# Patient Record
Sex: FEMALE | Race: WHITE | NOT HISPANIC OR LATINO | Employment: OTHER | URBAN - METROPOLITAN AREA
[De-identification: names, ages, dates, MRNs, and addresses within clinical notes are randomized per-mention and may not be internally consistent; named-entity substitution may affect disease eponyms.]

---

## 2017-03-01 ENCOUNTER — APPOINTMENT (OUTPATIENT)
Dept: LAB | Facility: CLINIC | Age: 77
End: 2017-03-01
Payer: MEDICARE

## 2017-03-01 DIAGNOSIS — I10 ESSENTIAL (PRIMARY) HYPERTENSION: ICD-10-CM

## 2017-03-01 LAB
ANION GAP SERPL CALCULATED.3IONS-SCNC: 5 MMOL/L (ref 4–13)
BUN SERPL-MCNC: 20 MG/DL (ref 5–25)
CALCIUM SERPL-MCNC: 9.6 MG/DL (ref 8.3–10.1)
CHLORIDE SERPL-SCNC: 103 MMOL/L (ref 100–108)
CO2 SERPL-SCNC: 32 MMOL/L (ref 21–32)
CREAT SERPL-MCNC: 0.97 MG/DL (ref 0.6–1.3)
GFR SERPL CREATININE-BSD FRML MDRD: 56 ML/MIN/1.73SQ M
GLUCOSE SERPL-MCNC: 128 MG/DL (ref 65–140)
POTASSIUM SERPL-SCNC: 4.3 MMOL/L (ref 3.5–5.3)
SODIUM SERPL-SCNC: 140 MMOL/L (ref 136–145)

## 2017-03-01 PROCEDURE — 80048 BASIC METABOLIC PNL TOTAL CA: CPT

## 2017-03-01 PROCEDURE — 36415 COLL VENOUS BLD VENIPUNCTURE: CPT

## 2017-03-21 ENCOUNTER — ALLSCRIPTS OFFICE VISIT (OUTPATIENT)
Dept: OTHER | Facility: OTHER | Age: 77
End: 2017-03-21

## 2017-05-22 ENCOUNTER — APPOINTMENT (OUTPATIENT)
Dept: LAB | Facility: CLINIC | Age: 77
End: 2017-05-22
Payer: MEDICARE

## 2017-05-22 DIAGNOSIS — E87.1 HYPO-OSMOLALITY AND HYPONATREMIA: ICD-10-CM

## 2017-05-22 LAB
ANION GAP SERPL CALCULATED.3IONS-SCNC: 10 MMOL/L (ref 4–13)
BACTERIA UR QL AUTO: ABNORMAL /HPF
BILIRUB UR QL STRIP: NEGATIVE
BUN SERPL-MCNC: 23 MG/DL (ref 5–25)
CALCIUM SERPL-MCNC: 9.6 MG/DL (ref 8.3–10.1)
CHLORIDE SERPL-SCNC: 99 MMOL/L (ref 100–108)
CLARITY UR: CLEAR
CO2 SERPL-SCNC: 28 MMOL/L (ref 21–32)
COLOR UR: YELLOW
CREAT SERPL-MCNC: 0.87 MG/DL (ref 0.6–1.3)
CREAT UR-MCNC: 62.1 MG/DL
GFR SERPL CREATININE-BSD FRML MDRD: >60 ML/MIN/1.73SQ M
GLUCOSE SERPL-MCNC: 152 MG/DL (ref 65–140)
GLUCOSE UR STRIP-MCNC: NEGATIVE MG/DL
HGB UR QL STRIP.AUTO: NEGATIVE
KETONES UR STRIP-MCNC: NEGATIVE MG/DL
LEUKOCYTE ESTERASE UR QL STRIP: ABNORMAL
NITRITE UR QL STRIP: NEGATIVE
NON-SQ EPI CELLS URNS QL MICRO: ABNORMAL /HPF
PH UR STRIP.AUTO: 6.5 [PH] (ref 4.5–8)
POTASSIUM SERPL-SCNC: 4.4 MMOL/L (ref 3.5–5.3)
PROT UR STRIP-MCNC: NEGATIVE MG/DL
PROT UR-MCNC: 10 MG/DL
PROT/CREAT UR: 0.16 MG/G{CREAT} (ref 0–0.1)
RBC #/AREA URNS AUTO: ABNORMAL /HPF
SODIUM SERPL-SCNC: 137 MMOL/L (ref 136–145)
SP GR UR STRIP.AUTO: 1.01 (ref 1–1.03)
UROBILINOGEN UR QL STRIP.AUTO: 0.2 E.U./DL
WBC #/AREA URNS AUTO: ABNORMAL /HPF

## 2017-05-22 PROCEDURE — 84156 ASSAY OF PROTEIN URINE: CPT

## 2017-05-22 PROCEDURE — 80048 BASIC METABOLIC PNL TOTAL CA: CPT

## 2017-05-22 PROCEDURE — 81001 URINALYSIS AUTO W/SCOPE: CPT

## 2017-05-22 PROCEDURE — 82570 ASSAY OF URINE CREATININE: CPT

## 2017-05-22 PROCEDURE — 36415 COLL VENOUS BLD VENIPUNCTURE: CPT

## 2017-05-24 ENCOUNTER — GENERIC CONVERSION - ENCOUNTER (OUTPATIENT)
Dept: OTHER | Facility: OTHER | Age: 77
End: 2017-05-24

## 2017-06-02 ENCOUNTER — ALLSCRIPTS OFFICE VISIT (OUTPATIENT)
Dept: OTHER | Facility: OTHER | Age: 77
End: 2017-06-02

## 2017-06-02 DIAGNOSIS — E87.1 HYPO-OSMOLALITY AND HYPONATREMIA: ICD-10-CM

## 2017-10-11 ENCOUNTER — APPOINTMENT (OUTPATIENT)
Dept: LAB | Facility: CLINIC | Age: 77
End: 2017-10-11
Payer: MEDICARE

## 2017-10-11 DIAGNOSIS — E87.1 HYPO-OSMOLALITY AND HYPONATREMIA: ICD-10-CM

## 2017-10-11 LAB
ANION GAP SERPL CALCULATED.3IONS-SCNC: 6 MMOL/L (ref 4–13)
BUN SERPL-MCNC: 15 MG/DL (ref 5–25)
CALCIUM SERPL-MCNC: 9.7 MG/DL (ref 8.3–10.1)
CHLORIDE SERPL-SCNC: 100 MMOL/L (ref 100–108)
CO2 SERPL-SCNC: 31 MMOL/L (ref 21–32)
CREAT SERPL-MCNC: 0.9 MG/DL (ref 0.6–1.3)
CREAT UR-MCNC: 57.9 MG/DL
GFR SERPL CREATININE-BSD FRML MDRD: 62 ML/MIN/1.73SQ M
GLUCOSE SERPL-MCNC: 147 MG/DL (ref 65–140)
POTASSIUM SERPL-SCNC: 4.5 MMOL/L (ref 3.5–5.3)
PROT UR-MCNC: 8 MG/DL
PROT/CREAT UR: 0.14 MG/G{CREAT} (ref 0–0.1)
SODIUM SERPL-SCNC: 137 MMOL/L (ref 136–145)

## 2017-10-11 PROCEDURE — 80048 BASIC METABOLIC PNL TOTAL CA: CPT

## 2017-10-11 PROCEDURE — 82570 ASSAY OF URINE CREATININE: CPT

## 2017-10-11 PROCEDURE — 84156 ASSAY OF PROTEIN URINE: CPT

## 2017-10-11 PROCEDURE — 36415 COLL VENOUS BLD VENIPUNCTURE: CPT

## 2017-10-22 ENCOUNTER — GENERIC CONVERSION - ENCOUNTER (OUTPATIENT)
Dept: OTHER | Facility: OTHER | Age: 77
End: 2017-10-22

## 2017-10-23 ENCOUNTER — GENERIC CONVERSION - ENCOUNTER (OUTPATIENT)
Dept: OTHER | Facility: OTHER | Age: 77
End: 2017-10-23

## 2017-11-28 ENCOUNTER — GENERIC CONVERSION - ENCOUNTER (OUTPATIENT)
Dept: NEPHROLOGY | Facility: CLINIC | Age: 77
End: 2017-11-28

## 2017-12-05 ENCOUNTER — GENERIC CONVERSION - ENCOUNTER (OUTPATIENT)
Dept: OTHER | Facility: OTHER | Age: 77
End: 2017-12-05

## 2017-12-15 ENCOUNTER — ALLSCRIPTS OFFICE VISIT (OUTPATIENT)
Dept: OTHER | Facility: OTHER | Age: 77
End: 2017-12-15

## 2017-12-20 ENCOUNTER — GENERIC CONVERSION - ENCOUNTER (OUTPATIENT)
Dept: OTHER | Facility: OTHER | Age: 77
End: 2017-12-20

## 2017-12-20 NOTE — PROGRESS NOTES
Assessment  1  Benign essential hypertension (401 1) (I10)   2  Hyponatremia (276 1) (E87 1)    Plan  Hyponatremia    · (1) BASIC METABOLIC PROFILE; Status:Active; Requested IQH:[id number]; Perform:[facility]; VLD:[id number]; Last Updated [facility]; 12/15/2017 12:05:52 PM;Ordered;For:Hyponatremia; Ordered By:Jeffrey Nava;   · (1) BASIC METABOLIC PROFILE; Status:Active; Requested QKX:43DKF4534; Perform:[facility]; [id number]; Last Updated [facility]; 12/15/2017 12:06:14 PM;Ordered;For:Hyponatremia; Ordered By:Jeffrey Nava;   · (1) BASIC METABOLIC PROFILE; Status:Active; Requested QFU:94WUP5171; Perform:[facility]; ZDC:46KMC1771; Last Updated [facility]; 12/15/2017 12:06:29 PM;Ordered;For:Hyponatremia; Ordered By:Jeffrey Nava;   · (1) BASIC METABOLIC PROFILE; Status:Complete; Requested for:Recurring Schedule:2018; 1/15/2018; 2018 ; Perform:[facility]; PIC:24YVN1555;KPVIBOE;PWB:[id number]; Ordered By:Jeffrey Nava;    #1  Continue fluid restriction between low 1 L to 1200 mL's per day that would be 1 L plus one glass of fluid  #2   Labs next week #3  Continue water pill  Please call with dosage so we can update the chart  #4  Make sure you continue to eat adequately #5  Call the office if any new medications were started   Discussion/Summary    #1  Hyponatremia: Recent hospitalization with acute on chronic drop in sodium level to 107 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, SIADH  Patient reported increase in fluid intake with decrease in intake of food due to nausea previous to admission  She was treated with 3% saline and discharged on fluid restriction, diuretic and salt tabs  She discontinued salt tabs one week, with suggestion of her PCP due to rash  Sodium level III days following cessation of salt tabs was 138 -Continue to closely monitor sodium level   Labs next week and then every two weeks in January Ã3-Continue fluid restriction-Continue diuretic-Hold salt tablets at this time#2  Hypertension: Blood pressure acceptable at this time#3  Volume status: Euvolemic  No evidence of orthostasis#4  CHF: Ejection fraction 55%, 2+ MR-Compensated#5  COPD: Remains on continuous home oxygen#6  Assessment of proteinuria excretion  Last urine protein creatinine ratio 0 16  The patient's family was counseled regarding instructions for management,-- risk factor reductions,-- patient and family education,-- impressions,-- importance of compliance with treatment  total time of encounter was 40 minutes-- and-- 20 minutes was spent counseling  Patient is able to Self-Care  Possible side effects of new medications were reviewed with the patient/guardian today  The treatment plan was reviewed with the patient/guardian  The patient/guardian understands and agrees with the treatment plan      Reason For Visit  Hospital follow-up      History of Present Illness  It was a pleasure seeing Camryn today in the nephrology clinic  She is followed by Dr Roberto Reyes for management of hyponatremia  She was hospitalized recently from November 27 to December 1 due to an acute decline in sodium level to 107  The patient states that it was preceded by increase in water intake, decreased appetite with decreased food intake and nausea  No recent change in medications  She was in the intensive care unit on 3% normal saline  She stabilized on fluid restriction, diuretic and salt tablets  Last Friday she had a generalized macular papular rash and her primary care physician took her off the salt tablets  The rash has subsequently resolved  She had labs done five days ago which show a serum sodium level of 138  She remains on a fluid restriction between 1 L to 1200 mL's a day  She is on torsemide daily  She relates that since she came home her lower extremity edema has all but resolved  She is on chronic oxygen since March 2016   She has no unusual shortness of breath although she has periods of shakiness sometimes in the morning  She has had no fevers or chills  No chest pain  No difficulty urinating  No nausea, vomiting, diarrhea  Her intake of food is adequate  She enjoys small frequent meals  She is religiously following her fluid restriction  Review of Systems   Constitutional: No complaints of fever, no chills, no anorexia, no tiredness, no recent weight gain or weight loss  Integumentary: a rash, but-- as noted in HPI  Gastrointestinal: No complains of abdominal pain, no constipation or diarrhea, no nausea or vomiting  Respiratory: no cough  Cardiovascular: no orthopnea,-- as noted in HPI,-- no chest pain,-- no palpitations-- and-- no lower extremity edema  Musculoskeletal: joint pain  Neurological: No complaints of headache, no lightheadedness or dizziness  Genitourinary: No dysuria, no hematuria, no nocturia, no urinary frequency, no incomplete emptying of bladder, no foamy urine  Eyes: No complaints of eyesight problems or dryness of eyes  ENT: no complaints of hearing loss, no nasal discharge  Psychiatric: Not suicidal, no sleep disturbance, no anxiety or depression, no change in personality, no emotional problems  Past Medical History    The active problems and past medical history were reviewed and updated today  Surgical History    The surgical history was reviewed and updated today  Family History    The family history was reviewed and updated today  Social History  The social history was reviewed and updated today  The social history was reviewed and is unchanged  Current Meds   1  Adult Aspirin EC Low Strength 81 MG Oral Tablet Delayed Release; TAKE 1 TABLET DAILY; Therapy: 33KAS2794 to Recorded   2  AmLODIPine Besylate 5 MG Oral Tablet; Take 1 tablet daily; Therapy: 85SRZ5513 to (Evaluate:07Jun2017) Recorded   3  Calcium 600-400 MG-UNIT Oral Tablet Chewable; chew 1 tablet twice daily; Therapy: 04WHE7226 to Recorded   4  Claritin 10 MG Oral Tablet; TAKE 1 TABLET DAILY; Therapy: 68AXQ9855 to Recorded   5  GlipiZIDE 10 MG Oral Tablet; Take 1 tablet twice daily; Therapy: 88PAD0469 to Recorded   6  Latanoprost 0 005 % Ophthalmic Solution; Therapy: 21Apr2017 to Recorded   7  MetFORMIN HCl - 500 MG Oral Tablet; take 2 tablet twice daily; Therapy: 97KMS7603 to Recorded   8  Metoprolol Tartrate 50 MG Oral Tablet; Take 1 tablet twice daily; Therapy: 14MWW4055 to (Evaluate:61Bjh1518) Recorded   9  Nasonex 50 MCG/ACT Nasal Suspension; USE 1 SPRAY IN EACH NOSTRIL ONCE DAILY; Therapy: 02ZMN8334 to Recorded   10  Symbicort 160-4 5 MCG/ACT Inhalation Aerosol; INHALE 2 PUFFS TWICE DAILY  RINSE  MOUTH AFTER USE; Therapy: 15NSO1955 to Recorded   11  Terazosin HCl - 10 MG Oral Capsule; TAKE 1 CAPSULE AT BEDTIME NIGHTLY; Therapy: 82NCK4017 to Recorded   12  Torsemide 20 MG Oral Tablet; TAKE 1 TABLET DAILY; Therapy: 35SUE8428 to (Evaluate:13Jun2018)  Requested for: 33WMM6217 Recorded    The medication list was reviewed and updated today  Allergies  1  HydroCHLOROthiazide TABS    Vitals  Vital Signs    ** Printed in Appendix #1 below  Physical Exam   Constitutional: General appearance: Abnormal   chronically ill-- and-- appearance reflects stated age  ENT: External ears and nose appear normal     Eyes: Anicteric sclerae  Neck: No bruit heard over either carotid  JVD:  No JVD present  Pulmonary: Respiratory effort: No increased work of breathing or signs of respiratory distress  -- Auscultation of lungs: Clear to auscultation  Cardiovascular: Auscultation of heart: Abnormal   The heart rate was normal  The rhythm was regular  Heart sounds: normal S2  A grade 2 systolic murmur was heard at the LUSB  Abdomen: Non-tender, no masses  Extremities: No cyanosis, clubbing or edema  Pulses: Dorsalis Pedis and Posterior Tibial pulses normal   Rash: No rash present    Neurologic: Non Focal     Psychiatric: Orientation to person, place, and time: Normal  -- and-- Mood and affect: Normal    Back: No CVA tenderness  Additional Exam:  No rash noted        Results/Data  Nephrology Flowsheet 07QYX7543 01:47PM Sheldon Saucedo     Test Name Result Flag Reference   Sodium 135     Potassium 4 5     Chloride 97     Carbon Dioxide 29     Calcium 9 7     GLUCOSE 194     BUN 21     Serum Creatinine 0 89     GFR, NON- 63         Signatures   Electronically signed by : Araceli Chaparro 15 2017 12:18PM EST                       (Author)    Electronically signed by : RAMAKRISHNA Saldana ; Dec 19 2017  9:29PM EST                       (Author)    Appendix #1  Vital Signs   PatientChloe Watson; : 1940; MRN: [id number]   Recorded: [id number] 11:57AM Recorded: 61TUI9180 11:45AM Recorded: 17NVM5996 11:30AM   Heart Rate  58, Apical    Pulse Quality  Regular, Apical    Respiration Quality  Normal    Systolic 320, RUE, Standing 136, RUE, Sitting    Diastolic 60, RUE, Standing 60, RUE, Sitting    Height   5 ft    Weight   190 lb 4 oz   BMI Calculated   37 16   BSA Calculated   1 83

## 2017-12-28 ENCOUNTER — GENERIC CONVERSION - ENCOUNTER (OUTPATIENT)
Dept: OTHER | Facility: OTHER | Age: 77
End: 2017-12-28

## 2018-01-01 DIAGNOSIS — I10 ESSENTIAL (PRIMARY) HYPERTENSION: ICD-10-CM

## 2018-01-01 DIAGNOSIS — E87.1 HYPO-OSMOLALITY AND HYPONATREMIA (CODE): ICD-10-CM

## 2018-01-09 NOTE — RESULT NOTES
Message   labs stable  Na stable  UPCR stable  will review with patient tomorrow at appt  Verified Results  (1) BASIC METABOLIC PROFILE 00HRI5783 09:46AM Julianawes Knutsondalia   TW Order Number: [id number]     Test Name Result Flag Reference   GLUCOSE,RANDM 147 mg/dL H    If the patient is fasting, the ADA then defines impaired fasting glucose as > 100 mg/dL and diabetes as > or equal to 123 mg/dL  Specimen collection should occur prior to Sulfasalazine administration due to the potential for falsely depressed results  Specimen collection should occur prior to Sulfapyridine administration due to the potential for falsely elevated results  SODIUM 137 mmol/L  136-145   POTASSIUM 4 5 mmol/L  3 5-5 3   CHLORIDE 100 mmol/L  100-108   CARBON DIOXIDE 31 mmol/L  21-32   ANION GAP (CALC) 6 mmol/L  4-13   BLOOD UREA NITROGEN 15 mg/dL  5-25   CREATININE 0 90 mg/dL  0 60-1 30   Standardized to IDMS reference method   CALCIUM 9 7 mg/dL  8 3-10 1   eGFR 62 ml/min/1 73sq [facility] Disease Education Program recommendations are as follows:  GFR calculation is accurate only with a steady state creatinine  Chronic Kidney disease less than 60 ml/min/1 73 sq  meters  Kidney failure less than 15 ml/min/1 73 sq  meters       (1) URINE PROTEIN CREATININE RATIO 11Oct2017 09:46AM Julianawes Scooby   TW Order Number: [id number]     Test Name Result Flag Reference   CREATININE URINE 57 9 mg/dL     URINE PROTEIN:CREATININE RATIO 0 14 H 0 00-0 10   URINE PROTEIN 8 mg/dL

## 2018-01-13 VITALS
HEIGHT: 60 IN | DIASTOLIC BLOOD PRESSURE: 85 MMHG | SYSTOLIC BLOOD PRESSURE: 145 MMHG | BODY MASS INDEX: 35.53 KG/M2 | WEIGHT: 181 LBS

## 2018-01-13 VITALS
DIASTOLIC BLOOD PRESSURE: 52 MMHG | SYSTOLIC BLOOD PRESSURE: 132 MMHG | BODY MASS INDEX: 34.55 KG/M2 | WEIGHT: 176 LBS | HEIGHT: 60 IN

## 2018-01-15 ENCOUNTER — APPOINTMENT (OUTPATIENT)
Dept: LAB | Facility: CLINIC | Age: 78
End: 2018-01-15
Payer: MEDICARE

## 2018-01-15 DIAGNOSIS — E87.1 HYPO-OSMOLALITY AND HYPONATREMIA (CODE): ICD-10-CM

## 2018-01-15 LAB
ANION GAP SERPL CALCULATED.3IONS-SCNC: 7 MMOL/L (ref 4–13)
BUN SERPL-MCNC: 15 MG/DL (ref 5–25)
CALCIUM SERPL-MCNC: 9.7 MG/DL (ref 8.3–10.1)
CHLORIDE SERPL-SCNC: 102 MMOL/L (ref 100–108)
CO2 SERPL-SCNC: 31 MMOL/L (ref 21–32)
CREAT SERPL-MCNC: 0.9 MG/DL (ref 0.6–1.3)
GFR SERPL CREATININE-BSD FRML MDRD: 62 ML/MIN/1.73SQ M
GLUCOSE SERPL-MCNC: 163 MG/DL (ref 65–140)
POTASSIUM SERPL-SCNC: 4 MMOL/L (ref 3.5–5.3)
SODIUM SERPL-SCNC: 140 MMOL/L (ref 136–145)

## 2018-01-15 PROCEDURE — 36415 COLL VENOUS BLD VENIPUNCTURE: CPT

## 2018-01-15 PROCEDURE — 80048 BASIC METABOLIC PNL TOTAL CA: CPT

## 2018-01-16 NOTE — RESULT NOTES
Verified Results  (1) BASIC METABOLIC PROFILE 24GBE1533 09:12AM Gnzo   TW Order Number: [id number]     Test Name Result Flag Reference   GLUCOSE,RANDM 152 mg/dL H    If the patient is fasting, the ADA then defines impaired fasting glucose as > 100 mg/dL and diabetes as > or equal to 123 mg/dL  SODIUM 137 mmol/L  136-145   POTASSIUM 4 4 mmol/L  3 5-5 3   CHLORIDE 99 mmol/L L 100-108   CARBON DIOXIDE 28 mmol/L  21-32   ANION GAP (CALC) 10 mmol/L  4-13   BLOOD UREA NITROGEN 23 mg/dL  5-25   CREATININE 0 87 mg/dL  0 60-1 30   Standardized to IDMS reference method   CALCIUM 9 6 mg/dL  8 3-10 1   eGFR Non-African American      >60 0 ml/min/1 73sq [facility] Disease Education Program recommendations are as follows:  GFR calculation is accurate only with a steady state creatinine  Chronic Kidney disease less than 60 ml/min/1 73 sq  meters  Kidney failure less than 15 ml/min/1 73 sq  meters       (1) URINE PROTEIN CREATININE RATIO 68BAO7111 09:12AM Gnzo   TW Order Number: [id number]     Test Name Result Flag Reference   CREATININE URINE 62 1 mg/dL     URINE PROTEIN:CREATININE RATIO 0 16 H 0 00-0 10   URINE PROTEIN 10 mg/dL       (1) URINALYSIS (will reflex a microscopy if leukocytes, occult blood, protein or nitrites are not within normal limits) 47DMT5728 09:12AM Gnzo   TW Order Number: [id number]     Test Name Result Flag Reference   COLOR Yellow     CLARITY Clear     SPECIFIC GRAVITY UA 1 010  1 003-1 030   PH UA 6 5  4 5-8 0   LEUKOCYTE ESTERASE UA Trace A Negative   NITRITE UA Negative  Negative   PROTEIN UA Negative mg/dl  Negative   GLUCOSE UA Negative mg/dl  Negative   KETONES UA Negative mg/dl  Negative   UROBILINOGEN UA 0 2 E U /dl  0 2, 1 0 E U /dl   BILIRUBIN UA Negative  Negative   BLOOD UA Negative  Negative   BACTERIA Occasional /hpf  None Seen, Occasional   EPITHELIAL CELLS Occasional /hpf  None Seen, Occasional   RBC UA 0-1 /hpf A None Seen   WBC UA 1-2 /hpf A None Seen

## 2018-01-18 ENCOUNTER — GENERIC CONVERSION - ENCOUNTER (OUTPATIENT)
Dept: OTHER | Facility: OTHER | Age: 78
End: 2018-01-18

## 2018-01-22 VITALS
HEIGHT: 60 IN | SYSTOLIC BLOOD PRESSURE: 138 MMHG | DIASTOLIC BLOOD PRESSURE: 58 MMHG | WEIGHT: 193 LBS | BODY MASS INDEX: 37.89 KG/M2

## 2018-01-23 VITALS
BODY MASS INDEX: 37.35 KG/M2 | HEIGHT: 60 IN | HEART RATE: 58 BPM | DIASTOLIC BLOOD PRESSURE: 60 MMHG | WEIGHT: 190.25 LBS | SYSTOLIC BLOOD PRESSURE: 140 MMHG

## 2018-01-23 NOTE — RESULT NOTES
Message    Can patient be advised that Na improving  can liberalize fluid intake to 1 8-2 L daily      repeat BMP in 2 weeks        thank you      above task request sent  Pt aware of the above already has paper for blood work              Verified Results  Nephrology Flowsheet 39CBQ4753 08:33AM Ardia Barn     Test Name Result Flag Reference   Sodium 140     Potassium 4 0     Chloride 103     Carbon Dioxide 24     Calcium 9 4     GLUCOSE 211     BUN 17     Serum Creatinine 0 77       Nephrology Flowsheet 54HCS9209 01:47PM Ardia Barn     Test Name Result Flag Reference   Sodium 135     Potassium 4 5     Chloride 97     Carbon Dioxide 29     Calcium 9 7     GLUCOSE 194     BUN 21     Serum Creatinine 0 89     GFR, NON- 63         Signatures   Electronically signed by : Lisandro Owusu, ; Dec 28 2017  3:54PM EST                       (Author)

## 2018-01-23 NOTE — MISCELLANEOUS
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)   4  Congestive heart failure (428 0) (I50 9)   5  COPD (chronic obstructive pulmonary disease) (496) (J44 9)   6  Diabetes mellitus, type 2 (250 00) (E11 9)   7  Glaucoma (365 9) (H40 9)   8  Hyponatremia (276 1) (E87 1)    Current Meds   1  Adult Aspirin EC Low Strength 81 MG Oral Tablet Delayed Release; TAKE 1 TABLET   DAILY; Therapy: 33FVI3548 to Recorded   2  AmLODIPine Besylate 5 MG Oral Tablet; Take 1 tablet daily; Therapy: 58TZD5429 to (Evaluate:07Jun2017) Recorded   3  Calcium 600-400 MG-UNIT Oral Tablet Chewable; chew 1 tablet twice daily; Therapy: 93FVO5725 to Recorded   4  Claritin 10 MG Oral Tablet; TAKE 1 TABLET DAILY; Therapy: 57NEX8059 to Recorded   5  GlipiZIDE 10 MG Oral Tablet; Take 1 tablet twice daily; Therapy: 64ZBV8879 to Recorded   6  Latanoprost 0 005 % Ophthalmic Solution; Therapy: 57Sky8064 to Recorded   7  MetFORMIN HCl - 500 MG Oral Tablet; take 2 tablet twice daily; Therapy: 35BKZ8427 to Recorded   8  Metoprolol Tartrate 50 MG Oral Tablet; Take 1 tablet twice daily; Therapy: 40LBC1183 to (Evaluate:09Lju7589) Recorded   9  Nasonex 50 MCG/ACT Nasal Suspension (Mometasone Furoate); USE 1 SPRAY IN   EACH NOSTRIL ONCE DAILY; Therapy: 57TDL0236 to Recorded   10  Symbicort 160-4 5 MCG/ACT Inhalation Aerosol; INHALE 2 PUFFS TWICE DAILY  RINSE MOUTH AFTER USE; Therapy: 46NNP1871 to Recorded   11   Terazosin HCl - 10 MG Oral Capsule; TAKE 1 CAPSULE AT BEDTIME NIGHTLY; Therapy: 77YEX2783 to Recorded   12  Torsemide 20 MG Oral Tablet; TAKE 1 TABLET DAILY; Therapy: 59SNB3672 to (Evaluate:47Wkj2674)  Requested for: 53KGT3870 Recorded    Allergies    1   HydroCHLOROthiazide TABS    Signatures   Electronically signed by : Britta Scott, ; Jan 18 2018  3:33PM EST                       (Author)

## 2018-01-29 ENCOUNTER — TRANSCRIBE ORDERS (OUTPATIENT)
Dept: LAB | Facility: CLINIC | Age: 78
End: 2018-01-29

## 2018-01-29 ENCOUNTER — LAB (OUTPATIENT)
Dept: LAB | Facility: CLINIC | Age: 78
End: 2018-01-29
Payer: MEDICARE

## 2018-01-29 DIAGNOSIS — E87.1 HYPO-OSMOLALITY AND HYPONATREMIA (CODE): ICD-10-CM

## 2018-01-29 LAB
ANION GAP SERPL CALCULATED.3IONS-SCNC: 8 MMOL/L (ref 4–13)
BUN SERPL-MCNC: 18 MG/DL (ref 5–25)
CALCIUM SERPL-MCNC: 10 MG/DL (ref 8.3–10.1)
CHLORIDE SERPL-SCNC: 104 MMOL/L (ref 100–108)
CO2 SERPL-SCNC: 29 MMOL/L (ref 21–32)
CREAT SERPL-MCNC: 0.98 MG/DL (ref 0.6–1.3)
GFR SERPL CREATININE-BSD FRML MDRD: 56 ML/MIN/1.73SQ M
GLUCOSE SERPL-MCNC: 152 MG/DL (ref 65–140)
POTASSIUM SERPL-SCNC: 4.5 MMOL/L (ref 3.5–5.3)
SODIUM SERPL-SCNC: 141 MMOL/L (ref 136–145)

## 2018-01-29 PROCEDURE — 80048 BASIC METABOLIC PNL TOTAL CA: CPT

## 2018-01-29 PROCEDURE — 36415 COLL VENOUS BLD VENIPUNCTURE: CPT

## 2018-02-12 ENCOUNTER — LAB (OUTPATIENT)
Dept: LAB | Facility: CLINIC | Age: 78
End: 2018-02-12
Payer: MEDICARE

## 2018-02-12 DIAGNOSIS — E87.1 HYPO-OSMOLALITY AND HYPONATREMIA (CODE): ICD-10-CM

## 2018-02-12 DIAGNOSIS — E87.1 HYPONATREMIA: Primary | ICD-10-CM

## 2018-02-12 LAB
ANION GAP SERPL CALCULATED.3IONS-SCNC: 9 MMOL/L (ref 4–13)
BUN SERPL-MCNC: 19 MG/DL (ref 5–25)
CALCIUM SERPL-MCNC: 10 MG/DL (ref 8.3–10.1)
CHLORIDE SERPL-SCNC: 103 MMOL/L (ref 100–108)
CO2 SERPL-SCNC: 29 MMOL/L (ref 21–32)
CREAT SERPL-MCNC: 1.04 MG/DL (ref 0.6–1.3)
CREAT UR-MCNC: <13 MG/DL
ERYTHROCYTE [DISTWIDTH] IN BLOOD BY AUTOMATED COUNT: 13.3 % (ref 11.6–15.1)
GFR SERPL CREATININE-BSD FRML MDRD: 52 ML/MIN/1.73SQ M
GLUCOSE SERPL-MCNC: 155 MG/DL (ref 65–140)
HCT VFR BLD AUTO: 37.8 % (ref 34.8–46.1)
HGB BLD-MCNC: 11.9 G/DL (ref 11.5–15.4)
MCH RBC QN AUTO: 27.8 PG (ref 26.8–34.3)
MCHC RBC AUTO-ENTMCNC: 31.5 G/DL (ref 31.4–37.4)
MCV RBC AUTO: 88 FL (ref 82–98)
PLATELET # BLD AUTO: 273 THOUSANDS/UL (ref 149–390)
PMV BLD AUTO: 9.9 FL (ref 8.9–12.7)
POTASSIUM SERPL-SCNC: 4.2 MMOL/L (ref 3.5–5.3)
PROT UR-MCNC: 7 MG/DL
PROT/CREAT UR: >0.54 MG/G{CREAT} (ref 0–0.1)
RBC # BLD AUTO: 4.28 MILLION/UL (ref 3.81–5.12)
SODIUM SERPL-SCNC: 141 MMOL/L (ref 136–145)
WBC # BLD AUTO: 6.53 THOUSAND/UL (ref 4.31–10.16)

## 2018-02-12 PROCEDURE — 36415 COLL VENOUS BLD VENIPUNCTURE: CPT

## 2018-02-12 PROCEDURE — 84165 PROTEIN E-PHORESIS SERUM: CPT | Performed by: PATHOLOGY

## 2018-02-12 PROCEDURE — 85027 COMPLETE CBC AUTOMATED: CPT

## 2018-02-12 PROCEDURE — 84156 ASSAY OF PROTEIN URINE: CPT

## 2018-02-12 PROCEDURE — 82570 ASSAY OF URINE CREATININE: CPT

## 2018-02-12 PROCEDURE — 80048 BASIC METABOLIC PNL TOTAL CA: CPT

## 2018-02-12 PROCEDURE — 84166 PROTEIN E-PHORESIS/URINE/CSF: CPT

## 2018-02-12 PROCEDURE — 83883 ASSAY NEPHELOMETRY NOT SPEC: CPT

## 2018-02-12 PROCEDURE — 84165 PROTEIN E-PHORESIS SERUM: CPT

## 2018-02-12 PROCEDURE — 84166 PROTEIN E-PHORESIS/URINE/CSF: CPT | Performed by: PATHOLOGY

## 2018-02-12 NOTE — PROGRESS NOTES
Hello    Can patient be advised that sodium is stable   No change to current plan    Can patient have BMP prior to next appt (i believe she is seeing us in march)    Thank you    np

## 2018-02-13 LAB
KAPPA LC FREE SER-MCNC: 48.5 MG/L (ref 3.3–19.4)
KAPPA LC FREE/LAMBDA FREE SER: 1.47 {RATIO} (ref 0.26–1.65)
LAMBDA LC FREE SERPL-MCNC: 32.9 MG/L (ref 5.7–26.3)

## 2018-02-14 LAB
ALBUMIN SERPL ELPH-MCNC: 4.41 G/DL (ref 3.5–5)
ALBUMIN SERPL ELPH-MCNC: 55.8 % (ref 52–65)
ALBUMIN UR ELPH-MCNC: 100 %
ALPHA1 GLOB MFR UR ELPH: 0 %
ALPHA1 GLOB SERPL ELPH-MCNC: 0.27 G/DL (ref 0.1–0.4)
ALPHA1 GLOB SERPL ELPH-MCNC: 3.4 % (ref 2.5–5)
ALPHA2 GLOB MFR UR ELPH: 0 %
ALPHA2 GLOB SERPL ELPH-MCNC: 0.91 G/DL (ref 0.4–1.2)
ALPHA2 GLOB SERPL ELPH-MCNC: 11.5 % (ref 7–13)
B-GLOBULIN MFR UR ELPH: 0 %
BETA GLOB ABNORMAL SERPL ELPH-MCNC: 0.47 G/DL (ref 0.4–0.8)
BETA1 GLOB SERPL ELPH-MCNC: 5.9 % (ref 5–13)
BETA2 GLOB SERPL ELPH-MCNC: 3.4 % (ref 2–8)
BETA2+GAMMA GLOB SERPL ELPH-MCNC: 0.27 G/DL (ref 0.2–0.5)
GAMMA GLOB ABNORMAL SERPL ELPH-MCNC: 1.58 G/DL (ref 0.5–1.6)
GAMMA GLOB MFR UR ELPH: 0 %
GAMMA GLOB SERPL ELPH-MCNC: 20 % (ref 12–22)
IGG/ALB SER: 1.26 {RATIO} (ref 1.1–1.8)
PROT PATTERN SERPL ELPH-IMP: NORMAL
PROT PATTERN UR ELPH-IMP: NORMAL
PROT SERPL-MCNC: 7.9 G/DL (ref 6.4–8.2)
PROT UR-MCNC: 7 MG/DL

## 2018-03-08 NOTE — PROGRESS NOTES
Hello    Can patient be notified that sodium level is normal      No changes to regimen    Thank you    berenice

## 2018-04-16 LAB — HBA1C MFR BLD HPLC: 6.1 %

## 2018-04-18 ENCOUNTER — OFFICE VISIT (OUTPATIENT)
Dept: NEPHROLOGY | Facility: CLINIC | Age: 78
End: 2018-04-18
Payer: MEDICARE

## 2018-04-18 VITALS
BODY MASS INDEX: 39.31 KG/M2 | DIASTOLIC BLOOD PRESSURE: 74 MMHG | WEIGHT: 195 LBS | SYSTOLIC BLOOD PRESSURE: 166 MMHG | HEIGHT: 59 IN | HEART RATE: 58 BPM

## 2018-04-18 DIAGNOSIS — R80.1 PERSISTENT PROTEINURIA: ICD-10-CM

## 2018-04-18 DIAGNOSIS — I10 ESSENTIAL HYPERTENSION: ICD-10-CM

## 2018-04-18 DIAGNOSIS — E87.1 HYPONATREMIA: Primary | ICD-10-CM

## 2018-04-18 LAB
EXT GLUCOSE BLD: 167
EXTERNAL BUN: 15
EXTERNAL CALCIUM: 10
EXTERNAL CHLORIDE: 99
EXTERNAL CO2: 32
EXTERNAL CREATININE: 0.78
EXTERNAL EGFR: 74
EXTERNAL POTASSIUM: 4.1
EXTERNAL SODIUM: 137
HCT VFR BLD AUTO: 37.6 % (ref 34.8–46.1)
HGB BLD-MCNC: 11.9 G/DL (ref 11.5–15.4)
PLATELET # BLD AUTO: 276 THOUSANDS/UL (ref 149–390)
WBC # BLD AUTO: 6.9 THOUSAND/UL

## 2018-04-18 PROCEDURE — 99213 OFFICE O/P EST LOW 20 MIN: CPT | Performed by: NURSE PRACTITIONER

## 2018-04-18 RX ORDER — LORATADINE 10 MG/1
1 TABLET ORAL DAILY
COMMUNITY
Start: 2016-12-09

## 2018-04-18 RX ORDER — GLIPIZIDE 10 MG/1
1 TABLET ORAL 2 TIMES DAILY
COMMUNITY
Start: 2016-12-09 | End: 2020-10-08 | Stop reason: SDUPTHER

## 2018-04-18 RX ORDER — FLUTICASONE PROPIONATE 50 MCG
1 SPRAY, SUSPENSION (ML) NASAL DAILY
COMMUNITY

## 2018-04-18 RX ORDER — TERAZOSIN 10 MG/1
1 CAPSULE ORAL
COMMUNITY
Start: 2016-12-09 | End: 2019-05-29 | Stop reason: SDUPTHER

## 2018-04-18 RX ORDER — AMLODIPINE BESYLATE 5 MG/1
1 TABLET ORAL DAILY
COMMUNITY
Start: 2016-12-09 | End: 2018-07-18 | Stop reason: SDUPTHER

## 2018-04-18 RX ORDER — TORSEMIDE 20 MG/1
20 TABLET ORAL DAILY
COMMUNITY
Start: 2018-03-15 | End: 2020-04-13 | Stop reason: SDUPTHER

## 2018-04-18 RX ORDER — VALSARTAN 320 MG/1
320 TABLET ORAL DAILY
COMMUNITY
Start: 2018-03-22 | End: 2018-07-18

## 2018-04-18 RX ORDER — SPIRONOLACTONE 25 MG/1
12.5 TABLET ORAL DAILY
Qty: 15 TABLET | Refills: 5 | Status: SHIPPED | OUTPATIENT
Start: 2018-04-18 | End: 2019-01-21 | Stop reason: SDUPTHER

## 2018-04-18 RX ORDER — ECHINACEA PURPUREA EXTRACT 125 MG
1 TABLET ORAL AS NEEDED
COMMUNITY
End: 2021-02-08 | Stop reason: ALTCHOICE

## 2018-04-18 RX ORDER — LATANOPROST 50 UG/ML
SOLUTION/ DROPS OPHTHALMIC
COMMUNITY
Start: 2017-04-21

## 2018-04-18 RX ORDER — METOPROLOL TARTRATE 50 MG/1
1 TABLET, FILM COATED ORAL 2 TIMES DAILY
COMMUNITY
Start: 2016-12-09 | End: 2019-05-29 | Stop reason: SDUPTHER

## 2018-04-18 NOTE — LETTER
April 18, 2018     [facility]  [address] [facility]  [id number] [place] [address]    Patient: Melvin George   YOB: 1940   Date of Visit: 4/18/2018       Dear Dr Andres Son: It was a pleasure seeing Melvin George In the nephrology clinic  Below are my notes for this Visit  If you have questions, please do not hesitate to call me  I look forward to following your patient along with you  Sincerely,        DEANNE Beltran        CC: MD Amee Carlson, [facility]  4/18/2018  5:08 PM  Sign at close encounter  [address] [facility] [address] [place] [address] 69 y o  female MRN: [id number]  4/18/2018    Reason for Visit: Follow-up    History of present illness and review of systems    I had the pleasure of seeing Ozzie Shanks today in the renal clinic for the continued management of Hyponatremia  She has a history of COPD, hypertension, lung carcinoid, CHF and mitral regurg  Since her last visit, there has been no ER visits or hospitilizations  She wears oxygen on a continual basis  She wears CPAP at night  She has no difficulty sleeping on one flat pillow; In fact she prefers it  No unusual or additional swelling  No weight change  She tries to keep her fluid intake at 2 L a day  No chest pain  No nausea, vomiting, diarrhea  No difficulty urinating  No blood or foam in urine  The last blood work was done on 4/13/18, which we have reviewed together  ASSESSMENT and PLAN:    1  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  Stopped salt tabs at the suggestion of her primary care physician several months ago due to a rash  Sodium level has been stable on fluid restriction and diuretic  · Sodium level stable, 137 on 4/13  · Modest fluid restriction  · On torsemide 10 mg daily  2  Hypertension: BP above goal  No orthostasis  Blood pressure increased upon standing  Left arm pressure readings lower than right arm  · Patient was not taking spironolactone therefore I started it at 12 5 mg daily  Check BMP in several weeks  · I instructed patient to check blood pressure at home and bring readings to next appointment  A form for documentation was provided  I encouraged her to bring in her home device if she had questions regarding function or validity  I reviewed the proper method of obtaining accurate home blood pressure readings  Please note that generally home devices are used in the left arm and need to be reviewed with the patient for use on the right arm if we desire those readings  Her left arm readings are lower than her right arm therefore we have to consider that when reviewing home readings  3  Electrolytes: Potassium 4 1  · Follow-up labs since we are reinitiating spironolactone  4  Volume status:  Fairly euvolemic- Trace edema  5  CHF: Compensated  Ejection fraction 55%, 2+ MR  6  COPD: On home oxygen  7  Proteinuria:  Last urine protein creatinine ratio 0 54 (Previously 0 14 and 0 16)  · Free light chain ratio within normal limits, no monoclonal gammopathy on SPEP or UPEP  · On valsartan  Spironolactone added      PATIENT INSTRUCTIONS:    Patient Instructions   1  Continue current fluid intake- 2 liters per day  2  Continue current medications  3  Blood work in one month and in 3 months before next appointment  4  Follow up with Dr Ivan Arrington in July  5  Bring in home blood pressure  Readings to next appointment  6  Start spironolactone 12 5 mg daily which is a half a tablet daily  7  Restart daily vitamin- centrum silver          Orders Placed This Encounter   Procedures    COMPREHENSIVE METABOLIC LESXV(26)     This is a patient instruction: Patient fasting for 8 hours or longer recommended       Standing Status:   Future     Standing Expiration Date:   9/18/2018    CBC     Standing Status:   Future     Standing Expiration Date:   9/18/2018    Protein / creatinine ratio, urine     Standing Status:   Future     Standing Expiration Date:   9/18/2018    Urinalysis with reflex to microscopic Standing Status:   Future     Standing Expiration Date:   9/18/2018    Basic metabolic panel     This is a patient instruction: Patient fasting for 8 hours or longer recommended  Standing Status:   Future     Standing Expiration Date:   7/18/2018       OBJECTIVE:  Current Weight: Weight - Scale: 88 5 kg (195 lb)  Vitals:    04/18/18 1456 04/18/18 1548 04/18/18 1549   BP: 146/68 156/70 166/74   BP Location: Left arm Right arm Right arm   Patient Position: Sitting Sitting Standing   Pulse: 58     Weight: 88 5 kg (195 lb)     Height: 4' 11" (1 499 m)      Body mass index is 39 39 kg/m²  REVIEW OF SYSTEMS:    Review of Systems   Constitutional: Negative for activity change, appetite change and unexpected weight change  Fatigue: No unusual fatigue  HENT:        Complains of nasal congestion especially at night  Sometimes uses some Vicks help clear her nasal passages   Eyes: Negative  Respiratory: Negative for wheezing  Shortness of breath: On continual oxygen  No unusual shortness of breath  Cardiovascular: Negative for chest pain and palpitations  Leg swelling: Mild intermittent ankle edema  Gastrointestinal: Negative  Endocrine: Negative  Genitourinary: Negative  Musculoskeletal: Gait problem: Uses an assistive device i e  cane for walking  PHYSICAL EXAM:      Physical Exam   Constitutional: She is oriented to person, place, and time  Elderly woman in no acute distress  HENT:   Head: Normocephalic and atraumatic  Mouth/Throat: Oropharynx is clear and moist  No oropharyngeal exudate  Eyes: Conjunctivae are normal  Right eye exhibits no discharge  Left eye exhibits no discharge  No scleral icterus  Neck: Neck supple  No JVD present  No bruit   Cardiovascular: Normal rate and regular rhythm  Exam reveals no gallop and no friction rub  Murmur (?  Soft systolic murmur) heard  Pulmonary/Chest: Effort normal and breath sounds normal  No respiratory distress   She has no wheezes  She has no rales  Abdominal: Soft  Bowel sounds are normal  She exhibits no distension  There is no tenderness  There is no rebound and no guarding  Musculoskeletal: She exhibits edema (Trace edema)  Neurological: She is alert and oriented to person, place, and time  Skin: Skin is warm and dry  No rash noted  No erythema         Medications:    Current Outpatient Prescriptions:     ADULT ASPIRIN LOW STRENGTH PO, Take 1 tablet by mouth daily, Disp: , Rfl:     amLODIPine (NORVASC) 5 mg tablet, Take 1 tablet by mouth daily, Disp: , Rfl:     Calcium 600-400 MG-UNIT CHEW, Chew 1 tablet 2 (two) times a day, Disp: , Rfl:     fluticasone (FLONASE) 50 mcg/act nasal spray, 1 spray into each nostril daily, Disp: , Rfl:     glipiZIDE (GLUCOTROL) 10 mg tablet, Take 1 tablet by mouth 2 (two) times a day, Disp: , Rfl:     latanoprost (XALATAN) 0 005 % ophthalmic solution, Apply to eye, Disp: , Rfl:     loratadine (CLARITIN) 10 mg tablet, Take 1 tablet by mouth daily, Disp: , Rfl:     metFORMIN (GLUCOPHAGE) 500 mg tablet, Take 2 tablets by mouth 2 (two) times a day, Disp: , Rfl:     metoprolol tartrate (LOPRESSOR) 50 mg tablet, Take 1 tablet by mouth 2 (two) times a day, Disp: , Rfl:     Multiple Vitamins-Minerals (CENTRUM SILVER 50+WOMEN PO), Take by mouth, Disp: , Rfl:     sodium chloride (OCEAN) 0 65 % nasal spray, 1 spray into each nostril as needed for congestion, Disp: , Rfl:     terazosin (HYTRIN) 10 MG capsule, Take 1 capsule by mouth, Disp: , Rfl:     torsemide (DEMADEX) 20 mg tablet, Take 20 mg by mouth daily, Disp: , Rfl:     valsartan (DIOVAN) 320 MG tablet, Take 320 mg by mouth daily, Disp: , Rfl:     spironolactone (ALDACTONE) 25 mg tablet, Take 0 5 tablets (12 5 mg total) by mouth daily, Disp: 15 tablet, Rfl: 5    Laboratory Results:    Results from last 7 days  Lab Units 04/13/18   WBC Thousand/uL 6 90   HEMOGLOBIN g/dL 11 9   HEMATOCRIT % 37 6   PLATELETS Thousands/uL 276   CHLORIDE 99       Results for orders placed or performed in visit on 04/16/18   HEMOGLOBIN A1C W/ EAG ESTIMATION   Result Value Ref Range    EXT Hemoglobin A1C 6 1

## 2018-04-18 NOTE — PROGRESS NOTES
NEPHROLOGY OFFICE VISIT   Marivel Sanchez 69 y o  female MRN: [id number]  4/18/2018    Reason for Visit: Follow-up    History of present illness and review of systems    I had the pleasure of seeing Camryn today in the renal clinic for the continued management of Hyponatremia  She has a history of COPD, hypertension, lung carcinoid, CHF and mitral regurg  Since her last visit, there has been no ER visits or hospitilizations  She wears oxygen on a continual basis  She wears CPAP at night  She has no difficulty sleeping on one flat pillow; In fact she prefers it  No unusual or additional swelling  No weight change  She tries to keep her fluid intake at 2 L a day  No chest pain  No nausea, vomiting, diarrhea  No difficulty urinating  No blood or foam in urine  The last blood work was done on 4/13/18, which we have reviewed together  ASSESSMENT and PLAN:    1  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  Stopped salt tabs at the suggestion of her primary care physician several months ago due to a rash  Sodium level has been stable on fluid restriction and diuretic  · Sodium level stable, 137 on 4/13  · Modest fluid restriction  · On torsemide 10 mg daily  2  Hypertension: BP above goal  No orthostasis  Blood pressure increased upon standing  Left arm pressure readings lower than right arm  · Patient was not taking spironolactone therefore I started it at 12 5 mg daily  Check BMP in several weeks  · I instructed patient to check blood pressure at home and bring readings to next appointment  A form for documentation was provided  I encouraged her to bring in her home device if she had questions regarding function or validity  I reviewed the proper method of obtaining accurate home blood pressure readings  Please note that generally home devices are used in the left arm and need to be reviewed with the patient for use on the right arm if we desire those readings    Her left arm readings are lower than her right arm therefore we have to consider that when reviewing home readings  3  Electrolytes: Potassium 4 1  · Follow-up labs since we are reinitiating spironolactone  4  Volume status:  Fairly euvolemic- Trace edema  5  CHF: Compensated  Ejection fraction 55%, 2+ MR  6  COPD: On home oxygen  7  Proteinuria:  Last urine protein creatinine ratio 0 54 (Previously 0 14 and 0 16)  · Free light chain ratio within normal limits, no monoclonal gammopathy on SPEP or UPEP  · On valsartan  Spironolactone added      PATIENT INSTRUCTIONS:    Patient Instructions   1  Continue current fluid intake- 2 liters per day  2  Continue current medications  3  Blood work in one month and in 3 months before next appointment  4  Follow up with Dr Jaden Brower in July 5  Bring in home blood pressure  Readings to next appointment  6  Start spironolactone 12 5 mg daily which is a half a tablet daily  7  Restart daily vitamin- centrum silver          Orders Placed This Encounter   Procedures    COMPREHENSIVE METABOLIC KFGLB(35)     This is a patient instruction: Patient fasting for 8 hours or longer recommended  Standing Status:   Future     Standing Expiration Date:   9/18/2018    CBC     Standing Status:   Future     Standing Expiration Date:   9/18/2018    Protein / creatinine ratio, urine     Standing Status:   Future     Standing Expiration Date:   9/18/2018    Urinalysis with reflex to microscopic     Standing Status:   Future     Standing Expiration Date:   9/18/2018    Basic metabolic panel     This is a patient instruction: Patient fasting for 8 hours or longer recommended       Standing Status:   Future     Standing Expiration Date:   7/18/2018       OBJECTIVE:  Current Weight: Weight - Scale: 88 5 kg (195 lb)  Vitals:    04/18/18 1456 04/18/18 1548 04/18/18 1549   BP: 146/68 156/70 166/74   BP Location: Left arm Right arm Right arm   Patient Position: Sitting Sitting Standing   Pulse: 58     Weight: 88 5 kg (195 lb)     Height: 4' 11" (1 499 m)      Body mass index is 39 39 kg/m²  REVIEW OF SYSTEMS:    Review of Systems   Constitutional: Negative for activity change, appetite change and unexpected weight change  Fatigue: No unusual fatigue  HENT:        Complains of nasal congestion especially at night  Sometimes uses some Vicks help clear her nasal passages   Eyes: Negative  Respiratory: Negative for wheezing  Shortness of breath: On continual oxygen  No unusual shortness of breath  Cardiovascular: Negative for chest pain and palpitations  Leg swelling: Mild intermittent ankle edema  Gastrointestinal: Negative  Endocrine: Negative  Genitourinary: Negative  Musculoskeletal: Gait problem: Uses an assistive device i e  cane for walking  PHYSICAL EXAM:      Physical Exam   Constitutional: She is oriented to person, place, and time  Elderly woman in no acute distress  HENT:   Head: Normocephalic and atraumatic  Mouth/Throat: Oropharynx is clear and moist  No oropharyngeal exudate  Eyes: Conjunctivae are normal  Right eye exhibits no discharge  Left eye exhibits no discharge  No scleral icterus  Neck: Neck supple  No JVD present  No bruit   Cardiovascular: Normal rate and regular rhythm  Exam reveals no gallop and no friction rub  Murmur (?  Soft systolic murmur) heard  Pulmonary/Chest: Effort normal and breath sounds normal  No respiratory distress  She has no wheezes  She has no rales  Abdominal: Soft  Bowel sounds are normal  She exhibits no distension  There is no tenderness  There is no rebound and no guarding  Musculoskeletal: She exhibits edema (Trace edema)  Neurological: She is alert and oriented to person, place, and time  Skin: Skin is warm and dry  No rash noted  No erythema         Medications:    Current Outpatient Prescriptions:     ADULT ASPIRIN LOW STRENGTH PO, Take 1 tablet by mouth daily, Disp: , Rfl:     amLODIPine (NORVASC) 5 mg tablet, Take 1 tablet by mouth daily, Disp: , Rfl:     Calcium 600-400 MG-UNIT CHEW, Chew 1 tablet 2 (two) times a day, Disp: , Rfl:     fluticasone (FLONASE) 50 mcg/act nasal spray, 1 spray into each nostril daily, Disp: , Rfl:     glipiZIDE (GLUCOTROL) 10 mg tablet, Take 1 tablet by mouth 2 (two) times a day, Disp: , Rfl:     latanoprost (XALATAN) 0 005 % ophthalmic solution, Apply to eye, Disp: , Rfl:     loratadine (CLARITIN) 10 mg tablet, Take 1 tablet by mouth daily, Disp: , Rfl:     metFORMIN (GLUCOPHAGE) 500 mg tablet, Take 2 tablets by mouth 2 (two) times a day, Disp: , Rfl:     metoprolol tartrate (LOPRESSOR) 50 mg tablet, Take 1 tablet by mouth 2 (two) times a day, Disp: , Rfl:     Multiple Vitamins-Minerals (CENTRUM SILVER 50+WOMEN PO), Take by mouth, Disp: , Rfl:     sodium chloride (OCEAN) 0 65 % nasal spray, 1 spray into each nostril as needed for congestion, Disp: , Rfl:     terazosin (HYTRIN) 10 MG capsule, Take 1 capsule by mouth, Disp: , Rfl:     torsemide (DEMADEX) 20 mg tablet, Take 20 mg by mouth daily, Disp: , Rfl:     valsartan (DIOVAN) 320 MG tablet, Take 320 mg by mouth daily, Disp: , Rfl:     spironolactone (ALDACTONE) 25 mg tablet, Take 0 5 tablets (12 5 mg total) by mouth daily, Disp: 15 tablet, Rfl: 5    Laboratory Results:    Results from last 7 days  Lab Units 04/13/18   WBC Thousand/uL 6 90   HEMOGLOBIN g/dL 11 9   HEMATOCRIT % 37 6   PLATELETS Thousands/uL 276   CHLORIDE  99       Results for orders placed or performed in visit on 04/16/18   HEMOGLOBIN A1C W/ EAG ESTIMATION   Result Value Ref Range    EXT Hemoglobin A1C 6 1

## 2018-04-18 NOTE — PATIENT INSTRUCTIONS
1  Continue current fluid intake- 2 liters per day  2  Continue current medications  3  Blood work in one month and in 3 months before next appointment  4  Follow up with Dr Ramiro Dinh in July  5  Bring in home blood pressure  Readings to next appointment  6  Start spironolactone 12 5 mg daily which is a half a tablet daily  7   Restart daily vitamin- centrum silver

## 2018-04-20 ENCOUNTER — TELEPHONE (OUTPATIENT)
Dept: NEPHROLOGY | Facility: CLINIC | Age: 78
End: 2018-04-20

## 2018-04-20 NOTE — TELEPHONE ENCOUNTER
I called Dr Benedict Juarez office to call Camryn and schedule follow up regarding her borderline A1C level per Dominic Dalal

## 2018-05-18 ENCOUNTER — LAB (OUTPATIENT)
Dept: LAB | Facility: CLINIC | Age: 78
End: 2018-05-18
Payer: MEDICARE

## 2018-05-18 DIAGNOSIS — E87.1 HYPONATREMIA: ICD-10-CM

## 2018-05-18 LAB
ANION GAP SERPL CALCULATED.3IONS-SCNC: 7 MMOL/L (ref 4–13)
BUN SERPL-MCNC: 15 MG/DL (ref 5–25)
CALCIUM SERPL-MCNC: 9.8 MG/DL (ref 8.3–10.1)
CHLORIDE SERPL-SCNC: 103 MMOL/L (ref 100–108)
CO2 SERPL-SCNC: 30 MMOL/L (ref 21–32)
CREAT SERPL-MCNC: 0.77 MG/DL (ref 0.6–1.3)
GFR SERPL CREATININE-BSD FRML MDRD: 75 ML/MIN/1.73SQ M
GLUCOSE P FAST SERPL-MCNC: 146 MG/DL (ref 65–99)
POTASSIUM SERPL-SCNC: 4 MMOL/L (ref 3.5–5.3)
SODIUM SERPL-SCNC: 140 MMOL/L (ref 136–145)

## 2018-05-18 PROCEDURE — 36415 COLL VENOUS BLD VENIPUNCTURE: CPT

## 2018-05-18 PROCEDURE — 80048 BASIC METABOLIC PNL TOTAL CA: CPT

## 2018-05-18 NOTE — PROGRESS NOTES
Please let Camryn know that her labs look great  Sodium level is perfect  No changes to medications

## 2018-07-02 ENCOUNTER — LAB (OUTPATIENT)
Dept: LAB | Facility: CLINIC | Age: 78
End: 2018-07-02
Payer: MEDICARE

## 2018-07-02 ENCOUNTER — TRANSCRIBE ORDERS (OUTPATIENT)
Dept: LAB | Facility: CLINIC | Age: 78
End: 2018-07-02

## 2018-07-02 DIAGNOSIS — R80.1 PERSISTENT PROTEINURIA: ICD-10-CM

## 2018-07-02 DIAGNOSIS — E87.1 HYPONATREMIA: ICD-10-CM

## 2018-07-02 LAB
ALBUMIN SERPL BCP-MCNC: 3.7 G/DL (ref 3.5–5)
ALP SERPL-CCNC: 68 U/L (ref 46–116)
ALT SERPL W P-5'-P-CCNC: 30 U/L (ref 12–78)
ANION GAP SERPL CALCULATED.3IONS-SCNC: 10 MMOL/L (ref 4–13)
AST SERPL W P-5'-P-CCNC: 51 U/L (ref 5–45)
BACTERIA UR QL AUTO: ABNORMAL /HPF
BILIRUB SERPL-MCNC: 0.5 MG/DL (ref 0.2–1)
BILIRUB UR QL STRIP: NEGATIVE
BUN SERPL-MCNC: 13 MG/DL (ref 5–25)
CALCIUM SERPL-MCNC: 9.8 MG/DL (ref 8.3–10.1)
CHLORIDE SERPL-SCNC: 101 MMOL/L (ref 100–108)
CLARITY UR: CLEAR
CO2 SERPL-SCNC: 27 MMOL/L (ref 21–32)
COLOR UR: YELLOW
CREAT SERPL-MCNC: 0.8 MG/DL (ref 0.6–1.3)
CREAT UR-MCNC: 50.5 MG/DL
ERYTHROCYTE [DISTWIDTH] IN BLOOD BY AUTOMATED COUNT: 13.9 % (ref 11.6–15.1)
GFR SERPL CREATININE-BSD FRML MDRD: 72 ML/MIN/1.73SQ M
GLUCOSE P FAST SERPL-MCNC: 147 MG/DL (ref 65–99)
GLUCOSE UR STRIP-MCNC: NEGATIVE MG/DL
HCT VFR BLD AUTO: 35.5 % (ref 34.8–46.1)
HGB BLD-MCNC: 11.2 G/DL (ref 11.5–15.4)
HGB UR QL STRIP.AUTO: NEGATIVE
KETONES UR STRIP-MCNC: NEGATIVE MG/DL
LEUKOCYTE ESTERASE UR QL STRIP: ABNORMAL
MCH RBC QN AUTO: 27.3 PG (ref 26.8–34.3)
MCHC RBC AUTO-ENTMCNC: 31.5 G/DL (ref 31.4–37.4)
MCV RBC AUTO: 87 FL (ref 82–98)
NITRITE UR QL STRIP: NEGATIVE
NON-SQ EPI CELLS URNS QL MICRO: ABNORMAL /HPF
OTHER STN SPEC: ABNORMAL
PH UR STRIP.AUTO: 7 [PH] (ref 4.5–8)
PLATELET # BLD AUTO: 289 THOUSANDS/UL (ref 149–390)
PMV BLD AUTO: 9.6 FL (ref 8.9–12.7)
POTASSIUM SERPL-SCNC: 4.2 MMOL/L (ref 3.5–5.3)
PROT SERPL-MCNC: 8.2 G/DL (ref 6.4–8.2)
PROT UR STRIP-MCNC: NEGATIVE MG/DL
PROT UR-MCNC: 17 MG/DL
PROT/CREAT UR: 0.34 MG/G{CREAT} (ref 0–0.1)
RBC # BLD AUTO: 4.1 MILLION/UL (ref 3.81–5.12)
RBC #/AREA URNS AUTO: ABNORMAL /HPF
SODIUM SERPL-SCNC: 138 MMOL/L (ref 136–145)
SP GR UR STRIP.AUTO: 1.01 (ref 1–1.03)
UROBILINOGEN UR QL STRIP.AUTO: 0.2 E.U./DL
WBC # BLD AUTO: 7.44 THOUSAND/UL (ref 4.31–10.16)
WBC #/AREA URNS AUTO: ABNORMAL /HPF

## 2018-07-02 PROCEDURE — 85027 COMPLETE CBC AUTOMATED: CPT

## 2018-07-02 PROCEDURE — 80053 COMPREHEN METABOLIC PANEL: CPT

## 2018-07-02 PROCEDURE — 84156 ASSAY OF PROTEIN URINE: CPT

## 2018-07-02 PROCEDURE — 81001 URINALYSIS AUTO W/SCOPE: CPT

## 2018-07-02 PROCEDURE — 36415 COLL VENOUS BLD VENIPUNCTURE: CPT

## 2018-07-02 PROCEDURE — 82570 ASSAY OF URINE CREATININE: CPT

## 2018-07-18 ENCOUNTER — OFFICE VISIT (OUTPATIENT)
Dept: NEPHROLOGY | Facility: CLINIC | Age: 78
End: 2018-07-18
Payer: MEDICARE

## 2018-07-18 VITALS
DIASTOLIC BLOOD PRESSURE: 80 MMHG | SYSTOLIC BLOOD PRESSURE: 172 MMHG | RESPIRATION RATE: 20 BRPM | HEART RATE: 58 BPM | HEIGHT: 60 IN | WEIGHT: 196.8 LBS | BODY MASS INDEX: 38.64 KG/M2

## 2018-07-18 DIAGNOSIS — N18.30 CKD (CHRONIC KIDNEY DISEASE), STAGE III (HCC): ICD-10-CM

## 2018-07-18 DIAGNOSIS — I10 BENIGN ESSENTIAL HTN: ICD-10-CM

## 2018-07-18 DIAGNOSIS — I15.0 RENOVASCULAR HYPERTENSION: ICD-10-CM

## 2018-07-18 DIAGNOSIS — R53.83 OTHER FATIGUE: Primary | ICD-10-CM

## 2018-07-18 PROCEDURE — 99213 OFFICE O/P EST LOW 20 MIN: CPT | Performed by: INTERNAL MEDICINE

## 2018-07-18 RX ORDER — LOSARTAN POTASSIUM 100 MG/1
100 TABLET ORAL DAILY
Qty: 30 TABLET | Refills: 3 | Status: SHIPPED | OUTPATIENT
Start: 2018-07-18 | End: 2018-10-15 | Stop reason: SDUPTHER

## 2018-07-18 RX ORDER — AMLODIPINE BESYLATE 10 MG/1
10 TABLET ORAL DAILY
Qty: 30 TABLET | Refills: 3 | Status: SHIPPED | OUTPATIENT
Start: 2018-07-18 | End: 2018-11-21 | Stop reason: SDUPTHER

## 2018-07-18 NOTE — PATIENT INSTRUCTIONS
1) start losartan one tablet daily  2) STOP valsartan  3) increase amlodipine to 10 mg once daily (you can use 2 of your 5 mg tablets until completed) then  the new prescription  4) labwork in 1 month  5) please call your PCP regarding the fatigue

## 2018-07-18 NOTE — LETTER
July 18, 2018     [address]  [id number] [place] [address] [address] [address]    Patient: Dee Maurice   YOB: 1940   Date of Visit: 7/18/2018       Dear Dr Charu Obrien:    Thank you for referring Dee Maurice to me for evaluation  Below are my notes for this consultation  If you have questions, please do not hesitate to call me  I look forward to following your patient along with you  Sincerely,        Audi Piña MD        CC: No Recipients  Audi Piña MD  7/18/2018 12:21 PM  Sign at close encounter  [address] [address] [address] [place] [address] 69 y o  female MRN: [id number]  7/18/2018    Reason for Visit: hyponatremia and HTN    ASSESSMENT and PLAN:    I had the pleasure of seeing Ms Mary Rodriguez today in the renal clinic for the continued management of hyponatremia, and HTN  Since our last visit, there has been no ER visits or hospitalizations  He currently has no complaints at this time and is feeling well  Patient denies any chest pain, shortness of breath and swelling  The last blood work was done on 7/2/18, which we have reviewed together  70 yo Patient has a history of CHF, lung carcinoid, COPD, hyponatremia, hypertension who is being seen as a follow-up for hyponatremia   The etiology of hyponatremia was felt to be secondary to volume overload in the setting of heart failure, and citalopram use during admission last year at [facility]     1) hyponatremia    - secondary volume overloaded systolic prime  -sodium level has normalized now  -on torsemide  - no longer on salt tab    2) HTN -uncontrolled    -was started on spironolactone last visit  -patient is valsartan as part of the FDA recall  -therefore will stop valsartan, and change to losartan 100 mg once a day  -increase amlodipine to 10 mg daily from 5 mg daily  -continue metoprolol, spironolactone, terazosin, torsemide  -check renal Doppler flows    3) renal function     - stable    4) proteinuria    - prior SPEP, UPEP unrevealing  - on ARB and spironolactone    5) fatigue    - thyroid nodule - pt states was biopsied  - check TSH  - pt will talk to PCP further as pt has COPD history, on O2, also with prior thyroid nodule    6) electrolytes    - check BMP in one month  - K this visit is stable    7) AST slightly elevated     - will defer to PCP    It was a pleasure evaluating your patient in the office today  Thank you for allowing our team to participate in the care of Ms Teena Humphrey  Please do not hesitate to contact our team if further issues/questions shall arise in the interim  No problem-specific Assessment & Plan notes found for this encounter  HPI:    Patient's main concern is fatigue  Otherwise denies complaints  No nausea, no vomiting, no diarrhea, no dysuria  PATIENT INSTRUCTIONS:    Patient Instructions   1) start losartan one tablet daily  2) STOP valsartan  3) increase amlodipine to 10 mg once daily (you can use 2 of your 5 mg tablets until completed) then  the new prescription  4) labwork in 1 month  5) please call your PCP regarding the fatigue        OBJECTIVE:  Current Weight: Weight - Scale: 89 3 kg (196 lb 12 8 oz)  Vitals:    07/18/18 1052   BP: (!) 172/80   BP Location: Right arm   Patient Position: Sitting   Cuff Size: Standard   Pulse: 58   Resp: 20   Weight: 89 3 kg (196 lb 12 8 oz)   Height: 5' (1 524 m)    Body mass index is 38 43 kg/m²  REVIEW OF SYSTEMS:    Review of Systems   Constitutional: Positive for fatigue  HENT: Negative  Eyes: Negative  Respiratory: Negative  Negative for shortness of breath  Cardiovascular: Negative  Negative for leg swelling  Gastrointestinal: Negative  Endocrine: Negative  Genitourinary: Negative  Negative for difficulty urinating  Musculoskeletal: Negative  Skin: Negative  Allergic/Immunologic: Negative  Neurological: Negative  Hematological: Negative  Psychiatric/Behavioral: Negative      All other systems reviewed and are negative  PHYSICAL EXAM:      Physical Exam   Constitutional: She is oriented to person, place, and time  She appears well-developed and well-nourished  No distress  HENT:   Head: Normocephalic and atraumatic  Mouth/Throat: No oropharyngeal exudate  Eyes: Conjunctivae are normal  Right eye exhibits no discharge  Left eye exhibits no discharge  No scleral icterus  Neck: Normal range of motion  Neck supple  Cardiovascular: Normal rate and regular rhythm  Exam reveals no gallop and no friction rub  No murmur heard  Pulmonary/Chest: Effort normal and breath sounds normal  No respiratory distress  She has no wheezes  She has no rales  Abdominal: Soft  Bowel sounds are normal  She exhibits no distension  There is no tenderness  There is no rebound  Musculoskeletal: Normal range of motion  She exhibits edema  She exhibits no tenderness or deformity  Trace LE    Neurological: She is alert and oriented to person, place, and time  Coordination normal    Skin: Skin is warm and dry  No rash noted  She is not diaphoretic  No erythema  No pallor  Psychiatric: She has a normal mood and affect  Her behavior is normal  Judgment and thought content normal    Nursing note and vitals reviewed        Medications:    Current Outpatient Prescriptions:     ADULT ASPIRIN LOW STRENGTH PO, Take 1 tablet by mouth daily, Disp: , Rfl:     amLODIPine (NORVASC) 10 mg tablet, Take 1 tablet (10 mg total) by mouth daily, Disp: 30 tablet, Rfl: 3    Calcium 600-400 MG-UNIT CHEW, Chew 1 tablet 2 (two) times a day, Disp: , Rfl:     fluticasone (FLONASE) 50 mcg/act nasal spray, 1 spray into each nostril daily, Disp: , Rfl:     glipiZIDE (GLUCOTROL) 10 mg tablet, Take 1 tablet by mouth 2 (two) times a day, Disp: , Rfl:     latanoprost (XALATAN) 0 005 % ophthalmic solution, Apply to eye, Disp: , Rfl:     loratadine (CLARITIN) 10 mg tablet, Take 1 tablet by mouth daily, Disp: , Rfl:     metFORMIN (GLUCOPHAGE) 500 mg tablet, Take 2 tablets by mouth 2 (two) times a day, Disp: , Rfl:     metoprolol tartrate (LOPRESSOR) 50 mg tablet, Take 1 tablet by mouth 2 (two) times a day, Disp: , Rfl:     Multiple Vitamins-Minerals (CENTRUM SILVER 50+WOMEN PO), Take by mouth, Disp: , Rfl:     sodium chloride (OCEAN) 0 65 % nasal spray, 1 spray into each nostril as needed for congestion, Disp: , Rfl:     spironolactone (ALDACTONE) 25 mg tablet, Take 0 5 tablets (12 5 mg total) by mouth daily, Disp: 15 tablet, Rfl: 5    terazosin (HYTRIN) 10 MG capsule, Take 1 capsule by mouth, Disp: , Rfl:     torsemide (DEMADEX) 20 mg tablet, Take 20 mg by mouth daily, Disp: , Rfl:     losartan (COZAAR) 100 MG tablet, Take 1 tablet (100 mg total) by mouth daily, Disp: 30 tablet, Rfl: 3    Laboratory Results:        Results for orders placed or performed in visit on 07/02/18   CBC   Result Value Ref Range    WBC 7 44 4 31 - 10 16 Thousand/uL    RBC 4 10 3 81 - 5 12 Million/uL    Hemoglobin 11 2 (L) 11 5 - 15 4 g/dL    Hematocrit 35 5 34 8 - 46 1 %    MCV 87 82 - 98 fL    MCH 27 3 26 8 - 34 3 pg    MCHC 31 5 31 4 - 37 4 g/dL    RDW 13 9 11 6 - 15 1 %    Platelets 476 416 - 272 Thousands/uL    MPV 9 6 8 9 - 12 7 fL   Protein / creatinine ratio, urine   Result Value Ref Range    Creatinine, Ur 50 5 mg/dL    Protein Urine Random 17 mg/dL    Prot/Creat Ratio, Ur 0 34 (H) 0 00 - 0 10   Urinalysis with reflex to microscopic   Result Value Ref Range    Color, UA Yellow     Clarity, UA Clear     Specific [place], UA 1 010 1 003 - 1 030    pH, UA 7 0 4 5 - 8 0    Leukocytes, UA Small (A) Negative    Nitrite, UA Negative Negative    Protein, UA Negative Negative mg/dl    Glucose, UA Negative Negative mg/dl    Ketones, UA Negative Negative mg/dl    Urobilinogen, UA 0 2 0 2, 1 0 E U /dl E U /dl    Bilirubin, UA Negative Negative    Blood, UA Negative Negative   Comprehensive metabolic panel   Result Value Ref Range    Sodium 138 136 - 145 mmol/L Potassium 4 2 3 5 - 5 3 mmol/L    Chloride 101 100 - 108 mmol/L    CO2 27 21 - 32 mmol/L    Anion Gap 10 4 - 13 mmol/L    BUN 13 5 - 25 mg/dL    Creatinine 0 80 0 60 - 1 30 mg/dL    Glucose, Fasting 147 (H) 65 - 99 mg/dL    Calcium 9 8 8 3 - 10 1 mg/dL    AST 51 (H) 5 - 45 U/L    ALT 30 12 - 78 U/L    Alkaline Phosphatase 68 46 - 116 U/L    Total Protein 8 2 6 4 - 8 2 g/dL    Albumin 3 7 3 5 - 5 0 g/dL    Total Bilirubin 0 50 0 20 - 1 00 mg/dL    eGFR 72 ml/min/1 73sq m   Urine Microscopic   Result Value Ref Range    RBC, UA None Seen None Seen, 0-5 /hpf    WBC, UA 0-1 (A) None Seen, 0-5, 5-55, 5-65 /hpf    Epithelial Cells Occasional None Seen, Occasional /hpf    Bacteria, UA None Seen None Seen, Occasional /hpf    OTHER OBSERVATIONS Transitional Epithelial Cells

## 2018-07-18 NOTE — PROGRESS NOTES
NEPHROLOGY OFFICE VISIT   Mavis Cho 69 y o  female MRN: [id number]  7/18/2018    Reason for Visit: hyponatremia and HTN    ASSESSMENT and PLAN:    I had the pleasure of seeing Ms Aranza Davidson today in the renal clinic for the continued management of hyponatremia, and HTN  Since our last visit, there has been no ER visits or hospitalizations  He currently has no complaints at this time and is feeling well  Patient denies any chest pain, shortness of breath and swelling  The last blood work was done on 7/2/18, which we have reviewed together  70 yo Patient has a history of CHF, lung carcinoid, COPD, hyponatremia, hypertension who is being seen as a follow-up for hyponatremia  The etiology of hyponatremia was felt to be secondary to volume overload in the setting of heart failure, and citalopram use during admission last year at [facility]     1) hyponatremia    - secondary volume overloaded systolic prime  -sodium level has normalized now  -on torsemide  - no longer on salt tab    2) HTN -uncontrolled    -was started on spironolactone last visit  -patient is valsartan as part of the FDA recall  -therefore will stop valsartan, and change to losartan 100 mg once a day  -increase amlodipine to 10 mg daily from 5 mg daily  -continue metoprolol, spironolactone, terazosin, torsemide  -check renal Doppler flows    3) renal function     - stable    4) proteinuria    - prior SPEP, UPEP unrevealing  - on ARB and spironolactone    5) fatigue    - thyroid nodule - pt states was biopsied  - check TSH  - pt will talk to PCP further as pt has COPD history, on O2, also with prior thyroid nodule    6) electrolytes    - check BMP in one month  - K this visit is stable    7) AST slightly elevated     - will defer to PCP    It was a pleasure evaluating your patient in the office today  Thank you for allowing our team to participate in the care of Ms Mavis Cho   Please do not hesitate to contact our team if further issues/questions shall arise in the interim  No problem-specific Assessment & Plan notes found for this encounter  HPI:    Patient's main concern is fatigue  Otherwise denies complaints  No nausea, no vomiting, no diarrhea, no dysuria  PATIENT INSTRUCTIONS:    Patient Instructions   1) start losartan one tablet daily  2) STOP valsartan  3) increase amlodipine to 10 mg once daily (you can use 2 of your 5 mg tablets until completed) then  the new prescription  4) labwork in 1 month  5) please call your PCP regarding the fatigue        OBJECTIVE:  Current Weight: Weight - Scale: 89 3 kg (196 lb 12 8 oz)  Vitals:    07/18/18 1052   BP: (!) 172/80   BP Location: Right arm   Patient Position: Sitting   Cuff Size: Standard   Pulse: 58   Resp: 20   Weight: 89 3 kg (196 lb 12 8 oz)   Height: 5' (1 524 m)    Body mass index is 38 43 kg/m²  REVIEW OF SYSTEMS:    Review of Systems   Constitutional: Positive for fatigue  HENT: Negative  Eyes: Negative  Respiratory: Negative  Negative for shortness of breath  Cardiovascular: Negative  Negative for leg swelling  Gastrointestinal: Negative  Endocrine: Negative  Genitourinary: Negative  Negative for difficulty urinating  Musculoskeletal: Negative  Skin: Negative  Allergic/Immunologic: Negative  Neurological: Negative  Hematological: Negative  Psychiatric/Behavioral: Negative  All other systems reviewed and are negative  PHYSICAL EXAM:      Physical Exam   Constitutional: She is oriented to person, place, and time  She appears well-developed and well-nourished  No distress  HENT:   Head: Normocephalic and atraumatic  Mouth/Throat: No oropharyngeal exudate  Eyes: Conjunctivae are normal  Right eye exhibits no discharge  Left eye exhibits no discharge  No scleral icterus  Neck: Normal range of motion  Neck supple  Cardiovascular: Normal rate and regular rhythm  Exam reveals no gallop and no friction rub      No murmur heard   Pulmonary/Chest: Effort normal and breath sounds normal  No respiratory distress  She has no wheezes  She has no rales  Abdominal: Soft  Bowel sounds are normal  She exhibits no distension  There is no tenderness  There is no rebound  Musculoskeletal: Normal range of motion  She exhibits edema  She exhibits no tenderness or deformity  Trace LE    Neurological: She is alert and oriented to person, place, and time  Coordination normal    Skin: Skin is warm and dry  No rash noted  She is not diaphoretic  No erythema  No pallor  Psychiatric: She has a normal mood and affect  Her behavior is normal  Judgment and thought content normal    Nursing note and vitals reviewed        Medications:    Current Outpatient Prescriptions:     ADULT ASPIRIN LOW STRENGTH PO, Take 1 tablet by mouth daily, Disp: , Rfl:     amLODIPine (NORVASC) 10 mg tablet, Take 1 tablet (10 mg total) by mouth daily, Disp: 30 tablet, Rfl: 3    Calcium 600-400 MG-UNIT CHEW, Chew 1 tablet 2 (two) times a day, Disp: , Rfl:     fluticasone (FLONASE) 50 mcg/act nasal spray, 1 spray into each nostril daily, Disp: , Rfl:     glipiZIDE (GLUCOTROL) 10 mg tablet, Take 1 tablet by mouth 2 (two) times a day, Disp: , Rfl:     latanoprost (XALATAN) 0 005 % ophthalmic solution, Apply to eye, Disp: , Rfl:     loratadine (CLARITIN) 10 mg tablet, Take 1 tablet by mouth daily, Disp: , Rfl:     metFORMIN (GLUCOPHAGE) 500 mg tablet, Take 2 tablets by mouth 2 (two) times a day, Disp: , Rfl:     metoprolol tartrate (LOPRESSOR) 50 mg tablet, Take 1 tablet by mouth 2 (two) times a day, Disp: , Rfl:     Multiple Vitamins-Minerals (CENTRUM SILVER 50+WOMEN PO), Take by mouth, Disp: , Rfl:     sodium chloride (OCEAN) 0 65 % nasal spray, 1 spray into each nostril as needed for congestion, Disp: , Rfl:     spironolactone (ALDACTONE) 25 mg tablet, Take 0 5 tablets (12 5 mg total) by mouth daily, Disp: 15 tablet, Rfl: 5    terazosin (HYTRIN) 10 MG capsule, Take 1 capsule by mouth, Disp: , Rfl:     torsemide (DEMADEX) 20 mg tablet, Take 20 mg by mouth daily, Disp: , Rfl:     losartan (COZAAR) 100 MG tablet, Take 1 tablet (100 mg total) by mouth daily, Disp: 30 tablet, Rfl: 3    Laboratory Results:        Results for orders placed or performed in visit on 07/02/18   CBC   Result Value Ref Range    WBC 7 44 4 31 - 10 16 Thousand/uL    RBC 4 10 3 81 - 5 12 Million/uL    Hemoglobin 11 2 (L) 11 5 - 15 4 g/dL    Hematocrit 35 5 34 8 - 46 1 %    MCV 87 82 - 98 fL    MCH 27 3 26 8 - 34 3 pg    MCHC 31 5 31 4 - 37 4 g/dL    RDW 13 9 11 6 - 15 1 %    Platelets 702 665 - 245 Thousands/uL    MPV 9 6 8 9 - 12 7 fL   Protein / creatinine ratio, urine   Result Value Ref Range    Creatinine, Ur 50 5 mg/dL    Protein Urine Random 17 mg/dL    Prot/Creat Ratio, Ur 0 34 (H) 0 00 - 0 10   Urinalysis with reflex to microscopic   Result Value Ref Range    Color, UA Yellow     Clarity, UA Clear     Specific [place], UA 1 010 1 003 - 1 030    pH, UA 7 0 4 5 - 8 0    Leukocytes, UA Small (A) Negative    Nitrite, UA Negative Negative    Protein, UA Negative Negative mg/dl    Glucose, UA Negative Negative mg/dl    Ketones, UA Negative Negative mg/dl    Urobilinogen, UA 0 2 0 2, 1 0 E U /dl E U /dl    Bilirubin, UA Negative Negative    Blood, UA Negative Negative   Comprehensive metabolic panel   Result Value Ref Range    Sodium 138 136 - 145 mmol/L    Potassium 4 2 3 5 - 5 3 mmol/L    Chloride 101 100 - 108 mmol/L    CO2 27 21 - 32 mmol/L    Anion Gap 10 4 - 13 mmol/L    BUN 13 5 - 25 mg/dL    Creatinine 0 80 0 60 - 1 30 mg/dL    Glucose, Fasting 147 (H) 65 - 99 mg/dL    Calcium 9 8 8 3 - 10 1 mg/dL    AST 51 (H) 5 - 45 U/L    ALT 30 12 - 78 U/L    Alkaline Phosphatase 68 46 - 116 U/L    Total Protein 8 2 6 4 - 8 2 g/dL    Albumin 3 7 3 5 - 5 0 g/dL    Total Bilirubin 0 50 0 20 - 1 00 mg/dL    eGFR 72 ml/min/1 73sq m   Urine Microscopic   Result Value Ref Range    RBC, UA None Seen None Seen, 0-5 /hpf    WBC, UA 0-1 (A) None Seen, 0-5, 5-55, 5-65 /hpf    Epithelial Cells Occasional None Seen, Occasional /hpf    Bacteria, UA None Seen None Seen, Occasional /hpf    OTHER OBSERVATIONS Transitional Epithelial Cells

## 2018-08-15 ENCOUNTER — OFFICE VISIT (OUTPATIENT)
Dept: NEPHROLOGY | Facility: CLINIC | Age: 78
End: 2018-08-15
Payer: MEDICARE

## 2018-08-15 VITALS
SYSTOLIC BLOOD PRESSURE: 118 MMHG | WEIGHT: 194 LBS | BODY MASS INDEX: 38.09 KG/M2 | HEART RATE: 64 BPM | DIASTOLIC BLOOD PRESSURE: 62 MMHG | HEIGHT: 60 IN

## 2018-08-15 DIAGNOSIS — I10 BENIGN ESSENTIAL HYPERTENSION: Primary | ICD-10-CM

## 2018-08-15 DIAGNOSIS — E87.1 HYPONATREMIA: ICD-10-CM

## 2018-08-15 PROCEDURE — 99213 OFFICE O/P EST LOW 20 MIN: CPT | Performed by: PHYSICIAN ASSISTANT

## 2018-08-15 NOTE — PROGRESS NOTES
Assessment and Plan:    Camryn was seen today for follow-up  Diagnoses and all orders for this visit:    Benign essential hypertension  -     Basic metabolic panel; Future    Hyponatremia      Hypertension- Antihypertensive regimen includes amlodipine 10mg daily, losartan 100mg daily, metoprolol 50mg twice a day, spironolactone 12 5mg daily, and torsemide 20mg daily  Avoid salt in your diet  Stay active  Avoid NSAIDs (advil, aleve, ibuprofen, motrin, naproxen, Excedrin, mobic)  Renal Artery Doppler negative  Blood pressure soft but no symptoms  No medication changes today  Hyponatremia- Stable  Follow up with Dr Hemalatha Seymour in 2-3 months  Please call the office with any questions or concerns  Reason for Visit: Follow-up    HPI: Artem Hale is a 69 y o  female who is here for follow up of a blood pressure check  She last saw Dr Bartolo Elkins about a month ago, and at that time was switched to losartan (due to recall of valsartan) and her amlodipine was increased  Homero Downing Her lightheadedness and dizziness has improved with her new regimen  She has no acute complaints  She does not take her blood pressure at home  ROS: A complete review of systems was performed and was negative unless otherwise noted in the history of present illness      Allergies:   Hydrochlorothiazide    Medications:     Current Outpatient Prescriptions:     ADULT ASPIRIN LOW STRENGTH PO, Take 1 tablet by mouth daily, Disp: , Rfl:     amLODIPine (NORVASC) 10 mg tablet, Take 1 tablet (10 mg total) by mouth daily, Disp: 30 tablet, Rfl: 3    Calcium 600-400 MG-UNIT CHEW, Chew 1 tablet 2 (two) times a day, Disp: , Rfl:     fluticasone (FLONASE) 50 mcg/act nasal spray, 1 spray into each nostril daily, Disp: , Rfl:     glipiZIDE (GLUCOTROL) 10 mg tablet, Take 1 tablet by mouth 2 (two) times a day, Disp: , Rfl:     latanoprost (XALATAN) 0 005 % ophthalmic solution, Apply to eye, Disp: , Rfl:     loratadine (CLARITIN) 10 mg tablet, Take 1 tablet by mouth daily, Disp: , Rfl:     losartan (COZAAR) 100 MG tablet, Take 1 tablet (100 mg total) by mouth daily, Disp: 30 tablet, Rfl: 3    metFORMIN (GLUCOPHAGE) 500 mg tablet, Take 2 tablets by mouth 2 (two) times a day, Disp: , Rfl:     metoprolol tartrate (LOPRESSOR) 50 mg tablet, Take 1 tablet by mouth 2 (two) times a day, Disp: , Rfl:     Multiple Vitamins-Minerals (CENTRUM SILVER 50+WOMEN PO), Take by mouth, Disp: , Rfl:     sodium chloride (OCEAN) 0 65 % nasal spray, 1 spray into each nostril as needed for congestion, Disp: , Rfl:     spironolactone (ALDACTONE) 25 mg tablet, Take 0 5 tablets (12 5 mg total) by mouth daily, Disp: 15 tablet, Rfl: 5    terazosin (HYTRIN) 10 MG capsule, Take 1 capsule by mouth, Disp: , Rfl:     torsemide (DEMADEX) 20 mg tablet, Take 20 mg by mouth daily, Disp: , Rfl:     Past Medical History:   Diagnosis Date    Benign hypertension     Hyponatremia      No past surgical history on file  Family History   Problem Relation Age of Onset    Family history unknown: Yes      reports that she has never smoked  She has never used smokeless tobacco     Physical Exam:   Vitals:    08/15/18 1054 08/15/18 1103   BP:  118/62   BP Location:  Right arm   Patient Position:  Sitting   Cuff Size:  Large   Pulse:  64   Weight: 88 kg (194 lb)    Height: 5' (1 524 m)      Body mass index is 37 89 kg/m²  General: NAD  Neuro: AAO  Neck: supple  Skin: no rash  Heart: RRR  Lungs: CTAB  Abdomen: soft nt nd  Extremities: trace edema    Procedure:  No results found for this or any previous visit  Labs reviewed      Lab Results   Component Value Date    GLUCOSE 155 (H) 02/12/2018    CALCIUM 9 8 07/02/2018     07/02/2018    K 4 2 07/02/2018    CO2 27 07/02/2018     07/02/2018    BUN 13 07/02/2018    CREATININE 0 80 07/02/2018

## 2018-08-15 NOTE — LETTER
August 15, 2018     [address] [place] [address]    Patient: Christin Minaya   YOB: 1940   Date of Visit: 8/15/2018       Dear Dr Fernando Lainez:    Thank you for referring Christin Minaya to me for evaluation  Below are my notes for this consultation  If you have questions, please do not hesitate to call me  I look forward to following your patient along with you  Sincerely,        Kana Rojas PA-C        CC: No Recipients  Kana Rojas [place]  8/15/2018 11:07 AM  Incomplete  Assessment and Plan:    Camryn was seen today for follow-up  Diagnoses and all orders for this visit:    Benign essential hypertension  -     Basic metabolic panel; Future    Hyponatremia      Hypertension- Antihypertensive regimen includes amlodipine 10mg daily, losartan 100mg daily, metoprolol 50mg twice a day, spironolactone 12 5mg daily, and torsemide 20mg daily  Avoid salt in your diet  Stay active  Avoid NSAIDs (advil, aleve, ibuprofen, motrin, naproxen, Excedrin, mobic)  Renal Artery Doppler pending  Blood pressure soft but no symptoms  No medication changes today  Hyponatremia- Stable  Follow up with Dr Radha Talley in 2-3 months  Please call the office with any questions or concerns  Reason for Visit: Follow-up    HPI: Christin Mniaya is a 69 y o  female who is here for follow up of a blood pressure check  She last saw Dr Giovanna Angel about a month ago, and at that time was switched to losartan (due to recall of valsartan) and her amlodipine was increased  Karey Sole Her lightheadedness and dizzines has improved with her new regimen  ROS: A complete review of systems was performed and was negative unless otherwise noted in the history of present illness      Allergies:   Hydrochlorothiazide    Medications:     Current Outpatient Prescriptions:     ADULT ASPIRIN LOW STRENGTH PO, Take 1 tablet by mouth daily, Disp: , Rfl:     amLODIPine (NORVASC) 10 mg tablet, Take 1 tablet (10 mg total) by mouth daily, Disp: 30 tablet, Rfl: 3    Calcium 600-400 MG-UNIT CHEW, Chew 1 tablet 2 (two) times a day, Disp: , Rfl:     fluticasone (FLONASE) 50 mcg/act nasal spray, 1 spray into each nostril daily, Disp: , Rfl:     glipiZIDE (GLUCOTROL) 10 mg tablet, Take 1 tablet by mouth 2 (two) times a day, Disp: , Rfl:     latanoprost (XALATAN) 0 005 % ophthalmic solution, Apply to eye, Disp: , Rfl:     loratadine (CLARITIN) 10 mg tablet, Take 1 tablet by mouth daily, Disp: , Rfl:     losartan (COZAAR) 100 MG tablet, Take 1 tablet (100 mg total) by mouth daily, Disp: 30 tablet, Rfl: 3    metFORMIN (GLUCOPHAGE) 500 mg tablet, Take 2 tablets by mouth 2 (two) times a day, Disp: , Rfl:     metoprolol tartrate (LOPRESSOR) 50 mg tablet, Take 1 tablet by mouth 2 (two) times a day, Disp: , Rfl:     Multiple Vitamins-Minerals (CENTRUM SILVER 50+WOMEN PO), Take by mouth, Disp: , Rfl:     sodium chloride (OCEAN) 0 65 % nasal spray, 1 spray into each nostril as needed for congestion, Disp: , Rfl:     spironolactone (ALDACTONE) 25 mg tablet, Take 0 5 tablets (12 5 mg total) by mouth daily, Disp: 15 tablet, Rfl: 5    terazosin (HYTRIN) 10 MG capsule, Take 1 capsule by mouth, Disp: , Rfl:     torsemide (DEMADEX) 20 mg tablet, Take 20 mg by mouth daily, Disp: , Rfl:     Past Medical History:   Diagnosis Date    Benign hypertension     Hyponatremia      No past surgical history on file  Family History   Problem Relation Age of Onset    Family history unknown: Yes      reports that she has never smoked  She has never used smokeless tobacco     Physical Exam:   Vitals:    08/15/18 1054 08/15/18 1103   BP:  118/62   BP Location:  Right arm   Patient Position:  Sitting   Cuff Size:  Large   Pulse:  64   Weight: 88 kg (194 lb)    Height: 5' (1 524 m)      Body mass index is 37 89 kg/m²      General: NAD  Neuro: AAO  Neck: supple  Skin: no rash  Heart: RRR  Lungs: CTAB  Abdomen: soft nt nd  Extremities: trace edema    Procedure:  No results found for this or any previous visit  Labs reviewed      Lab Results   Component Value Date    GLUCOSE 155 (H) 02/12/2018    CALCIUM 9 8 07/02/2018     07/02/2018    K 4 2 07/02/2018    CO2 27 07/02/2018     07/02/2018    BUN 13 07/02/2018    CREATININE 0 80 07/02/2018

## 2018-08-15 NOTE — PATIENT INSTRUCTIONS
Hypertension- Antihypertensive regimen includes amlodipine 10mg daily, losartan 100mg daily, metoprolol 50mg twice a day, spironolactone 12 5mg daily, and torsemide 20mg daily  Avoid salt in your diet  Stay active  Avoid NSAIDs (advil, aleve, ibuprofen, motrin, naproxen, Excedrin, mobic)  Renal Artery Doppler pending  Blood pressure soft but no symptoms  No medication changes today  Hyponatremia- Stable  Follow up with Dr Sidney Frances in 2-3 months  Please call the office with any questions or concerns

## 2018-08-18 ENCOUNTER — LAB (OUTPATIENT)
Dept: LAB | Facility: CLINIC | Age: 78
End: 2018-08-18
Payer: MEDICARE

## 2018-08-18 ENCOUNTER — TRANSCRIBE ORDERS (OUTPATIENT)
Dept: LAB | Facility: CLINIC | Age: 78
End: 2018-08-18

## 2018-08-18 DIAGNOSIS — N18.30 CKD (CHRONIC KIDNEY DISEASE), STAGE III (HCC): ICD-10-CM

## 2018-08-18 DIAGNOSIS — C7A.090 MALIGNANT CARCINOID TUMOR OF THE BRONCHUS AND LUNG (HCC): Primary | ICD-10-CM

## 2018-08-18 DIAGNOSIS — R53.83 OTHER FATIGUE: ICD-10-CM

## 2018-08-18 DIAGNOSIS — C7A.090 MALIGNANT CARCINOID TUMOR OF THE BRONCHUS AND LUNG (HCC): ICD-10-CM

## 2018-08-18 LAB
25(OH)D3 SERPL-MCNC: 40.1 NG/ML (ref 30–100)
ALBUMIN SERPL BCP-MCNC: 3.8 G/DL (ref 3.5–5)
ALP SERPL-CCNC: 62 U/L (ref 46–116)
ALT SERPL W P-5'-P-CCNC: 34 U/L (ref 12–78)
ANION GAP SERPL CALCULATED.3IONS-SCNC: 5 MMOL/L (ref 4–13)
AST SERPL W P-5'-P-CCNC: 54 U/L (ref 5–45)
BACTERIA UR QL AUTO: ABNORMAL /HPF
BASOPHILS # BLD AUTO: 0.06 THOUSANDS/ΜL (ref 0–0.1)
BASOPHILS NFR BLD AUTO: 1 % (ref 0–1)
BILIRUB SERPL-MCNC: 0.5 MG/DL (ref 0.2–1)
BILIRUB UR QL STRIP: NEGATIVE
BUN SERPL-MCNC: 14 MG/DL (ref 5–25)
CALCIUM SERPL-MCNC: 9.8 MG/DL (ref 8.3–10.1)
CHLORIDE SERPL-SCNC: 105 MMOL/L (ref 100–108)
CLARITY UR: CLEAR
CO2 SERPL-SCNC: 30 MMOL/L (ref 21–32)
COLOR UR: YELLOW
CREAT SERPL-MCNC: 0.82 MG/DL (ref 0.6–1.3)
CREAT UR-MCNC: 114 MG/DL
CREAT UR-MCNC: 114 MG/DL
EOSINOPHIL # BLD AUTO: 0.28 THOUSAND/ΜL (ref 0–0.61)
EOSINOPHIL NFR BLD AUTO: 4 % (ref 0–6)
ERYTHROCYTE [DISTWIDTH] IN BLOOD BY AUTOMATED COUNT: 14 % (ref 11.6–15.1)
GFR SERPL CREATININE-BSD FRML MDRD: 70 ML/MIN/1.73SQ M
GGT SERPL-CCNC: 33 U/L (ref 5–85)
GLUCOSE P FAST SERPL-MCNC: 139 MG/DL (ref 65–99)
GLUCOSE UR STRIP-MCNC: NEGATIVE MG/DL
HCT VFR BLD AUTO: 37.3 % (ref 34.8–46.1)
HGB BLD-MCNC: 11.6 G/DL (ref 11.5–15.4)
HGB UR QL STRIP.AUTO: NEGATIVE
IMM GRANULOCYTES # BLD AUTO: 0.02 THOUSAND/UL (ref 0–0.2)
IMM GRANULOCYTES NFR BLD AUTO: 0 % (ref 0–2)
KETONES UR STRIP-MCNC: NEGATIVE MG/DL
LEUKOCYTE ESTERASE UR QL STRIP: ABNORMAL
LYMPHOCYTES # BLD AUTO: 1.16 THOUSANDS/ΜL (ref 0.6–4.47)
LYMPHOCYTES NFR BLD AUTO: 18 % (ref 14–44)
MCH RBC QN AUTO: 27.1 PG (ref 26.8–34.3)
MCHC RBC AUTO-ENTMCNC: 31.1 G/DL (ref 31.4–37.4)
MCV RBC AUTO: 87 FL (ref 82–98)
MICROALBUMIN UR-MCNC: 33.3 MG/L (ref 0–20)
MICROALBUMIN/CREAT 24H UR: 29 MG/G CREATININE (ref 0–30)
MONOCYTES # BLD AUTO: 0.49 THOUSAND/ΜL (ref 0.17–1.22)
MONOCYTES NFR BLD AUTO: 8 % (ref 4–12)
NEUTROPHILS # BLD AUTO: 4.54 THOUSANDS/ΜL (ref 1.85–7.62)
NEUTS SEG NFR BLD AUTO: 69 % (ref 43–75)
NITRITE UR QL STRIP: NEGATIVE
NON-SQ EPI CELLS URNS QL MICRO: ABNORMAL /HPF
NRBC BLD AUTO-RTO: 0 /100 WBCS
OTHER STN SPEC: ABNORMAL
PH UR STRIP.AUTO: 7 [PH] (ref 4.5–8)
PHOSPHATE SERPL-MCNC: 3.9 MG/DL (ref 2.3–4.1)
PLATELET # BLD AUTO: 258 THOUSANDS/UL (ref 149–390)
PMV BLD AUTO: 10 FL (ref 8.9–12.7)
POTASSIUM SERPL-SCNC: 4.2 MMOL/L (ref 3.5–5.3)
PROT SERPL-MCNC: 8 G/DL (ref 6.4–8.2)
PROT UR STRIP-MCNC: NEGATIVE MG/DL
PROT UR-MCNC: 16 MG/DL
PROT/CREAT UR: 0.14 MG/G{CREAT} (ref 0–0.1)
PTH-INTACT SERPL-MCNC: 19.1 PG/ML (ref 18.4–80.1)
RBC # BLD AUTO: 4.28 MILLION/UL (ref 3.81–5.12)
RBC #/AREA URNS AUTO: ABNORMAL /HPF
SODIUM SERPL-SCNC: 140 MMOL/L (ref 136–145)
SP GR UR STRIP.AUTO: 1.01 (ref 1–1.03)
TSH SERPL DL<=0.05 MIU/L-ACNC: 2.48 UIU/ML (ref 0.36–3.74)
UROBILINOGEN UR QL STRIP.AUTO: 0.2 E.U./DL
WBC # BLD AUTO: 6.55 THOUSAND/UL (ref 4.31–10.16)
WBC #/AREA URNS AUTO: ABNORMAL /HPF

## 2018-08-18 PROCEDURE — 85025 COMPLETE CBC W/AUTO DIFF WBC: CPT

## 2018-08-18 PROCEDURE — 84156 ASSAY OF PROTEIN URINE: CPT

## 2018-08-18 PROCEDURE — 36415 COLL VENOUS BLD VENIPUNCTURE: CPT

## 2018-08-18 PROCEDURE — 82570 ASSAY OF URINE CREATININE: CPT

## 2018-08-18 PROCEDURE — 82977 ASSAY OF GGT: CPT

## 2018-08-18 PROCEDURE — 84443 ASSAY THYROID STIM HORMONE: CPT

## 2018-08-18 PROCEDURE — 82306 VITAMIN D 25 HYDROXY: CPT

## 2018-08-18 PROCEDURE — 84100 ASSAY OF PHOSPHORUS: CPT

## 2018-08-18 PROCEDURE — 82043 UR ALBUMIN QUANTITATIVE: CPT

## 2018-08-18 PROCEDURE — 83970 ASSAY OF PARATHORMONE: CPT

## 2018-08-18 PROCEDURE — 80053 COMPREHEN METABOLIC PANEL: CPT

## 2018-08-18 PROCEDURE — 81001 URINALYSIS AUTO W/SCOPE: CPT

## 2018-08-20 DIAGNOSIS — N18.30 CKD (CHRONIC KIDNEY DISEASE), STAGE III (HCC): Primary | ICD-10-CM

## 2018-08-20 NOTE — PROGRESS NOTES
Hello    Patient normally is followed up by Ms Charley Boggs  Can the patient be notified that the renal function is stable  Does the patient have any urinary symptoms-fevers, chills, dysuria, suprapubic pain? Patient had a very small amount of white cells in the urine  If there are no symptoms then for now nothing further to do  Patient should have a BMP, CBC in 3 months      Thank you    np

## 2018-10-12 DIAGNOSIS — I10 BENIGN ESSENTIAL HTN: ICD-10-CM

## 2018-10-12 DIAGNOSIS — N18.30 CKD (CHRONIC KIDNEY DISEASE), STAGE III (HCC): ICD-10-CM

## 2018-10-13 RX ORDER — LOSARTAN POTASSIUM 100 MG/1
100 TABLET ORAL DAILY
Qty: 90 TABLET | Refills: 3 | OUTPATIENT
Start: 2018-10-13

## 2018-10-15 ENCOUNTER — APPOINTMENT (OUTPATIENT)
Dept: LAB | Facility: CLINIC | Age: 78
End: 2018-10-15
Payer: MEDICARE

## 2018-10-15 ENCOUNTER — TELEPHONE (OUTPATIENT)
Dept: OTHER | Facility: HOSPITAL | Age: 78
End: 2018-10-15

## 2018-10-15 DIAGNOSIS — I10 BENIGN ESSENTIAL HYPERTENSION: ICD-10-CM

## 2018-10-15 DIAGNOSIS — I10 BENIGN ESSENTIAL HTN: ICD-10-CM

## 2018-10-15 DIAGNOSIS — N18.30 CKD (CHRONIC KIDNEY DISEASE), STAGE III (HCC): ICD-10-CM

## 2018-10-15 LAB
ANION GAP SERPL CALCULATED.3IONS-SCNC: 8 MMOL/L (ref 4–13)
BUN SERPL-MCNC: 17 MG/DL (ref 5–25)
CALCIUM SERPL-MCNC: 9.4 MG/DL (ref 8.3–10.1)
CHLORIDE SERPL-SCNC: 101 MMOL/L (ref 100–108)
CO2 SERPL-SCNC: 30 MMOL/L (ref 21–32)
CREAT SERPL-MCNC: 0.82 MG/DL (ref 0.6–1.3)
GFR SERPL CREATININE-BSD FRML MDRD: 69 ML/MIN/1.73SQ M
GLUCOSE P FAST SERPL-MCNC: 145 MG/DL (ref 65–99)
POTASSIUM SERPL-SCNC: 4.2 MMOL/L (ref 3.5–5.3)
SODIUM SERPL-SCNC: 139 MMOL/L (ref 136–145)

## 2018-10-15 PROCEDURE — 36415 COLL VENOUS BLD VENIPUNCTURE: CPT

## 2018-10-15 PROCEDURE — 80048 BASIC METABOLIC PNL TOTAL CA: CPT

## 2018-10-15 RX ORDER — LOSARTAN POTASSIUM 100 MG/1
100 TABLET ORAL DAILY
Qty: 90 TABLET | Refills: 3 | Status: SHIPPED | OUTPATIENT
Start: 2018-10-15 | End: 2019-05-29

## 2018-11-19 ENCOUNTER — LAB (OUTPATIENT)
Dept: LAB | Facility: CLINIC | Age: 78
End: 2018-11-19
Payer: MEDICARE

## 2018-11-19 DIAGNOSIS — N18.30 CKD (CHRONIC KIDNEY DISEASE), STAGE III (HCC): ICD-10-CM

## 2018-11-19 LAB
ANION GAP SERPL CALCULATED.3IONS-SCNC: 10 MMOL/L (ref 4–13)
BUN SERPL-MCNC: 16 MG/DL (ref 5–25)
CALCIUM SERPL-MCNC: 9.9 MG/DL (ref 8.3–10.1)
CHLORIDE SERPL-SCNC: 102 MMOL/L (ref 100–108)
CO2 SERPL-SCNC: 29 MMOL/L (ref 21–32)
CREAT SERPL-MCNC: 0.88 MG/DL (ref 0.6–1.3)
ERYTHROCYTE [DISTWIDTH] IN BLOOD BY AUTOMATED COUNT: 14.6 % (ref 11.6–15.1)
GFR SERPL CREATININE-BSD FRML MDRD: 64 ML/MIN/1.73SQ M
GLUCOSE P FAST SERPL-MCNC: 155 MG/DL (ref 65–99)
HCT VFR BLD AUTO: 37.7 % (ref 34.8–46.1)
HGB BLD-MCNC: 11.9 G/DL (ref 11.5–15.4)
MCH RBC QN AUTO: 28.1 PG (ref 26.8–34.3)
MCHC RBC AUTO-ENTMCNC: 31.6 G/DL (ref 31.4–37.4)
MCV RBC AUTO: 89 FL (ref 82–98)
PLATELET # BLD AUTO: 258 THOUSANDS/UL (ref 149–390)
PMV BLD AUTO: 9.7 FL (ref 8.9–12.7)
POTASSIUM SERPL-SCNC: 4.1 MMOL/L (ref 3.5–5.3)
RBC # BLD AUTO: 4.23 MILLION/UL (ref 3.81–5.12)
SODIUM SERPL-SCNC: 141 MMOL/L (ref 136–145)
WBC # BLD AUTO: 8.02 THOUSAND/UL (ref 4.31–10.16)

## 2018-11-19 PROCEDURE — 80048 BASIC METABOLIC PNL TOTAL CA: CPT

## 2018-11-19 PROCEDURE — 36415 COLL VENOUS BLD VENIPUNCTURE: CPT

## 2018-11-19 PROCEDURE — 85027 COMPLETE CBC AUTOMATED: CPT

## 2018-11-19 NOTE — PROGRESS NOTES
Hello    The you are seeing the patient a few days  Renal function stable  Sodium level is stable      Thank you    np

## 2018-11-21 DIAGNOSIS — I10 BENIGN ESSENTIAL HTN: ICD-10-CM

## 2018-11-21 DIAGNOSIS — N18.30 CKD (CHRONIC KIDNEY DISEASE), STAGE III (HCC): ICD-10-CM

## 2018-11-21 RX ORDER — AMLODIPINE BESYLATE 10 MG/1
10 TABLET ORAL DAILY
Qty: 30 TABLET | Refills: 4 | Status: SHIPPED | OUTPATIENT
Start: 2018-11-21 | End: 2019-04-16 | Stop reason: SDUPTHER

## 2018-11-23 ENCOUNTER — OFFICE VISIT (OUTPATIENT)
Dept: NEPHROLOGY | Facility: CLINIC | Age: 78
End: 2018-11-23
Payer: MEDICARE

## 2018-11-23 VITALS
WEIGHT: 195.2 LBS | DIASTOLIC BLOOD PRESSURE: 52 MMHG | SYSTOLIC BLOOD PRESSURE: 132 MMHG | HEIGHT: 60 IN | HEART RATE: 52 BPM | BODY MASS INDEX: 38.32 KG/M2

## 2018-11-23 DIAGNOSIS — I10 BENIGN ESSENTIAL HYPERTENSION: Primary | ICD-10-CM

## 2018-11-23 PROCEDURE — 99213 OFFICE O/P EST LOW 20 MIN: CPT | Performed by: PHYSICIAN ASSISTANT

## 2018-11-23 NOTE — PATIENT INSTRUCTIONS
Hypertension- Antihypertensive regimen includes amlodipine 10mg daily, losartan 100mg daily, metoprolol 50mg twice a day, spironolactone 12 5mg daily, and torsemide 20mg daily  Avoid salt in your diet  Stay active  Avoid NSAIDs (advil, aleve, ibuprofen, motrin, naproxen, Excedrin, mobic)  Her blood pressure is acceptable  Renal Artery Doppler negative  Hyponatremia- Stable for over a year  Follow up with Dr Petra Kern in 6 months  Please call the office with any questions or concerns

## 2018-11-23 NOTE — LETTER
November 23, 2018     [address] [facility]  [address] [facility]  [id number] [place] [address]    Patient: Roger Finch   YOB: 1940   Date of Visit: 11/23/2018       Dear Dr Sophie Cuevas:    Thank you for referring Roger Finch to me for evaluation  Below are my notes for this consultation  If you have questions, please do not hesitate to call me  I look forward to following your patient along with you  Sincerely,        Jose Chow PA-C        CC: No Recipients  Margoth Olguin  11/23/2018 11:46 AM  Sign at close encounter  Assessment and Plan:    Diagnoses and all orders for this visit:    Benign essential hypertension  -     Basic metabolic panel; Future      Hypertension- Antihypertensive regimen includes amlodipine 10mg daily, losartan 100mg daily, metoprolol 50mg twice a day, spironolactone 12 5mg daily, and torsemide 20mg daily  Avoid salt in your diet  Stay active  Avoid NSAIDs (advil, aleve, ibuprofen, motrin, naproxen, Excedrin, mobic)  Her blood pressure is acceptable  Renal Artery Doppler negative  Hyponatremia- Stable for over a year  Follow up with Dr Desi Deshpande in 6 months  Please call the office with any questions or concerns  Reason for Visit: No chief complaint on file  HPI: Roger Finch is a 69 y o  female who is here for follow up of a blood pressure check  She feels congested in her head and mucinex helped  She is also fatigued a lot  She denies changes in appetite and urination  She has some knee pain  ROS: A complete review of systems was performed and was negative unless otherwise noted in the history of present illness      Allergies:   Hydrochlorothiazide    Medications:     Current Outpatient Prescriptions:     ADULT ASPIRIN LOW STRENGTH PO, Take 1 tablet by mouth daily, Disp: , Rfl:     amLODIPine (NORVASC) 10 mg tablet, Take 1 tablet (10 mg total) by mouth daily, Disp: 30 tablet, Rfl: 4    Calcium 600-400 MG-UNIT CHEW, Chew 1 tablet 2 (two) times a day, Disp: , Rfl:     fluticasone (FLONASE) 50 mcg/act nasal spray, 1 spray into each nostril daily, Disp: , Rfl:     glipiZIDE (GLUCOTROL) 10 mg tablet, Take 1 tablet by mouth 2 (two) times a day, Disp: , Rfl:     latanoprost (XALATAN) 0 005 % ophthalmic solution, Apply to eye, Disp: , Rfl:     loratadine (CLARITIN) 10 mg tablet, Take 1 tablet by mouth daily, Disp: , Rfl:     losartan (COZAAR) 100 MG tablet, Take 1 tablet (100 mg total) by mouth daily, Disp: 90 tablet, Rfl: 3    metFORMIN (GLUCOPHAGE) 500 mg tablet, Take 2 tablets by mouth 2 (two) times a day, Disp: , Rfl:     metoprolol tartrate (LOPRESSOR) 50 mg tablet, Take 1 tablet by mouth 2 (two) times a day, Disp: , Rfl:     Multiple Vitamins-Minerals (CENTRUM SILVER 50+WOMEN PO), Take by mouth, Disp: , Rfl:     sodium chloride (OCEAN) 0 65 % nasal spray, 1 spray into each nostril as needed for congestion, Disp: , Rfl:     spironolactone (ALDACTONE) 25 mg tablet, Take 0 5 tablets (12 5 mg total) by mouth daily, Disp: 15 tablet, Rfl: 5    terazosin (HYTRIN) 10 MG capsule, Take 1 capsule by mouth, Disp: , Rfl:     torsemide (DEMADEX) 20 mg tablet, Take 20 mg by mouth daily, Disp: , Rfl:     Past Medical History:   Diagnosis Date    Benign hypertension     Hyponatremia      History reviewed  No pertinent surgical history  Family History   Problem Relation Age of Onset    Family history unknown: Yes      reports that she has never smoked  She has never used smokeless tobacco  She reports that she does not drink alcohol or use drugs  Physical Exam:   Vitals:    11/23/18 1124 11/23/18 1139   BP:  132/52   BP Location:  Right arm   Patient Position:  Sitting   Cuff Size:  Standard   Pulse:  (!) 52   Weight: 88 5 kg (195 lb 3 2 oz)    Height: 5' (1 524 m)      Body mass index is 38 12 kg/m²      General: NAD  Neuro: AAO  Neck: supple  Skin: no rash  Heart: RRR  Lungs: CTAB  Abdomen: soft nt nd  Extremities: trace pedal edema    Procedure:  No results found for this or any previous visit  Labs reviewed      Lab Results   Component Value Date    CALCIUM 9 9 11/19/2018    K 4 1 11/19/2018    CO2 29 11/19/2018     11/19/2018    BUN 16 11/19/2018    CREATININE 0 88 11/19/2018         Results from last 7 days  Lab Units 11/19/18  0930   WBC Thousand/uL 8 02   HEMOGLOBIN g/dL 11 9   HEMATOCRIT % 37 7   PLATELETS Thousands/uL 258   POTASSIUM mmol/L 4 1   CHLORIDE mmol/L 102   CO2 mmol/L 29   BUN mg/dL 16   CREATININE mg/dL 0 88   CALCIUM mg/dL 9 9

## 2018-11-23 NOTE — PROGRESS NOTES
Assessment and Plan:    Diagnoses and all orders for this visit:    Benign essential hypertension  -     Basic metabolic panel; Future      Hypertension- Antihypertensive regimen includes amlodipine 10mg daily, losartan 100mg daily, metoprolol 50mg twice a day, spironolactone 12 5mg daily, and torsemide 20mg daily  Avoid salt in your diet  Stay active  Avoid NSAIDs (advil, aleve, ibuprofen, motrin, naproxen, Excedrin, mobic)  Her blood pressure is acceptable  Renal Artery Doppler negative  Hyponatremia- Stable for over a year  Follow up with Dr Niharika Canchola in 6 months  Please call the office with any questions or concerns  Reason for Visit: No chief complaint on file  HPI: Quincy Hilaroi is a 69 y o  female who is here for follow up of a blood pressure check  She feels congested in her head and mucinex helped  She is also fatigued a lot  She denies changes in appetite and urination  She has some knee pain  ROS: A complete review of systems was performed and was negative unless otherwise noted in the history of present illness      Allergies:   Hydrochlorothiazide    Medications:     Current Outpatient Prescriptions:     ADULT ASPIRIN LOW STRENGTH PO, Take 1 tablet by mouth daily, Disp: , Rfl:     amLODIPine (NORVASC) 10 mg tablet, Take 1 tablet (10 mg total) by mouth daily, Disp: 30 tablet, Rfl: 4    Calcium 600-400 MG-UNIT CHEW, Chew 1 tablet 2 (two) times a day, Disp: , Rfl:     fluticasone (FLONASE) 50 mcg/act nasal spray, 1 spray into each nostril daily, Disp: , Rfl:     glipiZIDE (GLUCOTROL) 10 mg tablet, Take 1 tablet by mouth 2 (two) times a day, Disp: , Rfl:     latanoprost (XALATAN) 0 005 % ophthalmic solution, Apply to eye, Disp: , Rfl:     loratadine (CLARITIN) 10 mg tablet, Take 1 tablet by mouth daily, Disp: , Rfl:     losartan (COZAAR) 100 MG tablet, Take 1 tablet (100 mg total) by mouth daily, Disp: 90 tablet, Rfl: 3    metFORMIN (GLUCOPHAGE) 500 mg tablet, Take 2 tablets by mouth 2 (two) times a day, Disp: , Rfl:     metoprolol tartrate (LOPRESSOR) 50 mg tablet, Take 1 tablet by mouth 2 (two) times a day, Disp: , Rfl:     Multiple Vitamins-Minerals (CENTRUM SILVER 50+WOMEN PO), Take by mouth, Disp: , Rfl:     sodium chloride (OCEAN) 0 65 % nasal spray, 1 spray into each nostril as needed for congestion, Disp: , Rfl:     spironolactone (ALDACTONE) 25 mg tablet, Take 0 5 tablets (12 5 mg total) by mouth daily, Disp: 15 tablet, Rfl: 5    terazosin (HYTRIN) 10 MG capsule, Take 1 capsule by mouth, Disp: , Rfl:     torsemide (DEMADEX) 20 mg tablet, Take 20 mg by mouth daily, Disp: , Rfl:     Past Medical History:   Diagnosis Date    Benign hypertension     Hyponatremia      History reviewed  No pertinent surgical history  Family History   Problem Relation Age of Onset    Family history unknown: Yes      reports that she has never smoked  She has never used smokeless tobacco  She reports that she does not drink alcohol or use drugs  Physical Exam:   Vitals:    11/23/18 1124 11/23/18 1139   BP:  132/52   BP Location:  Right arm   Patient Position:  Sitting   Cuff Size:  Standard   Pulse:  (!) 52   Weight: 88 5 kg (195 lb 3 2 oz)    Height: 5' (1 524 m)      Body mass index is 38 12 kg/m²  General: NAD  Neuro: AAO  Neck: supple  Skin: no rash  Heart: RRR  Lungs: CTAB  Abdomen: soft nt nd  Extremities: trace pedal edema    Procedure:  No results found for this or any previous visit  Labs reviewed      Lab Results   Component Value Date    CALCIUM 9 9 11/19/2018    K 4 1 11/19/2018    CO2 29 11/19/2018     11/19/2018    BUN 16 11/19/2018    CREATININE 0 88 11/19/2018         Results from last 7 days  Lab Units 11/19/18  0930   WBC Thousand/uL 8 02   HEMOGLOBIN g/dL 11 9   HEMATOCRIT % 37 7   PLATELETS Thousands/uL 258   POTASSIUM mmol/L 4 1   CHLORIDE mmol/L 102   CO2 mmol/L 29   BUN mg/dL 16   CREATININE mg/dL 0 88   CALCIUM mg/dL 9 9

## 2019-01-21 DIAGNOSIS — I10 ESSENTIAL HYPERTENSION: ICD-10-CM

## 2019-01-21 DIAGNOSIS — R80.1 PERSISTENT PROTEINURIA: ICD-10-CM

## 2019-01-21 RX ORDER — SPIRONOLACTONE 25 MG/1
12.5 TABLET ORAL DAILY
Qty: 15 TABLET | Refills: 5 | Status: SHIPPED | OUTPATIENT
Start: 2019-01-21 | End: 2020-04-13 | Stop reason: SDUPTHER

## 2019-04-16 DIAGNOSIS — N18.30 CKD (CHRONIC KIDNEY DISEASE), STAGE III (HCC): ICD-10-CM

## 2019-04-16 DIAGNOSIS — I10 BENIGN ESSENTIAL HTN: ICD-10-CM

## 2019-04-16 RX ORDER — AMLODIPINE BESYLATE 10 MG/1
10 TABLET ORAL DAILY
Qty: 30 TABLET | Refills: 4 | Status: SHIPPED | OUTPATIENT
Start: 2019-04-16 | End: 2019-09-23 | Stop reason: SDUPTHER

## 2019-05-24 ENCOUNTER — APPOINTMENT (OUTPATIENT)
Dept: LAB | Facility: CLINIC | Age: 79
End: 2019-05-24
Payer: MEDICARE

## 2019-05-24 DIAGNOSIS — I10 BENIGN ESSENTIAL HYPERTENSION: ICD-10-CM

## 2019-05-24 LAB
ANION GAP SERPL CALCULATED.3IONS-SCNC: 9 MMOL/L (ref 4–13)
BUN SERPL-MCNC: 15 MG/DL (ref 5–25)
CALCIUM SERPL-MCNC: 9.9 MG/DL (ref 8.3–10.1)
CHLORIDE SERPL-SCNC: 100 MMOL/L (ref 100–108)
CO2 SERPL-SCNC: 29 MMOL/L (ref 21–32)
CREAT SERPL-MCNC: 0.92 MG/DL (ref 0.6–1.3)
GFR SERPL CREATININE-BSD FRML MDRD: 60 ML/MIN/1.73SQ M
GLUCOSE P FAST SERPL-MCNC: 120 MG/DL (ref 65–99)
POTASSIUM SERPL-SCNC: 4.2 MMOL/L (ref 3.5–5.3)
SODIUM SERPL-SCNC: 138 MMOL/L (ref 136–145)

## 2019-05-24 PROCEDURE — 36415 COLL VENOUS BLD VENIPUNCTURE: CPT

## 2019-05-24 PROCEDURE — 80048 BASIC METABOLIC PNL TOTAL CA: CPT

## 2019-05-29 ENCOUNTER — OFFICE VISIT (OUTPATIENT)
Dept: NEPHROLOGY | Facility: CLINIC | Age: 79
End: 2019-05-29
Payer: MEDICARE

## 2019-05-29 VITALS
HEIGHT: 60 IN | HEART RATE: 54 BPM | SYSTOLIC BLOOD PRESSURE: 130 MMHG | WEIGHT: 191.4 LBS | DIASTOLIC BLOOD PRESSURE: 42 MMHG | BODY MASS INDEX: 37.58 KG/M2

## 2019-05-29 DIAGNOSIS — E87.1 HYPONATREMIA: Primary | ICD-10-CM

## 2019-05-29 DIAGNOSIS — I10 BENIGN ESSENTIAL HTN: ICD-10-CM

## 2019-05-29 PROCEDURE — 99214 OFFICE O/P EST MOD 30 MIN: CPT | Performed by: INTERNAL MEDICINE

## 2019-05-29 RX ORDER — VALSARTAN 320 MG/1
320 TABLET ORAL DAILY
COMMUNITY
End: 2021-01-28 | Stop reason: SDUPTHER

## 2019-05-29 RX ORDER — TERAZOSIN 5 MG/1
5 CAPSULE ORAL
Qty: 90 CAPSULE | Refills: 3 | Status: SHIPPED | OUTPATIENT
Start: 2019-05-29 | End: 2020-06-01 | Stop reason: SDUPTHER

## 2019-05-29 RX ORDER — METOPROLOL TARTRATE 37.5 MG/1
37.5 TABLET, FILM COATED ORAL 2 TIMES DAILY
Qty: 180 TABLET | Refills: 3 | Status: SHIPPED | OUTPATIENT
Start: 2019-05-29 | End: 2019-07-01

## 2019-05-31 ENCOUNTER — DOCUMENTATION (OUTPATIENT)
Dept: NEPHROLOGY | Facility: CLINIC | Age: 79
End: 2019-05-31

## 2019-07-01 ENCOUNTER — OFFICE VISIT (OUTPATIENT)
Dept: NEPHROLOGY | Facility: CLINIC | Age: 79
End: 2019-07-01
Payer: MEDICARE

## 2019-07-01 VITALS
DIASTOLIC BLOOD PRESSURE: 56 MMHG | WEIGHT: 189.4 LBS | HEART RATE: 56 BPM | SYSTOLIC BLOOD PRESSURE: 126 MMHG | BODY MASS INDEX: 37.18 KG/M2 | HEIGHT: 60 IN

## 2019-07-01 DIAGNOSIS — I10 BENIGN ESSENTIAL HYPERTENSION: Primary | ICD-10-CM

## 2019-07-01 PROCEDURE — 99213 OFFICE O/P EST LOW 20 MIN: CPT | Performed by: PHYSICIAN ASSISTANT

## 2019-07-01 RX ORDER — METOPROLOL SUCCINATE 25 MG/1
25 TABLET, EXTENDED RELEASE ORAL
Qty: 30 TABLET | Refills: 3 | Status: SHIPPED | OUTPATIENT
Start: 2019-07-01 | End: 2019-10-25 | Stop reason: SDUPTHER

## 2019-07-01 NOTE — PATIENT INSTRUCTIONS
Hypertension- Antihypertensive regimen includes amlodipine 10mg daily, valsartan 320mg daily, metoprolol 37 5mg twice a day (however patient has only been taking it once a day for the past month), spironolactone 12 5mg daily, torsemide 20mg daily, and terazosin 5mg daily at night  Avoid salt in your diet  Stay active  Avoid NSAIDs (advil, aleve, ibuprofen, motrin, naproxen, Excedrin, mobic)  Her blood pressure is acceptable  Renal Artery Doppler negative  Please change metoprolol to the long acting form with 25mg once daily at night  Follow up with Dr Lorene Ashby in 3 months  Please call the office with any questions or concerns

## 2019-07-01 NOTE — PROGRESS NOTES
Assessment and Plan:    Camryn was seen today for follow-up, hypertension and hyponatremia  Diagnoses and all orders for this visit:    Benign essential hypertension  -     metoprolol succinate (TOPROL-XL) 25 mg 24 hr tablet; Take 1 tablet (25 mg total) by mouth daily at bedtime      Hypertension- Antihypertensive regimen includes amlodipine 10mg daily, valsartan 320mg daily, metoprolol 37 5mg twice a day (however patient has only been taking it once a day for the past month), spironolactone 12 5mg daily, torsemide 20mg daily, and terazosin 5mg daily at night  Avoid salt in your diet  Stay active  Avoid NSAIDs (advil, aleve, ibuprofen, motrin, naproxen, Excedrin, mobic)  Her blood pressure is acceptable  Renal Artery Doppler negative  Please change metoprolol to the long acting form with 25mg once daily at night  Follow up with Dr Bartolo Elkins in 3 months  Please call the office with any questions or concerns  Reason for Visit: Follow-up (bp check); Hypertension; and Hyponatremia    HPI: Artem Hale is a 69 y o  female who is here for follow up of hypertension  She saw Dr Bartolo Elkins 1 month ago  At that time, her antihypertensives were lowered due to low diastolic readings and feeling of orthostasis  At home for the week of 6/4, she had readings ranging from 135 to 149/60-80  Her pulse ranges from 60-66  She states she still feels off balance sometimes  She fell on Friday due to her hand rail breaking and she hit her knees  Otherwise, she has no acute complaints today  She is having cataract surgery in late August and early September  ROS: A complete review of systems was performed and was negative unless otherwise noted in the history of present illness      Allergies:   Hydrochlorothiazide; Penicillins; and Shellfish-derived products    Medications:     Current Outpatient Medications:     ADULT ASPIRIN LOW STRENGTH PO, Take 1 tablet by mouth every 14 (fourteen) days , Disp: , Rfl:     amLODIPine (NORVASC) 10 mg tablet, Take 1 tablet (10 mg total) by mouth daily, Disp: 30 tablet, Rfl: 4    Calcium 600-400 MG-UNIT CHEW, Chew 1 tablet 2 (two) times a day, Disp: , Rfl:     fluticasone (FLONASE) 50 mcg/act nasal spray, 1 spray into each nostril daily, Disp: , Rfl:     glipiZIDE (GLUCOTROL) 10 mg tablet, Take 1 tablet by mouth 2 (two) times a day, Disp: , Rfl:     ipratropium (ATROVENT) 0 03 % nasal spray, 2 sprays into each nostril 3 (three) times a day, Disp: 30 mL, Rfl: 6    latanoprost (XALATAN) 0 005 % ophthalmic solution, Apply to eye, Disp: , Rfl:     loratadine (CLARITIN) 10 mg tablet, Take 1 tablet by mouth daily, Disp: , Rfl:     metFORMIN (GLUCOPHAGE) 500 mg tablet, Take 2 tablets by mouth 2 (two) times a day, Disp: , Rfl:     Multiple Vitamins-Minerals (CENTRUM SILVER 50+WOMEN PO), Take by mouth, Disp: , Rfl:     sodium chloride (OCEAN) 0 65 % nasal spray, 1 spray into each nostril as needed for congestion, Disp: , Rfl:     spironolactone (ALDACTONE) 25 mg tablet, Take 0 5 tablets (12 5 mg total) by mouth daily, Disp: 15 tablet, Rfl: 5    terazosin (HYTRIN) 5 mg capsule, Take 1 capsule (5 mg total) by mouth daily at bedtime, Disp: 90 capsule, Rfl: 3    torsemide (DEMADEX) 20 mg tablet, Take 20 mg by mouth daily, Disp: , Rfl:     valsartan (DIOVAN) 320 MG tablet, Take 320 mg by mouth daily, Disp: , Rfl:     metoprolol succinate (TOPROL-XL) 25 mg 24 hr tablet, Take 1 tablet (25 mg total) by mouth daily at bedtime, Disp: 30 tablet, Rfl: 3    Past Medical History:   Diagnosis Date    Benign hypertension     Diabetes ([address] )     Hyponatremia      Past Surgical History:   Procedure Laterality Date    ABSCESS DRAINAGE      APPENDECTOMY      CECOSTOMY       SECTION      GALLBLADDER SURGERY      HERNIA REPAIR      HYSTERECTOMY       Family History   Family history unknown: Yes      reports that she has never smoked   She has never used smokeless tobacco  She reports that she does not drink alcohol or use drugs  Physical Exam:   Vitals:    07/01/19 0952 07/01/19 1003   BP:  126/56   BP Location:  Left arm   Patient Position:  Sitting   Cuff Size:  Standard   Pulse:  56   Weight: 85 9 kg (189 lb 6 4 oz)    Height: 5' (1 524 m)      Body mass index is 36 99 kg/m²  General: NAD  Neuro: AAO  Neck: supple  Skin: no rash  Heart: RRR  Lungs: CTAB  Abdomen: soft nt nd  Extremities: no edema    Procedure:  No results found for this or any previous visit  Labs reviewed      Lab Results   Component Value Date    CALCIUM 9 9 05/24/2019    K 4 2 05/24/2019    CO2 29 05/24/2019     05/24/2019    BUN 15 05/24/2019    CREATININE 0 92 05/24/2019

## 2019-08-20 ENCOUNTER — TELEPHONE (OUTPATIENT)
Dept: NEPHROLOGY | Facility: CLINIC | Age: 79
End: 2019-08-20

## 2019-08-20 NOTE — TELEPHONE ENCOUNTER
I called and left message for patient to call back to schedule October follow up appt with Dr Hoda James

## 2019-08-29 ENCOUNTER — LAB (OUTPATIENT)
Dept: LAB | Facility: CLINIC | Age: 79
End: 2019-08-29
Payer: MEDICARE

## 2019-08-29 ENCOUNTER — TELEPHONE (OUTPATIENT)
Dept: NEPHROLOGY | Facility: CLINIC | Age: 79
End: 2019-08-29

## 2019-08-29 DIAGNOSIS — E87.1 HYPONATREMIA: ICD-10-CM

## 2019-08-29 DIAGNOSIS — I10 BENIGN ESSENTIAL HTN: ICD-10-CM

## 2019-08-29 LAB
ANION GAP SERPL CALCULATED.3IONS-SCNC: 8 MMOL/L (ref 4–13)
BACTERIA UR QL AUTO: ABNORMAL /HPF
BILIRUB UR QL STRIP: NEGATIVE
BUN SERPL-MCNC: 15 MG/DL (ref 5–25)
CALCIUM SERPL-MCNC: 9 MG/DL (ref 8.3–10.1)
CHLORIDE SERPL-SCNC: 106 MMOL/L (ref 100–108)
CLARITY UR: CLEAR
CO2 SERPL-SCNC: 28 MMOL/L (ref 21–32)
COLOR UR: YELLOW
CREAT SERPL-MCNC: 0.81 MG/DL (ref 0.6–1.3)
CREAT UR-MCNC: 47.1 MG/DL
ERYTHROCYTE [DISTWIDTH] IN BLOOD BY AUTOMATED COUNT: 13.7 % (ref 11.6–15.1)
GFR SERPL CREATININE-BSD FRML MDRD: 70 ML/MIN/1.73SQ M
GLUCOSE P FAST SERPL-MCNC: 136 MG/DL (ref 65–99)
GLUCOSE UR STRIP-MCNC: NEGATIVE MG/DL
HCT VFR BLD AUTO: 37.7 % (ref 34.8–46.1)
HGB BLD-MCNC: 11.7 G/DL (ref 11.5–15.4)
HGB UR QL STRIP.AUTO: NEGATIVE
KETONES UR STRIP-MCNC: NEGATIVE MG/DL
LEUKOCYTE ESTERASE UR QL STRIP: ABNORMAL
MCH RBC QN AUTO: 28.2 PG (ref 26.8–34.3)
MCHC RBC AUTO-ENTMCNC: 31 G/DL (ref 31.4–37.4)
MCV RBC AUTO: 91 FL (ref 82–98)
NITRITE UR QL STRIP: NEGATIVE
NON-SQ EPI CELLS URNS QL MICRO: ABNORMAL /HPF
PH UR STRIP.AUTO: 5.5 [PH]
PHOSPHATE SERPL-MCNC: 3.6 MG/DL (ref 2.3–4.1)
PLATELET # BLD AUTO: 255 THOUSANDS/UL (ref 149–390)
PMV BLD AUTO: 9.8 FL (ref 8.9–12.7)
POTASSIUM SERPL-SCNC: 4.2 MMOL/L (ref 3.5–5.3)
PROT UR STRIP-MCNC: NEGATIVE MG/DL
PROT UR-MCNC: 14 MG/DL
PROT/CREAT UR: 0.3 MG/G{CREAT} (ref 0–0.1)
PTH-INTACT SERPL-MCNC: 24.8 PG/ML (ref 18.4–80.1)
RBC # BLD AUTO: 4.15 MILLION/UL (ref 3.81–5.12)
RBC #/AREA URNS AUTO: ABNORMAL /HPF
SODIUM SERPL-SCNC: 142 MMOL/L (ref 136–145)
SP GR UR STRIP.AUTO: 1.01 (ref 1–1.03)
UROBILINOGEN UR QL STRIP.AUTO: 0.2 E.U./DL
WBC # BLD AUTO: 6.67 THOUSAND/UL (ref 4.31–10.16)
WBC #/AREA URNS AUTO: ABNORMAL /HPF

## 2019-08-29 PROCEDURE — 85027 COMPLETE CBC AUTOMATED: CPT

## 2019-08-29 PROCEDURE — 83970 ASSAY OF PARATHORMONE: CPT

## 2019-08-29 PROCEDURE — 36415 COLL VENOUS BLD VENIPUNCTURE: CPT

## 2019-08-29 PROCEDURE — 80048 BASIC METABOLIC PNL TOTAL CA: CPT

## 2019-08-29 PROCEDURE — 81001 URINALYSIS AUTO W/SCOPE: CPT

## 2019-08-29 PROCEDURE — 84100 ASSAY OF PHOSPHORUS: CPT

## 2019-08-29 PROCEDURE — 82570 ASSAY OF URINE CREATININE: CPT

## 2019-08-29 PROCEDURE — 84156 ASSAY OF PROTEIN URINE: CPT

## 2019-08-29 NOTE — RESULT ENCOUNTER NOTE
Hello    Patient normally is followed up by Ms Brittnee Noble  Can pt be notified that renal function is stable  Sodium stable  No changes for now       Thank you    np

## 2019-08-29 NOTE — TELEPHONE ENCOUNTER
----- Message from Rajat Sanchez MD sent at 8/29/2019 10:38 AM EDT -----  Hello    Patient normally is followed up by Ms Wilner Carballo  Can pt be notified that renal function is stable  Sodium stable  No changes for now       Thank you    np

## 2019-09-23 DIAGNOSIS — N18.30 CKD (CHRONIC KIDNEY DISEASE), STAGE III (HCC): ICD-10-CM

## 2019-09-23 DIAGNOSIS — I10 BENIGN ESSENTIAL HTN: ICD-10-CM

## 2019-09-23 RX ORDER — AMLODIPINE BESYLATE 10 MG/1
10 TABLET ORAL DAILY
Qty: 30 TABLET | Refills: 4 | Status: SHIPPED | OUTPATIENT
Start: 2019-09-23 | End: 2020-02-25 | Stop reason: SDUPTHER

## 2019-10-25 ENCOUNTER — OFFICE VISIT (OUTPATIENT)
Dept: NEPHROLOGY | Facility: CLINIC | Age: 79
End: 2019-10-25
Payer: MEDICARE

## 2019-10-25 VITALS
HEART RATE: 72 BPM | BODY MASS INDEX: 36.71 KG/M2 | DIASTOLIC BLOOD PRESSURE: 40 MMHG | HEIGHT: 60 IN | WEIGHT: 187 LBS | SYSTOLIC BLOOD PRESSURE: 130 MMHG

## 2019-10-25 DIAGNOSIS — I10 BENIGN ESSENTIAL HYPERTENSION: Primary | ICD-10-CM

## 2019-10-25 DIAGNOSIS — R80.1 PERSISTENT PROTEINURIA: ICD-10-CM

## 2019-10-25 PROCEDURE — 99214 OFFICE O/P EST MOD 30 MIN: CPT | Performed by: PHYSICIAN ASSISTANT

## 2019-10-25 RX ORDER — METOPROLOL SUCCINATE 25 MG/1
25 TABLET, EXTENDED RELEASE ORAL
Qty: 90 TABLET | Refills: 3 | Status: SHIPPED | OUTPATIENT
Start: 2019-10-25 | End: 2020-08-03

## 2019-10-25 NOTE — PATIENT INSTRUCTIONS
Hypertension- Antihypertensive regimen includes amlodipine 10mg daily, valsartan 320mg daily, metoprolol 25mg daily at night, spironolactone 12 5mg daily, torsemide 20mg daily, and terazosin 5mg daily at night   Avoid salt in your diet   Stay active   Avoid NSAIDs (advil, aleve, ibuprofen, motrin, naproxen, Excedrin, mobic)  [facility] blood pressure is acceptable  Renal Artery Doppler negative  Proteinuria- Minimal   Continue ARB  SPEP/UPEP unrevealing in the past   May be secondary to long standing hypertension  Volume Status- Continue torsemide 20mg daily and spironolactone 12 5mg daily  Renal function stable and normal     Follow up with Dr Italo Recinos in 6 months  Please call the office with any questions or concerns

## 2019-10-25 NOTE — PROGRESS NOTES
Assessment and Plan:    Camryn was seen today for follow-up, hypertension and hyponatremia  Diagnoses and all orders for this visit:    Benign essential hypertension  -     metoprolol succinate (TOPROL-XL) 25 mg 24 hr tablet; Take 1 tablet (25 mg total) by mouth daily at bedtime  -     Basic metabolic panel; Future  -     Magnesium; Future    Persistent proteinuria  -     Protein / creatinine ratio, urine; Future      Hypertension- Antihypertensive regimen includes amlodipine 10mg daily, valsartan 320mg daily, metoprolol 25mg daily at night, spironolactone 12 5mg daily, torsemide 20mg daily, and terazosin 5mg daily at night   Avoid salt in your diet   Stay active   Avoid NSAIDs (advil, aleve, ibuprofen, motrin, naproxen, Excedrin, mobic)  Arleth Montes blood pressure is acceptable  Renal Artery Doppler negative  Proteinuria- Minimal   Continue ARB  SPEP/UPEP unrevealing in the past   May be secondary to long standing hypertension  Volume Status- Continue torsemide 20mg daily and spironolactone 12 5mg daily  Renal function stable and normal     Follow up with Dr Ria German in 6 months  Please call the office with any questions or concerns  Reason for Visit: Follow-up; Hypertension; and Hyponatremia    HPI: Lazarus Conrad is a 67 y o  female who is here for follow up of hypertension  I saw her last in July  She states during the summer she was very tired and lethargic but since fall started, she has been feeling better and able to do more around the house  She has knee pain which she sees Dr Chi Delvalle for but this makes her feel unstable on her legs which is not new  She has a left knee brace she uses sometimes  She denies dizziness  She denies acute shortness of breath  She has chronic shortness of breath and made modifications to her home with chairs near the steps so she can easily rest when needed      ROS: A complete review of systems was performed and was negative unless otherwise noted in the history of present illness  Allergies:   Hydrochlorothiazide;  Other; Penicillins; and Shellfish-derived products    Medications:     Current Outpatient Medications:     ACCU-CHEK FASTCLIX LANCETS MISC, USE LANCETS ONCE DAILY, Disp: , Rfl: 3    ACCU-CHEK SMARTVIEW test strip, USE 1 STRIP TO CHECK GLUCOSE ONCE DAILY, Disp: , Rfl: 3    ADULT ASPIRIN LOW STRENGTH PO, Take 1 tablet by mouth every 14 (fourteen) days , Disp: , Rfl:     amLODIPine (NORVASC) 10 mg tablet, Take 1 tablet (10 mg total) by mouth daily, Disp: 30 tablet, Rfl: 4    Calcium 600-400 MG-UNIT CHEW, Chew 1 tablet 2 (two) times a day, Disp: , Rfl:     fluticasone (FLONASE) 50 mcg/act nasal spray, 1 spray into each nostril daily, Disp: , Rfl:     glipiZIDE (GLUCOTROL) 10 mg tablet, Take 1 tablet by mouth 2 (two) times a day, Disp: , Rfl:     ipratropium (ATROVENT) 0 03 % nasal spray, 2 sprays into each nostril 3 (three) times a day, Disp: 30 mL, Rfl: 6    latanoprost (XALATAN) 0 005 % ophthalmic solution, Apply to eye, Disp: , Rfl:     loratadine (CLARITIN) 10 mg tablet, Take 1 tablet by mouth daily, Disp: , Rfl:     metFORMIN (GLUCOPHAGE) 500 mg tablet, Take 2 tablets by mouth 2 (two) times a day, Disp: , Rfl:     metoprolol succinate (TOPROL-XL) 25 mg 24 hr tablet, Take 1 tablet (25 mg total) by mouth daily at bedtime, Disp: 90 tablet, Rfl: 3    Multiple Vitamins-Minerals (CENTRUM SILVER 50+WOMEN PO), Take by mouth, Disp: , Rfl:     sodium chloride (OCEAN) 0 65 % nasal spray, 1 spray into each nostril as needed for congestion, Disp: , Rfl:     spironolactone (ALDACTONE) 25 mg tablet, Take 0 5 tablets (12 5 mg total) by mouth daily, Disp: 15 tablet, Rfl: 5    terazosin (HYTRIN) 5 mg capsule, Take 1 capsule (5 mg total) by mouth daily at bedtime, Disp: 90 capsule, Rfl: 3    torsemide (DEMADEX) 20 mg tablet, Take 20 mg by mouth daily, Disp: , Rfl:     valsartan (DIOVAN) 320 MG tablet, Take 320 mg by mouth daily, Disp: , Rfl:     Past Medical History:   Diagnosis Date    Allergic rhinitis     Asthma     As a child    Benign hypertension     COPD (chronic obstructive pulmonary disease) (HCC)     Diabetes (HCC)     Hyponatremia     Sleep apnea     On CPAP treatment    Sleep difficulties      Past Surgical History:   Procedure Laterality Date    ABSCESS DRAINAGE      APPENDECTOMY      CECOSTOMY       SECTION      GALLBLADDER SURGERY      HERNIA REPAIR      HYSTERECTOMY       Family History   Problem Relation Age of Onset    No Known Problems Mother     No Known Problems Father       reports that she is a non-smoker but has been exposed to tobacco smoke  She has never used smokeless tobacco  She reports that she does not drink alcohol or use drugs  Physical Exam:   Vitals:    10/25/19 1102 10/25/19 1114   BP:  (!) 130/40   BP Location:  Right arm   Patient Position:  Sitting   Cuff Size:  Large   Pulse:  72   Weight: 84 8 kg (187 lb)    Height: 5' (1 524 m)      Body mass index is 36 52 kg/m²  General: NAD  Neuro: AAO  Neck: no JVD  Skin: no rash  Cardiac: RRR  Lungs: CTAB  Abdomen: soft nt nd  Extremities: trace LE edema    Procedure:  No results found for this or any previous visit  Labs reviewed      Lab Results   Component Value Date    CALCIUM 9 0 2019    K 4 2 2019    CO2 28 2019     2019    BUN 15 2019    CREATININE 0 81 2019

## 2020-02-25 DIAGNOSIS — I10 BENIGN ESSENTIAL HTN: ICD-10-CM

## 2020-02-25 DIAGNOSIS — N18.30 CKD (CHRONIC KIDNEY DISEASE), STAGE III (HCC): ICD-10-CM

## 2020-02-25 RX ORDER — AMLODIPINE BESYLATE 10 MG/1
10 TABLET ORAL DAILY
Qty: 90 TABLET | Refills: 3 | Status: SHIPPED | OUTPATIENT
Start: 2020-02-25 | End: 2021-02-21 | Stop reason: SDUPTHER

## 2020-03-12 ENCOUNTER — TELEPHONE (OUTPATIENT)
Dept: NEPHROLOGY | Facility: CLINIC | Age: 80
End: 2020-03-12

## 2020-04-13 ENCOUNTER — TELEMEDICINE (OUTPATIENT)
Dept: NEPHROLOGY | Facility: CLINIC | Age: 80
End: 2020-04-13
Payer: MEDICARE

## 2020-04-13 DIAGNOSIS — I10 BENIGN ESSENTIAL HYPERTENSION: Primary | ICD-10-CM

## 2020-04-13 DIAGNOSIS — I10 ESSENTIAL HYPERTENSION: ICD-10-CM

## 2020-04-13 DIAGNOSIS — R80.1 PERSISTENT PROTEINURIA: ICD-10-CM

## 2020-04-13 PROCEDURE — 99442 PR PHYS/QHP TELEPHONE EVALUATION 11-20 MIN: CPT | Performed by: PHYSICIAN ASSISTANT

## 2020-04-13 RX ORDER — SPIRONOLACTONE 25 MG/1
12.5 TABLET ORAL DAILY
Qty: 45 TABLET | Refills: 3 | Status: SHIPPED | OUTPATIENT
Start: 2020-04-13 | End: 2020-10-09 | Stop reason: SDUPTHER

## 2020-04-13 RX ORDER — TORSEMIDE 20 MG/1
20 TABLET ORAL DAILY
Qty: 90 TABLET | Refills: 3 | Status: SHIPPED | OUTPATIENT
Start: 2020-04-13 | End: 2022-01-04 | Stop reason: SDUPTHER

## 2020-06-01 DIAGNOSIS — I10 BENIGN ESSENTIAL HTN: ICD-10-CM

## 2020-06-01 RX ORDER — TERAZOSIN 5 MG/1
5 CAPSULE ORAL
Qty: 90 CAPSULE | Refills: 3 | Status: SHIPPED | OUTPATIENT
Start: 2020-06-01 | End: 2021-06-07 | Stop reason: SDUPTHER

## 2020-08-01 DIAGNOSIS — I10 BENIGN ESSENTIAL HYPERTENSION: ICD-10-CM

## 2020-08-03 RX ORDER — METOPROLOL SUCCINATE 25 MG/1
TABLET, EXTENDED RELEASE ORAL
Qty: 90 TABLET | Refills: 0 | Status: SHIPPED | OUTPATIENT
Start: 2020-08-03 | End: 2020-10-19 | Stop reason: SDUPTHER

## 2020-08-25 ENCOUNTER — TELEPHONE (OUTPATIENT)
Dept: INTERNAL MEDICINE CLINIC | Facility: CLINIC | Age: 80
End: 2020-08-25

## 2020-08-26 ENCOUNTER — OFFICE VISIT (OUTPATIENT)
Dept: PULMONOLOGY | Facility: CLINIC | Age: 80
End: 2020-08-26
Payer: MEDICARE

## 2020-08-26 VITALS
BODY MASS INDEX: 36.71 KG/M2 | TEMPERATURE: 98.2 F | OXYGEN SATURATION: 96 % | HEIGHT: 60 IN | DIASTOLIC BLOOD PRESSURE: 68 MMHG | WEIGHT: 187 LBS | HEART RATE: 73 BPM | SYSTOLIC BLOOD PRESSURE: 132 MMHG

## 2020-08-26 DIAGNOSIS — D3A.090 BENIGN CARCINOID TUMOR OF LUNG: ICD-10-CM

## 2020-08-26 DIAGNOSIS — I50.32 CHRONIC DIASTOLIC CHF (CONGESTIVE HEART FAILURE) (HCC): ICD-10-CM

## 2020-08-26 DIAGNOSIS — E66.9 OBESITY (BMI 35.0-39.9 WITHOUT COMORBIDITY): ICD-10-CM

## 2020-08-26 DIAGNOSIS — I10 BENIGN ESSENTIAL HYPERTENSION: ICD-10-CM

## 2020-08-26 DIAGNOSIS — J44.9 CHRONIC OBSTRUCTIVE PULMONARY DISEASE, UNSPECIFIED COPD TYPE (HCC): Primary | ICD-10-CM

## 2020-08-26 DIAGNOSIS — G47.33 OSA (OBSTRUCTIVE SLEEP APNEA): ICD-10-CM

## 2020-08-26 PROCEDURE — 99213 OFFICE O/P EST LOW 20 MIN: CPT | Performed by: INTERNAL MEDICINE

## 2020-08-26 NOTE — PROGRESS NOTES
Assessment/Plan:    COPD (chronic obstructive pulmonary disease) ([facility] [address] )  She has history of COPD for a long time and was on Advair Bid before  She also has history of asthma as a child  She has occasional cough and no significant phlegm  No fever or chills  Though she is a never smoker, she has history of secondhand smoke exposure  Her chest was clear to auscultation  Her PFT which showed moderate airflow obstruction with diffusion defect  There was no hyperinflation  Her 6 min walk test showed an oxygen desaturation to 82% with exertion  Her baseline oxygen saturation at rest was 90%  She also was found to have severe exercise limitation from SOB  She is not using Symbicort  now  I have advised her to use the nebulizer when necessary for her shortness of breath  I have advised her to continue with oxygen supplementation  Carcinoid tumor of lung  She has a 2 0 x 2 0 lobulated lung mass in the left lower lobe and multiple lung nodules on the left side  She had also mediastinal lymphadenopathy  She was also noted to have a nodule in the thyroid gland and a nodular hepatic lesion  She had a CT guided biopsy of LLL lung lesion which was consistent with neuroendocrine tumor, carcinoid  She follows with Dr Kofi Carter from Oncology  She has multiple lung nodules on her CT scan from Feb 2019  She had a recent CT scan which reportedly showed that the lung shadow is almost gone  The films are not available at this time to review  Chronic diastolic CHF (congestive heart failure) (HCC)  Wt Readings from Last 3 Encounters:   08/26/20 84 8 kg (187 lb)   06/25/20 83 5 kg (184 lb)   01/23/20 84 8 kg (187 lb)     She has chronic heart failure with good ejection fraction  She is  much better now  She follows with Dr Elihue Harada from cardiology  She is on torsemide and spironolactone  Her previous echocardiogram from Feb 2016 showed good EF at 65%  She has CKD also       TOM (obstructive sleep apnea)  She had a long history of snoring and daytime sleepiness  She was found to have mild TOM with oxygen desaturation on her overnight sleep study  Her CPAP titration study was suboptimal  She was started on CPAP therapy at 9 cm of water and has been using it  Her CPAP compliance has been good  Her residual AHI has been low  She is getting benefit from CPAP therapy I have advised her to use the CPAP as before  Her latest CPAP compliance records are not currently available    I had a long discussion with her and have answered all their questions  Obesity (BMI 35 0-39 9 without comorbidity)  She is obese and understands the need for weight reduction  Unfortunately she has ambulatory dysfunction and is not able to lose weight    Benign essential hypertension  She has history of hypertension and has been on treatment with amlodipine and valsartan       Diagnoses and all orders for this visit:    Chronic obstructive pulmonary disease, unspecified COPD type (HCC)    TOM (obstructive sleep apnea)    Benign carcinoid tumor of lung    Chronic diastolic CHF (congestive heart failure) ([facility] [address] )    Benign essential hypertension    Obesity (BMI 35 0-39 9 without comorbidity)          Subjective:      Patient ID: Tyrell Santamaria is a 67 y o  female  Mrs Tyrell Santamaria has obstructive sleep apnea and has been on CPAP therapy  She states that she is using the CPAP regularly every night  But she has some problems keeping it longer according to her  She has some daytime sleepiness  She is comfortable with the mask and pressure  Her sleep is not disturbed  She has no morning headache  Her CPAP compliance records are not currently available  She feels that she is getting benefit from CPAP therapy  She has history of COPD and has been on oxygen supplementation  She has ambulatory dysfunction and her exercise tolerance is less than 100 ft  She has occasional cough from postnasal drip  No significant wheezing or chest pain no palpitations    No fever or chills  She uses albuterol nebulizer on an as-needed basis  She has history of carcinoid tumor or and has been on follow-up per Dr Sanchez Bautista  She denies any hemoptysis and according to her the recent CT scan showed that the tumor is almost disappeared  She is very obese and understands the need for weight reduction  Unfortunately she cannot exercise because of her obesity and ambulatory dysfunction  She has congestive heart failure which is chronic and diastolic  She is on diuresis with torsemide  The following portions of the patient's history were reviewed and updated as appropriate: allergies, current medications, past family history, past medical history, past social history, past surgical history and problem list     Review of Systems   Constitutional: Negative for chills and fever  HENT: Positive for postnasal drip and rhinorrhea  Negative for congestion, hearing loss, sore throat, trouble swallowing and voice change  Eyes: Negative for visual disturbance  Respiratory: Positive for cough and shortness of breath  Negative for chest tightness and wheezing  Cardiovascular: Positive for leg swelling  Negative for chest pain and palpitations  Gastrointestinal: Negative for abdominal pain, constipation and diarrhea  Endocrine: Negative for polyuria  Genitourinary: Positive for frequency and urgency  Negative for dysuria  Musculoskeletal: Positive for arthralgias and gait problem  Negative for myalgias  Severe ambulatory dysfunction  Uses cane   Skin: Negative for pallor and rash  Allergic/Immunologic: Positive for environmental allergies  Neurological: Negative for dizziness, speech difficulty and light-headedness  Psychiatric/Behavioral: Negative for agitation and sleep disturbance  The patient is not nervous/anxious            Objective:      /68 (BP Location: Right arm, Patient Position: Sitting, Cuff Size: Large)   Pulse 73   Temp 98 2 °F (36 8 °C) (Tympanic)   Ht 5' (1 524 m)   Wt 84 8 kg (187 lb)   SpO2 96% Comment: 1 liter  BMI 36 52 kg/m²          Physical Exam  Vitals signs reviewed  Constitutional:       General: She is not in acute distress  Appearance: She is obese  She is not ill-appearing or toxic-appearing  Eyes:      General: No scleral icterus  Conjunctiva/sclera: Conjunctivae normal    Neck:      Musculoskeletal: No neck rigidity  Cardiovascular:      Rate and Rhythm: Normal rate  Heart sounds: Normal heart sounds  No murmur  Pulmonary:      Effort: No respiratory distress  Breath sounds: No wheezing, rhonchi or rales  Chest:      Chest wall: No tenderness  Abdominal:      General: Bowel sounds are normal  There is distension  Tenderness: There is no abdominal tenderness  Musculoskeletal:         General: No deformity  Right lower leg: Edema present  Left lower leg: Edema present  Lymphadenopathy:      Cervical: No cervical adenopathy  Skin:     General: Skin is warm and dry  Coloration: Skin is not jaundiced or pale  Neurological:      Mental Status: She is alert and oriented to person, place, and time  Psychiatric:         Mood and Affect: Mood normal          Behavior: Behavior normal          Thought Content:  Thought content normal          Judgment: Judgment normal

## 2020-08-26 NOTE — LETTER
August 26, 2020     [address] [address]    Patient: Sierra Veliz   YOB: 1940   Date of Visit: 8/26/2020       Dear Dr Bryant Chaves:    Thank you for referring Sierra Veliz to me for evaluation  Below are my notes for this consultation  If you have questions, please do not hesitate to call me  I look forward to following your patient along with you  Sincerely,        Mary Ellen Wallace MD        CC: No Recipients  Mary Ellen Wallace MD  8/26/2020  4:30 PM  Sign when Signing Visit  Assessment/Plan:    COPD (chronic obstructive pulmonary disease) ([facility] [address] )  She has history of COPD for a long time and was on Advair Bid before  She also has history of asthma as a child  She has occasional cough and no significant phlegm  No fever or chills  Though she is a never smoker, she has history of secondhand smoke exposure  Her chest was clear to auscultation  Her PFT which showed moderate airflow obstruction with diffusion defect  There was no hyperinflation  Her 6 min walk test showed an oxygen desaturation to 82% with exertion  Her baseline oxygen saturation at rest was 90%  She also was found to have severe exercise limitation from SOB  She is not using Symbicort  now  I have advised her to use the nebulizer when necessary for her shortness of breath  I have advised her to continue with oxygen supplementation  Carcinoid tumor of lung  She has a 2 0 x 2 0 lobulated lung mass in the left lower lobe and multiple lung nodules on the left side  She had also mediastinal lymphadenopathy  She was also noted to have a nodule in the thyroid gland and a nodular hepatic lesion  She had a CT guided biopsy of LLL lung lesion which was consistent with neuroendocrine tumor, carcinoid  She follows with Dr Alejandro Raza from Oncology  She has multiple lung nodules on her CT scan from Feb 2019  She had a recent CT scan which reportedly showed that the lung shadow is almost gone  The films are not available at this time to review  Chronic diastolic CHF (congestive heart failure) (HCC)  Wt Readings from Last 3 Encounters:   08/26/20 84 8 kg (187 lb)   06/25/20 83 5 kg (184 lb)   01/23/20 84 8 kg (187 lb)     She has chronic heart failure with good ejection fraction  She is  much better now  She follows with Dr Melinda Long from cardiology  She is on torsemide and spironolactone  Her previous echocardiogram from Feb 2016 showed good EF at 65%  She has CKD also  TOM (obstructive sleep apnea)  She had a long history of snoring and daytime sleepiness  She was found to have mild TOM with oxygen desaturation on her overnight sleep study  Her CPAP titration study was suboptimal  She was started on CPAP therapy at 9 cm of water and has been using it  Her CPAP compliance has been good  Her residual AHI has been low  She is getting benefit from CPAP therapy I have advised her to use the CPAP as before  Her latest CPAP compliance records are not currently available    I had a long discussion with her and have answered all their questions  Obesity (BMI 35 0-39 9 without comorbidity)  She is obese and understands the need for weight reduction  Unfortunately she has ambulatory dysfunction and is not able to lose weight    Benign essential hypertension  She has history of hypertension and has been on treatment with amlodipine and valsartan       Diagnoses and all orders for this visit:    Chronic obstructive pulmonary disease, unspecified COPD type (HCC)    TOM (obstructive sleep apnea)    Benign carcinoid tumor of lung    Chronic diastolic CHF (congestive heart failure) ([facility] [address] )    Benign essential hypertension    Obesity (BMI 35 0-39 9 without comorbidity)          Subjective:      Patient ID: Christy Funez is a 67 y o  female  Mrs Hutchinson has obstructive sleep apnea and has been on CPAP therapy  She states that she is using the CPAP regularly every night    But she has some problems keeping it longer according to her  She has some daytime sleepiness  She is comfortable with the mask and pressure  Her sleep is not disturbed  She has no morning headache  Her CPAP compliance records are not currently available  She feels that she is getting benefit from CPAP therapy  She has history of COPD and has been on oxygen supplementation  She has ambulatory dysfunction and her exercise tolerance is less than 100 ft  She has occasional cough from postnasal drip  No significant wheezing or chest pain no palpitations  No fever or chills  She uses albuterol nebulizer on an as-needed basis  She has history of carcinoid tumor or and has been on follow-up per Dr Sapphire Gonzales  She denies any hemoptysis and according to her the recent CT scan showed that the tumor is almost disappeared  She is very obese and understands the need for weight reduction  Unfortunately she cannot exercise because of her obesity and ambulatory dysfunction  She has congestive heart failure which is chronic and diastolic  She is on diuresis with torsemide  The following portions of the patient's history were reviewed and updated as appropriate: allergies, current medications, past family history, past medical history, past social history, past surgical history and problem list     Review of Systems   Constitutional: Negative for chills and fever  HENT: Positive for postnasal drip and rhinorrhea  Negative for congestion, hearing loss, sore throat, trouble swallowing and voice change  Eyes: Negative for visual disturbance  Respiratory: Positive for cough and shortness of breath  Negative for chest tightness and wheezing  Cardiovascular: Positive for leg swelling  Negative for chest pain and palpitations  Gastrointestinal: Negative for abdominal pain, constipation and diarrhea  Endocrine: Negative for polyuria  Genitourinary: Positive for frequency and urgency  Negative for dysuria     Musculoskeletal: Positive for arthralgias and gait problem  Negative for myalgias  Severe ambulatory dysfunction  Uses cane   Skin: Negative for pallor and rash  Allergic/Immunologic: Positive for environmental allergies  Neurological: Negative for dizziness, speech difficulty and light-headedness  Psychiatric/Behavioral: Negative for agitation and sleep disturbance  The patient is not nervous/anxious  Objective:      /68 (BP Location: Right arm, Patient Position: Sitting, Cuff Size: Large)   Pulse 73   Temp 98 2 °F (36 8 °C) (Tympanic)   Ht 5' (1 524 m)   Wt 84 8 kg (187 lb)   SpO2 96% Comment: 1 liter  BMI 36 52 kg/m²          Physical Exam  Vitals signs reviewed  Constitutional:       General: She is not in acute distress  Appearance: She is obese  She is not ill-appearing or toxic-appearing  Eyes:      General: No scleral icterus  Conjunctiva/sclera: Conjunctivae normal    Neck:      Musculoskeletal: No neck rigidity  Cardiovascular:      Rate and Rhythm: Normal rate  Heart sounds: Normal heart sounds  No murmur  Pulmonary:      Effort: No respiratory distress  Breath sounds: No wheezing, rhonchi or rales  Chest:      Chest wall: No tenderness  Abdominal:      General: Bowel sounds are normal  There is distension  Tenderness: There is no abdominal tenderness  Musculoskeletal:         General: No deformity  Right lower leg: Edema present  Left lower leg: Edema present  Lymphadenopathy:      Cervical: No cervical adenopathy  Skin:     General: Skin is warm and dry  Coloration: Skin is not jaundiced or pale  Neurological:      Mental Status: She is alert and oriented to person, place, and time  Psychiatric:         Mood and Affect: Mood normal          Behavior: Behavior normal          Thought Content:  Thought content normal          Judgment: Judgment normal

## 2020-08-26 NOTE — ASSESSMENT & PLAN NOTE
She had a long history of snoring and daytime sleepiness  She was found to have mild TOM with oxygen desaturation on her overnight sleep study  Her CPAP titration study was suboptimal  She was started on CPAP therapy at 9 cm of water and has been using it  Her CPAP compliance has been good  Her residual AHI has been low  She is getting benefit from CPAP therapy I have advised her to use the CPAP as before  Her latest CPAP compliance records are not currently available    I had a long discussion with her and have answered all their questions

## 2020-08-26 NOTE — ASSESSMENT & PLAN NOTE
She is obese and understands the need for weight reduction    Unfortunately she has ambulatory dysfunction and is not able to lose weight

## 2020-08-26 NOTE — ASSESSMENT & PLAN NOTE
She has history of COPD for a long time and was on Advair Bid before  She also has history of asthma as a child  She has occasional cough and no significant phlegm  No fever or chills  Though she is a never smoker, she has history of secondhand smoke exposure  Her chest was clear to auscultation  Her PFT which showed moderate airflow obstruction with diffusion defect  There was no hyperinflation  Her 6 min walk test showed an oxygen desaturation to 82% with exertion  Her baseline oxygen saturation at rest was 90%  She also was found to have severe exercise limitation from SOB  She is not using Symbicort  now  I have advised her to use the nebulizer when necessary for her shortness of breath  I have advised her to continue with oxygen supplementation

## 2020-08-26 NOTE — ASSESSMENT & PLAN NOTE
Wt Readings from Last 3 Encounters:   08/26/20 84 8 kg (187 lb)   06/25/20 83 5 kg (184 lb)   01/23/20 84 8 kg (187 lb)     She has chronic heart failure with good ejection fraction  She is  much better now  She follows with Dr Chopra Adjutant from cardiology  She is on torsemide and spironolactone  Her previous echocardiogram from Feb 2016 showed good EF at 65%  She has CKD also

## 2020-09-25 ENCOUNTER — APPOINTMENT (OUTPATIENT)
Dept: LAB | Facility: CLINIC | Age: 80
End: 2020-09-25
Payer: MEDICARE

## 2020-09-25 ENCOUNTER — TRANSCRIBE ORDERS (OUTPATIENT)
Dept: LAB | Facility: CLINIC | Age: 80
End: 2020-09-25

## 2020-09-25 DIAGNOSIS — I10 BENIGN ESSENTIAL HYPERTENSION: ICD-10-CM

## 2020-09-25 DIAGNOSIS — R80.1 PERSISTENT PROTEINURIA: ICD-10-CM

## 2020-09-25 LAB
ANION GAP SERPL CALCULATED.3IONS-SCNC: 10 MMOL/L (ref 4–13)
BUN SERPL-MCNC: 17 MG/DL (ref 5–25)
CALCIUM SERPL-MCNC: 9.8 MG/DL (ref 8.3–10.1)
CHLORIDE SERPL-SCNC: 103 MMOL/L (ref 100–108)
CO2 SERPL-SCNC: 28 MMOL/L (ref 21–32)
CREAT SERPL-MCNC: 0.87 MG/DL (ref 0.6–1.3)
CREAT UR-MCNC: 28 MG/DL
ERYTHROCYTE [DISTWIDTH] IN BLOOD BY AUTOMATED COUNT: 13.7 % (ref 11.6–15.1)
GFR SERPL CREATININE-BSD FRML MDRD: 64 ML/MIN/1.73SQ M
GLUCOSE SERPL-MCNC: 143 MG/DL (ref 65–140)
HCT VFR BLD AUTO: 37.8 % (ref 34.8–46.1)
HGB BLD-MCNC: 11.8 G/DL (ref 11.5–15.4)
MAGNESIUM SERPL-MCNC: 2.1 MG/DL (ref 1.6–2.6)
MCH RBC QN AUTO: 28 PG (ref 26.8–34.3)
MCHC RBC AUTO-ENTMCNC: 31.2 G/DL (ref 31.4–37.4)
MCV RBC AUTO: 90 FL (ref 82–98)
PLATELET # BLD AUTO: 262 THOUSANDS/UL (ref 149–390)
PMV BLD AUTO: 10.1 FL (ref 8.9–12.7)
POTASSIUM SERPL-SCNC: 4.4 MMOL/L (ref 3.5–5.3)
PROT UR-MCNC: 17 MG/DL
PROT/CREAT UR: 0.61 MG/G{CREAT} (ref 0–0.1)
RBC # BLD AUTO: 4.21 MILLION/UL (ref 3.81–5.12)
SODIUM SERPL-SCNC: 141 MMOL/L (ref 136–145)
WBC # BLD AUTO: 7.8 THOUSAND/UL (ref 4.31–10.16)

## 2020-09-25 PROCEDURE — 83735 ASSAY OF MAGNESIUM: CPT

## 2020-09-25 PROCEDURE — 36415 COLL VENOUS BLD VENIPUNCTURE: CPT

## 2020-09-25 PROCEDURE — 84156 ASSAY OF PROTEIN URINE: CPT

## 2020-09-25 PROCEDURE — 80048 BASIC METABOLIC PNL TOTAL CA: CPT

## 2020-09-25 PROCEDURE — 85027 COMPLETE CBC AUTOMATED: CPT

## 2020-09-25 PROCEDURE — 82570 ASSAY OF URINE CREATININE: CPT

## 2020-10-05 PROBLEM — K63.5 COLON POLYPS: Status: ACTIVE | Noted: 2020-10-05

## 2020-10-05 PROBLEM — R91.8 MULTIPLE PULMONARY NODULES: Status: ACTIVE | Noted: 2020-10-05

## 2020-10-08 ENCOUNTER — OFFICE VISIT (OUTPATIENT)
Dept: FAMILY MEDICINE CLINIC | Facility: CLINIC | Age: 80
End: 2020-10-08
Payer: MEDICARE

## 2020-10-08 VITALS
TEMPERATURE: 97.2 F | SYSTOLIC BLOOD PRESSURE: 130 MMHG | OXYGEN SATURATION: 96 % | HEIGHT: 60 IN | WEIGHT: 193 LBS | BODY MASS INDEX: 37.89 KG/M2 | DIASTOLIC BLOOD PRESSURE: 60 MMHG | HEART RATE: 76 BPM

## 2020-10-08 DIAGNOSIS — Z23 ENCOUNTER FOR IMMUNIZATION: ICD-10-CM

## 2020-10-08 DIAGNOSIS — E11.9 DIABETES MELLITUS WITHOUT COMPLICATION (HCC): Primary | ICD-10-CM

## 2020-10-08 DIAGNOSIS — I50.32 CHRONIC DIASTOLIC CHF (CONGESTIVE HEART FAILURE) (HCC): ICD-10-CM

## 2020-10-08 DIAGNOSIS — G47.33 OSA (OBSTRUCTIVE SLEEP APNEA): ICD-10-CM

## 2020-10-08 DIAGNOSIS — I10 ESSENTIAL HYPERTENSION: ICD-10-CM

## 2020-10-08 DIAGNOSIS — D3A.090 BENIGN CARCINOID TUMOR OF LUNG: ICD-10-CM

## 2020-10-08 DIAGNOSIS — J44.9 CHRONIC OBSTRUCTIVE PULMONARY DISEASE, UNSPECIFIED COPD TYPE (HCC): ICD-10-CM

## 2020-10-08 PROCEDURE — G0008 ADMIN INFLUENZA VIRUS VAC: HCPCS | Performed by: FAMILY MEDICINE

## 2020-10-08 PROCEDURE — 90662 IIV NO PRSV INCREASED AG IM: CPT | Performed by: FAMILY MEDICINE

## 2020-10-08 PROCEDURE — 99214 OFFICE O/P EST MOD 30 MIN: CPT | Performed by: FAMILY MEDICINE

## 2020-10-08 RX ORDER — GLIPIZIDE 10 MG/1
10 TABLET ORAL 2 TIMES DAILY
Qty: 180 TABLET | Refills: 2 | Status: SHIPPED | OUTPATIENT
Start: 2020-10-08 | End: 2020-12-14 | Stop reason: SDUPTHER

## 2020-10-09 ENCOUNTER — OFFICE VISIT (OUTPATIENT)
Dept: CARDIOLOGY CLINIC | Facility: CLINIC | Age: 80
End: 2020-10-09
Payer: MEDICARE

## 2020-10-09 VITALS
WEIGHT: 192.4 LBS | DIASTOLIC BLOOD PRESSURE: 78 MMHG | SYSTOLIC BLOOD PRESSURE: 166 MMHG | HEIGHT: 60 IN | HEART RATE: 76 BPM | BODY MASS INDEX: 37.77 KG/M2 | TEMPERATURE: 98 F

## 2020-10-09 DIAGNOSIS — I10 ESSENTIAL HYPERTENSION: ICD-10-CM

## 2020-10-09 DIAGNOSIS — G47.33 OSA (OBSTRUCTIVE SLEEP APNEA): ICD-10-CM

## 2020-10-09 DIAGNOSIS — J44.9 CHRONIC OBSTRUCTIVE PULMONARY DISEASE, UNSPECIFIED COPD TYPE (HCC): ICD-10-CM

## 2020-10-09 DIAGNOSIS — I50.32 CHRONIC DIASTOLIC CHF (CONGESTIVE HEART FAILURE) (HCC): Primary | ICD-10-CM

## 2020-10-09 DIAGNOSIS — R80.1 PERSISTENT PROTEINURIA: ICD-10-CM

## 2020-10-09 PROCEDURE — 99204 OFFICE O/P NEW MOD 45 MIN: CPT | Performed by: INTERNAL MEDICINE

## 2020-10-09 RX ORDER — SPIRONOLACTONE 25 MG/1
25 TABLET ORAL DAILY
Qty: 90 TABLET | Refills: 3 | Status: SHIPPED | OUTPATIENT
Start: 2020-10-09 | End: 2022-01-14 | Stop reason: SDUPTHER

## 2020-10-15 ENCOUNTER — HOSPITAL ENCOUNTER (OUTPATIENT)
Dept: NON INVASIVE DIAGNOSTICS | Facility: CLINIC | Age: 80
Discharge: HOME | End: 2020-10-15
Payer: MEDICARE

## 2020-10-15 DIAGNOSIS — I50.32 CHRONIC DIASTOLIC CHF (CONGESTIVE HEART FAILURE) (HCC): ICD-10-CM

## 2020-10-15 PROCEDURE — 93306 TTE W/DOPPLER COMPLETE: CPT | Performed by: INTERNAL MEDICINE

## 2020-10-15 PROCEDURE — 93306 TTE W/DOPPLER COMPLETE: CPT

## 2020-10-19 DIAGNOSIS — I10 BENIGN ESSENTIAL HYPERTENSION: ICD-10-CM

## 2020-10-19 RX ORDER — METOPROLOL SUCCINATE 25 MG/1
25 TABLET, EXTENDED RELEASE ORAL
Qty: 90 TABLET | Refills: 3 | Status: SHIPPED | OUTPATIENT
Start: 2020-10-19 | End: 2021-10-12 | Stop reason: SDUPTHER

## 2020-11-04 ENCOUNTER — OFFICE VISIT (OUTPATIENT)
Dept: NEPHROLOGY | Facility: CLINIC | Age: 80
End: 2020-11-04
Payer: MEDICARE

## 2020-11-04 VITALS
HEIGHT: 60 IN | WEIGHT: 194 LBS | TEMPERATURE: 97.9 F | SYSTOLIC BLOOD PRESSURE: 144 MMHG | DIASTOLIC BLOOD PRESSURE: 64 MMHG | BODY MASS INDEX: 38.09 KG/M2

## 2020-11-04 DIAGNOSIS — R80.9 PROTEINURIA, UNSPECIFIED TYPE: Primary | ICD-10-CM

## 2020-11-04 DIAGNOSIS — I10 BENIGN ESSENTIAL HTN: ICD-10-CM

## 2020-11-04 DIAGNOSIS — R73.9 HYPERGLYCEMIA: ICD-10-CM

## 2020-11-04 PROCEDURE — 99214 OFFICE O/P EST MOD 30 MIN: CPT | Performed by: INTERNAL MEDICINE

## 2020-11-06 DIAGNOSIS — E11.9 DIABETES MELLITUS WITHOUT COMPLICATION (HCC): Primary | ICD-10-CM

## 2020-11-06 RX ORDER — BLOOD SUGAR DIAGNOSTIC
1 STRIP MISCELLANEOUS DAILY
Qty: 100 EACH | Refills: 3 | Status: SHIPPED | OUTPATIENT
Start: 2020-11-06 | End: 2021-11-22 | Stop reason: SDUPTHER

## 2020-11-09 ENCOUNTER — LAB (OUTPATIENT)
Dept: LAB | Facility: HOSPITAL | Age: 80
End: 2020-11-09
Payer: MEDICARE

## 2020-11-09 DIAGNOSIS — I50.32 CHRONIC DIASTOLIC CHF (CONGESTIVE HEART FAILURE) (HCC): ICD-10-CM

## 2020-11-09 LAB
ANION GAP SERPL CALCULATED.3IONS-SCNC: 8 MMOL/L (ref 4–13)
BUN SERPL-MCNC: 17 MG/DL (ref 6–20)
CALCIUM SERPL-MCNC: 10.1 MG/DL (ref 8.4–10.2)
CHLORIDE SERPL-SCNC: 101 MMOL/L (ref 96–108)
CO2 SERPL-SCNC: 29 MMOL/L (ref 22–33)
CREAT SERPL-MCNC: 0.88 MG/DL (ref 0.4–1.1)
GFR SERPL CREATININE-BSD FRML MDRD: 63 ML/MIN/1.73SQ M
GLUCOSE P FAST SERPL-MCNC: 145 MG/DL (ref 70–105)
POTASSIUM SERPL-SCNC: 4.4 MMOL/L (ref 3.5–5)
SODIUM SERPL-SCNC: 138 MMOL/L (ref 133–145)

## 2020-11-09 PROCEDURE — 80048 BASIC METABOLIC PNL TOTAL CA: CPT

## 2020-11-09 PROCEDURE — 36415 COLL VENOUS BLD VENIPUNCTURE: CPT

## 2020-11-24 ENCOUNTER — LAB (OUTPATIENT)
Dept: LAB | Facility: HOSPITAL | Age: 80
End: 2020-11-24
Attending: FAMILY MEDICINE
Payer: MEDICARE

## 2020-11-24 ENCOUNTER — TELEPHONE (OUTPATIENT)
Dept: NEPHROLOGY | Facility: CLINIC | Age: 80
End: 2020-11-24

## 2020-11-24 DIAGNOSIS — I10 BENIGN ESSENTIAL HTN: ICD-10-CM

## 2020-11-24 DIAGNOSIS — I10 ESSENTIAL HYPERTENSION: ICD-10-CM

## 2020-11-24 DIAGNOSIS — R73.9 HYPERGLYCEMIA: ICD-10-CM

## 2020-11-24 DIAGNOSIS — N18.30 STAGE 3 CHRONIC KIDNEY DISEASE, UNSPECIFIED WHETHER STAGE 3A OR 3B CKD (HCC): Primary | ICD-10-CM

## 2020-11-24 DIAGNOSIS — E11.9 DIABETES MELLITUS WITHOUT COMPLICATION (HCC): ICD-10-CM

## 2020-11-24 DIAGNOSIS — R80.9 PROTEINURIA, UNSPECIFIED TYPE: ICD-10-CM

## 2020-11-24 LAB
ANION GAP SERPL CALCULATED.3IONS-SCNC: 7 MMOL/L (ref 4–13)
BACTERIA UR QL AUTO: ABNORMAL /HPF
BILIRUB UR QL STRIP: NEGATIVE
BUN SERPL-MCNC: 22 MG/DL (ref 6–20)
CALCIUM SERPL-MCNC: 10 MG/DL (ref 8.4–10.2)
CHLORIDE SERPL-SCNC: 98 MMOL/L (ref 96–108)
CLARITY UR: CLEAR
CO2 SERPL-SCNC: 30 MMOL/L (ref 22–33)
COLOR UR: YELLOW
CREAT SERPL-MCNC: 0.89 MG/DL (ref 0.4–1.1)
CREAT UR-MCNC: 40.8 MG/DL
ERYTHROCYTE [DISTWIDTH] IN BLOOD BY AUTOMATED COUNT: 13.9 % (ref 11.6–15.1)
EST. AVERAGE GLUCOSE BLD GHB EST-MCNC: 140 MG/DL
GFR SERPL CREATININE-BSD FRML MDRD: 62 ML/MIN/1.73SQ M
GLUCOSE P FAST SERPL-MCNC: 144 MG/DL (ref 70–105)
GLUCOSE UR STRIP-MCNC: NEGATIVE MG/DL
HBA1C MFR BLD: 6.5 %
HCT VFR BLD AUTO: 37.7 % (ref 34.8–46.1)
HGB BLD-MCNC: 12.1 G/DL (ref 11.5–15.4)
HGB UR QL STRIP.AUTO: NEGATIVE
KETONES UR STRIP-MCNC: NEGATIVE MG/DL
LEUKOCYTE ESTERASE UR QL STRIP: ABNORMAL
MAGNESIUM SERPL-MCNC: 2.1 MG/DL (ref 1.6–2.6)
MCH RBC QN AUTO: 27.6 PG (ref 26.8–34.3)
MCHC RBC AUTO-ENTMCNC: 32.1 G/DL (ref 31.4–37.4)
MCV RBC AUTO: 86 FL (ref 82–98)
NITRITE UR QL STRIP: NEGATIVE
NON-SQ EPI CELLS URNS QL MICRO: ABNORMAL /HPF
PH UR STRIP.AUTO: 7.5 [PH]
PHOSPHATE SERPL-MCNC: 3.8 MG/DL (ref 2.5–5)
PLATELET # BLD AUTO: 272 THOUSANDS/UL (ref 149–390)
PMV BLD AUTO: 9.7 FL (ref 8.9–12.7)
POTASSIUM SERPL-SCNC: 4.9 MMOL/L (ref 3.5–5)
PROT UR STRIP-MCNC: NEGATIVE MG/DL
PROT UR-MCNC: 11 MG/DL
PROT/CREAT UR: 0.27 MG/G{CREAT} (ref 0–0.1)
RBC # BLD AUTO: 4.38 MILLION/UL (ref 3.81–5.12)
RBC #/AREA URNS AUTO: ABNORMAL /HPF
SODIUM SERPL-SCNC: 135 MMOL/L (ref 133–145)
SP GR UR STRIP.AUTO: 1.01 (ref 1–1.03)
UROBILINOGEN UR QL STRIP.AUTO: 0.2 E.U./DL
WBC # BLD AUTO: 7.3 THOUSAND/UL (ref 4.31–10.16)
WBC #/AREA URNS AUTO: ABNORMAL /HPF

## 2020-11-24 PROCEDURE — 80048 BASIC METABOLIC PNL TOTAL CA: CPT

## 2020-11-24 PROCEDURE — 84156 ASSAY OF PROTEIN URINE: CPT

## 2020-11-24 PROCEDURE — 36415 COLL VENOUS BLD VENIPUNCTURE: CPT

## 2020-11-24 PROCEDURE — 85027 COMPLETE CBC AUTOMATED: CPT

## 2020-11-24 PROCEDURE — 83036 HEMOGLOBIN GLYCOSYLATED A1C: CPT

## 2020-11-24 PROCEDURE — 81001 URINALYSIS AUTO W/SCOPE: CPT

## 2020-11-24 PROCEDURE — 82570 ASSAY OF URINE CREATININE: CPT

## 2020-11-24 PROCEDURE — 84100 ASSAY OF PHOSPHORUS: CPT

## 2020-11-24 PROCEDURE — 83735 ASSAY OF MAGNESIUM: CPT

## 2020-12-08 ENCOUNTER — TELEPHONE (OUTPATIENT)
Dept: FAMILY MEDICINE CLINIC | Facility: CLINIC | Age: 80
End: 2020-12-08

## 2020-12-08 DIAGNOSIS — I50.32 CHRONIC DIASTOLIC CHF (CONGESTIVE HEART FAILURE) (HCC): ICD-10-CM

## 2020-12-08 DIAGNOSIS — J44.9 CHRONIC OBSTRUCTIVE PULMONARY DISEASE, UNSPECIFIED COPD TYPE (HCC): Primary | ICD-10-CM

## 2020-12-14 DIAGNOSIS — E11.9 DIABETES MELLITUS WITHOUT COMPLICATION (HCC): ICD-10-CM

## 2020-12-14 RX ORDER — GLIPIZIDE 10 MG/1
10 TABLET ORAL 2 TIMES DAILY
Qty: 180 TABLET | Refills: 2 | Status: SHIPPED | OUTPATIENT
Start: 2020-12-14 | End: 2022-01-11 | Stop reason: SDUPTHER

## 2020-12-18 ENCOUNTER — TELEPHONE (OUTPATIENT)
Dept: SURGERY | Facility: CLINIC | Age: 80
End: 2020-12-18

## 2020-12-19 ENCOUNTER — TRANSCRIBE ORDERS (OUTPATIENT)
Dept: LAB | Facility: CLINIC | Age: 80
End: 2020-12-19

## 2020-12-19 ENCOUNTER — LAB (OUTPATIENT)
Dept: LAB | Facility: CLINIC | Age: 80
End: 2020-12-19
Payer: MEDICARE

## 2020-12-19 DIAGNOSIS — N18.30 STAGE 3 CHRONIC KIDNEY DISEASE, UNSPECIFIED WHETHER STAGE 3A OR 3B CKD (HCC): ICD-10-CM

## 2020-12-19 LAB
ANION GAP SERPL CALCULATED.3IONS-SCNC: 6 MMOL/L (ref 4–13)
BUN SERPL-MCNC: 17 MG/DL (ref 5–25)
CALCIUM SERPL-MCNC: 10 MG/DL (ref 8.3–10.1)
CHLORIDE SERPL-SCNC: 100 MMOL/L (ref 100–108)
CO2 SERPL-SCNC: 30 MMOL/L (ref 21–32)
CREAT SERPL-MCNC: 0.84 MG/DL (ref 0.6–1.3)
GFR SERPL CREATININE-BSD FRML MDRD: 66 ML/MIN/1.73SQ M
GLUCOSE SERPL-MCNC: 159 MG/DL (ref 65–140)
POTASSIUM SERPL-SCNC: 4.1 MMOL/L (ref 3.5–5.3)
SODIUM SERPL-SCNC: 136 MMOL/L (ref 136–145)

## 2020-12-19 PROCEDURE — 36415 COLL VENOUS BLD VENIPUNCTURE: CPT

## 2020-12-19 PROCEDURE — 80048 BASIC METABOLIC PNL TOTAL CA: CPT

## 2020-12-22 ENCOUNTER — TELEPHONE (OUTPATIENT)
Dept: NEPHROLOGY | Facility: CLINIC | Age: 80
End: 2020-12-22

## 2021-01-18 ENCOUNTER — TELEPHONE (OUTPATIENT)
Dept: FAMILY MEDICINE CLINIC | Facility: CLINIC | Age: 81
End: 2021-01-18

## 2021-01-28 DIAGNOSIS — I10 ESSENTIAL HYPERTENSION: Primary | ICD-10-CM

## 2021-01-28 RX ORDER — VALSARTAN 320 MG/1
320 TABLET ORAL DAILY
Qty: 90 TABLET | Refills: 1 | Status: SHIPPED | OUTPATIENT
Start: 2021-01-28 | End: 2021-07-26 | Stop reason: SDUPTHER

## 2021-02-07 ENCOUNTER — IMMUNIZATIONS (OUTPATIENT)
Dept: FAMILY MEDICINE CLINIC | Facility: HOSPITAL | Age: 81
End: 2021-02-07

## 2021-02-07 DIAGNOSIS — Z23 ENCOUNTER FOR IMMUNIZATION: Primary | ICD-10-CM

## 2021-02-07 PROCEDURE — 0001A SARS-COV-2 / COVID-19 MRNA VACCINE (PFIZER-BIONTECH) 30 MCG: CPT

## 2021-02-07 PROCEDURE — 91300 SARS-COV-2 / COVID-19 MRNA VACCINE (PFIZER-BIONTECH) 30 MCG: CPT

## 2021-02-08 ENCOUNTER — OFFICE VISIT (OUTPATIENT)
Dept: FAMILY MEDICINE CLINIC | Facility: CLINIC | Age: 81
End: 2021-02-08
Payer: MEDICARE

## 2021-02-08 VITALS
HEIGHT: 60 IN | TEMPERATURE: 96.6 F | BODY MASS INDEX: 37.89 KG/M2 | HEART RATE: 72 BPM | WEIGHT: 193 LBS | SYSTOLIC BLOOD PRESSURE: 126 MMHG | OXYGEN SATURATION: 98 % | DIASTOLIC BLOOD PRESSURE: 66 MMHG

## 2021-02-08 DIAGNOSIS — I50.32 CHRONIC DIASTOLIC CHF (CONGESTIVE HEART FAILURE) (HCC): ICD-10-CM

## 2021-02-08 DIAGNOSIS — E11.9 DIABETES MELLITUS WITHOUT COMPLICATION (HCC): ICD-10-CM

## 2021-02-08 DIAGNOSIS — G47.33 OSA (OBSTRUCTIVE SLEEP APNEA): ICD-10-CM

## 2021-02-08 DIAGNOSIS — D3A.090 BENIGN CARCINOID TUMOR OF LUNG: ICD-10-CM

## 2021-02-08 DIAGNOSIS — I10 ESSENTIAL HYPERTENSION: ICD-10-CM

## 2021-02-08 DIAGNOSIS — Z00.00 MEDICARE ANNUAL WELLNESS VISIT, SUBSEQUENT: Primary | ICD-10-CM

## 2021-02-08 DIAGNOSIS — J44.9 CHRONIC OBSTRUCTIVE PULMONARY DISEASE, UNSPECIFIED COPD TYPE (HCC): ICD-10-CM

## 2021-02-08 PROCEDURE — G0439 PPPS, SUBSEQ VISIT: HCPCS | Performed by: FAMILY MEDICINE

## 2021-02-08 PROCEDURE — 1123F ACP DISCUSS/DSCN MKR DOCD: CPT | Performed by: FAMILY MEDICINE

## 2021-02-08 PROCEDURE — 99214 OFFICE O/P EST MOD 30 MIN: CPT | Performed by: FAMILY MEDICINE

## 2021-02-08 NOTE — ASSESSMENT & PLAN NOTE
Wellness exam done  Had flu shot in Oct 2020  Had prevnar 13 and pneumovacc 23  Had COVID vaccine #1 in Feb 2021  Recommend Tdap and Shingrix  FBS and lipids UTD  Has living will
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
## 2021-02-08 NOTE — PATIENT INSTRUCTIONS
Medicare Preventive Visit Patient Instructions  Thank you for completing your Welcome to Medicare Visit or Medicare Annual Wellness Visit today  Your next wellness visit will be due in one year (2/8/2022)  The screening/preventive services that you may require over the next 5-10 years are detailed below  Some tests may not apply to you based off risk factors and/or age  Screening tests ordered at today's visit but not completed yet may show as past due  Also, please note that scanned in results may not display below  Preventive Screenings:  Service Recommendations Previous Testing/Comments   Colorectal Cancer Screening  * Colonoscopy    * Fecal Occult Blood Test (FOBT)/Fecal Immunochemical Test (FIT)  * Fecal DNA/Cologuard Test  * Flexible Sigmoidoscopy Age: 54-65 years old   Colonoscopy: every 10 years (may be performed more frequently if at higher risk)  OR  FOBT/FIT: every 1 year  OR  Cologuard: every 3 years  OR  Sigmoidoscopy: every 5 years  Screening may be recommended earlier than age 48 if at higher risk for colorectal cancer  Also, an individualized decision between you and your healthcare provider will decide whether screening between the ages of 74-80 would be appropriate  Colonoscopy: Not on file  FOBT/FIT: Not on file  Cologuard: Not on file  Sigmoidoscopy: Not on file    Screening Current     Breast Cancer Screening Age: 36 years old  Frequency: every 1-2 years  Not required if history of left and right mastectomy Mammogram: Not on file       Cervical Cancer Screening Between the ages of 21-29, pap smear recommended once every 3 years  Between the ages of 33-67, can perform pap smear with HPV co-testing every 5 years     Recommendations may differ for women with a history of total hysterectomy, cervical cancer, or abnormal pap smears in past  Pap Smear: Not on file    Screening Not Indicated   Hepatitis C Screening Once for adults born between 1945 and 1965  More frequently in patients at high risk for Hepatitis C Hep C Antibody: Not on file       Diabetes Screening 1-2 times per year if you're at risk for diabetes or have pre-diabetes Fasting glucose: 144 mg/dL   A1C: 6 5 %    Screening Not Indicated  History Diabetes   Cholesterol Screening Once every 5 years if you don't have a lipid disorder  May order more often based on risk factors  Lipid panel: Not on file    Screening Current     Other Preventive Screenings Covered by Medicare:  1  Abdominal Aortic Aneurysm (AAA) Screening: covered once if your at risk  You're considered to be at risk if you have a family history of AAA  2  Lung Cancer Screening: covers low dose CT scan once per year if you meet all of the following conditions: (1) Age 50-69; (2) No signs or symptoms of lung cancer; (3) Current smoker or have quit smoking within the last 15 years; (4) You have a tobacco smoking history of at least 30 pack years (packs per day multiplied by number of years you smoked); (5) You get a written order from a healthcare provider  3  Glaucoma Screening: covered annually if you're considered high risk: (1) You have diabetes OR (2) Family history of glaucoma OR (3)  aged 48 and older OR (3)  American aged 72 and older  3  Osteoporosis Screening: covered every 2 years if you meet one of the following conditions: (1) You're estrogen deficient and at risk for osteoporosis based off medical history and other findings; (2) Have a vertebral abnormality; (3) On glucocorticoid therapy for more than 3 months; (4) Have primary hyperparathyroidism; (5) On osteoporosis medications and need to assess response to drug therapy  · Last bone density test (DXA Scan): Not on file  5  HIV Screening: covered annually if you're between the age of 12-76  Also covered annually if you are younger than 13 and older than 72 with risk factors for HIV infection   For pregnant patients, it is covered up to 3 times per pregnancy  Immunizations:  Immunization Recommendations   Influenza Vaccine Annual influenza vaccination during flu season is recommended for all persons aged >= 6 months who do not have contraindications   Pneumococcal Vaccine (Prevnar and Pneumovax)  * Prevnar = PCV13  * Pneumovax = PPSV23   Adults 25-60 years old: 1-3 doses may be recommended based on certain risk factors  Adults 72 years old: Prevnar (PCV13) vaccine recommended followed by Pneumovax (PPSV23) vaccine  If already received PPSV23 since turning 65, then PCV13 recommended at least one year after PPSV23 dose  Hepatitis B Vaccine 3 dose series if at intermediate or high risk (ex: diabetes, end stage renal disease, liver disease)   Tetanus (Td) Vaccine - COST NOT COVERED BY MEDICARE PART B Following completion of primary series, a booster dose should be given every 10 years to maintain immunity against tetanus  Td may also be given as tetanus wound prophylaxis  Tdap Vaccine - COST NOT COVERED BY MEDICARE PART B Recommended at least once for all adults  For pregnant patients, recommended with each pregnancy  Shingles Vaccine (Shingrix) - COST NOT COVERED BY MEDICARE PART B  2 shot series recommended in those aged 48 and above     Health Maintenance Due:  There are no preventive care reminders to display for this patient  Immunizations Due:      Topic Date Due    DTaP,Tdap,and Td Vaccines (1 - Tdap) 10/02/1961     Advance Directives   What are advance directives? Advance directives are legal documents that state your wishes and plans for medical care  These plans are made ahead of time in case you lose your ability to make decisions for yourself  Advance directives can apply to any medical decision, such as the treatments you want, and if you want to donate organs  What are the types of advance directives? There are many types of advance directives, and each state has rules about how to use them   You may choose a combination of any of the following:  · Living will: This is a written record of the treatment you want  You can also choose which treatments you do not want, which to limit, and which to stop at a certain time  This includes surgery, medicine, IV fluid, and tube feedings  · Durable power of  for healthcare [facility]): This is a written record that states who you want to make healthcare choices for you when you are unable to make them for yourself  This person, called a proxy, is usually a family member or a friend  You may choose more than 1 proxy  · Do not resuscitate (DNR) order:  A DNR order is used in case your heart stops beating or you stop breathing  It is a request not to have certain forms of treatment, such as CPR  A DNR order may be included in other types of advance directives  · Medical directive: This covers the care that you want if you are in a coma, near death, or unable to make decisions for yourself  You can list the treatments you want for each condition  Treatment may include pain medicine, surgery, blood transfusions, dialysis, IV or tube feedings, and a ventilator (breathing machine)  · Values history: This document has questions about your views, beliefs, and how you feel and think about life  This information can help others choose the care that you would choose  Why are advance directives important? An advance directive helps you control your care  Although spoken wishes may be used, it is better to have your wishes written down  Spoken wishes can be misunderstood, or not followed  Treatments may be given even if you do not want them  An advance directive may make it easier for your family to make difficult choices about your care  Urinary Incontinence   Urinary incontinence (UI)  is when you lose control of your bladder  UI develops because your bladder cannot store or empty urine properly  The 3 most common types of UI are stress incontinence, urge incontinence, or both    Medicines:   · May be given to help strengthen your bladder control  Report any side effects of medication to your healthcare provider  Do pelvic muscle exercises often:  Your pelvic muscles help you stop urinating  Squeeze these muscles tight for 5 seconds, then relax for 5 seconds  Gradually work up to squeezing for 10 seconds  Do 3 sets of 15 repetitions a day, or as directed  This will help strengthen your pelvic muscles and improve bladder control  Train your bladder:  Go to the bathroom at set times, such as every 2 hours, even if you do not feel the urge to go  You can also try to hold your urine when you feel the urge to go  For example, hold your urine for 5 minutes when you feel the urge to go  As that becomes easier, hold your urine for 10 minutes  Self-care:   · Keep a UI record  Write down how often you leak urine and how much you leak  Make a note of what you were doing when you leaked urine  · Drink liquids as directed  You may need to limit the amount of liquid you drink to help control your urine leakage  Do not drink any liquid right before you go to bed  Limit or do not have drinks that contain caffeine or alcohol  · Prevent constipation  Eat a variety of high-fiber foods  Good examples are high-fiber cereals, beans, vegetables, and whole-grain breads  Walking is the best way to trigger your intestines to have a bowel movement  · Exercise regularly and maintain a healthy weight  Weight loss and exercise will decrease pressure on your bladder and help you control your leakage  · Use a catheter as directed  to help empty your bladder  A catheter is a tiny, plastic tube that is put into your bladder to drain your urine  · Go to behavior therapy as directed  Behavior therapy may be used to help you learn to control your urge to urinate      Weight Management   Why it is important to manage your weight:  Being overweight increases your risk of health conditions such as heart disease, high blood pressure, type 2 diabetes, and certain types of cancer  It can also increase your risk for osteoarthritis, sleep apnea, and other respiratory problems  Aim for a slow, steady weight loss  Even a small amount of weight loss can lower your risk of health problems  How to lose weight safely:  A safe and healthy way to lose weight is to eat fewer calories and get regular exercise  You can lose up about 1 pound a week by decreasing the number of calories you eat by 500 calories each day  Healthy meal plan for weight management:  A healthy meal plan includes a variety of foods, contains fewer calories, and helps you stay healthy  A healthy meal plan includes the following:  · Eat whole-grain foods more often  A healthy meal plan should contain fiber  Fiber is the part of grains, fruits, and vegetables that is not broken down by your body  Whole-grain foods are healthy and provide extra fiber in your diet  Some examples of whole-grain foods are whole-wheat breads and pastas, oatmeal, brown rice, and bulgur  · Eat a variety of vegetables every day  Include dark, leafy greens such as spinach, kale, sammy greens, and mustard greens  Eat yellow and orange vegetables such as carrots, sweet potatoes, and winter squash  · Eat a variety of fruits every day  Choose fresh or canned fruit (canned in its own juice or light syrup) instead of juice  Fruit juice has very little or no fiber  · Eat low-fat dairy foods  Drink fat-free (skim) milk or 1% milk  Eat fat-free yogurt and low-fat cottage cheese  Try low-fat cheeses such as mozzarella and other reduced-fat cheeses  · Choose meat and other protein foods that are low in fat  Choose beans or other legumes such as split peas or lentils  Choose fish, skinless poultry (chicken or turkey), or lean cuts of red meat (beef or pork)  Before you cook meat or poultry, cut off any visible fat  · Use less fat and oil  Try baking foods instead of frying them   Add less fat, such as margarine, sour cream, regular salad dressing and mayonnaise to foods  Eat fewer high-fat foods  Some examples of high-fat foods include french fries, doughnuts, ice cream, and cakes  · Eat fewer sweets  Limit foods and drinks that are high in sugar  This includes candy, cookies, regular soda, and sweetened drinks  Exercise:  Exercise at least 30 minutes per day on most days of the week  Some examples of exercise include walking, biking, dancing, and swimming  You can also fit in more physical activity by taking the stairs instead of the elevator or parking farther away from stores  Ask your healthcare provider about the best exercise plan for you  © Copyright [employer] 2018 Information is for End User's use only and may not be sold, redistributed or otherwise used for commercial purposes   All illustrations and images included in CareNotes® are the copyrighted property of A D A M , Inc  or [address] [place] [address]

## 2021-02-08 NOTE — ASSESSMENT & PLAN NOTE
A1C was 6 5 in Nov 2020  Cont current meds and low carb diet  Pt had eye exam in July 2020  Foot exam done  Had flu shot  In Oct 2020      Lab Results   Component Value Date    HGBA1C 6 5 (H) 11/24/2020

## 2021-02-08 NOTE — ASSESSMENT & PLAN NOTE
Wt Readings from Last 3 Encounters:   11/25/20 88 kg (194 lb)   11/04/20 88 kg (194 lb)   10/09/20 87 3 kg (192 lb 6 4 oz)     Pt saw Dr Quinn Cheung and had echo in Oct 2020  EF was 70%  Cont meds per Cards

## 2021-02-08 NOTE — ASSESSMENT & PLAN NOTE
Cont mgmt per Dr Nuria Walker and her next appt with him is on Feb 16, 2021  Uses O2 by NC 4L at home and 2L when out

## 2021-02-16 ENCOUNTER — TELEMEDICINE (OUTPATIENT)
Dept: PULMONOLOGY | Facility: CLINIC | Age: 81
End: 2021-02-16

## 2021-02-16 DIAGNOSIS — J44.9 CHRONIC OBSTRUCTIVE PULMONARY DISEASE, UNSPECIFIED COPD TYPE (HCC): Primary | ICD-10-CM

## 2021-02-16 DIAGNOSIS — E66.9 OBESITY (BMI 35.0-39.9 WITHOUT COMORBIDITY): ICD-10-CM

## 2021-02-16 DIAGNOSIS — D3A.090 BENIGN CARCINOID TUMOR OF LUNG: ICD-10-CM

## 2021-02-16 DIAGNOSIS — I10 ESSENTIAL HYPERTENSION: ICD-10-CM

## 2021-02-16 DIAGNOSIS — I50.32 CHRONIC DIASTOLIC CHF (CONGESTIVE HEART FAILURE) (HCC): ICD-10-CM

## 2021-02-16 DIAGNOSIS — G47.33 OSA (OBSTRUCTIVE SLEEP APNEA): ICD-10-CM

## 2021-02-16 PROCEDURE — 99212 OFFICE O/P EST SF 10 MIN: CPT | Performed by: INTERNAL MEDICINE
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
## 2021-02-17 ENCOUNTER — OFFICE VISIT (OUTPATIENT)
Dept: NEPHROLOGY | Facility: CLINIC | Age: 81
End: 2021-02-17
Payer: MEDICARE

## 2021-02-17 VITALS
SYSTOLIC BLOOD PRESSURE: 150 MMHG | WEIGHT: 194 LBS | HEART RATE: 81 BPM | BODY MASS INDEX: 38.09 KG/M2 | DIASTOLIC BLOOD PRESSURE: 64 MMHG | HEIGHT: 60 IN

## 2021-02-17 DIAGNOSIS — E83.52 HYPERCALCEMIA: Primary | ICD-10-CM

## 2021-02-17 DIAGNOSIS — E87.1 HYPONATREMIA: ICD-10-CM

## 2021-02-17 DIAGNOSIS — I15.8 OTHER SECONDARY HYPERTENSION: ICD-10-CM

## 2021-02-17 PROCEDURE — 99213 OFFICE O/P EST LOW 20 MIN: CPT | Performed by: INTERNAL MEDICINE

## 2021-02-17 RX ORDER — ACETAMINOPHEN 500 MG
500 TABLET ORAL EVERY 6 HOURS PRN
COMMUNITY

## 2021-02-17 NOTE — LETTER
February 17, 2021     Patsy Mclean [address] [facility]  [address] [facility]  [address] [address]    Patient: Lazarus Conrad   YOB: 1940   Date of Visit: 2/17/2021       Dear Dr Domenic Solorzano:    Thank you for referring Lazarus Conrad to me for evaluation  Below are my notes for this consultation  If you have questions, please do not hesitate to call me  I look forward to following your patient along with you  Sincerely,        Jose Guerra MD        CC: No Recipients  Jose Guerra MD  2/17/2021  3:08 PM  Sign when Signing Visit  NEPHROLOGY OFFICE VISIT   Lazarus Conrad 79 y o  female MRN: [id number]  2/17/2021    Reason for Visit: hyponatremia    ASSESSMENT and PLAN:    I had the pleasure of seeing Ms Tamia Hannon today in the renal clinic for the continued management of hyponatreima  79 yo Patient has a history of CHF, lung carcinoid, COPD on home O4, TOM, hyponatremia, hypertension who is being seen as a follow-up for hyponatremia  The etiology of hyponatremia was felt to be secondary to volume overload in the setting of heart failure, and citalopram use during admission 2018 at [facility]  Pt is now presenting for f/u for HTN, hyponatremia, proteinuria     1) hyponatremia     - secondary to volume overload prior  -sodium level has normalized now  -on torsemide  - no longer on salt tab     2020 - Pt states that was taking torsemide daily but not taking it on days with appointment and if had to go somewhere  Noticed edema when not taking torsemide  Was taking 1/2 of spironolactone  Saw Cardiology and was advised to take 1/2 tab torsemide (so ten mg) and spironolactone 25 mg daily  Pt states with this change, has edema improved   Is feeling satisfied with the improvement       Plan:  - check labs in 2 months  - lower calcium supplement to once daily  - check repeat UPCR  - appt in 3 months     2) HTN -     -was started on spironolactone in April 2018    -during visit in [facility] [address] eighteen, patient's amlodipine was increased and valsartan was changed to losartan due to recall   -continue metoprolol, spironolactone, terazosin, torsemide  -renal artery Doppler were unrevealing   In the proximal arteries of the renal artery  -May 2019-Lower terazosin to 5 mg, and lower metoprolol to 37 5 twice a day  - 7/2019 - metoprolol changed to 25 mg at night as pt was taking 37 5 BID dosing only once daily  - BP above goal today but was normal range 2/8  So will monitor for now     3) renal function      - stable creatinine 0 8 mg/dL  - f/u UPCR next labs     4) proteinuria     - prior SPEP, UPEP unrevealing  - on ARB and spironolactone  - last UA on 8/2019  None recent  - repeat first      5) thyroid nodule - biopsied prior     6) electrolytes     - stable Na, K, mag  - calcium rising  - lower calcium supplement  - check PTH     7) COPD and pulm carcinoid-     - follows with Pulmonary team and Hematology team     It was a pleasure evaluating your patient in the office today  Thank you for allowing our team to participate in the care of Ms Camryn Roblero  Please do not hesitate to contact our team if further issues/questions shall arise in the interim         No problem-specific Assessment & Plan notes found for this encounter  HPI:    Pt denies complaints  No fevers, chills, nausea, vomiting  PATIENT INSTRUCTIONS:    Patient Instructions   1) Avoid NSAIDS - (Example - motrin, advil, ibuprofen, aleve, exederin, etc)  2) Always follow a low salt diet  3) please lower calcium supplement to once daily  4) continue rest of your medications as you are  5) labwork in April  6) appointment in may         OBJECTIVE:  Current Weight: Weight - Scale: 88 kg (194 lb)  Vitals:    02/17/21 1414   BP: 150/64   BP Location: Left arm   Patient Position: Sitting   Cuff Size: Adult   Pulse: 81   Weight: 88 kg (194 lb)   Height: 5' (1 524 m)    Body mass index is 37 89 kg/m²        REVIEW OF SYSTEMS:    Review of Systems Constitutional: Negative  Negative for fatigue  HENT: Negative  Eyes: Negative  Respiratory: Negative  Negative for shortness of breath  Cardiovascular: Negative  Negative for leg swelling  Gastrointestinal: Negative  Endocrine: Negative  Genitourinary: Negative  Negative for difficulty urinating  Musculoskeletal: Negative  Skin: Negative  Allergic/Immunologic: Negative  Neurological: Negative  Hematological: Negative  Psychiatric/Behavioral: Negative  All other systems reviewed and are negative  PHYSICAL EXAM:      Physical Exam  Vitals signs and nursing note reviewed  Constitutional:       General: She is not in acute distress  Appearance: She is well-developed  She is not diaphoretic  HENT:      Head: Normocephalic and atraumatic  Eyes:      General: No scleral icterus  Right eye: No discharge  Left eye: No discharge  Conjunctiva/sclera: Conjunctivae normal    Neck:      Musculoskeletal: Normal range of motion and neck supple  Vascular: No JVD  Cardiovascular:      Rate and Rhythm: Normal rate and regular rhythm  Heart sounds: No murmur  No friction rub  No gallop  Pulmonary:      Effort: Pulmonary effort is normal  No respiratory distress  Breath sounds: Normal breath sounds  No wheezing or rales  Abdominal:      General: Bowel sounds are normal  There is no distension  Palpations: Abdomen is soft  Tenderness: There is no abdominal tenderness  There is no rebound  Musculoskeletal: Normal range of motion  General: No tenderness or deformity  Comments: Trace to minimal LE edema  Skin:     General: Skin is warm and dry  Coloration: Skin is not pale  Findings: No erythema or rash  Neurological:      Mental Status: She is alert and oriented to person, place, and time        Coordination: Coordination normal    Psychiatric:         Behavior: Behavior normal          Thought Content:  Thought content normal          Judgment: Judgment normal          Medications:    Current Outpatient Medications:     ACCU-CHEK FASTCLIX LANCETS [facility], USE LANCETS ONCE DAILY, Disp: , Rfl: 3    Accu-Chek SmartView test strip, 1 each by Other route daily Use as instructed, Disp: 100 each, Rfl: 3    acetaminophen (TYLENOL) 500 mg tablet, Take 500 mg by mouth every 6 (six) hours as needed for mild pain For pain, Disp: , Rfl:     ADULT ASPIRIN LOW STRENGTH PO, Take 1 tablet by mouth daily , Disp: , Rfl:     amLODIPine (NORVASC) 10 mg tablet, Take 1 tablet (10 mg total) by mouth daily, Disp: 90 tablet, Rfl: 3    Calcium 600-400 MG-UNIT CHEW, Chew 1 tablet daily, Disp: 30 tablet, Rfl: 3    fluticasone (FLONASE) 50 mcg/act nasal spray, 1 spray into each nostril daily, Disp: , Rfl:     glipiZIDE (GLUCOTROL) 10 mg tablet, Take 1 tablet (10 mg total) by mouth 2 (two) times a day, Disp: 180 tablet, Rfl: 2    latanoprost (XALATAN) 0 005 % ophthalmic solution, Apply to eye, Disp: , Rfl:     loratadine (CLARITIN) 10 mg tablet, Take 1 tablet by mouth daily, Disp: , Rfl:     metFORMIN (GLUCOPHAGE) 500 mg tablet, Take 2 tablets by mouth 2 (two) times a day, Disp: , Rfl:     metoprolol succinate (TOPROL-XL) 25 mg 24 hr tablet, Take 1 tablet (25 mg total) by mouth daily at bedtime, Disp: 90 tablet, Rfl: 3    spironolactone (ALDACTONE) 25 mg tablet, Take 1 tablet (25 mg total) by mouth daily, Disp: 90 tablet, Rfl: 3    terazosin (HYTRIN) 5 mg capsule, Take 1 capsule (5 mg total) by mouth daily at bedtime, Disp: 90 capsule, Rfl: 3    torsemide (DEMADEX) 20 mg tablet, Take 1 tablet (20 mg total) by mouth daily (Patient taking differently: Take 10 mg by mouth daily ), Disp: 90 tablet, Rfl: 3    valsartan (DIOVAN) 320 MG tablet, Take 1 tablet (320 mg total) by mouth daily, Disp: 90 tablet, Rfl: 1    Laboratory Results:        Invalid input(s): ALBUMIN    Results for orders placed or performed in visit on 11/24/20 Basic metabolic panel   Result Value Ref Range    Sodium 136 136 - 145 mmol/L    Potassium 4 1 3 5 - 5 3 mmol/L    Chloride 100 100 - 108 mmol/L    CO2 30 21 - 32 mmol/L    ANION GAP 6 4 - 13 mmol/L    BUN 17 5 - 25 mg/dL    Creatinine 0 84 0 60 - 1 30 mg/dL    Glucose 159 (H) 65 - 140 mg/dL    Calcium 10 0 8 3 - 10 1 mg/dL    eGFR 66 ml/min/1 73sq m   Basic metabolic panel   Result Value Ref Range    Sodium 135 133 - 145 mmol/L    Potassium 4 9 3 5 - 5 0 mmol/L    Chloride 98 96 - 108 mmol/L    CO2 30 22 - 33 mmol/L    ANION GAP 7 4 - 13 mmol/L    BUN 22 (H) 6 - 20 mg/dL    Creatinine 0 89 0 40 - 1 10 mg/dL    Glucose, Fasting 144 (H) 70 - 105 mg/dL    Calcium 10 0 8 4 - 10 2 mg/dL    eGFR 62 ml/min/1 73sq m   CBC   Result Value Ref Range    WBC 7 30 4 31 - 10 16 Thousand/uL    RBC 4 38 3 81 - 5 12 Million/uL    Hemoglobin 12 1 11 5 - 15 4 g/dL    Hematocrit 37 7 34 8 - 46 1 %    MCV 86 82 - 98 fL    MCH 27 6 26 8 - 34 3 pg    MCHC 32 1 31 4 - 37 4 g/dL    RDW 13 9 11 6 - 15 1 %    Platelets 319 550 - 713 Thousands/uL    MPV 9 7 8 9 - 12 7 fL   Magnesium   Result Value Ref Range    Magnesium 2 1 1 6 - 2 6 mg/dL   Phosphorus   Result Value Ref Range    Phosphorus 3 8 2 5 - 5 0 mg/dL   Protein / creatinine ratio, urine   Result Value Ref Range    Creatinine, Ur 40 8 mg/dL    Protein Urine Random 11 mg/dL    Prot/Creat Ratio, Ur 0 27 (H) 0 00 - 0 10   Urinalysis with microscopic   Result Value Ref Range    Clarity, UA Clear Clear    Color, UA Yellow Yellow    Specific [place], UA 1 010 1 001 - 1 030    pH, UA 7 5 5 0, 5 5, 6 0, 6 5, 7 0, 7 5, 8 0    Glucose, UA Negative Negative mg/dl    Ketones, UA Negative Negative mg/dl    Blood, UA Negative Negative    Protein, UA Negative Negative, Interference- unable to analyze mg/dl    Nitrite, UA Negative Negative    Bilirubin, UA Negative Negative    Urobilinogen, UA 0 2 0 2, 1 0 E U /dl E U /dl    Leukocytes, UA Trace (A) Negative    WBC, UA 2-4 None Seen, 0-1, 1-2, 0-5, 2-4 /hpf    RBC, UA None Seen None Seen, 0-1, 1-2, 2-4, 0-5 /hpf    Bacteria, UA Occasional None Seen, Occasional /hpf    Epithelial Cells Occasional None Seen, Occasional /hpf   Hemoglobin A1C   Result Value Ref Range    Hemoglobin A1C 6 5 (H) Normal 3 8-5 6%; PreDiabetic 5 7-6 4%;  Diabetic >=6 5%; Glycemic control for adults with diabetes <7 0% %     mg/dl

## 2021-02-17 NOTE — PATIENT INSTRUCTIONS
1) Avoid NSAIDS - (Example - motrin, advil, ibuprofen, aleve, exederin, etc)  2) Always follow a low salt diet  3) please lower calcium supplement to once daily  4) continue rest of your medications as you are  5) labwork in April  6) appointment in may

## 2021-02-17 NOTE — PROGRESS NOTES
NEPHROLOGY OFFICE VISIT   René Jean 79 y o  female MRN: [id number]  2/17/2021    Reason for Visit: hyponatremia    ASSESSMENT and PLAN:    I had the pleasure of seeing Ms Jeanette Ritter today in the renal clinic for the continued management of hyponatreima  81 yo Patient has a history of CHF, lung carcinoid, COPD on home O4, TOM, hyponatremia, hypertension who is being seen as a follow-up for hyponatremia  The etiology of hyponatremia was felt to be secondary to volume overload in the setting of heart failure, and citalopram use during admission 2018 at [facility]  Pt is now presenting for f/u for HTN, hyponatremia, proteinuria     1) hyponatremia     - secondary to volume overload prior  -sodium level has normalized now  -on torsemide  - no longer on salt tab     2020 - Pt states that was taking torsemide daily but not taking it on days with appointment and if had to go somewhere  Noticed edema when not taking torsemide  Was taking 1/2 of spironolactone  Saw Cardiology and was advised to take 1/2 tab torsemide (so ten mg) and spironolactone 25 mg daily  Pt states with this change, has edema improved  Is feeling satisfied with the improvement       Plan:  - check labs in 2 months  - lower calcium supplement to once daily  - check repeat UPCR  - appt in 3 months     2) HTN -     -was started on spironolactone in April 2018    -during visit in [address] [place] [address], patient's amlodipine was increased and valsartan was changed to losartan due to recall   -continue metoprolol, spironolactone, terazosin, torsemide  -renal artery Doppler were unrevealing   In the proximal arteries of the renal artery  -May 2019-Lower terazosin to 5 mg, and lower metoprolol to 37 5 twice a day  - 7/2019 - metoprolol changed to 25 mg at night as pt was taking 37 5 BID dosing only once daily  - BP above goal today but was normal range 2/8  So will monitor for now       3) renal function      - stable creatinine 0 8 mg/dL  - f/u UPCR next labs     4) proteinuria     - prior SPEP, UPEP unrevealing  - on ARB and spironolactone  - last UA on 8/2019  None recent  - repeat first      5) thyroid nodule - biopsied prior     6) electrolytes     - stable Na, K, mag  - calcium rising  - lower calcium supplement  - check PTH     7) COPD and pulm carcinoid-     - follows with Pulmonary team and Hematology team    8) AST slight elevation - chronic  Per PCP     It was a pleasure evaluating your patient in the office today  Thank you for allowing our team to participate in the care of Ms Camryn Roblero  Please do not hesitate to contact our team if further issues/questions shall arise in the interim         No problem-specific Assessment & Plan notes found for this encounter  HPI:    Pt denies complaints  No fevers, chills, nausea, vomiting  PATIENT INSTRUCTIONS:    Patient Instructions   1) Avoid NSAIDS - (Example - motrin, advil, ibuprofen, aleve, exederin, etc)  2) Always follow a low salt diet  3) please lower calcium supplement to once daily  4) continue rest of your medications as you are  5) labwork in April  6) appointment in may         OBJECTIVE:  Current Weight: Weight - Scale: 88 kg (194 lb)  Vitals:    02/17/21 1414   BP: 150/64   BP Location: Left arm   Patient Position: Sitting   Cuff Size: Adult   Pulse: 81   Weight: 88 kg (194 lb)   Height: 5' (1 524 m)    Body mass index is 37 89 kg/m²  REVIEW OF SYSTEMS:    Review of Systems   Constitutional: Negative  Negative for fatigue  HENT: Negative  Eyes: Negative  Respiratory: Negative  Negative for shortness of breath  Cardiovascular: Negative  Negative for leg swelling  Gastrointestinal: Negative  Endocrine: Negative  Genitourinary: Negative  Negative for difficulty urinating  Musculoskeletal: Negative  Skin: Negative  Allergic/Immunologic: Negative  Neurological: Negative  Hematological: Negative  Psychiatric/Behavioral: Negative      All other systems reviewed and are negative  PHYSICAL EXAM:      Physical Exam  Vitals signs and nursing note reviewed  Constitutional:       General: She is not in acute distress  Appearance: She is well-developed  She is not diaphoretic  HENT:      Head: Normocephalic and atraumatic  Eyes:      General: No scleral icterus  Right eye: No discharge  Left eye: No discharge  Conjunctiva/sclera: Conjunctivae normal    Neck:      Musculoskeletal: Normal range of motion and neck supple  Vascular: No JVD  Cardiovascular:      Rate and Rhythm: Normal rate and regular rhythm  Heart sounds: No murmur  No friction rub  No gallop  Pulmonary:      Effort: Pulmonary effort is normal  No respiratory distress  Breath sounds: Normal breath sounds  No wheezing or rales  Abdominal:      General: Bowel sounds are normal  There is no distension  Palpations: Abdomen is soft  Tenderness: There is no abdominal tenderness  There is no rebound  Musculoskeletal: Normal range of motion  General: No tenderness or deformity  Comments: Trace to minimal LE edema  Skin:     General: Skin is warm and dry  Coloration: Skin is not pale  Findings: No erythema or rash  Neurological:      Mental Status: She is alert and oriented to person, place, and time  Coordination: Coordination normal    Psychiatric:         Behavior: Behavior normal          Thought Content:  Thought content normal          Judgment: Judgment normal          Medications:    Current Outpatient Medications:     ACCU-CHEK FASTCLIX LANCETS [place], USE LANCETS ONCE DAILY, Disp: , Rfl: 3    Accu-Chek SmartView test strip, 1 each by Other route daily Use as instructed, Disp: 100 each, Rfl: 3    acetaminophen (TYLENOL) 500 mg tablet, Take 500 mg by mouth every 6 (six) hours as needed for mild pain For pain, Disp: , Rfl:     ADULT ASPIRIN LOW STRENGTH PO, Take 1 tablet by mouth daily , Disp: , Rfl:    amLODIPine (NORVASC) 10 mg tablet, Take 1 tablet (10 mg total) by mouth daily, Disp: 90 tablet, Rfl: 3    Calcium 600-400 MG-UNIT CHEW, Chew 1 tablet daily, Disp: 30 tablet, Rfl: 3    fluticasone (FLONASE) 50 mcg/act nasal spray, 1 spray into each nostril daily, Disp: , Rfl:     glipiZIDE (GLUCOTROL) 10 mg tablet, Take 1 tablet (10 mg total) by mouth 2 (two) times a day, Disp: 180 tablet, Rfl: 2    latanoprost (XALATAN) 0 005 % ophthalmic solution, Apply to eye, Disp: , Rfl:     loratadine (CLARITIN) 10 mg tablet, Take 1 tablet by mouth daily, Disp: , Rfl:     metFORMIN (GLUCOPHAGE) 500 mg tablet, Take 2 tablets by mouth 2 (two) times a day, Disp: , Rfl:     metoprolol succinate (TOPROL-XL) 25 mg 24 hr tablet, Take 1 tablet (25 mg total) by mouth daily at bedtime, Disp: 90 tablet, Rfl: 3    spironolactone (ALDACTONE) 25 mg tablet, Take 1 tablet (25 mg total) by mouth daily, Disp: 90 tablet, Rfl: 3    terazosin (HYTRIN) 5 mg capsule, Take 1 capsule (5 mg total) by mouth daily at bedtime, Disp: 90 capsule, Rfl: 3    torsemide (DEMADEX) 20 mg tablet, Take 1 tablet (20 mg total) by mouth daily (Patient taking differently: Take 10 mg by mouth daily ), Disp: 90 tablet, Rfl: 3    valsartan (DIOVAN) 320 MG tablet, Take 1 tablet (320 mg total) by mouth daily, Disp: 90 tablet, Rfl: 1    Laboratory Results:        Invalid input(s): ALBUMIN    Results for orders placed or performed in visit on 85/17/17   Basic metabolic panel   Result Value Ref Range    Sodium 136 136 - 145 mmol/L    Potassium 4 1 3 5 - 5 3 mmol/L    Chloride 100 100 - 108 mmol/L    CO2 30 21 - 32 mmol/L    ANION GAP 6 4 - 13 mmol/L    BUN 17 5 - 25 mg/dL    Creatinine 0 84 0 60 - 1 30 mg/dL    Glucose 159 (H) 65 - 140 mg/dL    Calcium 10 0 8 3 - 10 1 mg/dL    eGFR 66 ml/min/1 73sq m   Basic metabolic panel   Result Value Ref Range    Sodium 135 133 - 145 mmol/L    Potassium 4 9 3 5 - 5 0 mmol/L    Chloride 98 96 - 108 mmol/L    CO2 30 22 - 33 mmol/L ANION GAP 7 4 - 13 mmol/L    BUN 22 (H) 6 - 20 mg/dL    Creatinine 0 89 0 40 - 1 10 mg/dL    Glucose, Fasting 144 (H) 70 - 105 mg/dL    Calcium 10 0 8 4 - 10 2 mg/dL    eGFR 62 ml/min/1 73sq m   CBC   Result Value Ref Range    WBC 7 30 4 31 - 10 16 Thousand/uL    RBC 4 38 3 81 - 5 12 Million/uL    Hemoglobin 12 1 11 5 - 15 4 g/dL    Hematocrit 37 7 34 8 - 46 1 %    MCV 86 82 - 98 fL    MCH 27 6 26 8 - 34 3 pg    MCHC 32 1 31 4 - 37 4 g/dL    RDW 13 9 11 6 - 15 1 %    Platelets 840 349 - 376 Thousands/uL    MPV 9 7 8 9 - 12 7 fL   Magnesium   Result Value Ref Range    Magnesium 2 1 1 6 - 2 6 mg/dL   Phosphorus   Result Value Ref Range    Phosphorus 3 8 2 5 - 5 0 mg/dL   Protein / creatinine ratio, urine   Result Value Ref Range    Creatinine, Ur 40 8 mg/dL    Protein Urine Random 11 mg/dL    Prot/Creat Ratio, Ur 0 27 (H) 0 00 - 0 10   Urinalysis with microscopic   Result Value Ref Range    Clarity, UA Clear Clear    Color, UA Yellow Yellow    Specific [place], UA 1 010 1 001 - 1 030    pH, UA 7 5 5 0, 5 5, 6 0, 6 5, 7 0, 7 5, 8 0    Glucose, UA Negative Negative mg/dl    Ketones, UA Negative Negative mg/dl    Blood, UA Negative Negative    Protein, UA Negative Negative, Interference- unable to analyze mg/dl    Nitrite, UA Negative Negative    Bilirubin, UA Negative Negative    Urobilinogen, UA 0 2 0 2, 1 0 E U /dl E U /dl    Leukocytes, UA Trace (A) Negative    WBC, UA 2-4 None Seen, 0-1, 1-2, 0-5, 2-4 /hpf    RBC, UA None Seen None Seen, 0-1, 1-2, 2-4, 0-5 /hpf    Bacteria, UA Occasional None Seen, Occasional /hpf    Epithelial Cells Occasional None Seen, Occasional /hpf   Hemoglobin A1C   Result Value Ref Range    Hemoglobin A1C 6 5 (H) Normal 3 8-5 6%; PreDiabetic 5 7-6 4%;  Diabetic >=6 5%; Glycemic control for adults with diabetes <7 0% %     mg/dl

## 2021-02-19 NOTE — ASSESSMENT & PLAN NOTE
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

## 2021-02-19 NOTE — ASSESSMENT & PLAN NOTE
She had a long history of snoring and daytime sleepiness  She was found to have mild TOM with oxygen desaturation on her overnight sleep study  Her CPAP titration study was suboptimal  She was started on CPAP therapy at 9 cm of water and has been using it  Her CPAP compliance has been good  Her residual AHI has been low  She is getting benefit from CPAP therapy I have advised her to use the CPAP as before  Her latest CPAP compliance records are not currently available     I had a long discussion with her and have answered all their questions     today

## 2021-02-19 NOTE — ASSESSMENT & PLAN NOTE
She has history of COPD for a long time and was on Advair Bid before  She also has history of asthma as a child  She has occasional cough and no significant phlegm  No fever or chills  Though she is a never smoker, she has history of secondhand smoke exposure  Her PFT which showed moderate airflow obstruction with diffusion defect  There was no hyperinflation  Her 6 min walk test showed an oxygen desaturation to 82% with exertion  Her baseline oxygen saturation at rest was 90%  She also was found to have severe exercise limitation from SOB  She is not using Symbicort  now  I have advised her to use the nebulizer when necessary for her shortness of breath   I have advised her to continue with oxygen supplementation

## 2021-02-19 NOTE — ASSESSMENT & PLAN NOTE
Wt Readings from Last 3 Encounters:   02/17/21 88 kg (194 lb)   02/08/21 87 5 kg (193 lb)   11/25/20 88 kg (194 lb)     She has chronic heart failure with good ejection fraction  She is  much better now  She follows with Dr Guillermo Argueta from cardiology  She is on torsemide and spironolactone  Her previous echocardiogram from Feb 2016 showed good EF at 65%  She has CKD also

## 2021-02-21 DIAGNOSIS — I10 BENIGN ESSENTIAL HTN: ICD-10-CM

## 2021-02-21 DIAGNOSIS — N18.30 CKD (CHRONIC KIDNEY DISEASE), STAGE III (HCC): ICD-10-CM

## 2021-02-22 RX ORDER — AMLODIPINE BESYLATE 10 MG/1
10 TABLET ORAL DAILY
Qty: 90 TABLET | Refills: 0 | Status: SHIPPED | OUTPATIENT
Start: 2021-02-22 | End: 2021-05-13

## 2021-02-28 ENCOUNTER — IMMUNIZATIONS (OUTPATIENT)
Dept: FAMILY MEDICINE CLINIC | Facility: HOSPITAL | Age: 81
End: 2021-02-28

## 2021-02-28 DIAGNOSIS — Z23 ENCOUNTER FOR IMMUNIZATION: Primary | ICD-10-CM

## 2021-02-28 PROCEDURE — 91300 SARS-COV-2 / COVID-19 MRNA VACCINE (PFIZER-BIONTECH) 30 MCG: CPT

## 2021-02-28 PROCEDURE — 0002A SARS-COV-2 / COVID-19 MRNA VACCINE (PFIZER-BIONTECH) 30 MCG: CPT

## 2021-03-10 ENCOUNTER — TRANSCRIBE ORDERS (OUTPATIENT)
Dept: ADMINISTRATIVE | Facility: HOSPITAL | Age: 81
End: 2021-03-10

## 2021-03-10 DIAGNOSIS — C7A.090 MALIGNANT CARCINOID TUMOR OF THE BRONCHUS AND LUNG (HCC): Primary | ICD-10-CM

## 2021-04-15 DIAGNOSIS — E11.9 DIABETES MELLITUS WITHOUT COMPLICATION (HCC): Primary | ICD-10-CM

## 2021-04-15 RX ORDER — LANCETS
EACH MISCELLANEOUS
Qty: 100 EACH | Refills: 3 | Status: SHIPPED | OUTPATIENT
Start: 2021-04-15 | End: 2022-03-31

## 2021-04-22 ENCOUNTER — LAB (OUTPATIENT)
Dept: LAB | Facility: HOSPITAL | Age: 81
End: 2021-04-22
Attending: INTERNAL MEDICINE
Payer: MEDICARE

## 2021-04-22 DIAGNOSIS — E83.52 HYPERCALCEMIA: ICD-10-CM

## 2021-04-22 DIAGNOSIS — E87.1 HYPONATREMIA: ICD-10-CM

## 2021-04-22 DIAGNOSIS — I15.8 OTHER SECONDARY HYPERTENSION: ICD-10-CM

## 2021-04-22 LAB
ANION GAP SERPL CALCULATED.3IONS-SCNC: 7 MMOL/L (ref 4–13)
BACTERIA UR QL AUTO: ABNORMAL /HPF
BILIRUB UR QL STRIP: NEGATIVE
BUN SERPL-MCNC: 16 MG/DL (ref 6–20)
CALCIUM SERPL-MCNC: 10.1 MG/DL (ref 8.4–10.2)
CHLORIDE SERPL-SCNC: 97 MMOL/L (ref 96–108)
CLARITY UR: CLEAR
CO2 SERPL-SCNC: 28 MMOL/L (ref 22–33)
COLOR UR: ABNORMAL
CREAT SERPL-MCNC: 0.88 MG/DL (ref 0.4–1.1)
CREAT UR-MCNC: 32 MG/DL
ERYTHROCYTE [DISTWIDTH] IN BLOOD BY AUTOMATED COUNT: 13.4 % (ref 11.6–15.1)
GFR SERPL CREATININE-BSD FRML MDRD: 63 ML/MIN/1.73SQ M
GLUCOSE P FAST SERPL-MCNC: 130 MG/DL (ref 70–105)
GLUCOSE UR STRIP-MCNC: NEGATIVE MG/DL
HCT VFR BLD AUTO: 35.7 % (ref 34.8–46.1)
HGB BLD-MCNC: 11.7 G/DL (ref 11.5–15.4)
HGB UR QL STRIP.AUTO: NEGATIVE
KETONES UR STRIP-MCNC: NEGATIVE MG/DL
LEUKOCYTE ESTERASE UR QL STRIP: NEGATIVE
MAGNESIUM SERPL-MCNC: 1.9 MG/DL (ref 1.6–2.6)
MCH RBC QN AUTO: 28.1 PG (ref 26.8–34.3)
MCHC RBC AUTO-ENTMCNC: 32.8 G/DL (ref 31.4–37.4)
MCV RBC AUTO: 86 FL (ref 82–98)
NITRITE UR QL STRIP: NEGATIVE
NON-SQ EPI CELLS URNS QL MICRO: ABNORMAL /HPF
PH UR STRIP.AUTO: 6 [PH]
PHOSPHATE SERPL-MCNC: 3.8 MG/DL (ref 2.5–5)
PLATELET # BLD AUTO: 302 THOUSANDS/UL (ref 149–390)
PMV BLD AUTO: 9.8 FL (ref 8.9–12.7)
POTASSIUM SERPL-SCNC: 4.5 MMOL/L (ref 3.5–5)
PROT UR STRIP-MCNC: NEGATIVE MG/DL
PROT UR-MCNC: <6 MG/DL
PROT/CREAT UR: <0.19 MG/G{CREAT} (ref 0–0.1)
PTH-INTACT SERPL-MCNC: 25.3 PG/ML (ref 18.4–80.1)
RBC # BLD AUTO: 4.16 MILLION/UL (ref 3.81–5.12)
RBC #/AREA URNS AUTO: ABNORMAL /HPF
SODIUM SERPL-SCNC: 132 MMOL/L (ref 133–145)
SP GR UR STRIP.AUTO: 1.01 (ref 1–1.03)
UROBILINOGEN UR QL STRIP.AUTO: 0.2 E.U./DL
WBC # BLD AUTO: 5.36 THOUSAND/UL (ref 4.31–10.16)
WBC #/AREA URNS AUTO: ABNORMAL /HPF

## 2021-04-22 PROCEDURE — 84100 ASSAY OF PHOSPHORUS: CPT

## 2021-04-22 PROCEDURE — 82570 ASSAY OF URINE CREATININE: CPT

## 2021-04-22 PROCEDURE — 85027 COMPLETE CBC AUTOMATED: CPT

## 2021-04-22 PROCEDURE — 83970 ASSAY OF PARATHORMONE: CPT

## 2021-04-22 PROCEDURE — 81001 URINALYSIS AUTO W/SCOPE: CPT

## 2021-04-22 PROCEDURE — 36415 COLL VENOUS BLD VENIPUNCTURE: CPT

## 2021-04-22 PROCEDURE — 80048 BASIC METABOLIC PNL TOTAL CA: CPT

## 2021-04-22 PROCEDURE — 84156 ASSAY OF PROTEIN URINE: CPT

## 2021-04-22 PROCEDURE — 83735 ASSAY OF MAGNESIUM: CPT

## 2021-04-23 NOTE — RESULT ENCOUNTER NOTE
Hello    Patient normally is followed up by Ms Carmen Johnson  Can you please let pt know that creat stable  Na slightly below goal 132     - please have pt repeat BMP in 2-4 weeks    Thank you    np

## 2021-04-26 ENCOUNTER — TELEPHONE (OUTPATIENT)
Dept: NEPHROLOGY | Facility: CLINIC | Age: 81
End: 2021-04-26

## 2021-04-26 DIAGNOSIS — N18.30 STAGE 3 CHRONIC KIDNEY DISEASE, UNSPECIFIED WHETHER STAGE 3A OR 3B CKD (HCC): Primary | ICD-10-CM

## 2021-04-26 NOTE — TELEPHONE ENCOUNTER
----- Message from Shahla Bailey MD sent at 4/23/2021  6:19 PM EDT -----  Hello    Patient normally is followed up by Ms Airam Remy  Can you please let pt know that creat stable  Na slightly below goal 132     - please have pt repeat BMP in 2-4 weeks    Thank you    np

## 2021-05-10 ENCOUNTER — TELEPHONE (OUTPATIENT)
Dept: FAMILY MEDICINE CLINIC | Facility: CLINIC | Age: 81
End: 2021-05-10

## 2021-05-10 DIAGNOSIS — E11.9 DIABETES MELLITUS WITHOUT COMPLICATION (HCC): Primary | ICD-10-CM

## 2021-05-10 NOTE — TELEPHONE ENCOUNTER
metFORMIN (GLUCOPHAGE) 500 mg tablet Take 2 tablets by mouth 2 (two) times a day send to [facility]   ND

## 2021-05-11 ENCOUNTER — TELEPHONE (OUTPATIENT)
Dept: PULMONOLOGY | Facility: CLINIC | Age: 81
End: 2021-05-11

## 2021-05-11 ENCOUNTER — OFFICE VISIT (OUTPATIENT)
Dept: PULMONOLOGY | Facility: CLINIC | Age: 81
End: 2021-05-11
Payer: MEDICARE

## 2021-05-11 VITALS
WEIGHT: 181.4 LBS | SYSTOLIC BLOOD PRESSURE: 140 MMHG | TEMPERATURE: 97.2 F | HEART RATE: 65 BPM | DIASTOLIC BLOOD PRESSURE: 70 MMHG | HEIGHT: 59 IN | BODY MASS INDEX: 36.57 KG/M2 | OXYGEN SATURATION: 97 %

## 2021-05-11 DIAGNOSIS — I50.32 CHRONIC DIASTOLIC CHF (CONGESTIVE HEART FAILURE) (HCC): ICD-10-CM

## 2021-05-11 DIAGNOSIS — J30.2 SEASONAL ALLERGIC RHINITIS, UNSPECIFIED TRIGGER: ICD-10-CM

## 2021-05-11 DIAGNOSIS — G47.33 OSA (OBSTRUCTIVE SLEEP APNEA): ICD-10-CM

## 2021-05-11 DIAGNOSIS — J44.9 CHRONIC OBSTRUCTIVE PULMONARY DISEASE, UNSPECIFIED COPD TYPE (HCC): ICD-10-CM

## 2021-05-11 DIAGNOSIS — E66.9 OBESITY (BMI 35.0-39.9 WITHOUT COMORBIDITY): ICD-10-CM

## 2021-05-11 DIAGNOSIS — D3A.090 BENIGN CARCINOID TUMOR OF LUNG: Primary | ICD-10-CM

## 2021-05-11 PROCEDURE — 99214 OFFICE O/P EST MOD 30 MIN: CPT | Performed by: INTERNAL MEDICINE

## 2021-05-11 RX ORDER — MONTELUKAST SODIUM 10 MG/1
10 TABLET ORAL
Qty: 30 TABLET | Refills: 0 | Status: SHIPPED | OUTPATIENT
Start: 2021-05-11 | End: 2021-06-07 | Stop reason: SDUPTHER

## 2021-05-11 NOTE — ASSESSMENT & PLAN NOTE
She has a long history of COPD and was on Advair b i d  before  She also has a history of asthma as a child  She complains of occasional cough at baseline with no significant production of phlegm  She denied fever/chills  She is a nonsmoker, but she has a history of secondhand smoke exposure  Her PFT showed moderate airflow obstruction with diffusion defect  With no hyperinflation  Her 6 minutes walk test showed oxygen desaturation to 82% with exertion  Currently she uses 4 L of oxygen at rest and exertion 24/7  Whenever she is outside home she uses 2 L of oxygen  Today with 2 L of oxygen, her oxygen saturation was around 97%  After removal of oxygen and waiting for 5 minutes, when we recheck the oxygen saturation she was saturating around 97% on room air  She will be sent for a repeat PFT and a 6 minutes walk test, to re-evaluate her oxygen requirements  Currently she is not on any inhaler and not using nebulization

## 2021-05-11 NOTE — ASSESSMENT & PLAN NOTE
She had a 2 0 x 2 0 lobulated lung mass and left lower lobe and multiple lung nodules on the left side  She had also mediastinal lymphadenopathy  She was also noted to have a nodule in the thyroid gland and a nodular hepatic lesion  She underwent CT-guided biopsy of left lower lobe lung lesion which was consistent with neuroendocrine tumor, carcinoid  She follows with Dr Rio Diaz  Recent visit was in March/2021  She is due for her repeat CT scan this may/2021  Currently she has no respiratory complaints

## 2021-05-11 NOTE — ASSESSMENT & PLAN NOTE
She was found to be mild TOM with oxygen desaturation on her overnight sleep study  She was started on CPAP therapy at 9 cm of water pressure and has been using it since then  She stated that she has been compliance with it  She denies morning headache, fatigue however endorses daytime somnolence  Currently, I do not have her recent compliance report

## 2021-05-11 NOTE — PROGRESS NOTES
Assessment/Plan:    Carcinoid tumor of lung  She had a 2 0 x 2 0 lobulated lung mass and left lower lobe and multiple lung nodules on the left side  She had also mediastinal lymphadenopathy  She was also noted to have a nodule in the thyroid gland and a nodular hepatic lesion  She underwent CT-guided biopsy of left lower lobe lung lesion which was consistent with neuroendocrine tumor, carcinoid  She follows with Dr Murali Duffy  Recent visit was in March/2021  She is due for her repeat CT scan this may/2021  Currently she has no respiratory complaints  COPD (chronic obstructive pulmonary disease) ([facility] [address] )  She has a long history of COPD and was on Advair b i d  before  She also has a history of asthma as a child  She complains of occasional cough at baseline with no significant production of phlegm  She denied fever/chills  She is a nonsmoker, but she has a history of secondhand smoke exposure  Her PFT showed moderate airflow obstruction with diffusion defect  With no hyperinflation  Her 6 minutes walk test showed oxygen desaturation to 82% with exertion  Currently she uses 4 L of oxygen at rest and exertion 24/7  Whenever she is outside home she uses 2 L of oxygen  Today with 2 L of oxygen, her oxygen saturation was around 97%  After removal of oxygen and waiting for 5 minutes, when we recheck the oxygen saturation she was saturating around 97% on room air  She will be sent for a repeat PFT and a 6 minutes walk test, to re-evaluate her oxygen requirements  Currently she is not on any inhaler and not using nebulization  TOM (obstructive sleep apnea)  She was found to be mild TOM with oxygen desaturation on her overnight sleep study  She was started on CPAP therapy at 9 cm of water pressure and has been using it since then  She stated that she has been compliance with it  She denies morning headache, fatigue however endorses daytime somnolence    Currently, I do not have her recent compliance report  Obesity (BMI 35 0-39 9 without comorbidity)  Unfortunately, she has ambulatory dysfunction, and on baseline uses walker for ambulation  This makes it difficult for her to exercise and walk around  However she understands the importance of weight reduction  She was counseled thoroughly about adaptation  of healthy dietary habits  Problem List Items Addressed This Visit        Respiratory    Carcinoid tumor of lung - Primary     She had a 2 0 x 2 0 lobulated lung mass and left lower lobe and multiple lung nodules on the left side  She had also mediastinal lymphadenopathy  She was also noted to have a nodule in the thyroid gland and a nodular hepatic lesion  She underwent CT-guided biopsy of left lower lobe lung lesion which was consistent with neuroendocrine tumor, carcinoid  She follows with Dr Will Martinez  Recent visit was in March/2021  She is due for her repeat CT scan this may/2021  Currently she has no respiratory complaints  Relevant Medications    montelukast (SINGULAIR) 10 mg tablet    COPD (chronic obstructive pulmonary disease) ([facility] [address] )     She has a long history of COPD and was on Advair b i d  before  She also has a history of asthma as a child  She complains of occasional cough at baseline with no significant production of phlegm  She denied fever/chills  She is a nonsmoker, but she has a history of secondhand smoke exposure  Her PFT showed moderate airflow obstruction with diffusion defect  With no hyperinflation  Her 6 minutes walk test showed oxygen desaturation to 82% with exertion  Currently she uses 4 L of oxygen at rest and exertion 24/7  Whenever she is outside home she uses 2 L of oxygen  Today with 2 L of oxygen, her oxygen saturation was around 97%  After removal of oxygen and waiting for 5 minutes, when we recheck the oxygen saturation she was saturating around 97% on room air      She will be sent for a repeat PFT and a 6 minutes walk test, to re-evaluate her oxygen requirements  Currently she is not on any inhaler and not using nebulization  Relevant Medications    montelukast (SINGULAIR) 10 mg tablet    Other Relevant Orders    Complete PFT with post Bronchodilator and Six Minute walk    TOM (obstructive sleep apnea)     She was found to be mild TOM with oxygen desaturation on her overnight sleep study  She was started on CPAP therapy at 9 cm of water pressure and has been using it since then  She stated that she has been compliance with it  She denies morning headache, fatigue however endorses daytime somnolence  Currently, I do not have her recent compliance report  Cardiovascular and Mediastinum    Chronic diastolic CHF (congestive heart failure) (HCC)       Other    Obesity (BMI 35 0-39 9 without comorbidity)     Unfortunately, she has ambulatory dysfunction, and on baseline uses walker for ambulation  This makes it difficult for her to exercise and walk around  However she understands the importance of weight reduction  She was counseled thoroughly about adaptation  of healthy dietary habits  Other Visit Diagnoses     Seasonal allergic rhinitis, unspecified trigger        Relevant Medications    montelukast (SINGULAIR) 10 mg tablet            Subjective:  Patient was seen and examined in the room  She was comfortably sitting on a chair  She was not in acute distress  Patient ID: Ruth Klein is a 79 y o  female  With past medical history significant for pulmonary carcinoid tumor, COPD with chronic hypoxic respiratory failure, hypertension, diabetes, sleep apnea on CPAP and seasonal allergies presented to the clinic today for regular follow-up  She stated that she has been doing well generally  Lately, her seasonal allergies have been bothering her in the form of sneezing, rhino Debbie and runny eyes  She has been using Claritin with moderate relief    For her COPD, she is not on any inhaler, does not use any nebulizer  She denied cough, shortness of breath, chest pain  She stated that she uses 4 L of oxygen at rest and on off exertion 24/7  And whenever she is out of home, she uses 2 L of oxygen  Today in the clinic when I removed her oxygen and checked her saturation she was saturating around 97% on room air  I have ordered PFT with 6 minutes walk test, to re-evaluate her oxygen requirements  For her sleep apnea, she stated that she has been compliant with CPAP  She denied any morning headaches and daytime fatigue  However she endorses daytime somnolence  She denied fever, chills, sick contacts  She already got her both doses of COVID-19 vaccine  I a m  adding singular 10 mg tablet daily at bedtime for 30 days For her seasonal allergies    The following portions of the patient's history were reviewed and updated as appropriate: allergies, current medications, past family history, past medical history, past social history, past surgical history and problem list     Review of Systems   Constitutional: Negative for activity change, chills, fatigue and fever  HENT: Positive for congestion, rhinorrhea and sneezing  Negative for sore throat  Eyes: Negative for visual disturbance  Respiratory: Negative for cough, chest tightness and shortness of breath  Cardiovascular: Negative for chest pain, palpitations and leg swelling  Gastrointestinal: Negative for constipation, diarrhea, nausea and vomiting  Genitourinary: Negative for dysuria and urgency  Musculoskeletal: Positive for arthralgias  Neurological: Negative for dizziness, weakness, numbness and headaches  Hematological: Negative for adenopathy  Psychiatric/Behavioral: Negative for agitation, confusion and sleep disturbance           Objective:      /70 (BP Location: Left arm, Patient Position: Sitting)   Pulse 65   Temp (!) 97 2 °F (36 2 °C) (Tympanic)   Ht 4' 11" (1 499 m)   Wt 82 3 kg (181 lb 6 4 oz)   SpO2 97% BMI 36 64 kg/m²          Physical Exam  Vitals signs reviewed  Constitutional:       Appearance: She is obese  HENT:      Head: Normocephalic and atraumatic  Eyes:      General: No scleral icterus  Conjunctiva/sclera: Conjunctivae normal    Neck:      Musculoskeletal: Normal range of motion  Cardiovascular:      Rate and Rhythm: Normal rate and regular rhythm  Pulses: Normal pulses  Heart sounds: Normal heart sounds  Pulmonary:      Effort: Pulmonary effort is normal       Breath sounds: Normal breath sounds  Abdominal:      General: Bowel sounds are normal  There is distension  Palpations: Abdomen is soft  There is no mass  Tenderness: There is no abdominal tenderness  Musculoskeletal:         General: No swelling  Right lower leg: No edema  Left lower leg: No edema  Skin:     General: Skin is warm and dry  Capillary Refill: Capillary refill takes less than 2 seconds  Neurological:      Mental Status: She is alert and oriented to person, place, and time  Mental status is at baseline     Psychiatric:         Mood and Affect: Mood normal

## 2021-05-11 NOTE — TELEPHONE ENCOUNTER
5/11 [facility] from [facility] said they are not monitoring the pt right now because the pt normally would bring in the chip so they could download, but they can't until the pt does that  Any questions/concerns call [facility] directly at [phone]

## 2021-05-11 NOTE — ASSESSMENT & PLAN NOTE
Unfortunately, she has ambulatory dysfunction, and on baseline uses walker for ambulation  This makes it difficult for her to exercise and walk around  However she understands the importance of weight reduction  She was counseled thoroughly about adaptation  of healthy dietary habits

## 2021-05-11 NOTE — LETTER
May 11, 2021     [facility] [address] [address]    Patient: Angelique Nelson   YOB: 1940   Date of Visit: 5/11/2021       Dear Dr Barraza Sport:    Thank you for referring Angelique Nelson to me for evaluation  Below are my notes for this consultation  If you have questions, please do not hesitate to call me  I look forward to following your patient along with you  Sincerely,        Duncan Ochoa MD        CC: No Recipients  Alisa Celeste MD  5/11/2021 10:28 AM  Cosign Needed  Assessment/Plan:    Carcinoid tumor of lung  She had a 2 0 x 2 0 lobulated lung mass and left lower lobe and multiple lung nodules on the left side  She had also mediastinal lymphadenopathy  She was also noted to have a nodule in the thyroid gland and a nodular hepatic lesion  She underwent CT-guided biopsy of left lower lobe lung lesion which was consistent with neuroendocrine tumor, carcinoid  She follows with Dr Jesus Howell  Recent visit was in March/2021  She is due for her repeat CT scan this may/2021  Currently she has no respiratory complaints  COPD (chronic obstructive pulmonary disease) ([facility] [address] )  She has a long history of COPD and was on Advair b i d  before  She also has a history of asthma as a child  She complains of occasional cough at baseline with no significant production of phlegm  She denied fever/chills  She is a nonsmoker, but she has a history of secondhand smoke exposure  Her PFT showed moderate airflow obstruction with diffusion defect  With no hyperinflation  Her 6 minutes walk test showed oxygen desaturation to 82% with exertion  Currently she uses 4 L of oxygen at rest and exertion 24/7  Whenever she is outside home she uses 2 L of oxygen  Today with 2 L of oxygen, her oxygen saturation was around 97%  After removal of oxygen and waiting for 5 minutes, when we recheck the oxygen saturation she was saturating around 97% on room air      She will be sent for a repeat PFT and a 6 minutes walk test, to re-evaluate her oxygen requirements  Currently she is not on any inhaler and not using nebulization  TOM (obstructive sleep apnea)  She was found to be mild TOM with oxygen desaturation on her overnight sleep study  She was started on CPAP therapy at 9 cm of water pressure and has been using it since then  She stated that she has been compliance with it  She denies morning headache, fatigue however endorses daytime somnolence  Currently, I do not have her recent compliance report  Obesity (BMI 35 0-39 9 without comorbidity)  Unfortunately, she has ambulatory dysfunction, and on baseline uses walker for ambulation  This makes it difficult for her to exercise and walk around  However she understands the importance of weight reduction  She was counseled thoroughly about adaptation  of healthy dietary habits  Problem List Items Addressed This Visit        Respiratory    Carcinoid tumor of lung - Primary     She had a 2 0 x 2 0 lobulated lung mass and left lower lobe and multiple lung nodules on the left side  She had also mediastinal lymphadenopathy  She was also noted to have a nodule in the thyroid gland and a nodular hepatic lesion  She underwent CT-guided biopsy of left lower lobe lung lesion which was consistent with neuroendocrine tumor, carcinoid  She follows with Dr Randa Pierce  Recent visit was in March/2021  She is due for her repeat CT scan this may/2021  Currently she has no respiratory complaints  Relevant Medications    montelukast (SINGULAIR) 10 mg tablet    COPD (chronic obstructive pulmonary disease) ([facility] [address] )     She has a long history of COPD and was on Advair b i d  before  She also has a history of asthma as a child  She complains of occasional cough at baseline with no significant production of phlegm  She denied fever/chills  She is a nonsmoker, but she has a history of secondhand smoke exposure    Her PFT showed moderate airflow obstruction with diffusion defect  With no hyperinflation  Her 6 minutes walk test showed oxygen desaturation to 82% with exertion  Currently she uses 4 L of oxygen at rest and exertion 24/7  Whenever she is outside home she uses 2 L of oxygen  Today with 2 L of oxygen, her oxygen saturation was around 97%  After removal of oxygen and waiting for 5 minutes, when we recheck the oxygen saturation she was saturating around 97% on room air  She will be sent for a repeat PFT and a 6 minutes walk test, to re-evaluate her oxygen requirements  Currently she is not on any inhaler and not using nebulization  Relevant Medications    montelukast (SINGULAIR) 10 mg tablet    Other Relevant Orders    Complete PFT with post Bronchodilator and Six Minute walk    TOM (obstructive sleep apnea)     She was found to be mild TOM with oxygen desaturation on her overnight sleep study  She was started on CPAP therapy at 9 cm of water pressure and has been using it since then  She stated that she has been compliance with it  She denies morning headache, fatigue however endorses daytime somnolence  Currently, I do not have her recent compliance report  Cardiovascular and Mediastinum    Chronic diastolic CHF (congestive heart failure) (HCC)       Other    Obesity (BMI 35 0-39 9 without comorbidity)     Unfortunately, she has ambulatory dysfunction, and on baseline uses walker for ambulation  This makes it difficult for her to exercise and walk around  However she understands the importance of weight reduction  She was counseled thoroughly about adaptation  of healthy dietary habits  Other Visit Diagnoses     Seasonal allergic rhinitis, unspecified trigger        Relevant Medications    montelukast (SINGULAIR) 10 mg tablet            Subjective:  Patient was seen and examined in the room  She was comfortably sitting on a chair  She was not in acute distress       Patient ID: Angelique Nelson is a 79 y o  female  With past medical history significant for pulmonary carcinoid tumor, COPD with chronic hypoxic respiratory failure, hypertension, diabetes, sleep apnea on CPAP and seasonal allergies presented to the clinic today for regular follow-up  She stated that she has been doing well generally  Lately, her seasonal allergies have been bothering her in the form of sneezing, [place] and runny eyes  She has been using Claritin with moderate relief  For her COPD, she is not on any inhaler, does not use any nebulizer  She denied cough, shortness of breath, chest pain  She stated that she uses 4 L of oxygen at rest and on off exertion 24/7  And whenever she is out of home, she uses 2 L of oxygen  Today in the clinic when I removed her oxygen and checked her saturation she was saturating around 97% on room air  I have ordered PFT with 6 minutes walk test, to re-evaluate her oxygen requirements  For her sleep apnea, she stated that she has been compliant with CPAP  She denied any morning headaches and daytime fatigue  However she endorses daytime somnolence  She denied fever, chills, sick contacts  She already got her both doses of COVID-19 vaccine  I a m  adding singular 10 mg tablet daily at bedtime for 30 days For her seasonal allergies    The following portions of the patient's history were reviewed and updated as appropriate: allergies, current medications, past family history, past medical history, past social history, past surgical history and problem list     Review of Systems   Constitutional: Negative for activity change, chills, fatigue and fever  HENT: Positive for congestion, rhinorrhea and sneezing  Negative for sore throat  Eyes: Negative for visual disturbance  Respiratory: Negative for cough, chest tightness and shortness of breath  Cardiovascular: Negative for chest pain, palpitations and leg swelling     Gastrointestinal: Negative for constipation, diarrhea, nausea and vomiting  Genitourinary: Negative for dysuria and urgency  Musculoskeletal: Positive for arthralgias  Neurological: Negative for dizziness, weakness, numbness and headaches  Hematological: Negative for adenopathy  Psychiatric/Behavioral: Negative for agitation, confusion and sleep disturbance  Objective:      /70 (BP Location: Left arm, Patient Position: Sitting)   Pulse 65   Temp (!) 97 2 °F (36 2 °C) (Tympanic)   Ht 4' 11" (1 499 m)   Wt 82 3 kg (181 lb 6 4 oz)   SpO2 97%   BMI 36 64 kg/m²          Physical Exam  Vitals signs reviewed  Constitutional:       Appearance: She is obese  HENT:      Head: Normocephalic and atraumatic  Eyes:      General: No scleral icterus  Conjunctiva/sclera: Conjunctivae normal    Neck:      Musculoskeletal: Normal range of motion  Cardiovascular:      Rate and Rhythm: Normal rate and regular rhythm  Pulses: Normal pulses  Heart sounds: Normal heart sounds  Pulmonary:      Effort: Pulmonary effort is normal       Breath sounds: Normal breath sounds  Abdominal:      General: Bowel sounds are normal  There is distension  Palpations: Abdomen is soft  There is no mass  Tenderness: There is no abdominal tenderness  Musculoskeletal:         General: No swelling  Right lower leg: No edema  Left lower leg: No edema  Skin:     General: Skin is warm and dry  Capillary Refill: Capillary refill takes less than 2 seconds  Neurological:      Mental Status: She is alert and oriented to person, place, and time  Mental status is at baseline     Psychiatric:         Mood and Affect: Mood normal

## 2021-05-12 ENCOUNTER — TELEPHONE (OUTPATIENT)
Dept: FAMILY MEDICINE CLINIC | Facility: CLINIC | Age: 81
End: 2021-05-12

## 2021-05-12 NOTE — TELEPHONE ENCOUNTER
[place] [id number] [place] Cardi[place]  Spoke to patient and she will do it if you think it is necessary but otherwise she is not interested    Form on your desk

## 2021-05-13 ENCOUNTER — HOSPITAL ENCOUNTER (OUTPATIENT)
Dept: CT IMAGING | Facility: HOSPITAL | Age: 81
Discharge: HOME | End: 2021-05-13
Payer: MEDICARE

## 2021-05-13 ENCOUNTER — OFFICE VISIT (OUTPATIENT)
Dept: NEPHROLOGY | Facility: CLINIC | Age: 81
End: 2021-05-13
Payer: MEDICARE

## 2021-05-13 VITALS
HEART RATE: 66 BPM | BODY MASS INDEX: 36.49 KG/M2 | HEIGHT: 59 IN | DIASTOLIC BLOOD PRESSURE: 50 MMHG | SYSTOLIC BLOOD PRESSURE: 108 MMHG | WEIGHT: 181 LBS

## 2021-05-13 DIAGNOSIS — Z79.4 TYPE 2 DIABETES MELLITUS WITH HYPERGLYCEMIA, WITH LONG-TERM CURRENT USE OF INSULIN (HCC): ICD-10-CM

## 2021-05-13 DIAGNOSIS — E66.01 OBESITY, MORBID (HCC): ICD-10-CM

## 2021-05-13 DIAGNOSIS — E11.65 TYPE 2 DIABETES MELLITUS WITH HYPERGLYCEMIA, WITH LONG-TERM CURRENT USE OF INSULIN (HCC): ICD-10-CM

## 2021-05-13 DIAGNOSIS — C7A.090 MALIGNANT CARCINOID TUMOR OF THE BRONCHUS AND LUNG (HCC): ICD-10-CM

## 2021-05-13 DIAGNOSIS — E87.1 HYPONATREMIA: Primary | ICD-10-CM

## 2021-05-13 PROCEDURE — G1004 CDSM NDSC: HCPCS

## 2021-05-13 PROCEDURE — 99214 OFFICE O/P EST MOD 30 MIN: CPT | Performed by: INTERNAL MEDICINE

## 2021-05-13 PROCEDURE — 71250 CT THORAX DX C-: CPT

## 2021-05-13 NOTE — TELEPHONE ENCOUNTER
I do not think it is necessary  Pt has echo done in Oct 2020 and has Cardiologist  They will order cardiac tests when they think she needs them

## 2021-05-13 NOTE — PROGRESS NOTES
NEPHROLOGY OFFICE VISIT   Nadege Finley 79 y o  female MRN: [id number]  5/13/2021    Reason for Visit: hyponatremia    ASSESSMENT and PLAN:    I had the pleasure of seeing Ms Yury Helm today in the renal clinic for the continued management of hyponatremia  81 yo Patient has a history of CHF, lung carcinoid, COPD on home O4, TOM, hyponatremia, hypertension, prior ischemic bowel during pregnancy, who is being seen as a follow-up for hyponatremia  The etiology of hyponatremia was felt to be secondary to volume overload in the setting of heart failure, and citalopram use during admission 2018 at [facility] is now presenting for f/u for HTN, hyponatremia, proteinuria       1) hyponatremia     - secondary to volume overload prior  -sodium level has normalized now  Until 4/22 in the sodium level was slightly lower at 132  -on torsemide  - no longer on salt tab     2020 - Pt states that was taking torsemide daily but not taking it on days with appointment and if had to go somewhere  Noticed edema when not taking torsemide  Was taking 1/2 of spironolactone  Saw Cardiology and was advised to take 1/2 tab torsemide (so ten mg) and spironolactone 25 mg daily  Pt states with this change, has edema improved  Is feeling satisfied with the improvement  May 2021- patient's blood pressures are below goal   Patient is asymptomatic  Patient states that she has now been compliant with her medications this week and prior was not taking some of her medications up to 1 to 2 times a week  We reviewed that this makes it difficult to appropriately titrate medications as we are not sure what she is taking when she is seen in the office  Patient agrees  Therefore, now that the patient is taking all of her medications daily with the exception of diuretics on days when she has heard physician appointments, I advised the patient to hold amlodipine    And check blood pressures once a day for 2 weeks and call with results      Plan:  - Recheck sodium level in June   -Hold amlodipine   -Check blood pressures once a day for 2 weeks with call parameters outlined below   - remain compliant with the rest of the antihypertensive regimen which will allow for better titration if needed  - appointment in 2-3 months     2) HTN - currently is on valsartan, torsemide, spironolactone, metoprolol, amlodipine, terazosin     -was started on spironolactone in April 2018    -during visit in July 2018, patient's amlodipine was increased and valsartan was changed to losartan due to recall   -renal artery Doppler were unrevealing   In the proximal arteries of the renal artery  -May 2019-Lower terazosin to 5 mg, and lower metoprolol to 37 5 twice a day  - 7/2019 - metoprolol changed to 25 mg at night as pt was taking 37 5 BID dosing only once daily  - 5/13 /2021-hold amlodipine  - pt states is forgetting valsartan once or twice a week, pt also does not take torsemide and spironolactone daily and skips 1-2 times per week  But this week has been more compliant to chew the explain why the blood pressures are marginal today in the office  Changes as outlined above      3) renal function      - stable creatinine 0 8 mg/dL  -   U PCR unrevealing on 04/22     4) proteinuria     - prior SPEP, UPEP unrevealing  - on ARB and spironolactone  - last UA on 8/2019  None recent  -  Repeat urinalysis unrevealing      5) thyroid nodule - biopsied prior     6) electrolytes     -   Hyponatremia is above -recheck BMP for now  - calcium  stable  - lower calcium supplement last visit  Calcium level is stable on 04/22  -   Unrevealing PTH     7) COPD and pulm carcinoid and TOM     - follows with Pulmonary team and Hematology team  - on home O2  - f/u CT scan pending from 5/13/2021     8) AST slight elevation - chronic  Per PCP    9)   Parathyroid hormone level 25  3      It was a pleasure evaluating your patient in the office today   Thank you for allowing our team to participate in the care of Ms Camryn Roblero  Please do not hesitate to contact our team if further issues/questions shall arise in the interim      No problem-specific Assessment & Plan notes found for this encounter  HPI:    Patient feeling tired as  was sick and pt doing more at home  No fevers, chills  Stable resp status but does become fatigued with climbing stairs  PATIENT INSTRUCTIONS:    There are no Patient Instructions on file for this visit  OBJECTIVE:  Current Weight: Weight - Scale: 82 1 kg (181 lb)  Vitals:    05/13/21 1412   BP: 110/50   BP Location: Left arm   Patient Position: Sitting   Cuff Size: Large   Pulse: 66   Weight: 82 1 kg (181 lb)   Height: 4' 11" (1 499 m)    Body mass index is 36 56 kg/m²  REVIEW OF SYSTEMS:    Review of Systems   Constitutional: Negative  Negative for fatigue  HENT: Negative  Eyes: Negative  Respiratory: Negative  Negative for shortness of breath  Cardiovascular: Negative  Negative for leg swelling  Gastrointestinal: Negative  Endocrine: Negative  Genitourinary: Negative  Negative for difficulty urinating  Musculoskeletal: Negative  Skin: Negative  Allergic/Immunologic: Negative  Neurological: Negative  Hematological: Negative  Psychiatric/Behavioral: Negative  All other systems reviewed and are negative  PHYSICAL EXAM:      Physical Exam  Vitals signs and nursing note reviewed  Constitutional:       General: She is not in acute distress  Appearance: She is well-developed  She is not diaphoretic  HENT:      Head: Normocephalic and atraumatic  Eyes:      General: No scleral icterus  Right eye: No discharge  Left eye: No discharge  Conjunctiva/sclera: Conjunctivae normal    Neck:      Musculoskeletal: Normal range of motion and neck supple  Vascular: No JVD  Cardiovascular:      Rate and Rhythm: Normal rate and regular rhythm  Heart sounds: No murmur  No friction rub  No gallop  Pulmonary:      Effort: Pulmonary effort is normal  No respiratory distress  Breath sounds: Normal breath sounds  No wheezing or rales  Comments:  No significant findings but distant lung sounds  Abdominal:      General: Bowel sounds are normal  There is no distension  Palpations: Abdomen is soft  Tenderness: There is no abdominal tenderness  There is no rebound  Musculoskeletal: Normal range of motion  General: No tenderness or deformity  Comments:  No significant edema   Skin:     General: Skin is warm and dry  Coloration: Skin is not pale  Findings: No erythema or rash  Neurological:      Mental Status: She is alert and oriented to person, place, and time  Coordination: Coordination normal    Psychiatric:         Behavior: Behavior normal          Thought Content:  Thought content normal          Judgment: Judgment normal          Medications:    Current Outpatient Medications:     Accu-Chek FastClix Lancets MISC, Use 1 lancet per day, Disp: 100 each, Rfl: 3    Accu-Chek SmartView test strip, 1 each by Other route daily Use as instructed, Disp: 100 each, Rfl: 3    acetaminophen (TYLENOL) 500 mg tablet, Take 500 mg by mouth every 6 (six) hours as needed for mild pain For pain, Disp: , Rfl:     ADULT ASPIRIN LOW STRENGTH PO, Take 1 tablet by mouth daily , Disp: , Rfl:     amLODIPine (NORVASC) 10 mg tablet, Take 1 tablet (10 mg total) by mouth daily, Disp: 90 tablet, Rfl: 0    Calcium 600-400 MG-UNIT CHEW, Chew 1 tablet daily, Disp: 30 tablet, Rfl: 3    fluticasone (FLONASE) 50 mcg/act nasal spray, 1 spray into each nostril daily, Disp: , Rfl:     glipiZIDE (GLUCOTROL) 10 mg tablet, Take 1 tablet (10 mg total) by mouth 2 (two) times a day, Disp: 180 tablet, Rfl: 2    latanoprost (XALATAN) 0 005 % ophthalmic solution, Apply to eye, Disp: , Rfl:     loratadine (CLARITIN) 10 mg tablet, Take 1 tablet by mouth daily, Disp: , Rfl:     metFORMIN (GLUCOPHAGE) 500 mg tablet, Take 2 tablets (1,000 mg total) by mouth 2 (two) times a day, Disp: 360 tablet, Rfl: 1    metoprolol succinate (TOPROL-XL) 25 mg 24 hr tablet, Take 1 tablet (25 mg total) by mouth daily at bedtime, Disp: 90 tablet, Rfl: 3    spironolactone (ALDACTONE) 25 mg tablet, Take 1 tablet (25 mg total) by mouth daily, Disp: 90 tablet, Rfl: 3    terazosin (HYTRIN) 5 mg capsule, Take 1 capsule (5 mg total) by mouth daily at bedtime, Disp: 90 capsule, Rfl: 3    torsemide (DEMADEX) 20 mg tablet, Take 1 tablet (20 mg total) by mouth daily (Patient taking differently: Take 10 mg by mouth daily ), Disp: 90 tablet, Rfl: 3    valsartan (DIOVAN) 320 MG tablet, Take 1 tablet (320 mg total) by mouth daily, Disp: 90 tablet, Rfl: 1    montelukast (SINGULAIR) 10 mg tablet, Take 1 tablet (10 mg total) by mouth daily at bedtime (Patient not taking: Reported on 5/13/2021), Disp: 30 tablet, Rfl: 0    Laboratory Results:        Invalid input(s): ALBUMIN    Results for orders placed or performed in visit on 04/22/21   CBC   Result Value Ref Range    WBC 5 36 4 31 - 10 16 Thousand/uL    RBC 4 16 3 81 - 5 12 Million/uL    Hemoglobin 11 7 11 5 - 15 4 g/dL    Hematocrit 35 7 34 8 - 46 1 %    MCV 86 82 - 98 fL    MCH 28 1 26 8 - 34 3 pg    MCHC 32 8 31 4 - 37 4 g/dL    RDW 13 4 11 6 - 15 1 %    Platelets 354 274 - 610 Thousands/uL    MPV 9 8 8 9 - 12 7 fL   Basic metabolic panel   Result Value Ref Range    Sodium 132 (L) 133 - 145 mmol/L    Potassium 4 5 3 5 - 5 0 mmol/L    Chloride 97 96 - 108 mmol/L    CO2 28 22 - 33 mmol/L    ANION GAP 7 4 - 13 mmol/L    BUN 16 6 - 20 mg/dL    Creatinine 0 88 0 40 - 1 10 mg/dL    Glucose, Fasting 130 (H) 70 - 105 mg/dL    Calcium 10 1 8 4 - 10 2 mg/dL    eGFR 63 ml/min/1 73sq m   Magnesium   Result Value Ref Range    Magnesium 1 9 1 6 - 2 6 mg/dL   Phosphorus   Result Value Ref Range    Phosphorus 3 8 2 5 - 5 0 mg/dL   Protein / creatinine ratio, urine   Result Value Ref Range    Creatinine, Ur 32 0 mg/dL    Protein Urine Random <6 mg/dL    Prot/Creat Ratio, Ur <0 19 (H) 0 00 - 0 10   PTH, intact   Result Value Ref Range    PTH 25 3 18 4 - 80 1 pg/mL   Urinalysis with microscopic   Result Value Ref Range    Clarity, UA Clear Clear    Color, UA Straw (A) Yellow    Specific [place], UA 1 010 1 001 - 1 030    pH, UA 6 0 5 0, 5 5, 6 0, 6 5, 7 0, 7 5, 8 0    Glucose, UA Negative Negative mg/dl    Ketones, UA Negative Negative mg/dl    Blood, UA Negative Negative    Protein, UA Negative Negative, Interference- unable to analyze mg/dl    Nitrite, UA Negative Negative    Bilirubin, UA Negative Negative    Urobilinogen, UA 0 2 0 2, 1 0 E U /dl E U /dl    Leukocytes, UA Negative Negative    WBC, UA 1-2 None Seen, 0-1, 1-2, 0-5, 2-4 /hpf    RBC, UA None Seen None Seen, 0-1, 1-2, 2-4, 0-5 /hpf    Bacteria, UA Occasional None Seen, Occasional /hpf    Epithelial Cells Occasional None Seen, Occasional /hpf

## 2021-05-13 NOTE — PATIENT INSTRUCTIONS
1) Avoid NSAIDS - (Example - motrin, advil, ibuprofen, aleve, exederin, etc)  2) Always follow a low salt diet  3) STOP YOUR AMLODIPINE  4) check blood pressures once a day at the same time every day - if it is higher than 145 or lower than 105 (top number) please call; if bottom number is higher than 90 or less than 50 please call  5) labwork in June  6) appointment in 2-3 months

## 2021-05-13 NOTE — TELEPHONE ENCOUNTER
Pt advised she does not need to have this testing done   Not sure if you have a number to call them or just want to close this because she Is aware

## 2021-05-13 NOTE — LETTER
May 13, 2021     [place] [address]    Patient: Rajesh Montgomery   YOB: 1940   Date of Visit: 5/13/2021       Dear Dr Edgar Collins:    Thank you for referring Tavo Gonsales to me for evaluation  Below are my notes for this consultation  If you have questions, please do not hesitate to call me  I look forward to following your patient along with you  Sincerely,        Darlene Simms MD        CC: No Recipients  Darlene Simms MD  5/13/2021  2:58 PM  Sign when Signing Visit  Joe Found 79 y o  female MRN: [id number]  5/13/2021    Reason for Visit: hyponatremia    ASSESSMENT and PLAN:    I had the pleasure of seeing Ms Nikko Desai today in the renal clinic for the continued management of hyponatremia  81 yo Patient has a history of CHF, lung carcinoid, COPD on home O4, TOM, hyponatremia, hypertension, prior ischemic bowel during pregnancy, who is being seen as a follow-up for hyponatremia  The etiology of hyponatremia was felt to be secondary to volume overload in the setting of heart failure, and citalopram use during admission 2018 at [facility] is now presenting for f/u for HTN, hyponatremia, proteinuria       1) hyponatremia     - secondary to volume overload prior  -sodium level has normalized now  Until 4/22 in the sodium level was slightly lower at 132  -on torsemide  - no longer on salt tab     2020 - Pt states that was taking torsemide daily but not taking it on days with appointment and if had to go somewhere  Noticed edema when not taking torsemide  Was taking 1/2 of spironolactone  Saw Cardiology and was advised to take 1/2 tab torsemide (so ten mg) and spironolactone 25 mg daily  Pt states with this change, has edema improved  Is feeling satisfied with the improvement  May 2021- patient's blood pressures are below goal   Patient is asymptomatic    Patient states that she has now been compliant with her medications this week and prior was not taking some of her medications up to 1 to 2 times a week  We reviewed that this makes it difficult to appropriately titrate medications as we are not sure what she is taking when she is seen in the office  Patient agrees  Therefore, now that the patient is taking all of her medications daily with the exception of diuretics on days when she has heard physician appointments, I advised the patient to hold amlodipine  And check blood pressures once a day for 2 weeks and call with results      Plan:  -  Recheck sodium level in June   -Hold amlodipine   -Check blood pressures once a day for 2 weeks with call parameters outlined below   - remain compliant with the rest of the antihypertensive regimen which will allow for better titration if needed  - appointment in 2-3 months     2) HTN - currently is on valsartan, torsemide, spironolactone, metoprolol, amlodipine, terazosin     -was started on spironolactone in April 2018    -during visit in July 2018, patient's amlodipine was increased and valsartan was changed to losartan due to recall   -renal artery Doppler were unrevealing   In the proximal arteries of the renal artery  -May 2019-Lower terazosin to 5 mg, and lower metoprolol to 37 5 twice a day  - 7/2019 - metoprolol changed to 25 mg at night as pt was taking 37 5 BID dosing only once daily  - 5/13 /2021-hold amlodipine  - pt states is forgetting valsartan once or twice a week, pt also does not take torsemide and spironolactone daily and skips 1-2 times per week  But this week has been more compliant to chew the explain why the blood pressures are marginal today in the office  Changes as outlined above      3) renal function      - stable creatinine 0 8 mg/dL  -   U PCR unrevealing on 04/22     4) proteinuria     - prior SPEP, UPEP unrevealing  - on ARB and spironolactone  - last UA on 8/2019  None recent     -  Repeat urinalysis unrevealing      5) thyroid nodule - biopsied prior     6) electrolytes     -   Hyponatremia is above -recheck BMP for now  - calcium  stable  - lower calcium supplement last visit  Calcium level is stable on 04/22  -   Unrevealing PTH     7) COPD and pulm carcinoid and TOM     - follows with Pulmonary team and Hematology team  - on home O2  - f/u CT scan pending from 5/13/2021     8) AST slight elevation - chronic  Per PCP    9)   Parathyroid hormone level 25  3      It was a pleasure evaluating your patient in the office today  Thank you for allowing our team to participate in the care of Ms Camryn Roblero  Please do not hesitate to contact our team if further issues/questions shall arise in the interim      No problem-specific Assessment & Plan notes found for this encounter  HPI:    Patient feeling tired as  was sick and pt doing more at home  No fevers, chills  Stable resp status but does become fatigued with climbing stairs  PATIENT INSTRUCTIONS:    There are no Patient Instructions on file for this visit  OBJECTIVE:  Current Weight: Weight - Scale: 82 1 kg (181 lb)  Vitals:    05/13/21 1412   BP: 110/50   BP Location: Left arm   Patient Position: Sitting   Cuff Size: Large   Pulse: 66   Weight: 82 1 kg (181 lb)   Height: 4' 11" (1 499 m)    Body mass index is 36 56 kg/m²  REVIEW OF SYSTEMS:    Review of Systems   Constitutional: Negative  Negative for fatigue  HENT: Negative  Eyes: Negative  Respiratory: Negative  Negative for shortness of breath  Cardiovascular: Negative  Negative for leg swelling  Gastrointestinal: Negative  Endocrine: Negative  Genitourinary: Negative  Negative for difficulty urinating  Musculoskeletal: Negative  Skin: Negative  Allergic/Immunologic: Negative  Neurological: Negative  Hematological: Negative  Psychiatric/Behavioral: Negative  All other systems reviewed and are negative        PHYSICAL EXAM:      Physical Exam  Vitals signs and nursing note reviewed  Constitutional:       General: She is not in acute distress  Appearance: She is well-developed  She is not diaphoretic  HENT:      Head: Normocephalic and atraumatic  Eyes:      General: No scleral icterus  Right eye: No discharge  Left eye: No discharge  Conjunctiva/sclera: Conjunctivae normal    Neck:      Musculoskeletal: Normal range of motion and neck supple  Vascular: No JVD  Cardiovascular:      Rate and Rhythm: Normal rate and regular rhythm  Heart sounds: No murmur  No friction rub  No gallop  Pulmonary:      Effort: Pulmonary effort is normal  No respiratory distress  Breath sounds: Normal breath sounds  No wheezing or rales  Comments:  No significant findings but distant lung sounds  Abdominal:      General: Bowel sounds are normal  There is no distension  Palpations: Abdomen is soft  Tenderness: There is no abdominal tenderness  There is no rebound  Musculoskeletal: Normal range of motion  General: No tenderness or deformity  Comments:  No significant edema   Skin:     General: Skin is warm and dry  Coloration: Skin is not pale  Findings: No erythema or rash  Neurological:      Mental Status: She is alert and oriented to person, place, and time  Coordination: Coordination normal    Psychiatric:         Behavior: Behavior normal          Thought Content:  Thought content normal          Judgment: Judgment normal          Medications:    Current Outpatient Medications:     Accu-Chek FastClix Lancets MISC, Use 1 lancet per day, Disp: 100 each, Rfl: 3    Accu-Chek SmartView test strip, 1 each by Other route daily Use as instructed, Disp: 100 each, Rfl: 3    acetaminophen (TYLENOL) 500 mg tablet, Take 500 mg by mouth every 6 (six) hours as needed for mild pain For pain, Disp: , Rfl:     ADULT ASPIRIN LOW STRENGTH PO, Take 1 tablet by mouth daily , Disp: , Rfl:     amLODIPine (NORVASC) 10 mg tablet, Take 1 tablet (10 mg total) by mouth daily, Disp: 90 tablet, Rfl: 0    Calcium 600-400 MG-UNIT CHEW, Chew 1 tablet daily, Disp: 30 tablet, Rfl: 3    fluticasone (FLONASE) 50 mcg/act nasal spray, 1 spray into each nostril daily, Disp: , Rfl:     glipiZIDE (GLUCOTROL) 10 mg tablet, Take 1 tablet (10 mg total) by mouth 2 (two) times a day, Disp: 180 tablet, Rfl: 2    latanoprost (XALATAN) 0 005 % ophthalmic solution, Apply to eye, Disp: , Rfl:     loratadine (CLARITIN) 10 mg tablet, Take 1 tablet by mouth daily, Disp: , Rfl:     metFORMIN (GLUCOPHAGE) 500 mg tablet, Take 2 tablets (1,000 mg total) by mouth 2 (two) times a day, Disp: 360 tablet, Rfl: 1    metoprolol succinate (TOPROL-XL) 25 mg 24 hr tablet, Take 1 tablet (25 mg total) by mouth daily at bedtime, Disp: 90 tablet, Rfl: 3    spironolactone (ALDACTONE) 25 mg tablet, Take 1 tablet (25 mg total) by mouth daily, Disp: 90 tablet, Rfl: 3    terazosin (HYTRIN) 5 mg capsule, Take 1 capsule (5 mg total) by mouth daily at bedtime, Disp: 90 capsule, Rfl: 3    torsemide (DEMADEX) 20 mg tablet, Take 1 tablet (20 mg total) by mouth daily (Patient taking differently: Take 10 mg by mouth daily ), Disp: 90 tablet, Rfl: 3    valsartan (DIOVAN) 320 MG tablet, Take 1 tablet (320 mg total) by mouth daily, Disp: 90 tablet, Rfl: 1    montelukast (SINGULAIR) 10 mg tablet, Take 1 tablet (10 mg total) by mouth daily at bedtime (Patient not taking: Reported on 5/13/2021), Disp: 30 tablet, Rfl: 0    Laboratory Results:        Invalid input(s): ALBUMIN    Results for orders placed or performed in visit on 04/22/21   CBC   Result Value Ref Range    WBC 5 36 4 31 - 10 16 Thousand/uL    RBC 4 16 3 81 - 5 12 Million/uL    Hemoglobin 11 7 11 5 - 15 4 g/dL    Hematocrit 35 7 34 8 - 46 1 %    MCV 86 82 - 98 fL    MCH 28 1 26 8 - 34 3 pg    MCHC 32 8 31 4 - 37 4 g/dL    RDW 13 4 11 6 - 15 1 %    Platelets 252 414 - 202 Thousands/uL    MPV 9 8 8 9 - 12 7 fL   Basic metabolic panel Result Value Ref Range    Sodium 132 (L) 133 - 145 mmol/L    Potassium 4 5 3 5 - 5 0 mmol/L    Chloride 97 96 - 108 mmol/L    CO2 28 22 - 33 mmol/L    ANION GAP 7 4 - 13 mmol/L    BUN 16 6 - 20 mg/dL    Creatinine 0 88 0 40 - 1 10 mg/dL    Glucose, Fasting 130 (H) 70 - 105 mg/dL    Calcium 10 1 8 4 - 10 2 mg/dL    eGFR 63 ml/min/1 73sq m   Magnesium   Result Value Ref Range    Magnesium 1 9 1 6 - 2 6 mg/dL   Phosphorus   Result Value Ref Range    Phosphorus 3 8 2 5 - 5 0 mg/dL   Protein / creatinine ratio, urine   Result Value Ref Range    Creatinine, Ur 32 0 mg/dL    Protein Urine Random <6 mg/dL    Prot/Creat Ratio, Ur <0 19 (H) 0 00 - 0 10   PTH, intact   Result Value Ref Range    PTH 25 3 18 4 - 80 1 pg/mL   Urinalysis with microscopic   Result Value Ref Range    Clarity, UA Clear Clear    Color, UA Straw (A) Yellow    Specific [place], UA 1 010 1 001 - 1 030    pH, UA 6 0 5 0, 5 5, 6 0, 6 5, 7 0, 7 5, 8 0    Glucose, UA Negative Negative mg/dl    Ketones, UA Negative Negative mg/dl    Blood, UA Negative Negative    Protein, UA Negative Negative, Interference- unable to analyze mg/dl    Nitrite, UA Negative Negative    Bilirubin, UA Negative Negative    Urobilinogen, UA 0 2 0 2, 1 0 E U /dl E U /dl    Leukocytes, UA Negative Negative    WBC, UA 1-2 None Seen, 0-1, 1-2, 0-5, 2-4 /hpf    RBC, UA None Seen None Seen, 0-1, 1-2, 2-4, 0-5 /hpf    Bacteria, UA Occasional None Seen, Occasional /hpf    Epithelial Cells Occasional None Seen, Occasional /hpf

## 2021-05-14 NOTE — TELEPHONE ENCOUNTER
5/14 Pt said she will take the chip to [facility] sometime next week and will call the office to let us know after she does

## 2021-05-27 ENCOUNTER — TELEPHONE (OUTPATIENT)
Dept: PULMONOLOGY | Facility: CLINIC | Age: 81
End: 2021-05-27

## 2021-05-27 NOTE — TELEPHONE ENCOUNTER
Patient called 5/26/21 and LM that she mailed her chip to [address] so they can pull compliance  Called [facility] 5/27, they confirmed they have received the chip and will do a download today and fax over the compliance letter today  Patient notified

## 2021-05-29 ENCOUNTER — LAB (OUTPATIENT)
Dept: LAB | Facility: HOSPITAL | Age: 81
End: 2021-05-29
Attending: FAMILY MEDICINE
Payer: MEDICARE

## 2021-05-29 DIAGNOSIS — I10 ESSENTIAL HYPERTENSION: ICD-10-CM

## 2021-05-29 DIAGNOSIS — E87.1 HYPONATREMIA: ICD-10-CM

## 2021-05-29 DIAGNOSIS — E11.9 DIABETES MELLITUS WITHOUT COMPLICATION (HCC): ICD-10-CM

## 2021-05-29 LAB
ALBUMIN SERPL BCP-MCNC: 4.2 G/DL (ref 3.4–4.8)
ALP SERPL-CCNC: 55.8 U/L (ref 35–140)
ALT SERPL W P-5'-P-CCNC: 20 U/L (ref 5–54)
ANION GAP SERPL CALCULATED.3IONS-SCNC: 8 MMOL/L (ref 4–13)
AST SERPL W P-5'-P-CCNC: 50 U/L (ref 15–41)
BASOPHILS # BLD AUTO: 0.04 THOUSANDS/ΜL (ref 0–0.1)
BASOPHILS NFR BLD AUTO: 1 % (ref 0–1)
BILIRUB SERPL-MCNC: 0.56 MG/DL (ref 0.3–1.2)
BUN SERPL-MCNC: 18 MG/DL (ref 6–20)
CALCIUM SERPL-MCNC: 10 MG/DL (ref 8.4–10.2)
CHLORIDE SERPL-SCNC: 93 MMOL/L (ref 96–108)
CHOLEST SERPL-MCNC: 139 MG/DL
CO2 SERPL-SCNC: 27 MMOL/L (ref 22–33)
CREAT SERPL-MCNC: 0.81 MG/DL (ref 0.4–1.1)
EOSINOPHIL # BLD AUTO: 0.19 THOUSAND/ΜL (ref 0–0.61)
EOSINOPHIL NFR BLD AUTO: 3 % (ref 0–6)
ERYTHROCYTE [DISTWIDTH] IN BLOOD BY AUTOMATED COUNT: 13.4 % (ref 11.6–15.1)
EST. AVERAGE GLUCOSE BLD GHB EST-MCNC: 128 MG/DL
GFR SERPL CREATININE-BSD FRML MDRD: 69 ML/MIN/1.73SQ M
GLUCOSE P FAST SERPL-MCNC: 106 MG/DL (ref 70–105)
HBA1C MFR BLD: 6.1 %
HCT VFR BLD AUTO: 36.8 % (ref 34.8–46.1)
HDLC SERPL-MCNC: 34 MG/DL
HGB BLD-MCNC: 12 G/DL (ref 11.5–15.4)
IMM GRANULOCYTES # BLD AUTO: 0.01 THOUSAND/UL (ref 0–0.2)
IMM GRANULOCYTES NFR BLD AUTO: 0 % (ref 0–2)
LDLC SERPL CALC-MCNC: 89 MG/DL (ref 0–100)
LYMPHOCYTES # BLD AUTO: 1.27 THOUSANDS/ΜL (ref 0.6–4.47)
LYMPHOCYTES NFR BLD AUTO: 19 % (ref 14–44)
MCH RBC QN AUTO: 27.7 PG (ref 26.8–34.3)
MCHC RBC AUTO-ENTMCNC: 32.6 G/DL (ref 31.4–37.4)
MCV RBC AUTO: 85 FL (ref 82–98)
MONOCYTES # BLD AUTO: 0.61 THOUSAND/ΜL (ref 0.17–1.22)
MONOCYTES NFR BLD AUTO: 9 % (ref 4–12)
NEUTROPHILS # BLD AUTO: 4.43 THOUSANDS/ΜL (ref 1.85–7.62)
NEUTS SEG NFR BLD AUTO: 68 % (ref 43–75)
NONHDLC SERPL-MCNC: 105 MG/DL
PLATELET # BLD AUTO: 305 THOUSANDS/UL (ref 149–390)
PMV BLD AUTO: 9.8 FL (ref 8.9–12.7)
POTASSIUM SERPL-SCNC: 4.4 MMOL/L (ref 3.5–5)
PROT SERPL-MCNC: 7.6 G/DL (ref 6.4–8.3)
RBC # BLD AUTO: 4.33 MILLION/UL (ref 3.81–5.12)
SODIUM SERPL-SCNC: 128 MMOL/L (ref 133–145)
TRIGL SERPL-MCNC: 81.7 MG/DL
WBC # BLD AUTO: 6.55 THOUSAND/UL (ref 4.31–10.16)

## 2021-05-29 PROCEDURE — 85025 COMPLETE CBC W/AUTO DIFF WBC: CPT

## 2021-05-29 PROCEDURE — 83036 HEMOGLOBIN GLYCOSYLATED A1C: CPT

## 2021-05-29 PROCEDURE — 80053 COMPREHEN METABOLIC PANEL: CPT

## 2021-05-29 PROCEDURE — 36415 COLL VENOUS BLD VENIPUNCTURE: CPT

## 2021-05-29 PROCEDURE — 80061 LIPID PANEL: CPT

## 2021-06-01 ENCOUNTER — TELEPHONE (OUTPATIENT)
Dept: OTHER | Facility: HOSPITAL | Age: 81
End: 2021-06-01

## 2021-06-01 NOTE — RESULT ENCOUNTER NOTE
Hello    Patient normally is followed up by Ms Lulú Rubio  Can you please let the patient know that the sodium level is lower again to 128  Is the patient is staying compliant with her diuretics  Can you please confirm and let me know what diuretic she is taking and what doses  - can you please have the patient repeat a BMP this week on  Thursday or Friday  Along with urine sodium, urine osmolarity, serum osmolarity, TSH  -Fluid restriction 1-1 5 L a day  - I tried to call the patient just now but no answer  - if the patient is feeling fatigued, not well, worsening weight gain, she may need to be referred to the ER  Please let me know what you find out      Thank you    np

## 2021-06-02 ENCOUNTER — TELEPHONE (OUTPATIENT)
Dept: NEPHROLOGY | Facility: CLINIC | Age: 81
End: 2021-06-02

## 2021-06-02 DIAGNOSIS — E87.1 HYPONATREMIA: Primary | ICD-10-CM

## 2021-06-02 DIAGNOSIS — N18.30 STAGE 3 CHRONIC KIDNEY DISEASE, UNSPECIFIED WHETHER STAGE 3A OR 3B CKD (HCC): ICD-10-CM

## 2021-06-02 NOTE — TELEPHONE ENCOUNTER
----- Message from Tammie Campuzano MD sent at 6/1/2021  5:28 PM EDT -----  Hello    Patient normally is followed up by Ms Benitez Osorio  Can you please let the patient know that the sodium level is lower again to 128  Is the patient is staying compliant with her diuretics  Can you please confirm and let me know what diuretic she is taking and what doses  - can you please have the patient repeat a BMP this week on  Thursday or Friday  Along with urine sodium, urine osmolarity, serum osmolarity, TSH  -Fluid restriction 1-1 5 L a day  - I tried to call the patient just now but no answer  - if the patient is feeling fatigued, not well, worsening weight gain, she may need to be referred to the ER  Please let me know what you find out      Thank you    np

## 2021-06-02 NOTE — TELEPHONE ENCOUNTER
Pt aware of the above, she feels ok  She did admit to drinking more water and also her  is on low salt diet and she has been following that with him  Meds reviewed and she is been taking everything as prescribed  Agreeable to have the repeat labs and watch her fluid intake

## 2021-06-04 ENCOUNTER — TRANSCRIBE ORDERS (OUTPATIENT)
Dept: LAB | Facility: CLINIC | Age: 81
End: 2021-06-04

## 2021-06-04 ENCOUNTER — TELEPHONE (OUTPATIENT)
Dept: OTHER | Facility: HOSPITAL | Age: 81
End: 2021-06-04

## 2021-06-04 ENCOUNTER — LAB (OUTPATIENT)
Dept: LAB | Facility: CLINIC | Age: 81
End: 2021-06-04
Payer: MEDICARE

## 2021-06-04 DIAGNOSIS — E87.1 HYPONATREMIA: ICD-10-CM

## 2021-06-04 DIAGNOSIS — N18.30 STAGE 3 CHRONIC KIDNEY DISEASE, UNSPECIFIED WHETHER STAGE 3A OR 3B CKD (HCC): ICD-10-CM

## 2021-06-04 LAB
ANION GAP SERPL CALCULATED.3IONS-SCNC: 11 MMOL/L (ref 4–13)
BUN SERPL-MCNC: 15 MG/DL (ref 5–25)
CALCIUM SERPL-MCNC: 9.5 MG/DL (ref 8.3–10.1)
CHLORIDE SERPL-SCNC: 94 MMOL/L (ref 100–108)
CO2 SERPL-SCNC: 24 MMOL/L (ref 21–32)
CREAT SERPL-MCNC: 0.81 MG/DL (ref 0.6–1.3)
GFR SERPL CREATININE-BSD FRML MDRD: 69 ML/MIN/1.73SQ M
GLUCOSE SERPL-MCNC: 91 MG/DL (ref 65–140)
OSMOLALITY UR/SERPL-RTO: 270 MMOL/KG (ref 282–298)
OSMOLALITY UR: 242 MMOL/KG
POTASSIUM SERPL-SCNC: 4.5 MMOL/L (ref 3.5–5.3)
SODIUM 24H UR-SCNC: 22 MOL/L
SODIUM SERPL-SCNC: 129 MMOL/L (ref 136–145)
TSH SERPL DL<=0.05 MIU/L-ACNC: 2 UIU/ML (ref 0.36–3.74)

## 2021-06-04 PROCEDURE — 36415 COLL VENOUS BLD VENIPUNCTURE: CPT

## 2021-06-04 PROCEDURE — 84300 ASSAY OF URINE SODIUM: CPT

## 2021-06-04 PROCEDURE — 84443 ASSAY THYROID STIM HORMONE: CPT

## 2021-06-04 PROCEDURE — 80048 BASIC METABOLIC PNL TOTAL CA: CPT

## 2021-06-04 PROCEDURE — 83935 ASSAY OF URINE OSMOLALITY: CPT

## 2021-06-04 PROCEDURE — 83930 ASSAY OF BLOOD OSMOLALITY: CPT

## 2021-06-04 NOTE — RESULT ENCOUNTER NOTE
Hello    Patient normally is followed up by Ms Karen Quinonez         In addition to the prior task about these labs, please ask the patient to make sure she is eating healthy and consider drinking 1 ensure once a day    Thank you    np

## 2021-06-04 NOTE — TELEPHONE ENCOUNTER
Attempted to call the patient to review sodium level  Slightly improving  Still remains low at 129  No answer  Task request sent to reach the patient

## 2021-06-04 NOTE — RESULT ENCOUNTER NOTE
Hello    Patient normally is followed up by Ms Marquis Police  I attempted to call the patient  No answer  Can you please let the patient know that the sodium level is slightly improved to 129     -Continue fluid restriction   - please ask the patient to continue diuretics  Can you please make sure she is compliant with her diuretics?   Sometimes she does not take them all the time from her own report   -repeat BMP On Wednesday June 9th  - has she been  Compliant with fluid restriction  - please let me know what patient states as we may need to start salt tablets    Thank you    np

## 2021-06-07 DIAGNOSIS — I10 BENIGN ESSENTIAL HTN: ICD-10-CM

## 2021-06-07 DIAGNOSIS — E11.9 DIABETES MELLITUS WITHOUT COMPLICATION (HCC): Primary | ICD-10-CM

## 2021-06-07 DIAGNOSIS — J30.2 SEASONAL ALLERGIC RHINITIS, UNSPECIFIED TRIGGER: ICD-10-CM

## 2021-06-07 DIAGNOSIS — N18.30 STAGE 3 CHRONIC KIDNEY DISEASE, UNSPECIFIED WHETHER STAGE 3A OR 3B CKD (HCC): ICD-10-CM

## 2021-06-07 RX ORDER — MONTELUKAST SODIUM 10 MG/1
10 TABLET ORAL
Qty: 30 TABLET | Refills: 3 | Status: SHIPPED | OUTPATIENT
Start: 2021-06-07 | End: 2021-10-11 | Stop reason: SDUPTHER

## 2021-06-07 RX ORDER — TERAZOSIN 5 MG/1
5 CAPSULE ORAL
Qty: 90 CAPSULE | Refills: 3 | Status: SHIPPED | OUTPATIENT
Start: 2021-06-07 | End: 2022-01-11 | Stop reason: SDUPTHER

## 2021-06-07 NOTE — TELEPHONE ENCOUNTER
I spoke to the patient she is aware of message and will try her best to follow fluid restriction and stay on her diuretics  Per patient she does forget to take them sometimes, I tried to explain to her that it's very important she follows instructions and is following her prescribed orders  Will have her daughter  some ensure to try out  Will go for labs this Wednesday

## 2021-06-07 NOTE — TELEPHONE ENCOUNTER
Patient stated she was started on montelukast last month by PCP and would like a renewal    Patient states he has been working for her  Please review script and approve

## 2021-06-07 NOTE — TELEPHONE ENCOUNTER
----- Message from Tammie Campuzano MD sent at 6/4/2021  6:17 PM EDT -----  Hello    Patient normally is followed up by Ms Benitez Osorio        I attempted to call the patient   No answer  Marcelina Jina you please let the patient know that the sodium level is slightly improved to 129      -Continue fluid restriction   - please ask the patient to continue diuretics   Can you please make sure she is compliant with her diuretics?  Sometimes she does not take them all the time from her own report   -repeat BMP On Wednesday June 9th   - has she been  Compliant with fluid restriction   - please let me know what patient states as we may need to start salt tablets      In addition to the prior task about these labs, please ask the patient to make sure she is eating healthy and consider drinking 1 ensure once a day    Thank you    np

## 2021-06-08 ENCOUNTER — OFFICE VISIT (OUTPATIENT)
Dept: FAMILY MEDICINE CLINIC | Facility: CLINIC | Age: 81
End: 2021-06-08
Payer: MEDICARE

## 2021-06-08 VITALS
HEIGHT: 59 IN | RESPIRATION RATE: 20 BRPM | OXYGEN SATURATION: 96 % | DIASTOLIC BLOOD PRESSURE: 60 MMHG | TEMPERATURE: 98.6 F | BODY MASS INDEX: 36.49 KG/M2 | SYSTOLIC BLOOD PRESSURE: 132 MMHG | WEIGHT: 181 LBS | HEART RATE: 72 BPM

## 2021-06-08 DIAGNOSIS — D3A.090 BENIGN CARCINOID TUMOR OF LUNG: ICD-10-CM

## 2021-06-08 DIAGNOSIS — I50.32 CHRONIC DIASTOLIC CHF (CONGESTIVE HEART FAILURE) (HCC): ICD-10-CM

## 2021-06-08 DIAGNOSIS — E87.1 HYPONATREMIA: ICD-10-CM

## 2021-06-08 DIAGNOSIS — J44.9 CHRONIC OBSTRUCTIVE PULMONARY DISEASE, UNSPECIFIED COPD TYPE (HCC): ICD-10-CM

## 2021-06-08 DIAGNOSIS — I10 ESSENTIAL HYPERTENSION: Primary | ICD-10-CM

## 2021-06-08 DIAGNOSIS — M79.672 LEFT FOOT PAIN: ICD-10-CM

## 2021-06-08 DIAGNOSIS — E11.9 DIABETES MELLITUS WITHOUT COMPLICATION (HCC): ICD-10-CM

## 2021-06-08 DIAGNOSIS — G47.33 OSA (OBSTRUCTIVE SLEEP APNEA): ICD-10-CM

## 2021-06-08 PROCEDURE — 99214 OFFICE O/P EST MOD 30 MIN: CPT | Performed by: FAMILY MEDICINE

## 2021-06-08 NOTE — ASSESSMENT & PLAN NOTE
Wt Readings from Last 3 Encounters:   05/13/21 82 1 kg (181 lb)   05/11/21 82 3 kg (181 lb 6 4 oz)   04/29/21 88 kg (194 lb)     Weight has been stable and pt has had less leg swelling  Continue current management per Cardiology Dr Heidi Prado
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
## 2021-06-08 NOTE — ASSESSMENT & PLAN NOTE
Na 129 in June 2021  Managed by Nephrology Dr Farhad Tran  Pt to continue diuretics and fluid restriction  Pt is to recheck BMP tomorrow and if low, may start salt pill

## 2021-06-08 NOTE — ASSESSMENT & PLAN NOTE
Pt saw Dr Chinyere Long in May 2021 and has been stable  Pt for breathing eval next week to see if they can lower O2 requirements

## 2021-06-09 ENCOUNTER — LAB (OUTPATIENT)
Dept: LAB | Facility: CLINIC | Age: 81
End: 2021-06-09
Payer: MEDICARE

## 2021-06-09 DIAGNOSIS — E11.9 DIABETES MELLITUS WITHOUT COMPLICATION (HCC): ICD-10-CM

## 2021-06-09 DIAGNOSIS — N18.30 STAGE 3 CHRONIC KIDNEY DISEASE, UNSPECIFIED WHETHER STAGE 3A OR 3B CKD (HCC): ICD-10-CM

## 2021-06-09 DIAGNOSIS — I10 BENIGN ESSENTIAL HTN: ICD-10-CM

## 2021-06-09 LAB
ANION GAP SERPL CALCULATED.3IONS-SCNC: 8 MMOL/L (ref 4–13)
BUN SERPL-MCNC: 15 MG/DL (ref 5–25)
CALCIUM SERPL-MCNC: 9.5 MG/DL (ref 8.3–10.1)
CHLORIDE SERPL-SCNC: 96 MMOL/L (ref 100–108)
CO2 SERPL-SCNC: 27 MMOL/L (ref 21–32)
CREAT SERPL-MCNC: 0.94 MG/DL (ref 0.6–1.3)
GFR SERPL CREATININE-BSD FRML MDRD: 58 ML/MIN/1.73SQ M
GLUCOSE SERPL-MCNC: 147 MG/DL (ref 65–140)
POTASSIUM SERPL-SCNC: 4.9 MMOL/L (ref 3.5–5.3)
SODIUM SERPL-SCNC: 131 MMOL/L (ref 136–145)

## 2021-06-09 PROCEDURE — 80048 BASIC METABOLIC PNL TOTAL CA: CPT

## 2021-06-09 PROCEDURE — 36415 COLL VENOUS BLD VENIPUNCTURE: CPT

## 2021-06-09 NOTE — RESULT ENCOUNTER NOTE
Hello    Patient normally is followed up by Ms Jagdeep Mark  Please let pt know that sodium slowly improving 131  Creat stable   K slowly rising  - should attempt to follow a low potassium diet  - continue ensure drink  - BMP in 3 weeks and then if Na stable, will expand sodium checks further out    Thank you    np

## 2021-06-10 ENCOUNTER — TELEPHONE (OUTPATIENT)
Dept: NEPHROLOGY | Facility: CLINIC | Age: 81
End: 2021-06-10

## 2021-06-10 DIAGNOSIS — N18.30 STAGE 3 CHRONIC KIDNEY DISEASE, UNSPECIFIED WHETHER STAGE 3A OR 3B CKD (HCC): Primary | ICD-10-CM

## 2021-06-10 NOTE — TELEPHONE ENCOUNTER
----- Message from Myles Goldman MD sent at 6/9/2021  6:15 PM EDT -----  Hello    Patient normally is followed up by Ms Bre Barbosa  Please let pt know that sodium slowly improving 131  Creat stable  K slowly rising  - should attempt to follow a low potassium diet  - continue ensure drink  - BMP in 3 weeks and then if Na stable, will expand sodium checks further out    Thank you    np    Tried calling pt few times, no answer and voice mail full, unable to leave message  Will mail out instructions along with lab order

## 2021-06-15 ENCOUNTER — HOSPITAL ENCOUNTER (OUTPATIENT)
Dept: PULMONOLOGY | Facility: HOSPITAL | Age: 81
Discharge: HOME | End: 2021-06-15
Payer: MEDICARE

## 2021-06-15 DIAGNOSIS — J44.9 CHRONIC OBSTRUCTIVE PULMONARY DISEASE, UNSPECIFIED COPD TYPE (HCC): ICD-10-CM

## 2021-06-15 PROCEDURE — 94726 PLETHYSMOGRAPHY LUNG VOLUMES: CPT

## 2021-06-15 PROCEDURE — 94726 PLETHYSMOGRAPHY LUNG VOLUMES: CPT | Performed by: INTERNAL MEDICINE

## 2021-06-15 PROCEDURE — 94729 DIFFUSING CAPACITY: CPT

## 2021-06-15 PROCEDURE — 94729 DIFFUSING CAPACITY: CPT | Performed by: INTERNAL MEDICINE

## 2021-06-15 PROCEDURE — 94761 N-INVAS EAR/PLS OXIMETRY MLT: CPT

## 2021-06-15 PROCEDURE — 94060 EVALUATION OF WHEEZING: CPT | Performed by: INTERNAL MEDICINE

## 2021-06-15 PROCEDURE — 94060 EVALUATION OF WHEEZING: CPT

## 2021-06-15 PROCEDURE — 94618 PULMONARY STRESS TESTING: CPT | Performed by: INTERNAL MEDICINE

## 2021-06-15 RX ORDER — ALBUTEROL SULFATE 2.5 MG/3ML
2.5 SOLUTION RESPIRATORY (INHALATION) ONCE
Status: COMPLETED | OUTPATIENT
Start: 2021-06-15 | End: 2021-06-15

## 2021-06-15 RX ADMIN — ALBUTEROL SULFATE 2.5 MG: 2.5 SOLUTION RESPIRATORY (INHALATION) at 10:11

## 2021-07-06 ENCOUNTER — LAB (OUTPATIENT)
Dept: LAB | Facility: HOSPITAL | Age: 81
End: 2021-07-06
Attending: INTERNAL MEDICINE
Payer: MEDICARE

## 2021-07-06 DIAGNOSIS — N18.30 STAGE 3 CHRONIC KIDNEY DISEASE, UNSPECIFIED WHETHER STAGE 3A OR 3B CKD (HCC): ICD-10-CM

## 2021-07-06 LAB
ANION GAP SERPL CALCULATED.3IONS-SCNC: 6 MMOL/L (ref 4–13)
BUN SERPL-MCNC: 17 MG/DL (ref 6–20)
CALCIUM SERPL-MCNC: 9.8 MG/DL (ref 8.4–10.2)
CHLORIDE SERPL-SCNC: 97 MMOL/L (ref 96–108)
CO2 SERPL-SCNC: 31 MMOL/L (ref 22–33)
CREAT SERPL-MCNC: 0.87 MG/DL (ref 0.4–1.1)
GFR SERPL CREATININE-BSD FRML MDRD: 64 ML/MIN/1.73SQ M
GLUCOSE P FAST SERPL-MCNC: 133 MG/DL (ref 70–105)
POTASSIUM SERPL-SCNC: 4.3 MMOL/L (ref 3.5–5)
SODIUM SERPL-SCNC: 134 MMOL/L (ref 133–145)

## 2021-07-06 PROCEDURE — 80048 BASIC METABOLIC PNL TOTAL CA: CPT

## 2021-07-06 PROCEDURE — 36415 COLL VENOUS BLD VENIPUNCTURE: CPT

## 2021-07-07 ENCOUNTER — TELEPHONE (OUTPATIENT)
Dept: NEPHROLOGY | Facility: CLINIC | Age: 81
End: 2021-07-07

## 2021-07-07 NOTE — TELEPHONE ENCOUNTER
----- Message from Jen Perez MD sent at 7/7/2021 12:42 AM EDT -----  Hello    Patient normally is followed up by Ms Mushtaq Otero  Please let the patient know that the sodium level is improving to normal range 134  No changes for now      Thank you    np

## 2021-07-07 NOTE — TELEPHONE ENCOUNTER
I spoke with patient and relayed the following:  Sodium level is improving to normal range 134  No changes for now  Patient verbalized understanding with no questions or concerns at this time

## 2021-07-07 NOTE — RESULT ENCOUNTER NOTE
Hello    Patient normally is followed up by Ms Wilner Carballo  Please let the patient know that the sodium level is improving to normal range 134  No changes for now      Thank you    np

## 2021-07-26 DIAGNOSIS — I10 ESSENTIAL HYPERTENSION: ICD-10-CM

## 2021-07-26 RX ORDER — VALSARTAN 320 MG/1
320 TABLET ORAL DAILY
Qty: 90 TABLET | Refills: 0 | Status: SHIPPED | OUTPATIENT
Start: 2021-07-26 | End: 2021-11-01 | Stop reason: SDUPTHER

## 2021-08-02 ENCOUNTER — OFFICE VISIT (OUTPATIENT)
Dept: NEPHROLOGY | Facility: CLINIC | Age: 81
End: 2021-08-02
Payer: MEDICARE

## 2021-08-02 VITALS
HEIGHT: 59 IN | SYSTOLIC BLOOD PRESSURE: 150 MMHG | DIASTOLIC BLOOD PRESSURE: 80 MMHG | BODY MASS INDEX: 36.08 KG/M2 | WEIGHT: 179 LBS | HEART RATE: 72 BPM

## 2021-08-02 DIAGNOSIS — E87.1 HYPONATREMIA: Primary | ICD-10-CM

## 2021-08-02 PROCEDURE — 99214 OFFICE O/P EST MOD 30 MIN: CPT | Performed by: INTERNAL MEDICINE

## 2021-08-02 NOTE — PROGRESS NOTES
NEPHROLOGY OFFICE VISIT   Sofy Garrido 79 y o  female MRN: [id number]  8/2/2021    Reason for Visit: hyponatremia    ASSESSMENT and PLAN:    I had the pleasure of seeing Ms Randy Lane today in the renal clinic for the continued management of hyponatremia  79 yo Patient has a history of CHF, lung carcinoid, COPD on home O4, TOM, hyponatremia, hypertension, prior ischemic bowel during pregnancy, who is being seen as a follow-up for hyponatremia  The etiology of hyponatremia was felt to be secondary to volume overload in the setting of heart failure, and citalopram use during admission 2018 at [facility] is now presenting for f/u for HTN, hyponatremia, proteinuria       1) hyponatremia     - secondary to volume overload prior  -serum osmolarity 270 on June 2021  -urine osmolarity 242, urine sodium 22 in June 2021  -sodium level is stable and then decreased in June 2021-128 after which patient was advised fluid restriction  Patient was also advised to be compliant with diuretics  Sodium level is slowly improving back to 134 on July 7th  -on torsemide  - no longer on salt tab     2020 - Pt states that was taking torsemide daily but not taking it on days with appointment and if had to go somewhere  Noticed edema when not taking torsemide  Was taking 1/2 of spironolactone  Saw Cardiology and was advised to take 1/2 tab torsemide (so ten mg) and spironolactone 25 mg daily  Pt states with this change, has edema improved  Is feeling satisfied with the improvement        May 2021- patient's blood pressures are below goal   Patient is asymptomatic  Patient states that she has now been compliant with her medications this week and prior was not taking some of her medications up to 1 to 2 times a week  We reviewed that this makes it difficult to appropriately titrate medications as we are not sure what she is taking when she is seen in the office  Patient agrees    Therefore, now that the patient is taking all of her medications daily with the exception of diuretics on days when she has heard physician appointments, I advised the patient to hold amlodipine  And check blood pressures once a day for 2 weeks and call with results      Plan:  - labwork in 3 months  - BP elevated in office  Repeat  syst  But no changes as pt states that at times she does feel dizzy and lightheaded at home  Currently uses a walker  Would be a fall risk  Did not take torsemide and spironolactone today as of yet  Therefore no changes for now to antihypertensive regimen  - continue fluid restriction    2) HTN - currently is on valsartan, torsemide, spironolactone, metoprolol, amlodipine, terazosin     -was started on spironolactone in April 2018    -during visit in July 2018, patient's amlodipine was increased and valsartan was changed to losartan due to recall   -renal artery Doppler were unrevealing   In the proximal arteries of the renal artery  -May 2019-Lower terazosin to 5 mg, and lower metoprolol to 37 5 twice a day  - 7/2019 - metoprolol changed to 25 mg at night as pt was taking 37 5 BID dosing only once daily  - 5/13 /2021-hold amlodipine  - 08/2021-see above  Blood pressure is slightly elevated  Monitoring for now     3) renal function      - stable creatinine 0 8 mg/dL July 2021  -   U PCR unrevealing on 04/22     4) proteinuria     - prior SPEP, UPEP unrevealing  - on ARB and spironolactone  - last UA on 8/2019  None recent  -  Repeat urinalysis unrevealing  Id U PCR unrevealing in April 2021     5) thyroid nodule - biopsied prior     6) electrolytes     -   Hyponatremia is above -recheck BMP for now  - calcium  stable  - lower calcium supplement last visit  Calcium level is stable on 04/22  -   Unrevealing PTH     7) COPD and pulm carcinoid and TOM     - follows with Pulmonary team and Hematology team  - on home O2  - f/u CT scan pending from 5/13/2021     8) AST slight elevation - chronic   Per PCP     9)   recheck PTH next visit      It was a pleasure evaluating your patient in the office today  Thank you for allowing our team to participate in the care of Ms Camryn Roblero  Please do not hesitate to contact our team if further issues/questions shall arise in the interim      No problem-specific Assessment & Plan notes found for this encounter  HPI:    Patient denies complaints  No fevers, chills, nausea, vomiting  No shortness of breath  Is still on oxygen  PATIENT INSTRUCTIONS:    Patient Instructions   1) Avoid NSAIDS - (Example - motrin, advil, ibuprofen, aleve, exederin, etc)  2) Always follow a low salt diet  3) no changes to your medications today  4) labwork in 3 months   5) appointment in December         OBJECTIVE:  Current Weight: Weight - Scale: 81 2 kg (179 lb)  Vitals:    08/02/21 1425 08/02/21 1440   BP: 158/60 150/80   BP Location: Right arm    Patient Position: Sitting    Cuff Size: Large    Pulse: 72    Weight: 81 2 kg (179 lb)    Height: 4' 11" (1 499 m)     Body mass index is 36 15 kg/m²  REVIEW OF SYSTEMS:    Review of Systems   Constitutional: Negative  Negative for fatigue  HENT: Negative  Eyes: Negative  Respiratory: Negative  Negative for shortness of breath  Cardiovascular: Negative  Negative for leg swelling  Gastrointestinal: Negative  Endocrine: Negative  Genitourinary: Negative  Negative for difficulty urinating  Musculoskeletal: Negative  Skin: Negative  Allergic/Immunologic: Negative  Neurological: Negative  Hematological: Negative  Psychiatric/Behavioral: Negative  All other systems reviewed and are negative  PHYSICAL EXAM:      Physical Exam  Vitals and nursing note reviewed  Constitutional:       General: She is not in acute distress  Appearance: She is well-developed  She is not diaphoretic  HENT:      Head: Normocephalic and atraumatic  Eyes:      General: No scleral icterus  Right eye: No discharge           Left eye: No discharge  Conjunctiva/sclera: Conjunctivae normal    Neck:      Vascular: No JVD  Cardiovascular:      Rate and Rhythm: Normal rate and regular rhythm  Heart sounds: Murmur heard  No friction rub  No gallop  Pulmonary:      Effort: Pulmonary effort is normal  No respiratory distress  Breath sounds: Normal breath sounds  No wheezing or rales  Abdominal:      General: Bowel sounds are normal  There is no distension  Palpations: Abdomen is soft  Tenderness: There is no abdominal tenderness  There is no rebound  Musculoskeletal:         General: No tenderness or deformity  Normal range of motion  Cervical back: Normal range of motion and neck supple  Right lower leg: No edema  Left lower leg: No edema  Skin:     General: Skin is warm and dry  Coloration: Skin is not pale  Findings: No erythema or rash  Neurological:      Mental Status: She is alert and oriented to person, place, and time  Coordination: Coordination normal    Psychiatric:         Behavior: Behavior normal          Thought Content:  Thought content normal          Judgment: Judgment normal          Medications:    Current Outpatient Medications:     Accu-Chek FastClix Lancets MISC, Use 1 lancet per day, Disp: 100 each, Rfl: 3    Accu-Chek SmartView test strip, 1 each by Other route daily Use as instructed, Disp: 100 each, Rfl: 3    acetaminophen (TYLENOL) 500 mg tablet, Take 500 mg by mouth every 6 (six) hours as needed for mild pain For pain, Disp: , Rfl:     ADULT ASPIRIN LOW STRENGTH PO, Take 1 tablet by mouth daily , Disp: , Rfl:     Calcium 600-400 MG-UNIT CHEW, Chew 1 tablet daily, Disp: 30 tablet, Rfl: 3    fluticasone (FLONASE) 50 mcg/act nasal spray, 1 spray into each nostril daily, Disp: , Rfl:     glipiZIDE (GLUCOTROL) 10 mg tablet, Take 1 tablet (10 mg total) by mouth 2 (two) times a day, Disp: 180 tablet, Rfl: 2    latanoprost (XALATAN) 0 005 % ophthalmic solution, Apply to eye, Disp: , Rfl:     loratadine (CLARITIN) 10 mg tablet, Take 1 tablet by mouth daily, Disp: , Rfl:     metFORMIN (GLUCOPHAGE) 500 mg tablet, Take 2 tablets (1,000 mg total) by mouth 2 (two) times a day, Disp: 360 tablet, Rfl: 1    metoprolol succinate (TOPROL-XL) 25 mg 24 hr tablet, Take 1 tablet (25 mg total) by mouth daily at bedtime, Disp: 90 tablet, Rfl: 3    montelukast (SINGULAIR) 10 mg tablet, Take 1 tablet (10 mg total) by mouth daily at bedtime, Disp: 30 tablet, Rfl: 3    spironolactone (ALDACTONE) 25 mg tablet, Take 1 tablet (25 mg total) by mouth daily, Disp: 90 tablet, Rfl: 3    terazosin (HYTRIN) 5 mg capsule, Take 1 capsule (5 mg total) by mouth daily at bedtime, Disp: 90 capsule, Rfl: 3    torsemide (DEMADEX) 20 mg tablet, Take 1 tablet (20 mg total) by mouth daily (Patient taking differently: Take 10 mg by mouth daily ), Disp: 90 tablet, Rfl: 3    valsartan (DIOVAN) 320 MG tablet, Take 1 tablet (320 mg total) by mouth daily, Disp: 90 tablet, Rfl: 0    Laboratory Results:        Invalid input(s): ALBUMIN    Results for orders placed or performed in visit on 58/95/41   Basic metabolic panel   Result Value Ref Range    Sodium 134 133 - 145 mmol/L    Potassium 4 3 3 5 - 5 0 mmol/L    Chloride 97 96 - 108 mmol/L    CO2 31 22 - 33 mmol/L    ANION GAP 6 4 - 13 mmol/L    BUN 17 6 - 20 mg/dL    Creatinine 0 87 0 40 - 1 10 mg/dL    Glucose, Fasting 133 (H) 70 - 105 mg/dL    Calcium 9 8 8 4 - 10 2 mg/dL    eGFR 64 ml/min/1 73sq m

## 2021-08-02 NOTE — LETTER
August 2, 2021     [facility] [address] [address]    Patient: Ulisses Osorio   YOB: 1940   Date of Visit: 8/2/2021       Dear Dr Jaziel Booker:    Thank you for referring Soheila Valentino to me for evaluation  Below are my notes for this consultation  If you have questions, please do not hesitate to call me  I look forward to following your patient along with you  Sincerely,        Jeffry Wagner MD        CC: No Recipients  Jeffry Wagner MD  8/2/2021  2:57 PM  Sign when Signing Visit  Rosalynn Lanes 79 y o  female MRN: [id number]  8/2/2021    Reason for Visit: hyponatremia    ASSESSMENT and PLAN:    I had the pleasure of seeing Ms Jorge Jensen today in the renal clinic for the continued management of hyponatremia  81 yo Patient has a history of CHF, lung carcinoid, COPD on home O4, TOM, hyponatremia, hypertension, prior ischemic bowel during pregnancy, who is being seen as a follow-up for hyponatremia  The etiology of hyponatremia was felt to be secondary to volume overload in the setting of heart failure, and citalopram use during admission 2018 at [facility] is now presenting for f/u for HTN, hyponatremia, proteinuria       1) hyponatremia     - secondary to volume overload prior  -serum osmolarity 270 on June 2021  -urine osmolarity 242, urine sodium 22 in June 2021  -sodium level is stable and then decreased in June 2021-128 after which patient was advised fluid restriction  Patient was also advised to be compliant with diuretics  Sodium level is slowly improving back to 134 on July 7th  -on torsemide  - no longer on salt tab     2020 - Pt states that was taking torsemide daily but not taking it on days with appointment and if had to go somewhere  Noticed edema when not taking torsemide  Was taking 1/2 of spironolactone  Saw Cardiology and was advised to take 1/2 tab torsemide (so ten mg) and spironolactone 25 mg daily   Pt states with this change, has edema improved  Is feeling satisfied with the improvement        May 2021- patient's blood pressures are below goal   Patient is asymptomatic  Patient states that she has now been compliant with her medications this week and prior was not taking some of her medications up to 1 to 2 times a week  We reviewed that this makes it difficult to appropriately titrate medications as we are not sure what she is taking when she is seen in the office  Patient agrees  Therefore, now that the patient is taking all of her medications daily with the exception of diuretics on days when she has heard physician appointments, I advised the patient to hold amlodipine  And check blood pressures once a day for 2 weeks and call with results      Plan:  - labwork in 3 months  - BP elevated in office  Repeat  syst  But no changes as pt states that at times she does feel dizzy and lightheaded at home  Currently uses a walker  Would be a fall risk  Did not take torsemide and spironolactone today as of yet  Therefore no changes for now to antihypertensive regimen  - continue fluid restriction    2) HTN - currently is on valsartan, torsemide, spironolactone, metoprolol, amlodipine, terazosin     -was started on spironolactone in April 2018    -during visit in July 2018, patient's amlodipine was increased and valsartan was changed to losartan due to recall   -renal artery Doppler were unrevealing   In the proximal arteries of the renal artery  -May 2019-Lower terazosin to 5 mg, and lower metoprolol to 37 5 twice a day  - 7/2019 - metoprolol changed to 25 mg at night as pt was taking 37 5 BID dosing only once daily  - 5/13 /2021-hold amlodipine  - 08/2021-see above  Blood pressure is slightly elevated  Monitoring for now       3) renal function      - stable creatinine 0 8 mg/dL July 2021  -   U PCR unrevealing on 04/22     4) proteinuria     - prior SPEP, UPEP unrevealing  - on ARB and spironolactone  - last UA on 8/2019  None recent  -  Repeat urinalysis unrevealing  Id U PCR unrevealing in April 2021     5) thyroid nodule - biopsied prior     6) electrolytes     -   Hyponatremia is above -recheck BMP for now  - calcium  stable  - lower calcium supplement last visit  Calcium level is stable on 04/22  -   Unrevealing PTH     7) COPD and pulm carcinoid and TOM     - follows with Pulmonary team and Hematology team  - on home O2  - f/u CT scan pending from 5/13/2021     8) AST slight elevation - chronic  Per PCP     9)   recheck PTH next visit      It was a pleasure evaluating your patient in the office today  Thank you for allowing our team to participate in the care of Ms Camryn Roblero  Please do not hesitate to contact our team if further issues/questions shall arise in the interim      No problem-specific Assessment & Plan notes found for this encounter  HPI:    Patient denies complaints  No fevers, chills, nausea, vomiting  No shortness of breath  Is still on oxygen  PATIENT INSTRUCTIONS:    Patient Instructions   1) Avoid NSAIDS - (Example - motrin, advil, ibuprofen, aleve, exederin, etc)  2) Always follow a low salt diet  3) no changes to your medications today  4) labwork in 3 months   5) appointment in December         OBJECTIVE:  Current Weight: Weight - Scale: 81 2 kg (179 lb)  Vitals:    08/02/21 1425 08/02/21 1440   BP: 158/60 150/80   BP Location: Right arm    Patient Position: Sitting    Cuff Size: Large    Pulse: 72    Weight: 81 2 kg (179 lb)    Height: 4' 11" (1 499 m)     Body mass index is 36 15 kg/m²  REVIEW OF SYSTEMS:    Review of Systems   Constitutional: Negative  Negative for fatigue  HENT: Negative  Eyes: Negative  Respiratory: Negative  Negative for shortness of breath  Cardiovascular: Negative  Negative for leg swelling  Gastrointestinal: Negative  Endocrine: Negative  Genitourinary: Negative  Negative for difficulty urinating  Musculoskeletal: Negative  Skin: Negative  Allergic/Immunologic: Negative  Neurological: Negative  Hematological: Negative  Psychiatric/Behavioral: Negative  All other systems reviewed and are negative  PHYSICAL EXAM:      Physical Exam  Vitals and nursing note reviewed  Constitutional:       General: She is not in acute distress  Appearance: She is well-developed  She is not diaphoretic  HENT:      Head: Normocephalic and atraumatic  Eyes:      General: No scleral icterus  Right eye: No discharge  Left eye: No discharge  Conjunctiva/sclera: Conjunctivae normal    Neck:      Vascular: No JVD  Cardiovascular:      Rate and Rhythm: Normal rate and regular rhythm  Heart sounds: Murmur heard  No friction rub  No gallop  Pulmonary:      Effort: Pulmonary effort is normal  No respiratory distress  Breath sounds: Normal breath sounds  No wheezing or rales  Abdominal:      General: Bowel sounds are normal  There is no distension  Palpations: Abdomen is soft  Tenderness: There is no abdominal tenderness  There is no rebound  Musculoskeletal:         General: No tenderness or deformity  Normal range of motion  Cervical back: Normal range of motion and neck supple  Right lower leg: No edema  Left lower leg: No edema  Skin:     General: Skin is warm and dry  Coloration: Skin is not pale  Findings: No erythema or rash  Neurological:      Mental Status: She is alert and oriented to person, place, and time  Coordination: Coordination normal    Psychiatric:         Behavior: Behavior normal          Thought Content:  Thought content normal          Judgment: Judgment normal          Medications:    Current Outpatient Medications:     Accu-Chek FastClix Lancets MISC, Use 1 lancet per day, Disp: 100 each, Rfl: 3    Accu-Chek SmartView test strip, 1 each by Other route daily Use as instructed, Disp: 100 each, Rfl: 3    acetaminophen (TYLENOL) 500 mg tablet, Take 500 mg by mouth every 6 (six) hours as needed for mild pain For pain, Disp: , Rfl:     ADULT ASPIRIN LOW STRENGTH PO, Take 1 tablet by mouth daily , Disp: , Rfl:     Calcium 600-400 MG-UNIT CHEW, Chew 1 tablet daily, Disp: 30 tablet, Rfl: 3    fluticasone (FLONASE) 50 mcg/act nasal spray, 1 spray into each nostril daily, Disp: , Rfl:     glipiZIDE (GLUCOTROL) 10 mg tablet, Take 1 tablet (10 mg total) by mouth 2 (two) times a day, Disp: 180 tablet, Rfl: 2    latanoprost (XALATAN) 0 005 % ophthalmic solution, Apply to eye, Disp: , Rfl:     loratadine (CLARITIN) 10 mg tablet, Take 1 tablet by mouth daily, Disp: , Rfl:     metFORMIN (GLUCOPHAGE) 500 mg tablet, Take 2 tablets (1,000 mg total) by mouth 2 (two) times a day, Disp: 360 tablet, Rfl: 1    metoprolol succinate (TOPROL-XL) 25 mg 24 hr tablet, Take 1 tablet (25 mg total) by mouth daily at bedtime, Disp: 90 tablet, Rfl: 3    montelukast (SINGULAIR) 10 mg tablet, Take 1 tablet (10 mg total) by mouth daily at bedtime, Disp: 30 tablet, Rfl: 3    spironolactone (ALDACTONE) 25 mg tablet, Take 1 tablet (25 mg total) by mouth daily, Disp: 90 tablet, Rfl: 3    terazosin (HYTRIN) 5 mg capsule, Take 1 capsule (5 mg total) by mouth daily at bedtime, Disp: 90 capsule, Rfl: 3    torsemide (DEMADEX) 20 mg tablet, Take 1 tablet (20 mg total) by mouth daily (Patient taking differently: Take 10 mg by mouth daily ), Disp: 90 tablet, Rfl: 3    valsartan (DIOVAN) 320 MG tablet, Take 1 tablet (320 mg total) by mouth daily, Disp: 90 tablet, Rfl: 0    Laboratory Results:        Invalid input(s): ALBUMIN    Results for orders placed or performed in visit on 16/25/53   Basic metabolic panel   Result Value Ref Range    Sodium 134 133 - 145 mmol/L    Potassium 4 3 3 5 - 5 0 mmol/L    Chloride 97 96 - 108 mmol/L    CO2 31 22 - 33 mmol/L    ANION GAP 6 4 - 13 mmol/L    BUN 17 6 - 20 mg/dL    Creatinine 0 87 0 40 - 1 10 mg/dL Glucose, Fasting 133 (H) 70 - 105 mg/dL    Calcium 9 8 8 4 - 10 2 mg/dL    eGFR 64 ml/min/1 73sq m

## 2021-08-02 NOTE — PATIENT INSTRUCTIONS
1) Avoid NSAIDS - (Example - motrin, advil, ibuprofen, aleve, exederin, etc)  2) Always follow a low salt diet  3) no changes to your medications today  4) labwork in 3 months   5) appointment in December

## 2021-08-11 ENCOUNTER — OFFICE VISIT (OUTPATIENT)
Dept: PULMONOLOGY | Facility: CLINIC | Age: 81
End: 2021-08-11
Payer: MEDICARE

## 2021-08-11 VITALS
WEIGHT: 180 LBS | BODY MASS INDEX: 36.29 KG/M2 | HEIGHT: 59 IN | OXYGEN SATURATION: 94 % | TEMPERATURE: 98.4 F | DIASTOLIC BLOOD PRESSURE: 80 MMHG | HEART RATE: 79 BPM | SYSTOLIC BLOOD PRESSURE: 148 MMHG

## 2021-08-11 DIAGNOSIS — R91.8 MULTIPLE PULMONARY NODULES: ICD-10-CM

## 2021-08-11 DIAGNOSIS — I50.32 CHRONIC DIASTOLIC CHF (CONGESTIVE HEART FAILURE) (HCC): ICD-10-CM

## 2021-08-11 DIAGNOSIS — E66.01 CLASS 2 SEVERE OBESITY DUE TO EXCESS CALORIES WITH SERIOUS COMORBIDITY AND BODY MASS INDEX (BMI) OF 36.0 TO 36.9 IN ADULT (HCC): ICD-10-CM

## 2021-08-11 DIAGNOSIS — D3A.090 BENIGN CARCINOID TUMOR OF LUNG: ICD-10-CM

## 2021-08-11 DIAGNOSIS — G47.33 OSA (OBSTRUCTIVE SLEEP APNEA): ICD-10-CM

## 2021-08-11 DIAGNOSIS — J44.9 CHRONIC OBSTRUCTIVE PULMONARY DISEASE, UNSPECIFIED COPD TYPE (HCC): Primary | ICD-10-CM

## 2021-08-11 PROBLEM — E66.812 CLASS 2 SEVERE OBESITY DUE TO EXCESS CALORIES WITH SERIOUS COMORBIDITY AND BODY MASS INDEX (BMI) OF 36.0 TO 36.9 IN ADULT (HCC): Status: ACTIVE | Noted: 2021-05-13

## 2021-08-11 PROCEDURE — 99214 OFFICE O/P EST MOD 30 MIN: CPT | Performed by: INTERNAL MEDICINE

## 2021-08-11 RX ORDER — B-COMPLEX WITH VITAMIN C
1 TABLET ORAL
COMMUNITY

## 2021-08-11 RX ORDER — ALBUTEROL SULFATE 90 UG/1
2 AEROSOL, METERED RESPIRATORY (INHALATION) EVERY 6 HOURS PRN
Qty: 18 G | Refills: 1 | Status: SHIPPED | OUTPATIENT
Start: 2021-08-11

## 2021-08-11 NOTE — PROGRESS NOTES
Assessment/Plan:    Carcinoid tumor of lung  She had a 2 0 x 2 0 lobulated lung mass in the left lower lobe and multiple lung nodules on the left side  She had also mediastinal lymphadenopathy  She was also noted to have a nodule in the thyroid gland and a nodular hepatic lesion  She had a CT guided biopsy of LLL lung lesion which was consistent with neuroendocrine tumor, carcinoid  She follows with Dr Jeremy Vazquez from Oncology  She has multiple lung nodules on her CT scan from Feb 2019  She had a recent CT scan which  showed that the lung nodules  COPD (chronic obstructive pulmonary disease) ([address] )  She has history of COPD for a long time and was on Advair Bid before  She also has history of asthma as a child  She has occasional cough and no significant phlegm  No fever or chills  Though she is a never smoker, she has history of secondhand smoke exposure  Her PFT which showed moderate airflow obstruction with diffusion defect  There was no hyperinflation  Her 6 min walk test showed an oxygen desaturation to 82% with exertion  Her baseline oxygen saturation at rest was 90%  She also was found to have severe exercise limitation from SOB  She is not using Symbicort  now  I have advised her to use the nebulizer when necessary for her shortness of breath  I have advised her to continue with oxygen supplementation as needed  TOM (obstructive sleep apnea)  She had a long history of snoring and daytime sleepiness  She was found to have mild TOM with oxygen desaturation on her overnight sleep study  Her CPAP titration study was suboptimal  She was started on CPAP therapy at 9 cm of water and has been using it  Her CPAP compliance has been good  Her residual AHI has been low   She is getting benefit from CPAP therapy I have advised her to use the CPAP as before   Her latest CPAP compliance records are not currently available    I had a long discussion with her and have answered all their questions        Chronic diastolic CHF (congestive heart failure) (HCC)  Wt Readings from Last 3 Encounters:   08/11/21 81 6 kg (180 lb)   08/02/21 81 2 kg (179 lb)   06/08/21 82 1 kg (181 lb)     She has chronic heart failure with good ejection fraction    She is  much better now  She follows with cardiology  She is on torsemide and spironolactone  Her previous echocardiogram from Feb 2016 showed good EF at 65%  She has CKD also  Class 2 severe obesity due to excess calories with serious comorbidity and body mass index (BMI) of 36 0 to 36 9 in [facility])  She is severely obese and understands the need for weight reduction  Pressure she has ambulatory dysfunction and has not been able to exercise much  Diagnoses and all orders for this visit:    Chronic obstructive pulmonary disease, unspecified COPD type ([facility] [address] )  -     albuterol (ProAir HFA) 90 mcg/act inhaler; Inhale 2 puffs every 6 (six) hours as needed for wheezing or shortness of breath    TOM (obstructive sleep apnea)    Chronic diastolic CHF (congestive heart failure) (HCC)    Benign carcinoid tumor of lung    Multiple pulmonary nodules    Class 2 severe obesity due to excess calories with serious comorbidity and body mass index (BMI) of 36 0 to 36 9 in [facility])    Other orders  -     calcium carbonate-vitamin D (OSCAL-D) 500 mg-200 units per tablet; Take 1 tablet by mouth          Subjective:      Patient ID: Dorine Spurling is a 79 y o  female  Mrs Ceballos First has come for follow-up for her respiratory failure COPD and carcinoid tumor of the lung  She also has multiple lung nodules which have been stable on her last CT scan from May 2021  She has severe ambulatory dysfunction  She states she can walk about a block with the walker  She is on oxygen supplementation at rest and during exertion  She had a recent PFT which showed mild emphysema  She did the 6 minute walk test fairly satisfactorily and did not desaturate    She denies any significant cough or phlegm or wheeze or chest pain  Currently she is not using any inhaler  She has obstructive sleep apnea and has been using CPAP regularly  She is comfortable with the mask and pressure  She has no significant daytime sleepiness or morning headache  She is very motivated to continue on CPAP therapy  Her CPAP compliance is not available currently  The following portions of the patient's history were reviewed and updated as appropriate: allergies, current medications, past family history, past medical history, past social history, past surgical history and problem list     Review of Systems   Constitutional: Negative for activity change, chills and fever  HENT: Positive for sneezing  Negative for hearing loss, rhinorrhea, sore throat, trouble swallowing and voice change  Eyes: Negative for visual disturbance  Respiratory: Positive for shortness of breath  Negative for cough, chest tightness and wheezing  Cardiovascular: Negative for chest pain, palpitations and leg swelling  Gastrointestinal: Negative for abdominal pain, constipation, diarrhea, nausea and vomiting  Endocrine: Negative for polyuria  Genitourinary: Positive for urgency  Negative for dysuria and frequency  Musculoskeletal: Positive for arthralgias and gait problem  Skin: Negative for rash  Allergic/Immunologic: Positive for environmental allergies  Neurological: Negative for dizziness, seizures, syncope, light-headedness and headaches  Psychiatric/Behavioral: Negative for agitation, confusion and sleep disturbance  The patient is not nervous/anxious  Objective:      /80 (BP Location: Right arm, Patient Position: Sitting, Cuff Size: Adult)   Pulse 79   Temp 98 4 °F (36 9 °C) (Tympanic)   Ht 4' 11" (1 499 m)   Wt 81 6 kg (180 lb)   SpO2 94%   BMI 36 36 kg/m²          Physical Exam  Vitals reviewed  Constitutional:       General: She is not in acute distress  Appearance: She is obese   She is not ill-appearing or toxic-appearing  HENT:      Head: Normocephalic  Eyes:      General: No scleral icterus  Conjunctiva/sclera: Conjunctivae normal    Cardiovascular:      Rate and Rhythm: Normal rate  Heart sounds: Normal heart sounds  No murmur heard  Pulmonary:      Effort: Pulmonary effort is normal  No respiratory distress  Breath sounds: Normal breath sounds  No stridor  No wheezing, rhonchi or rales  Chest:      Chest wall: No tenderness  Abdominal:      General: Bowel sounds are normal       Palpations: Abdomen is soft  Tenderness: There is no abdominal tenderness  There is no guarding  Musculoskeletal:      Cervical back: No rigidity  Right lower leg: No edema  Left lower leg: No edema  Lymphadenopathy:      Cervical: No cervical adenopathy  Skin:     General: Skin is warm  Coloration: Skin is not jaundiced or pale  Neurological:      Mental Status: She is alert and oriented to person, place, and time  Gait: Gait abnormal    Psychiatric:         Mood and Affect: Mood normal          Behavior: Behavior normal          Thought Content:  Thought content normal          Judgment: Judgment normal

## 2021-08-16 NOTE — ASSESSMENT & PLAN NOTE
She had a 2 0 x 2 0 lobulated lung mass in the left lower lobe and multiple lung nodules on the left side  She had also mediastinal lymphadenopathy  She was also noted to have a nodule in the thyroid gland and a nodular hepatic lesion  She had a CT guided biopsy of LLL lung lesion which was consistent with neuroendocrine tumor, carcinoid  She follows with Dr Laura Paredes from Oncology  She has multiple lung nodules on her CT scan from Feb 2019  She had a recent CT scan which  showed that the lung nodules

## 2021-08-16 NOTE — ASSESSMENT & PLAN NOTE
She has history of COPD for a long time and was on Advair Bid before  She also has history of asthma as a child  She has occasional cough and no significant phlegm  No fever or chills  Though she is a never smoker, she has history of secondhand smoke exposure  Her PFT which showed moderate airflow obstruction with diffusion defect  There was no hyperinflation  Her 6 min walk test showed an oxygen desaturation to 82% with exertion  Her baseline oxygen saturation at rest was 90%  She also was found to have severe exercise limitation from SOB  She is not using Symbicort  now  I have advised her to use the nebulizer when necessary for her shortness of breath  I have advised her to continue with oxygen supplementation as needed

## 2021-08-16 NOTE — ASSESSMENT & PLAN NOTE
She is severely obese and understands the need for weight reduction  Pressure she has ambulatory dysfunction and has not been able to exercise much

## 2021-08-16 NOTE — ASSESSMENT & PLAN NOTE
Wt Readings from Last 3 Encounters:   08/11/21 81 6 kg (180 lb)   08/02/21 81 2 kg (179 lb)   06/08/21 82 1 kg (181 lb)     She has chronic heart failure with good ejection fraction    She is  much better now  She follows with cardiology  She is on torsemide and spironolactone  Her previous echocardiogram from Feb 2016 showed good EF at 65%  She has CKD also

## 2021-08-20 LAB
LEFT EYE DIABETIC RETINOPATHY: NORMAL
RIGHT EYE DIABETIC RETINOPATHY: NORMAL

## 2021-09-13 ENCOUNTER — APPOINTMENT (OUTPATIENT)
Dept: LAB | Facility: HOSPITAL | Age: 81
End: 2021-09-13
Payer: MEDICARE

## 2021-09-13 DIAGNOSIS — C7A.090 CARCINOID BRONCHIAL ADENOMA OF LEFT LUNG (HCC): ICD-10-CM

## 2021-09-13 LAB
ALBUMIN SERPL BCP-MCNC: 4.1 G/DL (ref 3.4–4.8)
ALP SERPL-CCNC: 61 U/L (ref 35–140)
ALT SERPL W P-5'-P-CCNC: 19 U/L (ref 5–54)
ANION GAP SERPL CALCULATED.3IONS-SCNC: 9 MMOL/L (ref 4–13)
AST SERPL W P-5'-P-CCNC: 47 U/L (ref 15–41)
BASOPHILS # BLD AUTO: 0.05 THOUSANDS/ΜL (ref 0–0.1)
BASOPHILS NFR BLD AUTO: 1 % (ref 0–1)
BILIRUB SERPL-MCNC: 0.45 MG/DL (ref 0.3–1.2)
BUN SERPL-MCNC: 18 MG/DL (ref 6–20)
CALCIUM SERPL-MCNC: 9.6 MG/DL (ref 8.4–10.2)
CHLORIDE SERPL-SCNC: 99 MMOL/L (ref 96–108)
CO2 SERPL-SCNC: 25 MMOL/L (ref 22–33)
CREAT SERPL-MCNC: 0.88 MG/DL (ref 0.4–1.1)
EOSINOPHIL # BLD AUTO: 0.25 THOUSAND/ΜL (ref 0–0.61)
EOSINOPHIL NFR BLD AUTO: 4 % (ref 0–6)
ERYTHROCYTE [DISTWIDTH] IN BLOOD BY AUTOMATED COUNT: 13.7 % (ref 11.6–15.1)
GFR SERPL CREATININE-BSD FRML MDRD: 63 ML/MIN/1.73SQ M
GLUCOSE P FAST SERPL-MCNC: 134 MG/DL (ref 70–105)
HCT VFR BLD AUTO: 37.4 % (ref 34.8–46.1)
HGB BLD-MCNC: 11.9 G/DL (ref 11.5–15.4)
IMM GRANULOCYTES # BLD AUTO: 0.01 THOUSAND/UL (ref 0–0.2)
IMM GRANULOCYTES NFR BLD AUTO: 0 % (ref 0–2)
LYMPHOCYTES # BLD AUTO: 1.63 THOUSANDS/ΜL (ref 0.6–4.47)
LYMPHOCYTES NFR BLD AUTO: 24 % (ref 14–44)
MCH RBC QN AUTO: 27.6 PG (ref 26.8–34.3)
MCHC RBC AUTO-ENTMCNC: 31.8 G/DL (ref 31.4–37.4)
MCV RBC AUTO: 87 FL (ref 82–98)
MONOCYTES # BLD AUTO: 0.62 THOUSAND/ΜL (ref 0.17–1.22)
MONOCYTES NFR BLD AUTO: 9 % (ref 4–12)
NEUTROPHILS # BLD AUTO: 4.32 THOUSANDS/ΜL (ref 1.85–7.62)
NEUTS SEG NFR BLD AUTO: 62 % (ref 43–75)
PLATELET # BLD AUTO: 280 THOUSANDS/UL (ref 149–390)
PMV BLD AUTO: 10.4 FL (ref 8.9–12.7)
POTASSIUM SERPL-SCNC: 4.3 MMOL/L (ref 3.5–5)
PROT SERPL-MCNC: 7.8 G/DL (ref 6.4–8.3)
RBC # BLD AUTO: 4.31 MILLION/UL (ref 3.81–5.12)
SODIUM SERPL-SCNC: 133 MMOL/L (ref 133–145)
WBC # BLD AUTO: 6.88 THOUSAND/UL (ref 4.31–10.16)

## 2021-09-13 PROCEDURE — 80053 COMPREHEN METABOLIC PANEL: CPT

## 2021-09-13 PROCEDURE — 85025 COMPLETE CBC W/AUTO DIFF WBC: CPT

## 2021-09-13 PROCEDURE — 36415 COLL VENOUS BLD VENIPUNCTURE: CPT

## 2021-09-14 ENCOUNTER — OFFICE VISIT (OUTPATIENT)
Dept: FAMILY MEDICINE CLINIC | Facility: CLINIC | Age: 81
End: 2021-09-14
Payer: MEDICARE

## 2021-09-14 VITALS
BODY MASS INDEX: 35.88 KG/M2 | DIASTOLIC BLOOD PRESSURE: 60 MMHG | WEIGHT: 178 LBS | HEART RATE: 74 BPM | OXYGEN SATURATION: 97 % | TEMPERATURE: 97.9 F | HEIGHT: 59 IN | SYSTOLIC BLOOD PRESSURE: 150 MMHG

## 2021-09-14 DIAGNOSIS — E11.9 DIABETES MELLITUS WITHOUT COMPLICATION (HCC): ICD-10-CM

## 2021-09-14 DIAGNOSIS — I10 ESSENTIAL HYPERTENSION: Primary | ICD-10-CM

## 2021-09-14 DIAGNOSIS — I50.32 CHRONIC DIASTOLIC CHF (CONGESTIVE HEART FAILURE) (HCC): ICD-10-CM

## 2021-09-14 DIAGNOSIS — G47.33 OSA (OBSTRUCTIVE SLEEP APNEA): ICD-10-CM

## 2021-09-14 DIAGNOSIS — D3A.090 BENIGN CARCINOID TUMOR OF LUNG: ICD-10-CM

## 2021-09-14 DIAGNOSIS — E87.1 HYPONATREMIA: ICD-10-CM

## 2021-09-14 DIAGNOSIS — J44.9 CHRONIC OBSTRUCTIVE PULMONARY DISEASE, UNSPECIFIED COPD TYPE (HCC): ICD-10-CM

## 2021-09-14 DIAGNOSIS — E66.01 CLASS 2 SEVERE OBESITY DUE TO EXCESS CALORIES WITH SERIOUS COMORBIDITY AND BODY MASS INDEX (BMI) OF 36.0 TO 36.9 IN ADULT (HCC): ICD-10-CM

## 2021-09-14 DIAGNOSIS — Z23 ENCOUNTER FOR IMMUNIZATION: ICD-10-CM

## 2021-09-14 PROBLEM — E66.9 OBESITY (BMI 35.0-39.9 WITHOUT COMORBIDITY): Status: RESOLVED | Noted: 2020-08-26 | Resolved: 2021-09-14

## 2021-09-14 PROBLEM — M79.672 LEFT FOOT PAIN: Status: RESOLVED | Noted: 2021-06-08 | Resolved: 2021-09-14

## 2021-09-14 LAB — SL AMB POCT HEMOGLOBIN AIC: 5.9 (ref ?–6.5)

## 2021-09-14 PROCEDURE — 99214 OFFICE O/P EST MOD 30 MIN: CPT | Performed by: FAMILY MEDICINE

## 2021-09-14 PROCEDURE — 90662 IIV NO PRSV INCREASED AG IM: CPT | Performed by: FAMILY MEDICINE

## 2021-09-14 PROCEDURE — G0008 ADMIN INFLUENZA VIRUS VAC: HCPCS | Performed by: FAMILY MEDICINE

## 2021-09-14 PROCEDURE — 83036 HEMOGLOBIN GLYCOSYLATED A1C: CPT | Performed by: FAMILY MEDICINE

## 2021-09-14 NOTE — ASSESSMENT & PLAN NOTE
Wt Readings from Last 3 Encounters:   08/11/21 81 6 kg (180 lb)   08/02/21 81 2 kg (179 lb)   06/08/21 82 1 kg (181 lb)     Weight stable and no recent leg swelling  Continue current meds and management per Cardiology Dr Chelsey Barahona  Her next appointment is on 9/17/21

## 2021-09-14 NOTE — ASSESSMENT & PLAN NOTE
Pt saw Dr Kane De La Torre in Aug 2021 and has been stable  Pt continues to use O2 supplementation as needed  Her 6 min walk test showed desaturation to 82% with exertion
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
## 2021-09-14 NOTE — ASSESSMENT & PLAN NOTE
BP high today  Continue current meds for now  Pt to monitor BP at home for next week and if SBP >140, will add back amlodipine

## 2021-09-14 NOTE — ASSESSMENT & PLAN NOTE
A1C done today and was 5 9  Continue metformin 1000 mg bid and will lower glipizide to 5 mg qd  Continue low carb diet  Last eye exam was in July 2020 and pt advised to schedule visit  Foot exam done in Feb 2021  Had COVID vaccines      Lab Results   Component Value Date    HGBA1C 6 1 (H) 05/29/2021

## 2021-09-14 NOTE — ASSESSMENT & PLAN NOTE
Had CT of chest in May 2021 and was stable  No new nodules were seen and no enlarged lymph nodes  Continue follow-up with Oncology Dr Ibeth Middleton  Her next appointment is on 9/21/21

## 2021-09-15 ENCOUNTER — TELEPHONE (OUTPATIENT)
Dept: ADMINISTRATIVE | Facility: OTHER | Age: 81
End: 2021-09-15

## 2021-09-15 NOTE — TELEPHONE ENCOUNTER
----- Message from Jalen Garvin sent at 9/14/2021  1:49 PM EDT -----  Regarding: care gap request  09/14/21 1:49 PM    Hello, our patient attached above has had Diabetic Eye Exam completed/performed  Please assist in updating the patient chart by making an External outreach to Dr Reyes [facility] facility located in [place], [place]  The date of service is July or August 2021      Thank you,  Jalen Garvin  [facility]

## 2021-09-15 NOTE — TELEPHONE ENCOUNTER
Upon review of the In Basket request and the patient's chart, initial outreach has been made via fax, please see Contacts section for details       Thank you  Namrata Varela

## 2021-09-15 NOTE — LETTER
Diabetic Eye Exam Form    Date Requested: 09/15/21  Patient: Pao Cline  Patient : 1940   Referring Provider: Sirena Farfan MD    Dilated Retinal Exam, Optomap-Iris Exam, or Fundus Photography Done         Yes ([place] one above)         No     Date of Diabetic Eye Exam ______________________________  Left Eye      Exam did show retinopathy    Exam did not show retinopathy         Mild       Moderate       None       Proliferative       Severe     Right Eye     Exam did show retinopathy    Exam did not show retinopathy         Mild       Moderate       None       Proliferative       Severe     Comments __________________________________________________________    Practice Providing Exam ______________________________________________    Exam Performed By (print name) _______________________________________      Provider Signature ___________________________________________________      These reports are needed for  compliance  Please fax this completed form and a copy of the Diabetic Eye Exam form to our office located at [place] [address] as soon as possible to [phone] david Aviles [employer]: Phone [phone]    We thank you for your assistance in treating our mutual patient

## 2021-09-16 NOTE — TELEPHONE ENCOUNTER
Upon review of the In Basket request we were able to locate, review, and update the patient chart as requested for Diabetic Eye Exam     Any additional questions or concerns should be emailed to the Practice Liaisons via [email or web address] email, please do not reply via In Basket      Thank you  Skylar Do

## 2021-09-17 ENCOUNTER — OFFICE VISIT (OUTPATIENT)
Dept: CARDIOLOGY CLINIC | Facility: CLINIC | Age: 81
End: 2021-09-17
Payer: MEDICARE

## 2021-09-17 VITALS
WEIGHT: 178.4 LBS | OXYGEN SATURATION: 98 % | BODY MASS INDEX: 35.96 KG/M2 | HEIGHT: 59 IN | SYSTOLIC BLOOD PRESSURE: 150 MMHG | DIASTOLIC BLOOD PRESSURE: 72 MMHG | HEART RATE: 73 BPM

## 2021-09-17 DIAGNOSIS — I10 ESSENTIAL HYPERTENSION: ICD-10-CM

## 2021-09-17 DIAGNOSIS — I50.32 CHRONIC DIASTOLIC CHF (CONGESTIVE HEART FAILURE) (HCC): Primary | ICD-10-CM

## 2021-09-17 DIAGNOSIS — G47.33 OSA (OBSTRUCTIVE SLEEP APNEA): ICD-10-CM

## 2021-09-17 PROCEDURE — 99214 OFFICE O/P EST MOD 30 MIN: CPT | Performed by: INTERNAL MEDICINE

## 2021-09-17 PROCEDURE — 93000 ELECTROCARDIOGRAM COMPLETE: CPT | Performed by: INTERNAL MEDICINE

## 2021-09-17 RX ORDER — AMLODIPINE BESYLATE 5 MG/1
5 TABLET ORAL DAILY
Qty: 90 TABLET | Refills: 3 | Status: SHIPPED | OUTPATIENT
Start: 2021-09-17 | End: 2022-01-20 | Stop reason: SDUPTHER

## 2021-09-17 NOTE — PATIENT INSTRUCTIONS
Low-Sodium Diet   AMBULATORY CARE:   A low-sodium diet  limits foods that are high in sodium (salt)  You will need to follow a low-sodium diet if you have high blood pressure, kidney disease, or heart failure  You may also need to follow this diet if you have a condition that is causing your body to retain (hold) extra fluid  You may need to limit the amount of sodium you eat in a day to 1,500 to 2,000 mg  Ask your healthcare provider how much sodium you can have each day  How to use food labels to choose foods that are low in sodium:  Read food labels to find the amount of sodium they contain  The amount of sodium is listed in milligrams (mg)  The % Daily Value (DV) column tells you how much of your daily needs are met by 1 serving of the food for each nutrient listed  Choose foods that have less than 5% of the DV of sodium  These foods are considered low in sodium  Foods that have 20% or more of the DV of sodium are considered high in sodium  Some food labels may also list any of the following terms that tell you about the sodium content in the food:  · Sodium-free:  Less than 5 mg in each serving    · Very low sodium:  35 mg of sodium or less in each serving    · Low sodium:  140 mg of sodium or less in each serving    · Reduced sodium: At least 25% less sodium in each serving than the regular type    · Light in sodium:  50% less sodium in each serving    · Unsalted or no added salt:  No extra salt is added during processing (the food may still contain sodium)     Foods to avoid:  Salty foods are high in sodium  You should avoid the following:  · Processed foods:      ? Mixes for cornbread, biscuits, cake, and pudding     ? Instant foods, such as potatoes, cereals, noodles, and rice     ? Packaged foods, such as bread stuffing, rice and pasta mixes, snack dip mixes, and macaroni and cheese     ? Canned foods, such as canned vegetables, soups, broths, sauces, and vegetable or tomato juice    ?  Snack foods, such as salted chips, popcorn, pretzels, pork rinds, salted crackers, and salted nuts    ? Frozen foods, such as dinners, entrees, vegetables with sauces, and breaded meats    ? Sauerkraut, pickled vegetables, and other foods prepared in brine    · Meats and cheeses:      ? Smoked or cured meat, such as corned beef, sandoval, ham, hot dogs, and sausage    ? Canned meats or spreads, such as potted meats, sardines, anchovies, and imitation seafood    ? Deli or lunch meats, such as bologna, ham, turkey, and roast beef    ? Processed cheese, such as American cheese and cheese spreads    · Condiments, sauces, and seasonings:      ? Salt (¼ teaspoon of salt contains 575 mg of sodium)    ? Seasonings made with salt, such as garlic salt, celery salt, onion salt, and seasoned salt    ? Regular soy sauce, barbecue sauce, teriyaki sauce, steak sauce, Worcestershire sauce, and most flavored vinegars    ? Canned gravy and mixes     ? Regular condiments, such as mustard, ketchup, and salad dressings    ? Pickles and olives    ? Meat tenderizers and monosodium glutamate (MSG)    Foods to include:  Read the food label to find the exact amount of sodium in each serving  · Bread and cereal:  Try to choose breads with less than 80 mg of sodium per serving  ? Bread, roll, beth, tortilla, or unsalted crackers  ? Ready-to-eat cereals with less than 5% DV of sodium (examples include shredded wheat and puffed rice)    ? Pasta    · Vegetables and fruits:      ? Unsalted fresh, frozen, or canned vegetables    ? Fresh, frozen, or canned fruits    ? Fruit juice    · Dairy:  One serving has about 150 mg of sodium  ? Milk, all types    ? Yogurt    ? Hard cheese, such as cheddar, Swiss, [employer], or mozzarella    · Meat and other protein foods:  Some raw meats may have added sodium  ? Plain meats, fish, and poultry     ? Eggs    · Other foods:      ? Homemade pudding    ? Unsalted nuts, popcorn, or pretzels    ?  Unsalted butter or margarine    Ways to decrease sodium:   · Add spices and herbs to foods instead of salt during cooking  Use salt-free seasonings to add flavor to foods  Examples include onion powder, garlic powder, basil, mccormack powder, paprika, and parsley  Try lemon or lime juice or vinegar to give foods a tart flavor  Use hot peppers, pepper, or cayenne pepper to add a spicy flavor  · Do not keep a salt shaker at your kitchen table  This may help keep you from adding salt to food at the table  A teaspoon of salt has 2,300 mg of sodium  It may take time to get used to enjoying the natural flavor of food instead of adding salt  Talk to your healthcare provider before you use salt substitutes  Some salt substitutes have a high amount of potassium and need to be avoided if you have kidney disease  · Choose low-sodium foods at restaurants  Meals from restaurants are often high in sodium  Some restaurants have nutrition information on the menu that tells you the amount of sodium in their foods  If possible, ask for your food to be prepared with less, or no salt  · Shop for unsalted or low-sodium foods and snacks at the grocery store  Examples include unsalted or low-sodium broths, soups, and canned vegetables  Choose fresh or frozen vegetables instead  Choose unsalted nuts or seeds or fresh fruits or vegetables as snacks  Read food labels and choose salt-free, very low-sodium, or low-sodium foods  © Copyright [employer] 2021 Information is for End User's use only and may not be sold, redistributed or otherwise used for commercial purposes  All illustrations and images included in CareNotes® are the copyrighted property of A D A M [employer] Inc  or Kody Ramos   The above information is an  only  It is not intended as medical advice for individual conditions or treatments  Talk to your doctor, nurse or pharmacist before following any medical regimen to see if it is safe and effective for you
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
## 2021-09-21 ENCOUNTER — TELEPHONE (OUTPATIENT)
Dept: FAMILY MEDICINE CLINIC | Facility: CLINIC | Age: 81
End: 2021-09-21

## 2021-09-24 ENCOUNTER — NEW PATIENT (OUTPATIENT)
Dept: URBAN - METROPOLITAN AREA CLINIC 6 | Facility: CLINIC | Age: 81
End: 2021-09-24

## 2021-09-24 DIAGNOSIS — H40.1131: ICD-10-CM

## 2021-09-24 DIAGNOSIS — H25.813: ICD-10-CM

## 2021-09-24 PROCEDURE — 1036F TOBACCO NON-USER: CPT

## 2021-09-24 PROCEDURE — 92004 COMPRE OPH EXAM NEW PT 1/>: CPT

## 2021-09-24 PROCEDURE — G8427 DOCREV CUR MEDS BY ELIG CLIN: HCPCS

## 2021-09-24 ASSESSMENT — VISUAL ACUITY
OU_CC: J1-1
OD_PH: 20/40
OD_CC: 20/40
OS_CC: 20/70
OS_GLARE: CF 2FT
OD_GLARE: CF 2FT
OS_PH: 20/70

## 2021-09-24 ASSESSMENT — TONOMETRY
OD_IOP_MMHG: 11
OS_IOP_MMHG: 7

## 2021-10-11 DIAGNOSIS — J30.2 SEASONAL ALLERGIC RHINITIS, UNSPECIFIED TRIGGER: ICD-10-CM

## 2021-10-12 DIAGNOSIS — I10 BENIGN ESSENTIAL HYPERTENSION: ICD-10-CM

## 2021-10-12 RX ORDER — METOPROLOL SUCCINATE 25 MG/1
25 TABLET, EXTENDED RELEASE ORAL
Qty: 90 TABLET | Refills: 0 | Status: SHIPPED | OUTPATIENT
Start: 2021-10-12 | End: 2022-01-11 | Stop reason: SDUPTHER

## 2021-10-13 RX ORDER — MONTELUKAST SODIUM 10 MG/1
10 TABLET ORAL
Qty: 30 TABLET | Refills: 2 | Status: SHIPPED | OUTPATIENT
Start: 2021-10-13 | End: 2022-04-10

## 2021-10-27 ENCOUNTER — PRE-OP CATARACT MEASUREMENTS (OUTPATIENT)
Dept: URBAN - METROPOLITAN AREA CLINIC 6 | Facility: CLINIC | Age: 81
End: 2021-10-27

## 2021-10-27 DIAGNOSIS — H52.4: ICD-10-CM

## 2021-10-27 DIAGNOSIS — H40.1131: ICD-10-CM

## 2021-10-27 DIAGNOSIS — H25.813: ICD-10-CM

## 2021-10-27 PROCEDURE — 92136 OPHTHALMIC BIOMETRY: CPT

## 2021-10-27 PROCEDURE — G8428 CUR MEDS NOT DOCUMENT: HCPCS

## 2021-10-27 PROCEDURE — 1036F TOBACCO NON-USER: CPT

## 2021-10-27 PROCEDURE — 92012 INTRM OPH EXAM EST PATIENT: CPT

## 2021-10-27 ASSESSMENT — VISUAL ACUITY
OS_PH: 20/100-1
OD_GLARE: 20/400-1
OD_PH: 20/50
OS_GLARE: 20/400-1
OD_CC: 20/50
OS_CC: 20/200

## 2021-10-28 ENCOUNTER — TELEPHONE (OUTPATIENT)
Dept: FAMILY MEDICINE CLINIC | Facility: CLINIC | Age: 81
End: 2021-10-28

## 2021-11-01 ENCOUNTER — TELEPHONE (OUTPATIENT)
Dept: FAMILY MEDICINE CLINIC | Facility: CLINIC | Age: 81
End: 2021-11-01

## 2021-11-01 DIAGNOSIS — I10 ESSENTIAL HYPERTENSION: ICD-10-CM

## 2021-11-01 RX ORDER — VALSARTAN 320 MG/1
320 TABLET ORAL DAILY
Qty: 90 TABLET | Refills: 0 | Status: SHIPPED | OUTPATIENT
Start: 2021-11-01 | End: 2022-01-11 | Stop reason: SDUPTHER

## 2021-11-02 ENCOUNTER — OFFICE VISIT (OUTPATIENT)
Dept: FAMILY MEDICINE CLINIC | Facility: CLINIC | Age: 81
End: 2021-11-02
Payer: MEDICARE

## 2021-11-02 ENCOUNTER — LAB (OUTPATIENT)
Dept: LAB | Facility: HOSPITAL | Age: 81
End: 2021-11-02
Attending: INTERNAL MEDICINE
Payer: MEDICARE

## 2021-11-02 VITALS
DIASTOLIC BLOOD PRESSURE: 60 MMHG | HEIGHT: 59 IN | HEART RATE: 75 BPM | TEMPERATURE: 97 F | WEIGHT: 178 LBS | BODY MASS INDEX: 35.88 KG/M2 | OXYGEN SATURATION: 97 % | SYSTOLIC BLOOD PRESSURE: 138 MMHG

## 2021-11-02 DIAGNOSIS — E11.9 DIABETES MELLITUS WITHOUT COMPLICATION (HCC): ICD-10-CM

## 2021-11-02 DIAGNOSIS — H26.9 CATARACT OF BOTH EYES, UNSPECIFIED CATARACT TYPE: Primary | ICD-10-CM

## 2021-11-02 DIAGNOSIS — I50.32 CHRONIC DIASTOLIC CHF (CONGESTIVE HEART FAILURE) (HCC): ICD-10-CM

## 2021-11-02 DIAGNOSIS — E87.1 HYPONATREMIA: ICD-10-CM

## 2021-11-02 DIAGNOSIS — I10 ESSENTIAL HYPERTENSION: ICD-10-CM

## 2021-11-02 LAB
ANION GAP SERPL CALCULATED.3IONS-SCNC: 7 MMOL/L (ref 4–13)
BACTERIA UR QL AUTO: ABNORMAL /HPF
BILIRUB UR QL STRIP: NEGATIVE
BUN SERPL-MCNC: 19 MG/DL (ref 6–20)
CALCIUM SERPL-MCNC: 10 MG/DL (ref 8.4–10.2)
CHLORIDE SERPL-SCNC: 98 MMOL/L (ref 96–108)
CLARITY UR: CLEAR
CO2 SERPL-SCNC: 28 MMOL/L (ref 22–33)
COLOR UR: ABNORMAL
CREAT SERPL-MCNC: 0.83 MG/DL (ref 0.4–1.1)
CREAT UR-MCNC: 18.8 MG/DL
ERYTHROCYTE [DISTWIDTH] IN BLOOD BY AUTOMATED COUNT: 14 % (ref 11.6–15.1)
GFR SERPL CREATININE-BSD FRML MDRD: 67 ML/MIN/1.73SQ M
GLUCOSE P FAST SERPL-MCNC: 124 MG/DL (ref 70–105)
GLUCOSE UR STRIP-MCNC: NEGATIVE MG/DL
HCT VFR BLD AUTO: 38.6 % (ref 34.8–46.1)
HGB BLD-MCNC: 12.6 G/DL (ref 11.5–15.4)
HGB UR QL STRIP.AUTO: NEGATIVE
KETONES UR STRIP-MCNC: NEGATIVE MG/DL
LEUKOCYTE ESTERASE UR QL STRIP: ABNORMAL
MAGNESIUM SERPL-MCNC: 1.8 MG/DL (ref 1.6–2.6)
MCH RBC QN AUTO: 27.5 PG (ref 26.8–34.3)
MCHC RBC AUTO-ENTMCNC: 32.6 G/DL (ref 31.4–37.4)
MCV RBC AUTO: 84 FL (ref 82–98)
NITRITE UR QL STRIP: NEGATIVE
NON-SQ EPI CELLS URNS QL MICRO: ABNORMAL /HPF
PH UR STRIP.AUTO: 7 [PH]
PHOSPHATE SERPL-MCNC: 4.1 MG/DL (ref 2.5–5)
PLATELET # BLD AUTO: 265 THOUSANDS/UL (ref 149–390)
PMV BLD AUTO: 10.1 FL (ref 8.9–12.7)
POTASSIUM SERPL-SCNC: 4 MMOL/L (ref 3.5–5)
PROT UR STRIP-MCNC: NEGATIVE MG/DL
PROT UR-MCNC: 9 MG/DL
PROT/CREAT UR: 0.48 MG/G{CREAT} (ref 0–0.1)
RBC # BLD AUTO: 4.58 MILLION/UL (ref 3.81–5.12)
RBC #/AREA URNS AUTO: ABNORMAL /HPF
SODIUM SERPL-SCNC: 133 MMOL/L (ref 133–145)
SP GR UR STRIP.AUTO: 1.01 (ref 1–1.03)
UROBILINOGEN UR QL STRIP.AUTO: 0.2 E.U./DL
WBC # BLD AUTO: 6.72 THOUSAND/UL (ref 4.31–10.16)
WBC #/AREA URNS AUTO: ABNORMAL /HPF

## 2021-11-02 PROCEDURE — 80048 BASIC METABOLIC PNL TOTAL CA: CPT

## 2021-11-02 PROCEDURE — 83735 ASSAY OF MAGNESIUM: CPT

## 2021-11-02 PROCEDURE — 82570 ASSAY OF URINE CREATININE: CPT

## 2021-11-02 PROCEDURE — 85027 COMPLETE CBC AUTOMATED: CPT

## 2021-11-02 PROCEDURE — 99214 OFFICE O/P EST MOD 30 MIN: CPT | Performed by: FAMILY MEDICINE

## 2021-11-02 PROCEDURE — 81001 URINALYSIS AUTO W/SCOPE: CPT

## 2021-11-02 PROCEDURE — 84156 ASSAY OF PROTEIN URINE: CPT

## 2021-11-02 PROCEDURE — 36415 COLL VENOUS BLD VENIPUNCTURE: CPT

## 2021-11-02 PROCEDURE — 84100 ASSAY OF PHOSPHORUS: CPT

## 2021-11-02 RX ORDER — PREDNISOLONE ACETATE 10 MG/ML
SUSPENSION/ DROPS OPHTHALMIC
COMMUNITY
Start: 2021-11-01

## 2021-11-03 ENCOUNTER — TELEPHONE (OUTPATIENT)
Dept: NEPHROLOGY | Facility: CLINIC | Age: 81
End: 2021-11-03

## 2021-11-03 DIAGNOSIS — N18.30 STAGE 3 CHRONIC KIDNEY DISEASE, UNSPECIFIED WHETHER STAGE 3A OR 3B CKD (HCC): Primary | ICD-10-CM

## 2021-11-10 DIAGNOSIS — E11.9 DIABETES MELLITUS WITHOUT COMPLICATION (HCC): ICD-10-CM

## 2021-11-11 ENCOUNTER — SURGERY/PROCEDURE (OUTPATIENT)
Dept: URBAN - METROPOLITAN AREA SURGICAL CENTER 6 | Facility: SURGICAL CENTER | Age: 81
End: 2021-11-11

## 2021-11-11 DIAGNOSIS — H25.811: ICD-10-CM

## 2021-11-11 DIAGNOSIS — H40.1111: ICD-10-CM

## 2021-11-11 PROCEDURE — 66984 XCAPSL CTRC RMVL W/O ECP: CPT | Mod: 54,RT

## 2021-11-11 PROCEDURE — 0191T INSERTION OF ANTERIOR SEGMENT AQUEOUS DRAINAGE DEVICE, WITHOUT EXTRAOCULAR RESERVOIR; INTERNAL APPROACH, INTO THE TRABECULAR MESHWORK, INITIAL INSERTION: CPT | Mod: 59,RT

## 2021-11-11 PROCEDURE — 0356T INSERTION OF DRUG-ELUTING IMPLANT (INCLUDING PUNCTAL DILATION AND IMPLANT REMOVAL WHEN PERFORMED) INTO LACRIMAL CANALICULUS, EACH: CPT | Mod: 59,RT

## 2021-11-11 PROCEDURE — 0376T INSERTION OF ANTERIOR SEGMENT AQUEOUS DRAINAGE DEVICE, WITHOUT EXTRAOCULAR RESERVOIR, INTERNAL APPROACH, INTO THE TRABECULAR MESHWORK; EACH ADDITIONAL DEVICE INSERTION (LIST SEPARATELY IN ADDITION TO CODE FOR PRIMARY PROCEDURE): CPT | Mod: 59,RT

## 2021-11-12 ENCOUNTER — 1 DAY PO - CE/IOL (OUTPATIENT)
Dept: URBAN - METROPOLITAN AREA CLINIC 6 | Facility: CLINIC | Age: 81
End: 2021-11-12

## 2021-11-12 DIAGNOSIS — H40.1131: ICD-10-CM

## 2021-11-12 DIAGNOSIS — Z96.1: ICD-10-CM

## 2021-11-12 PROCEDURE — 1036F TOBACCO NON-USER: CPT

## 2021-11-12 PROCEDURE — 99024 POSTOP FOLLOW-UP VISIT: CPT

## 2021-11-12 PROCEDURE — G8428 CUR MEDS NOT DOCUMENT: HCPCS

## 2021-11-12 ASSESSMENT — TONOMETRY: OD_IOP_MMHG: 15

## 2021-11-12 ASSESSMENT — VISUAL ACUITY
OD_PH: 20/20-2
OD_SC: 20/40-2

## 2021-11-18 ENCOUNTER — OFFICE VISIT (OUTPATIENT)
Dept: PULMONOLOGY | Facility: CLINIC | Age: 81
End: 2021-11-18
Payer: MEDICARE

## 2021-11-18 VITALS
OXYGEN SATURATION: 98 % | TEMPERATURE: 97.2 F | HEIGHT: 59 IN | DIASTOLIC BLOOD PRESSURE: 78 MMHG | BODY MASS INDEX: 35.96 KG/M2 | HEART RATE: 88 BPM | SYSTOLIC BLOOD PRESSURE: 146 MMHG | WEIGHT: 178.38 LBS

## 2021-11-18 DIAGNOSIS — I10 ESSENTIAL HYPERTENSION: ICD-10-CM

## 2021-11-18 DIAGNOSIS — G47.33 OSA (OBSTRUCTIVE SLEEP APNEA): ICD-10-CM

## 2021-11-18 DIAGNOSIS — I50.32 CHRONIC DIASTOLIC CHF (CONGESTIVE HEART FAILURE) (HCC): ICD-10-CM

## 2021-11-18 DIAGNOSIS — J44.9 CHRONIC OBSTRUCTIVE PULMONARY DISEASE, UNSPECIFIED COPD TYPE (HCC): Primary | ICD-10-CM

## 2021-11-18 DIAGNOSIS — R91.8 MULTIPLE PULMONARY NODULES: ICD-10-CM

## 2021-11-18 DIAGNOSIS — E66.01 CLASS 2 SEVERE OBESITY DUE TO EXCESS CALORIES WITH SERIOUS COMORBIDITY AND BODY MASS INDEX (BMI) OF 36.0 TO 36.9 IN ADULT (HCC): ICD-10-CM

## 2021-11-18 PROCEDURE — 99214 OFFICE O/P EST MOD 30 MIN: CPT | Performed by: INTERNAL MEDICINE

## 2021-11-22 DIAGNOSIS — E11.9 DIABETES MELLITUS WITHOUT COMPLICATION (HCC): ICD-10-CM

## 2021-11-22 RX ORDER — BLOOD SUGAR DIAGNOSTIC
1 STRIP MISCELLANEOUS DAILY
Qty: 100 EACH | Refills: 3 | Status: SHIPPED | OUTPATIENT
Start: 2021-11-22

## 2021-11-24 ENCOUNTER — PRE-OP CATARACT MEASUREMENTS (OUTPATIENT)
Dept: URBAN - METROPOLITAN AREA CLINIC 6 | Facility: CLINIC | Age: 81
End: 2021-11-24

## 2021-11-24 DIAGNOSIS — H25.812: ICD-10-CM

## 2021-11-24 DIAGNOSIS — H40.1131: ICD-10-CM

## 2021-11-24 PROCEDURE — 1036F TOBACCO NON-USER: CPT

## 2021-11-24 PROCEDURE — 92012 INTRM OPH EXAM EST PATIENT: CPT | Mod: 24

## 2021-11-24 PROCEDURE — G8427 DOCREV CUR MEDS BY ELIG CLIN: HCPCS

## 2021-11-24 PROCEDURE — 92136 OPHTHALMIC BIOMETRY: CPT

## 2021-11-24 ASSESSMENT — TONOMETRY
OS_IOP_MMHG: 13
OD_IOP_MMHG: 14

## 2021-11-24 ASSESSMENT — VISUAL ACUITY
OD_SC: 20/25-2
OS_CC: 20/50-2
OS_PH: 20/50-2
OS_GLARE: 20/400-1

## 2021-11-24 ASSESSMENT — KERATOMETRY
OS_K2POWER_DIOPTERS: 43.75
OS_AXISANGLE_DEGREES: 128
OS_K1POWER_DIOPTERS: 43.50
OS_AXISANGLE2_DEGREES: 38

## 2021-11-30 ENCOUNTER — PREPPED CHART (OUTPATIENT)
Dept: URBAN - METROPOLITAN AREA CLINIC 6 | Facility: CLINIC | Age: 81
End: 2021-11-30

## 2021-11-30 ASSESSMENT — KERATOMETRY
OS_K2POWER_DIOPTERS: 43.75
OS_K1POWER_DIOPTERS: 43.50
OS_AXISANGLE_DEGREES: 128
OS_AXISANGLE2_DEGREES: 38

## 2021-12-02 ENCOUNTER — SURGERY/PROCEDURE (OUTPATIENT)
Dept: URBAN - METROPOLITAN AREA SURGICAL CENTER 6 | Facility: SURGICAL CENTER | Age: 81
End: 2021-12-02

## 2021-12-02 DIAGNOSIS — H40.1122: ICD-10-CM

## 2021-12-02 DIAGNOSIS — H25.812: ICD-10-CM

## 2021-12-02 PROCEDURE — 0191T INSERTION OF ANTERIOR SEGMENT AQUEOUS DRAINAGE DEVICE, WITHOUT EXTRAOCULAR RESERVOIR; INTERNAL APPROACH, INTO THE TRABECULAR MESHWORK, INITIAL INSERTION: CPT | Mod: 59,LT

## 2021-12-02 PROCEDURE — 0376T INSERTION OF ANTERIOR SEGMENT AQUEOUS DRAINAGE DEVICE, WITHOUT EXTRAOCULAR RESERVOIR, INTERNAL APPROACH, INTO THE TRABECULAR MESHWORK; EACH ADDITIONAL DEVICE INSERTION (LIST SEPARATELY IN ADDITION TO CODE FOR PRIMARY PROCEDURE): CPT | Mod: 59,LT

## 2021-12-02 PROCEDURE — 66984 XCAPSL CTRC RMVL W/O ECP: CPT | Mod: 54,LT,79,LT

## 2021-12-02 PROCEDURE — 0356T INSERTION OF DRUG-ELUTING IMPLANT (INCLUDING PUNCTAL DILATION AND IMPLANT REMOVAL WHEN PERFORMED) INTO LACRIMAL CANALICULUS, EACH: CPT

## 2021-12-03 ENCOUNTER — 1 DAY POST-OP (OUTPATIENT)
Dept: URBAN - METROPOLITAN AREA CLINIC 6 | Facility: CLINIC | Age: 81
End: 2021-12-03

## 2021-12-03 DIAGNOSIS — Z96.1: ICD-10-CM

## 2021-12-03 PROCEDURE — 99024 POSTOP FOLLOW-UP VISIT: CPT

## 2021-12-03 PROCEDURE — G8427 DOCREV CUR MEDS BY ELIG CLIN: HCPCS

## 2021-12-03 PROCEDURE — 1036F TOBACCO NON-USER: CPT

## 2021-12-03 ASSESSMENT — TONOMETRY
OS_IOP_MMHG: 25
OD_IOP_MMHG: 13

## 2021-12-03 ASSESSMENT — KERATOMETRY
OS_AXISANGLE_DEGREES: 128
OS_K1POWER_DIOPTERS: 43.50
OS_K2POWER_DIOPTERS: 43.75
OS_AXISANGLE2_DEGREES: 38

## 2021-12-03 ASSESSMENT — VISUAL ACUITY
OS_SC: 20/40
OD_SC: 20/30

## 2021-12-06 ASSESSMENT — KERATOMETRY
OS_AXISANGLE2_DEGREES: 38
OS_K2POWER_DIOPTERS: 43.75
OS_K1POWER_DIOPTERS: 43.50
OS_AXISANGLE_DEGREES: 128

## 2021-12-08 ENCOUNTER — RA CDI HCC (OUTPATIENT)
Dept: OTHER | Facility: HOSPITAL | Age: 81
End: 2021-12-08

## 2021-12-14 ENCOUNTER — OFFICE VISIT (OUTPATIENT)
Dept: FAMILY MEDICINE CLINIC | Facility: CLINIC | Age: 81
End: 2021-12-14
Payer: MEDICARE

## 2021-12-14 VITALS
HEIGHT: 59 IN | OXYGEN SATURATION: 96 % | BODY MASS INDEX: 35.08 KG/M2 | RESPIRATION RATE: 16 BRPM | SYSTOLIC BLOOD PRESSURE: 136 MMHG | TEMPERATURE: 96.4 F | DIASTOLIC BLOOD PRESSURE: 74 MMHG | HEART RATE: 75 BPM | WEIGHT: 174 LBS

## 2021-12-14 DIAGNOSIS — M25.561 CHRONIC PAIN OF BOTH KNEES: ICD-10-CM

## 2021-12-14 DIAGNOSIS — D3A.090 BENIGN CARCINOID TUMOR OF LUNG: ICD-10-CM

## 2021-12-14 DIAGNOSIS — M25.562 CHRONIC PAIN OF BOTH KNEES: ICD-10-CM

## 2021-12-14 DIAGNOSIS — E11.9 DIABETES MELLITUS WITHOUT COMPLICATION (HCC): ICD-10-CM

## 2021-12-14 DIAGNOSIS — E87.1 HYPONATREMIA: ICD-10-CM

## 2021-12-14 DIAGNOSIS — G89.29 CHRONIC PAIN OF BOTH KNEES: ICD-10-CM

## 2021-12-14 DIAGNOSIS — I50.32 CHRONIC DIASTOLIC CHF (CONGESTIVE HEART FAILURE) (HCC): ICD-10-CM

## 2021-12-14 DIAGNOSIS — J44.9 CHRONIC OBSTRUCTIVE PULMONARY DISEASE, UNSPECIFIED COPD TYPE (HCC): ICD-10-CM

## 2021-12-14 DIAGNOSIS — G47.33 OSA (OBSTRUCTIVE SLEEP APNEA): ICD-10-CM

## 2021-12-14 DIAGNOSIS — I10 ESSENTIAL HYPERTENSION: Primary | ICD-10-CM

## 2021-12-14 LAB — SL AMB POCT HEMOGLOBIN AIC: 6.4 (ref ?–6.5)

## 2021-12-14 PROCEDURE — 83036 HEMOGLOBIN GLYCOSYLATED A1C: CPT | Performed by: FAMILY MEDICINE

## 2021-12-14 PROCEDURE — 99214 OFFICE O/P EST MOD 30 MIN: CPT | Performed by: FAMILY MEDICINE

## 2021-12-18 ENCOUNTER — OFFICE VISIT (OUTPATIENT)
Dept: OBGYN CLINIC | Facility: CLINIC | Age: 81
End: 2021-12-18
Payer: MEDICARE

## 2021-12-18 ENCOUNTER — HOSPITAL ENCOUNTER (OUTPATIENT)
Dept: RADIOLOGY | Facility: HOSPITAL | Age: 81
Discharge: HOME | End: 2021-12-18
Payer: MEDICARE

## 2021-12-18 VITALS — BODY MASS INDEX: 36 KG/M2 | HEIGHT: 59 IN | WEIGHT: 178.6 LBS

## 2021-12-18 DIAGNOSIS — M25.561 CHRONIC PAIN OF BOTH KNEES: ICD-10-CM

## 2021-12-18 DIAGNOSIS — M25.561 CHRONIC PAIN OF BOTH KNEES: Primary | ICD-10-CM

## 2021-12-18 DIAGNOSIS — G89.29 CHRONIC PAIN OF BOTH KNEES: Primary | ICD-10-CM

## 2021-12-18 DIAGNOSIS — M25.562 CHRONIC PAIN OF BOTH KNEES: ICD-10-CM

## 2021-12-18 DIAGNOSIS — M25.562 CHRONIC PAIN OF BOTH KNEES: Primary | ICD-10-CM

## 2021-12-18 DIAGNOSIS — M17.12 PRIMARY OSTEOARTHRITIS OF LEFT KNEE: ICD-10-CM

## 2021-12-18 DIAGNOSIS — M17.11 PRIMARY OSTEOARTHRITIS OF RIGHT KNEE: ICD-10-CM

## 2021-12-18 DIAGNOSIS — G89.29 CHRONIC PAIN OF BOTH KNEES: ICD-10-CM

## 2021-12-18 PROCEDURE — 20610 DRAIN/INJ JOINT/BURSA W/O US: CPT | Performed by: PHYSICIAN ASSISTANT

## 2021-12-18 PROCEDURE — 73562 X-RAY EXAM OF KNEE 3: CPT

## 2021-12-18 PROCEDURE — 99203 OFFICE O/P NEW LOW 30 MIN: CPT | Performed by: PHYSICIAN ASSISTANT

## 2021-12-18 RX ORDER — TRIAMCINOLONE ACETONIDE 40 MG/ML
80 INJECTION, SUSPENSION INTRA-ARTICULAR; INTRAMUSCULAR
Status: COMPLETED | OUTPATIENT
Start: 2021-12-18 | End: 2021-12-18

## 2021-12-18 RX ORDER — BUPIVACAINE HYDROCHLORIDE 5 MG/ML
2 INJECTION, SOLUTION EPIDURAL; INTRACAUDAL
Status: COMPLETED | OUTPATIENT
Start: 2021-12-18 | End: 2021-12-18

## 2021-12-18 RX ORDER — LIDOCAINE HYDROCHLORIDE 10 MG/ML
2 INJECTION, SOLUTION INFILTRATION; PERINEURAL
Status: COMPLETED | OUTPATIENT
Start: 2021-12-18 | End: 2021-12-18

## 2021-12-18 RX ADMIN — TRIAMCINOLONE ACETONIDE 80 MG: 40 INJECTION, SUSPENSION INTRA-ARTICULAR; INTRAMUSCULAR at 11:28

## 2021-12-18 RX ADMIN — LIDOCAINE HYDROCHLORIDE 2 ML: 10 INJECTION, SOLUTION INFILTRATION; PERINEURAL at 11:28

## 2021-12-18 RX ADMIN — BUPIVACAINE HYDROCHLORIDE 2 ML: 5 INJECTION, SOLUTION EPIDURAL; INTRACAUDAL at 11:28

## 2022-01-04 DIAGNOSIS — I10 BENIGN ESSENTIAL HYPERTENSION: ICD-10-CM

## 2022-01-05 RX ORDER — TORSEMIDE 20 MG/1
20 TABLET ORAL DAILY
Qty: 90 TABLET | Refills: 3 | Status: SHIPPED | OUTPATIENT
Start: 2022-01-05 | End: 2022-01-17 | Stop reason: SDUPTHER

## 2022-01-09 ENCOUNTER — IMMUNIZATIONS (OUTPATIENT)
Dept: FAMILY MEDICINE CLINIC | Facility: HOSPITAL | Age: 82
End: 2022-01-09

## 2022-01-09 DIAGNOSIS — Z23 ENCOUNTER FOR IMMUNIZATION: Primary | ICD-10-CM

## 2022-01-09 PROCEDURE — 0001A COVID-19 PFIZER VACC 0.3 ML: CPT

## 2022-01-09 PROCEDURE — 91300 COVID-19 PFIZER VACC 0.3 ML: CPT

## 2022-01-11 ENCOUNTER — TELEPHONE (OUTPATIENT)
Dept: FAMILY MEDICINE CLINIC | Facility: CLINIC | Age: 82
End: 2022-01-11

## 2022-01-11 DIAGNOSIS — I10 BENIGN ESSENTIAL HYPERTENSION: ICD-10-CM

## 2022-01-11 DIAGNOSIS — I10 BENIGN ESSENTIAL HTN: ICD-10-CM

## 2022-01-11 DIAGNOSIS — I10 ESSENTIAL HYPERTENSION: ICD-10-CM

## 2022-01-11 DIAGNOSIS — E11.9 DIABETES MELLITUS WITHOUT COMPLICATION (HCC): ICD-10-CM

## 2022-01-11 RX ORDER — METOPROLOL SUCCINATE 25 MG/1
25 TABLET, EXTENDED RELEASE ORAL
Qty: 90 TABLET | Refills: 3 | Status: SHIPPED | OUTPATIENT
Start: 2022-01-11

## 2022-01-11 RX ORDER — TERAZOSIN 5 MG/1
5 CAPSULE ORAL
Qty: 90 CAPSULE | Refills: 3 | Status: SHIPPED | OUTPATIENT
Start: 2022-01-11

## 2022-01-11 RX ORDER — VALSARTAN 320 MG/1
320 TABLET ORAL DAILY
Qty: 90 TABLET | Refills: 2 | Status: SHIPPED | OUTPATIENT
Start: 2022-01-11 | End: 2022-06-21

## 2022-01-11 RX ORDER — GLIPIZIDE 10 MG/1
5 TABLET ORAL DAILY
Qty: 45 TABLET | Refills: 2 | Status: SHIPPED | OUTPATIENT
Start: 2022-01-11

## 2022-01-14 DIAGNOSIS — R80.1 PERSISTENT PROTEINURIA: ICD-10-CM

## 2022-01-14 DIAGNOSIS — I10 ESSENTIAL HYPERTENSION: ICD-10-CM

## 2022-01-17 DIAGNOSIS — I10 BENIGN ESSENTIAL HYPERTENSION: ICD-10-CM

## 2022-01-17 RX ORDER — TORSEMIDE 20 MG/1
20 TABLET ORAL DAILY
Qty: 90 TABLET | Refills: 3 | Status: SHIPPED | OUTPATIENT
Start: 2022-01-17 | End: 2023-01-17

## 2022-01-18 RX ORDER — SPIRONOLACTONE 25 MG/1
25 TABLET ORAL DAILY
Qty: 90 TABLET | Refills: 3 | Status: SHIPPED | OUTPATIENT
Start: 2022-01-18 | End: 2022-06-21 | Stop reason: SDUPTHER

## 2022-01-20 DIAGNOSIS — I10 ESSENTIAL HYPERTENSION: ICD-10-CM

## 2022-01-20 RX ORDER — AMLODIPINE BESYLATE 5 MG/1
5 TABLET ORAL DAILY
Qty: 90 TABLET | Refills: 3 | Status: SHIPPED | OUTPATIENT
Start: 2022-01-20

## 2022-02-01 ENCOUNTER — TELEPHONE (OUTPATIENT)
Dept: NEPHROLOGY | Facility: CLINIC | Age: 82
End: 2022-02-01

## 2022-02-01 ENCOUNTER — LAB (OUTPATIENT)
Dept: LAB | Facility: HOSPITAL | Age: 82
End: 2022-02-01
Attending: INTERNAL MEDICINE
Payer: MEDICARE

## 2022-02-01 ENCOUNTER — DOCUMENTATION (OUTPATIENT)
Dept: NEPHROLOGY | Facility: CLINIC | Age: 82
End: 2022-02-01

## 2022-02-01 DIAGNOSIS — N18.30 STAGE 3 CHRONIC KIDNEY DISEASE, UNSPECIFIED WHETHER STAGE 3A OR 3B CKD (HCC): ICD-10-CM

## 2022-02-01 LAB
ANION GAP SERPL CALCULATED.3IONS-SCNC: 6 MMOL/L (ref 4–13)
BACTERIA UR QL AUTO: ABNORMAL /HPF
BASOPHILS # BLD AUTO: 0.07 THOUSANDS/ΜL (ref 0–0.1)
BASOPHILS NFR BLD AUTO: 1 % (ref 0–1)
BILIRUB UR QL STRIP: NEGATIVE
BUN SERPL-MCNC: 19 MG/DL (ref 6–20)
CALCIUM SERPL-MCNC: 10.4 MG/DL (ref 8.4–10.2)
CHLORIDE SERPL-SCNC: 97 MMOL/L (ref 96–108)
CLARITY UR: CLEAR
CO2 SERPL-SCNC: 31 MMOL/L (ref 22–33)
COLOR UR: YELLOW
CREAT SERPL-MCNC: 0.86 MG/DL (ref 0.4–1.1)
CREAT UR-MCNC: 19.8 MG/DL
EOSINOPHIL # BLD AUTO: 0.29 THOUSAND/ΜL (ref 0–0.61)
EOSINOPHIL NFR BLD AUTO: 4 % (ref 0–6)
ERYTHROCYTE [DISTWIDTH] IN BLOOD BY AUTOMATED COUNT: 14.4 % (ref 11.6–15.1)
GFR SERPL CREATININE-BSD FRML MDRD: 64 ML/MIN/1.73SQ M
GLUCOSE P FAST SERPL-MCNC: 129 MG/DL (ref 70–105)
GLUCOSE UR STRIP-MCNC: NEGATIVE MG/DL
HCT VFR BLD AUTO: 37.5 % (ref 34.8–46.1)
HGB BLD-MCNC: 12.1 G/DL (ref 11.5–15.4)
HGB UR QL STRIP.AUTO: NEGATIVE
IMM GRANULOCYTES # BLD AUTO: 0.02 THOUSAND/UL (ref 0–0.2)
IMM GRANULOCYTES NFR BLD AUTO: 0 % (ref 0–2)
KETONES UR STRIP-MCNC: NEGATIVE MG/DL
LEUKOCYTE ESTERASE UR QL STRIP: ABNORMAL
LYMPHOCYTES # BLD AUTO: 1.39 THOUSANDS/ΜL (ref 0.6–4.47)
LYMPHOCYTES NFR BLD AUTO: 19 % (ref 14–44)
MAGNESIUM SERPL-MCNC: 1.9 MG/DL (ref 1.6–2.6)
MCH RBC QN AUTO: 27.6 PG (ref 26.8–34.3)
MCHC RBC AUTO-ENTMCNC: 32.3 G/DL (ref 31.4–37.4)
MCV RBC AUTO: 85 FL (ref 82–98)
MONOCYTES # BLD AUTO: 0.71 THOUSAND/ΜL (ref 0.17–1.22)
MONOCYTES NFR BLD AUTO: 10 % (ref 4–12)
MUCOUS THREADS UR QL AUTO: ABNORMAL
NEUTROPHILS # BLD AUTO: 4.86 THOUSANDS/ΜL (ref 1.85–7.62)
NEUTS SEG NFR BLD AUTO: 66 % (ref 43–75)
NITRITE UR QL STRIP: NEGATIVE
NON-SQ EPI CELLS URNS QL MICRO: ABNORMAL /HPF
NRBC BLD AUTO-RTO: 0 /100 WBCS
PH UR STRIP.AUTO: 7 [PH]
PHOSPHATE SERPL-MCNC: 3.9 MG/DL (ref 2.5–5)
PLATELET # BLD AUTO: 293 THOUSANDS/UL (ref 149–390)
PMV BLD AUTO: 9.7 FL (ref 8.9–12.7)
POTASSIUM SERPL-SCNC: 4.4 MMOL/L (ref 3.5–5)
PROT UR STRIP-MCNC: NEGATIVE MG/DL
PROT UR-MCNC: 7 MG/DL
PROT/CREAT UR: 0.35 MG/G{CREAT} (ref 0–0.1)
RBC # BLD AUTO: 4.39 MILLION/UL (ref 3.81–5.12)
RBC #/AREA URNS AUTO: ABNORMAL /HPF
SODIUM SERPL-SCNC: 134 MMOL/L (ref 133–145)
SP GR UR STRIP.AUTO: 1.01 (ref 1–1.03)
UROBILINOGEN UR QL STRIP.AUTO: 0.2 E.U./DL
WBC # BLD AUTO: 7.34 THOUSAND/UL (ref 4.31–10.16)
WBC #/AREA URNS AUTO: ABNORMAL /HPF

## 2022-02-01 PROCEDURE — 80048 BASIC METABOLIC PNL TOTAL CA: CPT

## 2022-02-01 PROCEDURE — 36415 COLL VENOUS BLD VENIPUNCTURE: CPT

## 2022-02-01 PROCEDURE — 83735 ASSAY OF MAGNESIUM: CPT

## 2022-02-01 PROCEDURE — 84156 ASSAY OF PROTEIN URINE: CPT

## 2022-02-01 PROCEDURE — 84100 ASSAY OF PHOSPHORUS: CPT

## 2022-02-01 PROCEDURE — 81001 URINALYSIS AUTO W/SCOPE: CPT

## 2022-02-01 PROCEDURE — 85025 COMPLETE CBC W/AUTO DIFF WBC: CPT

## 2022-02-01 PROCEDURE — 82570 ASSAY OF URINE CREATININE: CPT

## 2022-02-01 NOTE — RESULT ENCOUNTER NOTE
Hello    Patient normally is followed up by Ms Faulkner Sic  Can you please let the patient know that the calcium is borderline elevated  Hold the calcium and vitamin-D supplement  I am seeing her next week at which point I will give her new lab slips as we will need to also check parathyroid hormone levels at that time    But for now can just hold the medications mentioned above and we will discuss more next week    Thank you    np

## 2022-02-01 NOTE — TELEPHONE ENCOUNTER
----- Message from Jen Sun MD sent at 2/1/2022 11:54 AM EST -----  Hello    Patient normally is followed up by Ms Betina Souza  Can you please let the patient know that the calcium is borderline elevated  Hold the calcium and vitamin-D supplement  I am seeing her next week at which point I will give her new lab slips as we will need to also check parathyroid hormone levels at that time    But for now can just hold the medications mentioned above and we will discuss more next week    Thank you    np

## 2022-02-09 ENCOUNTER — OFFICE VISIT (OUTPATIENT)
Dept: NEPHROLOGY | Facility: CLINIC | Age: 82
End: 2022-02-09
Payer: MEDICARE

## 2022-02-09 VITALS
HEART RATE: 81 BPM | DIASTOLIC BLOOD PRESSURE: 54 MMHG | HEIGHT: 59 IN | BODY MASS INDEX: 35.48 KG/M2 | SYSTOLIC BLOOD PRESSURE: 130 MMHG | WEIGHT: 176 LBS

## 2022-02-09 DIAGNOSIS — E83.52 HYPERCALCEMIA: Primary | ICD-10-CM

## 2022-02-09 DIAGNOSIS — E87.1 HYPONATREMIA: ICD-10-CM

## 2022-02-09 PROCEDURE — 99213 OFFICE O/P EST LOW 20 MIN: CPT | Performed by: INTERNAL MEDICINE

## 2022-02-09 NOTE — PATIENT INSTRUCTIONS
1) Avoid NSAIDS - (Example - motrin, advil, ibuprofen, aleve, exederin, etc)  2) Always follow a low salt diet  3) labwork in one month - BMP, PTH, Vitamin D level, phosphorous levels  4) full labwork in 5 months  5) appointment in 5-6 months  6) keep holding calcium supplement for now

## 2022-02-09 NOTE — LETTER
February 9, 2022     [facility]chel[facility]In[facility] [address]  [id number] [place] [address]    Patient: Yadi Angel   YOB: 1940   Date of Visit: 2/9/2022       Dear Dr Low Cage:    Thank you for referring Tyrell Santamaria to me for evaluation  Below are my notes for this consultation  If you have questions, please do not hesitate to call me  I look forward to following your patient along with you  Sincerely,        Fernie Mccartney MD        CC: No Recipients  Fernie Mccartney MD  2/9/2022  4:40 PM  Sign when Signing Visit  Alessandro Franklin [de-identified] y o  female MRN: [id number]  2/9/2022    Reason for Visit: hyponatremia    ASSESSMENT and PLAN:    I had the pleasure of seeing ms Roblero today in the renal clinic for the continued management of hyponatremia  82 yo Patient has a history of CHF, lung carcinoid, COPD on home O4, TOM, hyponatremia, hypertension, prior ischemic bowel during pregnancy, who is being seen as a follow-up for hyponatremia  The etiology of hyponatremia was felt to be secondary to volume overload in the setting of heart failure, and citalopram use during admission 2018 at [facility] is now presenting for f/u for HTN, hyponatremia, proteinuria       1) hyponatremia     - secondary to volume overload prior  -serum osmolarity 270 on June 2021  -urine osmolarity 242, urine sodium 22 in June 2021  -sodium level is stable and then decreased in June 2021-128 after which patient was advised fluid restriction  Patient was also advised to be compliant with diuretics  -on torsemide  - no longer on salt tab     2020 - Pt states that was taking torsemide daily but not taking it on days with appointment and if had to go somewhere  Noticed edema when not taking torsemide  Was taking 1/2 of spironolactone  Saw Cardiology and was advised to take 1/2 tab torsemide (so ten mg) and spironolactone 25 mg daily  Pt states with this change, has edema improved   Is feeling satisfied with the improvement        May 2021- patient's blood pressures are below goal   Patient is asymptomatic   Patient states that she has now been compliant with her medications this week and prior was not taking some of her medications up to 1 to 2 times a week   We reviewed that this makes it difficult to appropriately titrate medications as we are not sure what she is taking when she is seen in the office  Dangelo Kirby agrees  Amol Loveless, now that the patient is taking all of her medications daily with the exception of diuretics on days when she has heard physician appointments, I advised the patient to hold amlodipine   And check blood pressures once a day for 2 weeks and call with results  February 2022-sodium level remains stable 134  Calcium borderline elevated and was advised to hold calcium supplement  Protein level in the urine remains stable 0 3      Plan:  - labwork in 1 month to follow up calcium, PTH, vitamin-D level  -full lab work in 5-6 months an appointment in 5-6 months  -continue mild fluid restriction     2) HTN - currently is on valsartan, torsemide, spironolactone, metoprolol, amlodipine, terazosin     -was started on spironolactone in April 2018    -during visit in [address] [place] [address], patient's amlodipine was increased and valsartan was changed to losartan due to recall   -renal artery Doppler were unrevealing   In the proximal arteries of the renal artery  -May 2019-Lower terazosin to 5 mg, and lower metoprolol to 37 5 twice a day  - 7/2019 - metoprolol changed to 25 mg at night as pt was taking 37 5 BID dosing only once daily     3) renal function      - stable creatinine 0 8 mg/dL  02/2022     4) proteinuria     - prior SPEP, UPEP unrevealing  - on ARB and spironolactone  - last UA on 8/2019  None recent  -  Repeat urinalysis unrevealing    U PCR stable 02/2022     5) thyroid nodule - biopsied prior     6) electrolytes     -   Hyponatremia is above -recheck BMP for now  - slightly elevated calcium  - calcium supplement held  February 2022  Repeating tests as noted above in 1 month     7) COPD and pulm carcinoid and TOM     - follows with Pulmonary team and Hematology team  - on home O2  - f/u CT scan pending from 5/13/2021     8) AST slight elevation - chronic  Per PCP     9)   recheck PTH 1 month     It was a pleasure evaluating your patient in the office today  Thank you for allowing our team to participate in the care of Ms Camryn Roblero  Please do not hesitate to contact our team if further issues/questions shall arise in the interim      No problem-specific Assessment & Plan notes found for this encounter  HPI:    Patient denies overall complaints  No fevers, chills, nausea, vomiting  No constipation  PATIENT INSTRUCTIONS:    Patient Instructions   1) Avoid NSAIDS - (Example - motrin, advil, ibuprofen, aleve, exederin, etc)  2) Always follow a low salt diet  3) labwork in one month - BMP, PTH, Vitamin D level, phosphorous levels  4) full labwork in 5 months  5) appointment in 5-6 months  6) keep holding calcium supplement for now          OBJECTIVE:  Current Weight: Weight - Scale: 79 8 kg (176 lb)  Vitals:    02/09/22 1600 02/09/22 1630   BP: 158/78 130/54   BP Location: Left arm    Patient Position: Sitting    Cuff Size: Standard    Pulse: 81    Weight: 79 8 kg (176 lb)    Height: 4' 11" (1 499 m)     Body mass index is 35 55 kg/m²  REVIEW OF SYSTEMS:    Review of Systems   Constitutional: Negative  Negative for fatigue  HENT: Negative  Eyes: Negative  Respiratory: Negative  Negative for shortness of breath  Cardiovascular: Negative  Negative for leg swelling  Gastrointestinal: Negative  Endocrine: Negative  Genitourinary: Negative  Negative for difficulty urinating  Musculoskeletal: Negative  Skin: Negative  Allergic/Immunologic: Negative  Neurological: Negative  Hematological: Negative  Psychiatric/Behavioral: Negative      All other systems reviewed and are negative  PHYSICAL EXAM:      Physical Exam  Vitals and nursing note reviewed  Constitutional:       General: She is not in acute distress  Appearance: She is well-developed  She is not diaphoretic  HENT:      Head: Normocephalic and atraumatic  Eyes:      General: No scleral icterus  Right eye: No discharge  Left eye: No discharge  Conjunctiva/sclera: Conjunctivae normal    Neck:      Vascular: No JVD  Cardiovascular:      Rate and Rhythm: Normal rate and regular rhythm  Heart sounds: No murmur heard  No friction rub  No gallop  Pulmonary:      Effort: Pulmonary effort is normal  No respiratory distress  Breath sounds: Normal breath sounds  No wheezing or rales  Abdominal:      General: Bowel sounds are normal  There is no distension  Palpations: Abdomen is soft  Tenderness: There is no abdominal tenderness  There is no rebound  Musculoskeletal:         General: No tenderness or deformity  Normal range of motion  Cervical back: Normal range of motion and neck supple  Right lower leg: Edema present  Left lower leg: Edema present  Comments: Trace to 1+   Skin:     General: Skin is warm and dry  Coloration: Skin is not pale  Findings: No erythema or rash  Neurological:      Mental Status: She is alert and oriented to person, place, and time  Coordination: Coordination normal    Psychiatric:         Behavior: Behavior normal          Thought Content:  Thought content normal          Judgment: Judgment normal          Medications:    Current Outpatient Medications:     Accu-Chek FastClix Lancets MISC, Use 1 lancet per day, Disp: 100 each, Rfl: 3    Accu-Chek SmartView test strip, Use 1 each daily Use as instructed, Disp: 100 each, Rfl: 3    acetaminophen (TYLENOL) 500 mg tablet, Take 500 mg by mouth every 6 (six) hours as needed for mild pain For pain, Disp: , Rfl:     ADULT ASPIRIN LOW STRENGTH PO, Take 1 tablet by mouth daily , Disp: , Rfl:     albuterol (ProAir HFA) 90 mcg/act inhaler, Inhale 2 puffs every 6 (six) hours as needed for wheezing or shortness of breath (Patient taking differently: Inhale 2 puffs as needed for wheezing or shortness of breath  ), Disp: 18 g, Rfl: 1    amLODIPine (NORVASC) 5 mg tablet, Take 1 tablet (5 mg total) by mouth daily, Disp: 90 tablet, Rfl: 3    fluticasone (FLONASE) 50 mcg/act nasal spray, 1 spray into each nostril daily , Disp: , Rfl:     glipiZIDE (GLUCOTROL) 10 mg tablet, Take 0 5 tablets (5 mg total) by mouth in the morning, Disp: 45 tablet, Rfl: 2    latanoprost (XALATAN) 0 005 % ophthalmic solution, Apply to eye, Disp: , Rfl:     loratadine (CLARITIN) 10 mg tablet, Take 1 tablet by mouth daily, Disp: , Rfl:     metFORMIN (GLUCOPHAGE) 500 mg tablet, Take 2 tablets (1,000 mg total) by mouth 2 (two) times a day, Disp: 360 tablet, Rfl: 2    metoprolol succinate (TOPROL-XL) 25 mg 24 hr tablet, Take 1 tablet (25 mg total) by mouth daily at bedtime, Disp: 90 tablet, Rfl: 3    montelukast (SINGULAIR) 10 mg tablet, Take 1 tablet (10 mg total) by mouth daily at bedtime, Disp: 30 tablet, Rfl: 2    spironolactone (ALDACTONE) 25 mg tablet, Take 1 tablet (25 mg total) by mouth daily, Disp: 90 tablet, Rfl: 3    terazosin (HYTRIN) 5 mg capsule, Take 1 capsule (5 mg total) by mouth daily at bedtime, Disp: 90 capsule, Rfl: 3    torsemide (DEMADEX) 20 mg tablet, Take 1 tablet (20 mg total) by mouth daily (Patient taking differently: Take 10 mg by mouth daily  ), Disp: 90 tablet, Rfl: 3    valsartan (DIOVAN) 320 MG tablet, Take 1 tablet (320 mg total) by mouth daily, Disp: 90 tablet, Rfl: 2    Calcium 600-400 MG-UNIT CHEW, Chew 1 tablet daily (Patient not taking: Reported on 12/14/2021 ), Disp: 30 tablet, Rfl: 3    calcium carbonate-vitamin D (OSCAL-D) 500 mg-200 units per tablet, Take 1 tablet by mouth (Patient not taking: Reported on 2/1/2022 ), Disp: , Rfl:     prednisoLONE acetate (PRED FORTE) 1 % ophthalmic suspension, , Disp: , Rfl:     Laboratory Results:        Invalid input(s): ALBUMIN    Results for orders placed or performed in visit on 84/43/45   Basic metabolic panel   Result Value Ref Range    Sodium 134 133 - 145 mmol/L    Potassium 4 4 3 5 - 5 0 mmol/L    Chloride 97 96 - 108 mmol/L    CO2 31 22 - 33 mmol/L    ANION GAP 6 4 - 13 mmol/L    BUN 19 6 - 20 mg/dL    Creatinine 0 86 0 40 - 1 10 mg/dL    Glucose, Fasting 129 (H) 70 - 105 mg/dL    Calcium 10 4 (H) 8 4 - 10 2 mg/dL    eGFR 64 ml/min/1 73sq m   Magnesium   Result Value Ref Range    Magnesium 1 9 1 6 - 2 6 mg/dL   Phosphorus   Result Value Ref Range    Phosphorus 3 9 2 5 - 5 0 mg/dL   CBC and differential   Result Value Ref Range    WBC 7 34 4 31 - 10 16 Thousand/uL    RBC 4 39 3 81 - 5 12 Million/uL    Hemoglobin 12 1 11 5 - 15 4 g/dL    Hematocrit 37 5 34 8 - 46 1 %    MCV 85 82 - 98 fL    MCH 27 6 26 8 - 34 3 pg    MCHC 32 3 31 4 - 37 4 g/dL    RDW 14 4 11 6 - 15 1 %    MPV 9 7 8 9 - 12 7 fL    Platelets 408 912 - 298 Thousands/uL    nRBC 0 /100 WBCs    Neutrophils Relative 66 43 - 75 %    Immat GRANS % 0 0 - 2 %    Lymphocytes Relative 19 14 - 44 %    Monocytes Relative 10 4 - 12 %    Eosinophils Relative 4 0 - 6 %    Basophils Relative 1 0 - 1 %    Neutrophils Absolute 4 86 1 85 - 7 62 Thousands/µL    Immature Grans Absolute 0 02 0 00 - 0 20 Thousand/uL    Lymphocytes Absolute 1 39 0 60 - 4 47 Thousands/µL    Monocytes Absolute 0 71 0 17 - 1 22 Thousand/µL    Eosinophils Absolute 0 29 0 00 - 0 61 Thousand/µL    Basophils Absolute 0 07 0 00 - 0 10 Thousands/µL

## 2022-02-10 DIAGNOSIS — E11.9 DIABETES MELLITUS WITHOUT COMPLICATION (HCC): ICD-10-CM

## 2022-03-09 ENCOUNTER — LAB (OUTPATIENT)
Dept: LAB | Facility: HOSPITAL | Age: 82
End: 2022-03-09
Attending: INTERNAL MEDICINE
Payer: MEDICARE

## 2022-03-09 DIAGNOSIS — E83.52 HYPERCALCEMIA: ICD-10-CM

## 2022-03-09 LAB
25(OH)D3 SERPL-MCNC: 38.2 NG/ML (ref 30–100)
ANION GAP SERPL CALCULATED.3IONS-SCNC: 8 MMOL/L (ref 4–13)
BUN SERPL-MCNC: 20 MG/DL (ref 5–25)
CALCIUM SERPL-MCNC: 10.1 MG/DL (ref 8.4–10.2)
CHLORIDE SERPL-SCNC: 96 MMOL/L (ref 96–108)
CO2 SERPL-SCNC: 29 MMOL/L (ref 21–32)
CREAT SERPL-MCNC: 0.83 MG/DL (ref 0.6–1.3)
GFR SERPL CREATININE-BSD FRML MDRD: 66 ML/MIN/1.73SQ M
GLUCOSE P FAST SERPL-MCNC: 121 MG/DL (ref 65–99)
PHOSPHATE SERPL-MCNC: 3.7 MG/DL (ref 2.3–4.1)
POTASSIUM SERPL-SCNC: 4.2 MMOL/L (ref 3.5–5.3)
PTH-INTACT SERPL-MCNC: 28.6 PG/ML (ref 18.4–80.1)
SODIUM SERPL-SCNC: 133 MMOL/L (ref 135–147)

## 2022-03-09 PROCEDURE — 84100 ASSAY OF PHOSPHORUS: CPT

## 2022-03-09 PROCEDURE — 36415 COLL VENOUS BLD VENIPUNCTURE: CPT

## 2022-03-09 PROCEDURE — 82306 VITAMIN D 25 HYDROXY: CPT

## 2022-03-09 PROCEDURE — 80048 BASIC METABOLIC PNL TOTAL CA: CPT

## 2022-03-09 PROCEDURE — 83970 ASSAY OF PARATHORMONE: CPT

## 2022-03-09 NOTE — RESULT ENCOUNTER NOTE
Hello    Patient normally is followed up by Ms Afsaneh Pack  Please let the patient know that the calcium is improved  PTH and vitamin-D are appropriate  Sodium level is slightly below goal at 1:33 a m  No changes for now    Repeat a BMP in 1-2 months   -patient should continue mild fluid restriction 1 8 L per day    Thank you    np

## 2022-03-10 ENCOUNTER — TELEPHONE (OUTPATIENT)
Dept: NEPHROLOGY | Facility: CLINIC | Age: 82
End: 2022-03-10

## 2022-03-10 DIAGNOSIS — N18.30 STAGE 3 CHRONIC KIDNEY DISEASE, UNSPECIFIED WHETHER STAGE 3A OR 3B CKD (HCC): Primary | ICD-10-CM

## 2022-03-10 NOTE — TELEPHONE ENCOUNTER
----- Message from Juan Fernández MD sent at 3/9/2022  5:18 PM EST -----  Hello    Patient normally is followed up by Ms Jemma Chan  Please let the patient know that the calcium is improved  PTH and vitamin-D are appropriate  Sodium level is slightly below goal at 1:33 a m  No changes for now    Repeat a BMP in 1-2 months   -patient should continue mild fluid restriction 1 8 L per day    Thank you    np

## 2022-03-22 ENCOUNTER — OFFICE VISIT (OUTPATIENT)
Dept: OBGYN CLINIC | Facility: CLINIC | Age: 82
End: 2022-03-22
Payer: MEDICARE

## 2022-03-22 VITALS
HEIGHT: 59 IN | BODY MASS INDEX: 36 KG/M2 | DIASTOLIC BLOOD PRESSURE: 58 MMHG | WEIGHT: 178.6 LBS | SYSTOLIC BLOOD PRESSURE: 138 MMHG

## 2022-03-22 DIAGNOSIS — M17.11 PRIMARY OSTEOARTHRITIS OF RIGHT KNEE: ICD-10-CM

## 2022-03-22 DIAGNOSIS — M17.12 PRIMARY OSTEOARTHRITIS OF LEFT KNEE: Primary | ICD-10-CM

## 2022-03-22 PROCEDURE — 20610 DRAIN/INJ JOINT/BURSA W/O US: CPT | Performed by: ORTHOPAEDIC SURGERY

## 2022-03-22 PROCEDURE — 99214 OFFICE O/P EST MOD 30 MIN: CPT | Performed by: ORTHOPAEDIC SURGERY

## 2022-03-22 RX ORDER — KETOROLAC TROMETHAMINE 30 MG/ML
30 INJECTION, SOLUTION INTRAMUSCULAR; INTRAVENOUS
Status: COMPLETED | OUTPATIENT
Start: 2022-03-22 | End: 2022-03-22

## 2022-03-22 RX ORDER — BUPIVACAINE HYDROCHLORIDE 2.5 MG/ML
2 INJECTION, SOLUTION INFILTRATION; PERINEURAL
Status: COMPLETED | OUTPATIENT
Start: 2022-03-22 | End: 2022-03-22

## 2022-03-22 RX ORDER — METHYLPREDNISOLONE ACETATE 80 MG/ML
1 INJECTION, SUSPENSION INTRA-ARTICULAR; INTRALESIONAL; INTRAMUSCULAR; SOFT TISSUE
Status: COMPLETED | OUTPATIENT
Start: 2022-03-22 | End: 2022-03-22

## 2022-03-22 RX ADMIN — KETOROLAC TROMETHAMINE 30 MG: 30 INJECTION, SOLUTION INTRAMUSCULAR; INTRAVENOUS at 14:09

## 2022-03-22 RX ADMIN — METHYLPREDNISOLONE ACETATE 1 ML: 80 INJECTION, SUSPENSION INTRA-ARTICULAR; INTRALESIONAL; INTRAMUSCULAR; SOFT TISSUE at 14:09

## 2022-03-22 RX ADMIN — BUPIVACAINE HYDROCHLORIDE 2 ML: 2.5 INJECTION, SOLUTION INFILTRATION; PERINEURAL at 14:09
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
## 2022-04-08 ENCOUNTER — RA CDI HCC (OUTPATIENT)
Dept: OTHER | Facility: HOSPITAL | Age: 82
End: 2022-04-08

## 2022-04-08 NOTE — PROGRESS NOTES
I13 0, E11 22  [facility]  coding opportunities          Chart Reviewed number of suggestions sent to Provider: 2     Patients Insurance     Medicare Insurance: Medicare

## 2022-04-10 DIAGNOSIS — J30.2 SEASONAL ALLERGIC RHINITIS, UNSPECIFIED TRIGGER: ICD-10-CM

## 2022-04-10 RX ORDER — MONTELUKAST SODIUM 10 MG/1
TABLET ORAL
Qty: 90 TABLET | Refills: 0 | Status: SHIPPED | OUTPATIENT
Start: 2022-04-10

## 2022-04-18 ENCOUNTER — LAB (OUTPATIENT)
Dept: LAB | Facility: HOSPITAL | Age: 82
End: 2022-04-18
Attending: INTERNAL MEDICINE
Payer: MEDICARE

## 2022-04-18 DIAGNOSIS — N18.30 STAGE 3 CHRONIC KIDNEY DISEASE, UNSPECIFIED WHETHER STAGE 3A OR 3B CKD (HCC): ICD-10-CM

## 2022-04-18 LAB
ANION GAP SERPL CALCULATED.3IONS-SCNC: 6 MMOL/L (ref 4–13)
BUN SERPL-MCNC: 18 MG/DL (ref 5–25)
CALCIUM SERPL-MCNC: 9.9 MG/DL (ref 8.4–10.2)
CHLORIDE SERPL-SCNC: 104 MMOL/L (ref 96–108)
CO2 SERPL-SCNC: 27 MMOL/L (ref 21–32)
CREAT SERPL-MCNC: 0.67 MG/DL (ref 0.6–1.3)
GFR SERPL CREATININE-BSD FRML MDRD: 83 ML/MIN/1.73SQ M
GLUCOSE P FAST SERPL-MCNC: 92 MG/DL (ref 65–99)
POTASSIUM SERPL-SCNC: 4.1 MMOL/L (ref 3.5–5.3)
SODIUM SERPL-SCNC: 137 MMOL/L (ref 135–147)

## 2022-04-18 PROCEDURE — 36415 COLL VENOUS BLD VENIPUNCTURE: CPT

## 2022-04-18 PROCEDURE — 80048 BASIC METABOLIC PNL TOTAL CA: CPT

## 2022-04-18 NOTE — RESULT ENCOUNTER NOTE
Hello    Patient normally is followed up by Ms Roque Laird  Please let the patient know that the sodium level is normalized 137  Creatinine stable  Calcium normal   No changes for now      Thank you    np

## 2022-04-19 ENCOUNTER — TELEPHONE (OUTPATIENT)
Dept: NEPHROLOGY | Facility: CLINIC | Age: 82
End: 2022-04-19

## 2022-04-19 NOTE — TELEPHONE ENCOUNTER
----- Message from Brittany Owen MD sent at 4/18/2022  3:39 PM EDT -----  Hello    Patient normally is followed up by Ms Nani Torres  Please let the patient know that the sodium level is normalized 137  Creatinine stable  Calcium normal   No changes for now      Thank you    np

## 2022-04-23 NOTE — ASSESSMENT & PLAN NOTE
Had CT in May 2021 and was stable  Continue follow-up with Oncology Dr Rukhsana Hurley in Aug 2021  Security at bedside, has returned pt belongings to room.

## 2022-04-26 ENCOUNTER — OFFICE VISIT (OUTPATIENT)
Dept: FAMILY MEDICINE CLINIC | Facility: CLINIC | Age: 82
End: 2022-04-26
Payer: MEDICARE

## 2022-04-26 VITALS
TEMPERATURE: 97 F | HEART RATE: 77 BPM | OXYGEN SATURATION: 96 % | BODY MASS INDEX: 35.08 KG/M2 | HEIGHT: 59 IN | SYSTOLIC BLOOD PRESSURE: 130 MMHG | WEIGHT: 174 LBS | DIASTOLIC BLOOD PRESSURE: 60 MMHG | RESPIRATION RATE: 16 BRPM

## 2022-04-26 DIAGNOSIS — E11.9 DIABETES MELLITUS WITHOUT COMPLICATION (HCC): ICD-10-CM

## 2022-04-26 DIAGNOSIS — Z13.820 ENCOUNTER FOR OSTEOPOROSIS SCREENING IN ASYMPTOMATIC POSTMENOPAUSAL PATIENT: ICD-10-CM

## 2022-04-26 DIAGNOSIS — Z12.31 ENCOUNTER FOR SCREENING MAMMOGRAM FOR BREAST CANCER: ICD-10-CM

## 2022-04-26 DIAGNOSIS — D3A.090 BENIGN CARCINOID TUMOR OF LUNG: ICD-10-CM

## 2022-04-26 DIAGNOSIS — G47.33 OSA (OBSTRUCTIVE SLEEP APNEA): ICD-10-CM

## 2022-04-26 DIAGNOSIS — I50.32 CHRONIC DIASTOLIC CHF (CONGESTIVE HEART FAILURE) (HCC): ICD-10-CM

## 2022-04-26 DIAGNOSIS — J44.9 CHRONIC OBSTRUCTIVE PULMONARY DISEASE, UNSPECIFIED COPD TYPE (HCC): ICD-10-CM

## 2022-04-26 DIAGNOSIS — Z78.0 ENCOUNTER FOR OSTEOPOROSIS SCREENING IN ASYMPTOMATIC POSTMENOPAUSAL PATIENT: ICD-10-CM

## 2022-04-26 DIAGNOSIS — Z23 ENCOUNTER FOR IMMUNIZATION: ICD-10-CM

## 2022-04-26 DIAGNOSIS — Z00.00 MEDICARE ANNUAL WELLNESS VISIT, SUBSEQUENT: Primary | ICD-10-CM

## 2022-04-26 DIAGNOSIS — I10 ESSENTIAL HYPERTENSION: ICD-10-CM

## 2022-04-26 DIAGNOSIS — E87.1 HYPONATREMIA: ICD-10-CM

## 2022-04-26 DIAGNOSIS — E66.01 CLASS 2 SEVERE OBESITY DUE TO EXCESS CALORIES WITH SERIOUS COMORBIDITY AND BODY MASS INDEX (BMI) OF 36.0 TO 36.9 IN ADULT (HCC): ICD-10-CM

## 2022-04-26 LAB — SL AMB POCT HEMOGLOBIN AIC: 6.3 (ref ?–6.5)

## 2022-04-26 PROCEDURE — 1123F ACP DISCUSS/DSCN MKR DOCD: CPT | Performed by: FAMILY MEDICINE

## 2022-04-26 PROCEDURE — G0009 ADMIN PNEUMOCOCCAL VACCINE: HCPCS | Performed by: FAMILY MEDICINE

## 2022-04-26 PROCEDURE — 90677 PCV20 VACCINE IM: CPT | Performed by: FAMILY MEDICINE

## 2022-04-26 PROCEDURE — 83036 HEMOGLOBIN GLYCOSYLATED A1C: CPT | Performed by: FAMILY MEDICINE

## 2022-04-26 PROCEDURE — 99214 OFFICE O/P EST MOD 30 MIN: CPT | Performed by: FAMILY MEDICINE

## 2022-04-26 PROCEDURE — G0438 PPPS, INITIAL VISIT: HCPCS | Performed by: FAMILY MEDICINE

## 2022-04-26 NOTE — ASSESSMENT & PLAN NOTE
Wt Readings from Last 3 Encounters:   03/22/22 81 kg (178 lb 9 6 oz)   03/02/22 79 8 kg (176 lb)   02/09/22 79 8 kg (176 lb)     No recent weight gain or leg swelling  Continue current meds

## 2022-04-26 NOTE — ASSESSMENT & PLAN NOTE
Wellness exam done  Had flu shot in Sept 2021 and COVID booster in Jan 2022  Had prevnar 13 and pneumovacc 23  Recommend Tdap and Shingrix  FBS and lipids UTD  I advised pt to schedule mammogram and dexascan  No recent falls  Mood ok  Has living will

## 2022-04-26 NOTE — PROGRESS NOTES
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  Right Foot/Ankle   Right Foot Inspection  Skin Exam: skin normal and skin intact  No dry skin, no warmth, no callus, no erythema, no maceration, no abnormal color, no pre-ulcer, no ulcer and no callus  Sensory   Proprioception: diminished      Vascular  Capillary refills: < 3 seconds          Left Foot/Ankle  Left Foot Inspection  Skin Exam: skin normal and skin intact  No dry skin, no warmth, no erythema, no maceration, normal color, no pre-ulcer, no ulcer and no callus       Sensory   Proprioception: diminished      Vascular  Capillary refills: < 3 seconds        Assign Risk Category  No deformity present  Loss of protective sensation  No weak pulses  Risk: 1        Dalia Beal MD

## 2022-04-26 NOTE — ASSESSMENT & PLAN NOTE
A1C done today and was 6 4  Continue metformin 1000 mg bid and glipizide 5 mg qd  Continue low carb diet  Foot exam done today  Eye exam done in Aug 2021  Had COVID booster and flu shot      Lab Results   Component Value Date    HGBA1C 6 4 12/14/2021

## 2022-04-26 NOTE — ASSESSMENT & PLAN NOTE
Pt saw Dr Tico Alberto in Nov 2021 and has been stable  Pt continues to use O2 supplementation as needed  Her 6 min walk test showed desaturation to 82% with exertion  Had flu shot in Sept 2021 and had COVID booster in Jan 2022

## 2022-04-26 NOTE — PATIENT INSTRUCTIONS
Medicare Preventive Visit Patient Instructions  Thank you for completing your Welcome to Medicare Visit or Medicare Annual Wellness Visit today  Your next wellness visit will be due in one year (4/27/2023)  The screening/preventive services that you may require over the next 5-10 years are detailed below  Some tests may not apply to you based off risk factors and/or age  Screening tests ordered at today's visit but not completed yet may show as past due  Also, please note that scanned in results may not display below  Preventive Screenings:  Service Recommendations Previous Testing/Comments   Colorectal Cancer Screening  * Colonoscopy    * Fecal Occult Blood Test (FOBT)/Fecal Immunochemical Test (FIT)  * Fecal DNA/Cologuard Test  * Flexible Sigmoidoscopy Age: 54-65 years old   Colonoscopy: every 10 years (may be performed more frequently if at higher risk)  OR  FOBT/FIT: every 1 year  OR  Cologuard: every 3 years  OR  Sigmoidoscopy: every 5 years  Screening may be recommended earlier than age 48 if at higher risk for colorectal cancer  Also, an individualized decision between you and your healthcare provider will decide whether screening between the ages of 74-80 would be appropriate  Colonoscopy: Not on file  FOBT/FIT: Not on file  Cologuard: Not on file  Sigmoidoscopy: Not on file    Patient Declines     Breast Cancer Screening Age: 36 years old  Frequency: every 1-2 years  Not required if history of left and right mastectomy Mammogram: 04/10/2019    Patient Declines   Cervical Cancer Screening Between the ages of 21-29, pap smear recommended once every 3 years  Between the ages of 33-67, can perform pap smear with HPV co-testing every 5 years     Recommendations may differ for women with a history of total hysterectomy, cervical cancer, or abnormal pap smears in past  Pap Smear: Not on file    Screening Not Indicated   Hepatitis C Screening Once for adults born between 1945 and 1965  More frequently in patients at high risk for Hepatitis C Hep C Antibody: Not on file        Diabetes Screening 1-2 times per year if you're at risk for diabetes or have pre-diabetes Fasting glucose: 92 mg/dL   A1C: 6 4    Screening Not Indicated  History Diabetes   Cholesterol Screening Once every 5 years if you don't have a lipid disorder  May order more often based on risk factors  Lipid panel: 05/29/2021    Screening Current     Other Preventive Screenings Covered by Medicare:  1  Abdominal Aortic Aneurysm (AAA) Screening: covered once if your at risk  You're considered to be at risk if you have a family history of AAA  2  Lung Cancer Screening: covers low dose CT scan once per year if you meet all of the following conditions: (1) Age 50-69; (2) No signs or symptoms of lung cancer; (3) Current smoker or have quit smoking within the last 15 years; (4) You have a tobacco smoking history of at least 30 pack years (packs per day multiplied by number of years you smoked); (5) You get a written order from a healthcare provider  3  Glaucoma Screening: covered annually if you're considered high risk: (1) You have diabetes OR (2) Family history of glaucoma OR (3)  aged 48 and older OR (3)  American aged 72 and older  3  Osteoporosis Screening: covered every 2 years if you meet one of the following conditions: (1) You're estrogen deficient and at risk for osteoporosis based off medical history and other findings; (2) Have a vertebral abnormality; (3) On glucocorticoid therapy for more than 3 months; (4) Have primary hyperparathyroidism; (5) On osteoporosis medications and need to assess response to drug therapy  · Last bone density test (DXA Scan): Not on file  5  HIV Screening: covered annually if you're between the age of 12-76  Also covered annually if you are younger than 13 and older than 72 with risk factors for HIV infection   For pregnant patients, it is covered up to 3 times per pregnancy  Immunizations:  Immunization Recommendations   Influenza Vaccine Annual influenza vaccination during flu season is recommended for all persons aged >= 6 months who do not have contraindications   Pneumococcal Vaccine (Prevnar and Pneumovax)  * Prevnar = PCV13  * Pneumovax = PPSV23   Adults 25-60 years old: 1-3 doses may be recommended based on certain risk factors  Adults 72 years old: Prevnar (PCV13) vaccine recommended followed by Pneumovax (PPSV23) vaccine  If already received PPSV23 since turning 65, then PCV13 recommended at least one year after PPSV23 dose  Hepatitis B Vaccine 3 dose series if at intermediate or high risk (ex: diabetes, end stage renal disease, liver disease)   Tetanus (Td) Vaccine - COST NOT COVERED BY MEDICARE PART B Following completion of primary series, a booster dose should be given every 10 years to maintain immunity against tetanus  Td may also be given as tetanus wound prophylaxis  Tdap Vaccine - COST NOT COVERED BY MEDICARE PART B Recommended at least once for all adults  For pregnant patients, recommended with each pregnancy  Shingles Vaccine (Shingrix) - COST NOT COVERED BY MEDICARE PART B  2 shot series recommended in those aged 48 and above     Health Maintenance Due:  There are no preventive care reminders to display for this patient  Immunizations Due:  There are no preventive care reminders to display for this patient  Advance Directives   What are advance directives? Advance directives are legal documents that state your wishes and plans for medical care  These plans are made ahead of time in case you lose your ability to make decisions for yourself  Advance directives can apply to any medical decision, such as the treatments you want, and if you want to donate organs  What are the types of advance directives? There are many types of advance directives, and each state has rules about how to use them   You may choose a combination of any of the following:  · Living will: This is a written record of the treatment you want  You can also choose which treatments you do not want, which to limit, and which to stop at a certain time  This includes surgery, medicine, IV fluid, and tube feedings  · Durable power of  for healthcare [facility]): This is a written record that states who you want to make healthcare choices for you when you are unable to make them for yourself  This person, called a proxy, is usually a family member or a friend  You may choose more than 1 proxy  · Do not resuscitate (DNR) order:  A DNR order is used in case your heart stops beating or you stop breathing  It is a request not to have certain forms of treatment, such as CPR  A DNR order may be included in other types of advance directives  · Medical directive: This covers the care that you want if you are in a coma, near death, or unable to make decisions for yourself  You can list the treatments you want for each condition  Treatment may include pain medicine, surgery, blood transfusions, dialysis, IV or tube feedings, and a ventilator (breathing machine)  · Values history: This document has questions about your views, beliefs, and how you feel and think about life  This information can help others choose the care that you would choose  Why are advance directives important? An advance directive helps you control your care  Although spoken wishes may be used, it is better to have your wishes written down  Spoken wishes can be misunderstood, or not followed  Treatments may be given even if you do not want them  An advance directive may make it easier for your family to make difficult choices about your care  Urinary Incontinence   Urinary incontinence (UI)  is when you lose control of your bladder  UI develops because your bladder cannot store or empty urine properly  The 3 most common types of UI are stress incontinence, urge incontinence, or both    Medicines:   · May be given to help strengthen your bladder control  Report any side effects of medication to your healthcare provider  Do pelvic muscle exercises often:  Your pelvic muscles help you stop urinating  Squeeze these muscles tight for 5 seconds, then relax for 5 seconds  Gradually work up to squeezing for 10 seconds  Do 3 sets of 15 repetitions a day, or as directed  This will help strengthen your pelvic muscles and improve bladder control  Train your bladder:  Go to the bathroom at set times, such as every 2 hours, even if you do not feel the urge to go  You can also try to hold your urine when you feel the urge to go  For example, hold your urine for 5 minutes when you feel the urge to go  As that becomes easier, hold your urine for 10 minutes  Self-care:   · Keep a UI record  Write down how often you leak urine and how much you leak  Make a note of what you were doing when you leaked urine  · Drink liquids as directed  You may need to limit the amount of liquid you drink to help control your urine leakage  Do not drink any liquid right before you go to bed  Limit or do not have drinks that contain caffeine or alcohol  · Prevent constipation  Eat a variety of high-fiber foods  Good examples are high-fiber cereals, beans, vegetables, and whole-grain breads  Walking is the best way to trigger your intestines to have a bowel movement  · Exercise regularly and maintain a healthy weight  Weight loss and exercise will decrease pressure on your bladder and help you control your leakage  · Use a catheter as directed  to help empty your bladder  A catheter is a tiny, plastic tube that is put into your bladder to drain your urine  · Go to behavior therapy as directed  Behavior therapy may be used to help you learn to control your urge to urinate      Weight Management   Why it is important to manage your weight:  Being overweight increases your risk of health conditions such as heart disease, high blood pressure, type 2 diabetes, and certain types of cancer  It can also increase your risk for osteoarthritis, sleep apnea, and other respiratory problems  Aim for a slow, steady weight loss  Even a small amount of weight loss can lower your risk of health problems  How to lose weight safely:  A safe and healthy way to lose weight is to eat fewer calories and get regular exercise  You can lose up about 1 pound a week by decreasing the number of calories you eat by 500 calories each day  Healthy meal plan for weight management:  A healthy meal plan includes a variety of foods, contains fewer calories, and helps you stay healthy  A healthy meal plan includes the following:  · Eat whole-grain foods more often  A healthy meal plan should contain fiber  Fiber is the part of grains, fruits, and vegetables that is not broken down by your body  Whole-grain foods are healthy and provide extra fiber in your diet  Some examples of whole-grain foods are whole-wheat breads and pastas, oatmeal, brown rice, and bulgur  · Eat a variety of vegetables every day  Include dark, leafy greens such as spinach, kale, sammy greens, and mustard greens  Eat yellow and orange vegetables such as carrots, sweet potatoes, and winter squash  · Eat a variety of fruits every day  Choose fresh or canned fruit (canned in its own juice or light syrup) instead of juice  Fruit juice has very little or no fiber  · Eat low-fat dairy foods  Drink fat-free (skim) milk or 1% milk  Eat fat-free yogurt and low-fat cottage cheese  Try low-fat cheeses such as mozzarella and other reduced-fat cheeses  · Choose meat and other protein foods that are low in fat  Choose beans or other legumes such as split peas or lentils  Choose fish, skinless poultry (chicken or turkey), or lean cuts of red meat (beef or pork)  Before you cook meat or poultry, cut off any visible fat  · Use less fat and oil  Try baking foods instead of frying them   Add less fat, such as margarine, sour cream, regular salad dressing and mayonnaise to foods  Eat fewer high-fat foods  Some examples of high-fat foods include french fries, doughnuts, ice cream, and cakes  · Eat fewer sweets  Limit foods and drinks that are high in sugar  This includes candy, cookies, regular soda, and sweetened drinks  Exercise:  Exercise at least 30 minutes per day on most days of the week  Some examples of exercise include walking, biking, dancing, and swimming  You can also fit in more physical activity by taking the stairs instead of the elevator or parking farther away from stores  Ask your healthcare provider about the best exercise plan for you  © Copyright [employer] 2018 Information is for End User's use only and may not be sold, redistributed or otherwise used for commercial purposes   All illustrations and images included in CareNotes® are the copyrighted property of [employer]  or [address] [place] [address]

## 2022-05-16 ENCOUNTER — HOSPITAL ENCOUNTER (OUTPATIENT)
Dept: CT IMAGING | Facility: HOSPITAL | Age: 82
Discharge: HOME | End: 2022-05-16
Payer: MEDICARE

## 2022-05-16 DIAGNOSIS — C7A.090 MALIGNANT CARCINOID TUMOR OF THE BRONCHUS AND LUNG (HCC): ICD-10-CM

## 2022-05-16 PROCEDURE — 71250 CT THORAX DX C-: CPT

## 2022-05-16 PROCEDURE — G1004 CDSM NDSC: HCPCS

## 2022-05-23 ENCOUNTER — TELEPHONE (OUTPATIENT)
Dept: FAMILY MEDICINE CLINIC | Facility: CLINIC | Age: 82
End: 2022-05-23

## 2022-05-23 DIAGNOSIS — E11.9 DIABETES MELLITUS WITHOUT COMPLICATION (HCC): ICD-10-CM

## 2022-05-23 RX ORDER — LANCETS
EACH MISCELLANEOUS
Qty: 102 EACH | Refills: 5 | Status: SHIPPED | OUTPATIENT
Start: 2022-05-23

## 2022-05-24 ENCOUNTER — OFFICE VISIT (OUTPATIENT)
Dept: PULMONOLOGY | Facility: CLINIC | Age: 82
End: 2022-05-24
Payer: MEDICARE

## 2022-05-24 VITALS
OXYGEN SATURATION: 98 % | BODY MASS INDEX: 36.08 KG/M2 | HEIGHT: 59 IN | WEIGHT: 179 LBS | HEART RATE: 59 BPM | DIASTOLIC BLOOD PRESSURE: 69 MMHG | SYSTOLIC BLOOD PRESSURE: 166 MMHG

## 2022-05-24 DIAGNOSIS — G47.33 OSA (OBSTRUCTIVE SLEEP APNEA): ICD-10-CM

## 2022-05-24 DIAGNOSIS — J44.9 CHRONIC OBSTRUCTIVE PULMONARY DISEASE, UNSPECIFIED COPD TYPE (HCC): Primary | ICD-10-CM

## 2022-05-24 DIAGNOSIS — E66.01 CLASS 2 SEVERE OBESITY DUE TO EXCESS CALORIES WITH SERIOUS COMORBIDITY AND BODY MASS INDEX (BMI) OF 36.0 TO 36.9 IN ADULT (HCC): ICD-10-CM

## 2022-05-24 DIAGNOSIS — I50.32 CHRONIC DIASTOLIC CHF (CONGESTIVE HEART FAILURE) (HCC): ICD-10-CM

## 2022-05-24 DIAGNOSIS — D3A.090 BENIGN CARCINOID TUMOR OF LUNG: ICD-10-CM

## 2022-05-24 PROCEDURE — 99214 OFFICE O/P EST MOD 30 MIN: CPT | Performed by: INTERNAL MEDICINE

## 2022-05-24 NOTE — PROGRESS NOTES
Assessment/Plan:    Carcinoid tumor of lung  She had a 2 0 x 2 0 lobulated lung mass in the left lower lobe and multiple lung nodules on the left side  She had also mediastinal lymphadenopathy  She was also noted to have a nodule in the thyroid gland and a nodular hepatic lesion  She had a CT guided biopsy of LLL lung lesion which was consistent with neuroendocrine tumor, carcinoid  She follows with Dr Daylin Cabrales from Oncology  She has multiple lung nodules on her CT scan from Feb 2019  She had a CT scan in May 2021 which showed stability  COPD (chronic obstructive pulmonary disease) ([address] )  She has history of COPD and was on Advair Bid before  She also has history of asthma as a child  She has occasional cough and no significant phlegm  No fever or chills  She is a never smoker, however she has history of secondhand smoke exposure  Her PFT which showed moderate airflow obstruction with diffusion defect  There was no hyperinflation  Her 6 min walk test showed an oxygen desaturation to 82% with exertion  Her baseline oxygen saturation at rest was 90%  She also was found to have severe exercise limitation from SOB  She is not using Symbicort  now  I have advised her to use the nebulizer when necessary for her shortness of breath  I have advised her to continue with oxygen supplementation as needed  TOM (obstructive sleep apnea)  She had a long history of snoring and daytime sleepiness  She was found to have mild TOM with oxygen desaturation on her overnight sleep study  Her CPAP titration study was suboptimal  She was started on CPAP therapy at 9 cm of water and has been using it  Her CPAP compliance has been good  Her residual AHI has been low   She is getting benefit from CPAP therapy I have advised her to use the CPAP as before   Her latest CPAP compliance records are not currently available    I had a long discussion with her and have answered all their questions    Chronic diastolic CHF (congestive heart failure) ([place] [id number] )  Wt Readings from Last 3 Encounters:   05/24/22 81 2 kg (179 lb)   04/26/22 78 9 kg (174 lb)   03/22/22 81 kg (178 lb 9 6 oz)     She has chronic heart failure with good ejection fraction    She is  much better now  She follows with cardiology  She is on torsemide and spironolactone  Her previous echocardiogram from Feb 2016 showed good EF at 65%  She has CKD also  Class 2 severe obesity due to excess calories with serious comorbidity and body mass index (BMI) of 36 0 to 36 9 in adult [facility])  She is severely obese and understands the need for weight reduction   Pressure she has ambulatory dysfunction and has not been able to exercise much    Multiple pulmonary nodules  She has multiple lung nodules which were stable on the last CT scan from May 2022  Diagnoses and all orders for this visit:    Chronic obstructive pulmonary disease, unspecified COPD type ([place] [id number] )    Benign carcinoid tumor of lung    Class 2 severe obesity due to excess calories with serious comorbidity and body mass index (BMI) of 36 0 to 36 9 in adult ([facility])    TOM (obstructive sleep apnea)    Chronic diastolic CHF (congestive heart failure) ([facility])          Subjective:      Patient ID: Sandy Lund is a [de-identified] y o  female  Peña Laureano came for follow-up for her COPD and obstructive sleep apnea  Her exercise tolerance is limited due to her ambulatory problems  She uses a walker  Currently she uses only albuterol inhaler on an as-needed basis  She has chronic diastolic heart failure and has been on diuretic therapy  She has occasional cough and occasional wheeze  She has also allergies and she has been using Claritin  She also has montelukast   She denied any swelling of feet  No chest pain or palpitations  She has been the using the CPAP every night  She is comfortable with the mask and pressure  She has no significant daytime sleepiness or morning headache  She is very motivated to continue on CPAP therapy    Her compliance records are not available  The following portions of the patient's history were reviewed and updated as appropriate: allergies, current medications, past family history, past medical history, past social history, past surgical history and problem list     Review of Systems   Constitutional: Negative for activity change, appetite change, chills, fatigue and fever  HENT: Positive for rhinorrhea and sneezing  Negative for hearing loss, sore throat, trouble swallowing and voice change  Eyes: Negative for visual disturbance  Respiratory: Positive for cough and shortness of breath  Negative for wheezing  Cardiovascular: Negative for chest pain, palpitations and leg swelling  Gastrointestinal: Negative for abdominal pain, constipation, diarrhea, nausea and vomiting  Genitourinary: Negative for dysuria, frequency and urgency  Musculoskeletal: Positive for gait problem (uses walker)  Skin: Negative for rash  Allergic/Immunologic: Positive for environmental allergies  Neurological: Positive for dizziness  Negative for syncope, light-headedness and headaches  Psychiatric/Behavioral: Negative for agitation, confusion and sleep disturbance  The patient is not nervous/anxious  Objective:      /69 (BP Location: Left arm, Patient Position: Sitting, Cuff Size: Adult)   Pulse 59   Ht 4' 11" (1 499 m)   Wt 81 2 kg (179 lb)   SpO2 98%   BMI 36 15 kg/m²          Physical Exam  Vitals reviewed  Constitutional:       General: She is not in acute distress  Appearance: She is obese  She is not ill-appearing, toxic-appearing or diaphoretic  HENT:      Head: Normocephalic  Mouth/Throat:      Mouth: Mucous membranes are moist    Eyes:      General: No scleral icterus  Conjunctiva/sclera: Conjunctivae normal    Cardiovascular:      Rate and Rhythm: Normal rate and regular rhythm  Heart sounds: Normal heart sounds  No murmur heard    Pulmonary:      Effort: Pulmonary effort is normal  No respiratory distress  Breath sounds: Normal breath sounds  No wheezing or rales  Abdominal:      General: Bowel sounds are normal       Palpations: Abdomen is soft  Tenderness: There is no abdominal tenderness  There is no guarding  Musculoskeletal:      Cervical back: No rigidity  Right lower leg: No edema  Left lower leg: No edema  Lymphadenopathy:      Cervical: No cervical adenopathy  Skin:     Coloration: Skin is not jaundiced or pale  Findings: No rash  Neurological:      Mental Status: She is alert and oriented to person, place, and time  Gait: Gait abnormal    Psychiatric:         Mood and Affect: Mood normal          Judgment: Judgment normal        I spent 30 minutes taking care of this patient with multiple pulmonary problems  The majority of this time was spent directly with the patient counseling as well as coordinating care

## 2022-05-26 ENCOUNTER — TELEPHONE (OUTPATIENT)
Dept: PULMONOLOGY | Facility: CLINIC | Age: 82
End: 2022-05-26

## 2022-05-26 NOTE — TELEPHONE ENCOUNTER
5/26/22 - Pt called and said during her appt this wk, Dr German Mercado said she could return her oxygen tanks b/c she hasn't used them in a year  Pt said she called [facility] but they need a script from Dr German Mercado saying it is ok for them to  the tanks  Please review and then advise the pt re the status of her request/'s script

## 2022-05-26 NOTE — ASSESSMENT & PLAN NOTE
She had a long history of snoring and daytime sleepiness  She was found to have mild TMO with oxygen desaturation on her overnight sleep study  Her CPAP titration study was suboptimal  She was started on CPAP therapy at 9 cm of water and has been using it  Her CPAP compliance has been good  Her residual AHI has been low   She is getting benefit from CPAP therapy I have advised her to use the CPAP as before   Her latest CPAP compliance records are not currently available    I had a long discussion with her and have answered all their questions

## 2022-05-26 NOTE — ASSESSMENT & PLAN NOTE
Wt Readings from Last 3 Encounters:   05/24/22 81 2 kg (179 lb)   04/26/22 78 9 kg (174 lb)   03/22/22 81 kg (178 lb 9 6 oz)     She has chronic heart failure with good ejection fraction    She is  much better now  She follows with cardiology  She is on torsemide and spironolactone  Her previous echocardiogram from Feb 2016 showed good EF at 65%  She has CKD also

## 2022-05-26 NOTE — ASSESSMENT & PLAN NOTE
She has history of COPD and was on Advair Bid before  She also has history of asthma as a child  She has occasional cough and no significant phlegm  No fever or chills  She is a never smoker, however she has history of secondhand smoke exposure  Her PFT which showed moderate airflow obstruction with diffusion defect  There was no hyperinflation  Her 6 min walk test showed an oxygen desaturation to 82% with exertion  Her baseline oxygen saturation at rest was 90%  She also was found to have severe exercise limitation from SOB  She is not using Symbicort  now  I have advised her to use the nebulizer when necessary for her shortness of breath  I have advised her to continue with oxygen supplementation as needed

## 2022-05-26 NOTE — ASSESSMENT & PLAN NOTE
She had a 2 0 x 2 0 lobulated lung mass in the left lower lobe and multiple lung nodules on the left side  She had also mediastinal lymphadenopathy  She was also noted to have a nodule in the thyroid gland and a nodular hepatic lesion  She had a CT guided biopsy of LLL lung lesion which was consistent with neuroendocrine tumor, carcinoid  She follows with Dr Oleg Angel from Oncology  She has multiple lung nodules on her CT scan from Feb 2019  She had a CT scan in May 2021 which showed stability

## 2022-05-27 NOTE — TELEPHONE ENCOUNTER
Called and spoke to flaco at [facility], faxing over office note from 5/24 for them to review and they will contact patient to  the tanks however Dr Brandie Gonzales still wants patient on O2 so they will not take everything

## 2022-06-09 ENCOUNTER — LAB (OUTPATIENT)
Dept: LAB | Facility: HOSPITAL | Age: 82
End: 2022-06-09
Attending: INTERNAL MEDICINE
Payer: MEDICARE

## 2022-06-09 DIAGNOSIS — E83.52 HYPERCALCEMIA: ICD-10-CM

## 2022-06-09 DIAGNOSIS — E87.1 HYPONATREMIA: ICD-10-CM

## 2022-06-09 LAB
ANION GAP SERPL CALCULATED.3IONS-SCNC: 7 MMOL/L (ref 4–13)
BACTERIA UR QL AUTO: ABNORMAL /HPF
BILIRUB UR QL STRIP: NEGATIVE
BUN SERPL-MCNC: 17 MG/DL (ref 5–25)
CALCIUM SERPL-MCNC: 9.5 MG/DL (ref 8.4–10.2)
CHLORIDE SERPL-SCNC: 101 MMOL/L (ref 96–108)
CLARITY UR: CLEAR
CO2 SERPL-SCNC: 29 MMOL/L (ref 21–32)
COLOR UR: YELLOW
CREAT SERPL-MCNC: 0.78 MG/DL (ref 0.6–1.3)
CREAT UR-MCNC: 47 MG/DL
ERYTHROCYTE [DISTWIDTH] IN BLOOD BY AUTOMATED COUNT: 14.3 % (ref 11.6–15.1)
GFR SERPL CREATININE-BSD FRML MDRD: 72 ML/MIN/1.73SQ M
GLUCOSE P FAST SERPL-MCNC: 85 MG/DL (ref 65–99)
GLUCOSE UR STRIP-MCNC: NEGATIVE MG/DL
HCT VFR BLD AUTO: 37.8 % (ref 34.8–46.1)
HGB BLD-MCNC: 12.1 G/DL (ref 11.5–15.4)
HGB UR QL STRIP.AUTO: NEGATIVE
KETONES UR STRIP-MCNC: NEGATIVE MG/DL
LEUKOCYTE ESTERASE UR QL STRIP: ABNORMAL
MAGNESIUM SERPL-MCNC: 1.8 MG/DL (ref 1.9–2.7)
MCH RBC QN AUTO: 28.2 PG (ref 26.8–34.3)
MCHC RBC AUTO-ENTMCNC: 32 G/DL (ref 31.4–37.4)
MCV RBC AUTO: 88 FL (ref 82–98)
NITRITE UR QL STRIP: NEGATIVE
NON-SQ EPI CELLS URNS QL MICRO: ABNORMAL /HPF
PH UR STRIP.AUTO: 6 [PH]
PHOSPHATE SERPL-MCNC: 3.5 MG/DL (ref 2.3–4.1)
PLATELET # BLD AUTO: 259 THOUSANDS/UL (ref 149–390)
PMV BLD AUTO: 10.3 FL (ref 8.9–12.7)
POTASSIUM SERPL-SCNC: 4.5 MMOL/L (ref 3.5–5.3)
PROT UR STRIP-MCNC: NEGATIVE MG/DL
PROT UR-MCNC: 14 MG/DL
PROT/CREAT UR: 0.3 MG/G{CREAT} (ref 0–0.1)
RBC # BLD AUTO: 4.29 MILLION/UL (ref 3.81–5.12)
RBC #/AREA URNS AUTO: ABNORMAL /HPF
SODIUM SERPL-SCNC: 137 MMOL/L (ref 135–147)
SP GR UR STRIP.AUTO: 1.01 (ref 1–1.03)
UROBILINOGEN UR QL STRIP.AUTO: 0.2 E.U./DL
WBC # BLD AUTO: 7.52 THOUSAND/UL (ref 4.31–10.16)
WBC #/AREA URNS AUTO: ABNORMAL /HPF

## 2022-06-09 PROCEDURE — 82570 ASSAY OF URINE CREATININE: CPT

## 2022-06-09 PROCEDURE — 36415 COLL VENOUS BLD VENIPUNCTURE: CPT

## 2022-06-09 PROCEDURE — 85027 COMPLETE CBC AUTOMATED: CPT

## 2022-06-09 PROCEDURE — 84100 ASSAY OF PHOSPHORUS: CPT

## 2022-06-09 PROCEDURE — 84156 ASSAY OF PROTEIN URINE: CPT

## 2022-06-09 PROCEDURE — 80048 BASIC METABOLIC PNL TOTAL CA: CPT

## 2022-06-09 PROCEDURE — 83735 ASSAY OF MAGNESIUM: CPT

## 2022-06-09 PROCEDURE — 81001 URINALYSIS AUTO W/SCOPE: CPT

## 2022-06-10 ENCOUNTER — TELEPHONE (OUTPATIENT)
Dept: NEPHROLOGY | Facility: CLINIC | Age: 82
End: 2022-06-10

## 2022-06-10 NOTE — RESULT ENCOUNTER NOTE
Hello    Patient normally is followed up by Ms Jake Taylor  Please let the patient know that the renal function is stable  Sodium level is stable  Please have the patient scheduled to see us in August   She does not need to do blood work before this appointment  I will give the patient a new lab work to do for after the appointment      Thank you    np

## 2022-06-10 NOTE — TELEPHONE ENCOUNTER
----- Message from Juan Garnett MD sent at 6/10/2022 10:46 AM EDT -----  Hello    Patient normally is followed up by Ms Jose Crum  Please let the patient know that the renal function is stable  Sodium level is stable  Please have the patient scheduled to see us in August   She does not need to do blood work before this appointment  I will give the patient a new lab work to do for after the appointment      Thank you    np

## 2022-06-21 ENCOUNTER — OFFICE VISIT (OUTPATIENT)
Dept: CARDIOLOGY CLINIC | Facility: CLINIC | Age: 82
End: 2022-06-21
Payer: MEDICARE

## 2022-06-21 VITALS
HEART RATE: 82 BPM | SYSTOLIC BLOOD PRESSURE: 152 MMHG | HEIGHT: 59 IN | WEIGHT: 180.8 LBS | DIASTOLIC BLOOD PRESSURE: 68 MMHG | OXYGEN SATURATION: 96 % | BODY MASS INDEX: 36.45 KG/M2

## 2022-06-21 DIAGNOSIS — I50.32 CHRONIC DIASTOLIC CHF (CONGESTIVE HEART FAILURE) (HCC): Primary | ICD-10-CM

## 2022-06-21 DIAGNOSIS — I10 ESSENTIAL HYPERTENSION: ICD-10-CM

## 2022-06-21 DIAGNOSIS — G47.33 OSA (OBSTRUCTIVE SLEEP APNEA): ICD-10-CM

## 2022-06-21 PROCEDURE — 99214 OFFICE O/P EST MOD 30 MIN: CPT | Performed by: INTERNAL MEDICINE

## 2022-06-21 RX ORDER — SPIRONOLACTONE 25 MG/1
25 TABLET ORAL DAILY
Qty: 90 TABLET | Refills: 3
Start: 2022-06-21
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
## 2022-06-21 NOTE — PATIENT INSTRUCTIONS

## 2022-06-28 ENCOUNTER — OFFICE VISIT (OUTPATIENT)
Dept: OBGYN CLINIC | Facility: CLINIC | Age: 82
End: 2022-06-28
Payer: MEDICARE

## 2022-06-28 VITALS
BODY MASS INDEX: 35.68 KG/M2 | WEIGHT: 177 LBS | HEART RATE: 88 BPM | HEIGHT: 59 IN | SYSTOLIC BLOOD PRESSURE: 160 MMHG | DIASTOLIC BLOOD PRESSURE: 78 MMHG

## 2022-06-28 DIAGNOSIS — M17.11 PRIMARY OSTEOARTHRITIS OF RIGHT KNEE: ICD-10-CM

## 2022-06-28 DIAGNOSIS — M17.12 PRIMARY OSTEOARTHRITIS OF LEFT KNEE: Primary | ICD-10-CM

## 2022-06-28 PROCEDURE — 99214 OFFICE O/P EST MOD 30 MIN: CPT | Performed by: ORTHOPAEDIC SURGERY

## 2022-06-28 RX ORDER — MELOXICAM 15 MG/1
15 TABLET ORAL DAILY
Qty: 30 TABLET | Refills: 1 | Status: SHIPPED | OUTPATIENT
Start: 2022-06-28
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
## 2022-07-18 ENCOUNTER — RA CDI HCC (OUTPATIENT)
Dept: OTHER | Facility: HOSPITAL | Age: 82
End: 2022-07-18

## 2022-07-18 NOTE — PROGRESS NOTES
[address]  coding opportunities       Chart reviewed, no opportunity found: CHART REVIEWED, NO OPPORTUNITY FOUND        Patients Insurance     Medicare Insurance: Medicare

## 2022-08-16 ENCOUNTER — OFFICE VISIT (OUTPATIENT)
Dept: NEPHROLOGY | Facility: CLINIC | Age: 82
End: 2022-08-16
Payer: MEDICARE

## 2022-08-16 VITALS
HEART RATE: 68 BPM | DIASTOLIC BLOOD PRESSURE: 60 MMHG | HEIGHT: 59 IN | SYSTOLIC BLOOD PRESSURE: 138 MMHG | WEIGHT: 179 LBS | BODY MASS INDEX: 36.08 KG/M2

## 2022-08-16 DIAGNOSIS — E11.65 TYPE 2 DIABETES MELLITUS WITH HYPERGLYCEMIA, WITH LONG-TERM CURRENT USE OF INSULIN (HCC): ICD-10-CM

## 2022-08-16 DIAGNOSIS — M17.11 PRIMARY OSTEOARTHRITIS OF RIGHT KNEE: ICD-10-CM

## 2022-08-16 DIAGNOSIS — E87.1 HYPONATREMIA: Primary | ICD-10-CM

## 2022-08-16 DIAGNOSIS — I10 BENIGN ESSENTIAL HTN: ICD-10-CM

## 2022-08-16 DIAGNOSIS — Z79.4 TYPE 2 DIABETES MELLITUS WITH HYPERGLYCEMIA, WITH LONG-TERM CURRENT USE OF INSULIN (HCC): ICD-10-CM

## 2022-08-16 PROCEDURE — 99213 OFFICE O/P EST LOW 20 MIN: CPT | Performed by: INTERNAL MEDICINE

## 2022-08-16 RX ORDER — MELOXICAM 15 MG/1
TABLET ORAL
Qty: 60 TABLET | Refills: 0 | Status: SHIPPED | OUTPATIENT
Start: 2022-08-16 | End: 2022-09-19

## 2022-08-16 NOTE — LETTER
August 16, 2022     [facility] [address] [address]    Patient: Kateryna Houser   YOB: 1940   Date of Visit: 8/16/2022       Dear Dr Ruth Meraz:    Thank you for referring Priyanka Finch to me for evaluation  Below are my notes for this consultation  If you have questions, please do not hesitate to call me  I look forward to following your patient along with you  Sincerely,        Néstor Finn MD        CC: No Recipients  Néstor Finn MD  8/16/2022 11:52 AM  Sign when Signing Visit  Janice Soto [de-identified] y o  female MRN: [id number]  8/16/2022    Reason for Visit:  Hyponatremia follow-up    ASSESSMENT and PLAN:    I had the pleasure of seeing Ms Benny Harrington today in the renal clinic for the continued management of Hyponatremia  80 yo Patient has a history of CHF, lung carcinoid, COPD on home O4, TOM, hyponatremia, hypertension, prior ischemic bowel during pregnancy, who is being seen as a follow-up for hyponatremia  The etiology of hyponatremia was felt to be secondary to volume overload in the setting of heart failure, and citalopram use during admission 2018 at [facility] is now presenting for f/u for HTN, hyponatremia, proteinuria       1) hyponatremia     - secondary to volume overload prior  -serum osmolarity 270 on June 2021  -urine osmolarity 242, urine sodium 22 in June 2021  -sodium level is stable and then decreased in June 2021-128 after which patient was advised fluid restriction   Patient was also advised to be compliant with diuretics     -on torsemide  - no longer on salt tab     2020 - Pt states that was taking torsemide daily but not taking it on days with appointment and if had to go somewhere  Noticed edema when not taking torsemide  Was taking 1/2 of spironolactone  Saw Cardiology and was advised to take 1/2 tab torsemide (so ten mg) and spironolactone 25 mg daily   Pt states with this change, has edema improved  Is feeling satisfied with the improvement        May 2021- patient's blood pressures are below goal   Patient is asymptomatic   Patient states that she has now been compliant with her medications this week and prior was not taking some of her medications up to 1 to 2 times a week   We reviewed that this makes it difficult to appropriately titrate medications as we are not sure what she is taking when she is seen in the office  Dorcas Alexs agrees  Erica Randolph, now that the patient is taking all of her medications daily with the exception of diuretics on days when she has heard physician appointments, I advised the patient to hold amlodipine   And check blood pressures once a day for 2 weeks and call with results      February 2022-sodium level remains stable 134  Calcium borderline elevated and was advised to hold calcium supplement  Protein level in the urine remains stable 0 3  March 2022 calcium level improved      Plan:  - appointment in 5-6 months   -no changes to medication regimen today  - repeat labwork this or next month especially as pt is on meloxicam to follow renal function and sodium levels closely     2) HTN - currently is on valsartan, spironolactone, metoprolol, amlodipine, terazosin with controlled blood pressures August 2022     -was started on spironolactone in April 2018    -during visit in [address] [place] [address], patient's amlodipine was increased and valsartan was changed to losartan due to recall   -renal artery Doppler were unrevealing   In the proximal arteries of the renal artery    -May 2019-Lower terazosin to 5 mg, and lower metoprolol to 37 5 twice a day  - 7/2019 - metoprolol changed to 25 mg at night as pt was taking 37 5 BID dosing only once daily  -June 2022-patient saw cardiologist   At this time was taking torsemide 10 mg 3 times per week and spironolactone was 3 times per week and was advised to take spironolactone once a day  - august 2022 - pt states is not taking torsemide regularly and last dose was in july     3) renal function      - stable creatinine 0 8 mg/dL  6/2022  - to note, started on meloxicam in 6/28/2022     4) proteinuria     - prior SPEP, UPEP unrevealing  - on ARB and spironolactone  - last UA on 8/2019  None recent  -  Repeat urinalysis unrevealing  U PCR stable August 2022     5) thyroid nodule - biopsied prior     6) electrolytes     -   Hyponatremia -sodium level has normalized  - calcium supplement held in February 2022 due to borderline hypercalcemia  February 2022  repeat testing with improved calcium level  Holding calcium supplements     7) COPD and pulm carcinoid and TOM     - follows with Pulmonary team and Hematology team  - on home O2  - f/u CT scan pending from 5/13/2021     8) AST slight elevation - chronic  Per PCP     9)   recheck PTH 1 month    10) weight at home is 172-175 lbs  - not taking torsemide since July  - is taking spironolactone once deisi  -advised to take torsemide 10 mg if weight rises above 175    11) DM - being managed by PCP     It was a pleasure evaluating your patient in the office today  Thank you for allowing our team to participate in the care of Ms Camryn Roblero  Please do not hesitate to contact our team if further issues/questions shall arise in the interim         No problem-specific Assessment & Plan notes found for this encounter  HPI:    Patient denies complaints  No fevers, chills, nausea, vomiting    Weight is controlled without    PATIENT INSTRUCTIONS:    Patient Instructions   1) Avoid NSAIDS - (Example - motrin, advil, ibuprofen, aleve, exederin, etc)  2) Always follow a low salt diet  3) please take 1/2 tablet of torsemide if your weight rises 176 pounds or above  4) labwork end of this month or early next month  5) no changes to your medications today        OBJECTIVE:  Current Weight: Weight - Scale: 81 2 kg (179 lb)  Vitals:    08/16/22 1115 08/16/22 1146   BP: 164/78 138/60   BP Location: Left arm Patient Position: Sitting    Cuff Size: Standard    Pulse: 68    Weight: 81 2 kg (179 lb)    Height: 4' 11" (1 499 m)     Body mass index is 36 15 kg/m²  REVIEW OF SYSTEMS:    Review of Systems   Constitutional: Negative  Negative for fatigue  HENT: Negative  Eyes: Negative  Respiratory: Negative  Negative for shortness of breath  Cardiovascular: Negative  Negative for leg swelling  Gastrointestinal: Negative  Endocrine: Negative  Genitourinary: Negative  Negative for difficulty urinating  Musculoskeletal: Negative  Skin: Negative  Allergic/Immunologic: Negative  Neurological: Negative  Hematological: Negative  Psychiatric/Behavioral: Negative  All other systems reviewed and are negative  PHYSICAL EXAM:      Physical Exam  Vitals and nursing note reviewed  Constitutional:       General: She is not in acute distress  Appearance: She is well-developed  She is not diaphoretic  HENT:      Head: Normocephalic and atraumatic  Eyes:      General: No scleral icterus  Right eye: No discharge  Left eye: No discharge  Conjunctiva/sclera: Conjunctivae normal    Neck:      Vascular: No JVD  Cardiovascular:      Rate and Rhythm: Normal rate and regular rhythm  Heart sounds: No murmur heard  No friction rub  No gallop  Pulmonary:      Effort: Pulmonary effort is normal  No respiratory distress  Breath sounds: Normal breath sounds  No wheezing or rales  Abdominal:      General: Bowel sounds are normal  There is no distension  Palpations: Abdomen is soft  Tenderness: There is no abdominal tenderness  There is no rebound  Musculoskeletal:         General: No tenderness or deformity  Normal range of motion  Cervical back: Normal range of motion and neck supple  Skin:     General: Skin is warm and dry  Coloration: Skin is not pale  Findings: No erythema or rash     Neurological:      Mental Status: She is alert and oriented to person, place, and time  Coordination: Coordination normal    Psychiatric:         Behavior: Behavior normal          Thought Content:  Thought content normal          Judgment: Judgment normal          Medications:    Current Outpatient Medications:     ADULT ASPIRIN LOW STRENGTH PO, Take 1 tablet by mouth daily , Disp: , Rfl:     amLODIPine (NORVASC) 5 mg tablet, Take 1 tablet (5 mg total) by mouth daily, Disp: 90 tablet, Rfl: 3    glipiZIDE (GLUCOTROL) 10 mg tablet, Take 0 5 tablets (5 mg total) by mouth in the morning, Disp: 45 tablet, Rfl: 2    loratadine (CLARITIN) 10 mg tablet, Take 1 tablet by mouth daily, Disp: , Rfl:     meloxicam (Mobic) 15 mg tablet, Take 1 tablet (15 mg total) by mouth daily, Disp: 30 tablet, Rfl: 1    metFORMIN (GLUCOPHAGE) 500 mg tablet, Take 2 tablets (1,000 mg total) by mouth 2 (two) times a day, Disp: 360 tablet, Rfl: 2    metoprolol succinate (TOPROL-XL) 25 mg 24 hr tablet, Take 1 tablet (25 mg total) by mouth daily at bedtime, Disp: 90 tablet, Rfl: 3    montelukast (SINGULAIR) 10 mg tablet, TAKE 1 TABLET EVERY DAY, Disp: 90 tablet, Rfl: 0    spironolactone (ALDACTONE) 25 mg tablet, Take 1 tablet (25 mg total) by mouth daily, Disp: 90 tablet, Rfl: 3    terazosin (HYTRIN) 5 mg capsule, Take 1 capsule (5 mg total) by mouth daily at bedtime, Disp: 90 capsule, Rfl: 3    valsartan (DIOVAN) 320 MG tablet, Take 1 tablet (320 mg total) by mouth daily, Disp: 90 tablet, Rfl: 2    Accu-Chek FastClix Lancets MISC, USE 1 LANCET   TO CHECK GLUCOSE ONCE DAILY, Disp: 102 each, Rfl: 5    Accu-Chek SmartView test strip, Use 1 each daily Use as instructed, Disp: 100 each, Rfl: 3    acetaminophen (TYLENOL) 500 mg tablet, Take 500 mg by mouth every 6 (six) hours as needed for mild pain For pain, Disp: , Rfl:     albuterol (ProAir HFA) 90 mcg/act inhaler, Inhale 2 puffs every 6 (six) hours as needed for wheezing or shortness of breath (Patient taking differently: Inhale 2 puffs as needed for wheezing or shortness of breath), Disp: 18 g, Rfl: 1    Calcium 600-400 MG-UNIT CHEW, Chew 1 tablet daily (Patient not taking: Reported on 8/16/2022), Disp: 30 tablet, Rfl: 3    calcium carbonate-vitamin D (OSCAL-D) 500 mg-200 units per tablet, Take 1 tablet by mouth (Patient not taking: Reported on 8/16/2022), Disp: , Rfl:     Diclofenac Sodium (VOLTAREN) 1 %, Apply 2 g topically 4 (four) times a day, Disp: 120 g, Rfl: 2    fluticasone (FLONASE) 50 mcg/act nasal spray, 1 spray into each nostril daily , Disp: , Rfl:     latanoprost (XALATAN) 0 005 % ophthalmic solution, Apply to eye (Patient not taking: No sig reported), Disp: , Rfl:     prednisoLONE acetate (PRED FORTE) 1 % ophthalmic suspension, , Disp: , Rfl:     torsemide (DEMADEX) 20 mg tablet, Take 1 tablet (20 mg total) by mouth daily (Patient not taking: No sig reported), Disp: 90 tablet, Rfl: 3    Laboratory Results:        Invalid input(s): ALBUMIN    Results for orders placed or performed in visit on 68/87/30   Basic metabolic panel   Result Value Ref Range    Sodium 137 135 - 147 mmol/L    Potassium 4 5 3 5 - 5 3 mmol/L    Chloride 101 96 - 108 mmol/L    CO2 29 21 - 32 mmol/L    ANION GAP 7 4 - 13 mmol/L    BUN 17 5 - 25 mg/dL    Creatinine 0 78 0 60 - 1 30 mg/dL    Glucose, Fasting 85 65 - 99 mg/dL    Calcium 9 5 8 4 - 10 2 mg/dL    eGFR 72 ml/min/1 73sq m   CBC   Result Value Ref Range    WBC 7 52 4 31 - 10 16 Thousand/uL    RBC 4 29 3 81 - 5 12 Million/uL    Hemoglobin 12 1 11 5 - 15 4 g/dL    Hematocrit 37 8 34 8 - 46 1 %    MCV 88 82 - 98 fL    MCH 28 2 26 8 - 34 3 pg    MCHC 32 0 31 4 - 37 4 g/dL    RDW 14 3 11 6 - 15 1 %    Platelets 062 072 - 668 Thousands/uL    MPV 10 3 8 9 - 12 7 fL   Magnesium   Result Value Ref Range    Magnesium 1 8 (L) 1 9 - 2 7 mg/dL   Phosphorus   Result Value Ref Range    Phosphorus 3 5 2 3 - 4 1 mg/dL   Protein / creatinine ratio, urine   Result Value Ref Range Creatinine, Ur 47 0 mg/dL    Protein Urine Random 14 mg/dL    Prot/Creat Ratio, Ur 0 30 (H) 0 00 - 0 10   Urinalysis with microscopic   Result Value Ref Range    Clarity, UA Clear Clear    Color, UA Yellow Yellow    Specific [place], UA 1 010 1 001 - 1 030    pH, UA 6 0 5 0, 5 5, 6 0, 6 5, 7 0, 7 5, 8 0    Glucose, UA Negative Negative mg/dl    Ketones, UA Negative Negative mg/dl    Occult Blood, UA Negative Negative    Protein, UA Negative Negative, Interference- unable to analyze mg/dl    Nitrite, UA Negative Negative    Bilirubin, UA Negative Negative    Urobilinogen, UA 0 2 0 2, 1 0 E U /dl E U /dl    Leukocytes, UA 2+ (A) Negative    WBC, UA 0-5 None Seen, 0-1, 1-2, 0-5, 2-4 /hpf    RBC, UA None Seen None Seen, 0-1, 1-2, 2-4, 0-5 /hpf    Bacteria, UA Occasional None Seen, Occasional /hpf    Epithelial Cells Occasional None Seen, Occasional /hpf

## 2022-08-16 NOTE — PROGRESS NOTES
NEPHROLOGY OFFICE VISIT   Palak Mas [de-identified] y o  female MRN: [id number]  8/16/2022    Reason for Visit:  Hyponatremia follow-up    ASSESSMENT and PLAN:    I had the pleasure of seeing Ms Bia Soto today in the renal clinic for the continued management of Hyponatremia  82 yo Patient has a history of CHF, lung carcinoid, COPD on home O4, TOM, hyponatremia, hypertension, prior ischemic bowel during pregnancy, who is being seen as a follow-up for hyponatremia  The etiology of hyponatremia was felt to be secondary to volume overload in the setting of heart failure, and citalopram use during admission 2018 at [facility] is now presenting for f/u for HTN, hyponatremia, proteinuria       1) hyponatremia     - secondary to volume overload prior  -serum osmolarity 270 on June 2021  -urine osmolarity 242, urine sodium 22 in June 2021  -sodium level is stable and then decreased in June 2021-128 after which patient was advised fluid restriction   Patient was also advised to be compliant with diuretics     -on torsemide  - no longer on salt tab     2020 - Pt states that was taking torsemide daily but not taking it on days with appointment and if had to go somewhere  Noticed edema when not taking torsemide  Was taking 1/2 of spironolactone  Saw Cardiology and was advised to take 1/2 tab torsemide (so ten mg) and spironolactone 25 mg daily  Pt states with this change, has edema improved   Is feeling satisfied with the improvement        May 2021- patient's blood pressures are below goal   Patient is asymptomatic   Patient states that she has now been compliant with her medications this week and prior was not taking some of her medications up to 1 to 2 times a week   We reviewed that this makes it difficult to appropriately titrate medications as we are not sure what she is taking when she is seen in the office  Estela George agrees  Sri Diana, now that the patient is taking all of her medications daily with the exception of diuretics on days when she has heard physician appointments, I advised the patient to hold amlodipine   And check blood pressures once a day for 2 weeks and call with results      February 2022-sodium level remains stable 134  Calcium borderline elevated and was advised to hold calcium supplement  Protein level in the urine remains stable 0 3  March 2022 calcium level improved      Plan:  - appointment in 5-6 months   -no changes to medication regimen today  - repeat labwork this or next month especially as pt is on meloxicam to follow renal function and sodium levels closely     2) HTN - currently is on valsartan, spironolactone, metoprolol, amlodipine, terazosin with controlled blood pressures August 2022     -was started on spironolactone in April 2018    -during visit in [facility], patient's amlodipine was increased and valsartan was changed to losartan due to recall   -renal artery Doppler were unrevealing   In the proximal arteries of the renal artery  -May 2019-Lower terazosin to 5 mg, and lower metoprolol to 37 5 twice a day  - 7/2019 - metoprolol changed to 25 mg at night as pt was taking 37 5 BID dosing only once daily  -June 2022-patient saw cardiologist   At this time was taking torsemide 10 mg 3 times per week and spironolactone was 3 times per week and was advised to take spironolactone once a day  - august 2022 - pt states is not taking torsemide regularly and last dose was in july     3) renal function      - stable creatinine 0 8 mg/dL  6/2022  - to note, started on meloxicam in 6/28/2022     4) proteinuria     - prior SPEP, UPEP unrevealing  - on ARB and spironolactone  - last UA on 8/2019  None recent  -  Repeat urinalysis unrevealing  U PCR stable August 2022     5) thyroid nodule - biopsied prior     6) electrolytes     -   Hyponatremia -sodium level has normalized  - calcium supplement held in February 2022 due to borderline hypercalcemia  February 2022     repeat testing with improved calcium level   Holding calcium supplements     7) COPD and pulm carcinoid and TOM     - follows with Pulmonary team and Hematology team  - on home O2  - f/u CT scan pending from 5/13/2021     8) AST slight elevation - chronic  Per PCP     9)   recheck PTH 1 month    10) weight at home is 172-175 lbs  - not taking torsemide since July  - is taking spironolactone once deisi  -advised to take torsemide 10 mg if weight rises above 175    11) DM - being managed by PCP     It was a pleasure evaluating your patient in the office today  Thank you for allowing our team to participate in the care of Ms Camryn Roblero  Please do not hesitate to contact our team if further issues/questions shall arise in the interim         No problem-specific Assessment & Plan notes found for this encounter  HPI:    Patient denies complaints  No fevers, chills, nausea, vomiting  Weight is controlled without    PATIENT INSTRUCTIONS:    Patient Instructions   1) Avoid NSAIDS - (Example - motrin, advil, ibuprofen, aleve, exederin, etc)  2) Always follow a low salt diet  3) please take 1/2 tablet of torsemide if your weight rises 176 pounds or above  4) labwork end of this month or early next month  5) no changes to your medications today        OBJECTIVE:  Current Weight: Weight - Scale: 81 2 kg (179 lb)  Vitals:    08/16/22 1115 08/16/22 1146   BP: 164/78 138/60   BP Location: Left arm    Patient Position: Sitting    Cuff Size: Standard    Pulse: 68    Weight: 81 2 kg (179 lb)    Height: 4' 11" (1 499 m)     Body mass index is 36 15 kg/m²  REVIEW OF SYSTEMS:    Review of Systems   Constitutional: Negative  Negative for fatigue  HENT: Negative  Eyes: Negative  Respiratory: Negative  Negative for shortness of breath  Cardiovascular: Negative  Negative for leg swelling  Gastrointestinal: Negative  Endocrine: Negative  Genitourinary: Negative  Negative for difficulty urinating  Musculoskeletal: Negative  Skin: Negative  Allergic/Immunologic: Negative  Neurological: Negative  Hematological: Negative  Psychiatric/Behavioral: Negative  All other systems reviewed and are negative  PHYSICAL EXAM:      Physical Exam  Vitals and nursing note reviewed  Constitutional:       General: She is not in acute distress  Appearance: She is well-developed  She is not diaphoretic  HENT:      Head: Normocephalic and atraumatic  Eyes:      General: No scleral icterus  Right eye: No discharge  Left eye: No discharge  Conjunctiva/sclera: Conjunctivae normal    Neck:      Vascular: No JVD  Cardiovascular:      Rate and Rhythm: Normal rate and regular rhythm  Heart sounds: No murmur heard  No friction rub  No gallop  Pulmonary:      Effort: Pulmonary effort is normal  No respiratory distress  Breath sounds: Normal breath sounds  No wheezing or rales  Abdominal:      General: Bowel sounds are normal  There is no distension  Palpations: Abdomen is soft  Tenderness: There is no abdominal tenderness  There is no rebound  Musculoskeletal:         General: No tenderness or deformity  Normal range of motion  Cervical back: Normal range of motion and neck supple  Skin:     General: Skin is warm and dry  Coloration: Skin is not pale  Findings: No erythema or rash  Neurological:      Mental Status: She is alert and oriented to person, place, and time  Coordination: Coordination normal    Psychiatric:         Behavior: Behavior normal          Thought Content:  Thought content normal          Judgment: Judgment normal          Medications:    Current Outpatient Medications:     ADULT ASPIRIN LOW STRENGTH PO, Take 1 tablet by mouth daily , Disp: , Rfl:     amLODIPine (NORVASC) 5 mg tablet, Take 1 tablet (5 mg total) by mouth daily, Disp: 90 tablet, Rfl: 3    glipiZIDE (GLUCOTROL) 10 mg tablet, Take 0 5 tablets (5 mg total) by mouth in the morning, Disp: 45 tablet, Rfl: 2    loratadine (CLARITIN) 10 mg tablet, Take 1 tablet by mouth daily, Disp: , Rfl:     meloxicam (Mobic) 15 mg tablet, Take 1 tablet (15 mg total) by mouth daily, Disp: 30 tablet, Rfl: 1    metFORMIN (GLUCOPHAGE) 500 mg tablet, Take 2 tablets (1,000 mg total) by mouth 2 (two) times a day, Disp: 360 tablet, Rfl: 2    metoprolol succinate (TOPROL-XL) 25 mg 24 hr tablet, Take 1 tablet (25 mg total) by mouth daily at bedtime, Disp: 90 tablet, Rfl: 3    montelukast (SINGULAIR) 10 mg tablet, TAKE 1 TABLET EVERY DAY, Disp: 90 tablet, Rfl: 0    spironolactone (ALDACTONE) 25 mg tablet, Take 1 tablet (25 mg total) by mouth daily, Disp: 90 tablet, Rfl: 3    terazosin (HYTRIN) 5 mg capsule, Take 1 capsule (5 mg total) by mouth daily at bedtime, Disp: 90 capsule, Rfl: 3    valsartan (DIOVAN) 320 MG tablet, Take 1 tablet (320 mg total) by mouth daily, Disp: 90 tablet, Rfl: 2    Accu-Chek FastClix Lancets MISC, USE 1 LANCET   TO CHECK GLUCOSE ONCE DAILY, Disp: 102 each, Rfl: 5    Accu-Chek SmartView test strip, Use 1 each daily Use as instructed, Disp: 100 each, Rfl: 3    acetaminophen (TYLENOL) 500 mg tablet, Take 500 mg by mouth every 6 (six) hours as needed for mild pain For pain, Disp: , Rfl:     albuterol (ProAir HFA) 90 mcg/act inhaler, Inhale 2 puffs every 6 (six) hours as needed for wheezing or shortness of breath (Patient taking differently: Inhale 2 puffs as needed for wheezing or shortness of breath), Disp: 18 g, Rfl: 1    Calcium 600-400 MG-UNIT CHEW, Chew 1 tablet daily (Patient not taking: Reported on 8/16/2022), Disp: 30 tablet, Rfl: 3    calcium carbonate-vitamin D (OSCAL-D) 500 mg-200 units per tablet, Take 1 tablet by mouth (Patient not taking: Reported on 8/16/2022), Disp: , Rfl:     Diclofenac Sodium (VOLTAREN) 1 %, Apply 2 g topically 4 (four) times a day, Disp: 120 g, Rfl: 2    fluticasone (FLONASE) 50 mcg/act nasal spray, 1 spray into each nostril daily , Disp: , Rfl:    latanoprost (XALATAN) 0 005 % ophthalmic solution, Apply to eye (Patient not taking: No sig reported), Disp: , Rfl:     prednisoLONE acetate (PRED FORTE) 1 % ophthalmic suspension, , Disp: , Rfl:     torsemide (DEMADEX) 20 mg tablet, Take 1 tablet (20 mg total) by mouth daily (Patient not taking: No sig reported), Disp: 90 tablet, Rfl: 3    Laboratory Results:        Invalid input(s): ALBUMIN    Results for orders placed or performed in visit on 82/54/65   Basic metabolic panel   Result Value Ref Range    Sodium 137 135 - 147 mmol/L    Potassium 4 5 3 5 - 5 3 mmol/L    Chloride 101 96 - 108 mmol/L    CO2 29 21 - 32 mmol/L    ANION GAP 7 4 - 13 mmol/L    BUN 17 5 - 25 mg/dL    Creatinine 0 78 0 60 - 1 30 mg/dL    Glucose, Fasting 85 65 - 99 mg/dL    Calcium 9 5 8 4 - 10 2 mg/dL    eGFR 72 ml/min/1 73sq m   CBC   Result Value Ref Range    WBC 7 52 4 31 - 10 16 Thousand/uL    RBC 4 29 3 81 - 5 12 Million/uL    Hemoglobin 12 1 11 5 - 15 4 g/dL    Hematocrit 37 8 34 8 - 46 1 %    MCV 88 82 - 98 fL    MCH 28 2 26 8 - 34 3 pg    MCHC 32 0 31 4 - 37 4 g/dL    RDW 14 3 11 6 - 15 1 %    Platelets 574 943 - 172 Thousands/uL    MPV 10 3 8 9 - 12 7 fL   Magnesium   Result Value Ref Range    Magnesium 1 8 (L) 1 9 - 2 7 mg/dL   Phosphorus   Result Value Ref Range    Phosphorus 3 5 2 3 - 4 1 mg/dL   Protein / creatinine ratio, urine   Result Value Ref Range    Creatinine, Ur 47 0 mg/dL    Protein Urine Random 14 mg/dL    Prot/Creat Ratio, Ur 0 30 (H) 0 00 - 0 10   Urinalysis with microscopic   Result Value Ref Range    Clarity, UA Clear Clear    Color, UA Yellow Yellow    Specific [place], UA 1 010 1 001 - 1 030    pH, UA 6 0 5 0, 5 5, 6 0, 6 5, 7 0, 7 5, 8 0    Glucose, UA Negative Negative mg/dl    Ketones, UA Negative Negative mg/dl    Occult Blood, UA Negative Negative    Protein, UA Negative Negative, Interference- unable to analyze mg/dl    Nitrite, UA Negative Negative    Bilirubin, UA Negative Negative    Urobilinogen, UA 0 2 0 2, 1 0 E U /dl E U /dl    Leukocytes, UA 2+ (A) Negative    WBC, UA 0-5 None Seen, 0-1, 1-2, 0-5, 2-4 /hpf    RBC, UA None Seen None Seen, 0-1, 1-2, 2-4, 0-5 /hpf    Bacteria, UA Occasional None Seen, Occasional /hpf    Epithelial Cells Occasional None Seen, Occasional /hpf

## 2022-08-16 NOTE — PATIENT INSTRUCTIONS
1) Avoid NSAIDS - (Example - motrin, advil, ibuprofen, aleve, exederin, etc)  2) Always follow a low salt diet  3) please take 1/2 tablet of torsemide if your weight rises 176 pounds or above  4) labwork end of this month or early next month  5) no changes to your medications today

## 2022-08-23 ENCOUNTER — HOSPITAL ENCOUNTER (OUTPATIENT)
Dept: RADIOLOGY | Facility: HOSPITAL | Age: 82
Discharge: HOME | End: 2022-08-23
Payer: MEDICARE

## 2022-08-23 VITALS — WEIGHT: 178 LBS | HEIGHT: 59 IN | BODY MASS INDEX: 35.88 KG/M2

## 2022-08-23 DIAGNOSIS — Z12.31 ENCOUNTER FOR SCREENING MAMMOGRAM FOR BREAST CANCER: ICD-10-CM

## 2022-08-23 DIAGNOSIS — Z13.820 ENCOUNTER FOR OSTEOPOROSIS SCREENING IN ASYMPTOMATIC POSTMENOPAUSAL PATIENT: ICD-10-CM

## 2022-08-23 DIAGNOSIS — Z78.0 ENCOUNTER FOR OSTEOPOROSIS SCREENING IN ASYMPTOMATIC POSTMENOPAUSAL PATIENT: ICD-10-CM

## 2022-08-23 PROBLEM — M85.80 OSTEOPENIA: Status: ACTIVE | Noted: 2022-08-23

## 2022-08-23 PROCEDURE — 77063 BREAST TOMOSYNTHESIS BI: CPT

## 2022-08-23 PROCEDURE — 77080 DXA BONE DENSITY AXIAL: CPT

## 2022-08-23 PROCEDURE — 77067 SCR MAMMO BI INCL CAD: CPT

## 2022-08-29 ENCOUNTER — OFFICE VISIT (OUTPATIENT)
Dept: FAMILY MEDICINE CLINIC | Facility: CLINIC | Age: 82
End: 2022-08-29
Payer: MEDICARE

## 2022-08-29 VITALS
DIASTOLIC BLOOD PRESSURE: 60 MMHG | RESPIRATION RATE: 16 BRPM | HEIGHT: 59 IN | OXYGEN SATURATION: 96 % | TEMPERATURE: 97 F | HEART RATE: 68 BPM | BODY MASS INDEX: 36.49 KG/M2 | WEIGHT: 181 LBS | SYSTOLIC BLOOD PRESSURE: 130 MMHG

## 2022-08-29 DIAGNOSIS — M25.561 CHRONIC PAIN OF BOTH KNEES: ICD-10-CM

## 2022-08-29 DIAGNOSIS — M85.80 OSTEOPENIA, UNSPECIFIED LOCATION: ICD-10-CM

## 2022-08-29 DIAGNOSIS — I10 ESSENTIAL HYPERTENSION: Primary | ICD-10-CM

## 2022-08-29 DIAGNOSIS — M25.562 CHRONIC PAIN OF BOTH KNEES: ICD-10-CM

## 2022-08-29 DIAGNOSIS — D3A.090 BENIGN CARCINOID TUMOR OF LUNG: ICD-10-CM

## 2022-08-29 DIAGNOSIS — E11.9 DIABETES MELLITUS WITHOUT COMPLICATION (HCC): ICD-10-CM

## 2022-08-29 DIAGNOSIS — G47.33 OSA (OBSTRUCTIVE SLEEP APNEA): ICD-10-CM

## 2022-08-29 DIAGNOSIS — G89.29 CHRONIC PAIN OF BOTH KNEES: ICD-10-CM

## 2022-08-29 DIAGNOSIS — I50.32 CHRONIC DIASTOLIC CHF (CONGESTIVE HEART FAILURE) (HCC): ICD-10-CM

## 2022-08-29 DIAGNOSIS — J44.9 CHRONIC OBSTRUCTIVE PULMONARY DISEASE, UNSPECIFIED COPD TYPE (HCC): ICD-10-CM

## 2022-08-29 LAB — SL AMB POCT HEMOGLOBIN AIC: 6.1 (ref ?–6.5)

## 2022-08-29 PROCEDURE — 99214 OFFICE O/P EST MOD 30 MIN: CPT | Performed by: FAMILY MEDICINE

## 2022-08-29 PROCEDURE — 83036 HEMOGLOBIN GLYCOSYLATED A1C: CPT | Performed by: FAMILY MEDICINE
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
## 2022-08-29 NOTE — ASSESSMENT & PLAN NOTE
A1C done today and was 6 1  Continue metformin 1000 mg bid and glipizide 5 mg qd  Continue low carb diet  Foot exam done in April 2022  Eye exam done in Aug 2021      Lab Results   Component Value Date    HGBA1C 6 3 04/26/2022

## 2022-08-29 NOTE — ASSESSMENT & PLAN NOTE
Wt Readings from Last 3 Encounters:   08/23/22 80 7 kg (178 lb)   08/16/22 81 2 kg (179 lb)   07/06/22 80 3 kg (177 lb 0 5 oz)     Has been stable  Continue current meds per Cardiology

## 2022-08-29 NOTE — ASSESSMENT & PLAN NOTE
Pt has OA and saw Ortho in June 2022  Pt was given brace for left knee and will follow-up in Oct 2022

## 2022-08-29 NOTE — ASSESSMENT & PLAN NOTE
Pt had dexascan in Aug 2022 and has osteopenia  Pt advised to take calcium 1200 mg qd and Vit D 1000 U qd  Pt also advised to perform weight-bearing exercise on a regular basis  Recheck dexascan in Aug 2024

## 2022-08-29 NOTE — ASSESSMENT & PLAN NOTE
Pt saw Dr Florence Weinstein in May 2022 and has been stable  Pt continues to use O2 supplementation as needed  Her 6 min walk test showed desaturation to 82% with exertion  Had flu shot in Sept 2021 and had COVID booster in Jan 2022  Had Prevnar 20

## 2022-08-29 NOTE — ASSESSMENT & PLAN NOTE
Pt had CT of chest in May 2022 and no change  Pt saw Hematology in May 2022 and told that only needs to f/u as needed

## 2022-09-06 ENCOUNTER — TELEPHONE (OUTPATIENT)
Dept: FAMILY MEDICINE CLINIC | Facility: CLINIC | Age: 82
End: 2022-09-06

## 2022-09-06 DIAGNOSIS — E11.9 DIABETES MELLITUS WITHOUT COMPLICATION (HCC): ICD-10-CM

## 2022-09-06 RX ORDER — BLOOD SUGAR DIAGNOSTIC
1 STRIP MISCELLANEOUS DAILY
Qty: 100 EACH | Refills: 3 | Status: SHIPPED | OUTPATIENT
Start: 2022-09-06

## 2022-09-17 DIAGNOSIS — E11.9 DIABETES MELLITUS WITHOUT COMPLICATION (HCC): ICD-10-CM

## 2022-09-17 DIAGNOSIS — M17.11 PRIMARY OSTEOARTHRITIS OF RIGHT KNEE: ICD-10-CM

## 2022-09-19 RX ORDER — MELOXICAM 15 MG/1
TABLET ORAL
Qty: 90 TABLET | Refills: 0 | Status: SHIPPED | OUTPATIENT
Start: 2022-09-19

## 2022-10-04 ENCOUNTER — OFFICE VISIT (OUTPATIENT)
Dept: OBGYN CLINIC | Facility: CLINIC | Age: 82
End: 2022-10-04
Payer: MEDICARE

## 2022-10-04 VITALS — BODY MASS INDEX: 36.49 KG/M2 | HEIGHT: 59 IN | WEIGHT: 181 LBS

## 2022-10-04 DIAGNOSIS — M75.81 RIGHT ROTATOR CUFF TENDONITIS: ICD-10-CM

## 2022-10-04 DIAGNOSIS — M17.11 PRIMARY OSTEOARTHRITIS OF RIGHT KNEE: Primary | ICD-10-CM

## 2022-10-04 DIAGNOSIS — M17.12 PRIMARY OSTEOARTHRITIS OF LEFT KNEE: ICD-10-CM

## 2022-10-04 PROCEDURE — 99213 OFFICE O/P EST LOW 20 MIN: CPT | Performed by: ORTHOPAEDIC SURGERY

## 2022-10-04 RX ORDER — MELOXICAM 15 MG/1
15 TABLET ORAL DAILY
Qty: 30 TABLET | Refills: 2 | Status: SHIPPED | OUTPATIENT
Start: 2022-10-04
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
## 2022-10-04 NOTE — PATIENT INSTRUCTIONS
For the right shoulder: you can use ice and heat (warm shower)   You can take the Meloxicam 1x/daily and can take Tylenol (every 6-8 hours) later on in the day  It's ok to take them the same day

## 2022-10-12 PROBLEM — Z00.00 MEDICARE ANNUAL WELLNESS VISIT, SUBSEQUENT: Status: RESOLVED | Noted: 2021-02-08 | Resolved: 2022-10-12

## 2022-10-16 DIAGNOSIS — J30.2 SEASONAL ALLERGIC RHINITIS, UNSPECIFIED TRIGGER: ICD-10-CM

## 2022-10-16 DIAGNOSIS — I10 ESSENTIAL HYPERTENSION: ICD-10-CM

## 2022-10-17 ENCOUNTER — LAB (OUTPATIENT)
Dept: LAB | Facility: HOSPITAL | Age: 82
End: 2022-10-17
Attending: INTERNAL MEDICINE
Payer: MEDICARE

## 2022-10-17 DIAGNOSIS — I10 BENIGN ESSENTIAL HTN: ICD-10-CM

## 2022-10-17 DIAGNOSIS — E87.1 HYPONATREMIA: ICD-10-CM

## 2022-10-17 LAB
ANION GAP SERPL CALCULATED.3IONS-SCNC: 7 MMOL/L (ref 4–13)
BUN SERPL-MCNC: 23 MG/DL (ref 5–25)
CALCIUM SERPL-MCNC: 10.1 MG/DL (ref 8.4–10.2)
CHLORIDE SERPL-SCNC: 92 MMOL/L (ref 96–108)
CO2 SERPL-SCNC: 28 MMOL/L (ref 21–32)
CREAT SERPL-MCNC: 0.96 MG/DL (ref 0.6–1.3)
GFR SERPL CREATININE-BSD FRML MDRD: 55 ML/MIN/1.73SQ M
GLUCOSE P FAST SERPL-MCNC: 123 MG/DL (ref 65–99)
MAGNESIUM SERPL-MCNC: 1.7 MG/DL (ref 1.9–2.7)
PHOSPHATE SERPL-MCNC: 3.9 MG/DL (ref 2.3–4.1)
POTASSIUM SERPL-SCNC: 4.7 MMOL/L (ref 3.5–5.3)
SODIUM SERPL-SCNC: 127 MMOL/L (ref 135–147)

## 2022-10-17 PROCEDURE — 84100 ASSAY OF PHOSPHORUS: CPT

## 2022-10-17 PROCEDURE — 36415 COLL VENOUS BLD VENIPUNCTURE: CPT

## 2022-10-17 PROCEDURE — 80048 BASIC METABOLIC PNL TOTAL CA: CPT

## 2022-10-17 PROCEDURE — 83735 ASSAY OF MAGNESIUM: CPT

## 2022-10-17 RX ORDER — MONTELUKAST SODIUM 10 MG/1
TABLET ORAL
Qty: 90 TABLET | Refills: 0 | Status: SHIPPED | OUTPATIENT
Start: 2022-10-17

## 2022-10-17 RX ORDER — VALSARTAN 320 MG/1
320 TABLET ORAL DAILY
Qty: 90 TABLET | Refills: 2 | Status: SHIPPED | OUTPATIENT
Start: 2022-10-17 | End: 2023-01-15

## 2022-10-18 ENCOUNTER — TELEPHONE (OUTPATIENT)
Dept: NEPHROLOGY | Facility: CLINIC | Age: 82
End: 2022-10-18

## 2022-10-18 DIAGNOSIS — E87.1 HYPONATREMIA: Primary | ICD-10-CM

## 2022-10-18 NOTE — RESULT ENCOUNTER NOTE
Hello    Patient normally is followed up by Ms Betina Souza  Can you please find out from pt if anything has changed? Diuretic dosing? Diarrhea? Nausea/vomiting? Na is lower 127  Please have pt go for repeat BMP today or tomorrow at latest if she is feeling well       Thank you    np

## 2022-10-18 NOTE — TELEPHONE ENCOUNTER
----- Message from Darlene Simms MD sent at 10/18/2022 10:45 AM EDT -----  Hello    Patient normally is followed up by Ms Jagdeep Mark  Can you please find out from pt if anything has changed? Diuretic dosing? Diarrhea? Nausea/vomiting? Na is lower 127  Please have pt go for repeat BMP today or tomorrow at latest if she is feeling well       Thank you    np

## 2022-10-18 NOTE — TELEPHONE ENCOUNTER
Spoke to pt- she feels fine, no med changes  Pt doesn't add any salt when cooks and avoids salty foods  Per Dr Xu Lazaro pt needs BMP, urine & serum osm, urine sodium done tomorrow  Pt aware and agreeable, orders in the system

## 2022-10-19 ENCOUNTER — APPOINTMENT (OUTPATIENT)
Dept: LAB | Facility: HOSPITAL | Age: 82
End: 2022-10-19
Attending: INTERNAL MEDICINE
Payer: MEDICARE

## 2022-10-19 DIAGNOSIS — E87.1 HYPONATREMIA: ICD-10-CM

## 2022-10-19 LAB
ANION GAP SERPL CALCULATED.3IONS-SCNC: 8 MMOL/L (ref 4–13)
BUN SERPL-MCNC: 23 MG/DL (ref 5–25)
CALCIUM SERPL-MCNC: 10 MG/DL (ref 8.4–10.2)
CHLORIDE SERPL-SCNC: 94 MMOL/L (ref 96–108)
CO2 SERPL-SCNC: 26 MMOL/L (ref 21–32)
CREAT SERPL-MCNC: 0.95 MG/DL (ref 0.6–1.3)
GFR SERPL CREATININE-BSD FRML MDRD: 56 ML/MIN/1.73SQ M
GLUCOSE P FAST SERPL-MCNC: 147 MG/DL (ref 65–99)
OSMOLALITY UR/SERPL-RTO: 281 MMOL/KG (ref 282–298)
OSMOLALITY UR: 317 MMOL/KG
POTASSIUM SERPL-SCNC: 4 MMOL/L (ref 3.5–5.3)
SODIUM 24H UR-SCNC: 43 MOL/L
SODIUM SERPL-SCNC: 128 MMOL/L (ref 135–147)

## 2022-10-19 PROCEDURE — 36415 COLL VENOUS BLD VENIPUNCTURE: CPT

## 2022-10-19 PROCEDURE — 80048 BASIC METABOLIC PNL TOTAL CA: CPT

## 2022-10-19 PROCEDURE — 83930 ASSAY OF BLOOD OSMOLALITY: CPT

## 2022-10-19 PROCEDURE — 84300 ASSAY OF URINE SODIUM: CPT

## 2022-10-19 PROCEDURE — 83935 ASSAY OF URINE OSMOLALITY: CPT

## 2022-10-20 DIAGNOSIS — E87.1 HYPONATREMIA: Primary | ICD-10-CM

## 2022-10-20 DIAGNOSIS — I10 ESSENTIAL HYPERTENSION: ICD-10-CM

## 2022-10-20 DIAGNOSIS — I10 BENIGN ESSENTIAL HYPERTENSION: ICD-10-CM

## 2022-10-20 RX ORDER — TORSEMIDE 20 MG/1
10 TABLET ORAL 3 TIMES WEEKLY
Qty: 90 TABLET | Refills: 0
Start: 2022-10-21 | End: 2023-10-21

## 2022-10-20 RX ORDER — SPIRONOLACTONE 25 MG/1
12.5 TABLET ORAL DAILY
Qty: 90 TABLET | Refills: 0
Start: 2022-10-20

## 2022-10-20 NOTE — PROGRESS NOTES
Spoke to the patient over the phone  No complaints  Edema has improved  Sodium level has decreased since June  On repeat sodium level stable but still low one hundred twenty-eight  Spironolactone was restarted in June appointment with Cardiology team and patient was also advised to maintain torsemide 3 times a week  But the patient states that she has not been taking torsemide and only taking spironolactone from a diuretic perspective    Serum osmolarity is relatively normal     Urine studies are noted      For now, lower spironolactone to half a tablet a day, restart torsemide to promote free water excretion    Check SPEP, UPEP, free light chain, BMP in 1 week    Patient repeated back recommendation

## 2022-10-28 ENCOUNTER — APPOINTMENT (OUTPATIENT)
Dept: LAB | Facility: HOSPITAL | Age: 82
End: 2022-10-28
Attending: INTERNAL MEDICINE
Payer: MEDICARE

## 2022-10-28 DIAGNOSIS — E87.1 HYPONATREMIA: ICD-10-CM

## 2022-10-28 LAB
ANION GAP SERPL CALCULATED.3IONS-SCNC: 8 MMOL/L (ref 4–13)
BUN SERPL-MCNC: 21 MG/DL (ref 5–25)
CALCIUM SERPL-MCNC: 10 MG/DL (ref 8.4–10.2)
CHLORIDE SERPL-SCNC: 96 MMOL/L (ref 96–108)
CO2 SERPL-SCNC: 27 MMOL/L (ref 21–32)
CREAT SERPL-MCNC: 0.85 MG/DL (ref 0.6–1.3)
GFR SERPL CREATININE-BSD FRML MDRD: 64 ML/MIN/1.73SQ M
GLUCOSE SERPL-MCNC: 141 MG/DL (ref 65–140)
POTASSIUM SERPL-SCNC: 4.1 MMOL/L (ref 3.5–5.3)
SODIUM SERPL-SCNC: 131 MMOL/L (ref 135–147)

## 2022-10-28 PROCEDURE — 36415 COLL VENOUS BLD VENIPUNCTURE: CPT

## 2022-10-28 PROCEDURE — 83521 IG LIGHT CHAINS FREE EACH: CPT

## 2022-10-28 PROCEDURE — 80048 BASIC METABOLIC PNL TOTAL CA: CPT

## 2022-10-29 LAB
KAPPA LC FREE SER-MCNC: 45.4 MG/L (ref 3.3–19.4)
KAPPA LC FREE/LAMBDA FREE SER: 1.47 {RATIO} (ref 0.26–1.65)
LAMBDA LC FREE SERPL-MCNC: 30.8 MG/L (ref 5.7–26.3)

## 2022-11-01 ENCOUNTER — TELEPHONE (OUTPATIENT)
Dept: NEPHROLOGY | Facility: CLINIC | Age: 82
End: 2022-11-01

## 2022-11-01 LAB
ALBUMIN SERPL ELPH-MCNC: 4.43 G/DL (ref 3.5–5)
ALBUMIN SERPL ELPH-MCNC: 53.4 % (ref 52–65)
ALBUMIN UR ELPH-MCNC: 100 %
ALPHA1 GLOB MFR UR ELPH: 0 %
ALPHA1 GLOB SERPL ELPH-MCNC: 0.26 G/DL (ref 0.1–0.4)
ALPHA1 GLOB SERPL ELPH-MCNC: 3.1 % (ref 2.5–5)
ALPHA2 GLOB MFR UR ELPH: 0 %
ALPHA2 GLOB SERPL ELPH-MCNC: 0.87 G/DL (ref 0.4–1.2)
ALPHA2 GLOB SERPL ELPH-MCNC: 10.5 % (ref 7–13)
B-GLOBULIN MFR UR ELPH: 0 %
BETA GLOB ABNORMAL SERPL ELPH-MCNC: 0.51 G/DL (ref 0.4–0.8)
BETA1 GLOB SERPL ELPH-MCNC: 6.1 % (ref 5–13)
BETA2 GLOB SERPL ELPH-MCNC: 3.2 % (ref 2–8)
BETA2+GAMMA GLOB SERPL ELPH-MCNC: 0.27 G/DL (ref 0.2–0.5)
GAMMA GLOB ABNORMAL SERPL ELPH-MCNC: 1.97 G/DL (ref 0.5–1.6)
GAMMA GLOB MFR UR ELPH: 0 %
GAMMA GLOB SERPL ELPH-MCNC: 23.7 % (ref 12–22)
IGG/ALB SER: 1.15 {RATIO} (ref 1.1–1.8)
INTERPRETATION UR IFE-IMP: NORMAL
PROT PATTERN SERPL ELPH-IMP: ABNORMAL
PROT PATTERN UR ELPH-IMP: NORMAL
PROT SERPL-MCNC: 8.3 G/DL (ref 6.4–8.2)
PROT UR-MCNC: 8 MG/DL

## 2022-11-01 NOTE — TELEPHONE ENCOUNTER
----- Message from [place], [place] sent at 11/1/2022  2:31 PM EDT -----  Sodium level improving  Protein workup (SPEP UPEP [facility]LC) came back without concern  Will continue to monitor

## 2022-11-10 ENCOUNTER — IMMUNIZATIONS (OUTPATIENT)
Dept: FAMILY MEDICINE CLINIC | Facility: CLINIC | Age: 82
End: 2022-11-10

## 2022-11-10 DIAGNOSIS — Z23 ENCOUNTER FOR IMMUNIZATION: Primary | ICD-10-CM

## 2022-11-13 DIAGNOSIS — E11.9 DIABETES MELLITUS WITHOUT COMPLICATION (HCC): ICD-10-CM

## 2022-11-14 RX ORDER — GLIPIZIDE 10 MG/1
TABLET ORAL
Qty: 45 TABLET | Refills: 2 | Status: SHIPPED | OUTPATIENT
Start: 2022-11-14

## 2022-11-28 ENCOUNTER — TELEPHONE (OUTPATIENT)
Dept: NEPHROLOGY | Facility: CLINIC | Age: 82
End: 2022-11-28

## 2022-11-28 NOTE — TELEPHONE ENCOUNTER
LM for pt to r/s 1 27 appt with dr Garner Salvage due to provider schedule change   Can be r/s'd to 1/16

## 2022-12-28 ENCOUNTER — RA CDI HCC (OUTPATIENT)
Dept: OTHER | Facility: HOSPITAL | Age: 82
End: 2022-12-28

## 2022-12-28 NOTE — PROGRESS NOTES
I13 0, e11 22 for 2023  [facility]  coding opportunities          Chart Reviewed number of suggestions sent to Provider: 1     Patients Insurance     Medicare Insurance: [employer]

## 2022-12-31 DIAGNOSIS — I10 BENIGN ESSENTIAL HTN: ICD-10-CM

## 2022-12-31 DIAGNOSIS — I10 ESSENTIAL HYPERTENSION: ICD-10-CM

## 2022-12-31 DIAGNOSIS — I10 BENIGN ESSENTIAL HYPERTENSION: ICD-10-CM

## 2022-12-31 DIAGNOSIS — M17.11 PRIMARY OSTEOARTHRITIS OF RIGHT KNEE: ICD-10-CM

## 2023-01-01 RX ORDER — TERAZOSIN 5 MG/1
CAPSULE ORAL
Qty: 90 CAPSULE | Refills: 3 | Status: SHIPPED | OUTPATIENT
Start: 2023-01-01

## 2023-01-01 RX ORDER — METOPROLOL SUCCINATE 25 MG/1
TABLET, EXTENDED RELEASE ORAL
Qty: 90 TABLET | Refills: 3 | Status: SHIPPED | OUTPATIENT
Start: 2023-01-01

## 2023-01-03 RX ORDER — AMLODIPINE BESYLATE 5 MG/1
TABLET ORAL
Qty: 90 TABLET | Refills: 3 | Status: SHIPPED | OUTPATIENT
Start: 2023-01-03

## 2023-01-05 ENCOUNTER — OFFICE VISIT (OUTPATIENT)
Dept: FAMILY MEDICINE CLINIC | Facility: CLINIC | Age: 83
End: 2023-01-05

## 2023-01-05 VITALS
RESPIRATION RATE: 18 BRPM | HEIGHT: 59 IN | TEMPERATURE: 97.9 F | WEIGHT: 185 LBS | DIASTOLIC BLOOD PRESSURE: 70 MMHG | SYSTOLIC BLOOD PRESSURE: 130 MMHG | OXYGEN SATURATION: 96 % | BODY MASS INDEX: 37.29 KG/M2 | HEART RATE: 66 BPM

## 2023-01-05 DIAGNOSIS — J44.9 CHRONIC OBSTRUCTIVE PULMONARY DISEASE, UNSPECIFIED COPD TYPE (HCC): ICD-10-CM

## 2023-01-05 DIAGNOSIS — G47.33 OSA (OBSTRUCTIVE SLEEP APNEA): ICD-10-CM

## 2023-01-05 DIAGNOSIS — I50.32 CHRONIC DIASTOLIC CHF (CONGESTIVE HEART FAILURE) (HCC): ICD-10-CM

## 2023-01-05 DIAGNOSIS — E11.9 DIABETES MELLITUS WITHOUT COMPLICATION (HCC): ICD-10-CM

## 2023-01-05 DIAGNOSIS — E66.01 CLASS 2 SEVERE OBESITY DUE TO EXCESS CALORIES WITH SERIOUS COMORBIDITY AND BODY MASS INDEX (BMI) OF 36.0 TO 36.9 IN ADULT (HCC): ICD-10-CM

## 2023-01-05 DIAGNOSIS — D3A.090 BENIGN CARCINOID TUMOR OF LUNG: ICD-10-CM

## 2023-01-05 DIAGNOSIS — J06.9 ACUTE URI: ICD-10-CM

## 2023-01-05 DIAGNOSIS — I10 ESSENTIAL HYPERTENSION: Primary | ICD-10-CM

## 2023-01-05 LAB
SARS-COV-2 AG UPPER RESP QL IA: NEGATIVE
SL AMB POCT HEMOGLOBIN AIC: 6.2 (ref ?–6.5)
VALID CONTROL: NORMAL

## 2023-01-05 RX ORDER — BENZONATATE 200 MG/1
200 CAPSULE ORAL 3 TIMES DAILY PRN
Qty: 30 CAPSULE | Refills: 0 | Status: SHIPPED | OUTPATIENT
Start: 2023-01-05

## 2023-01-05 RX ORDER — AZITHROMYCIN 250 MG/1
TABLET, FILM COATED ORAL
Qty: 6 TABLET | Refills: 0 | Status: SHIPPED | OUTPATIENT
Start: 2023-01-05 | End: 2023-01-10
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
## 2023-01-05 NOTE — ASSESSMENT & PLAN NOTE
Pt has had it for 4-5 days  Rapid COVID test negative now  Will start zpak and benzonatate for cough

## 2023-01-05 NOTE — ASSESSMENT & PLAN NOTE
Wt Readings from Last 3 Encounters:   11/09/22 82 1 kg (181 lb)   10/04/22 82 1 kg (181 lb)   08/29/22 82 1 kg (181 lb)     Stable on current meds per Cardiology

## 2023-01-05 NOTE — ASSESSMENT & PLAN NOTE
A1C done today and was 6 2  Continue metformin 1000 mg bid and glipizide 5 mg qd  Continue low carb diet  Foot exam done today  Eye exam done in Aug 2021      Lab Results   Component Value Date    HGBA1C 6 1 08/29/2022

## 2023-01-05 NOTE — ASSESSMENT & PLAN NOTE
Pt saw Dr Heraclio Garner in May 2022 and has been stable  Pt continues to use O2 supplementation as needed  Her 6 min walk test showed desaturation to 82% with exertion  Had flu shot and COVID booster in Nov 2022  Had Prevnar 20

## 2023-01-06 LAB
CREAT UR-MCNC: 40.5 MG/DL
MICROALBUMIN UR-MCNC: 38.7 MG/L (ref 0–20)
MICROALBUMIN/CREAT 24H UR: 96 MG/G CREATININE (ref 0–30)

## 2023-01-10 ENCOUNTER — OFFICE VISIT (OUTPATIENT)
Dept: OBGYN CLINIC | Facility: CLINIC | Age: 83
End: 2023-01-10

## 2023-01-10 VITALS — BODY MASS INDEX: 37.29 KG/M2 | WEIGHT: 185 LBS | HEIGHT: 59 IN

## 2023-01-10 DIAGNOSIS — M17.11 PRIMARY OSTEOARTHRITIS OF RIGHT KNEE: ICD-10-CM

## 2023-01-10 DIAGNOSIS — M75.81 RIGHT ROTATOR CUFF TENDONITIS: ICD-10-CM

## 2023-01-10 DIAGNOSIS — M17.12 PRIMARY OSTEOARTHRITIS OF LEFT KNEE: Primary | ICD-10-CM

## 2023-01-10 RX ORDER — BUPIVACAINE HYDROCHLORIDE 2.5 MG/ML
2 INJECTION, SOLUTION INFILTRATION; PERINEURAL
Status: COMPLETED | OUTPATIENT
Start: 2023-01-10 | End: 2023-01-10

## 2023-01-10 RX ORDER — METHYLPREDNISOLONE ACETATE 40 MG/ML
2 INJECTION, SUSPENSION INTRA-ARTICULAR; INTRALESIONAL; INTRAMUSCULAR; SOFT TISSUE
Status: COMPLETED | OUTPATIENT
Start: 2023-01-10 | End: 2023-01-10

## 2023-01-10 RX ORDER — KETOROLAC TROMETHAMINE 30 MG/ML
60 INJECTION, SOLUTION INTRAMUSCULAR; INTRAVENOUS
Status: COMPLETED | OUTPATIENT
Start: 2023-01-10 | End: 2023-01-10

## 2023-01-10 RX ADMIN — KETOROLAC TROMETHAMINE 60 MG: 30 INJECTION, SOLUTION INTRAMUSCULAR; INTRAVENOUS at 12:22

## 2023-01-10 RX ADMIN — BUPIVACAINE HYDROCHLORIDE 2 ML: 2.5 INJECTION, SOLUTION INFILTRATION; PERINEURAL at 12:22

## 2023-01-10 RX ADMIN — METHYLPREDNISOLONE ACETATE 2 ML: 40 INJECTION, SUSPENSION INTRA-ARTICULAR; INTRALESIONAL; INTRAMUSCULAR; SOFT TISSUE at 12:22
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
## 2023-01-16 ENCOUNTER — OFFICE VISIT (OUTPATIENT)
Dept: NEPHROLOGY | Facility: CLINIC | Age: 83
End: 2023-01-16

## 2023-01-16 VITALS
WEIGHT: 183 LBS | HEIGHT: 59 IN | SYSTOLIC BLOOD PRESSURE: 140 MMHG | DIASTOLIC BLOOD PRESSURE: 56 MMHG | BODY MASS INDEX: 36.89 KG/M2 | HEART RATE: 70 BPM

## 2023-01-16 DIAGNOSIS — I10 BENIGN ESSENTIAL HTN: ICD-10-CM

## 2023-01-16 DIAGNOSIS — E11.65 TYPE 2 DIABETES MELLITUS WITH HYPERGLYCEMIA, WITH LONG-TERM CURRENT USE OF INSULIN (HCC): ICD-10-CM

## 2023-01-16 DIAGNOSIS — Z79.4 TYPE 2 DIABETES MELLITUS WITH HYPERGLYCEMIA, WITH LONG-TERM CURRENT USE OF INSULIN (HCC): ICD-10-CM

## 2023-01-16 DIAGNOSIS — E87.1 HYPONATREMIA: Primary | ICD-10-CM

## 2023-01-16 NOTE — PATIENT INSTRUCTIONS
1) Avoid NSAIDS - (Example - motrin, advil, ibuprofen, aleve, exederin, etc)  2) Always follow a low salt diet  3) please limit meloxicam as much as possible  4) labwork this month (can go any time this week or next week)  5) labwork in 5 months  6) appointment in 6 months  7) no changes today to medications

## 2023-01-16 NOTE — LETTER
January 16, 2023     Haylee Reyes, [address] [place] [address]    Patient: Bo Diaz   YOB: 1940   Date of Visit: 1/16/2023       Dear Dr Connie Logan:    Thank you for referring María Patel to me for evaluation  Below are my notes for this consultation  If you have questions, please do not hesitate to call me  I look forward to following your patient along with you  Sincerely,        Sophie Arias MD        CC: No Recipients  Sophie Arias MD  1/16/2023  1:32 PM  Sign when Signing Visit  Francesca Villeda 81 y o  female MRN: [id number]  1/16/2023    Reason for Visit: hyponatremia    ASSESSMENT and PLAN:    I had the pleasure of seeing Ms Karena Girard today in the renal clinic for the continued management of hyponatremia  82 yo Patient has a history of CHF, lung carcinoid, COPD on home O4, TOM, hyponatremia, hypertension, prior ischemic bowel during pregnancy, who is being seen as a follow-up for hyponatremia  The etiology of hyponatremia was felt to be secondary to volume overload in the setting of heart failure, and citalopram use during admission 2018 at [facility] is now presenting for f/u for HTN, hyponatremia, proteinuria       1) hyponatremia     - secondary to volume overload prior  -serum osmolarity 270 on June 2021  -urine osmolarity 242, urine sodium 22 in June 2021  -sodium level is stable and then decreased in June 2021-128 after which patient was advised fluid restriction   Patient was also advised to be compliant with diuretics  - Sodium level decreased again on 10/2022-at that time it was noted that the patient had self discontinued torsemide  Was advised to restart torsemide and lower spironolactone slightly    - SPEP, UPEP, free light chain unrevealing in October 2022  -on torsemide  - no longer on salt tab     2020 - Pt states that was taking torsemide daily but not taking it on days with appointment and if had to go somewhere  Noticed edema when not taking torsemide  Was taking 1/2 of spironolactone  Saw Cardiology and was advised to take 1/2 tab torsemide (so ten mg) and spironolactone 25 mg daily  Pt states with this change, has edema improved  Is feeling satisfied with the improvement        May 2021- patient's blood pressures are below goal   Patient is asymptomatic   Patient states that she has now been compliant with her medications this week and prior was not taking some of her medications up to 1 to 2 times a week   We reviewed that this makes it difficult to appropriately titrate medications as we are not sure what she is taking when she is seen in the office  Yas Madrid agrees  Maulik Reid, now that the patient is taking all of her medications daily with the exception of diuretics on days when she has heard physician appointments, I advised the patient to hold amlodipine   And check blood pressures once a day for 2 weeks and call with results      February 2022-sodium level remains stable 134  Calcium borderline elevated and was advised to hold calcium supplement   Protein level in the urine remains stable 0 3      March 2022 calcium level improved  October 2022-hyponatremia 127  Advised the patient to restart torsemide and lower spironolactone  Repeat sodium level improved to 131      Plan:  - No lab work since last appointment  Patient advised to have lab work now and then again before next appointment  - appointment in 5-6 months   -no changes to medication regimen today - torsemide 10 mg three times per week, spironolactone 12 5 mg daily; valsartan 320 mg daily, metoprolol  - pt is still taking meloxicam - readvised to limit as much as possible    Patient is agreeable     2) HTN - currently is on valsartan, spironolactone, metoprolol, amlodipine, terazosin with controlled blood pressures August 2022     -was started on spironolactone in April 2018    -during visit in [facility], patient's amlodipine was increased and valsartan was changed to losartan due to recall   -renal artery Doppler were unrevealing   In the proximal arteries of the renal artery  -May 2019-Lower terazosin to 5 mg, and lower metoprolol to 37 5 twice a day  - 7/2019 - metoprolol changed to 25 mg at night as pt was taking 37 5 BID dosing only once daily  -June 2022-patient saw cardiologist   At this time was taking torsemide 10 mg 3 times per week and spironolactone was 3 times per week and was advised to take spironolactone once a day  - august 2022 - pt states is not taking torsemide regularly and last dose was in july  - October 2022-patient was not taking torsemide and was advised to restart and lower spironolactone due to hyponatremia  - 1/2023 - SBP ok for now (upper limit of nl but pt higher risk of fall)     3) renal function      - stable creatinine 0 8 mg/d Abhijeet Ontiverossophy 2022  - to note, started on meloxicam in 6/28/2022     4) proteinuria     - prior SPEP, UPEP unrevealing  - on ARB and spironolactone  - last UA on 8/2019  None recent  -  Repeat urinalysis unrevealing   U PCR stable August 2022     5) thyroid nodule - biopsied prior     6) electrolytes     -   Hyponatremia -sodium level has normalized  - calcium supplement held in February 2022 due to borderline hypercalcemia   February 2022    repeat testing with improved calcium level  Holding calcium supplements     7) COPD and pulm carcinoid and TOM     - follows with Pulmonary team and Hematology team  - on home O2  - f/u CT scan pending from 5/13/2021     8) AST slight elevation - chronic  Per PCP     9)   recheck PTH 1 month     10) weight at home is 172-175 lbs     -Patient is euvolemic on exam on 1/16  Monitor with current dose of diuretic      11) DM - being managed by PCP    12) URI    - treated with zpack recently by PCP and now improved     It was a pleasure evaluating your patient in the office today   Thank you for allowing our team to participate in the care of Ms Camryn Roblero  Please do not hesitate to contact our team if further issues/questions shall arise in the interim            Type 2 diabetes mellitus with hyperglycemia, with long-term current use of insulin ([address] )    Lab Results   Component Value Date    HGBA1C 6 2 01/05/2023   Being managed by PCP      HPI:    Recently treated for URI with zpack as outpt by PCP  No fevers, chills, nausea, vomiting  PATIENT INSTRUCTIONS:    Patient Instructions   1) Avoid NSAIDS - (Example - motrin, advil, ibuprofen, aleve, exederin, etc)  2) Always follow a low salt diet  3) please limit meloxicam as much as possible  4) labwork this month (can go any time this week or next week)  5) labwork in 5 months  6) appointment in 6 months  7) no changes today to medications        OBJECTIVE:  Current Weight: Weight - Scale: 83 kg (183 lb)  Vitals:    01/16/23 1308   BP: 140/56   BP Location: Right arm   Patient Position: Sitting   Cuff Size: Standard   Pulse: 70   Weight: 83 kg (183 lb)   Height: 4' 11" (1 499 m)    Body mass index is 36 96 kg/m²  REVIEW OF SYSTEMS:    Review of Systems   Constitutional: Negative  Negative for fatigue  HENT: Negative  Eyes: Negative  Respiratory: Negative  Negative for shortness of breath  Cardiovascular: Negative  Negative for leg swelling  Gastrointestinal: Negative  Endocrine: Negative  Genitourinary: Negative  Negative for difficulty urinating  Musculoskeletal: Negative  Skin: Negative  Allergic/Immunologic: Negative  Neurological: Negative  Hematological: Negative  Psychiatric/Behavioral: Negative  All other systems reviewed and are negative  PHYSICAL EXAM:      Physical Exam  Vitals and nursing note reviewed  Constitutional:       General: She is not in acute distress  Appearance: She is well-developed  She is not diaphoretic  HENT:      Head: Normocephalic and atraumatic  Eyes:      General: No scleral icterus          Right eye: No discharge  Left eye: No discharge  Conjunctiva/sclera: Conjunctivae normal    Neck:      Vascular: No JVD  Cardiovascular:      Rate and Rhythm: Normal rate and regular rhythm  Heart sounds: No murmur heard  No friction rub  No gallop  Pulmonary:      Effort: Pulmonary effort is normal  No respiratory distress  Breath sounds: Normal breath sounds  No wheezing or rales  Abdominal:      General: Bowel sounds are normal  There is no distension  Palpations: Abdomen is soft  Tenderness: There is no abdominal tenderness  There is no rebound  Musculoskeletal:         General: No tenderness or deformity  Normal range of motion  Cervical back: Normal range of motion and neck supple  Skin:     General: Skin is warm and dry  Coloration: Skin is not pale  Findings: No erythema or rash  Neurological:      Mental Status: She is alert and oriented to person, place, and time  Coordination: Coordination normal    Psychiatric:         Behavior: Behavior normal          Thought Content:  Thought content normal          Judgment: Judgment normal          Medications:    Current Outpatient Medications:   •  acetaminophen (TYLENOL) 500 mg tablet, Take 500 mg by mouth every 6 (six) hours as needed for mild pain For pain, Disp: , Rfl:   •  ADULT ASPIRIN LOW STRENGTH PO, Take 1 tablet by mouth daily , Disp: , Rfl:   •  amLODIPine (NORVASC) 5 mg tablet, TAKE 1 TABLET EVERY DAY, Disp: 90 tablet, Rfl: 3  •  Calcium 600-400 MG-UNIT CHEW, Chew 1 tablet daily (Patient taking differently: Chew 1 tablet daily Takes 2 tablets daily), Disp: 30 tablet, Rfl: 3  •  calcium carbonate-vitamin D (OSCAL-D) 500 mg-200 units per tablet, Take 1 tablet by mouth, Disp: , Rfl:   •  glipiZIDE (GLUCOTROL) 10 mg tablet, TAKE 1/2 TABLET EVERY MORNING, Disp: 45 tablet, Rfl: 2  •  loratadine (CLARITIN) 10 mg tablet, Take 1 tablet by mouth daily, Disp: , Rfl:   •  meloxicam (Mobic) 15 mg tablet, Take 1 tablet (15 mg total) by mouth daily With food, Disp: 30 tablet, Rfl: 2  •  metFORMIN (GLUCOPHAGE) 500 mg tablet, TAKE 2 TABLETS (1,000 MG TOTAL) BY MOUTH 2 (TWO) TIMES A DAY, Disp: 360 tablet, Rfl: 2  •  metoprolol succinate (TOPROL-XL) 25 mg 24 hr tablet, TAKE 1 TABLET EVERY DAY, Disp: 90 tablet, Rfl: 3  •  montelukast (SINGULAIR) 10 mg tablet, TAKE 1 TABLET EVERY DAY, Disp: 90 tablet, Rfl: 0  •  spironolactone (ALDACTONE) 25 mg tablet, Take 0 5 tablets (12 5 mg total) by mouth daily, Disp: 90 tablet, Rfl: 0  •  terazosin (HYTRIN) 5 mg capsule, TAKE 1 CAPSULE EVERY DAY, Disp: 90 capsule, Rfl: 3  •  torsemide (DEMADEX) 20 mg tablet, Take 0 5 tablets (10 mg total) by mouth 3 (three) times a week, Disp: 90 tablet, Rfl: 0  •  valsartan (DIOVAN) 320 MG tablet, TAKE 1 TABLET (320 MG TOTAL) BY MOUTH DAILY, Disp: 90 tablet, Rfl: 2  •  Accu-Chek FastClix Lancets MISC, USE 1 LANCET   TO CHECK GLUCOSE ONCE DAILY, Disp: 102 each, Rfl: 5  •  Accu-Chek SmartView test strip, Use 1 each daily Use as instructed, Disp: 100 each, Rfl: 3  •  albuterol (ProAir HFA) 90 mcg/act inhaler, Inhale 2 puffs every 6 (six) hours as needed for wheezing or shortness of breath (Patient not taking: Reported on 1/16/2023), Disp: 18 g, Rfl: 1  •  benzonatate (TESSALON) 200 MG capsule, Take 1 capsule (200 mg total) by mouth 3 (three) times a day as needed for cough (Patient not taking: Reported on 1/16/2023), Disp: 30 capsule, Rfl: 0  •  Diclofenac Sodium (VOLTAREN) 1 %, APPLY 2 GRAMS TOPICALLY 4 (FOUR) TIMES A DAY (Patient not taking: Reported on 1/16/2023), Disp: 300 g, Rfl: 1  •  fluticasone (FLONASE) 50 mcg/act nasal spray, 1 spray into each nostril daily  (Patient not taking: Reported on 1/5/2023), Disp: , Rfl:   •  latanoprost (XALATAN) 0 005 % ophthalmic solution, Apply to eye (Patient not taking: Reported on 1/5/2023), Disp: , Rfl:   •  meloxicam (MOBIC) 15 mg tablet, TAKE 1 TABLET EVERY DAY (Patient not taking: Reported on 1/5/2023), Disp: 90 tablet, Rfl: 0  •  prednisoLONE acetate (PRED FORTE) 1 % ophthalmic suspension, , Disp: , Rfl:     Laboratory Results:        Invalid input(s): ALBUMIN    Results for orders placed or performed in visit on 01/05/23   Microalbumin / creatinine urine ratio   Result Value Ref Range    Creatinine, Ur 40 5 mg/dL    Microalbum  ,U,Random 38 7 (H) 0 0 - 20 0 mg/L    Microalb Creat Ratio 96 (H) 0 - 30 mg/g creatinine   POCT hemoglobin A1c   Result Value Ref Range    Hemoglobin A1C 6 2 6 5   Poct Covid 19 Rapid Antigen Test   Result Value Ref Range    POCT SARS-CoV-2 Ag Negative Negative    VALID CONTROL Valid

## 2023-01-16 NOTE — PROGRESS NOTES
NEPHROLOGY OFFICE VISIT   Azeem Valadez 81 y o  female MRN: [id number]  1/16/2023    Reason for Visit: hyponatremia    ASSESSMENT and PLAN:    I had the pleasure of seeing Ms Farrah Corado today in the renal clinic for the continued management of hyponatremia  80 yo Patient has a history of CHF, lung carcinoid, COPD on home O4, TOM, hyponatremia, hypertension, prior ischemic bowel during pregnancy, who is being seen as a follow-up for hyponatremia  The etiology of hyponatremia was felt to be secondary to volume overload in the setting of heart failure, and citalopram use during admission 2018 at [facility] is now presenting for f/u for HTN, hyponatremia, proteinuria       1) hyponatremia     - secondary to volume overload prior  -serum osmolarity 270 on June 2021  -urine osmolarity 242, urine sodium 22 in June 2021  -sodium level is stable and then decreased in June 2021-128 after which patient was advised fluid restriction   Patient was also advised to be compliant with diuretics  - Sodium level decreased again on 10/2022-at that time it was noted that the patient had self discontinued torsemide  Was advised to restart torsemide and lower spironolactone slightly  - SPEP, UPEP, free light chain unrevealing in October 2022  -on torsemide  - no longer on salt tab     2020 - Pt states that was taking torsemide daily but not taking it on days with appointment and if had to go somewhere  Noticed edema when not taking torsemide  Was taking 1/2 of spironolactone  Saw Cardiology and was advised to take 1/2 tab torsemide (so ten mg) and spironolactone 25 mg daily  Pt states with this change, has edema improved   Is feeling satisfied with the improvement        May 2021- patient's blood pressures are below goal   Patient is asymptomatic   Patient states that she has now been compliant with her medications this week and prior was not taking some of her medications up to 1 to 2 times a week   We reviewed that this makes it difficult to appropriately titrate medications as we are not sure what she is taking when she is seen in the office  Mohan Smith agrees  Anna Montana, now that the patient is taking all of her medications daily with the exception of diuretics on days when she has heard physician appointments, I advised the patient to hold amlodipine   And check blood pressures once a day for 2 weeks and call with results      February 2022-sodium level remains stable 134  Calcium borderline elevated and was advised to hold calcium supplement   Protein level in the urine remains stable 0 3      March 2022 calcium level improved  October 2022-hyponatremia 127  Advised the patient to restart torsemide and lower spironolactone  Repeat sodium level improved to 131      Plan:  - No lab work since last appointment  Patient advised to have lab work now and then again before next appointment  - appointment in 5-6 months   -no changes to medication regimen today - torsemide 10 mg three times per week, spironolactone 12 5 mg daily; valsartan 320 mg daily, metoprolol  - pt is still taking meloxicam - readvised to limit as much as possible  Patient is agreeable     2) HTN - currently is on valsartan, spironolactone, metoprolol, amlodipine, terazosin with controlled blood pressures August 2022     -was started on spironolactone in April 2018    -during visit in [facility], patient's amlodipine was increased and valsartan was changed to losartan due to recall   -renal artery Doppler were unrevealing   In the proximal arteries of the renal artery    -May 2019-Lower terazosin to 5 mg, and lower metoprolol to 37 5 twice a day  - 7/2019 - metoprolol changed to 25 mg at night as pt was taking 37 5 BID dosing only once daily  -June 2022-patient saw cardiologist   At this time was taking torsemide 10 mg 3 times per week and spironolactone was 3 times per week and was advised to take spironolactone once a day  - august 2022 - pt states is not taking torsemide regularly and last dose was in july  - October 2022-patient was not taking torsemide and was advised to restart and lower spironolactone due to hyponatremia  - 1/2023 - SBP ok for now (upper limit of nl but pt higher risk of fall)     3) renal function      - stable creatinine 0 8 mg/d Abhijeet Ang 2022  - to note, started on meloxicam in 6/28/2022     4) proteinuria     - prior SPEP, UPEP unrevealing  - on ARB and spironolactone  - last UA on 8/2019  None recent  -  Repeat urinalysis unrevealing   U PCR stable August 2022     5) thyroid nodule - biopsied prior     6) electrolytes     -   Hyponatremia -sodium level has normalized  - calcium supplement held in February 2022 due to borderline hypercalcemia   February 2022    repeat testing with improved calcium level  Holding calcium supplements     7) COPD and pulm carcinoid and TOM     - follows with Pulmonary team and Hematology team  - on home O2  - f/u CT scan pending from 5/13/2021     8) AST slight elevation - chronic  Per PCP     9)   recheck PTH 1 month     10) weight at home is 172-175 lbs     -Patient is euvolemic on exam on 1/16  Monitor with current dose of diuretic      11) DM - being managed by PCP    12) URI    - treated with zpack recently by PCP and now improved     It was a pleasure evaluating your patient in the office today  Thank you for allowing our team to participate in the care of Ms Camryn Roblero  Please do not hesitate to contact our team if further issues/questions shall arise in the interim            Type 2 diabetes mellitus with hyperglycemia, with long-term current use of insulin ([facility] [address] )    Lab Results   Component Value Date    HGBA1C 6 2 01/05/2023   Being managed by PCP      HPI:    Recently treated for URI with zpack as outpt by PCP  No fevers, chills, nausea, vomiting      PATIENT INSTRUCTIONS:    Patient Instructions   1) Avoid NSAIDS - (Example - motrin, advil, ibuprofen, aleve, exederin, etc)  2) Always follow a low salt diet  3) please limit meloxicam as much as possible  4) labwork this month (can go any time this week or next week)  5) labwork in 5 months  6) appointment in 6 months  7) no changes today to medications        OBJECTIVE:  Current Weight: Weight - Scale: 83 kg (183 lb)  Vitals:    01/16/23 1308   BP: 140/56   BP Location: Right arm   Patient Position: Sitting   Cuff Size: Standard   Pulse: 70   Weight: 83 kg (183 lb)   Height: 4' 11" (1 499 m)    Body mass index is 36 96 kg/m²  REVIEW OF SYSTEMS:    Review of Systems   Constitutional: Negative  Negative for fatigue  HENT: Negative  Eyes: Negative  Respiratory: Negative  Negative for shortness of breath  Cardiovascular: Negative  Negative for leg swelling  Gastrointestinal: Negative  Endocrine: Negative  Genitourinary: Negative  Negative for difficulty urinating  Musculoskeletal: Negative  Skin: Negative  Allergic/Immunologic: Negative  Neurological: Negative  Hematological: Negative  Psychiatric/Behavioral: Negative  All other systems reviewed and are negative  PHYSICAL EXAM:      Physical Exam  Vitals and nursing note reviewed  Constitutional:       General: She is not in acute distress  Appearance: She is well-developed  She is not diaphoretic  HENT:      Head: Normocephalic and atraumatic  Eyes:      General: No scleral icterus  Right eye: No discharge  Left eye: No discharge  Conjunctiva/sclera: Conjunctivae normal    Neck:      Vascular: No JVD  Cardiovascular:      Rate and Rhythm: Normal rate and regular rhythm  Heart sounds: No murmur heard  No friction rub  No gallop  Pulmonary:      Effort: Pulmonary effort is normal  No respiratory distress  Breath sounds: Normal breath sounds  No wheezing or rales  Abdominal:      General: Bowel sounds are normal  There is no distension  Palpations: Abdomen is soft  Tenderness: There is no abdominal tenderness  There is no rebound  Musculoskeletal:         General: No tenderness or deformity  Normal range of motion  Cervical back: Normal range of motion and neck supple  Skin:     General: Skin is warm and dry  Coloration: Skin is not pale  Findings: No erythema or rash  Neurological:      Mental Status: She is alert and oriented to person, place, and time  Coordination: Coordination normal    Psychiatric:         Behavior: Behavior normal          Thought Content:  Thought content normal          Judgment: Judgment normal          Medications:    Current Outpatient Medications:   •  acetaminophen (TYLENOL) 500 mg tablet, Take 500 mg by mouth every 6 (six) hours as needed for mild pain For pain, Disp: , Rfl:   •  ADULT ASPIRIN LOW STRENGTH PO, Take 1 tablet by mouth daily , Disp: , Rfl:   •  amLODIPine (NORVASC) 5 mg tablet, TAKE 1 TABLET EVERY DAY, Disp: 90 tablet, Rfl: 3  •  Calcium 600-400 MG-UNIT CHEW, Chew 1 tablet daily (Patient taking differently: Chew 1 tablet daily Takes 2 tablets daily), Disp: 30 tablet, Rfl: 3  •  calcium carbonate-vitamin D (OSCAL-D) 500 mg-200 units per tablet, Take 1 tablet by mouth, Disp: , Rfl:   •  glipiZIDE (GLUCOTROL) 10 mg tablet, TAKE 1/2 TABLET EVERY MORNING, Disp: 45 tablet, Rfl: 2  •  loratadine (CLARITIN) 10 mg tablet, Take 1 tablet by mouth daily, Disp: , Rfl:   •  meloxicam (Mobic) 15 mg tablet, Take 1 tablet (15 mg total) by mouth daily With food, Disp: 30 tablet, Rfl: 2  •  metFORMIN (GLUCOPHAGE) 500 mg tablet, TAKE 2 TABLETS (1,000 MG TOTAL) BY MOUTH 2 (TWO) TIMES A DAY, Disp: 360 tablet, Rfl: 2  •  metoprolol succinate (TOPROL-XL) 25 mg 24 hr tablet, TAKE 1 TABLET EVERY DAY, Disp: 90 tablet, Rfl: 3  •  montelukast (SINGULAIR) 10 mg tablet, TAKE 1 TABLET EVERY DAY, Disp: 90 tablet, Rfl: 0  •  spironolactone (ALDACTONE) 25 mg tablet, Take 0 5 tablets (12 5 mg total) by mouth daily, Disp: 90 tablet, Rfl: 0  •  terazosin (HYTRIN) 5 mg capsule, TAKE 1 CAPSULE EVERY DAY, Disp: 90 capsule, Rfl: 3  •  torsemide (DEMADEX) 20 mg tablet, Take 0 5 tablets (10 mg total) by mouth 3 (three) times a week, Disp: 90 tablet, Rfl: 0  •  valsartan (DIOVAN) 320 MG tablet, TAKE 1 TABLET (320 MG TOTAL) BY MOUTH DAILY, Disp: 90 tablet, Rfl: 2  •  Accu-Chek FastClix Lancets MISC, USE 1 LANCET   TO CHECK GLUCOSE ONCE DAILY, Disp: 102 each, Rfl: 5  •  Accu-Chek SmartView test strip, Use 1 each daily Use as instructed, Disp: 100 each, Rfl: 3  •  albuterol (ProAir HFA) 90 mcg/act inhaler, Inhale 2 puffs every 6 (six) hours as needed for wheezing or shortness of breath (Patient not taking: Reported on 1/16/2023), Disp: 18 g, Rfl: 1  •  benzonatate (TESSALON) 200 MG capsule, Take 1 capsule (200 mg total) by mouth 3 (three) times a day as needed for cough (Patient not taking: Reported on 1/16/2023), Disp: 30 capsule, Rfl: 0  •  Diclofenac Sodium (VOLTAREN) 1 %, APPLY 2 GRAMS TOPICALLY 4 (FOUR) TIMES A DAY (Patient not taking: Reported on 1/16/2023), Disp: 300 g, Rfl: 1  •  fluticasone (FLONASE) 50 mcg/act nasal spray, 1 spray into each nostril daily  (Patient not taking: Reported on 1/5/2023), Disp: , Rfl:   •  latanoprost (XALATAN) 0 005 % ophthalmic solution, Apply to eye (Patient not taking: Reported on 1/5/2023), Disp: , Rfl:   •  meloxicam (MOBIC) 15 mg tablet, TAKE 1 TABLET EVERY DAY (Patient not taking: Reported on 1/5/2023), Disp: 90 tablet, Rfl: 0  •  prednisoLONE acetate (PRED FORTE) 1 % ophthalmic suspension, , Disp: , Rfl:     Laboratory Results:        Invalid input(s): ALBUMIN    Results for orders placed or performed in visit on 01/05/23   Microalbumin / creatinine urine ratio   Result Value Ref Range    Creatinine, Ur 40 5 mg/dL    Microalbum  ,U,Random 38 7 (H) 0 0 - 20 0 mg/L    Microalb Creat Ratio 96 (H) 0 - 30 mg/g creatinine   POCT hemoglobin A1c   Result Value Ref Range    Hemoglobin A1C 6 2 6 5   Poct Covid 19 Rapid Antigen Test   Result Value Ref Range    POCT SARS-CoV-2 Ag Negative Negative    VALID CONTROL Valid

## 2023-01-17 ENCOUNTER — TELEPHONE (OUTPATIENT)
Dept: CARDIOLOGY CLINIC | Facility: CLINIC | Age: 83
End: 2023-01-17

## 2023-01-18 ENCOUNTER — TELEPHONE (OUTPATIENT)
Dept: NEPHROLOGY | Facility: CLINIC | Age: 83
End: 2023-01-18

## 2023-01-18 ENCOUNTER — LAB (OUTPATIENT)
Dept: LAB | Facility: HOSPITAL | Age: 83
End: 2023-01-18
Attending: INTERNAL MEDICINE

## 2023-01-18 DIAGNOSIS — E87.1 HYPONATREMIA: ICD-10-CM

## 2023-01-18 DIAGNOSIS — Z79.4 TYPE 2 DIABETES MELLITUS WITH HYPERGLYCEMIA, WITH LONG-TERM CURRENT USE OF INSULIN (HCC): ICD-10-CM

## 2023-01-18 DIAGNOSIS — E87.1 HYPONATREMIA: Primary | ICD-10-CM

## 2023-01-18 DIAGNOSIS — I10 BENIGN ESSENTIAL HTN: ICD-10-CM

## 2023-01-18 DIAGNOSIS — E11.65 TYPE 2 DIABETES MELLITUS WITH HYPERGLYCEMIA, WITH LONG-TERM CURRENT USE OF INSULIN (HCC): ICD-10-CM

## 2023-01-18 LAB
ANION GAP SERPL CALCULATED.3IONS-SCNC: 8 MMOL/L (ref 4–13)
BACTERIA UR QL AUTO: ABNORMAL /HPF
BILIRUB UR QL STRIP: NEGATIVE
BUN SERPL-MCNC: 21 MG/DL (ref 5–25)
CALCIUM SERPL-MCNC: 9.6 MG/DL (ref 8.4–10.2)
CHLORIDE SERPL-SCNC: 97 MMOL/L (ref 96–108)
CLARITY UR: CLEAR
CO2 SERPL-SCNC: 26 MMOL/L (ref 21–32)
COLOR UR: YELLOW
CREAT SERPL-MCNC: 0.89 MG/DL (ref 0.6–1.3)
CREAT UR-MCNC: 59.5 MG/DL
ERYTHROCYTE [DISTWIDTH] IN BLOOD BY AUTOMATED COUNT: 13.5 % (ref 11.6–15.1)
GFR SERPL CREATININE-BSD FRML MDRD: 60 ML/MIN/1.73SQ M
GLUCOSE P FAST SERPL-MCNC: 130 MG/DL (ref 65–99)
GLUCOSE UR STRIP-MCNC: NEGATIVE MG/DL
HCT VFR BLD AUTO: 38.8 % (ref 34.8–46.1)
HGB BLD-MCNC: 12.7 G/DL (ref 11.5–15.4)
HGB UR QL STRIP.AUTO: NEGATIVE
KETONES UR STRIP-MCNC: NEGATIVE MG/DL
LEUKOCYTE ESTERASE UR QL STRIP: ABNORMAL
MAGNESIUM SERPL-MCNC: 1.8 MG/DL (ref 1.9–2.7)
MCH RBC QN AUTO: 28.5 PG (ref 26.8–34.3)
MCHC RBC AUTO-ENTMCNC: 32.7 G/DL (ref 31.4–37.4)
MCV RBC AUTO: 87 FL (ref 82–98)
NITRITE UR QL STRIP: NEGATIVE
NON-SQ EPI CELLS URNS QL MICRO: ABNORMAL /HPF
PH UR STRIP.AUTO: 6 [PH]
PHOSPHATE SERPL-MCNC: 3.4 MG/DL (ref 2.3–4.1)
PLATELET # BLD AUTO: 282 THOUSANDS/UL (ref 149–390)
PMV BLD AUTO: 10.1 FL (ref 8.9–12.7)
POTASSIUM SERPL-SCNC: 4.4 MMOL/L (ref 3.5–5.3)
PROT UR STRIP-MCNC: NEGATIVE MG/DL
PROT UR-MCNC: 9 MG/DL
PROT/CREAT UR: 0.15 MG/G{CREAT} (ref 0–0.1)
RBC # BLD AUTO: 4.46 MILLION/UL (ref 3.81–5.12)
RBC #/AREA URNS AUTO: ABNORMAL /HPF
SODIUM SERPL-SCNC: 131 MMOL/L (ref 135–147)
SP GR UR STRIP.AUTO: 1.01 (ref 1–1.03)
UROBILINOGEN UR QL STRIP.AUTO: 0.2 E.U./DL
WBC # BLD AUTO: 7.71 THOUSAND/UL (ref 4.31–10.16)
WBC #/AREA URNS AUTO: ABNORMAL /HPF

## 2023-01-18 NOTE — TELEPHONE ENCOUNTER
----- Message from Lorrie Hanna MD sent at 1/18/2023  4:04 PM EST -----  Hello    Patient normally is followed up by Ms Millie Miranda  Please let the patient know That the sodium level is low but unchanged and okay at 131 for now  Other electrolytes are stable and renal function is stable  No changes for now with the exception that the patient should make sure she is fluid restriction 1 8 L/day and repeat a BMP in 1 to 2 months      Thank you    np

## 2023-01-18 NOTE — RESULT ENCOUNTER NOTE
Hello    Patient normally is followed up by Ms Ana Chahal  Please let the patient know That the sodium level is low but unchanged and okay at 131 for now  Other electrolytes are stable and renal function is stable  No changes for now with the exception that the patient should make sure she is fluid restriction 1 8 L/day and repeat a BMP in 1 to 2 months      Thank you    np

## 2023-02-20 DIAGNOSIS — I10 ESSENTIAL HYPERTENSION: ICD-10-CM

## 2023-02-21 RX ORDER — SPIRONOLACTONE 25 MG/1
12.5 TABLET ORAL DAILY
Qty: 90 TABLET | Refills: 2 | Status: SHIPPED | OUTPATIENT
Start: 2023-02-21

## 2023-03-06 PROBLEM — J06.9 ACUTE URI: Status: RESOLVED | Noted: 2023-01-05 | Resolved: 2023-03-06

## 2023-03-23 ENCOUNTER — APPOINTMENT (OUTPATIENT)
Dept: LAB | Facility: HOSPITAL | Age: 83
End: 2023-03-23
Attending: INTERNAL MEDICINE

## 2023-03-23 DIAGNOSIS — E87.1 HYPONATREMIA: ICD-10-CM

## 2023-03-23 LAB
ANION GAP SERPL CALCULATED.3IONS-SCNC: 12 MMOL/L (ref 4–13)
BACTERIA UR QL AUTO: ABNORMAL /HPF
BILIRUB UR QL STRIP: NEGATIVE
BUN SERPL-MCNC: 19 MG/DL (ref 5–25)
CALCIUM SERPL-MCNC: 9.5 MG/DL (ref 8.4–10.2)
CHLORIDE SERPL-SCNC: 100 MMOL/L (ref 96–108)
CLARITY UR: CLEAR
CO2 SERPL-SCNC: 22 MMOL/L (ref 21–32)
COLOR UR: YELLOW
CREAT SERPL-MCNC: 0.83 MG/DL (ref 0.6–1.3)
CREAT UR-MCNC: 22.7 MG/DL
ERYTHROCYTE [DISTWIDTH] IN BLOOD BY AUTOMATED COUNT: 13.7 % (ref 11.6–15.1)
GFR SERPL CREATININE-BSD FRML MDRD: 66 ML/MIN/1.73SQ M
GLUCOSE SERPL-MCNC: 149 MG/DL (ref 65–140)
GLUCOSE UR STRIP-MCNC: NEGATIVE MG/DL
HCT VFR BLD AUTO: 37.4 % (ref 34.8–46.1)
HGB BLD-MCNC: 12.1 G/DL (ref 11.5–15.4)
HGB UR QL STRIP.AUTO: NEGATIVE
KETONES UR STRIP-MCNC: NEGATIVE MG/DL
LEUKOCYTE ESTERASE UR QL STRIP: ABNORMAL
MAGNESIUM SERPL-MCNC: 1.7 MG/DL (ref 1.9–2.7)
MCH RBC QN AUTO: 29.3 PG (ref 26.8–34.3)
MCHC RBC AUTO-ENTMCNC: 32.4 G/DL (ref 31.4–37.4)
MCV RBC AUTO: 91 FL (ref 82–98)
NITRITE UR QL STRIP: NEGATIVE
NON-SQ EPI CELLS URNS QL MICRO: ABNORMAL /HPF
PH UR STRIP.AUTO: 6 [PH]
PHOSPHATE SERPL-MCNC: 3.4 MG/DL (ref 2.3–4.1)
PLATELET # BLD AUTO: 270 THOUSANDS/UL (ref 149–390)
PMV BLD AUTO: 10 FL (ref 8.9–12.7)
POTASSIUM SERPL-SCNC: 4.2 MMOL/L (ref 3.5–5.3)
PROT UR STRIP-MCNC: NEGATIVE MG/DL
PROT UR-MCNC: 10 MG/DL
PROT/CREAT UR: 0.44 MG/G{CREAT} (ref 0–0.1)
RBC # BLD AUTO: 4.13 MILLION/UL (ref 3.81–5.12)
RBC #/AREA URNS AUTO: ABNORMAL /HPF
SODIUM SERPL-SCNC: 134 MMOL/L (ref 135–147)
SP GR UR STRIP.AUTO: 1.01 (ref 1–1.03)
UROBILINOGEN UR QL STRIP.AUTO: 0.2 E.U./DL
WBC # BLD AUTO: 6.92 THOUSAND/UL (ref 4.31–10.16)
WBC #/AREA URNS AUTO: ABNORMAL /HPF

## 2023-03-24 ENCOUNTER — TELEPHONE (OUTPATIENT)
Dept: NEPHROLOGY | Facility: CLINIC | Age: 83
End: 2023-03-24

## 2023-03-24 NOTE — TELEPHONE ENCOUNTER
----- Message from Chloé Mclean MD sent at 3/23/2023  4:11 PM EDT -----  Hello    Patient normally is followed up by Ms Jean Hernandez  Please let the patient know that the sodium level is improving 134  Creatinine at baseline  No changes for now      Thank you    np

## 2023-04-24 ENCOUNTER — OFFICE VISIT (OUTPATIENT)
Dept: CARDIOLOGY CLINIC | Facility: CLINIC | Age: 83
End: 2023-04-24

## 2023-04-24 VITALS
HEART RATE: 77 BPM | WEIGHT: 183.8 LBS | DIASTOLIC BLOOD PRESSURE: 70 MMHG | HEIGHT: 59 IN | SYSTOLIC BLOOD PRESSURE: 154 MMHG | BODY MASS INDEX: 37.05 KG/M2 | OXYGEN SATURATION: 95 %

## 2023-04-24 DIAGNOSIS — G47.33 OSA (OBSTRUCTIVE SLEEP APNEA): ICD-10-CM

## 2023-04-24 DIAGNOSIS — I50.32 CHRONIC DIASTOLIC CHF (CONGESTIVE HEART FAILURE) (HCC): ICD-10-CM

## 2023-04-24 DIAGNOSIS — I10 ESSENTIAL HYPERTENSION: Primary | ICD-10-CM

## 2023-04-24 RX ORDER — SPIRONOLACTONE 25 MG/1
25 TABLET ORAL DAILY
Qty: 90 TABLET | Refills: 2 | Status: SHIPPED | OUTPATIENT
Start: 2023-04-24
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
## 2023-04-28 ENCOUNTER — OFFICE VISIT (OUTPATIENT)
Dept: FAMILY MEDICINE CLINIC | Facility: CLINIC | Age: 83
End: 2023-04-28

## 2023-04-28 VITALS
DIASTOLIC BLOOD PRESSURE: 70 MMHG | BODY MASS INDEX: 37.09 KG/M2 | HEIGHT: 59 IN | RESPIRATION RATE: 16 BRPM | OXYGEN SATURATION: 94 % | HEART RATE: 71 BPM | WEIGHT: 184 LBS | SYSTOLIC BLOOD PRESSURE: 136 MMHG | TEMPERATURE: 97.7 F

## 2023-04-28 DIAGNOSIS — E11.9 DIABETES MELLITUS WITHOUT COMPLICATION (HCC): ICD-10-CM

## 2023-04-28 DIAGNOSIS — G47.33 OSA (OBSTRUCTIVE SLEEP APNEA): ICD-10-CM

## 2023-04-28 DIAGNOSIS — I10 ESSENTIAL HYPERTENSION: ICD-10-CM

## 2023-04-28 DIAGNOSIS — I50.32 CHRONIC DIASTOLIC CHF (CONGESTIVE HEART FAILURE) (HCC): ICD-10-CM

## 2023-04-28 DIAGNOSIS — J44.9 CHRONIC OBSTRUCTIVE PULMONARY DISEASE, UNSPECIFIED COPD TYPE (HCC): ICD-10-CM

## 2023-04-28 DIAGNOSIS — D3A.090 BENIGN CARCINOID TUMOR OF LUNG: ICD-10-CM

## 2023-04-28 DIAGNOSIS — Z00.00 MEDICARE ANNUAL WELLNESS VISIT, SUBSEQUENT: Primary | ICD-10-CM

## 2023-04-28 LAB — SL AMB POCT HEMOGLOBIN AIC: 6.2 (ref ?–6.5)

## 2023-04-28 RX ORDER — AMLODIPINE BESYLATE 10 MG/1
10 TABLET ORAL DAILY
Qty: 90 TABLET | Refills: 2 | Status: SHIPPED | OUTPATIENT
Start: 2023-04-28

## 2023-04-28 NOTE — ASSESSMENT & PLAN NOTE
Stable  Pt sees Dr Jazlyn Negro in July 2023  Pt continues to use O2 supplementation as needed  Her 6 min walk test showed desaturation to 82% with exertion  Had flu shot and COVID booster in Nov 2022  Had Prevnar 20

## 2023-04-28 NOTE — PATIENT INSTRUCTIONS
Medicare Preventive Visit Patient Instructions  Thank you for completing your Welcome to Medicare Visit or Medicare Annual Wellness Visit today  Your next wellness visit will be due in one year (4/28/2024)  The screening/preventive services that you may require over the next 5-10 years are detailed below  Some tests may not apply to you based off risk factors and/or age  Screening tests ordered at today's visit but not completed yet may show as past due  Also, please note that scanned in results may not display below  Preventive Screenings:  Service Recommendations Previous Testing/Comments   Colorectal Cancer Screening  * Colonoscopy    * Fecal Occult Blood Test (FOBT)/Fecal Immunochemical Test (FIT)  * Fecal DNA/Cologuard Test  * Flexible Sigmoidoscopy Age: 39-70 years old   Colonoscopy: every 10 years (may be performed more frequently if at higher risk)  OR  FOBT/FIT: every 1 year  OR  Cologuard: every 3 years  OR  Sigmoidoscopy: every 5 years  Screening may be recommended earlier than age 39 if at higher risk for colorectal cancer  Also, an individualized decision between you and your healthcare provider will decide whether screening between the ages of 74-80 would be appropriate  Colonoscopy: Not on file  FOBT/FIT: Not on file  Cologuard: Not on file  Sigmoidoscopy: Not on file    Screening Not Indicated     Breast Cancer Screening Age: 36 years old  Frequency: every 1-2 years  Not required if history of left and right mastectomy Mammogram: 08/23/2022    Screening Current   Cervical Cancer Screening Between the ages of 21-29, pap smear recommended once every 3 years  Between the ages of 33-67, can perform pap smear with HPV co-testing every 5 years     Recommendations may differ for women with a history of total hysterectomy, cervical cancer, or abnormal pap smears in past  Pap Smear: Not on file    Screening Not Indicated   Hepatitis C Screening Once for adults born between 1945 and 1965  More frequently in patients at high risk for Hepatitis C Hep C Antibody: Not on file        Diabetes Screening 1-2 times per year if you're at risk for diabetes or have pre-diabetes Fasting glucose: 130 mg/dL (1/18/2023)  A1C: 6 2 (1/5/2023)  Screening Not Indicated  History Diabetes   Cholesterol Screening Once every 5 years if you don't have a lipid disorder  May order more often based on risk factors  Lipid panel: 05/29/2021    Screening Current     Other Preventive Screenings Covered by Medicare:  1  Abdominal Aortic Aneurysm (AAA) Screening: covered once if your at risk  You're considered to be at risk if you have a family history of AAA  2  Lung Cancer Screening: covers low dose CT scan once per year if you meet all of the following conditions: (1) Age 50-69; (2) No signs or symptoms of lung cancer; (3) Current smoker or have quit smoking within the last 15 years; (4) You have a tobacco smoking history of at least 20 pack years (packs per day multiplied by number of years you smoked); (5) You get a written order from a healthcare provider  3  Glaucoma Screening: covered annually if you're considered high risk: (1) You have diabetes OR (2) Family history of glaucoma OR (3)  aged 48 and older OR (3)  American aged 72 and older  3  Osteoporosis Screening: covered every 2 years if you meet one of the following conditions: (1) You're estrogen deficient and at risk for osteoporosis based off medical history and other findings; (2) Have a vertebral abnormality; (3) On glucocorticoid therapy for more than 3 months; (4) Have primary hyperparathyroidism; (5) On osteoporosis medications and need to assess response to drug therapy  · Last bone density test (DXA Scan): 08/23/2022   5  HIV Screening: covered annually if you're between the age of 15-65  Also covered annually if you are younger than 13 and older than 72 with risk factors for HIV infection   For pregnant patients, it is covered up to 3 times per pregnancy  Immunizations:  Immunization Recommendations   Influenza Vaccine Annual influenza vaccination during flu season is recommended for all persons aged >= 6 months who do not have contraindications   Pneumococcal Vaccine   * Pneumococcal conjugate vaccine = PCV13 (Prevnar 13), PCV15 (Vaxneuvance), PCV20 (Prevnar 20)  * Pneumococcal polysaccharide vaccine = PPSV23 (Pneumovax) Adults 25-60 years old: 1-3 doses may be recommended based on certain risk factors  Adults 72 years old: 1-2 doses may be recommended based off what pneumonia vaccine you previously received   Hepatitis B Vaccine 3 dose series if at intermediate or high risk (ex: diabetes, end stage renal disease, liver disease)   Tetanus (Td) Vaccine - COST NOT COVERED BY MEDICARE PART B Following completion of primary series, a booster dose should be given every 10 years to maintain immunity against tetanus  Td may also be given as tetanus wound prophylaxis  Tdap Vaccine - COST NOT COVERED BY MEDICARE PART B Recommended at least once for all adults  For pregnant patients, recommended with each pregnancy  Shingles Vaccine (Shingrix) - COST NOT COVERED BY MEDICARE PART B  2 shot series recommended in those aged 48 and above     Health Maintenance Due:  There are no preventive care reminders to display for this patient  Immunizations Due:  There are no preventive care reminders to display for this patient  Advance Directives   What are advance directives? Advance directives are legal documents that state your wishes and plans for medical care  These plans are made ahead of time in case you lose your ability to make decisions for yourself  Advance directives can apply to any medical decision, such as the treatments you want, and if you want to donate organs  What are the types of advance directives? There are many types of advance directives, and each state has rules about how to use them   You may choose a combination of any of the following:  · Living will: This is a written record of the treatment you want  You can also choose which treatments you do not want, which to limit, and which to stop at a certain time  This includes surgery, medicine, IV fluid, and tube feedings  · Durable power of  for healthcare [facility]): This is a written record that states who you want to make healthcare choices for you when you are unable to make them for yourself  This person, called a proxy, is usually a family member or a friend  You may choose more than 1 proxy  · Do not resuscitate (DNR) order:  A DNR order is used in case your heart stops beating or you stop breathing  It is a request not to have certain forms of treatment, such as CPR  A DNR order may be included in other types of advance directives  · Medical directive: This covers the care that you want if you are in a coma, near death, or unable to make decisions for yourself  You can list the treatments you want for each condition  Treatment may include pain medicine, surgery, blood transfusions, dialysis, IV or tube feedings, and a ventilator (breathing machine)  · Values history: This document has questions about your views, beliefs, and how you feel and think about life  This information can help others choose the care that you would choose  Why are advance directives important? An advance directive helps you control your care  Although spoken wishes may be used, it is better to have your wishes written down  Spoken wishes can be misunderstood, or not followed  Treatments may be given even if you do not want them  An advance directive may make it easier for your family to make difficult choices about your care  Urinary Incontinence   Urinary incontinence (UI)  is when you lose control of your bladder  UI develops because your bladder cannot store or empty urine properly  The 3 most common types of UI are stress incontinence, urge incontinence, or both    Medicines:   · May be given to help strengthen your bladder control  Report any side effects of medication to your healthcare provider  Do pelvic muscle exercises often:  Your pelvic muscles help you stop urinating  Squeeze these muscles tight for 5 seconds, then relax for 5 seconds  Gradually work up to squeezing for 10 seconds  Do 3 sets of 15 repetitions a day, or as directed  This will help strengthen your pelvic muscles and improve bladder control  Train your bladder:  Go to the bathroom at set times, such as every 2 hours, even if you do not feel the urge to go  You can also try to hold your urine when you feel the urge to go  For example, hold your urine for 5 minutes when you feel the urge to go  As that becomes easier, hold your urine for 10 minutes  Self-care:   · Keep a UI record  Write down how often you leak urine and how much you leak  Make a note of what you were doing when you leaked urine  · Drink liquids as directed  You may need to limit the amount of liquid you drink to help control your urine leakage  Do not drink any liquid right before you go to bed  Limit or do not have drinks that contain caffeine or alcohol  · Prevent constipation  Eat a variety of high-fiber foods  Good examples are high-fiber cereals, beans, vegetables, and whole-grain breads  Walking is the best way to trigger your intestines to have a bowel movement  · Exercise regularly and maintain a healthy weight  Weight loss and exercise will decrease pressure on your bladder and help you control your leakage  · Use a catheter as directed  to help empty your bladder  A catheter is a tiny, plastic tube that is put into your bladder to drain your urine  · Go to behavior therapy as directed  Behavior therapy may be used to help you learn to control your urge to urinate      Weight Management   Why it is important to manage your weight:  Being overweight increases your risk of health conditions such as heart disease, high blood pressure, type 2 diabetes, and certain types of cancer  It can also increase your risk for osteoarthritis, sleep apnea, and other respiratory problems  Aim for a slow, steady weight loss  Even a small amount of weight loss can lower your risk of health problems  How to lose weight safely:  A safe and healthy way to lose weight is to eat fewer calories and get regular exercise  You can lose up about 1 pound a week by decreasing the number of calories you eat by 500 calories each day  Healthy meal plan for weight management:  A healthy meal plan includes a variety of foods, contains fewer calories, and helps you stay healthy  A healthy meal plan includes the following:  · Eat whole-grain foods more often  A healthy meal plan should contain fiber  Fiber is the part of grains, fruits, and vegetables that is not broken down by your body  Whole-grain foods are healthy and provide extra fiber in your diet  Some examples of whole-grain foods are whole-wheat breads and pastas, oatmeal, brown rice, and bulgur  · Eat a variety of vegetables every day  Include dark, leafy greens such as spinach, kale, sammy greens, and mustard greens  Eat yellow and orange vegetables such as carrots, sweet potatoes, and winter squash  · Eat a variety of fruits every day  Choose fresh or canned fruit (canned in its own juice or light syrup) instead of juice  Fruit juice has very little or no fiber  · Eat low-fat dairy foods  Drink fat-free (skim) milk or 1% milk  Eat fat-free yogurt and low-fat cottage cheese  Try low-fat cheeses such as mozzarella and other reduced-fat cheeses  · Choose meat and other protein foods that are low in fat  Choose beans or other legumes such as split peas or lentils  Choose fish, skinless poultry (chicken or turkey), or lean cuts of red meat (beef or pork)  Before you cook meat or poultry, cut off any visible fat  · Use less fat and oil  Try baking foods instead of frying them   Add less fat, such as margarine, sour cream, regular salad dressing and mayonnaise to foods  Eat fewer high-fat foods  Some examples of high-fat foods include french fries, doughnuts, ice cream, and cakes  · Eat fewer sweets  Limit foods and drinks that are high in sugar  This includes candy, cookies, regular soda, and sweetened drinks  Exercise:  Exercise at least 30 minutes per day on most days of the week  Some examples of exercise include walking, biking, dancing, and swimming  You can also fit in more physical activity by taking the stairs instead of the elevator or parking farther away from stores  Ask your healthcare provider about the best exercise plan for you  © Copyright [employer] 2018 Information is for End User's use only and may not be sold, redistributed or otherwise used for commercial purposes   All illustrations and images included in CareNotes® are the copyrighted property of A [employer] M [employer]  or [address] [place] [address]

## 2023-04-28 NOTE — ASSESSMENT & PLAN NOTE
Wt Readings from Last 3 Encounters:   04/24/23 83 4 kg (183 lb 12 8 oz)   04/12/23 83 kg (183 lb)   04/11/23 83 kg (183 lb)     Weight has remained stable  Continue current meds
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
## 2023-04-28 NOTE — ASSESSMENT & PLAN NOTE
A1C done today and was 6 2  Continue metformin 1000 mg bid and glipizide 5 mg qd  Continue low carb diet  Foot exam done in Jan 2023  Eye exam done in April 2023  Microalbumin/creatinine ratio done in Jan 2023       Lab Results   Component Value Date    HGBA1C 6 2 01/05/2023

## 2023-04-28 NOTE — ASSESSMENT & PLAN NOTE
Wellness exam done  Had flu shot in Nov 2022 and COVID booster in Nov 2022  Had prevnar 13 and pneumovacc 23  Recommend Tdap and Shingrix  Labs are UTD  Had mammogram and dexascan in Aug 2022  No recent falls  Mood ok  Has living will

## 2023-05-01 ENCOUNTER — CONSULT (OUTPATIENT)
Dept: PAIN MEDICINE | Facility: CLINIC | Age: 83
End: 2023-05-01

## 2023-05-01 VITALS
DIASTOLIC BLOOD PRESSURE: 89 MMHG | BODY MASS INDEX: 37.09 KG/M2 | HEIGHT: 59 IN | WEIGHT: 184 LBS | SYSTOLIC BLOOD PRESSURE: 160 MMHG | HEART RATE: 74 BPM

## 2023-05-01 DIAGNOSIS — M17.12 PRIMARY OSTEOARTHRITIS OF LEFT KNEE: ICD-10-CM

## 2023-05-16 ENCOUNTER — APPOINTMENT (OUTPATIENT)
Dept: LAB | Facility: HOSPITAL | Age: 83
End: 2023-05-16
Attending: INTERNAL MEDICINE

## 2023-05-16 ENCOUNTER — TELEPHONE (OUTPATIENT)
Dept: NEPHROLOGY | Facility: CLINIC | Age: 83
End: 2023-05-16

## 2023-05-16 DIAGNOSIS — Z79.4 ENCOUNTER FOR LONG-TERM (CURRENT) USE OF INSULIN (HCC): ICD-10-CM

## 2023-05-16 DIAGNOSIS — I10 ESSENTIAL HYPERTENSION, MALIGNANT: ICD-10-CM

## 2023-05-16 DIAGNOSIS — E11.9 DIABETES MELLITUS WITHOUT COMPLICATION (HCC): ICD-10-CM

## 2023-05-16 DIAGNOSIS — E11.65 TYPE II DIABETES MELLITUS WITH HYPEROSMOLARITY, UNCONTROLLED (HCC): ICD-10-CM

## 2023-05-16 DIAGNOSIS — E11.00 TYPE II DIABETES MELLITUS WITH HYPEROSMOLARITY, UNCONTROLLED (HCC): ICD-10-CM

## 2023-05-16 DIAGNOSIS — E87.1 HYPOSMOLALITY SYNDROME: ICD-10-CM

## 2023-05-16 LAB
ANION GAP SERPL CALCULATED.3IONS-SCNC: 8 MMOL/L (ref 4–13)
BASOPHILS # BLD AUTO: 0.06 THOUSANDS/ÂΜL (ref 0–0.1)
BASOPHILS NFR BLD AUTO: 1 % (ref 0–1)
BUN SERPL-MCNC: 21 MG/DL (ref 5–25)
CALCIUM SERPL-MCNC: 9.8 MG/DL (ref 8.4–10.2)
CHLORIDE SERPL-SCNC: 99 MMOL/L (ref 96–108)
CHOLEST SERPL-MCNC: 143 MG/DL
CO2 SERPL-SCNC: 27 MMOL/L (ref 21–32)
CREAT SERPL-MCNC: 0.83 MG/DL (ref 0.6–1.3)
EOSINOPHIL # BLD AUTO: 0.31 THOUSAND/ÂΜL (ref 0–0.61)
EOSINOPHIL NFR BLD AUTO: 4 % (ref 0–6)
ERYTHROCYTE [DISTWIDTH] IN BLOOD BY AUTOMATED COUNT: 13.4 % (ref 11.6–15.1)
GFR SERPL CREATININE-BSD FRML MDRD: 66 ML/MIN/1.73SQ M
GLUCOSE P FAST SERPL-MCNC: 125 MG/DL (ref 65–99)
HCT VFR BLD AUTO: 39 % (ref 34.8–46.1)
HDLC SERPL-MCNC: 39 MG/DL
HGB BLD-MCNC: 12.6 G/DL (ref 11.5–15.4)
IMM GRANULOCYTES # BLD AUTO: 0.03 THOUSAND/UL (ref 0–0.2)
IMM GRANULOCYTES NFR BLD AUTO: 0 % (ref 0–2)
LDLC SERPL CALC-MCNC: 86 MG/DL (ref 0–100)
LYMPHOCYTES # BLD AUTO: 1.52 THOUSANDS/ÂΜL (ref 0.6–4.47)
LYMPHOCYTES NFR BLD AUTO: 20 % (ref 14–44)
MAGNESIUM SERPL-MCNC: 1.9 MG/DL (ref 1.9–2.7)
MCH RBC QN AUTO: 28.6 PG (ref 26.8–34.3)
MCHC RBC AUTO-ENTMCNC: 32.3 G/DL (ref 31.4–37.4)
MCV RBC AUTO: 89 FL (ref 82–98)
MONOCYTES # BLD AUTO: 0.54 THOUSAND/ÂΜL (ref 0.17–1.22)
MONOCYTES NFR BLD AUTO: 7 % (ref 4–12)
NEUTROPHILS # BLD AUTO: 5.06 THOUSANDS/ÂΜL (ref 1.85–7.62)
NEUTS SEG NFR BLD AUTO: 68 % (ref 43–75)
NONHDLC SERPL-MCNC: 104 MG/DL
NRBC BLD AUTO-RTO: 0 /100 WBCS
PHOSPHATE SERPL-MCNC: 3.3 MG/DL (ref 2.3–4.1)
PLATELET # BLD AUTO: 265 THOUSANDS/UL (ref 149–390)
PMV BLD AUTO: 10.2 FL (ref 8.9–12.7)
POTASSIUM SERPL-SCNC: 4.6 MMOL/L (ref 3.5–5.3)
RBC # BLD AUTO: 4.4 MILLION/UL (ref 3.81–5.12)
SODIUM SERPL-SCNC: 134 MMOL/L (ref 135–147)
TRIGL SERPL-MCNC: 91 MG/DL
WBC # BLD AUTO: 7.52 THOUSAND/UL (ref 4.31–10.16)

## 2023-05-16 NOTE — TELEPHONE ENCOUNTER
----- Message from Brodie sent at 5/16/2023  3:23 PM EDT -----  Please let Camryn know that labs and urine studies are stable  Sodium level 134 which is near normal   Follow-up appointment in July with Dr Yarely Acosta  Labs prior to July appointment

## 2023-05-19 ENCOUNTER — TELEPHONE (OUTPATIENT)
Dept: OBGYN CLINIC | Facility: CLINIC | Age: 83
End: 2023-05-19

## 2023-05-19 NOTE — TELEPHONE ENCOUNTER
----- Message from Opal Snider DO sent at 5/1/2023 11:46 AM EDT -----  Left knee genicular nerve block   Thanks

## 2023-05-30 ENCOUNTER — APPOINTMENT (OUTPATIENT)
Dept: RADIOLOGY | Facility: HOSPITAL | Age: 83
End: 2023-05-30

## 2023-05-30 ENCOUNTER — HOSPITAL ENCOUNTER (OUTPATIENT)
Facility: HOSPITAL | Age: 83
Setting detail: OUTPATIENT SURGERY
Discharge: HOME | End: 2023-05-30
Attending: STUDENT IN AN ORGANIZED HEALTH CARE EDUCATION/TRAINING PROGRAM | Admitting: STUDENT IN AN ORGANIZED HEALTH CARE EDUCATION/TRAINING PROGRAM

## 2023-05-30 VITALS
TEMPERATURE: 97.2 F | OXYGEN SATURATION: 94 % | RESPIRATION RATE: 18 BRPM | HEART RATE: 83 BPM | DIASTOLIC BLOOD PRESSURE: 68 MMHG | SYSTOLIC BLOOD PRESSURE: 171 MMHG

## 2023-05-30 RX ORDER — BUPIVACAINE HYDROCHLORIDE 2.5 MG/ML
INJECTION, SOLUTION EPIDURAL; INFILTRATION; INTRACAUDAL AS NEEDED
Status: DISCONTINUED | OUTPATIENT
Start: 2023-05-30 | End: 2023-05-30 | Stop reason: HOSPADM

## 2023-05-30 NOTE — DISCHARGE INSTRUCTIONS

## 2023-05-30 NOTE — OP NOTE
OPERATIVE REPORT  PATIENT NAME: Pretty Silva    :  1940  MRN: [id number]  Pt Location: [facility] OR ROOM 01    SURGERY DATE: 2023    Surgeon(s) and Role:     * DO Megan Joya Primary    Preop Diagnosis:  Primary osteoarthritis of left knee [M17 12]    Post-Op Diagnosis Codes:     * Primary osteoarthritis of left knee [M17 12]    Procedure(s):  Left - GENICULAR NERVE BLOCK  (10853)    Specimen(s):  * No specimens in log *    Estimated Blood Loss:   Minimal    Drains:  * No LDAs found *    Anesthesia Type:   Local    Operative Indications:  Primary osteoarthritis of left knee [M17 12]      Operative Findings:      Complications:   None    Procedure and Technique:  DATE: May 30, 2023  PROCEDURE: LEFT Genicular Nerve Block under Fluoroscopy  PHYSICIAN: Rodney Celeste  PRE-OPERATIVE DIAGNOSIS: LEFT Knee Pain  POST-OPERATIVE DIAGNOSIS: Same  ANESTHESIA: Local  COMPLICATIONS: None     Indications  Pretty Silva is a 81 y o  female with a history of chronic left  knee pain who has failed conservative therapy and presents today seeking a more definitive palliative treatment  Informed Consent  The risks, benefits and alternatives of the procedure were discussed with the patient  The patient was given opportunity to ask questions regarding the procedure, its indications and the associated risks  The risks of the procedure discussed include but are not limited to infection, bleeding, allergic reactions, nerve injuries, tendon injuries, susceptibility to COVID from steroids, and medication side effects  The patient showed understanding and wished to proceed  Informed consent was signed  Procedure Details:  After obtaining informed consent, the patient's chart was reviewed, and the patient's identification was confirmed  The patient was brought to the exam room and placed in the supine position on the exam room table  A time out was performed per hospital policy  Strict sterile technique was used   Skin was prepped with chloraprep solution  The left knee was identified under fluoroscopy  The junctions connecting the femoral shaft to the superior lateral epicondyle and superior medial epicondyle, and that connecting the tibial shaft to the inferior medial epicondyle were were visualized and marked separately  a 25 gauge 3 5 in needle was inserted and directed towards towards marked location, and advanced until bone contact was made  The needles were then further advanced  On the lateral fluoroscopic view the needles were adjusted to just beyond the midline of the diaphysis  Next, 1 cc 0 25% bupivacaine  was injected intermittently after negative heme aspirations  There was no pain on injection  A Band-Aid dressing was applied  The patient tolerated the procedure well and there were no immediate adverse events  Pain diary was provided to monitor the knee pain over the next 24 hours  The patient was instructed to call with fever, chills, increased pain, redness or swelling at the injection site, numbness or weakness in the leg        Patient Disposition:  PACU         SIGNATURE: Mary Painting, DO  DATE: May 30, 2023  TIME: 11:31 AM

## 2023-06-27 PROBLEM — Z00.00 MEDICARE ANNUAL WELLNESS VISIT, SUBSEQUENT: Status: RESOLVED | Noted: 2021-02-08 | Resolved: 2023-06-27

## 2023-07-05 ENCOUNTER — OFFICE VISIT (OUTPATIENT)
Dept: PULMONOLOGY | Facility: CLINIC | Age: 83
End: 2023-07-05
Payer: MEDICARE

## 2023-07-05 VITALS
TEMPERATURE: 97.9 F | HEIGHT: 59 IN | DIASTOLIC BLOOD PRESSURE: 60 MMHG | HEART RATE: 76 BPM | BODY MASS INDEX: 36.29 KG/M2 | OXYGEN SATURATION: 96 % | SYSTOLIC BLOOD PRESSURE: 116 MMHG | WEIGHT: 180 LBS

## 2023-07-05 DIAGNOSIS — J43.9 PULMONARY EMPHYSEMA, UNSPECIFIED EMPHYSEMA TYPE (HCC): Primary | ICD-10-CM

## 2023-07-05 DIAGNOSIS — E66.01 CLASS 2 SEVERE OBESITY DUE TO EXCESS CALORIES WITH SERIOUS COMORBIDITY AND BODY MASS INDEX (BMI) OF 36.0 TO 36.9 IN ADULT (HCC): ICD-10-CM

## 2023-07-05 DIAGNOSIS — R91.8 MULTIPLE PULMONARY NODULES: ICD-10-CM

## 2023-07-05 DIAGNOSIS — D3A.090 BENIGN CARCINOID TUMOR OF LUNG: ICD-10-CM

## 2023-07-05 DIAGNOSIS — G47.33 OSA (OBSTRUCTIVE SLEEP APNEA): ICD-10-CM

## 2023-07-05 DIAGNOSIS — J30.2 SEASONAL ALLERGIC RHINITIS, UNSPECIFIED TRIGGER: ICD-10-CM

## 2023-07-05 PROCEDURE — 99214 OFFICE O/P EST MOD 30 MIN: CPT | Performed by: INTERNAL MEDICINE

## 2023-07-05 RX ORDER — MONTELUKAST SODIUM 10 MG/1
10 TABLET ORAL DAILY
Qty: 90 TABLET | Refills: 0 | Status: SHIPPED | OUTPATIENT
Start: 2023-07-05

## 2023-07-05 NOTE — PROGRESS NOTES
Assessment/Plan:    Carcinoid tumor of lung  She had a 2.0 x 2.0 lobulated lung mass in the left lower lobe and multiple lung nodules on the left side. She had also mediastinal lymphadenopathy. She was also noted to have a nodule in the thyroid gland and a nodular hepatic lesion. She had a CT guided biopsy of LLL lung lesion which was consistent with neuroendocrine tumor, carcinoid. She follows with Dr. Ravi Juan from Oncology. She has multiple lung nodules on her CT scan from Feb 2019. She had a CT scan in May 2021 which showed stability    COPD (chronic obstructive pulmonary disease) ([address])  She has history of COPD and was on Advair Bid before. She also has history of asthma as a child. She has occasional cough and no significant phlegm. No fever or chills. She is a never smoker, however she has history of secondhand smoke exposure. Her PFT which showed moderate airflow obstruction with diffusion defect. There was no hyperinflation. Her 6 min walk test showed an oxygen desaturation to 82% with exertion. Her baseline oxygen saturation at rest was 90%. She also was found to have severe exercise limitation from SOB. She is not using Symbicort  now. I have advised her to use the nebulizer when necessary for her shortness of breath. I have advised her to continue with oxygen supplementation as needed.       Multiple pulmonary nodules  She has multiple lung nodules which were stable on the last CT scan from May 2022. A repeat CT scan has been ordered       TOM (obstructive sleep apnea)  She had a long history of snoring and daytime sleepiness. She was found to have mild TOM with oxygen desaturation on her overnight sleep study. Her CPAP titration study was suboptimal. She was started on CPAP therapy at 9 cm of water and has been using it. Her CPAP compliance was good. Her residual AHI has been low.  She is getting benefit from CPAP therapy I have advised her to use the CPAP as before.  Her latest CPAP compliance records are not currently available. . I had a long discussion with her and have answered all their questions       Chronic diastolic CHF (congestive heart failure) ([address])  Wt Readings from Last 3 Encounters:   07/05/23 81.6 kg (180 lb)   05/01/23 83.5 kg (184 lb)   04/28/23 83.5 kg (184 lb)     She has chronic heart failure with good ejection fraction.   She is  much better now. She follows with cardiology. She is on torsemide and spironolactone. Her previous echocardiogram from Feb 2016 showed good EF at 65%. She has CKD also        Seasonal allergic rhinitis  She has seasonal allergic rhinitis. She has been on treatment with montelukast.       Diagnoses and all orders for this visit:    Pulmonary emphysema, unspecified emphysema type ([address])    Benign carcinoid tumor of lung  -     CT chest without contrast; Future    TOM (obstructive sleep apnea)    Multiple pulmonary nodules  -     CT chest without contrast; Future    Seasonal allergic rhinitis, unspecified trigger  -     montelukast (SINGULAIR) 10 mg tablet; Take 1 tablet (10 mg total) by mouth daily    Class 2 severe obesity due to excess calories with serious comorbidity and body mass index (BMI) of 36.0 to 36.9 in [facility])    Other orders  -     Diclofenac Sodium (VOLTAREN) 1 %  -     AYR SALINE NASAL DROPS NA; Two sprays as needed          Subjective:      Patient ID: Nataly Junior is a 81 y.o. female. Mrs. Oj Boyer has come for a follow-up for her obstructive sleep apnea carcinoid tumor after a long time. She has been on CPAP therapy and has been using it every night. She has the current machine for about 5 years now. She is comfortable with the mask and pressure and has no significant daytime sleepiness or morning headache. Her CPAP compliance records are not available. She is very motivated to continue on CPAP therapy and feels that it is helping her. She has no significant daytime sleepiness or morning headache. The [employer] is her DME.   She also uses 2 L/min of oxygen supplementation with CPAP. She also has COPD. Currently she is not using any oxygen or inhaler for that. She has ambulatory issues and uses a walker. She also has arthritis and gets knee injections. She denied any significant phlegm or wheeze. Her exercise capacity is limited. She also feels occasionally unsteady on her feet. She has occasional difficulty swallowing. No fever or chills. She has previous history of carcinoid and underwent bronchoscopy. Currently she does not have any hemoptysis. She is obese and understands the need for weight reduction. She has seasonal allergies and has been on montelukast.  She also uses Claritin. She has chronic congestive heart failure and has been on diuretic therapy with spironolactone and Demadex. The following portions of the patient's history were reviewed and updated as appropriate: allergies, current medications, past family history, past medical history, past social history, past surgical history and problem list.    Review of Systems   Constitutional: Negative for appetite change, chills, fatigue, fever and unexpected weight change. HENT: Positive for hearing loss, rhinorrhea and trouble swallowing. Negative for sneezing and sore throat. Eyes: Negative for visual disturbance. Respiratory: Negative for cough, chest tightness, shortness of breath and wheezing. Cardiovascular: Negative for chest pain, palpitations and leg swelling. Gastrointestinal: Negative for abdominal pain, constipation, diarrhea, nausea and vomiting. Genitourinary: Positive for urgency. Negative for dysuria and frequency. Musculoskeletal: Positive for arthralgias and gait problem (uses walker). Skin: Negative for rash. Neurological: Negative for dizziness, syncope, light-headedness and headaches. Psychiatric/Behavioral: Negative for agitation, confusion and sleep disturbance. The patient is nervous/anxious. Objective:      /60   Pulse 76   Temp 97.9 °F (36.6 °C)   Ht 4' 11" (1.499 m)   Wt 81.6 kg (180 lb)   SpO2 96%   BMI 36.36 kg/m²          Physical Exam  Vitals reviewed. Constitutional:       General: She is not in acute distress. Appearance: She is obese. She is not ill-appearing, toxic-appearing or diaphoretic. HENT:      Head: Normocephalic. Mouth/Throat:      Mouth: Mucous membranes are moist.   Eyes:      General: No scleral icterus. Conjunctiva/sclera: Conjunctivae normal.   Cardiovascular:      Rate and Rhythm: Normal rate and regular rhythm. Heart sounds: Normal heart sounds. No murmur heard. Pulmonary:      Effort: Pulmonary effort is normal. No respiratory distress. Breath sounds: Normal breath sounds. No stridor. No wheezing, rhonchi or rales. Chest:      Chest wall: No tenderness. Abdominal:      General: Bowel sounds are normal.      Palpations: Abdomen is soft. Tenderness: There is no abdominal tenderness. There is no guarding. Musculoskeletal:      Cervical back: No rigidity. Right lower leg: No edema. Left lower leg: No edema. Lymphadenopathy:      Cervical: No cervical adenopathy. Skin:     Coloration: Skin is not jaundiced or pale. Findings: No rash. Neurological:      Mental Status: She is alert and oriented to person, place, and time. Gait: Gait abnormal.   Psychiatric:         Mood and Affect: Mood normal.         Behavior: Behavior normal.         Thought Content:  Thought content normal.         Judgment: Judgment normal.

## 2023-07-06 PROBLEM — J30.2 SEASONAL ALLERGIC RHINITIS: Status: ACTIVE | Noted: 2023-07-06

## 2023-07-06 NOTE — ASSESSMENT & PLAN NOTE
She has multiple lung nodules which were stable on the last CT scan from May 2022.   A repeat CT scan has been ordered

## 2023-07-06 NOTE — ASSESSMENT & PLAN NOTE
She had a long history of snoring and daytime sleepiness. She was found to have mild TOM with oxygen desaturation on her overnight sleep study. Her CPAP titration study was suboptimal. She was started on CPAP therapy at 9 cm of water and has been using it. Her CPAP compliance was good. Her residual AHI has been low.  She is getting benefit from CPAP therapy I have advised her to use the CPAP as before.  Her latest CPAP compliance records are not currently available. . I had a long discussion with her and have answered all their questions

## 2023-07-06 NOTE — ASSESSMENT & PLAN NOTE
She had a 2.0 x 2.0 lobulated lung mass in the left lower lobe and multiple lung nodules on the left side. She had also mediastinal lymphadenopathy. She was also noted to have a nodule in the thyroid gland and a nodular hepatic lesion. She had a CT guided biopsy of LLL lung lesion which was consistent with neuroendocrine tumor, carcinoid. She follows with Dr. Shyla Lozano from Oncology.  She has multiple lung nodules on her CT scan from Feb 2019. She had a CT scan in May 2021 which showed stability

## 2023-07-06 NOTE — ASSESSMENT & PLAN NOTE
She has history of COPD and was on Advair Bid before. She also has history of asthma as a child. She has occasional cough and no significant phlegm. No fever or chills. She is a never smoker, however she has history of secondhand smoke exposure. Her PFT which showed moderate airflow obstruction with diffusion defect. There was no hyperinflation. Her 6 min walk test showed an oxygen desaturation to 82% with exertion. Her baseline oxygen saturation at rest was 90%. She also was found to have severe exercise limitation from SOB. She is not using Symbicort  now. I have advised her to use the nebulizer when necessary for her shortness of breath.  I have advised her to continue with oxygen supplementation as needed.

## 2023-07-06 NOTE — ASSESSMENT & PLAN NOTE
Wt Readings from Last 3 Encounters:   07/05/23 81.6 kg (180 lb)   05/01/23 83.5 kg (184 lb)   04/28/23 83.5 kg (184 lb)     She has chronic heart failure with good ejection fraction.   She is  much better now. She follows with cardiology. She is on torsemide and spironolactone. Her previous echocardiogram from Feb 2016 showed good EF at 65%.  She has CKD also

## 2023-07-12 DIAGNOSIS — E11.9 DIABETES MELLITUS WITHOUT COMPLICATION (HCC): ICD-10-CM

## 2023-07-12 RX ORDER — LANCETS
EACH MISCELLANEOUS
Qty: 102 EACH | Refills: 0 | Status: SHIPPED | OUTPATIENT
Start: 2023-07-12

## 2023-07-17 ENCOUNTER — OFFICE VISIT (OUTPATIENT)
Dept: NEPHROLOGY | Facility: CLINIC | Age: 83
End: 2023-07-17
Payer: MEDICARE

## 2023-07-17 VITALS
BODY MASS INDEX: 36.33 KG/M2 | SYSTOLIC BLOOD PRESSURE: 134 MMHG | WEIGHT: 180.2 LBS | HEIGHT: 59 IN | DIASTOLIC BLOOD PRESSURE: 50 MMHG

## 2023-07-17 DIAGNOSIS — I10 BENIGN ESSENTIAL HTN: ICD-10-CM

## 2023-07-17 DIAGNOSIS — E87.1 HYPONATREMIA: Primary | ICD-10-CM

## 2023-07-17 PROCEDURE — 99215 OFFICE O/P EST HI 40 MIN: CPT | Performed by: INTERNAL MEDICINE

## 2023-07-17 RX ORDER — TERAZOSIN 5 MG/1
5 CAPSULE ORAL DAILY
Qty: 90 CAPSULE | Refills: 3 | Status: CANCELLED | OUTPATIENT
Start: 2023-07-17

## 2023-07-17 RX ORDER — TERAZOSIN 5 MG/1
5 CAPSULE ORAL DAILY
Qty: 90 CAPSULE | Refills: 3 | Status: SHIPPED | OUTPATIENT
Start: 2023-07-17

## 2023-07-17 NOTE — PATIENT INSTRUCTIONS
1) Avoid NSAIDS - (Example - motrin, advil, ibuprofen, aleve, exederin, etc)  2) Always follow a low salt diet  3) If you have any issues with trouble urinating, blood in the urine, food tasting like metal, confusion, weakness, fatigue that is worsening, please call right away.   4) Please continue to follow regularly with your family physician and any other specialist that you are advised to see and continue to follow their recommendations  5) BMP labwork - can be in august or September  6) full labwork in 6 months (can be before next appointment); appointment in 6-8 months  7) no changes to your medications today

## 2023-07-17 NOTE — PROGRESS NOTES
NEPHROLOGY OFFICE VISIT   Jose Patterson 81 y.o. female MRN: [id number]  7/17/2023    Reason for Visit: hyponatremia    ASSESSMENT and PLAN:    I had the pleasure of seeing Ms Marissa Ferrera today in the renal clinic for the continued management of hyponatremia. 80 yo Patient has a history of CHF, lung carcinoid, COPD on home O4, TOM, hyponatremia, hypertension, prior ischemic bowel during pregnancy, who is being seen as a follow-up for hyponatremia. The etiology of hyponatremia was felt to be secondary to volume overload in the setting of heart failure, and citalopram use during admission 2018 at [address] [place] [address] is now presenting for f/u for HTN, hyponatremia, proteinuria.      1) hyponatremia     - secondary to volume overload prior  -serum osmolarity 270 on June 2021  -urine osmolarity 242, urine sodium 22 in June 2021  -sodium level is stable and then decreased in June 2021-128 after which patient was advised fluid restriction.  Patient was also advised to be compliant with diuretics. - Sodium level decreased again on 10/2022-at that time it was noted that the patient had self discontinued torsemide. Was advised to restart torsemide and lower spironolactone slightly. - SPEP, UPEP, free light chain unrevealing in October 2022  -on torsemide  - no longer on salt tab     2020 - Pt states that was taking torsemide daily but not taking it on days with appointment and if had to go somewhere. Noticed edema when not taking torsemide. Was taking 1/2 of spironolactone. Saw Cardiology and was advised to take 1/2 tab torsemide (so ten mg) and spironolactone 25 mg daily. Pt states with this change, has edema improved.  Is feeling satisfied with the improvement.       May 2021- patient's blood pressures are below goal.  Patient is asymptomatic.  Patient states that she has now been compliant with her medications this week and prior was not taking some of her medications up to 1 to 2 times a week.  We reviewed that this makes it difficult to appropriately titrate medications as we are not sure what she is taking when she is seen in the office. Jah Palacioanamika agrees. Oscar Thornton, now that the patient is taking all of her medications daily with the exception of diuretics on days when she has heard physician appointments, I advised the patient to hold amlodipine.  And check blood pressures once a day for 2 weeks and call with results.     February 2022-sodium level remains stable 134. Calcium borderline elevated and was advised to hold calcium supplement.  Protein level in the urine remains stable 0.3.     March 2022 calcium level improved.     October 2022-hyponatremia 127. Advised the patient to restart torsemide and lower spironolactone. Repeat sodium level improved to 131.    7/2023 -appointment -  labs from 5/2023 - creat is stable 0.8. Na 134. Hb 12. 6. UA 4-10. No urinary complaints. UPCR 0.34. SBP appropriate. To note, since last seen, spironolactone was increased to 25 mg once daily. Given this, we will have pt repeat BMP in 1 month.      Plan:  - labs in one month with BMP  - appt in 6 months with labs prior to appt  -no changes to medication regimen today - torsemide 10 mg three times per week, spironolactone 25 mg daily; valsartan 320 mg daily, metoprolol, terazosin  - pt is still taking meloxicam - readvised to limit as much as possible. Patient is agreeable     2) HTN - currently is on valsartan, spironolactone, metoprolol, amlodipine, terazosin with controlled blood pressures      -was started on spironolactone in April 2018.   -during visit in [address] [place] [address], patient's amlodipine was increased and valsartan was changed to losartan due to recall.  -renal artery Doppler were unrevealing.  In the proximal arteries of the renal artery.   -May 2019-Lower terazosin to 5 mg, and lower metoprolol to 37.5 twice a day  - 7/2019 - metoprolol changed to 25 mg at night as pt was taking 37.5 BID dosing only once daily  -June 2022-patient saw cardiologist. Hannah Arevalo this time was taking torsemide 10 mg 3 times per week and spironolactone was 3 times per week and was advised to take spironolactone once a day  - august 2022 - pt states is not taking torsemide regularly and last dose was in july  - October 2022-patient was not taking torsemide and was advised to restart and lower spironolactone due to hyponatremia  - 1/2023 - SBP ok for now (upper limit of nl but pt higher risk of fall)  - 4/2023 - spironolactone increased by Cardiologist to 25 mg daily  - 7/2023 - BP at goal     3) renal function      - stable creatinine 0.8 mg/d   - to note, started on meloxicam in 6/28/2022     4) proteinuria     - prior SPEP, UPEP unrevealing  - on ARB and spironolactone  - last UA on 8/2019. None recent. -  Repeat urinalysis unrevealing.  U PCR stable 5/2023     5) thyroid nodule - biopsied prior     6) electrolytes     -   Hyponatremia -sodium level has normalized  - calcium supplement held in February 2022 due to borderline hypercalcemia.  February 2022.   repeat testing with improved calcium level.  Holding calcium supplements. - monitor closely as spironolactone increased 4/2023     7) COPD and pulm carcinoid and TOM     - follows with Pulmonary team and Hematology team  - on home O2  - f/u CT scan pending from 5/13/2021     8) AST slight elevation - chronic. Per PCP     9)   recheck PTH 1 month     10) weight at home is 172-175 lbs.    - euvolemic     11) DM - being managed by PCP     It was a pleasure evaluating your patient in the office today. Thank you for allowing our team to participate in the care of Ms Camryn Roblero. Please do not hesitate to contact our team if further issues/questions shall arise in the interim.        No problem-specific Assessment & Plan notes found for this encounter. HPI:    Patient denies fevers, chills, nausea, vomiting. Has chronic L knee pain.      PATIENT INSTRUCTIONS:    Patient Instructions   1) Avoid NSAIDS - (Example - motrin, advil, ibuprofen, aleve, exederin, etc)  2) Always follow a low salt diet  3) If you have any issues with trouble urinating, blood in the urine, food tasting like metal, confusion, weakness, fatigue that is worsening, please call right away. 4) Please continue to follow regularly with your family physician and any other specialist that you are advised to see and continue to follow their recommendations  5) BMP labwork - can be in august or September  6) full labwork in 6 months (can be before next appointment); appointment in 6-8 months  7) no changes to your medications today        OBJECTIVE:  Current Weight: Weight - Scale: 81.7 kg (180 lb 3.2 oz)  Vitals:    07/17/23 1315   BP: 134/50   BP Location: Left arm   Patient Position: Sitting   Cuff Size: Large   Weight: 81.7 kg (180 lb 3.2 oz)   Height: 4' 11" (1.499 m)    Body mass index is 36.4 kg/m². REVIEW OF SYSTEMS:    Review of Systems   Constitutional: Negative. Negative for fatigue. HENT: Negative. Eyes: Negative. Respiratory: Negative. Negative for shortness of breath. Cardiovascular: Negative. Negative for leg swelling. Gastrointestinal: Negative. Endocrine: Negative. Genitourinary: Negative. Negative for difficulty urinating. Musculoskeletal: Negative. Skin: Negative. Allergic/Immunologic: Negative. Neurological: Negative. Hematological: Negative. Psychiatric/Behavioral: Negative. All other systems reviewed and are negative. PHYSICAL EXAM:      Physical Exam  Vitals and nursing note reviewed. Constitutional:       General: She is not in acute distress. Appearance: She is well-developed. She is not diaphoretic. HENT:      Head: Normocephalic and atraumatic. Eyes:      General: No scleral icterus. Right eye: No discharge. Left eye: No discharge. Conjunctiva/sclera: Conjunctivae normal.   Neck:      Vascular: No JVD.    Cardiovascular:      Rate and Rhythm: Normal rate and regular rhythm. Heart sounds: No murmur heard. No friction rub. No gallop. Pulmonary:      Effort: Pulmonary effort is normal.      Breath sounds: Normal breath sounds. Abdominal:      General: Bowel sounds are normal. There is no distension. Palpations: Abdomen is soft. Tenderness: There is no abdominal tenderness. There is no rebound. Musculoskeletal:         General: No tenderness or deformity. Normal range of motion. Cervical back: Normal range of motion and neck supple. Skin:     General: Skin is warm and dry. Coloration: Skin is not pale. Findings: No erythema or rash. Neurological:      Mental Status: She is alert and oriented to person, place, and time. Coordination: Coordination normal.   Psychiatric:         Behavior: Behavior normal.         Thought Content:  Thought content normal.         Judgment: Judgment normal.         Medications:    Current Outpatient Medications:   •  Accu-Chek FastClix Lancets MISC, USE   TO CHECK GLUCOSE ONCE DAILY, Disp: 102 each, Rfl: 0  •  Accu-Chek SmartView test strip, Use 1 each daily Use as instructed, Disp: 100 each, Rfl: 3  •  acetaminophen (TYLENOL) 500 mg tablet, Take 500 mg by mouth every 6 (six) hours as needed for mild pain For pain, Disp: , Rfl:   •  ADULT ASPIRIN LOW STRENGTH PO, Take 1 tablet by mouth daily , Disp: , Rfl:   •  amLODIPine (NORVASC) 10 mg tablet, Take 1 tablet (10 mg total) by mouth daily (Patient taking differently: Take 5 mg by mouth daily), Disp: 90 tablet, Rfl: 2  •  AYR SALINE NASAL DROPS NA, Two sprays as needed, Disp: , Rfl:   •  Diclofenac Sodium (VOLTAREN) 1 %, , Disp: , Rfl:   •  glipiZIDE (GLUCOTROL) 10 mg tablet, TAKE 1/2 TABLET EVERY MORNING, Disp: 45 tablet, Rfl: 2  •  loratadine (CLARITIN) 10 mg tablet, Take 1 tablet by mouth daily, Disp: , Rfl:   •  meloxicam (Mobic) 15 mg tablet, Take 1 tablet (15 mg total) by mouth daily With food (Patient taking differently: Take 15 mg by mouth 2 (two) times a week With food), Disp: 30 tablet, Rfl: 2  •  metFORMIN (GLUCOPHAGE) 500 mg tablet, TAKE 2 TABLETS (1,000 MG TOTAL) BY MOUTH 2 (TWO) TIMES A DAY, Disp: 360 tablet, Rfl: 2  •  metoprolol succinate (TOPROL-XL) 25 mg 24 hr tablet, TAKE 1 TABLET EVERY DAY, Disp: 90 tablet, Rfl: 3  •  montelukast (SINGULAIR) 10 mg tablet, Take 1 tablet (10 mg total) by mouth daily, Disp: 90 tablet, Rfl: 0  •  spironolactone (ALDACTONE) 25 mg tablet, Take 1 tablet (25 mg total) by mouth daily, Disp: 90 tablet, Rfl: 2  •  terazosin (HYTRIN) 5 mg capsule, Take 1 capsule (5 mg total) by mouth daily, Disp: 90 capsule, Rfl: 3  •  torsemide (DEMADEX) 20 mg tablet, Take 0.5 tablets (10 mg total) by mouth 3 (three) times a week, Disp: 90 tablet, Rfl: 0  •  valsartan (DIOVAN) 320 MG tablet, TAKE 1 TABLET (320 MG TOTAL) BY MOUTH DAILY, Disp: 90 tablet, Rfl: 2    Laboratory Results:        Invalid input(s): "ALBUMIN"    Results for orders placed or performed in visit on 05/16/23   Lipid panel   Result Value Ref Range    Cholesterol 143 See Comment mg/dL    Triglycerides 91 See Comment mg/dL    HDL, Direct 39 (L) >=50 mg/dL    LDL Calculated 86 0 - 100 mg/dL    Non-HDL-Chol (CHOL-HDL) 104 mg/dl   Basic metabolic panel   Result Value Ref Range    Sodium 134 (L) 135 - 147 mmol/L    Potassium 4.6 3.5 - 5.3 mmol/L    Chloride 99 96 - 108 mmol/L    CO2 27 21 - 32 mmol/L    ANION GAP 8 4 - 13 mmol/L    BUN 21 5 - 25 mg/dL    Creatinine 0.83 0.60 - 1.30 mg/dL    Glucose, Fasting 125 (H) 65 - 99 mg/dL    Calcium 9.8 8.4 - 10.2 mg/dL    eGFR 66 ml/min/1.73sq m   CBC and differential   Result Value Ref Range    WBC 7.52 4.31 - 10.16 Thousand/uL    RBC 4.40 3.81 - 5.12 Million/uL    Hemoglobin 12.6 11.5 - 15.4 g/dL    Hematocrit 39.0 34.8 - 46.1 %    MCV 89 82 - 98 fL    MCH 28.6 26.8 - 34.3 pg    MCHC 32.3 31.4 - 37.4 g/dL    RDW 13.4 11.6 - 15.1 %    MPV 10.2 8.9 - 12.7 fL    Platelets 068 441 - 004 Thousands/uL    nRBC 0 /100 WBCs    Neutrophils Relative 68 43 - 75 %    Immat GRANS % 0 0 - 2 %    Lymphocytes Relative 20 14 - 44 %    Monocytes Relative 7 4 - 12 %    Eosinophils Relative 4 0 - 6 %    Basophils Relative 1 0 - 1 %    Neutrophils Absolute 5.06 1.85 - 7.62 Thousands/µL    Immature Grans Absolute 0.03 0.00 - 0.20 Thousand/uL    Lymphocytes Absolute 1.52 0.60 - 4.47 Thousands/µL    Monocytes Absolute 0.54 0.17 - 1.22 Thousand/µL    Eosinophils Absolute 0.31 0.00 - 0.61 Thousand/µL    Basophils Absolute 0.06 0.00 - 0.10 Thousands/µL   Magnesium   Result Value Ref Range    Magnesium 1.9 1.9 - 2.7 mg/dL   Phosphorus   Result Value Ref Range    Phosphorus 3.3 2.3 - 4.1 mg/dL

## 2023-07-17 NOTE — LETTER
July 17, 2023     Catherine Canales, [address] [facility]  [address] [address]    Patient: Emiliano Melnedez   YOB: 1940   Date of Visit: 7/17/2023       Dear Dr. [place]:    Thank you for referring Mirna Sanchez to me for evaluation. Below are my notes for this consultation. If you have questions, please do not hesitate to call me. I look forward to following your patient along with you. Sincerely,        Angeli Rainey MD        CC: No Recipients    Angeli Rainey MD  7/17/2023  2:04 PM  Sign when Signing Visit  Binta Padilla 81 y.o. female MRN: [id number]  7/17/2023    Reason for Visit: hyponatremia    ASSESSMENT and PLAN:    I had the pleasure of seeing Ms Ge Wick today in the renal clinic for the continued management of hyponatremia. 82 yo Patient has a history of CHF, lung carcinoid, COPD on home O2, TOM, hyponatremia, hypertension, prior ischemic bowel during pregnancy, who is being seen as a follow-up for hyponatremia. The etiology of hyponatremia was felt to be secondary to volume overload in the setting of heart failure, and citalopram use during admission 2018 at [facility]. Pt is now presenting for f/u for HTN, hyponatremia, proteinuria. 1) hyponatremia     - secondary to volume overload prior  -serum osmolarity 270 on June 2021  -urine osmolarity 242, urine sodium 22 in June 2021  -sodium level is stable and then decreased in June 2021-128 after which patient was advised fluid restriction. Patient was also advised to be compliant with diuretics. - Sodium level decreased again on 10/2022-at that time it was noted that the patient had self discontinued torsemide. Was advised to restart torsemide and lower spironolactone slightly.   - SPEP, UPEP, free light chain unrevealing in October 2022  -on torsemide  - no longer on salt tab     2020 - Pt states that was taking torsemide daily but not taking it on days with appointment and if had to go somewhere. Noticed edema when not taking torsemide. Was taking 1/2 of spironolactone. Saw Cardiology and was advised to take 1/2 tab torsemide (so ten mg) and spironolactone 25 mg daily. Pt states with this change, has edema improved. Is feeling satisfied with the improvement. May 2021- patient's blood pressures are below goal.  Patient is asymptomatic. Patient states that she has now been compliant with her medications this week and prior was not taking some of her medications up to 1 to 2 times a week. We reviewed that this makes it difficult to appropriately titrate medications as we are not sure what she is taking when she is seen in the office. Patient agrees. Therefore, now that the patient is taking all of her medications daily with the exception of diuretics on days when she has heard physician appointments, I advised the patient to hold amlodipine. And check blood pressures once a day for 2 weeks and call with results. February 2022-sodium level remains stable 134. Calcium borderline elevated and was advised to hold calcium supplement. Protein level in the urine remains stable 0.3. March 2022 calcium level improved. October 2022-hyponatremia 127. Advised the patient to restart torsemide and lower spironolactone. Repeat sodium level improved to 131.    7/2023 -appointment -  labs from 5/2023 - creat is stable 0.8. Na 134. Hb 12. 6. UA 4-10. No urinary complaints. UPCR 0.34. SBP appropriate. To note, since last seen, spironolactone was increased to 25 mg once daily. Given this, we will have pt repeat BMP in 1 month. Plan:  - labs in one month with BMP  - appt in 6 months with labs prior to appt  -no changes to medication regimen today - torsemide 10 mg three times per week, spironolactone 25 mg daily; valsartan 320 mg daily, metoprolol, terazosin  - pt is still taking meloxicam - readvised to limit as much as possible.   Patient is agreeable     2) HTN - currently is on valsartan, spironolactone, metoprolol, amlodipine, terazosin with controlled blood pressures      -was started on spironolactone in April 2018.   -during visit in July 2018, patient's amlodipine was increased and valsartan was changed to losartan due to recall.  -renal artery Doppler were unrevealing. In the proximal arteries of the renal artery. -May 2019-Lower terazosin to 5 mg, and lower metoprolol to 37.5 twice a day  - 7/2019 - metoprolol changed to 25 mg at night as pt was taking 37.5 BID dosing only once daily  -June 2022-patient saw cardiologist.  At this time was taking torsemide 10 mg 3 times per week and spironolactone was 3 times per week and was advised to take spironolactone once a day  - august 2022 - pt states is not taking torsemide regularly and last dose was in july  - October 2022-patient was not taking torsemide and was advised to restart and lower spironolactone due to hyponatremia  - 1/2023 - SBP ok for now (upper limit of nl but pt higher risk of fall)  - 4/2023 - spironolactone increased by Cardiologist to 25 mg daily  - 7/2023 - BP at goal     3) renal function      - stable creatinine 0.8 mg/d   - to note, started on meloxicam in 6/28/2022     4) proteinuria     - prior SPEP, UPEP unrevealing  - on ARB and spironolactone  - last UA on 8/2019. None recent. -  Repeat urinalysis unrevealing. U PCR stable 5/2023     5) thyroid nodule - biopsied prior     6) electrolytes     -   Hyponatremia -sodium level has normalized  - calcium supplement held in February 2022 due to borderline hypercalcemia. February 2022. repeat testing with improved calcium level. Holding calcium supplements. - monitor closely as spironolactone increased 4/2023     7) COPD and pulm carcinoid and TOM     - follows with Pulmonary team and Hematology team  - on home O2  - f/u CT scan pending from 5/13/2021     8) AST slight elevation - chronic.  Per PCP     9)   recheck PTH 1 month     10) weight at home is 172-175 lbs.      - euvolemic     11) DM - being managed by PCP     It was a pleasure evaluating your patient in the office today. Thank you for allowing our team to participate in the care of Ms Maria Del Rosario Mercer. Please do not hesitate to contact our team if further issues/questions shall arise in the interim. No problem-specific Assessment & Plan notes found for this encounter. HPI:    Patient denies fevers, chills, nausea, vomiting. Has chronic L knee pain. PATIENT INSTRUCTIONS:    Patient Instructions   1) Avoid NSAIDS - (Example - motrin, advil, ibuprofen, aleve, exederin, etc)  2) Always follow a low salt diet  3) If you have any issues with trouble urinating, blood in the urine, food tasting like metal, confusion, weakness, fatigue that is worsening, please call right away. 4) Please continue to follow regularly with your family physician and any other specialist that you are advised to see and continue to follow their recommendations  5) BMP labwork - can be in august or September  6) full labwork in 6 months (can be before next appointment); appointment in 6-8 months  7) no changes to your medications today        OBJECTIVE:  Current Weight: Weight - Scale: 81.7 kg (180 lb 3.2 oz)  Vitals:    07/17/23 1315   BP: 134/50   BP Location: Left arm   Patient Position: Sitting   Cuff Size: Large   Weight: 81.7 kg (180 lb 3.2 oz)   Height: 4' 11" (1.499 m)    Body mass index is 36.4 kg/m². REVIEW OF SYSTEMS:    Review of Systems   Constitutional: Negative. Negative for fatigue. HENT: Negative. Eyes: Negative. Respiratory: Negative. Negative for shortness of breath. Cardiovascular: Negative. Negative for leg swelling. Gastrointestinal: Negative. Endocrine: Negative. Genitourinary: Negative. Negative for difficulty urinating. Musculoskeletal: Negative. Skin: Negative. Allergic/Immunologic: Negative. Neurological: Negative. Hematological: Negative.     Psychiatric/Behavioral: Negative. All other systems reviewed and are negative. PHYSICAL EXAM:      Physical Exam  Vitals and nursing note reviewed. Constitutional:       General: She is not in acute distress. Appearance: She is well-developed. She is not diaphoretic. HENT:      Head: Normocephalic and atraumatic. Eyes:      General: No scleral icterus. Right eye: No discharge. Left eye: No discharge. Conjunctiva/sclera: Conjunctivae normal.   Neck:      Vascular: No JVD. Cardiovascular:      Rate and Rhythm: Normal rate and regular rhythm. Heart sounds: No murmur heard. No friction rub. No gallop. Pulmonary:      Effort: Pulmonary effort is normal.      Breath sounds: Normal breath sounds. Abdominal:      General: Bowel sounds are normal. There is no distension. Palpations: Abdomen is soft. Tenderness: There is no abdominal tenderness. There is no rebound. Musculoskeletal:         General: No tenderness or deformity. Normal range of motion. Cervical back: Normal range of motion and neck supple. Skin:     General: Skin is warm and dry. Coloration: Skin is not pale. Findings: No erythema or rash. Neurological:      Mental Status: She is alert and oriented to person, place, and time. Coordination: Coordination normal.   Psychiatric:         Behavior: Behavior normal.         Thought Content:  Thought content normal.         Judgment: Judgment normal.         Medications:    Current Outpatient Medications:   •  Accu-Chek FastClix Lancets MISC, USE   TO CHECK GLUCOSE ONCE DAILY, Disp: 102 each, Rfl: 0  •  Accu-Chek SmartView test strip, Use 1 each daily Use as instructed, Disp: 100 each, Rfl: 3  •  acetaminophen (TYLENOL) 500 mg tablet, Take 500 mg by mouth every 6 (six) hours as needed for mild pain For pain, Disp: , Rfl:   •  ADULT ASPIRIN LOW STRENGTH PO, Take 1 tablet by mouth daily , Disp: , Rfl:   •  amLODIPine (NORVASC) 10 mg tablet, Take 1 tablet (10 mg total) by mouth daily (Patient taking differently: Take 5 mg by mouth daily), Disp: 90 tablet, Rfl: 2  •  AYR SALINE NASAL DROPS NA, Two sprays as needed, Disp: , Rfl:   •  Diclofenac Sodium (VOLTAREN) 1 %, , Disp: , Rfl:   •  glipiZIDE (GLUCOTROL) 10 mg tablet, TAKE 1/2 TABLET EVERY MORNING, Disp: 45 tablet, Rfl: 2  •  loratadine (CLARITIN) 10 mg tablet, Take 1 tablet by mouth daily, Disp: , Rfl:   •  meloxicam (Mobic) 15 mg tablet, Take 1 tablet (15 mg total) by mouth daily With food (Patient taking differently: Take 15 mg by mouth 2 (two) times a week With food), Disp: 30 tablet, Rfl: 2  •  metFORMIN (GLUCOPHAGE) 500 mg tablet, TAKE 2 TABLETS (1,000 MG TOTAL) BY MOUTH 2 (TWO) TIMES A DAY, Disp: 360 tablet, Rfl: 2  •  metoprolol succinate (TOPROL-XL) 25 mg 24 hr tablet, TAKE 1 TABLET EVERY DAY, Disp: 90 tablet, Rfl: 3  •  montelukast (SINGULAIR) 10 mg tablet, Take 1 tablet (10 mg total) by mouth daily, Disp: 90 tablet, Rfl: 0  •  spironolactone (ALDACTONE) 25 mg tablet, Take 1 tablet (25 mg total) by mouth daily, Disp: 90 tablet, Rfl: 2  •  terazosin (HYTRIN) 5 mg capsule, Take 1 capsule (5 mg total) by mouth daily, Disp: 90 capsule, Rfl: 3  •  torsemide (DEMADEX) 20 mg tablet, Take 0.5 tablets (10 mg total) by mouth 3 (three) times a week, Disp: 90 tablet, Rfl: 0  •  valsartan (DIOVAN) 320 MG tablet, TAKE 1 TABLET (320 MG TOTAL) BY MOUTH DAILY, Disp: 90 tablet, Rfl: 2    Laboratory Results:        Invalid input(s): "ALBUMIN"    Results for orders placed or performed in visit on 05/16/23   Lipid panel   Result Value Ref Range    Cholesterol 143 See Comment mg/dL    Triglycerides 91 See Comment mg/dL    HDL, Direct 39 (L) >=50 mg/dL    LDL Calculated 86 0 - 100 mg/dL    Non-HDL-Chol (CHOL-HDL) 104 mg/dl   Basic metabolic panel   Result Value Ref Range    Sodium 134 (L) 135 - 147 mmol/L    Potassium 4.6 3.5 - 5.3 mmol/L    Chloride 99 96 - 108 mmol/L    CO2 27 21 - 32 mmol/L    ANION GAP 8 4 - 13 mmol/L    BUN 21 5 - 25 mg/dL    Creatinine 0.83 0.60 - 1.30 mg/dL    Glucose, Fasting 125 (H) 65 - 99 mg/dL    Calcium 9.8 8.4 - 10.2 mg/dL    eGFR 66 ml/min/1.73sq m   CBC and differential   Result Value Ref Range    WBC 7.52 4.31 - 10.16 Thousand/uL    RBC 4.40 3.81 - 5.12 Million/uL    Hemoglobin 12.6 11.5 - 15.4 g/dL    Hematocrit 39.0 34.8 - 46.1 %    MCV 89 82 - 98 fL    MCH 28.6 26.8 - 34.3 pg    MCHC 32.3 31.4 - 37.4 g/dL    RDW 13.4 11.6 - 15.1 %    MPV 10.2 8.9 - 12.7 fL    Platelets 901 294 - 407 Thousands/uL    nRBC 0 /100 WBCs    Neutrophils Relative 68 43 - 75 %    Immat GRANS % 0 0 - 2 %    Lymphocytes Relative 20 14 - 44 %    Monocytes Relative 7 4 - 12 %    Eosinophils Relative 4 0 - 6 %    Basophils Relative 1 0 - 1 %    Neutrophils Absolute 5.06 1.85 - 7.62 Thousands/µL    Immature Grans Absolute 0.03 0.00 - 0.20 Thousand/uL    Lymphocytes Absolute 1.52 0.60 - 4.47 Thousands/µL    Monocytes Absolute 0.54 0.17 - 1.22 Thousand/µL    Eosinophils Absolute 0.31 0.00 - 0.61 Thousand/µL    Basophils Absolute 0.06 0.00 - 0.10 Thousands/µL   Magnesium   Result Value Ref Range    Magnesium 1.9 1.9 - 2.7 mg/dL   Phosphorus   Result Value Ref Range    Phosphorus 3.3 2.3 - 4.1 mg/dL

## 2023-07-20 NOTE — PROGRESS NOTES
NEPHROLOGY OFFICE VISIT   Shyam Raines [de-identified] y o  female MRN: [id number]  2/9/2022    Reason for Visit: hyponatremia    ASSESSMENT and PLAN:    I had the pleasure of seeing ms Roblero today in the renal clinic for the continued management of hyponatremia  82 yo Patient has a history of CHF, lung carcinoid, COPD on home O4, TOM, hyponatremia, hypertension, prior ischemic bowel during pregnancy, who is being seen as a follow-up for hyponatremia  The etiology of hyponatremia was felt to be secondary to volume overload in the setting of heart failure, and citalopram use during admission 2018 at [facility] is now presenting for f/u for HTN, hyponatremia, proteinuria       1) hyponatremia     - secondary to volume overload prior  -serum osmolarity 270 on June 2021  -urine osmolarity 242, urine sodium 22 in June 2021  -sodium level is stable and then decreased in June 2021-128 after which patient was advised fluid restriction  Patient was also advised to be compliant with diuretics  -on torsemide  - no longer on salt tab     2020 - Pt states that was taking torsemide daily but not taking it on days with appointment and if had to go somewhere  Noticed edema when not taking torsemide  Was taking 1/2 of spironolactone  Saw Cardiology and was advised to take 1/2 tab torsemide (so ten mg) and spironolactone 25 mg daily  Pt states with this change, has edema improved   Is feeling satisfied with the improvement        May 2021- patient's blood pressures are below goal   Patient is asymptomatic   Patient states that she has now been compliant with her medications this week and prior was not taking some of her medications up to 1 to 2 times a week   We reviewed that this makes it difficult to appropriately titrate medications as we are not sure what she is taking when she is seen in the office  Juanjose Correa agrees  Ross Walsh, now that the patient is taking all of her medications daily with the exception of diuretics on days Goldy Villagomez from [facility], [phone], when she has heard physician appointments, I advised the patient to hold amlodipine   And check blood pressures once a day for 2 weeks and call with results  February 2022-sodium level remains stable 134  Calcium borderline elevated and was advised to hold calcium supplement  Protein level in the urine remains stable 0 3      Plan:  - labwork in 1 month to follow up calcium, PTH, vitamin-D level  -full lab work in 5-6 months an appointment in 5-6 months  -continue mild fluid restriction     2) HTN - currently is on valsartan, torsemide, spironolactone, metoprolol, amlodipine, terazosin     -was started on spironolactone in April 2018    -during visit in [facility], patient's amlodipine was increased and valsartan was changed to losartan due to recall   -renal artery Doppler were unrevealing   In the proximal arteries of the renal artery  -May 2019-Lower terazosin to 5 mg, and lower metoprolol to 37 5 twice a day  - 7/2019 - metoprolol changed to 25 mg at night as pt was taking 37 5 BID dosing only once daily     3) renal function      - stable creatinine 0 8 mg/dL  02/2022     4) proteinuria     - prior SPEP, UPEP unrevealing  - on ARB and spironolactone  - last UA on 8/2019  None recent  -  Repeat urinalysis unrevealing  U PCR stable 02/2022     5) thyroid nodule - biopsied prior     6) electrolytes     -   Hyponatremia is above -recheck BMP for now  - slightly elevated calcium  - calcium supplement held  February 2022  Repeating tests as noted above in 1 month     7) COPD and pulm carcinoid and TOM     - follows with Pulmonary team and Hematology team  - on home O2  - f/u CT scan pending from 5/13/2021     8) AST slight elevation - chronic  Per PCP     9)   recheck PTH 1 month     It was a pleasure evaluating your patient in the office today  Thank you for allowing our team to participate in the care of Ms Camryn Roblero   Please do not hesitate to contact our team if further issues/questions shall arise in the interim      No problem-specific Assessment & Plan notes found for this encounter  HPI:    Patient denies overall complaints  No fevers, chills, nausea, vomiting  No constipation  PATIENT INSTRUCTIONS:    Patient Instructions   1) Avoid NSAIDS - (Example - motrin, advil, ibuprofen, aleve, exederin, etc)  2) Always follow a low salt diet  3) labwork in one month - BMP, PTH, Vitamin D level, phosphorous levels  4) full labwork in 5 months  5) appointment in 5-6 months  6) keep holding calcium supplement for now          OBJECTIVE:  Current Weight: Weight - Scale: 79 8 kg (176 lb)  Vitals:    02/09/22 1600 02/09/22 1630   BP: 158/78 130/54   BP Location: Left arm    Patient Position: Sitting    Cuff Size: Standard    Pulse: 81    Weight: 79 8 kg (176 lb)    Height: 4' 11" (1 499 m)     Body mass index is 35 55 kg/m²  REVIEW OF SYSTEMS:    Review of Systems   Constitutional: Negative  Negative for fatigue  HENT: Negative  Eyes: Negative  Respiratory: Negative  Negative for shortness of breath  Cardiovascular: Negative  Negative for leg swelling  Gastrointestinal: Negative  Endocrine: Negative  Genitourinary: Negative  Negative for difficulty urinating  Musculoskeletal: Negative  Skin: Negative  Allergic/Immunologic: Negative  Neurological: Negative  Hematological: Negative  Psychiatric/Behavioral: Negative  All other systems reviewed and are negative  PHYSICAL EXAM:      Physical Exam  Vitals and nursing note reviewed  Constitutional:       General: She is not in acute distress  Appearance: She is well-developed  She is not diaphoretic  HENT:      Head: Normocephalic and atraumatic  Eyes:      General: No scleral icterus  Right eye: No discharge  Left eye: No discharge  Conjunctiva/sclera: Conjunctivae normal    Neck:      Vascular: No JVD  Cardiovascular:      Rate and Rhythm: Normal rate and regular rhythm        Heart sounds: No murmur heard  No friction rub  No gallop  Pulmonary:      Effort: Pulmonary effort is normal  No respiratory distress  Breath sounds: Normal breath sounds  No wheezing or rales  Abdominal:      General: Bowel sounds are normal  There is no distension  Palpations: Abdomen is soft  Tenderness: There is no abdominal tenderness  There is no rebound  Musculoskeletal:         General: No tenderness or deformity  Normal range of motion  Cervical back: Normal range of motion and neck supple  Right lower leg: Edema present  Left lower leg: Edema present  Comments: Trace to 1+   Skin:     General: Skin is warm and dry  Coloration: Skin is not pale  Findings: No erythema or rash  Neurological:      Mental Status: She is alert and oriented to person, place, and time  Coordination: Coordination normal    Psychiatric:         Behavior: Behavior normal          Thought Content:  Thought content normal          Judgment: Judgment normal          Medications:    Current Outpatient Medications:     Accu-Chek FastClix Lancets MISC, Use 1 lancet per day, Disp: 100 each, Rfl: 3    Accu-Chek SmartView test strip, Use 1 each daily Use as instructed, Disp: 100 each, Rfl: 3    acetaminophen (TYLENOL) 500 mg tablet, Take 500 mg by mouth every 6 (six) hours as needed for mild pain For pain, Disp: , Rfl:     ADULT ASPIRIN LOW STRENGTH PO, Take 1 tablet by mouth daily , Disp: , Rfl:     albuterol (ProAir HFA) 90 mcg/act inhaler, Inhale 2 puffs every 6 (six) hours as needed for wheezing or shortness of breath (Patient taking differently: Inhale 2 puffs as needed for wheezing or shortness of breath  ), Disp: 18 g, Rfl: 1    amLODIPine (NORVASC) 5 mg tablet, Take 1 tablet (5 mg total) by mouth daily, Disp: 90 tablet, Rfl: 3    fluticasone (FLONASE) 50 mcg/act nasal spray, 1 spray into each nostril daily , Disp: , Rfl:     glipiZIDE (GLUCOTROL) 10 mg tablet, Take 0 5 tablets (5 mg total) by mouth in the morning, Disp: 45 tablet, Rfl: 2    latanoprost (XALATAN) 0 005 % ophthalmic solution, Apply to eye, Disp: , Rfl:     loratadine (CLARITIN) 10 mg tablet, Take 1 tablet by mouth daily, Disp: , Rfl:     metFORMIN (GLUCOPHAGE) 500 mg tablet, Take 2 tablets (1,000 mg total) by mouth 2 (two) times a day, Disp: 360 tablet, Rfl: 2    metoprolol succinate (TOPROL-XL) 25 mg 24 hr tablet, Take 1 tablet (25 mg total) by mouth daily at bedtime, Disp: 90 tablet, Rfl: 3    montelukast (SINGULAIR) 10 mg tablet, Take 1 tablet (10 mg total) by mouth daily at bedtime, Disp: 30 tablet, Rfl: 2    spironolactone (ALDACTONE) 25 mg tablet, Take 1 tablet (25 mg total) by mouth daily, Disp: 90 tablet, Rfl: 3    terazosin (HYTRIN) 5 mg capsule, Take 1 capsule (5 mg total) by mouth daily at bedtime, Disp: 90 capsule, Rfl: 3    torsemide (DEMADEX) 20 mg tablet, Take 1 tablet (20 mg total) by mouth daily (Patient taking differently: Take 10 mg by mouth daily  ), Disp: 90 tablet, Rfl: 3    valsartan (DIOVAN) 320 MG tablet, Take 1 tablet (320 mg total) by mouth daily, Disp: 90 tablet, Rfl: 2    Calcium 600-400 MG-UNIT CHEW, Chew 1 tablet daily (Patient not taking: Reported on 12/14/2021 ), Disp: 30 tablet, Rfl: 3    calcium carbonate-vitamin D (OSCAL-D) 500 mg-200 units per tablet, Take 1 tablet by mouth (Patient not taking: Reported on 2/1/2022 ), Disp: , Rfl:     prednisoLONE acetate (PRED FORTE) 1 % ophthalmic suspension, , Disp: , Rfl:     Laboratory Results:        Invalid input(s): ALBUMIN    Results for orders placed or performed in visit on 40/52/08   Basic metabolic panel   Result Value Ref Range    Sodium 134 133 - 145 mmol/L    Potassium 4 4 3 5 - 5 0 mmol/L    Chloride 97 96 - 108 mmol/L    CO2 31 22 - 33 mmol/L    ANION GAP 6 4 - 13 mmol/L    BUN 19 6 - 20 mg/dL    Creatinine 0 86 0 40 - 1 10 mg/dL    Glucose, Fasting 129 (H) 70 - 105 mg/dL    Calcium 10 4 (H) 8 4 - 10 2 mg/dL    eGFR 64 ml/min/1 73sq m   Magnesium   Result Value Ref Range    Magnesium 1 9 1 6 - 2 6 mg/dL   Phosphorus   Result Value Ref Range    Phosphorus 3 9 2 5 - 5 0 mg/dL   CBC and differential   Result Value Ref Range    WBC 7 34 4 31 - 10 16 Thousand/uL    RBC 4 39 3 81 - 5 12 Million/uL    Hemoglobin 12 1 11 5 - 15 4 g/dL    Hematocrit 37 5 34 8 - 46 1 %    MCV 85 82 - 98 fL    MCH 27 6 26 8 - 34 3 pg    MCHC 32 3 31 4 - 37 4 g/dL    RDW 14 4 11 6 - 15 1 %    MPV 9 7 8 9 - 12 7 fL    Platelets 664 962 - 869 Thousands/uL    nRBC 0 /100 WBCs    Neutrophils Relative 66 43 - 75 %    Immat GRANS % 0 0 - 2 %    Lymphocytes Relative 19 14 - 44 %    Monocytes Relative 10 4 - 12 %    Eosinophils Relative 4 0 - 6 %    Basophils Relative 1 0 - 1 %    Neutrophils Absolute 4 86 1 85 - 7 62 Thousands/µL    Immature Grans Absolute 0 02 0 00 - 0 20 Thousand/uL    Lymphocytes Absolute 1 39 0 60 - 4 47 Thousands/µL    Monocytes Absolute 0 71 0 17 - 1 22 Thousand/µL    Eosinophils Absolute 0 29 0 00 - 0 61 Thousand/µL    Basophils Absolute 0 07 0 00 - 0 10 Thousands/µL no

## 2023-07-27 ENCOUNTER — HOSPITAL ENCOUNTER (OUTPATIENT)
Dept: CT IMAGING | Facility: HOSPITAL | Age: 83
End: 2023-07-27
Attending: INTERNAL MEDICINE
Payer: MEDICARE

## 2023-07-27 DIAGNOSIS — D3A.090 BENIGN CARCINOID TUMOR OF LUNG: ICD-10-CM

## 2023-07-27 DIAGNOSIS — R91.8 MULTIPLE PULMONARY NODULES: ICD-10-CM

## 2023-07-27 PROCEDURE — G1004 CDSM NDSC: HCPCS

## 2023-07-27 PROCEDURE — 71250 CT THORAX DX C-: CPT

## 2023-07-28 ENCOUNTER — APPOINTMENT (OUTPATIENT)
Dept: LAB | Facility: HOSPITAL | Age: 83
End: 2023-07-28
Attending: FAMILY MEDICINE
Payer: MEDICARE

## 2023-07-28 DIAGNOSIS — E11.9 DIABETES MELLITUS WITHOUT COMPLICATION (HCC): ICD-10-CM

## 2023-07-28 DIAGNOSIS — I10 ESSENTIAL HYPERTENSION: ICD-10-CM

## 2023-07-28 DIAGNOSIS — I50.32 CHRONIC DIASTOLIC CHF (CONGESTIVE HEART FAILURE) (HCC): ICD-10-CM

## 2023-07-28 LAB
ALBUMIN SERPL BCP-MCNC: 4.2 G/DL (ref 3.5–5)
ALP SERPL-CCNC: 55 U/L (ref 34–104)
ALT SERPL W P-5'-P-CCNC: 24 U/L (ref 7–52)
ANION GAP SERPL CALCULATED.3IONS-SCNC: 8 MMOL/L
AST SERPL W P-5'-P-CCNC: 56 U/L (ref 13–39)
BILIRUB SERPL-MCNC: 0.81 MG/DL (ref 0.2–1)
BUN SERPL-MCNC: 17 MG/DL (ref 5–25)
CALCIUM SERPL-MCNC: 9.6 MG/DL (ref 8.4–10.2)
CHLORIDE SERPL-SCNC: 99 MMOL/L (ref 96–108)
CO2 SERPL-SCNC: 25 MMOL/L (ref 21–32)
CREAT SERPL-MCNC: 0.85 MG/DL (ref 0.6–1.3)
ERYTHROCYTE [DISTWIDTH] IN BLOOD BY AUTOMATED COUNT: 13.3 % (ref 11.6–15.1)
EST. AVERAGE GLUCOSE BLD GHB EST-MCNC: 137 MG/DL
GFR SERPL CREATININE-BSD FRML MDRD: 64 ML/MIN/1.73SQ M
GLUCOSE P FAST SERPL-MCNC: 117 MG/DL (ref 65–99)
HBA1C MFR BLD: 6.4 %
HCT VFR BLD AUTO: 39.4 % (ref 34.8–46.1)
HGB BLD-MCNC: 12.5 G/DL (ref 11.5–15.4)
MCH RBC QN AUTO: 28.8 PG (ref 26.8–34.3)
MCHC RBC AUTO-ENTMCNC: 31.7 G/DL (ref 31.4–37.4)
MCV RBC AUTO: 91 FL (ref 82–98)
PLATELET # BLD AUTO: 251 THOUSANDS/UL (ref 149–390)
PMV BLD AUTO: 10.2 FL (ref 8.9–12.7)
POTASSIUM SERPL-SCNC: 4.3 MMOL/L (ref 3.5–5.3)
PROT SERPL-MCNC: 7.6 G/DL (ref 6.4–8.4)
RBC # BLD AUTO: 4.34 MILLION/UL (ref 3.81–5.12)
SODIUM SERPL-SCNC: 132 MMOL/L (ref 135–147)
WBC # BLD AUTO: 6.48 THOUSAND/UL (ref 4.31–10.16)

## 2023-07-28 PROCEDURE — 80053 COMPREHEN METABOLIC PANEL: CPT

## 2023-07-28 PROCEDURE — 83036 HEMOGLOBIN GLYCOSYLATED A1C: CPT

## 2023-07-28 PROCEDURE — 85027 COMPLETE CBC AUTOMATED: CPT

## 2023-07-28 PROCEDURE — 36415 COLL VENOUS BLD VENIPUNCTURE: CPT

## 2023-08-02 ENCOUNTER — TELEPHONE (OUTPATIENT)
Dept: NEPHROLOGY | Facility: CLINIC | Age: 83
End: 2023-08-02

## 2023-08-17 ENCOUNTER — RA CDI HCC (OUTPATIENT)
Dept: OTHER | Facility: HOSPITAL | Age: 83
End: 2023-08-17

## 2023-08-17 NOTE — PROGRESS NOTES
E11.22, I13.0  [address] [facility] coding opportunities          Chart Reviewed number of suggestions sent to Provider: 2     Patients Insurance     Medicare Insurance: [facility]

## 2023-08-19 ENCOUNTER — LAB (OUTPATIENT)
Dept: LAB | Facility: HOSPITAL | Age: 83
End: 2023-08-19
Attending: INTERNAL MEDICINE
Payer: MEDICARE

## 2023-08-19 DIAGNOSIS — E87.1 HYPONATREMIA: ICD-10-CM

## 2023-08-19 DIAGNOSIS — I10 BENIGN ESSENTIAL HTN: ICD-10-CM

## 2023-08-19 LAB
ANION GAP SERPL CALCULATED.3IONS-SCNC: 7 MMOL/L
BUN SERPL-MCNC: 19 MG/DL (ref 5–25)
CALCIUM SERPL-MCNC: 9.8 MG/DL (ref 8.4–10.2)
CHLORIDE SERPL-SCNC: 100 MMOL/L (ref 96–108)
CO2 SERPL-SCNC: 27 MMOL/L (ref 21–32)
CREAT SERPL-MCNC: 0.9 MG/DL (ref 0.6–1.3)
GFR SERPL CREATININE-BSD FRML MDRD: 60 ML/MIN/1.73SQ M
GLUCOSE P FAST SERPL-MCNC: 118 MG/DL (ref 65–99)
POTASSIUM SERPL-SCNC: 4.4 MMOL/L (ref 3.5–5.3)
SODIUM SERPL-SCNC: 134 MMOL/L (ref 135–147)

## 2023-08-19 PROCEDURE — 36415 COLL VENOUS BLD VENIPUNCTURE: CPT

## 2023-08-19 PROCEDURE — 80048 BASIC METABOLIC PNL TOTAL CA: CPT

## 2023-08-25 ENCOUNTER — OFFICE VISIT (OUTPATIENT)
Dept: FAMILY MEDICINE CLINIC | Facility: CLINIC | Age: 83
End: 2023-08-25
Payer: MEDICARE

## 2023-08-25 VITALS
WEIGHT: 180 LBS | OXYGEN SATURATION: 96 % | RESPIRATION RATE: 16 BRPM | HEART RATE: 68 BPM | DIASTOLIC BLOOD PRESSURE: 60 MMHG | HEIGHT: 59 IN | BODY MASS INDEX: 36.29 KG/M2 | SYSTOLIC BLOOD PRESSURE: 130 MMHG

## 2023-08-25 DIAGNOSIS — J43.9 PULMONARY EMPHYSEMA, UNSPECIFIED EMPHYSEMA TYPE (HCC): ICD-10-CM

## 2023-08-25 DIAGNOSIS — I10 ESSENTIAL HYPERTENSION: Primary | ICD-10-CM

## 2023-08-25 DIAGNOSIS — E11.9 DIABETES MELLITUS WITHOUT COMPLICATION (HCC): ICD-10-CM

## 2023-08-25 DIAGNOSIS — G47.33 OSA (OBSTRUCTIVE SLEEP APNEA): ICD-10-CM

## 2023-08-25 DIAGNOSIS — I50.32 CHRONIC DIASTOLIC CHF (CONGESTIVE HEART FAILURE) (HCC): ICD-10-CM

## 2023-08-25 DIAGNOSIS — D3A.090 BENIGN CARCINOID TUMOR OF LUNG: ICD-10-CM

## 2023-08-25 PROBLEM — Z79.4 TYPE 2 DIABETES MELLITUS WITH HYPERGLYCEMIA, WITH LONG-TERM CURRENT USE OF INSULIN (HCC): Status: RESOLVED | Noted: 2022-08-16 | Resolved: 2023-08-25

## 2023-08-25 PROBLEM — E11.65 TYPE 2 DIABETES MELLITUS WITH HYPERGLYCEMIA, WITH LONG-TERM CURRENT USE OF INSULIN (HCC): Status: RESOLVED | Noted: 2022-08-16 | Resolved: 2023-08-25

## 2023-08-25 PROCEDURE — 99214 OFFICE O/P EST MOD 30 MIN: CPT | Performed by: FAMILY MEDICINE

## 2023-08-25 NOTE — ASSESSMENT & PLAN NOTE
Wt Readings from Last 3 Encounters:   08/16/23 81.6 kg (180 lb)   07/17/23 81.7 kg (180 lb 3.2 oz)   07/05/23 81.6 kg (180 lb)     Stable on current medications. Saw Cardiology in April 2023.

## 2023-08-25 NOTE — ASSESSMENT & PLAN NOTE
Stable. Pt saw Dr Sukh Granado in July 2023. Pt continues to use O2 supplementation as needed. Her 6 min walk test showed desaturation to 82% with exertion.

## 2023-08-25 NOTE — ASSESSMENT & PLAN NOTE
A1C 6.4 in July 2023. Continue metformin 1000 mg bid and glipizide 5 mg qd. Continue low carb diet. Foot exam done in Jan 2023. Eye exam done in April 2023. Microalbumin/creatinine ratio done in Jan 2023.      Lab Results   Component Value Date    HGBA1C 6.4 (H) 07/28/2023
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
## 2023-08-25 NOTE — ASSESSMENT & PLAN NOTE
Pt had CT of chest in July 2023 and no change. Pt saw Hematology in May 2022 and told that only needs to f/u as needed. Patient saw Pulmonary in July 2023.

## 2023-08-28 ENCOUNTER — TELEPHONE (OUTPATIENT)
Dept: ADMINISTRATIVE | Facility: OTHER | Age: 83
End: 2023-08-28

## 2023-08-28 NOTE — LETTER
2nd Request      Diabetic Eye Exam Form    Date Requested: 23  Patient: Ciera Bennett  Patient : 1940   Referring Provider: Candi Murillo MD      DIABETIC Eye Exam Date _______________________________      Type of Exam MUST be documented for Diabetic Eye Exams. Please CHECK ONE. Retinal Exam       Dilated Retinal Exam       OCT       Optomap-Iris Exam      Fundus Photography       Left Eye - Please check Retinopathy or No Retinopathy        Exam did show retinopathy    Exam did not show retinopathy       Right Eye - Please check Retinopathy or No Retinopathy       Exam did show retinopathy    Exam did not show retinopathy       Comments __________________________________________________________    Practice Providing Exam ______________________________________________    Exam Performed By (print name) _______________________________________      Provider Signature ___________________________________________________      These reports are needed for  compliance. Please fax this completed form and a copy of the Diabetic Eye Exam report to our office located at [address] [place] [address] as soon as possible via Fax [phone] attention Jackie: Phone [phone]  We thank you for your assistance in treating our mutual patient.

## 2023-08-28 NOTE — TELEPHONE ENCOUNTER
Upon review of the In Basket request and the patient's chart, initial outreach has been made via fax to facility. Please see Contacts section for details.      Thank you  Eusebio Mg MA

## 2023-08-28 NOTE — LETTER
Diabetic Eye Exam Form    Date Requested: 23  Patient: Yuri Mcdermott  Patient : 1940   Referring Provider: Jake Rios MD      DIABETIC Eye Exam Date _______________________________      Type of Exam MUST be documented for Diabetic Eye Exams. Please CHECK ONE. Retinal Exam       Dilated Retinal Exam       OCT       Optomap-Iris Exam      Fundus Photography       Left Eye - Please check Retinopathy or No Retinopathy        Exam did show retinopathy    Exam did not show retinopathy       Right Eye - Please check Retinopathy or No Retinopathy       Exam did show retinopathy    Exam did not show retinopathy       Comments __________________________________________________________    Practice Providing Exam ______________________________________________    Exam Performed By (print name) _______________________________________      Provider Signature ___________________________________________________      These reports are needed for  compliance. Please fax this completed form and a copy of the Diabetic Eye Exam report to our office located at [address] [place] [address] as soon as possible via Fax [phone] attention Jackie: Phone [phone]  We thank you for your assistance in treating our mutual patient.

## 2023-08-28 NOTE — TELEPHONE ENCOUNTER
----- Message from Shmuel Fitzpatrick sent at 8/25/2023  2:39 PM EDT -----  Regarding: care gap request  08/25/23 2:39 PM    Hello, our patient attached above has had Diabetic Eye Exam completed/performed. Please assist in updating the patient chart by making an External outreach to dr Wilton Johnson facility located in [address] [place] [address]. The date of service is approx 3/2023.     Thank you,  Liz Montaño  [facility]

## 2023-09-05 DIAGNOSIS — E11.9 DIABETES MELLITUS WITHOUT COMPLICATION (HCC): ICD-10-CM

## 2023-09-05 RX ORDER — GLIPIZIDE 10 MG/1
5 TABLET ORAL DAILY
Qty: 45 TABLET | Refills: 2 | Status: SHIPPED | OUTPATIENT
Start: 2023-09-05

## 2023-09-07 NOTE — TELEPHONE ENCOUNTER
As a follow-up, a second attempt has been made for outreach via fax to facility. Please see Contacts section for details.     Thank you  Cherri Mendoza MA

## 2023-09-11 ENCOUNTER — OFFICE VISIT (OUTPATIENT)
Dept: PULMONOLOGY | Facility: CLINIC | Age: 83
End: 2023-09-11
Payer: MEDICARE

## 2023-09-11 VITALS
HEIGHT: 59 IN | SYSTOLIC BLOOD PRESSURE: 140 MMHG | WEIGHT: 182 LBS | HEART RATE: 79 BPM | DIASTOLIC BLOOD PRESSURE: 80 MMHG | TEMPERATURE: 97.8 F | OXYGEN SATURATION: 92 % | BODY MASS INDEX: 36.69 KG/M2

## 2023-09-11 DIAGNOSIS — R91.8 MULTIPLE PULMONARY NODULES: Primary | ICD-10-CM

## 2023-09-11 DIAGNOSIS — J43.9 PULMONARY EMPHYSEMA, UNSPECIFIED EMPHYSEMA TYPE (HCC): ICD-10-CM

## 2023-09-11 DIAGNOSIS — D3A.090 BENIGN CARCINOID TUMOR OF LUNG: ICD-10-CM

## 2023-09-11 DIAGNOSIS — G47.33 OSA (OBSTRUCTIVE SLEEP APNEA): ICD-10-CM

## 2023-09-11 DIAGNOSIS — I50.32 CHRONIC DIASTOLIC CHF (CONGESTIVE HEART FAILURE) (HCC): ICD-10-CM

## 2023-09-11 PROCEDURE — 99214 OFFICE O/P EST MOD 30 MIN: CPT | Performed by: INTERNAL MEDICINE

## 2023-09-11 NOTE — ASSESSMENT & PLAN NOTE
She had a 2.0 x 2.0 lobulated lung mass in the left lower lobe and multiple lung nodules on the left side. She had also mediastinal lymphadenopathy. She was also noted to have a nodule in the thyroid gland and a nodular hepatic lesion. She had a CT guided biopsy of LLL lung lesion which was consistent with neuroendocrine tumor, carcinoid. She follows with Dr. Zeynep Jack from [facility].  She has multiple lung nodules on her CT scan from Feb 2019. She had a CT scan in May 2021 which showed stability

## 2023-09-11 NOTE — ASSESSMENT & PLAN NOTE
She had a long history of snoring and daytime sleepiness. She was found to have mild TOM with oxygen desaturation on her overnight sleep study. Her CPAP titration study was suboptimal. She was started on CPAP therapy at 9 cm of water and has been using it. Her CPAP compliance was good. Her residual AHI has been low. She is getting benefit from CPAP therapy I have advised her to use the CPAP as before.  Se is having excessive dryness of mouth now. I have advised her to change the mask regularly. I have also advised her to cut down the oxygen to 1 L/min. If she continues to have symptoms she will need a mask fit.

## 2023-09-11 NOTE — PROGRESS NOTES
Assessment/Plan:    Carcinoid tumor of lung  She had a 2.0 x 2.0 lobulated lung mass in the left lower lobe and multiple lung nodules on the left side. She had also mediastinal lymphadenopathy. She was also noted to have a nodule in the thyroid gland and a nodular hepatic lesion. She had a CT guided biopsy of LLL lung lesion which was consistent with neuroendocrine tumor, carcinoid. She follows with Dr. Tova Springer from [facility]. She has multiple lung nodules on her CT scan from Feb 2019. She had a CT scan in May 2021 which showed stability    COPD (chronic obstructive pulmonary disease) ([address])  She has history of COPD and was on Advair Bid before. She also has history of asthma as a child. She has occasional cough and no significant phlegm. No fever or chills. She is a never smoker, however she has history of secondhand smoke exposure. Her PFT which showed moderate airflow obstruction with diffusion defect. There was no hyperinflation. Her 6 min walk test showed an oxygen desaturation to 82% with exertion. Her baseline oxygen saturation at rest was 90%. She also was found to have severe exercise limitation from SOB. She is not using Symbicort  now. I have advised her to use the nebulizer when necessary for her shortness of breath. I have advised her to continue with oxygen supplementation as needed. TOM (obstructive sleep apnea)  She had a long history of snoring and daytime sleepiness. She was found to have mild TOM with oxygen desaturation on her overnight sleep study. Her CPAP titration study was suboptimal. She was started on CPAP therapy at 9 cm of water and has been using it. Her CPAP compliance was good. Her residual AHI has been low. She is getting benefit from CPAP therapy I have advised her to use the CPAP as before.  Se is having excessive dryness of mouth now. I have advised her to change the mask regularly. I have also advised her to cut down the oxygen to 1 L/min.   If she continues to have symptoms she will need a mask fit. Multiple pulmonary nodules  She has multiple lung nodules which were stable on the last CT scan from May 2022. A repeat CT scan July 2023 showed stability of the lung nodules       Diagnoses and all orders for this visit:    Multiple pulmonary nodules    Pulmonary emphysema, unspecified emphysema type ([address])    Benign carcinoid tumor of lung    Chronic diastolic CHF (congestive heart failure) (HCC)    TOM (obstructive sleep apnea)          Subjective:      Patient ID: Renato June is a 81 y.o. female. Mrs. Camryn kwok came for follow-up for her obstructive sleep apnea on CPAP therapy, COPD and  carcinoid tumor. She states that she has been using the CPAP every night but of late she has been feeling a lot of dryness. She was using oxygen 2 L/min with the CPAP. She was wondering whether we should continue using oxygen because she thought it may be making her very dry. She has no significant daytime sleepiness or morning headache. Her CPAP compliance the latest ones are not available. She is very motivated to continue on CPAP therapy. She stated that her mask leaks occasionally. She sometimes forgets to change the mask. I have advised her to mask change the mask regularly also to cut down the oxygen to 1 L. She has COPD from her previous smoking. Currently she is not on any inhalers or oxygen. She is also not using any nebulizer. She has chronic congestive heart failure and has been on diuretic therapy with spironolactone and torsemide. She has ambulatory dysfunction and uses a walker. She is also on montelukast for allergies. She has previous history of carcinoid tumor. Currently she is asymptomatic. She does not have any hemoptysis or weight loss or anorexia. She has no fever or chills.       The following portions of the patient's history were reviewed and updated as appropriate: allergies, current medications, past family history, past medical history, past social history, past surgical history and problem list.    Review of Systems   Constitutional: Positive for fatigue. Negative for appetite change, chills, fever and unexpected weight change. HENT: Positive for rhinorrhea and sneezing. Negative for hearing loss, sore throat, trouble swallowing and voice change. Eyes: Negative for visual disturbance. Respiratory: Positive for cough and shortness of breath. Negative for chest tightness, wheezing and stridor. Cardiovascular: Negative for chest pain, palpitations and leg swelling. Gastrointestinal: Negative for abdominal pain, constipation, diarrhea, nausea and vomiting. Genitourinary: Positive for urgency. Negative for dysuria and frequency. Musculoskeletal: Positive for gait problem (uses walker). Skin: Negative for rash. Allergic/Immunologic: Positive for environmental allergies. Neurological: Positive for dizziness. Negative for syncope and headaches. Psychiatric/Behavioral: Negative for agitation, confusion and sleep disturbance. The patient is not nervous/anxious. Objective:      /80   Pulse 79   Temp 97.8 °F (36.6 °C)   Ht 4' 11" (1.499 m)   Wt 82.6 kg (182 lb)   SpO2 92%   BMI 36.76 kg/m²          Physical Exam  Vitals reviewed. Constitutional:       General: She is not in acute distress. Appearance: She is obese. She is not ill-appearing, toxic-appearing or diaphoretic. HENT:      Head: Normocephalic. Mouth/Throat:      Mouth: Mucous membranes are moist.   Eyes:      General: No scleral icterus. Conjunctiva/sclera: Conjunctivae normal.   Cardiovascular:      Rate and Rhythm: Normal rate and regular rhythm. Heart sounds: Normal heart sounds. No murmur heard. No gallop. Pulmonary:      Effort: Pulmonary effort is normal. No respiratory distress. Breath sounds: Normal breath sounds. No stridor. No wheezing, rhonchi or rales. Chest:      Chest wall: No tenderness.    Abdominal: General: Bowel sounds are normal.      Palpations: Abdomen is soft. Tenderness: There is no abdominal tenderness. There is no guarding. Musculoskeletal:      Cervical back: Neck supple. No rigidity. Right lower leg: No edema. Left lower leg: No edema. Lymphadenopathy:      Cervical: No cervical adenopathy. Skin:     Coloration: Skin is not jaundiced or pale. Findings: No rash. Neurological:      Mental Status: She is alert and oriented to person, place, and time. Gait: Gait abnormal.   Psychiatric:         Mood and Affect: Mood normal.         Behavior: Behavior normal.         Thought Content: Thought content normal.         Judgment: Judgment normal.      I spent 30 minutes of time time taking care of this patient with complex medical issues. The majority of this time was spent directly with the patient counseling as well as correlating care.

## 2023-09-12 NOTE — ASSESSMENT & PLAN NOTE
She has multiple lung nodules which were stable on the last CT scan from May 2022.   A repeat CT scan July 2023 showed stability of the lung nodules

## 2023-09-15 NOTE — TELEPHONE ENCOUNTER
As a final attempt, a third outreach has been made via telephone call to facility. Please see Contacts section for details. This encounter will be closed and completed by end of day. Should we receive the requested information because of previous outreach attempts, the requested patient's chart will be updated appropriately.      Thank you  Davian Nicole MA

## 2023-09-18 DIAGNOSIS — E11.9 DIABETES MELLITUS WITHOUT COMPLICATION (HCC): ICD-10-CM

## 2023-09-18 RX ORDER — BLOOD SUGAR DIAGNOSTIC
1 STRIP MISCELLANEOUS DAILY
Qty: 100 EACH | Refills: 3 | Status: SHIPPED | OUTPATIENT
Start: 2023-09-18

## 2023-09-19 DIAGNOSIS — I10 ESSENTIAL HYPERTENSION: ICD-10-CM

## 2023-09-20 RX ORDER — VALSARTAN 320 MG/1
320 TABLET ORAL DAILY
Qty: 90 TABLET | Refills: 2 | Status: SHIPPED | OUTPATIENT
Start: 2023-09-20

## 2023-10-12 LAB
LEFT EYE DIABETIC RETINOPATHY: NORMAL
RIGHT EYE DIABETIC RETINOPATHY: NORMAL

## 2023-10-13 ENCOUNTER — VBI (OUTPATIENT)
Dept: ADMINISTRATIVE | Facility: OTHER | Age: 83
End: 2023-10-13

## 2023-10-13 NOTE — TELEPHONE ENCOUNTER
Upon review of the In Basket request we were able to locate, review, and update the patient chart as requested for Diabetic Eye Exam.    Any additional questions or concerns should be emailed to the Practice Liaisons via the appropriate education email address, please do not reply via In Basket.     Thank you  Arturo Haque

## 2023-10-21 PROBLEM — E78.2 MIXED HYPERLIPIDEMIA: Status: ACTIVE | Noted: 2023-10-21
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
## 2023-10-23 ENCOUNTER — APPOINTMENT (OUTPATIENT)
Dept: LAB | Facility: CLINIC | Age: 83
End: 2023-10-23
Payer: MEDICARE

## 2023-10-23 ENCOUNTER — OFFICE VISIT (OUTPATIENT)
Dept: CARDIOLOGY CLINIC | Facility: CLINIC | Age: 83
End: 2023-10-23
Payer: MEDICARE

## 2023-10-23 VITALS
DIASTOLIC BLOOD PRESSURE: 73 MMHG | BODY MASS INDEX: 36.97 KG/M2 | SYSTOLIC BLOOD PRESSURE: 136 MMHG | HEART RATE: 71 BPM | OXYGEN SATURATION: 99 % | WEIGHT: 183.4 LBS | HEIGHT: 59 IN

## 2023-10-23 DIAGNOSIS — E11.9 DIABETES MELLITUS WITHOUT COMPLICATION (HCC): ICD-10-CM

## 2023-10-23 DIAGNOSIS — I50.32 CHRONIC DIASTOLIC CHF (CONGESTIVE HEART FAILURE) (HCC): ICD-10-CM

## 2023-10-23 DIAGNOSIS — I10 ESSENTIAL HYPERTENSION: ICD-10-CM

## 2023-10-23 DIAGNOSIS — H53.8 BLURRED VISION: ICD-10-CM

## 2023-10-23 DIAGNOSIS — R06.09 DOE (DYSPNEA ON EXERTION): ICD-10-CM

## 2023-10-23 DIAGNOSIS — E66.01 CLASS 2 SEVERE OBESITY DUE TO EXCESS CALORIES WITH SERIOUS COMORBIDITY AND BODY MASS INDEX (BMI) OF 36.0 TO 36.9 IN ADULT: ICD-10-CM

## 2023-10-23 DIAGNOSIS — G45.3 AMAUROSIS FUGAX: ICD-10-CM

## 2023-10-23 DIAGNOSIS — E78.5 DYSLIPIDEMIA: ICD-10-CM

## 2023-10-23 DIAGNOSIS — I50.32 CHRONIC DIASTOLIC CHF (CONGESTIVE HEART FAILURE) (HCC): Primary | ICD-10-CM

## 2023-10-23 DIAGNOSIS — G47.33 OSA (OBSTRUCTIVE SLEEP APNEA): ICD-10-CM

## 2023-10-23 LAB
ANION GAP SERPL CALCULATED.3IONS-SCNC: 6 MMOL/L
BNP SERPL-MCNC: 189 PG/ML (ref 0–100)
BUN SERPL-MCNC: 21 MG/DL (ref 5–25)
CALCIUM SERPL-MCNC: 9.9 MG/DL (ref 8.4–10.2)
CHLORIDE SERPL-SCNC: 101 MMOL/L (ref 96–108)
CO2 SERPL-SCNC: 27 MMOL/L (ref 21–32)
CREAT SERPL-MCNC: 0.89 MG/DL (ref 0.6–1.3)
ERYTHROCYTE [DISTWIDTH] IN BLOOD BY AUTOMATED COUNT: 13.9 % (ref 11.6–15.1)
GFR SERPL CREATININE-BSD FRML MDRD: 60 ML/MIN/1.73SQ M
GLUCOSE P FAST SERPL-MCNC: 153 MG/DL (ref 65–99)
HCT VFR BLD AUTO: 39.9 % (ref 34.8–46.1)
HGB BLD-MCNC: 12.6 G/DL (ref 11.5–15.4)
MCH RBC QN AUTO: 28.8 PG (ref 26.8–34.3)
MCHC RBC AUTO-ENTMCNC: 31.6 G/DL (ref 31.4–37.4)
MCV RBC AUTO: 91 FL (ref 82–98)
PLATELET # BLD AUTO: 237 THOUSANDS/UL (ref 149–390)
PMV BLD AUTO: 10.3 FL (ref 8.9–12.7)
POTASSIUM SERPL-SCNC: 4.7 MMOL/L (ref 3.5–5.3)
RBC # BLD AUTO: 4.38 MILLION/UL (ref 3.81–5.12)
SODIUM SERPL-SCNC: 134 MMOL/L (ref 135–147)
WBC # BLD AUTO: 8.23 THOUSAND/UL (ref 4.31–10.16)

## 2023-10-23 PROCEDURE — 80048 BASIC METABOLIC PNL TOTAL CA: CPT

## 2023-10-23 PROCEDURE — 85027 COMPLETE CBC AUTOMATED: CPT

## 2023-10-23 PROCEDURE — 83880 ASSAY OF NATRIURETIC PEPTIDE: CPT

## 2023-10-23 PROCEDURE — 36415 COLL VENOUS BLD VENIPUNCTURE: CPT

## 2023-10-23 PROCEDURE — 93000 ELECTROCARDIOGRAM COMPLETE: CPT | Performed by: NURSE PRACTITIONER

## 2023-10-23 PROCEDURE — 99215 OFFICE O/P EST HI 40 MIN: CPT | Performed by: NURSE PRACTITIONER

## 2023-10-23 RX ORDER — ATORVASTATIN CALCIUM 40 MG/1
40 TABLET, FILM COATED ORAL DAILY
Qty: 30 TABLET | Refills: 3 | Status: SHIPPED | OUTPATIENT
Start: 2023-10-23

## 2023-10-23 RX ORDER — ASPIRIN 81 MG/1
81 TABLET ORAL DAILY
Start: 2023-10-23

## 2023-10-23 RX ORDER — ATORVASTATIN CALCIUM 40 MG/1
40 TABLET, FILM COATED ORAL DAILY
Qty: 30 TABLET | Refills: 3 | Status: SHIPPED | OUTPATIENT
Start: 2023-10-23 | End: 2023-10-23 | Stop reason: SDUPTHER

## 2023-10-23 NOTE — PATIENT INSTRUCTIONS
DASH Eating Plan   WHAT YOU NEED TO KNOW:   What is the DASH Eating Plan? The DASH (Dietary Approaches to Stop Hypertension) Eating Plan is designed to help prevent or lower high blood pressure. It can also help to lower LDL (bad) cholesterol and decrease your risk for heart disease. The plan is low in sodium, sugar, unhealthy fats, and total fat. It is high in potassium, calcium, magnesium, and fiber. These nutrients are added when you eat more fruits, vegetables, and whole grains. With the DASH eating plan, you need to eat a certain number of servings from each food group. This will help you get enough of certain nutrients and limit others. The amount of servings you should eat depends on how many calories you need. Your dietitian can help you create meal plans with the right number of servings for each food group. What do I need to know about sodium? Your dietitian will tell you how much sodium is safe for you to have each day. People with high blood pressure should have no more than 1,500 to 2,300 mg of sodium in a day. A teaspoon (tsp) of salt has 2,300 mg of sodium. This may seem like a difficult goal, but small changes to the foods you eat can make a big difference. Your healthcare provider or dietitian can help you create a meal plan that follows your sodium limit. Read food labels. Food labels can help you choose foods that are low in sodium. The amount of sodium is listed in milligrams (mg). The % Daily Value (DV) column tells you how much of your daily needs are met by 1 serving of the food for each nutrient listed. Choose foods that have less than 5% of the DV of sodium. These foods are considered low in sodium. Foods that have 20% or more of the DV of sodium are considered high in sodium. Avoid foods that have more than 300 mg of sodium in each serving. Choose foods that say low-sodium, reduced-sodium, or no salt added on the food label. Limit added salt.   Do not salt food at the table if you add salt when you cook. Use herbs and spices, such as onions, garlic, and salt-free seasonings to add flavor. Try lemon or lime juice or vinegar to add a tart flavor. Use hot peppers or a small amount of hot pepper sauce to add a spicy flavor. Limit foods high in added salt, such as the following:    Seasonings made with salt, such as garlic salt, celery salt, onion salt, seasoned salt, meat tenderizers, and monosodium glutamate (MSG)    Miso soup and canned or dried soup mixes    Regular soy sauce, barbecue sauce, teriyaki sauce, steak sauce, Worcestershire sauce, and most flavored vinegars    Snack foods, such as salted chips, popcorn, pretzels, pork rinds, salted crackers, and salted nuts    Frozen foods, such as dinners, entrees, vegetables with sauces, and breaded meats    Ask about salt substitutes. Ask your healthcare provider if you may use salt substitutes. Some salt substitutes have ingredients that can be harmful if you have certain health conditions. Choose foods carefully at restaurants. Meals from restaurants, especially fast food restaurants, are often high in sodium. Some restaurants have nutrition information that tells you the amount of sodium in their foods. Ask to have your food prepared with less, or no salt. What do I need to know about fats? Healthy fats include unsaturated fats and omega-3 fatty acids. Unhealthy fats include saturated fats and trans fats.   Include healthy fats, such as the following:      Cooking oils, such as soybean, canola, olive, or sunflower    Fatty fish, such as salmon, tuna, mackerel, or sardines    Flaxseed oil or ground flaxseed    ½ cup of cooked beans, such as black beans, kidney beans, or esteves beans    1½ ounces of low-sodium nuts, such as almonds or walnuts    Low-sugar, low-sodium peanut butter    Seeds such as katie seeds or sunflower seeds       Limit or do not have unhealthy fats, such as the following:      Foods that contain fat from animals, such as fatty meats, whole milk, butter, and cream    Shortening, stick margarine, palm oil, and coconut oil    Full-fat or creamy salad dressing    Creamy soup    Crackers, chips, and baked goods made with margarine or shortening    Foods that are fried in unhealthy fats    Gravy and sauces, such as Eddy or cheese sauces    What do I need to know about carbohydrates (carbs)? All carbs break down into sugar. Complex carbs contain more fiber than simple carbs. This means complex carbs go into the bloodstream more slowly and cause less of a blood sugar spike. Try to include more complex carbs and fewer simple carbs. Include complex carbs, such as the followin slice of whole-grain bread    1 ounce of dry cereal that does not contain added sugar    ½ cup of cooked oatmeal    2 ounces of cooked whole-grain pasta    ½ cup of cooked brown rice    Limit or do not have simple carbs, such as the following:      [employer], such as doughnuts, pastries, and cookies    Mixes for cornbread and biscuits    White rice and pasta mixes, such as boxed macaroni and cheese    Instant and cold cereals that contain sugar    Jelly, jam, and ice cream that contain sugar    Condiments such as ketchup    Drinks high in sugar, such as soft drinks, lemonade, and fruit juice    What do I need to know about vegetables and fruits? Vegetables and fruits can be fresh, frozen, or canned. If possible, try to choose low-sodium canned options.   Include a variety of vegetables and fruits, such as the followin medium apple, pear, or peach (about ½ cup chopped)    ½ small banana    ½ cup berries, such as blueberries, strawberries, or blackberries    1 cup of raw leafy greens, such as lettuce, spinach, kale, or sammy greens    ½ cup of frozen or canned (no added salt) vegetables, such as green beans    ½ cup of fresh, frozen, or canned fruit (canned in light syrup or fruit juice)    ½ cup of vegetable or fruit juice    Limit or do not have vegetables and fruits made in the following ways:      Frozen fruit such as cherries that have added sugar    Fruit in cream or butter sauce    Canned vegetables that are high in sodium    Sauerkraut, pickled vegetables, and other foods prepared in brine    Fried vegetables or vegetables in butter or high-fat sauces    What do I need to know about protein foods? Include lean or low-fat protein foods, such as the following:      Poultry (chicken, turkey) with no skin    Fish (especially fatty fish, such as salmon, fresh tuna, or mackerel)    Lean beef and pork (loin, round, extra lean hamburger)    Egg whites and egg substitutes    1 cup of nonfat (skim) or 1% milk    1½ ounces of fat-free or low-fat cheese    6 ounces of nonfat or low-fat yogurt    Limit or do not have high-fat protein foods, such as the following:      Smoked or cured meat, such as corned beef, sandoval, ham, hot dogs, and sausage    Canned beans and canned meats or spreads, such as potted meats, sardines, anchovies, and imitation seafood    Deli or lunch meats, such as bologna, ham, turkey, and roast beef    High-fat meat (T-bone steak, regular hamburger, and ribs)    Whole eggs and egg yolks    Whole milk, 2% milk, and cream    Regular cheese and processed cheese    What other guidelines should I follow? Maintain a healthy weight. Your risk for heart disease is higher if you have extra weight. Ask your healthcare provider what a healthy weight is for you. Your provider may suggest that you lose weight. You can lose weight by eating fewer calories and foods that have added sugars and fat. The DASH meal plan can help you do this. Decrease calories by eating smaller portions at each meal and fewer snacks. Ask your provider for more information about how to lose weight. Exercise regularly. Regular exercise can help you reach or maintain a healthy weight.  Regular exercise can also help decrease your blood pressure and improve your cholesterol levels. Get 30 minutes or more of moderate exercise each day of the week. To lose weight, get at least 60 minutes of exercise. Talk to your healthcare provider about the best exercise program for you. Limit alcohol. Women should limit alcohol to 1 drink a day. Men should limit alcohol to 2 drinks a day. A drink of alcohol is 12 ounces of beer, 5 ounces of wine, or 1½ ounces of liquor. Where can I find more information? [employer] [facility] and [address]  [address] [address]  [place] [address]  Phone: [phone]  Web Address: [email or web address]    CARE AGREEMENT:   You have the right to help plan your care. Discuss treatment options with your healthcare provider to decide what care you want to receive. You always have the right to refuse treatment. The above information is an  only. It is not intended as medical advice for individual conditions or treatments. Talk to your doctor, nurse or pharmacist before following any medical regimen to see if it is safe and effective for you. © Copyright [facility] 2023 Information is for End User's use only and may not be sold, redistributed or otherwise used for commercial purposes.
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
## 2023-11-08 ENCOUNTER — HOSPITAL ENCOUNTER (OUTPATIENT)
Dept: NUCLEAR MEDICINE | Facility: HOSPITAL | Age: 83
Discharge: HOME | End: 2023-11-08
Payer: MEDICARE

## 2023-11-08 ENCOUNTER — HOSPITAL ENCOUNTER (OUTPATIENT)
Dept: NON INVASIVE DIAGNOSTICS | Facility: HOSPITAL | Age: 83
Discharge: HOME | End: 2023-11-08
Payer: MEDICARE

## 2023-11-08 VITALS — HEIGHT: 59 IN | WEIGHT: 183 LBS | BODY MASS INDEX: 36.89 KG/M2

## 2023-11-08 DIAGNOSIS — I50.32 CHRONIC DIASTOLIC CHF (CONGESTIVE HEART FAILURE) (HCC): ICD-10-CM

## 2023-11-08 DIAGNOSIS — I10 ESSENTIAL HYPERTENSION: ICD-10-CM

## 2023-11-08 DIAGNOSIS — E11.9 DIABETES MELLITUS WITHOUT COMPLICATION (HCC): ICD-10-CM

## 2023-11-08 DIAGNOSIS — E78.5 DYSLIPIDEMIA: ICD-10-CM

## 2023-11-08 DIAGNOSIS — R06.09 DOE (DYSPNEA ON EXERTION): ICD-10-CM

## 2023-11-08 LAB
CHEST PAIN STATEMENT: NORMAL
CHEST PAIN STATEMENT: NORMAL
MAX DIASTOLIC BP: 60 MMHG
MAX DIASTOLIC BP: 60 MMHG
MAX PREDICTED HEART RATE: 138 BPM
MAX PREDICTED HEART RATE: 138 BPM
NUC STRESS EJECTION FRACTION: 86 %
PROTOCOL NAME: NORMAL
PROTOCOL NAME: NORMAL
RATE PRESSURE PRODUCT: NORMAL
REASON FOR TERMINATION: NORMAL
REASON FOR TERMINATION: NORMAL
SL CV REST NUCLEAR ISOTOPE DOSE: 10.5 MCI
SL CV STRESS NUCLEAR ISOTOPE DOSE: 32.2 MCI
SL CV STRESS RECOVERY BP: NORMAL MMHG
SL CV STRESS RECOVERY HR: 78 BPM
SL CV STRESS RECOVERY O2 SAT: 100 %
STRESS ANGINA INDEX: 0
STRESS BASELINE BP: NORMAL MMHG
STRESS BASELINE HR: 80 BPM
STRESS O2 SAT REST: 95 %
STRESS PEAK HR: 91 BPM
STRESS POST EXERCISE DUR MIN: 3 MIN
STRESS POST EXERCISE DUR MIN: 3 MIN
STRESS POST EXERCISE DUR SEC: 0 SEC
STRESS POST EXERCISE DUR SEC: 0 SEC
STRESS POST O2 SAT PEAK: 99 %
STRESS POST PEAK BP: 144 MMHG
STRESS POST PEAK HR: 91 BPM
STRESS POST PEAK HR: 91 BPM
STRESS POST PEAK SYSTOLIC BP: 144 MMHG
STRESS POST PEAK SYSTOLIC BP: 144 MMHG
STRESS/REST PERFUSION RATIO: 1.05
TARGET HR FORMULA: NORMAL
TARGET HR FORMULA: NORMAL
TEST INDICATION: NORMAL
TEST INDICATION: NORMAL

## 2023-11-08 PROCEDURE — 93018 CV STRESS TEST I&R ONLY: CPT | Performed by: INTERNAL MEDICINE

## 2023-11-08 PROCEDURE — 93016 CV STRESS TEST SUPVJ ONLY: CPT | Performed by: INTERNAL MEDICINE

## 2023-11-08 PROCEDURE — A9502 TC99M TETROFOSMIN: HCPCS

## 2023-11-08 PROCEDURE — 78452 HT MUSCLE IMAGE SPECT MULT: CPT

## 2023-11-08 PROCEDURE — 93017 CV STRESS TEST TRACING ONLY: CPT

## 2023-11-08 PROCEDURE — 78452 HT MUSCLE IMAGE SPECT MULT: CPT | Performed by: INTERNAL MEDICINE

## 2023-11-08 PROCEDURE — G1004 CDSM NDSC: HCPCS

## 2023-11-08 RX ORDER — REGADENOSON 0.08 MG/ML
0.4 INJECTION, SOLUTION INTRAVENOUS ONCE
Status: COMPLETED | OUTPATIENT
Start: 2023-11-08 | End: 2023-11-08

## 2023-11-08 RX ADMIN — REGADENOSON 0.4 MG: 0.08 INJECTION, SOLUTION INTRAVENOUS at 14:44

## 2023-11-09 ENCOUNTER — TELEPHONE (OUTPATIENT)
Dept: CARDIOLOGY CLINIC | Facility: CLINIC | Age: 83
End: 2023-11-09

## 2023-11-09 DIAGNOSIS — E78.5 DYSLIPIDEMIA: ICD-10-CM

## 2023-11-09 RX ORDER — AMLODIPINE BESYLATE 10 MG/1
10 TABLET ORAL DAILY
Qty: 90 TABLET | Refills: 1 | Status: SHIPPED | OUTPATIENT
Start: 2023-11-09

## 2023-11-09 RX ORDER — ATORVASTATIN CALCIUM 40 MG/1
40 TABLET, FILM COATED ORAL DAILY
Qty: 90 TABLET | Refills: 3 | Status: SHIPPED | OUTPATIENT
Start: 2023-11-09

## 2023-11-13 DIAGNOSIS — E11.9 DIABETES MELLITUS WITHOUT COMPLICATION (HCC): ICD-10-CM

## 2023-11-13 RX ORDER — LANCETS
EACH MISCELLANEOUS
Qty: 102 EACH | Refills: 0 | Status: SHIPPED | OUTPATIENT
Start: 2023-11-13

## 2023-11-13 NOTE — TELEPHONE ENCOUNTER
Reason for call:   [x] Refill   [] Prior Auth  [] Other:     Office:   [x] PCP/Provider - dr Snowden [place]  [] Specialty/Provider -     Medication: medformin and accu chek test strips    Dose/Frequency: //////    Quantity: 90 day supply    Pharmacy: /////////    Does the patient have enough for 3 days?    [x] Yes   [] No - Send as HP to POD

## 2023-11-24 ENCOUNTER — HOSPITAL ENCOUNTER (OUTPATIENT)
Dept: VASCULAR ULTRASOUND | Facility: HOSPITAL | Age: 83
Discharge: HOME | End: 2023-11-24
Payer: MEDICARE

## 2023-11-24 ENCOUNTER — HOSPITAL ENCOUNTER (OUTPATIENT)
Dept: NON INVASIVE DIAGNOSTICS | Facility: HOSPITAL | Age: 83
Discharge: HOME | End: 2023-11-24
Payer: MEDICARE

## 2023-11-24 VITALS
HEART RATE: 79 BPM | WEIGHT: 183 LBS | BODY MASS INDEX: 36.89 KG/M2 | HEIGHT: 59 IN | SYSTOLIC BLOOD PRESSURE: 136 MMHG | DIASTOLIC BLOOD PRESSURE: 73 MMHG

## 2023-11-24 DIAGNOSIS — R06.09 DOE (DYSPNEA ON EXERTION): ICD-10-CM

## 2023-11-24 DIAGNOSIS — E11.9 DIABETES MELLITUS WITHOUT COMPLICATION (HCC): ICD-10-CM

## 2023-11-24 DIAGNOSIS — I50.32 CHRONIC DIASTOLIC CHF (CONGESTIVE HEART FAILURE) (HCC): ICD-10-CM

## 2023-11-24 DIAGNOSIS — G45.3 AMAUROSIS FUGAX: ICD-10-CM

## 2023-11-24 DIAGNOSIS — H53.8 BLURRED VISION: ICD-10-CM

## 2023-11-24 DIAGNOSIS — I10 ESSENTIAL HYPERTENSION: ICD-10-CM

## 2023-11-24 DIAGNOSIS — E78.5 DYSLIPIDEMIA: ICD-10-CM

## 2023-11-24 LAB
AORTIC ROOT: 2.8 CM
APICAL FOUR CHAMBER EJECTION FRACTION: 78 %
ASCENDING AORTA: 3.5 CM
AV PEAK GRADIENT: 10 MMHG
AV REGURGITATION PRESSURE HALF TIME: 554 MS
E WAVE DECELERATION TIME: 188 MS
E/A RATIO: 1.98
FRACTIONAL SHORTENING: 37 (ref 28–44)
INTERVENTRICULAR SEPTUM IN DIASTOLE (PARASTERNAL SHORT AXIS VIEW): 1.1 CM
INTERVENTRICULAR SEPTUM: 1.1 CM (ref 0.6–1.1)
LAAS-AP2: 33.1 CM2
LAAS-AP4: 33 CM2
LEFT ATRIUM SIZE: 6.3 CM
LEFT ATRIUM VOLUME (MOD BIPLANE): 117 ML
LEFT ATRIUM VOLUME INDEX (MOD BIPLANE): 65.7 ML/M2
LEFT INTERNAL DIMENSION IN SYSTOLE: 3.3 CM (ref 2.1–4)
LEFT VENTRICULAR INTERNAL DIMENSION IN DIASTOLE: 5.2 CM (ref 3.5–6)
LEFT VENTRICULAR POSTERIOR WALL IN END DIASTOLE: 1.1 CM
LEFT VENTRICULAR STROKE VOLUME: 84 ML
LVSV (TEICH): 84 ML
MV E'TISSUE VEL-SEP: 5 CM/S
MV PEAK A VEL: 0.64 M/S
MV PEAK E VEL: 127 CM/S
MV STENOSIS PRESSURE HALF TIME: 54 MS
MV VALVE AREA P 1/2 METHOD: 4.1
RIGHT ATRIUM AREA SYSTOLE A4C: 20.3 CM2
RIGHT VENTRICLE ID DIMENSION: 3.1 CM
SL CV AV DECELERATION TIME RETROGRADE: 1909 MS
SL CV AV PEAK GRADIENT RETROGRADE: 58 MMHG
SL CV LEFT ATRIUM LENGTH A2C: 7.5 CM
SL CV PED ECHO LEFT VENTRICLE DIASTOLIC VOLUME (MOD BIPLANE) 2D: 127 ML
SL CV PED ECHO LEFT VENTRICLE SYSTOLIC VOLUME (MOD BIPLANE) 2D: 43 ML
TR MAX PG: 29 MMHG
TR PEAK VELOCITY: 2.7 M/S
TRICUSPID ANNULAR PLANE SYSTOLIC EXCURSION: 1.9 CM
TRICUSPID VALVE PEAK REGURGITATION VELOCITY: 2.71 M/S

## 2023-11-24 PROCEDURE — 93306 TTE W/DOPPLER COMPLETE: CPT

## 2023-11-24 PROCEDURE — 93306 TTE W/DOPPLER COMPLETE: CPT | Performed by: INTERNAL MEDICINE

## 2023-11-24 PROCEDURE — 93880 EXTRACRANIAL BILAT STUDY: CPT | Performed by: SURGERY

## 2023-11-24 PROCEDURE — 93880 EXTRACRANIAL BILAT STUDY: CPT

## 2023-11-27 LAB
AORTIC ROOT: 2.8 CM
ASCENDING AORTA: 3.5 CM
AV PEAK GRADIENT: 10 MMHG
AV REGURGITATION PRESSURE HALF TIME: 554 MS
E WAVE DECELERATION TIME: 188 MS
E/A RATIO: 1.98
FRACTIONAL SHORTENING: 34 % (ref 28–44)
INTERVENTRICULAR SEPTUM IN DIASTOLE (PARASTERNAL SHORT AXIS VIEW): 0.9 CM
INTERVENTRICULAR SEPTUM: 1.1 CM (ref 0.6–1.1)
LAAS-AP2: 33.1 CM2
LAAS-AP4: 33 CM2
LEFT ATRIUM SIZE: 6.3 CM
LEFT ATRIUM VOLUME (MOD BIPLANE): 117 ML
LEFT ATRIUM VOLUME INDEX (MOD BIPLANE): 65.7 ML/M2
LEFT INTERNAL DIMENSION IN SYSTOLE: 3.3 CM (ref 2.1–4)
LEFT VENTRICULAR INTERNAL DIMENSION IN DIASTOLE: 5 CM (ref 3.5–6)
LEFT VENTRICULAR POSTERIOR WALL IN END DIASTOLE: 0.9 CM
LEFT VENTRICULAR STROKE VOLUME: 84 ML
LVSV (TEICH): 84 ML
MV E'TISSUE VEL-SEP: 5 CM/S
MV PEAK A VEL: 0.64 M/S
MV PEAK E VEL: 127 CM/S
MV STENOSIS PRESSURE HALF TIME: 54 MS
MV VALVE AREA P 1/2 METHOD: 4.07
RA PRESSURE ESTIMATED: 8 MMHG
RIGHT ATRIUM AREA SYSTOLE A4C: 20.3 CM2
RIGHT VENTRICLE ID DIMENSION: 3.1 CM
RV PSP: 37 MMHG
SL CV AV DECELERATION TIME RETROGRADE: 1909 MS
SL CV AV PEAK GRADIENT RETROGRADE: 58 MMHG
SL CV LEFT ATRIUM LENGTH A2C: 7.5 CM
SL CV LV EF: 70
SL CV PED ECHO LEFT VENTRICLE DIASTOLIC VOLUME (MOD BIPLANE) 2D: 127 ML
SL CV PED ECHO LEFT VENTRICLE SYSTOLIC VOLUME (MOD BIPLANE) 2D: 43 ML
TR MAX PG: 29 MMHG
TR PEAK VELOCITY: 2.7 M/S
TRICUSPID ANNULAR PLANE SYSTOLIC EXCURSION: 1.9 CM
TRICUSPID VALVE PEAK REGURGITATION VELOCITY: 2.71 M/S

## 2023-11-28 DIAGNOSIS — M17.12 PRIMARY OSTEOARTHRITIS OF LEFT KNEE: ICD-10-CM

## 2023-11-28 DIAGNOSIS — M17.11 PRIMARY OSTEOARTHRITIS OF RIGHT KNEE: ICD-10-CM

## 2023-11-28 RX ORDER — MELOXICAM 15 MG/1
15 TABLET ORAL DAILY
Qty: 90 TABLET | Refills: 1 | Status: SHIPPED | OUTPATIENT
Start: 2023-11-28

## 2023-12-15 ENCOUNTER — RA CDI HCC (OUTPATIENT)
Dept: OTHER | Facility: HOSPITAL | Age: 83
End: 2023-12-15

## 2023-12-15 NOTE — PROGRESS NOTES
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

## 2023-12-22 ENCOUNTER — HOSPITAL ENCOUNTER (INPATIENT)
Facility: HOSPITAL | Age: 83
LOS: 4 days | Discharge: HOME HEALTH | DRG: 309 | End: 2023-12-26
Attending: EMERGENCY MEDICINE | Admitting: INTERNAL MEDICINE
Payer: MEDICARE

## 2023-12-22 ENCOUNTER — OFFICE VISIT (OUTPATIENT)
Dept: FAMILY MEDICINE CLINIC | Facility: CLINIC | Age: 83
End: 2023-12-22
Payer: MEDICARE

## 2023-12-22 ENCOUNTER — APPOINTMENT (EMERGENCY)
Dept: RADIOLOGY | Facility: HOSPITAL | Age: 83
DRG: 309 | End: 2023-12-22
Payer: MEDICARE

## 2023-12-22 VITALS
WEIGHT: 181 LBS | OXYGEN SATURATION: 95 % | SYSTOLIC BLOOD PRESSURE: 90 MMHG | HEIGHT: 59 IN | DIASTOLIC BLOOD PRESSURE: 50 MMHG | HEART RATE: 120 BPM | BODY MASS INDEX: 36.49 KG/M2 | RESPIRATION RATE: 16 BRPM

## 2023-12-22 DIAGNOSIS — E78.2 MIXED HYPERLIPIDEMIA: ICD-10-CM

## 2023-12-22 DIAGNOSIS — E11.9 DIABETES MELLITUS WITHOUT COMPLICATION (HCC): ICD-10-CM

## 2023-12-22 DIAGNOSIS — R79.89 ELEVATED TROPONIN LEVEL: ICD-10-CM

## 2023-12-22 DIAGNOSIS — J43.9 PULMONARY EMPHYSEMA, UNSPECIFIED EMPHYSEMA TYPE (HCC): ICD-10-CM

## 2023-12-22 DIAGNOSIS — I50.32 CHRONIC DIASTOLIC CHF (CONGESTIVE HEART FAILURE) (HCC): ICD-10-CM

## 2023-12-22 DIAGNOSIS — I48.92 ATRIAL FLUTTER WITH RAPID VENTRICULAR RESPONSE (HCC): Primary | ICD-10-CM

## 2023-12-22 DIAGNOSIS — G47.33 OSA (OBSTRUCTIVE SLEEP APNEA): ICD-10-CM

## 2023-12-22 DIAGNOSIS — D3A.090 BENIGN CARCINOID TUMOR OF LUNG: ICD-10-CM

## 2023-12-22 DIAGNOSIS — R00.0 TACHYCARDIA: ICD-10-CM

## 2023-12-22 DIAGNOSIS — I10 BENIGN ESSENTIAL HYPERTENSION: ICD-10-CM

## 2023-12-22 DIAGNOSIS — I10 ESSENTIAL HYPERTENSION: Primary | ICD-10-CM

## 2023-12-22 DIAGNOSIS — Z23 ENCOUNTER FOR IMMUNIZATION: ICD-10-CM

## 2023-12-22 LAB
2HR DELTA HS TROPONIN: 85 NG/L
4HR DELTA HS TROPONIN: 217 NG/L
ALBUMIN SERPL BCP-MCNC: 4.3 G/DL (ref 3.5–5)
ALP SERPL-CCNC: 56 U/L (ref 34–104)
ALT SERPL W P-5'-P-CCNC: 44 U/L (ref 7–52)
ANION GAP SERPL CALCULATED.3IONS-SCNC: 12 MMOL/L
APTT PPP: 28 SECONDS (ref 23–37)
AST SERPL W P-5'-P-CCNC: 82 U/L (ref 13–39)
ATRIAL RATE: 132 BPM
ATRIAL RATE: 277 BPM
ATRIAL RATE: 394 BPM
BACTERIA UR QL AUTO: ABNORMAL /HPF
BASOPHILS # BLD AUTO: 0.04 THOUSANDS/ÂΜL (ref 0–0.1)
BASOPHILS NFR BLD AUTO: 1 % (ref 0–1)
BILIRUB SERPL-MCNC: 0.77 MG/DL (ref 0.2–1)
BILIRUB UR QL STRIP: NEGATIVE
BNP SERPL-MCNC: 440 PG/ML (ref 0–100)
BUN SERPL-MCNC: 26 MG/DL (ref 5–25)
CALCIUM SERPL-MCNC: 10.1 MG/DL (ref 8.4–10.2)
CARDIAC TROPONIN I PNL SERPL HS: 147 NG/L
CARDIAC TROPONIN I PNL SERPL HS: 232 NG/L
CARDIAC TROPONIN I PNL SERPL HS: 364 NG/L
CHLORIDE SERPL-SCNC: 100 MMOL/L (ref 96–108)
CLARITY UR: CLEAR
CO2 SERPL-SCNC: 20 MMOL/L (ref 21–32)
COLOR UR: YELLOW
CREAT SERPL-MCNC: 1.1 MG/DL (ref 0.6–1.3)
EOSINOPHIL # BLD AUTO: 0.02 THOUSAND/ÂΜL (ref 0–0.61)
EOSINOPHIL NFR BLD AUTO: 0 % (ref 0–6)
ERYTHROCYTE [DISTWIDTH] IN BLOOD BY AUTOMATED COUNT: 13.8 % (ref 11.6–15.1)
FLUAV RNA RESP QL NAA+PROBE: NEGATIVE
FLUBV RNA RESP QL NAA+PROBE: NEGATIVE
GFR SERPL CREATININE-BSD FRML MDRD: 46 ML/MIN/1.73SQ M
GLUCOSE SERPL-MCNC: 147 MG/DL (ref 65–140)
GLUCOSE SERPL-MCNC: 161 MG/DL (ref 65–140)
GLUCOSE UR STRIP-MCNC: NEGATIVE MG/DL
HCT VFR BLD AUTO: 40.1 % (ref 34.8–46.1)
HGB BLD-MCNC: 12.8 G/DL (ref 11.5–15.4)
HGB UR QL STRIP.AUTO: NEGATIVE
IMM GRANULOCYTES # BLD AUTO: 0.03 THOUSAND/UL (ref 0–0.2)
IMM GRANULOCYTES NFR BLD AUTO: 0 % (ref 0–2)
INR PPP: 1.16 (ref 0.84–1.19)
KETONES UR STRIP-MCNC: NEGATIVE MG/DL
LACTATE SERPL-SCNC: 1.6 MMOL/L (ref 0.5–2)
LACTATE SERPL-SCNC: 2.4 MMOL/L (ref 0.5–2)
LEUKOCYTE ESTERASE UR QL STRIP: ABNORMAL
LYMPHOCYTES # BLD AUTO: 1.01 THOUSANDS/ÂΜL (ref 0.6–4.47)
LYMPHOCYTES NFR BLD AUTO: 12 % (ref 14–44)
MAGNESIUM SERPL-MCNC: 1.6 MG/DL (ref 1.9–2.7)
MCH RBC QN AUTO: 29 PG (ref 26.8–34.3)
MCHC RBC AUTO-ENTMCNC: 31.9 G/DL (ref 31.4–37.4)
MCV RBC AUTO: 91 FL (ref 82–98)
MONOCYTES # BLD AUTO: 0.43 THOUSAND/ÂΜL (ref 0.17–1.22)
MONOCYTES NFR BLD AUTO: 5 % (ref 4–12)
NEUTROPHILS # BLD AUTO: 7.23 THOUSANDS/ÂΜL (ref 1.85–7.62)
NEUTS SEG NFR BLD AUTO: 82 % (ref 43–75)
NITRITE UR QL STRIP: NEGATIVE
NON-SQ EPI CELLS URNS QL MICRO: ABNORMAL /HPF
NRBC BLD AUTO-RTO: 0 /100 WBCS
P AXIS: 81 DEGREES
PH UR STRIP.AUTO: 5.5 [PH]
PLATELET # BLD AUTO: 273 THOUSANDS/UL (ref 149–390)
PMV BLD AUTO: 10.4 FL (ref 8.9–12.7)
POTASSIUM SERPL-SCNC: 4.3 MMOL/L (ref 3.5–5.3)
PR INTERVAL: 204 MS
PROCALCITONIN SERPL-MCNC: <0.05 NG/ML
PROT SERPL-MCNC: 7.8 G/DL (ref 6.4–8.4)
PROT UR STRIP-MCNC: NEGATIVE MG/DL
PROTHROMBIN TIME: 14.7 SECONDS (ref 11.6–14.5)
QRS AXIS: 57 DEGREES
QRS AXIS: 60 DEGREES
QRS AXIS: 66 DEGREES
QRSD INTERVAL: 102 MS
QRSD INTERVAL: 104 MS
QRSD INTERVAL: 112 MS
QT INTERVAL: 328 MS
QT INTERVAL: 330 MS
QT INTERVAL: 358 MS
QTC INTERVAL: 466 MS
QTC INTERVAL: 485 MS
QTC INTERVAL: 491 MS
RBC # BLD AUTO: 4.42 MILLION/UL (ref 3.81–5.12)
RBC #/AREA URNS AUTO: ABNORMAL /HPF
RSV RNA RESP QL NAA+PROBE: NEGATIVE
SARS-COV-2 RNA RESP QL NAA+PROBE: NEGATIVE
SL AMB POCT HEMOGLOBIN AIC: 6.3 (ref ?–6.5)
SODIUM SERPL-SCNC: 132 MMOL/L (ref 135–147)
SP GR UR STRIP.AUTO: 1.02 (ref 1–1.03)
T WAVE AXIS: 236 DEGREES
T WAVE AXIS: 256 DEGREES
T WAVE AXIS: 261 DEGREES
TSH SERPL DL<=0.05 MIU/L-ACNC: 1.46 UIU/ML (ref 0.45–4.5)
UROBILINOGEN UR QL STRIP.AUTO: 0.2 E.U./DL
VENTRICULAR RATE: 102 BPM
VENTRICULAR RATE: 132 BPM
VENTRICULAR RATE: 133 BPM
WBC # BLD AUTO: 8.76 THOUSAND/UL (ref 4.31–10.16)
WBC #/AREA URNS AUTO: ABNORMAL /HPF

## 2023-12-22 PROCEDURE — 80053 COMPREHEN METABOLIC PANEL: CPT | Performed by: EMERGENCY MEDICINE

## 2023-12-22 PROCEDURE — 85610 PROTHROMBIN TIME: CPT | Performed by: EMERGENCY MEDICINE

## 2023-12-22 PROCEDURE — 93005 ELECTROCARDIOGRAM TRACING: CPT

## 2023-12-22 PROCEDURE — 96366 THER/PROPH/DIAG IV INF ADDON: CPT

## 2023-12-22 PROCEDURE — 99285 EMERGENCY DEPT VISIT HI MDM: CPT | Performed by: EMERGENCY MEDICINE

## 2023-12-22 PROCEDURE — 84484 ASSAY OF TROPONIN QUANT: CPT | Performed by: EMERGENCY MEDICINE

## 2023-12-22 PROCEDURE — 84443 ASSAY THYROID STIM HORMONE: CPT | Performed by: EMERGENCY MEDICINE

## 2023-12-22 PROCEDURE — 87086 URINE CULTURE/COLONY COUNT: CPT | Performed by: EMERGENCY MEDICINE

## 2023-12-22 PROCEDURE — 83605 ASSAY OF LACTIC ACID: CPT | Performed by: EMERGENCY MEDICINE

## 2023-12-22 PROCEDURE — 83735 ASSAY OF MAGNESIUM: CPT | Performed by: EMERGENCY MEDICINE

## 2023-12-22 PROCEDURE — 36415 COLL VENOUS BLD VENIPUNCTURE: CPT | Performed by: EMERGENCY MEDICINE

## 2023-12-22 PROCEDURE — 96375 TX/PRO/DX INJ NEW DRUG ADDON: CPT

## 2023-12-22 PROCEDURE — 99223 1ST HOSP IP/OBS HIGH 75: CPT | Performed by: INTERNAL MEDICINE

## 2023-12-22 PROCEDURE — 71045 X-RAY EXAM CHEST 1 VIEW: CPT

## 2023-12-22 PROCEDURE — 82948 REAGENT STRIP/BLOOD GLUCOSE: CPT

## 2023-12-22 PROCEDURE — 81003 URINALYSIS AUTO W/O SCOPE: CPT | Performed by: EMERGENCY MEDICINE

## 2023-12-22 PROCEDURE — 83880 ASSAY OF NATRIURETIC PEPTIDE: CPT | Performed by: EMERGENCY MEDICINE

## 2023-12-22 PROCEDURE — 99285 EMERGENCY DEPT VISIT HI MDM: CPT

## 2023-12-22 PROCEDURE — 96376 TX/PRO/DX INJ SAME DRUG ADON: CPT

## 2023-12-22 PROCEDURE — 83036 HEMOGLOBIN GLYCOSYLATED A1C: CPT | Performed by: FAMILY MEDICINE

## 2023-12-22 PROCEDURE — 99215 OFFICE O/P EST HI 40 MIN: CPT | Performed by: FAMILY MEDICINE

## 2023-12-22 PROCEDURE — 87040 BLOOD CULTURE FOR BACTERIA: CPT | Performed by: EMERGENCY MEDICINE

## 2023-12-22 PROCEDURE — 85025 COMPLETE CBC W/AUTO DIFF WBC: CPT | Performed by: EMERGENCY MEDICINE

## 2023-12-22 PROCEDURE — 0241U HB NFCT DS VIR RESP RNA 4 TRGT: CPT | Performed by: EMERGENCY MEDICINE

## 2023-12-22 PROCEDURE — G0008 ADMIN INFLUENZA VIRUS VAC: HCPCS | Performed by: FAMILY MEDICINE

## 2023-12-22 PROCEDURE — 96365 THER/PROPH/DIAG IV INF INIT: CPT

## 2023-12-22 PROCEDURE — 84145 PROCALCITONIN (PCT): CPT | Performed by: EMERGENCY MEDICINE

## 2023-12-22 PROCEDURE — 85730 THROMBOPLASTIN TIME PARTIAL: CPT | Performed by: EMERGENCY MEDICINE

## 2023-12-22 PROCEDURE — 90662 IIV NO PRSV INCREASED AG IM: CPT | Performed by: FAMILY MEDICINE

## 2023-12-22 PROCEDURE — 81001 URINALYSIS AUTO W/SCOPE: CPT | Performed by: EMERGENCY MEDICINE

## 2023-12-22 RX ORDER — DILTIAZEM HYDROCHLORIDE 60 MG/1
60 TABLET, FILM COATED ORAL EVERY 6 HOURS SCHEDULED
Status: DISCONTINUED | OUTPATIENT
Start: 2023-12-23 | End: 2023-12-23

## 2023-12-22 RX ORDER — METOPROLOL SUCCINATE 25 MG/1
25 TABLET, EXTENDED RELEASE ORAL DAILY
Status: DISCONTINUED | OUTPATIENT
Start: 2023-12-23 | End: 2023-12-26

## 2023-12-22 RX ORDER — ONDANSETRON 2 MG/ML
4 INJECTION INTRAMUSCULAR; INTRAVENOUS EVERY 6 HOURS PRN
Status: DISCONTINUED | OUTPATIENT
Start: 2023-12-22 | End: 2023-12-26 | Stop reason: HOSPADM

## 2023-12-22 RX ORDER — DILTIAZEM HYDROCHLORIDE 5 MG/ML
15 INJECTION INTRAVENOUS ONCE
Status: COMPLETED | OUTPATIENT
Start: 2023-12-22 | End: 2023-12-22

## 2023-12-22 RX ORDER — ACETAMINOPHEN 325 MG/1
650 TABLET ORAL EVERY 6 HOURS PRN
Status: DISCONTINUED | OUTPATIENT
Start: 2023-12-22 | End: 2023-12-26 | Stop reason: HOSPADM

## 2023-12-22 RX ORDER — MAGNESIUM SULFATE HEPTAHYDRATE 40 MG/ML
2 INJECTION, SOLUTION INTRAVENOUS ONCE
Status: COMPLETED | OUTPATIENT
Start: 2023-12-22 | End: 2023-12-22

## 2023-12-22 RX ORDER — ATORVASTATIN CALCIUM 40 MG/1
40 TABLET, FILM COATED ORAL
Status: DISCONTINUED | OUTPATIENT
Start: 2023-12-23 | End: 2023-12-26 | Stop reason: HOSPADM

## 2023-12-22 RX ORDER — HEPARIN SODIUM 1000 [USP'U]/ML
2000 INJECTION, SOLUTION INTRAVENOUS; SUBCUTANEOUS EVERY 6 HOURS PRN
Status: DISCONTINUED | OUTPATIENT
Start: 2023-12-22 | End: 2023-12-23

## 2023-12-22 RX ORDER — ATORVASTATIN CALCIUM 40 MG/1
40 TABLET, FILM COATED ORAL DAILY
Status: DISCONTINUED | OUTPATIENT
Start: 2023-12-23 | End: 2023-12-22

## 2023-12-22 RX ORDER — HEPARIN SODIUM 1000 [USP'U]/ML
4000 INJECTION, SOLUTION INTRAVENOUS; SUBCUTANEOUS EVERY 6 HOURS PRN
Status: DISCONTINUED | OUTPATIENT
Start: 2023-12-22 | End: 2023-12-23

## 2023-12-22 RX ORDER — OXYCODONE HYDROCHLORIDE AND ACETAMINOPHEN 5; 325 MG/1; MG/1
1 TABLET ORAL EVERY 4 HOURS PRN
Status: DISCONTINUED | OUTPATIENT
Start: 2023-12-22 | End: 2023-12-26 | Stop reason: HOSPADM

## 2023-12-22 RX ORDER — GLIPIZIDE 5 MG/1
5 TABLET ORAL DAILY
Status: DISCONTINUED | OUTPATIENT
Start: 2023-12-23 | End: 2023-12-26 | Stop reason: HOSPADM

## 2023-12-22 RX ORDER — INSULIN LISPRO 100 [IU]/ML
1-5 INJECTION, SOLUTION INTRAVENOUS; SUBCUTANEOUS
Status: DISCONTINUED | OUTPATIENT
Start: 2023-12-23 | End: 2023-12-26 | Stop reason: HOSPADM

## 2023-12-22 RX ORDER — HEPARIN SODIUM 10000 [USP'U]/100ML
3-20 INJECTION, SOLUTION INTRAVENOUS
Status: DISCONTINUED | OUTPATIENT
Start: 2023-12-22 | End: 2023-12-23

## 2023-12-22 RX ORDER — TERAZOSIN 5 MG/1
5 CAPSULE ORAL DAILY
Status: DISCONTINUED | OUTPATIENT
Start: 2023-12-23 | End: 2023-12-23

## 2023-12-22 RX ORDER — LORATADINE 10 MG/1
10 TABLET ORAL DAILY
Status: DISCONTINUED | OUTPATIENT
Start: 2023-12-23 | End: 2023-12-26 | Stop reason: HOSPADM

## 2023-12-22 RX ORDER — METOPROLOL SUCCINATE 25 MG/1
25 TABLET, EXTENDED RELEASE ORAL DAILY
Qty: 90 TABLET | Refills: 3 | Status: SHIPPED | OUTPATIENT
Start: 2023-12-22 | End: 2023-12-26

## 2023-12-22 RX ORDER — HEPARIN SODIUM 1000 [USP'U]/ML
4000 INJECTION, SOLUTION INTRAVENOUS; SUBCUTANEOUS ONCE
Status: COMPLETED | OUTPATIENT
Start: 2023-12-22 | End: 2023-12-22

## 2023-12-22 RX ADMIN — HEPARIN SODIUM 12 UNITS/KG/HR: 10000 INJECTION, SOLUTION INTRAVENOUS at 23:08

## 2023-12-22 RX ADMIN — MAGNESIUM SULFATE HEPTAHYDRATE 2 G: 40 INJECTION, SOLUTION INTRAVENOUS at 16:10

## 2023-12-22 RX ADMIN — HEPARIN SODIUM 4000 UNITS: 1000 INJECTION INTRAVENOUS; SUBCUTANEOUS at 23:12

## 2023-12-22 RX ADMIN — DILTIAZEM HYDROCHLORIDE 15 MG: 5 INJECTION INTRAVENOUS at 18:40

## 2023-12-22 RX ADMIN — DILTIAZEM HYDROCHLORIDE 15 MG: 5 INJECTION INTRAVENOUS at 16:32
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
## 2023-12-22 NOTE — ASSESSMENT & PLAN NOTE
Stable. Pt sees Pulmonary Dr Garland for follow-up in Feb 2024. Pt continues to use O2 supplementation as needed. Her 6 min walk test showed desaturation to 82% with exertion.

## 2023-12-22 NOTE — ASSESSMENT & PLAN NOTE
Patient has rapid HR today and feels off. Blood pressure also low. Will send to ER for evaluation.
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
## 2023-12-22 NOTE — ASSESSMENT & PLAN NOTE
A1C done today and was 6.3. Continue metformin 1000 mg bid and glipizide 5 mg qd. Continue low carb diet. Foot exam done in Jan 2023. Eye exam done in April 2023. Microalbumin/creatinine ratio done in Jan 2023.     Lab Results   Component Value Date    HGBA1C 6.4 (H) 07/28/2023

## 2023-12-22 NOTE — ASSESSMENT & PLAN NOTE
Pt had CT of chest in July 2023 and no change. Pt saw Hematology in May 2022 and told that only needs to f/u as needed. Patient saw Pulmonary in Sept 2023.

## 2023-12-22 NOTE — ASSESSMENT & PLAN NOTE
Wt Readings from Last 3 Encounters:   11/24/23 83 kg (183 lb)   11/08/23 83 kg (183 lb)   10/23/23 83.2 kg (183 lb 6.4 oz)     Had echocardiogram and stress test in Nov 2023 and were ok. Continue current medications and management per Cardiology. Her next appointment is in Jan 2024.

## 2023-12-23 LAB
ALBUMIN SERPL BCP-MCNC: 3.9 G/DL (ref 3.5–5)
ALP SERPL-CCNC: 47 U/L (ref 34–104)
ALT SERPL W P-5'-P-CCNC: 39 U/L (ref 7–52)
ANION GAP SERPL CALCULATED.3IONS-SCNC: 9 MMOL/L
APTT PPP: 56 SECONDS (ref 23–37)
APTT PPP: 95 SECONDS (ref 23–37)
AST SERPL W P-5'-P-CCNC: 71 U/L (ref 13–39)
BILIRUB SERPL-MCNC: 1.05 MG/DL (ref 0.2–1)
BUN SERPL-MCNC: 24 MG/DL (ref 5–25)
CALCIUM SERPL-MCNC: 9.6 MG/DL (ref 8.4–10.2)
CHLORIDE SERPL-SCNC: 103 MMOL/L (ref 96–108)
CHOLEST SERPL-MCNC: 78 MG/DL
CO2 SERPL-SCNC: 24 MMOL/L (ref 21–32)
CREAT SERPL-MCNC: 0.89 MG/DL (ref 0.6–1.3)
D DIMER PPP FEU-MCNC: 0.61 UG/ML FEU
ERYTHROCYTE [DISTWIDTH] IN BLOOD BY AUTOMATED COUNT: 13.8 % (ref 11.6–15.1)
GFR SERPL CREATININE-BSD FRML MDRD: 60 ML/MIN/1.73SQ M
GLUCOSE SERPL-MCNC: 104 MG/DL (ref 65–140)
GLUCOSE SERPL-MCNC: 109 MG/DL (ref 65–140)
GLUCOSE SERPL-MCNC: 118 MG/DL (ref 65–140)
GLUCOSE SERPL-MCNC: 186 MG/DL (ref 65–140)
GLUCOSE SERPL-MCNC: 87 MG/DL (ref 65–140)
HCT VFR BLD AUTO: 37.9 % (ref 34.8–46.1)
HDLC SERPL-MCNC: 31 MG/DL
HGB BLD-MCNC: 12.4 G/DL (ref 11.5–15.4)
LDLC SERPL CALC-MCNC: 37 MG/DL (ref 0–100)
MCH RBC QN AUTO: 29 PG (ref 26.8–34.3)
MCHC RBC AUTO-ENTMCNC: 32.7 G/DL (ref 31.4–37.4)
MCV RBC AUTO: 89 FL (ref 82–98)
PLATELET # BLD AUTO: 258 THOUSANDS/UL (ref 149–390)
PMV BLD AUTO: 10.4 FL (ref 8.9–12.7)
POTASSIUM SERPL-SCNC: 4 MMOL/L (ref 3.5–5.3)
PROT SERPL-MCNC: 7.3 G/DL (ref 6.4–8.4)
RBC # BLD AUTO: 4.27 MILLION/UL (ref 3.81–5.12)
SODIUM SERPL-SCNC: 136 MMOL/L (ref 135–147)
TRIGL SERPL-MCNC: 51 MG/DL
WBC # BLD AUTO: 9.5 THOUSAND/UL (ref 4.31–10.16)

## 2023-12-23 PROCEDURE — 94760 N-INVAS EAR/PLS OXIMETRY 1: CPT

## 2023-12-23 PROCEDURE — 82948 REAGENT STRIP/BLOOD GLUCOSE: CPT

## 2023-12-23 PROCEDURE — 80053 COMPREHEN METABOLIC PANEL: CPT | Performed by: INTERNAL MEDICINE

## 2023-12-23 PROCEDURE — 80061 LIPID PANEL: CPT | Performed by: INTERNAL MEDICINE

## 2023-12-23 PROCEDURE — 94660 CPAP INITIATION&MGMT: CPT

## 2023-12-23 PROCEDURE — 85027 COMPLETE CBC AUTOMATED: CPT | Performed by: INTERNAL MEDICINE

## 2023-12-23 PROCEDURE — 85730 THROMBOPLASTIN TIME PARTIAL: CPT | Performed by: INTERNAL MEDICINE

## 2023-12-23 PROCEDURE — 99232 SBSQ HOSP IP/OBS MODERATE 35: CPT

## 2023-12-23 PROCEDURE — 85379 FIBRIN DEGRADATION QUANT: CPT | Performed by: INTERNAL MEDICINE

## 2023-12-23 RX ORDER — DOCUSATE SODIUM 100 MG/1
100 CAPSULE, LIQUID FILLED ORAL 2 TIMES DAILY
Status: DISCONTINUED | OUTPATIENT
Start: 2023-12-23 | End: 2023-12-26 | Stop reason: HOSPADM

## 2023-12-23 RX ORDER — DILTIAZEM HYDROCHLORIDE 120 MG/1
120 CAPSULE, COATED, EXTENDED RELEASE ORAL DAILY
Status: DISCONTINUED | OUTPATIENT
Start: 2023-12-24 | End: 2023-12-26 | Stop reason: HOSPADM

## 2023-12-23 RX ORDER — LEVALBUTEROL INHALATION SOLUTION 1.25 MG/3ML
1.25 SOLUTION RESPIRATORY (INHALATION) EVERY 8 HOURS PRN
Status: DISCONTINUED | OUTPATIENT
Start: 2023-12-23 | End: 2023-12-26 | Stop reason: HOSPADM

## 2023-12-23 RX ORDER — SENNOSIDES 8.6 MG
1 TABLET ORAL
Status: DISCONTINUED | OUTPATIENT
Start: 2023-12-23 | End: 2023-12-26 | Stop reason: HOSPADM

## 2023-12-23 RX ADMIN — DOCUSATE SODIUM 100 MG: 100 CAPSULE, LIQUID FILLED ORAL at 17:30

## 2023-12-23 RX ADMIN — APIXABAN 5 MG: 5 TABLET, FILM COATED ORAL at 17:30

## 2023-12-23 RX ADMIN — ASPIRIN 81 MG: 81 TABLET, COATED ORAL at 08:56

## 2023-12-23 RX ADMIN — DILTIAZEM HYDROCHLORIDE 30 MG: 30 TABLET, FILM COATED ORAL at 17:30

## 2023-12-23 RX ADMIN — DILTIAZEM HYDROCHLORIDE 60 MG: 60 TABLET, FILM COATED ORAL at 00:43

## 2023-12-23 RX ADMIN — LORATADINE 10 MG: 10 TABLET ORAL at 08:56

## 2023-12-23 RX ADMIN — METOPROLOL SUCCINATE 25 MG: 25 TABLET, FILM COATED, EXTENDED RELEASE ORAL at 08:56

## 2023-12-23 RX ADMIN — DILTIAZEM HYDROCHLORIDE 30 MG: 30 TABLET, FILM COATED ORAL at 07:30

## 2023-12-23 RX ADMIN — ATORVASTATIN CALCIUM 40 MG: 40 TABLET, FILM COATED ORAL at 17:30

## 2023-12-23 RX ADMIN — DILTIAZEM HYDROCHLORIDE 30 MG: 30 TABLET, FILM COATED ORAL at 12:37

## 2023-12-23 RX ADMIN — DOCUSATE SODIUM 100 MG: 100 CAPSULE, LIQUID FILLED ORAL at 09:51

## 2023-12-23 RX ADMIN — GLIPIZIDE 5 MG: 5 TABLET ORAL at 08:56

## 2023-12-23 NOTE — ASSESSMENT & PLAN NOTE
Wt Readings from Last 3 Encounters:   12/22/23 82.1 kg (181 lb)   11/24/23 83 kg (183 lb)   11/08/23 83 kg (183 lb)   Patient had an echo in November 2023 which results noted below    Left Ventricle: Left ventricular cavity size is normal. The left ventricular ejection fraction is >70%. Systolic function is hyperdynamic. Wall motion is normal. Diastolic function is moderately abnormal, consistent with grade II (pseudonormal) relaxation.    IVS: There is late systolic/early diastolic flattening of the interventricular septum.    Right Ventricle: Right ventricular cavity size is normal. Systolic function is normal.    Left Atrium: The atrium is severely dilated (>48 mL/m2).    Right Atrium: The atrium is mildly dilated.    Aortic Valve: There is mild regurgitation.    Mitral Valve: There is mild to moderate regurgitation.    Tricuspid Valve: The pulmonary artery systolic pressure is at least 37 mmHg, which may be underestimated due to an incomplete TR envelope.    Pericardium: There is a small pericardial effusion posterior to the heart which is similar in appearance compared to prior study dated 10/15/2020.    She is not in any acute CHF exacerbation at this time  Holding her diuretics(patient on torsemide 2ce wkly and spironolactone daily)  Holding the valsartan and Norvasc

## 2023-12-23 NOTE — ASSESSMENT & PLAN NOTE
Pt had CT of chest in July 2023 and no change.   Pt saw Hematology in May 2022 and told that only needs to f/u as needed.   Also continue to follow-up with pulmonary OP.

## 2023-12-23 NOTE — ASSESSMENT & PLAN NOTE
Newly diagnosed.  Denies history of arrhythmia   Echo (November 2023) which showed EF of 70% with evidence of diastolic dysfunction  HS Trops elevated  S/p IV Cardizem 50 mg x 2 with improvement in her heart rate  C/w Cardizem 30 mg PO q6hrs   [id number] of 6  C/w IV heparin  Patient on metoprolol XL 25 mg daily which we will continue  Cardiology Consulted  Telemetry reviewed-> Afib with HR 's  Continue Supportive care
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
## 2023-12-23 NOTE — ASSESSMENT & PLAN NOTE
Blood pressure had improved  Will hold her Norvasc, Aldactone and valsartan for now  Continue with metoprolol and Cardizem

## 2023-12-23 NOTE — ASSESSMENT & PLAN NOTE
Lab Results   Component Value Date    HGBA1C 6.3 12/22/2023       Recent Labs     12/22/23  2200 12/23/23  0714 12/23/23  1107   POCGLU 161* 118 109         Blood Sugar Average: Last 72 hrs:  (P) [id number]    Last A1c 6.3.  Patient is on glipizide 5 mg daily. Patient unsure if she is still taking metformin.  Holding of metformin for now  Continue Accu-Checks and SSI AC/HS  Hypoglycemic protocol  Diabetic diet

## 2023-12-23 NOTE — PLAN OF CARE
Problem: Potential for Falls  Goal: Patient will remain free of falls  Description: INTERVENTIONS:  - Educate patient/family on patient safety including physical limitations  - Instruct patient to call for assistance with activity   - Consult OT/PT to assist with strengthening/mobility   - Keep Call bell within reach  - Keep bed low and locked with side rails adjusted as appropriate  - Keep care items and personal belongings within reach  - Initiate and maintain comfort rounds  - Make Fall Risk Sign visible to staff  - Offer Toileting every 2 Hours, in advance of need  - Initiate/Maintain bed alarm  - Obtain necessary fall risk management equipment:   - Apply yellow socks and bracelet for high fall risk patients  - Consider moving patient to room near nurses station  Outcome: Progressing     Problem: Prexisting or High Potential for Compromised Skin Integrity  Goal: Skin integrity is maintained or improved  Description: INTERVENTIONS:  - Identify patients at risk for skin breakdown  - Assess and monitor skin integrity  - Assess and monitor nutrition and hydration status  - Monitor labs   - Assess for incontinence   - Turn and reposition patient  - Assist with mobility/ambulation  - Relieve pressure over bony prominences  - Avoid friction and shearing  - Provide appropriate hygiene as needed including keeping skin clean and dry  - Evaluate need for skin moisturizer/barrier cream  - Collaborate with interdisciplinary team   - Patient/family teaching  - Consider wound care consult   Outcome: Progressing

## 2023-12-23 NOTE — ASSESSMENT & PLAN NOTE
No acute exacerbation at this time stable.   She sees Pulmonary Dr Garland for follow-up in Feb 2024.   Pt continues to use O2 supplementation as needed.   Reportedly her 6 min walk test showed desaturation to 82% with exertion.

## 2023-12-23 NOTE — ASSESSMENT & PLAN NOTE
Blood pressure reviewed and remains stable   Continue to hold Norvasc, Aldactone and valsartan for now  Continue with metoprolol and Cardizem

## 2023-12-23 NOTE — PROGRESS NOTES
[facility]  Progress Note  Name: Camryn Roblero I  MRN: [id number]  Unit/Bed#: -01 I Date of Admission: 12/22/2023   Date of Service: 12/23/2023 I Hospital Day: 1    Assessment/Plan   * Atrial flutter with rapid ventricular response (HCC)  Assessment & Plan  Newly diagnosed.  Denies history of arrhythmia   Echo (November 2023) which showed EF of 70% with evidence of diastolic dysfunction  HS Trops elevated  S/p IV Cardizem 50 mg x 2 with improvement in her heart rate  C/w Cardizem 30 mg PO q6hrs   [id number] of 6  C/w IV heparin  Patient on metoprolol XL 25 mg daily which we will continue  Cardiology Consulted  Telemetry reviewed-> Afib with HR 's  Continue Supportive care       Chronic diastolic CHF (congestive heart failure) ([facility])  Assessment & Plan  Wt Readings from Last 3 Encounters:   12/22/23 81.6 kg (179 lb 14.3 oz)   12/22/23 82.1 kg (181 lb)   11/24/23 83 kg (183 lb)       Does not appear to be in an acute CHF exacerbation at this time  Echo (Nov. 2023): EF >70%. Systolic function is hyperdynamic. G2DD. There is a small pericardial effusion posterior to the heart which is similar in appearance compared to prior study dated 10/15/2020.  Holding diuretics (patient on torsemide 2ce wkly and spironolactone daily)  Holding valsartan and Norvasc   Monitor Daily weights and I/O's    Diabetes mellitus without complication ([facility])  Assessment & Plan  Lab Results   Component Value Date    HGBA1C 6.3 12/22/2023       Recent Labs     12/22/23  2200 12/23/23  0714 12/23/23  1107   POCGLU 161* 118 109         Blood Sugar Average: Last 72 hrs:  (P) [id number]    Last A1c 6.3.  Patient is on glipizide 5 mg daily. Patient unsure if she is still taking metformin.  Holding of metformin for now  Continue Accu-Checks and SSI AC/HS  Hypoglycemic protocol  Diabetic diet    COPD (chronic obstructive pulmonary disease) ([facility])  Assessment & Plan  No acute exacerbation at this time stable.    She sees Pulmonary Dr Garland for follow-up in Feb 2024.   Pt continues to use O2 supplementation as needed.   Reportedly her 6 min walk test showed desaturation to 82% with exertion.   Continue supportive care    Essential hypertension  Assessment & Plan  Blood pressure reviewed and remains stable   Continue to hold Norvasc, Aldactone and valsartan for now  Continue with metoprolol and Cardizem    Mixed hyperlipidemia  Assessment & Plan  Continue statin therapy    Carcinoid tumor of lung  Assessment & Plan  CT of chest in July 2023 and no change.   Pt saw Hematology in May 2022 and told that only needs to f/u as needed.   Also continue to follow-up with pulmonary OP.             VTE Pharmacologic Prophylaxis:   High Risk (Score >/= 5) - Pharmacological DVT Prophylaxis Ordered: heparin drip. Sequential Compression Devices Ordered.    Mobility:   Basic Mobility Inpatient Raw Score: 17  JH-HLM Goal: 5: Stand one or more mins  JH-HLM Achieved: 4: Move to chair/commode  HLM Goal NOT achieved. Continue with multidisciplinary rounding and encourage appropriate mobility to improve upon HLM goals.    Patient Centered Rounds: I performed bedside rounds with nursing staff today.   Discussions with Specialists or Other Care Team Provider: Cards    Education and Discussions with Family / Patient: Patient declined call to .     Total Time Spent on Date of Encounter in care of patient: 45 mins. This time was spent on one or more of the following: performing physical exam; counseling and coordination of care; obtaining or reviewing history; documenting in the medical record; reviewing/ordering tests, medications or procedures; communicating with other healthcare professionals and discussing with patient's family/caregivers.    Current Length of Stay: 1 day(s)  Current Patient Status: Inpatient   Certification Statement: The patient will continue to require additional inpatient hospital stay due to new onset  afib/aflutter requiring Cardiology evaluation and medical management  Discharge Plan: Anticipate discharge in 24-48 hrs to home.    Code Status: Level 1 - Full Code    Subjective:   Patient stated she feels alright. Patient stated she is looking to go home as soon as possible because she needs to take care of her . Patient denies any chest pain, shortness of breath or abdominal pain.     Objective:     Vitals:   Temp (24hrs), Av.7 °F (36.5 °C), Min:97.2 °F (36.2 °C), Max:98 °F (36.7 °C)    Temp:  [97.2 °F (36.2 °C)-98 °F (36.7 °C)] 97.9 °F (36.6 °C)  HR:  [] 83  Resp:  [16-25] 16  BP: ()/(50-79) 125/79  SpO2:  [93 %-98 %] 95 %  Body mass index is 36.33 kg/m².     Input and Output Summary (last 24 hours):     Intake/Output Summary (Last 24 hours) at 2023 1219  Last data filed at 2023 0100  Gross per 24 hour   Intake 50 ml   Output 325 ml   Net -275 ml       Physical Exam:   Physical Exam  Vitals and nursing note reviewed.   Constitutional:       General: She is not in acute distress.  HENT:      Head: Normocephalic.      Nose: Nose normal. No congestion.      Mouth/Throat:      Mouth: Mucous membranes are moist.      Pharynx: Oropharynx is clear.   Cardiovascular:      Rate and Rhythm: Normal rate. Rhythm irregular.      Pulses: Normal pulses.      Heart sounds: No murmur heard.  Pulmonary:      Effort: Pulmonary effort is normal. No respiratory distress.   Abdominal:      General: Bowel sounds are normal. There is no distension.      Palpations: Abdomen is soft.   Musculoskeletal:         General: Normal range of motion.      Cervical back: Normal range of motion.      Right lower leg: Edema present.      Left lower leg: Edema present.   Skin:     General: Skin is warm and dry.      Capillary Refill: Capillary refill takes less than 2 seconds.   Neurological:      Mental Status: She is alert and oriented to person, place, and time. Mental status is at baseline.      Motor: No  weakness.   Psychiatric:         Mood and Affect: Mood is anxious.         Speech: Speech normal.         Behavior: Behavior is cooperative.          Additional Data:     Labs:  Results from last 7 days   Lab Units 12/23/23  0452 12/22/23  1532   WBC Thousand/uL 9.50 8.76   HEMOGLOBIN g/dL 12.4 12.8   HEMATOCRIT % 37.9 40.1   PLATELETS Thousands/uL 258 273   NEUTROS PCT %  --  82*   LYMPHS PCT %  --  12*   MONOS PCT %  --  5   EOS PCT %  --  0     Results from last 7 days   Lab Units 12/23/23  0452   SODIUM mmol/L 136   POTASSIUM mmol/L 4.0   CHLORIDE mmol/L 103   CO2 mmol/L 24   BUN mg/dL 24   CREATININE mg/dL 0.89   ANION GAP mmol/L 9   CALCIUM mg/dL 9.6   ALBUMIN g/dL 3.9   TOTAL BILIRUBIN mg/dL 1.05*   ALK PHOS U/L 47   ALT U/L 39   AST U/L 71*   GLUCOSE RANDOM mg/dL 87     Results from last 7 days   Lab Units 12/22/23  1606   INR  1.16     Results from last 7 days   Lab Units 12/23/23  1107 12/23/23  0714 12/22/23  2200   POC GLUCOSE mg/dl 109 118 161*     Results from last 7 days   Lab Units 12/22/23  1455   HEMOGLOBIN A1C  6.3     Results from last 7 days   Lab Units 12/22/23  1845 12/22/23  1606   LACTIC ACID mmol/L 1.6 2.4*   PROCALCITONIN ng/ml  --  <0.05       Lines/Drains:  Invasive Devices       Peripheral Intravenous Line  Duration             Peripheral IV 12/22/23 Left Antecubital <1 day    Peripheral IV 12/22/23 Right Antecubital <1 day                      Telemetry:  Telemetry Orders (From admission, onward)               24 Hour Telemetry Monitoring  Continuous x 24 Hours (Telem)        Question:  Reason for 24 Hour Telemetry  Answer:  Arrhythmias requiring acute medical intervention / PPM or ICD malfunction                     Telemetry Reviewed: Atrial fibrillation. HR averaging 's  Indication for Continued Telemetry Use: Arrthymias requiring medical therapy             Imaging: Reviewed radiology reports from this admission including: chest xray    Recent Cultures (last 7 days):    Results from last 7 days   Lab Units 12/22/23  1606   BLOOD CULTURE  Received in Microbiology Lab. Culture in Progress.  Received in Microbiology Lab. Culture in Progress.       Last 24 Hours Medication List:   Current Facility-Administered Medications   Medication Dose Route Frequency Provider Last Rate    acetaminophen  650 mg Oral Q6H PRN Debra Haddad MD      aspirin  81 mg Oral Daily Debra Haddad MD      atorvastatin  40 mg Oral Daily With Dinner Debra Haddad MD      diltiazem  30 mg Oral Q6H JOE Debra Haddad MD      docusate sodium  100 mg Oral BID DEANNE Faria      glipiZIDE  5 mg Oral Daily Debra Haddad MD      heparin (porcine)  3-20 Units/kg/hr (Order-Specific) Intravenous Titrated Debra Haddad MD 10 Units/kg/hr (12/23/23 0650)    heparin (porcine)  2,000 Units Intravenous Q6H PRN Debra Haddad MD      heparin (porcine)  4,000 Units Intravenous Q6H PRN Debra Haddad MD      insulin lispro  1-5 Units Subcutaneous TID AC Debra Haddad MD      levalbuterol  1.25 mg Nebulization Q8H PRN Debra Haddad MD      loratadine  10 mg Oral Daily Debra Haddad MD      metoprolol succinate  25 mg Oral Daily Debra Haddad MD      ondansetron  4 mg Intravenous Q6H PRN Debra Haddad MD      oxyCODONE-acetaminophen  1 tablet Oral Q4H PRN Debra Haddad MD      senna  1 tablet Oral HS DEANNE Faria          Today, Patient Was Seen By: DEANNE Faria    **Please Note: This note may have been constructed using a voice recognition system.**

## 2023-12-23 NOTE — ASSESSMENT & PLAN NOTE
Newly diagnosed.  Patient denies any history of arrhythmia before but states she has been told she had tachycardia in the past.  She had received IV Cardizem 50 mg x 2 with improvement in her heart rate  She will be admitted with telemetry monitoring  Her troponin was elevated  Patient with [id number] of 6  We will start her on IV heparin  Continue with Cardizem oral 30 mg every 6 hours  Patient on metoprolol XL 25 mg daily which we will continue  Her blood pressure had remained stable  She recently had an echocardiogram done in November 2023 which showed EF of 70% with evidence of diastolic dysfunction  Will get cardiology consultation in the a.m.  Replete electrolytes

## 2023-12-23 NOTE — CASE MANAGEMENT
Case Management Discharge Planning Note    Patient name Camryn Roblero  Location /-01 MRN [id number]  : 1940 Date 2023       Current Admission Date: 2023  Current Admission Diagnosis:Atrial flutter with rapid ventricular response (HCC)   Patient Active Problem List    Diagnosis Date Noted    Tachycardia 2023    Atrial flutter with rapid ventricular response (HCC) 2023    Mixed hyperlipidemia 10/21/2023    Seasonal allergic rhinitis 2023    Osteopenia 2022    Chronic pain of both knees 2021    Cataract of both eyes 2021    Class 2 severe obesity due to excess calories with serious comorbidity and body mass index (BMI) of 36.0 to 36.9 in adult  2021    Multiple pulmonary nodules 10/05/2020    Colon polyps 10/05/2020    TOM (obstructive sleep apnea) 2020    Chronic diastolic CHF (congestive heart failure) (HCC) 2020    Persistent proteinuria 10/25/2019    Hyponatremia 2016    Essential hypertension 2016    Diabetes mellitus without complication (HCC) 2016    Carcinoid tumor of lung 2016    COPD (chronic obstructive pulmonary disease) ([facility]) 2016      LOS (days): 1  Geometric Mean LOS (GMLOS) (days): 2.3  Days to GMLOS:1.5     OBJECTIVE:  Risk of Unplanned Readmission Score: 8.76         Current admission status: Inpatient   Preferred Pharmacy:   [facility] [address] [place] [id number]  Phone: [phone] Fax: [phone]    [facility] - [facility] [address]  [address] [facility] [address]  Phone: [phone] Fax: [phone]    Primary Care Provider: Abiel Seals MD    Primary Insurance: MEDICARE  Secondary Insurance: [facility]    DISCHARGE DETAILS:  CM contacted patient's pharmacy [facility] at . Patient's copay for Eliquis is $119.00. SLIM updated

## 2023-12-23 NOTE — ASSESSMENT & PLAN NOTE
Wt Readings from Last 3 Encounters:   12/22/23 81.6 kg (179 lb 14.3 oz)   12/22/23 82.1 kg (181 lb)   11/24/23 83 kg (183 lb)       Does not appear to be in an acute CHF exacerbation at this time  Echo (Nov. 2023): EF >70%. Systolic function is hyperdynamic. G2DD. There is a small pericardial effusion posterior to the heart which is similar in appearance compared to prior study dated 10/15/2020.  Holding diuretics (patient on torsemide 2ce wkly and spironolactone daily)  Holding valsartan and Norvasc   Monitor Daily weights and I/O's

## 2023-12-23 NOTE — PLAN OF CARE
Problem: Potential for Falls  Goal: Patient will remain free of falls  Description: INTERVENTIONS:  - Educate patient/family on patient safety including physical limitations  - Instruct patient to call for assistance with activity   - Consult OT/PT to assist with strengthening/mobility   - Keep Call bell within reach  - Keep bed low and locked with side rails adjusted as appropriate  - Keep care items and personal belongings within reach  - Initiate and maintain comfort rounds  - Offer Toileting every 2 Hours, in advance of need  - Initiate/Maintain bed alarm  - Obtain necessary fall risk management equipment: rolling walker  - Apply yellow socks and bracelet for high fall risk patients  - Consider moving patient to room near nurses station  Outcome: Progressing     Problem: Prexisting or High Potential for Compromised Skin Integrity  Goal: Skin integrity is maintained or improved  Description: INTERVENTIONS:  - Identify patients at risk for skin breakdown  - Assess and monitor skin integrity  - Assess and monitor nutrition and hydration status  - Monitor labs   - Assess for incontinence   - Turn and reposition patient  - Assist with mobility/ambulation  - Relieve pressure over bony prominences  - Avoid friction and shearing  - Provide appropriate hygiene as needed including keeping skin clean and dry  - Evaluate need for skin moisturizer/barrier cream  - Collaborate with interdisciplinary team   - Patient/family teaching  - Consider wound care consult   Outcome: Progressing

## 2023-12-23 NOTE — ASSESSMENT & PLAN NOTE
CT of chest in July 2023 and no change.   Pt saw Hematology in May 2022 and told that only needs to f/u as needed.   Also continue to follow-up with pulmonary OP.

## 2023-12-23 NOTE — ASSESSMENT & PLAN NOTE
No acute exacerbation at this time stable.   She sees Pulmonary Dr Garland for follow-up in Feb 2024.   Pt continues to use O2 supplementation as needed.   Reportedly her 6 min walk test showed desaturation to 82% with exertion.   Continue supportive care

## 2023-12-23 NOTE — ASSESSMENT & PLAN NOTE
Lab Results   Component Value Date    HGBA1C 6.3 12/22/2023       Recent Labs     12/22/23  2200   POCGLU 161*       Blood Sugar Average: Last 72 hrs:  (P) 161    Her last A1c 6.3.  Patient is on glipizide 5 mg daily  Patient unsure if she is still taking metformin.  Holding of metformin for now  Will place on sliding scale insulin  Continue with Accu-Chek before meals and at bedtime

## 2023-12-24 LAB
ALBUMIN SERPL BCP-MCNC: 3.8 G/DL (ref 3.5–5)
ALP SERPL-CCNC: 47 U/L (ref 34–104)
ALT SERPL W P-5'-P-CCNC: 39 U/L (ref 7–52)
ANION GAP SERPL CALCULATED.3IONS-SCNC: 7 MMOL/L
AST SERPL W P-5'-P-CCNC: 69 U/L (ref 13–39)
BACTERIA UR CULT: NORMAL
BILIRUB SERPL-MCNC: 0.74 MG/DL (ref 0.2–1)
BUN SERPL-MCNC: 24 MG/DL (ref 5–25)
CALCIUM SERPL-MCNC: 9.3 MG/DL (ref 8.4–10.2)
CHLORIDE SERPL-SCNC: 104 MMOL/L (ref 96–108)
CO2 SERPL-SCNC: 24 MMOL/L (ref 21–32)
CREAT SERPL-MCNC: 0.92 MG/DL (ref 0.6–1.3)
ERYTHROCYTE [DISTWIDTH] IN BLOOD BY AUTOMATED COUNT: 14 % (ref 11.6–15.1)
GFR SERPL CREATININE-BSD FRML MDRD: 58 ML/MIN/1.73SQ M
GLUCOSE SERPL-MCNC: 112 MG/DL (ref 65–140)
GLUCOSE SERPL-MCNC: 128 MG/DL (ref 65–140)
GLUCOSE SERPL-MCNC: 141 MG/DL (ref 65–140)
GLUCOSE SERPL-MCNC: 191 MG/DL (ref 65–140)
GLUCOSE SERPL-MCNC: 86 MG/DL (ref 65–140)
HCT VFR BLD AUTO: 38.7 % (ref 34.8–46.1)
HGB BLD-MCNC: 12.4 G/DL (ref 11.5–15.4)
MAGNESIUM SERPL-MCNC: 1.9 MG/DL (ref 1.9–2.7)
MCH RBC QN AUTO: 29.1 PG (ref 26.8–34.3)
MCHC RBC AUTO-ENTMCNC: 32 G/DL (ref 31.4–37.4)
MCV RBC AUTO: 91 FL (ref 82–98)
PLATELET # BLD AUTO: 258 THOUSANDS/UL (ref 149–390)
PMV BLD AUTO: 10.4 FL (ref 8.9–12.7)
POTASSIUM SERPL-SCNC: 4.2 MMOL/L (ref 3.5–5.3)
PROT SERPL-MCNC: 7.2 G/DL (ref 6.4–8.4)
RBC # BLD AUTO: 4.26 MILLION/UL (ref 3.81–5.12)
SODIUM SERPL-SCNC: 135 MMOL/L (ref 135–147)
WBC # BLD AUTO: 6.84 THOUSAND/UL (ref 4.31–10.16)

## 2023-12-24 PROCEDURE — 99233 SBSQ HOSP IP/OBS HIGH 50: CPT

## 2023-12-24 PROCEDURE — 80053 COMPREHEN METABOLIC PANEL: CPT

## 2023-12-24 PROCEDURE — 94760 N-INVAS EAR/PLS OXIMETRY 1: CPT

## 2023-12-24 PROCEDURE — 85027 COMPLETE CBC AUTOMATED: CPT

## 2023-12-24 PROCEDURE — 99222 1ST HOSP IP/OBS MODERATE 55: CPT | Performed by: INTERNAL MEDICINE

## 2023-12-24 PROCEDURE — 94660 CPAP INITIATION&MGMT: CPT

## 2023-12-24 PROCEDURE — 82948 REAGENT STRIP/BLOOD GLUCOSE: CPT

## 2023-12-24 PROCEDURE — 83735 ASSAY OF MAGNESIUM: CPT

## 2023-12-24 RX ADMIN — LORATADINE 10 MG: 10 TABLET ORAL at 09:15

## 2023-12-24 RX ADMIN — DOCUSATE SODIUM 100 MG: 100 CAPSULE, LIQUID FILLED ORAL at 17:29

## 2023-12-24 RX ADMIN — APIXABAN 5 MG: 5 TABLET, FILM COATED ORAL at 17:29

## 2023-12-24 RX ADMIN — SENNOSIDES 8.6 MG: 8.6 TABLET, FILM COATED ORAL at 21:32

## 2023-12-24 RX ADMIN — INSULIN LISPRO 1 UNITS: 100 INJECTION, SOLUTION INTRAVENOUS; SUBCUTANEOUS at 11:56

## 2023-12-24 RX ADMIN — DOCUSATE SODIUM 100 MG: 100 CAPSULE, LIQUID FILLED ORAL at 09:15

## 2023-12-24 RX ADMIN — METOPROLOL SUCCINATE 25 MG: 25 TABLET, FILM COATED, EXTENDED RELEASE ORAL at 09:15

## 2023-12-24 RX ADMIN — APIXABAN 5 MG: 5 TABLET, FILM COATED ORAL at 09:15

## 2023-12-24 RX ADMIN — DILTIAZEM HYDROCHLORIDE 120 MG: 120 CAPSULE, COATED, EXTENDED RELEASE ORAL at 06:14

## 2023-12-24 RX ADMIN — ATORVASTATIN CALCIUM 40 MG: 40 TABLET, FILM COATED ORAL at 17:29

## 2023-12-24 RX ADMIN — GLIPIZIDE 5 MG: 5 TABLET ORAL at 09:15

## 2023-12-24 NOTE — PROGRESS NOTES
[facility]  Progress Note  Name: Camryn Roblero I  MRN: [id number]  Unit/Bed#: -01 I Date of Admission: 12/22/2023   Date of Service: 12/24/2023 I Hospital Day: 2    Assessment/Plan   * Atrial flutter with rapid ventricular response (HCC)  Assessment & Plan  Newly diagnosed.  Denies history of arrhythmia   Echo (November 2023) which showed EF of 70% with evidence of diastolic dysfunction  HS Trops elevated  S/p IV Cardizem 50 mg x 2 with improvement in her heart rate  Completed Cardizem 30 mg PO q6hrs -> Transitioned to Oral Cardizem  mg daily (12/24)  [id number] of 6  S/p IV heparin infusion-> Transitioned to Oral Eliquis  CM following-> Price Checked for $119 monthly. Discussion with CM, able to get 1st month free and then apply online for more assistance through Pharmaceutical website.   C/w  metoprolol XL 25 mg daily  Cardiology Consulted-> Awaiting formal evaluation  Telemetry reviewed-> Afib with HR 's  With ambulation HR ranging from 120-140's. Symptomatic GOMEZ/SOB  Concern for episodes of Bradycardia overnight  Continue Supportive care     Chronic diastolic CHF (congestive heart failure) (HCC)  Assessment & Plan  Wt Readings from Last 3 Encounters:   12/22/23 81.6 kg (179 lb 14.3 oz)   12/22/23 82.1 kg (181 lb)   11/24/23 83 kg (183 lb)       Does not appear to be in an acute CHF exacerbation at this time  Echo (Nov. 2023): EF >70%. Systolic function is hyperdynamic. G2DD. There is a small pericardial effusion posterior to the heart which is similar in appearance compared to prior study dated 10/15/2020.  Holding diuretics (patient on torsemide 2ce wkly and spironolactone daily)  Holding valsartan and Norvasc   Once cleared by Cardiology-> Plan to slowly reintroduce home regimen. Likely remain off amlodipine.   Monitor Daily weights and I/O's    Diabetes mellitus without complication (HCC)  Assessment & Plan  Lab Results   Component Value Date    HGBA1C 6.3 12/22/2023        Recent Labs     12/23/23  1547 12/23/23  1656 12/24/23  0710 12/24/23  1142   POCGLU 104 186* 128 191*         Blood Sugar Average: Last 72 hrs:  (P) [id number]    Last A1c 6.3.  Patient is on glipizide 5 mg daily. Patient unsure if she is still taking metformin.    Holding of metformin for now  Continue Accu-Checks and SSI AC/HS  Hypoglycemic protocol  Diabetic diet    COPD (chronic obstructive pulmonary disease) (HCC)  Assessment & Plan  No acute exacerbation at this time stable.   She sees Pulmonary Dr Garland for follow-up in Feb 2024.   Pt continues to use O2 supplementation as needed.   Reportedly her 6 min walk test showed desaturation to 82% with exertion.   Continue supportive care    Essential hypertension  Assessment & Plan  Blood pressure reviewed and fluctuating ->Intermittent Hypotension overnight  Continue to hold Norvasc, Aldactone and valsartan for now  Continue with metoprolol and Cardizem    Mixed hyperlipidemia  Assessment & Plan  Continue statin therapy    Carcinoid tumor of lung  Assessment & Plan  CT of chest in July 2023 and no change.   Pt saw Hematology in May 2022 and told that only needs to f/u as needed.   Also continue to follow-up with pulmonary OP.             VTE Pharmacologic Prophylaxis: VTE Score: 6 High Risk (Score >/= 5) - Pharmacological DVT Prophylaxis Ordered: apixaban (Eliquis). Sequential Compression Devices Ordered.    Mobility:   Basic Mobility Inpatient Raw Score: 17  JH-HLM Goal: 5: Stand one or more mins  JH-HLM Achieved: 8: Walk 250 feet ot more  HLM Goal achieved. Continue to encourage appropriate mobility.    Patient Centered Rounds: I performed bedside rounds with nursing staff today.   Discussions with Specialists or Other Care Team Provider: Cards    Education and Discussions with Family / Patient: Updated  (daughter) via phone.    Total Time Spent on Date of Encounter in care of patient: 30 mins. This time was spent on one or more  of the following: performing physical exam; counseling and coordination of care; obtaining or reviewing history; documenting in the medical record; reviewing/ordering tests, medications or procedures; communicating with other healthcare professionals and discussing with patient's family/caregivers.    Current Length of Stay: 2 day(s)  Current Patient Status: Inpatient   Certification Statement: The patient will continue to require additional inpatient hospital stay due to new onset afib with symptomatic GOMEZ with ambulation and intermittent elevated HR requiring Medication adjustment and formal Cardiology evaluation   Discharge Plan: Anticipate discharge in 24-48 hrs to home.    Code Status: Level 1 - Full Code    Subjective:   Patient stated she is having some dyspnea on exertion accompanied by some heart palpitations. Patient denies any chest pain or abdominal pain.     Objective:     Vitals:   Temp (24hrs), Av.4 °F (36.3 °C), Min:97.4 °F (36.3 °C), Max:97.5 °F (36.4 °C)    Temp:  [97.4 °F (36.3 °C)-97.5 °F (36.4 °C)] 97.5 °F (36.4 °C)  HR:  [66-83] 68  Resp:  [16-18] 18  BP: ()/(46-92) 120/68  SpO2:  [91 %-98 %] 98 %  Body mass index is 36.33 kg/m².     Input and Output Summary (last 24 hours):     Intake/Output Summary (Last 24 hours) at 2023 1341  Last data filed at 2023 1001  Gross per 24 hour   Intake 350 ml   Output 350 ml   Net 0 ml       Physical Exam:   Physical Exam  Vitals and nursing note reviewed.   Constitutional:       General: She is not in acute distress.     Appearance: She is obese.   HENT:      Head: Normocephalic.      Nose: Nose normal. No congestion.      Mouth/Throat:      Mouth: Mucous membranes are moist.      Pharynx: Oropharynx is clear.   Cardiovascular:      Rate and Rhythm: Normal rate. Rhythm irregular.      Pulses: Normal pulses.      Heart sounds: No murmur heard.  Pulmonary:      Effort: Pulmonary effort is normal. No respiratory distress.   Abdominal:       General: Bowel sounds are normal. There is distension.      Palpations: Abdomen is soft.      Tenderness: There is no abdominal tenderness.   Musculoskeletal:         General: Normal range of motion.      Cervical back: Normal range of motion.      Right lower leg: No edema.      Left lower leg: No edema.   Skin:     General: Skin is warm and dry.      Capillary Refill: Capillary refill takes less than 2 seconds.   Neurological:      Mental Status: She is alert. Mental status is at baseline.      Motor: No weakness.   Psychiatric:         Mood and Affect: Mood is anxious.         Speech: Speech normal.         Behavior: Behavior is cooperative.          Additional Data:     Labs:  Results from last 7 days   Lab Units 12/24/23  0425 12/23/23  0452 12/22/23  1532   WBC Thousand/uL 6.84   < > 8.76   HEMOGLOBIN g/dL 12.4   < > 12.8   HEMATOCRIT % 38.7   < > 40.1   PLATELETS Thousands/uL 258   < > 273   NEUTROS PCT %  --   --  82*   LYMPHS PCT %  --   --  12*   MONOS PCT %  --   --  5   EOS PCT %  --   --  0    < > = values in this interval not displayed.     Results from last 7 days   Lab Units 12/24/23  0425   SODIUM mmol/L 135   POTASSIUM mmol/L 4.2   CHLORIDE mmol/L 104   CO2 mmol/L 24   BUN mg/dL 24   CREATININE mg/dL 0.92   ANION GAP mmol/L 7   CALCIUM mg/dL 9.3   ALBUMIN g/dL 3.8   TOTAL BILIRUBIN mg/dL 0.74   ALK PHOS U/L 47   ALT U/L 39   AST U/L 69*   GLUCOSE RANDOM mg/dL 112     Results from last 7 days   Lab Units 12/22/23  1606   INR  1.16     Results from last 7 days   Lab Units 12/24/23  1142 12/24/23  0710 12/23/23  1656 12/23/23  1547 12/23/23  1107 12/23/23  0714 12/22/23  2200   POC GLUCOSE mg/dl 191* 128 186* 104 109 118 161*     Results from last 7 days   Lab Units 12/22/23  1455   HEMOGLOBIN A1C  6.3     Results from last 7 days   Lab Units 12/22/23  1845 12/22/23  1606   LACTIC ACID mmol/L 1.6 2.4*   PROCALCITONIN ng/ml  --  <0.05       Lines/Drains:  Invasive Devices       Peripheral Intravenous  Line  Duration             Peripheral IV 12/22/23 Left Antecubital 1 day    Peripheral IV 12/22/23 Right Antecubital 1 day                      Telemetry:  Telemetry Orders (From admission, onward)               24 Hour Telemetry Monitoring  Continuous x 24 Hours (Telem)        Expiring   Question:  Reason for 24 Hour Telemetry  Answer:  Arrhythmias requiring acute medical intervention / PPM or ICD malfunction                     Telemetry Reviewed: Atrial fibrillation. HR averaging   Indication for Continued Telemetry Use: Arrthymias requiring medical therapy             Imaging: No pertinent imaging reviewed.    Recent Cultures (last 7 days):   Results from last 7 days   Lab Units 12/22/23  1832 12/22/23  1606   BLOOD CULTURE   --  No Growth at 24 hrs.  No Growth at 24 hrs.   URINE CULTURE  50,000-59,000 cfu/ml  --        Last 24 Hours Medication List:   Current Facility-Administered Medications   Medication Dose Route Frequency Provider Last Rate    acetaminophen  650 mg Oral Q6H PRN Debra Haddad MD      apixaban  5 mg Oral BID DEANNE Faria      atorvastatin  40 mg Oral Daily With Dinner Debra Haddad MD      diltiazem  120 mg Oral Daily DEANNE Faria      docusate sodium  100 mg Oral BID DEANNE Faria      glipiZIDE  5 mg Oral Daily Debra Haddad MD      insulin lispro  1-5 Units Subcutaneous TID AC Debra Haddad MD      levalbuterol  1.25 mg Nebulization Q8H PRN Debra Haddad MD      loratadine  10 mg Oral Daily Debra Haddad MD      metoprolol succinate  25 mg Oral Daily Debra Haddad MD      ondansetron  4 mg Intravenous Q6H PRN Debra Haddad MD      oxyCODONE-acetaminophen  1 tablet Oral Q4H PRN Debra Haddad MD      senna  1 tablet Oral HS DEANNE Faria          Today, Patient Was Seen By: DEANNE Faria    **Please Note: This note may have been constructed using a voice recognition system.**

## 2023-12-24 NOTE — PLAN OF CARE
Problem: Potential for Falls  Goal: Patient will remain free of falls  Description: INTERVENTIONS:  - Educate patient/family on patient safety including physical limitations  - Instruct patient to call for assistance with activity   - Consult OT/PT to assist with strengthening/mobility   - Keep Call bell within reach  - Keep bed low and locked with side rails adjusted as appropriate  - Keep care items and personal belongings within reach  - Initiate and maintain comfort rounds  - Make Fall Risk Sign visible to staff  - Offer Toileting every 2 Hours, in advance of need  - Initiate/Maintain bed alarm  - Obtain necessary fall risk management equipment:   - Apply yellow socks and bracelet for high fall risk patients  - Consider moving patient to room near nurses station  Outcome: Progressing         Problem: PAIN - ADULT  Goal: Verbalizes/displays adequate comfort level or baseline comfort level  Description: Interventions:  - Encourage patient to monitor pain and request assistance  - Assess pain using appropriate pain scale  - Administer analgesics based on type and severity of pain and evaluate response  - Implement non-pharmacological measures as appropriate and evaluate response  - Consider cultural and social influences on pain and pain management  - Notify physician/advanced practitioner if interventions unsuccessful or patient reports new pain  Outcome: Progressing      Problem: SAFETY ADULT  Goal: Patient will remain free of falls  Description: INTERVENTIONS:  - Educate patient/family on patient safety including physical limitations  - Instruct patient to call for assistance with activity   - Consult OT/PT to assist with strengthening/mobility   - Keep Call bell within reach  - Keep bed low and locked with side rails adjusted as appropriate  - Keep care items and personal belongings within reach  - Initiate and maintain comfort rounds  - Make Fall Risk Sign visible to staff  - Offer Toileting every 2 Hours,  in advance of need  - Initiate/Maintain bed alarm  - Obtain necessary fall risk management equipment:   - Apply yellow socks and bracelet for high fall risk patients  - Consider moving patient to room near nurses station  Outcome: Progressing         Problem: CARDIOVASCULAR - ADULT  Goal: Maintains optimal cardiac output and hemodynamic stability  Description: INTERVENTIONS:  - Monitor I/O, vital signs and rhythm  - Monitor for S/S and trends of decreased cardiac output  - Administer and titrate ordered vasoactive medications to optimize hemodynamic stability  - Assess quality of pulses, skin color and temperature  - Assess for signs of decreased coronary artery perfusion  - Instruct patient to report change in severity of symptoms  Outcome: Progressing  Goal: Absence of cardiac dysrhythmias or at baseline rhythm  Description: INTERVENTIONS:  - Continuous cardiac monitoring, vital signs, obtain 12 lead EKG if ordered  - Administer antiarrhythmic and heart rate control medications as ordered  - Monitor electrolytes and administer replacement therapy as ordered  Outcome: Progressing

## 2023-12-24 NOTE — ASSESSMENT & PLAN NOTE
Lab Results   Component Value Date    HGBA1C 6.3 12/22/2023       Recent Labs     12/23/23  1547 12/23/23  1656 12/24/23  0710 12/24/23  1142   POCGLU 104 186* 128 191*         Blood Sugar Average: Last 72 hrs:  (P) 142.1935266214561492    Last A1c 6.3.  Patient is on glipizide 5 mg daily. Patient unsure if she is still taking metformin.    Holding of metformin for now  Continue Accu-Checks and SSI AC/HS  Hypoglycemic protocol  Diabetic diet
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
## 2023-12-24 NOTE — PLAN OF CARE
Problem: Potential for Falls  Goal: Patient will remain free of falls  Description: INTERVENTIONS:  - Educate patient/family on patient safety including physical limitations  - Instruct patient to call for assistance with activity   - Consult OT/PT to assist with strengthening/mobility   - Keep Call bell within reach  - Keep bed low and locked with side rails adjusted as appropriate  - Keep care items and personal belongings within reach  - Initiate and maintain comfort rounds  - Make Fall Risk Sign visible to staff  - Offer Toileting every  Hours, in advance of need  - Initiate/Maintain alarm  - Obtain necessary fall risk management equipment:   - Apply yellow socks and bracelet for high fall risk patients  - Consider moving patient to room near nurses station  Outcome: Progressing     Problem: Prexisting or High Potential for Compromised Skin Integrity  Goal: Skin integrity is maintained or improved  Description: INTERVENTIONS:  - Identify patients at risk for skin breakdown  - Assess and monitor skin integrity  - Assess and monitor nutrition and hydration status  - Monitor labs   - Assess for incontinence   - Turn and reposition patient  - Assist with mobility/ambulation  - Relieve pressure over bony prominences  - Avoid friction and shearing  - Provide appropriate hygiene as needed including keeping skin clean and dry  - Evaluate need for skin moisturizer/barrier cream  - Collaborate with interdisciplinary team   - Patient/family teaching  - Consider wound care consult   Outcome: Progressing     Problem: PAIN - ADULT  Goal: Verbalizes/displays adequate comfort level or baseline comfort level  Description: Interventions:  - Encourage patient to monitor pain and request assistance  - Assess pain using appropriate pain scale  - Administer analgesics based on type and severity of pain and evaluate response  - Implement non-pharmacological measures as appropriate and evaluate response  - Consider cultural and  social influences on pain and pain management  - Notify physician/advanced practitioner if interventions unsuccessful or patient reports new pain  Outcome: Progressing     Problem: INFECTION - ADULT  Goal: Absence or prevention of progression during hospitalization  Description: INTERVENTIONS:  - Assess and monitor for signs and symptoms of infection  - Monitor lab/diagnostic results  - Monitor all insertion sites, i.e. indwelling lines, tubes, and drains  - Monitor endotracheal if appropriate and nasal secretions for changes in amount and color  - [place] appropriate cooling/warming therapies per order  - Administer medications as ordered  - Instruct and encourage patient and family to use good hand hygiene technique  - Identify and instruct in appropriate isolation precautions for identified infection/condition  Outcome: Progressing  Goal: Absence of fever/infection during neutropenic period  Description: INTERVENTIONS:  - Monitor WBC    Outcome: Progressing     Problem: SAFETY ADULT  Goal: Patient will remain free of falls  Description: INTERVENTIONS:  - Educate patient/family on patient safety including physical limitations  - Instruct patient to call for assistance with activity   - Consult OT/PT to assist with strengthening/mobility   - Keep Call bell within reach  - Keep bed low and locked with side rails adjusted as appropriate  - Keep care items and personal belongings within reach  - Initiate and maintain comfort rounds  - Make Fall Risk Sign visible to staff  - Offer Toileting every  Hours, in advance of need  - Initiate/Maintain alarm  - Obtain necessary fall risk management equipment:   - Apply yellow socks and bracelet for high fall risk patients  - Consider moving patient to room near nurses station  Outcome: Progressing  Goal: Maintain or return to baseline ADL function  Description: INTERVENTIONS:  -  Assess patient's ability to carry out ADLs; assess patient's baseline for ADL function and  identify physical deficits which impact ability to perform ADLs (bathing, care of mouth/teeth, toileting, grooming, dressing, etc.)  - Assess/evaluate cause of self-care deficits   - Assess range of motion  - Assess patient's mobility; develop plan if impaired  - Assess patient's need for assistive devices and provide as appropriate  - Encourage maximum independence but intervene and supervise when necessary  - Involve family in performance of ADLs  - Assess for home care needs following discharge   - Consider OT consult to assist with ADL evaluation and planning for discharge  - Provide patient education as appropriate  Outcome: Progressing  Goal: Maintains/Returns to pre admission functional level  Description: INTERVENTIONS:  - Perform AM-PAC 6 Click Basic Mobility/ Daily Activity assessment daily.  - Set and communicate daily mobility goal to care team and patient/family/caregiver.   - Collaborate with rehabilitation services on mobility goals if consulted  - Perform Range of Motion times a day.  - Reposition patient every  hours.  - Dangle patient  times a day  - Stand patient  times a day  - Ambulate patient  times a day  - Out of bed to chair  times a day   - Out of bed for meals  times a day  - Out of bed for toileting  - Record patient progress and toleration of activity level   Outcome: Progressing     Problem: DISCHARGE PLANNING  Goal: Discharge to home or other facility with appropriate resources  Description: INTERVENTIONS:  - Identify barriers to discharge w/patient and caregiver  - Arrange for needed discharge resources and transportation as appropriate  - Identify discharge learning needs (meds, wound care, etc.)  - Arrange for interpretive services to assist at discharge as needed  - Refer to Case Management Department for coordinating discharge planning if the patient needs post-hospital services based on physician/advanced practitioner order or complex needs related to functional status, cognitive  ability, or social support system  Outcome: Progressing

## 2023-12-24 NOTE — ASSESSMENT & PLAN NOTE
Wt Readings from Last 3 Encounters:   12/22/23 81.6 kg (179 lb 14.3 oz)   12/22/23 82.1 kg (181 lb)   11/24/23 83 kg (183 lb)       Does not appear to be in an acute CHF exacerbation at this time  Echo (Nov. 2023): EF >70%. Systolic function is hyperdynamic. G2DD. There is a small pericardial effusion posterior to the heart which is similar in appearance compared to prior study dated 10/15/2020.  Holding diuretics (patient on torsemide 2ce wkly and spironolactone daily)  Holding valsartan and Norvasc   Once cleared by Cardiology-> Plan to slowly reintroduce home regimen. Likely remain off amlodipine.   Monitor Daily weights and I/O's

## 2023-12-24 NOTE — ASSESSMENT & PLAN NOTE
Newly diagnosed.  Denies history of arrhythmia   Echo (November 2023) which showed EF of 70% with evidence of diastolic dysfunction  HS Trops elevated  S/p IV Cardizem 50 mg x 2 with improvement in her heart rate  Completed Cardizem 30 mg PO q6hrs -> Transitioned to Oral Cardizem  mg daily (12/24)  [id number] of 6  S/p IV heparin infusion-> Transitioned to Oral Eliquis  CM following-> Price Checked for $119 monthly. Discussion with CM, able to get 1st month free and then apply online for more assistance through Pharmaceutical website.   C/w  metoprolol XL 25 mg daily  Cardiology Consulted-> Awaiting formal evaluation  Telemetry reviewed-> Afib with HR 's  With ambulation HR ranging from 120-140's. Symptomatic GOMEZ/SOB  Concern for episodes of Bradycardia overnight  Continue Supportive care

## 2023-12-24 NOTE — ASSESSMENT & PLAN NOTE
Blood pressure reviewed and fluctuating ->Intermittent Hypotension overnight  Continue to hold Norvasc, Aldactone and valsartan for now  Continue with metoprolol and Cardizem

## 2023-12-25 PROBLEM — R79.89 ELEVATED TROPONIN: Status: ACTIVE | Noted: 2023-12-25

## 2023-12-25 LAB
GLUCOSE SERPL-MCNC: 121 MG/DL (ref 65–140)
GLUCOSE SERPL-MCNC: 133 MG/DL (ref 65–140)
GLUCOSE SERPL-MCNC: 148 MG/DL (ref 65–140)
GLUCOSE SERPL-MCNC: 252 MG/DL (ref 65–140)

## 2023-12-25 PROCEDURE — 99232 SBSQ HOSP IP/OBS MODERATE 35: CPT | Performed by: PHYSICIAN ASSISTANT

## 2023-12-25 PROCEDURE — 94660 CPAP INITIATION&MGMT: CPT

## 2023-12-25 PROCEDURE — 94760 N-INVAS EAR/PLS OXIMETRY 1: CPT

## 2023-12-25 PROCEDURE — 82948 REAGENT STRIP/BLOOD GLUCOSE: CPT

## 2023-12-25 RX ORDER — SPIRONOLACTONE 25 MG/1
25 TABLET ORAL DAILY
Status: DISCONTINUED | OUTPATIENT
Start: 2023-12-25 | End: 2023-12-26 | Stop reason: HOSPADM

## 2023-12-25 RX ORDER — LOSARTAN POTASSIUM 50 MG/1
100 TABLET ORAL DAILY
Status: DISCONTINUED | OUTPATIENT
Start: 2023-12-25 | End: 2023-12-26 | Stop reason: HOSPADM

## 2023-12-25 RX ADMIN — GLIPIZIDE 5 MG: 5 TABLET ORAL at 08:51

## 2023-12-25 RX ADMIN — APIXABAN 5 MG: 5 TABLET, FILM COATED ORAL at 16:53

## 2023-12-25 RX ADMIN — METOPROLOL SUCCINATE 25 MG: 25 TABLET, FILM COATED, EXTENDED RELEASE ORAL at 08:51

## 2023-12-25 RX ADMIN — ATORVASTATIN CALCIUM 40 MG: 40 TABLET, FILM COATED ORAL at 16:53

## 2023-12-25 RX ADMIN — DOCUSATE SODIUM 100 MG: 100 CAPSULE, LIQUID FILLED ORAL at 08:51

## 2023-12-25 RX ADMIN — LORATADINE 10 MG: 10 TABLET ORAL at 08:51

## 2023-12-25 RX ADMIN — SPIRONOLACTONE 25 MG: 25 TABLET, FILM COATED ORAL at 09:10

## 2023-12-25 RX ADMIN — APIXABAN 5 MG: 5 TABLET, FILM COATED ORAL at 08:51

## 2023-12-25 RX ADMIN — DILTIAZEM HYDROCHLORIDE 120 MG: 120 CAPSULE, COATED, EXTENDED RELEASE ORAL at 08:51

## 2023-12-25 RX ADMIN — DOCUSATE SODIUM 100 MG: 100 CAPSULE, LIQUID FILLED ORAL at 16:53

## 2023-12-25 RX ADMIN — LOSARTAN POTASSIUM 100 MG: 50 TABLET, FILM COATED ORAL at 09:10

## 2023-12-25 NOTE — PLAN OF CARE
Problem: Potential for Falls  Goal: Patient will remain free of falls  Description: INTERVENTIONS:  - Educate patient/family on patient safety including physical limitations  - Instruct patient to call for assistance with activity   - Consult OT/PT to assist with strengthening/mobility   - Keep Call bell within reach  - Keep bed low and locked with side rails adjusted as appropriate  - Keep care items and personal belongings within reach  - Initiate and maintain comfort rounds  - Offer Toileting every 2 Hours, in advance of need  - Initiate/Maintain bed alarm  - Obtain necessary fall risk management equipment: rolling walker  - Apply yellow socks and bracelet for high fall risk patients  - Consider moving patient to room near nurses station  Outcome: Progressing     Problem: Prexisting or High Potential for Compromised Skin Integrity  Goal: Skin integrity is maintained or improved  Description: INTERVENTIONS:  - Identify patients at risk for skin breakdown  - Assess and monitor skin integrity  - Assess and monitor nutrition and hydration status  - Monitor labs   - Assess for incontinence   - Turn and reposition patient  - Assist with mobility/ambulation  - Relieve pressure over bony prominences  - Avoid friction and shearing  - Provide appropriate hygiene as needed including keeping skin clean and dry  - Evaluate need for skin moisturizer/barrier cream  - Collaborate with interdisciplinary team   - Patient/family teaching  - Consider wound care consult   Outcome: Progressing     Problem: PAIN - ADULT  Goal: Verbalizes/displays adequate comfort level or baseline comfort level  Description: Interventions:  - Encourage patient to monitor pain and request assistance  - Assess pain using appropriate pain scale  - Administer analgesics based on type and severity of pain and evaluate response  - Implement non-pharmacological measures as appropriate and evaluate response  - Consider cultural and social influences on pain  and pain management  - Notify physician/advanced practitioner if interventions unsuccessful or patient reports new pain  Outcome: Progressing

## 2023-12-25 NOTE — ASSESSMENT & PLAN NOTE
Presented from PCP office with tachycardia and hypotension, found to be in atrial flutter with RVR on arrival to ED. New diagnosis, denies prior history of arrhythmias.  S/p IV Cardizem 50 mg x 2 in ED with improvement HR  Cardizem 30 mg PO q6hrs -> Cardizem  mg daily (12/24)  [id number] of 6, needs anticoagulation   IV heparin gtt -> PO Eliquis  CM price checked for $119/month, per CM able to get 1st month free and then apply online for more assistance through pharmaceutical website  Continue Toprol-XL XL 25 mg daily, hold torsemide for now  Cardiology consulted, continue Eliquis and Cardizem, hold amlodipine and terazosin, & resume torsemide, valsartan and spironolactone at discharge  Telemetry reviewed, atrial flutter with HR in 70s with no acute events overnight  Check orthostatics, assess mobility with resuming ARB and spironolactone

## 2023-12-25 NOTE — ASSESSMENT & PLAN NOTE
Troponins trended up, peaked at 364 on arrival to ED  Suspect non-MI with demand ischemia in setting of atrial flutter with RVR  EKG showed atrial flutter with variable AV block  D-dimer negative with age adjustment  Notify provider if chest pain develops

## 2023-12-25 NOTE — DISCHARGE SUMMARY
[facility]  Discharge- Camryn DURBIN Osbaldo 1940, 83 y.o. female MRN: [id number]  Unit/Bed#: -[facility] Encounter: [id number]  Primary Care Provider: Abiel Seals MD   Date and time admitted to hospital: 12/22/2023  3:01 PM    * Atrial flutter with rapid ventricular response (HCC)  Assessment & Plan  Presented from PCP office with tachycardia and hypotension, found to be in atrial flutter with RVR in ED. New diagnosis, denies prior history of arrhythmias although arrhythmia was noted on Echo in November.  S/p IV Cardizem 50 mg x 2 in ED with improvement HR  Cardizem 30 mg PO q6hrs -> Cardizem  mg daily (12/24)  [id number] of 6, IV heparin gtt -> PO Eliquis  CM price checked for $119/month, per CM able to get 1st month free & can apply online for more assistance through [employer] website. This was discussed with daughter Naomie.  Telemetry reviewed, atrial flutter with -160s with ambulation, then with rest HR 50-70s  Cardiology following:  Given tachycardia with ambulation, discharge with increased Toprol-XL 50mg BID, Cardizem and Eliquis.   Continue Cardizem, Eliquis, valsartan, spironolactone, torsemide at discharge.    Hold Norvasc, terazosin at discharge with increased BB + Cardizem.  To consider ablation in the future, further discussion in outpatient setting.  Discussed with patient purchasing pulse oximeter at home to monitor HR and SpO2  PT/OT evaluated, plan for HHS set up per CM at discharge    Elevated troponin  Assessment & Plan  Troponins trended up, peaked at 364 on arrival to ED  Suspect non-MI with demand ischemia in setting of atrial flutter with RVR  EKG showed atrial flutter with variable AV block  D-dimer negative with age adjustment  Notify provider if chest pain develops    Chronic diastolic CHF (congestive heart failure) (HCC)  Assessment & Plan  Wt Readings from Last 3 Encounters:   12/22/23 81.6 kg (179 lb 14.3 oz)   12/22/23 82.1 kg (181 lb)   11/24/23  83 kg (183 lb)       Does not appear to be in acute exacerbation   Echo (11/2023): EF >70%. Systolic function is hyperdynamic. G2DD.   Resumed spironolactone, holding home torsemide 3x/week  Resumed ARB, hold Norvasc at discharge with increasing Toprol and starting Cardizem  Daily weights, I/Os    Carcinoid tumor of lung  Assessment & Plan  Known hx carcinoid tumor of lung, recent CT chest in July 2023 with no change  Recently saw hematology in 5/2022, told that she only needs to F/U PRN   BC x2: NGTD (72 hrs). Urine cx with mixed contaminants.   Repeat CXR today personally reviewed, no significant changes compared to admission CXR    Essential hypertension  Assessment & Plan  BP previously intermittently soft, since improved & orthostatic BP negative  Continue to hold Norvasc, torsemide for now  Restarted spironolactone, losartan based on hospital formulary  Continue Cardizem, increase Toprol-XL per cardiology    Mixed hyperlipidemia  Assessment & Plan  Continue statin     COPD (chronic obstructive pulmonary disease) (HCC)  Assessment & Plan  Not in acute exacerbation, remains stable on room air  Follows with Dr. Garland from pulmonology, scheduled outpatient appointment in 2/2024  Reportedly her 6 min walk test showed desaturation to 82% with exertion, uses supplemental O2 PRN  Continue outpatient follow-up with pulmonology    Diabetes mellitus without complication ([facility])  Assessment & Plan  Lab Results   Component Value Date    HGBA1C 6.3 12/22/2023       Recent Labs     12/25/23  1531 12/25/23  2036 12/26/23  0721 12/26/23  1058   POCGLU 133 252* 130 210*         Blood Sugar Average: Last 72 hrs:  (P) [id number]    Hold home glipizide, metformin while inpatient  [facility]  Hypoglycemia protocol, diabetic diet      Medical Problems       Resolved Problems  Date Reviewed: 12/26/2023   None       Discharging Physician / Practitioner: Jory Nam PA-C  PCP: Abiel Seals  MD  Admission Date:   Admission Orders (From admission, onward)       Ordered        12/22/23 1947  INPATIENT ADMISSION  Once                          Discharge Date: 12/26/23    Consultations During Hospital Stay:  Cardiology    Procedures Performed:   None    Significant Findings / Test Results:   CXR: No acute cardiopulmonary disease.    Incidental Findings:   None    Test Results Pending at Discharge (will require follow up):   Final blood culture results     Outpatient Tests Requested:  Follow-up with cardiology for ongoing management of atrial flutter, chronic diastolic CHF  Follow-up with pulmonology for management of COPD, carcinoid tumor of lung  Follow-up with PCP for management chronic conditions    Complications: None    Reason for Admission: Atrial flutter with RVR    Hospital Course:   Camryn Roblero is a 83 y.o. female patient, PMH DM, HTN, COPD, HLD, chronic diastolic CHF, carcinoid tumor of lung, who originally presented to the hospital on 12/22/2023 due to new onset atrial flutter with RVR, initially noted from PCP office with tachycardia and hypotension. On arrival to ED, EKG revealed atrial flutter with RVR, received IV Cardizem with improvement in HR. Based on [id number] score, patient was started on IV heparin gtt and scheduled oral Cardizem for rate control. Case management price checked Eliquis, ultimately agreeable and per discussion with daughter recommended further assistance for possible coupons or discount on pharmaceutical website as needed.     Cardiology evaluated patient, atrial flutter and HR had improved, tolerated transition to Eliquis and Cardizem CD daily dosing with increased Toprol-XL dosing. Recommend close outpatient follow-up with cardiology for further management, to discuss need for ablation in the future.    Please see above list of diagnoses and related plan for additional information.     Condition at Discharge: stable    Discharge Day Visit / Exam: *Please see separate  progress note for today for examination findings day of discharge.*    Discussion with Family: Attempted to update  (daughter) via phone. Left voicemail.     Discharge instructions/Information to patient and family:   See after visit summary for information provided to patient and family.      Provisions for Follow-Up Care:  See after visit summary for information related to follow-up care and any pertinent home health orders.      Mobility at time of Discharge:   Basic Mobility Inpatient Raw Score: 17  JH-HLM Goal: 5: Stand one or more mins  JH-HLM Achieved: 7: Walk 25 feet or more  HLM Goal achieved. Continue to encourage appropriate mobility.     Disposition:   Home with [facility]NA Services (Reminder: Complete face to face encounter)    Planned Readmission: None     Discharge Statement:  I spent 55 minutes discharging the patient. This time was spent on the day of discharge. I had direct contact with the patient on the day of discharge. Greater than 50% of the total time was spent examining patient, answering all patient questions, arranging and discussing plan of care with patient as well as directly providing post-discharge instructions.  Additional time then spent on discharge activities.    Discharge Medications:  See after visit summary for reconciled discharge medications provided to patient and/or family.      **Please Note: This note may have been constructed using a voice recognition system**

## 2023-12-25 NOTE — PLAN OF CARE
Problem: Potential for Falls  Goal: Patient will remain free of falls  Description: INTERVENTIONS:  - Educate patient/family on patient safety including physical limitations  - Instruct patient to call for assistance with activity   - Consult OT/PT to assist with strengthening/mobility   - Keep Call bell within reach  - Keep bed low and locked with side rails adjusted as appropriate  - Keep care items and personal belongings within reach  - Initiate and maintain comfort rounds  - Make Fall Risk Sign visible to staff  - Offer Toileting every 2 Hours, in advance of need  - Initiate/Maintain bed alarm  - Obtain necessary fall risk management equipment:   - Apply yellow socks and bracelet for high fall risk patients  - Consider moving patient to room near nurses station  Outcome: Progressing     Problem: Prexisting or High Potential for Compromised Skin Integrity  Goal: Skin integrity is maintained or improved  Description: INTERVENTIONS:  - Identify patients at risk for skin breakdown  - Assess and monitor skin integrity  - Assess and monitor nutrition and hydration status  - Monitor labs   - Assess for incontinence   - Turn and reposition patient  - Assist with mobility/ambulation  - Relieve pressure over bony prominences  - Avoid friction and shearing  - Provide appropriate hygiene as needed including keeping skin clean and dry  - Evaluate need for skin moisturizer/barrier cream  - Collaborate with interdisciplinary team   - Patient/family teaching  - Consider wound care consult   Outcome: Progressing     Problem: PAIN - ADULT  Goal: Verbalizes/displays adequate comfort level or baseline comfort level  Description: Interventions:  - Encourage patient to monitor pain and request assistance  - Assess pain using appropriate pain scale  - Administer analgesics based on type and severity of pain and evaluate response  - Implement non-pharmacological measures as appropriate and evaluate response  - Consider cultural and  social influences on pain and pain management  - Notify physician/advanced practitioner if interventions unsuccessful or patient reports new pain  Outcome: Progressing     Problem: INFECTION - ADULT  Goal: Absence or prevention of progression during hospitalization  Description: INTERVENTIONS:  - Assess and monitor for signs and symptoms of infection  - Monitor lab/diagnostic results  - Monitor all insertion sites, i.e. indwelling lines, tubes, and drains  - Monitor endotracheal if appropriate and nasal secretions for changes in amount and color  - Sharon appropriate cooling/warming therapies per order  - Administer medications as ordered  - Instruct and encourage patient and family to use good hand hygiene technique  - Identify and instruct in appropriate isolation precautions for identified infection/condition  Outcome: Progressing  Goal: Absence of fever/infection during neutropenic period  Description: INTERVENTIONS:  - Monitor WBC    Outcome: Progressing     Problem: SAFETY ADULT  Goal: Patient will remain free of falls  Description: INTERVENTIONS:  - Educate patient/family on patient safety including physical limitations  - Instruct patient to call for assistance with activity   - Consult OT/PT to assist with strengthening/mobility   - Keep Call bell within reach  - Keep bed low and locked with side rails adjusted as appropriate  - Keep care items and personal belongings within reach  - Initiate and maintain comfort rounds  - Make Fall Risk Sign visible to staff  - Offer Toileting every 2 Hours, in advance of need  - Initiate/Maintain bed alarm  - Obtain necessary fall risk management equipment:   - Apply yellow socks and bracelet for high fall risk patients  - Consider moving patient to room near nurses station  Outcome: Progressing  Goal: Maintain or return to baseline ADL function  Description: INTERVENTIONS:  -  Assess patient's ability to carry out ADLs; assess patient's baseline for ADL function and  identify physical deficits which impact ability to perform ADLs (bathing, care of mouth/teeth, toileting, grooming, dressing, etc.)  - Assess/evaluate cause of self-care deficits   - Assess range of motion  - Assess patient's mobility; develop plan if impaired  - Assess patient's need for assistive devices and provide as appropriate  - Encourage maximum independence but intervene and supervise when necessary  - Involve family in performance of ADLs  - Assess for home care needs following discharge   - Consider OT consult to assist with ADL evaluation and planning for discharge  - Provide patient education as appropriate  Outcome: Progressing  Goal: Maintains/Returns to pre admission functional level  Description: INTERVENTIONS:  - Perform AM-PAC 6 Click Basic Mobility/ Daily Activity assessment daily.  - Set and communicate daily mobility goal to care team and patient/family/caregiver.   - Collaborate with rehabilitation services on mobility goals if consulted  - Perform Range of Motion 3 times a day.  - Reposition patient every 2 hours.  - Dangle patient 3 times a day  - Stand patient 3 times a day  - Ambulate patient 3 times a day  - Out of bed to chair 3 times a day   - Out of bed for meals 3 times a day  - Out of bed for toileting  - Record patient progress and toleration of activity level   Outcome: Progressing     Problem: DISCHARGE PLANNING  Goal: Discharge to home or other facility with appropriate resources  Description: INTERVENTIONS:  - Identify barriers to discharge w/patient and caregiver  - Arrange for needed discharge resources and transportation as appropriate  - Identify discharge learning needs (meds, wound care, etc.)  - Arrange for interpretive services to assist at discharge as needed  - Refer to Case Management Department for coordinating discharge planning if the patient needs post-hospital services based on physician/advanced practitioner order or complex needs related to functional status,  cognitive ability, or social support system  Outcome: Progressing     Problem: Knowledge Deficit  Goal: Patient/family/caregiver demonstrates understanding of disease process, treatment plan, medications, and discharge instructions  Description: Complete learning assessment and assess knowledge base.  Interventions:  - Provide teaching at level of understanding  - Provide teaching via preferred learning methods  Outcome: Progressing     Problem: CARDIOVASCULAR - ADULT  Goal: Maintains optimal cardiac output and hemodynamic stability  Description: INTERVENTIONS:  - Monitor I/O, vital signs and rhythm  - Monitor for S/S and trends of decreased cardiac output  - Administer and titrate ordered vasoactive medications to optimize hemodynamic stability  - Assess quality of pulses, skin color and temperature  - Assess for signs of decreased coronary artery perfusion  - Instruct patient to report change in severity of symptoms  Outcome: Progressing  Goal: Absence of cardiac dysrhythmias or at baseline rhythm  Description: INTERVENTIONS:  - Continuous cardiac monitoring, vital signs, obtain 12 lead EKG if ordered  - Administer antiarrhythmic and heart rate control medications as ordered  - Monitor electrolytes and administer replacement therapy as ordered  Outcome: Progressing

## 2023-12-25 NOTE — ASSESSMENT & PLAN NOTE
INR, proBNP, APTT and troponin add on accepted by lab.   Wt Readings from Last 3 Encounters:   12/22/23 81.6 kg (179 lb 14.3 oz)   12/22/23 82.1 kg (181 lb)   11/24/23 83 kg (183 lb)       Does not appear to be in acute exacerbation   Echo (11/2023): EF >70%. Systolic function is hyperdynamic. G2DD. There is a small pericardial effusion posterior to the heart which is similar in appearance compared to prior study dated 10/15/2020.  Holding diuretics (patient on torsemide 2x/week and spironolactone daily)  Holding valsartan and Norvasc while titrating Cardizem  Daily weights, I/Os

## 2023-12-25 NOTE — ASSESSMENT & PLAN NOTE
BP intermittently soft, improved this a.m.  Continue to hold Norvasc, Aldactone and valsartan for now  Continue Cardizem, Toprol-XL

## 2023-12-25 NOTE — PROGRESS NOTES
[facility]  Progress Note  Name: Camryn Roblero I  MRN: [id number]  Unit/Bed#: -01 I Date of Admission: 12/22/2023   Date of Service: 12/25/2023 I Hospital Day: 3    Assessment/Plan   * Atrial flutter with rapid ventricular response (HCC)  Assessment & Plan  Presented from PCP office with tachycardia and hypotension, found to be in atrial flutter with RVR on arrival to ED. New diagnosis, denies prior history of arrhythmias.  S/p IV Cardizem 50 mg x 2 in ED with improvement HR  Cardizem 30 mg PO q6hrs -> Cardizem  mg daily (12/24)  [id number] of 6, needs anticoagulation   IV heparin gtt -> PO Eliquis  CM price checked for $119/month, per CM able to get 1st month free and then apply online for more assistance through [employer] website  Continue Toprol-XL XL 25 mg daily, hold torsemide for now  Cardiology consulted, continue Eliquis and Cardizem, hold amlodipine and terazosin, & resume torsemide, valsartan and spironolactone at discharge  Telemetry reviewed, atrial flutter with HR in 70s with no acute events overnight  Check orthostatics, assess mobility with resuming ARB and spironolactone    Elevated troponin  Assessment & Plan  Troponins trended up, peaked at 364 on arrival to ED  Suspect non-MI with demand ischemia in setting of atrial flutter with RVR  EKG showed atrial flutter with variable AV block  D-dimer negative with age adjustment  Notify provider if chest pain develops    Chronic diastolic CHF (congestive heart failure) (HCC)  Assessment & Plan  Wt Readings from Last 3 Encounters:   12/22/23 81.6 kg (179 lb 14.3 oz)   12/22/23 82.1 kg (181 lb)   11/24/23 83 kg (183 lb)       Does not appear to be in acute exacerbation   Echo (11/2023): EF >70%. Systolic function is hyperdynamic. G2DD. There is a small pericardial effusion posterior to the heart which is similar in appearance compared to prior study dated 10/15/2020.  Holding diuretics (patient on torsemide 2x/week  and spironolactone daily)  Holding valsartan and Norvasc while titrating Cardizem  Daily weights, I/Os    Carcinoid tumor of lung  Assessment & Plan  Known hx carcinoid tumor of lung, recent CT chest in July 2023 with no change  Recently saw hematology in 5/2022, told that she only needs to F/U PRN   BC x2: NGTD (48 hrs). Urine cx with mixed contaminants.     Mixed hyperlipidemia  Assessment & Plan  Continue statin     COPD (chronic obstructive pulmonary disease) (HCC)  Assessment & Plan  Not in acute exacerbation  Follows with Dr. Garland from pulmonology, scheduled outpatient appointment in 2/2024  Reportedly her 6 min walk test showed desaturation to 82% with exertion, uses supplemental O2 PRN  Continue supportive care    Diabetes mellitus without complication (HCC)  Assessment & Plan  Lab Results   Component Value Date    HGBA1C 6.3 12/22/2023       Recent Labs     12/24/23  1142 12/24/23  1459 12/24/23  2031 12/25/23  0710   POCGLU 191* 86 141* 121         Blood Sugar Average: Last 72 hrs:  (P) 134.5    Hold home glipizide, metformin while inpatient  [facility]  Hypoglycemia protocol, diabetic diet    Essential hypertension  Assessment & Plan  BP intermittently soft, improved this a.m.  Continue to hold Norvasc, Aldactone and valsartan for now  Continue Cardizem, Toprol-XL             VTE Pharmacologic Prophylaxis: VTE Score: 6 High Risk (Score >/= 5) - Pharmacological DVT Prophylaxis Ordered: apixaban (Eliquis). Sequential Compression Devices Ordered.    Mobility:   Basic Mobility Inpatient Raw Score: 17  JH-HLM Goal: 5: Stand one or more mins  JH-HLM Achieved: 7: Walk 25 feet or more  HLM Goal achieved. Continue to encourage appropriate mobility.    Patient Centered Rounds: I performed bedside rounds with nursing staff today.   Discussions with Specialists or Other Care Team Provider: None    Education and Discussions with Family / Patient:  Updated daughter Naomie via phone.     Total  Time Spent on Date of Encounter in care of patient: 35 mins. This time was spent on one or more of the following: performing physical exam; counseling and coordination of care; obtaining or reviewing history; documenting in the medical record; reviewing/ordering tests, medications or procedures; communicating with other healthcare professionals and discussing with patient's family/caregivers.    Current Length of Stay: 3 day(s)  Current Patient Status: Inpatient   Certification Statement: The patient will continue to require additional inpatient hospital stay due to clinical improvement with dyspnea on exertion, safe discharge planning  Discharge Plan: Anticipate discharge later today or tomorrow to home.    Code Status: Level 1 - Full Code    Subjective:   Patient is seen at bedside this a.m., reports improving but ongoing dyspnea on exertion with using rolling walker, notes having to frequently take breaks with ambulation.     Objective:     Vitals:   Temp (24hrs), Av.7 °F (36.5 °C), Min:97.3 °F (36.3 °C), Max:98.1 °F (36.7 °C)    Temp:  [97.3 °F (36.3 °C)-98.1 °F (36.7 °C)] 97.3 °F (36.3 °C)  HR:  [72-81] 73  Resp:  [16-20] 16  BP: ()/(51-76) 137/76  SpO2:  [94 %-99 %] 94 %  Body mass index is 36.33 kg/m².     Input and Output Summary (last 24 hours):     Intake/Output Summary (Last 24 hours) at 2023 1143  Last data filed at 2023  Gross per 24 hour   Intake 120 ml   Output --   Net 120 ml       Physical Exam:   Physical Exam  Constitutional:       General: She is not in acute distress.     Appearance: She is obese. She is not ill-appearing, toxic-appearing or diaphoretic.   Cardiovascular:      Rate and Rhythm: Normal rate. Rhythm irregular.      Pulses: Normal pulses.      Heart sounds: Normal heart sounds.   Pulmonary:      Effort: Pulmonary effort is normal. No respiratory distress.      Breath sounds: Normal breath sounds.   Abdominal:      General: Bowel sounds are normal. There is  no distension.      Palpations: Abdomen is soft.      Tenderness: There is no abdominal tenderness.   Musculoskeletal:         General: No swelling or tenderness.   Skin:     General: Skin is warm.   Neurological:      General: No focal deficit present.      Mental Status: She is alert.   Psychiatric:         Mood and Affect: Mood normal.         Behavior: Behavior normal.          Additional Data:     Labs:  Results from last 7 days   Lab Units 12/24/23  0425 12/23/23  0452 12/22/23  1532   WBC Thousand/uL 6.84   < > 8.76   HEMOGLOBIN g/dL 12.4   < > 12.8   HEMATOCRIT % 38.7   < > 40.1   PLATELETS Thousands/uL 258   < > 273   NEUTROS PCT %  --   --  82*   LYMPHS PCT %  --   --  12*   MONOS PCT %  --   --  5   EOS PCT %  --   --  0    < > = values in this interval not displayed.     Results from last 7 days   Lab Units 12/24/23  0425   SODIUM mmol/L 135   POTASSIUM mmol/L 4.2   CHLORIDE mmol/L 104   CO2 mmol/L 24   BUN mg/dL 24   CREATININE mg/dL 0.92   ANION GAP mmol/L 7   CALCIUM mg/dL 9.3   ALBUMIN g/dL 3.8   TOTAL BILIRUBIN mg/dL 0.74   ALK PHOS U/L 47   ALT U/L 39   AST U/L 69*   GLUCOSE RANDOM mg/dL 112     Results from last 7 days   Lab Units 12/22/23  1606   INR  1.16     Results from last 7 days   Lab Units 12/25/23  1116 12/25/23  0710 12/24/23  2031 12/24/23  1459 12/24/23  1142 12/24/23  0710 12/23/23  1656 12/23/23  1547 12/23/23  1107 12/23/23  0714 12/22/23  2200   POC GLUCOSE mg/dl 148* 121 141* 86 191* 128 186* 104 109 118 161*     Results from last 7 days   Lab Units 12/22/23  1455   HEMOGLOBIN A1C  6.3     Results from last 7 days   Lab Units 12/22/23  1845 12/22/23  1606   LACTIC ACID mmol/L 1.6 2.4*   PROCALCITONIN ng/ml  --  <0.05       Lines/Drains:  Invasive Devices       Peripheral Intravenous Line  Duration             Peripheral IV 12/22/23 Left Antecubital 2 days    Peripheral IV 12/22/23 Right Antecubital 2 days                      Telemetry:  Telemetry Orders (From admission, onward)                24 Hour Telemetry Monitoring  Continuous x 24 Hours (Telem)        Question:  Reason for 24 Hour Telemetry  Answer:  Arrhythmias requiring acute medical intervention / PPM or ICD malfunction                     Telemetry Reviewed: Atrial flutter. HR averaging 70  Indication for Continued Telemetry Use: Arrthymias requiring medical therapy             Imaging: Reviewed radiology reports from this admission including: chest xray    Recent Cultures (last 7 days):   Results from last 7 days   Lab Units 12/22/23  1832 12/22/23  1606   BLOOD CULTURE   --  No Growth at 48 hrs.  No Growth at 48 hrs.   URINE CULTURE  50,000-59,000 cfu/ml  --        Last 24 Hours Medication List:   Current Facility-Administered Medications   Medication Dose Route Frequency Provider Last Rate    acetaminophen  650 mg Oral Q6H PRN Debra Haddad MD      apixaban  5 mg Oral BID DEANNE Faria      atorvastatin  40 mg Oral Daily With Dinner Debra Haddad MD      diltiazem  120 mg Oral Daily DEANNE Faria      docusate sodium  100 mg Oral BID DEANNE Faria      glipiZIDE  5 mg Oral Daily Debra Haddad MD      insulin lispro  1-5 Units Subcutaneous TID AC Debra Haddad MD      levalbuterol  1.25 mg Nebulization Q8H PRN Debra Haddad MD      loratadine  10 mg Oral Daily Debra Haddad MD      losartan  100 mg Oral Daily Jory Nam PA-C      metoprolol succinate  25 mg Oral Daily Debra Haddad MD      ondansetron  4 mg Intravenous Q6H PRN Debra Haddad MD      oxyCODONE-acetaminophen  1 tablet Oral Q4H PRN Debra Haddad MD      senna  1 tablet Oral HS DEANNE Faria      spironolactone  25 mg Oral Daily Jory Nam PA-C          Today, Patient Was Seen By: Jory Nam PA-C    **Please Note: This note may have been constructed using a voice recognition system.**

## 2023-12-25 NOTE — ASSESSMENT & PLAN NOTE
Not in acute exacerbation  Follows with Dr. Garland from pulmonology, scheduled outpatient appointment in 2/2024  Reportedly her 6 min walk test showed desaturation to 82% with exertion, uses supplemental O2 PRN  Continue supportive care

## 2023-12-25 NOTE — PLAN OF CARE
Problem: Potential for Falls  Goal: Patient will remain free of falls  Description: INTERVENTIONS:  - Educate patient/family on patient safety including physical limitations  - Instruct patient to call for assistance with activity   - Consult OT/PT to assist with strengthening/mobility   - Keep Call bell within reach  - Keep bed low and locked with side rails adjusted as appropriate  - Keep care items and personal belongings within reach  - Initiate and maintain comfort rounds  - Make Fall Risk Sign visible to staff  - Offer Toileting every 2 Hours, in advance of need  - Initiate/Maintain bed alarm  - Obtain necessary fall risk management equipment:   - Apply yellow socks and bracelet for high fall risk patients  - Consider moving patient to room near nurses station  12/24/2023 2146 by Melody Heart RN  Outcome: Progressing  12/24/2023 2137 by Melody Heart RN  Outcome: Progressing     Problem: Prexisting or High Potential for Compromised Skin Integrity  Goal: Skin integrity is maintained or improved  Description: INTERVENTIONS:  - Identify patients at risk for skin breakdown  - Assess and monitor skin integrity  - Assess and monitor nutrition and hydration status  - Monitor labs   - Assess for incontinence   - Turn and reposition patient  - Assist with mobility/ambulation  - Relieve pressure over bony prominences  - Avoid friction and shearing  - Provide appropriate hygiene as needed including keeping skin clean and dry  - Evaluate need for skin moisturizer/barrier cream  - Collaborate with interdisciplinary team   - Patient/family teaching  - Consider wound care consult   12/24/2023 2146 by Melody Heart RN  Outcome: Progressing  12/24/2023 2137 by Melody Heart RN  Outcome: Progressing     Problem: PAIN - ADULT  Goal: Verbalizes/displays adequate comfort level or baseline comfort level  Description: Interventions:  - Encourage patient to monitor pain and request assistance  - Assess pain using  appropriate pain scale  - Administer analgesics based on type and severity of pain and evaluate response  - Implement non-pharmacological measures as appropriate and evaluate response  - Consider cultural and social influences on pain and pain management  - Notify physician/advanced practitioner if interventions unsuccessful or patient reports new pain  12/24/2023 2146 by Melody Heart RN  Outcome: Progressing  12/24/2023 2137 by Melody Heart RN  Outcome: Progressing     Problem: INFECTION - ADULT  Goal: Absence or prevention of progression during hospitalization  Description: INTERVENTIONS:  - Assess and monitor for signs and symptoms of infection  - Monitor lab/diagnostic results  - Monitor all insertion sites, i.e. indwelling lines, tubes, and drains  - Monitor endotracheal if appropriate and nasal secretions for changes in amount and color  - [place] appropriate cooling/warming therapies per order  - Administer medications as ordered  - Instruct and encourage patient and family to use good hand hygiene technique  - Identify and instruct in appropriate isolation precautions for identified infection/condition  12/24/2023 2146 by Melody Heart RN  Outcome: Progressing  12/24/2023 2137 by Melody Heart RN  Outcome: Progressing  Goal: Absence of fever/infection during neutropenic period  Description: INTERVENTIONS:  - Monitor WBC    12/24/2023 2146 by Melody Heart RN  Outcome: Progressing  12/24/2023 2137 by Melody Heart RN  Outcome: Progressing     Problem: SAFETY ADULT  Goal: Patient will remain free of falls  Description: INTERVENTIONS:  - Educate patient/family on patient safety including physical limitations  - Instruct patient to call for assistance with activity   - Consult OT/PT to assist with strengthening/mobility   - Keep Call bell within reach  - Keep bed low and locked with side rails adjusted as appropriate  - Keep care items and personal belongings within reach  - Initiate and maintain  comfort rounds  - Make Fall Risk Sign visible to staff  - Offer Toileting every 2 Hours, in advance of need  - Initiate/Maintain bed alarm  - Obtain necessary fall risk management equipment:   - Apply yellow socks and bracelet for high fall risk patients  - Consider moving patient to room near nurses station  12/24/2023 2146 by Melody Heart RN  Outcome: Progressing  12/24/2023 2137 by Melody Heart RN  Outcome: Progressing  Goal: Maintain or return to baseline ADL function  Description: INTERVENTIONS:  -  Assess patient's ability to carry out ADLs; assess patient's baseline for ADL function and identify physical deficits which impact ability to perform ADLs (bathing, care of mouth/teeth, toileting, grooming, dressing, etc.)  - Assess/evaluate cause of self-care deficits   - Assess range of motion  - Assess patient's mobility; develop plan if impaired  - Assess patient's need for assistive devices and provide as appropriate  - Encourage maximum independence but intervene and supervise when necessary  - Involve family in performance of ADLs  - Assess for home care needs following discharge   - Consider OT consult to assist with ADL evaluation and planning for discharge  - Provide patient education as appropriate  12/24/2023 2146 by Melody Heart RN  Outcome: Progressing  12/24/2023 2137 by Melody Heart RN  Outcome: Progressing  Goal: Maintains/Returns to pre admission functional level  Description: INTERVENTIONS:  - Perform AM-PAC 6 Click Basic Mobility/ Daily Activity assessment daily.  - Set and communicate daily mobility goal to care team and patient/family/caregiver.   - Collaborate with rehabilitation services on mobility goals if consulted  - Perform Range of Motion 3 times a day.  - Reposition patient every 2 hours.  - Dangle patient 3 times a day  - Stand patient 3 times a day  - Ambulate patient 3 times a day  - Out of bed to chair 3 times a day   - Out of bed for meals 3 times a day  - Out of bed  for toileting  - Record patient progress and toleration of activity level   12/24/2023 2146 by Melody Heart RN  Outcome: Progressing  12/24/2023 2137 by Melody Heart RN  Outcome: Progressing     Problem: DISCHARGE PLANNING  Goal: Discharge to home or other facility with appropriate resources  Description: INTERVENTIONS:  - Identify barriers to discharge w/patient and caregiver  - Arrange for needed discharge resources and transportation as appropriate  - Identify discharge learning needs (meds, wound care, etc.)  - Arrange for interpretive services to assist at discharge as needed  - Refer to Case Management Department for coordinating discharge planning if the patient needs post-hospital services based on physician/advanced practitioner order or complex needs related to functional status, cognitive ability, or social support system  12/24/2023 2146 by Melody Heart RN  Outcome: Progressing  12/24/2023 2137 by Melody Heart RN  Outcome: Progressing     Problem: Knowledge Deficit  Goal: Patient/family/caregiver demonstrates understanding of disease process, treatment plan, medications, and discharge instructions  Description: Complete learning assessment and assess knowledge base.  Interventions:  - Provide teaching at level of understanding  - Provide teaching via preferred learning methods  12/24/2023 2146 by Melody Heart RN  Outcome: Progressing  12/24/2023 2137 by Melody Heart RN  Outcome: Progressing     Problem: CARDIOVASCULAR - ADULT  Goal: Maintains optimal cardiac output and hemodynamic stability  Description: INTERVENTIONS:  - Monitor I/O, vital signs and rhythm  - Monitor for S/S and trends of decreased cardiac output  - Administer and titrate ordered vasoactive medications to optimize hemodynamic stability  - Assess quality of pulses, skin color and temperature  - Assess for signs of decreased coronary artery perfusion  - Instruct patient to report change in severity of  symptoms  12/24/2023 2146 by Melody Heart RN  Outcome: Progressing  12/24/2023 2137 by Melody Heart RN  Outcome: Progressing  Goal: Absence of cardiac dysrhythmias or at baseline rhythm  Description: INTERVENTIONS:  - Continuous cardiac monitoring, vital signs, obtain 12 lead EKG if ordered  - Administer antiarrhythmic and heart rate control medications as ordered  - Monitor electrolytes and administer replacement therapy as ordered  12/24/2023 2146 by Melody Heart RN  Outcome: Progressing  12/24/2023 2137 by Melody Heart RN  Outcome: Progressing

## 2023-12-25 NOTE — ASSESSMENT & PLAN NOTE
Lab Results   Component Value Date    HGBA1C 6.3 12/22/2023       Recent Labs     12/24/23  1142 12/24/23  1459 12/24/23 2031 12/25/23  0710   POCGLU 191* 86 141* 121         Blood Sugar Average: Last 72 hrs:  (P) 134.5    Hold home glipizide, metformin while inpatient  SSI coverage with Accu-Cheks [facility]  Hypoglycemia protocol, diabetic diet

## 2023-12-25 NOTE — ASSESSMENT & PLAN NOTE
Known hx carcinoid tumor of lung, recent CT chest in July 2023 with no change  Recently saw hematology in 5/2022, told that she only needs to F/U PRN   BC x2: NGTD (48 hrs). Urine cx with mixed contaminants.

## 2023-12-26 ENCOUNTER — APPOINTMENT (INPATIENT)
Dept: RADIOLOGY | Facility: HOSPITAL | Age: 83
DRG: 309 | End: 2023-12-26
Payer: MEDICARE

## 2023-12-26 VITALS
OXYGEN SATURATION: 93 % | SYSTOLIC BLOOD PRESSURE: 131 MMHG | DIASTOLIC BLOOD PRESSURE: 57 MMHG | HEART RATE: 63 BPM | BODY MASS INDEX: 36.27 KG/M2 | WEIGHT: 179.9 LBS | RESPIRATION RATE: 20 BRPM | TEMPERATURE: 98.3 F | HEIGHT: 59 IN

## 2023-12-26 LAB
ATRIAL RATE: 132 BPM
ATRIAL RATE: 277 BPM
ATRIAL RATE: 381 BPM
ATRIAL RATE: 394 BPM
GLUCOSE SERPL-MCNC: 130 MG/DL (ref 65–140)
GLUCOSE SERPL-MCNC: 210 MG/DL (ref 65–140)
P AXIS: 81 DEGREES
PR INTERVAL: 204 MS
QRS AXIS: 36 DEGREES
QRS AXIS: 57 DEGREES
QRS AXIS: 60 DEGREES
QRS AXIS: 66 DEGREES
QRSD INTERVAL: 102 MS
QRSD INTERVAL: 104 MS
QRSD INTERVAL: 106 MS
QRSD INTERVAL: 112 MS
QT INTERVAL: 328 MS
QT INTERVAL: 330 MS
QT INTERVAL: 358 MS
QT INTERVAL: 392 MS
QTC INTERVAL: 425 MS
QTC INTERVAL: 466 MS
QTC INTERVAL: 485 MS
QTC INTERVAL: 491 MS
T WAVE AXIS: 236 DEGREES
T WAVE AXIS: 256 DEGREES
T WAVE AXIS: 261 DEGREES
T WAVE AXIS: 266 DEGREES
VENTRICULAR RATE: 102 BPM
VENTRICULAR RATE: 132 BPM
VENTRICULAR RATE: 133 BPM
VENTRICULAR RATE: 71 BPM

## 2023-12-26 PROCEDURE — NC001 PR NO CHARGE: Performed by: PHYSICIAN ASSISTANT

## 2023-12-26 PROCEDURE — 71045 X-RAY EXAM CHEST 1 VIEW: CPT

## 2023-12-26 PROCEDURE — 93005 ELECTROCARDIOGRAM TRACING: CPT

## 2023-12-26 PROCEDURE — 97530 THERAPEUTIC ACTIVITIES: CPT

## 2023-12-26 PROCEDURE — 97163 PT EVAL HIGH COMPLEX 45 MIN: CPT

## 2023-12-26 PROCEDURE — 82948 REAGENT STRIP/BLOOD GLUCOSE: CPT

## 2023-12-26 PROCEDURE — 99239 HOSP IP/OBS DSCHRG MGMT >30: CPT | Performed by: PHYSICIAN ASSISTANT

## 2023-12-26 PROCEDURE — 99232 SBSQ HOSP IP/OBS MODERATE 35: CPT | Performed by: INTERNAL MEDICINE

## 2023-12-26 RX ORDER — METOPROLOL SUCCINATE 50 MG/1
50 TABLET, EXTENDED RELEASE ORAL 2 TIMES DAILY
Qty: 60 TABLET | Refills: 0 | Status: SHIPPED | OUTPATIENT
Start: 2023-12-26 | End: 2024-01-25

## 2023-12-26 RX ORDER — DILTIAZEM HYDROCHLORIDE 120 MG/1
120 CAPSULE, COATED, EXTENDED RELEASE ORAL DAILY
Qty: 30 CAPSULE | Refills: 0 | Status: SHIPPED | OUTPATIENT
Start: 2023-12-27 | End: 2024-01-26

## 2023-12-26 RX ORDER — METOPROLOL SUCCINATE 50 MG/1
50 TABLET, EXTENDED RELEASE ORAL DAILY
Status: DISCONTINUED | OUTPATIENT
Start: 2023-12-26 | End: 2023-12-26 | Stop reason: HOSPADM

## 2023-12-26 RX ADMIN — LORATADINE 10 MG: 10 TABLET ORAL at 08:17

## 2023-12-26 RX ADMIN — METOPROLOL SUCCINATE 25 MG: 25 TABLET, FILM COATED, EXTENDED RELEASE ORAL at 08:18

## 2023-12-26 RX ADMIN — INSULIN LISPRO 2 UNITS: 100 INJECTION, SOLUTION INTRAVENOUS; SUBCUTANEOUS at 12:39

## 2023-12-26 RX ADMIN — DILTIAZEM HYDROCHLORIDE 120 MG: 120 CAPSULE, COATED, EXTENDED RELEASE ORAL at 08:17

## 2023-12-26 RX ADMIN — GLIPIZIDE 5 MG: 5 TABLET ORAL at 08:18

## 2023-12-26 RX ADMIN — SENNOSIDES 8.6 MG: 8.6 TABLET, FILM COATED ORAL at 08:21

## 2023-12-26 RX ADMIN — SPIRONOLACTONE 25 MG: 25 TABLET, FILM COATED ORAL at 08:18

## 2023-12-26 RX ADMIN — LOSARTAN POTASSIUM 100 MG: 50 TABLET, FILM COATED ORAL at 08:18

## 2023-12-26 RX ADMIN — DOCUSATE SODIUM 100 MG: 100 CAPSULE, LIQUID FILLED ORAL at 08:17

## 2023-12-26 RX ADMIN — METOPROLOL SUCCINATE 50 MG: 50 TABLET, EXTENDED RELEASE ORAL at 10:02

## 2023-12-26 RX ADMIN — APIXABAN 5 MG: 5 TABLET, FILM COATED ORAL at 08:18

## 2023-12-26 NOTE — ASSESSMENT & PLAN NOTE
Wt Readings from Last 3 Encounters:   12/22/23 81.6 kg (179 lb 14.3 oz)   12/22/23 82.1 kg (181 lb)   11/24/23 83 kg (183 lb)       Does not appear to be in acute exacerbation   Echo (11/2023): EF >70%. Systolic function is hyperdynamic. G2DD.   Resumed spironolactone, holding home torsemide 3x/week  Resumed ARB, likely to hold Norvasc at discharge  Daily weights, I/Os

## 2023-12-26 NOTE — OCCUPATIONAL THERAPY NOTE
Occupational Therapy Cancel Note       12/26/23 0915   Note Type   Note type Cancelled Session   Cancel Reasons Medical status   Additional Comments Notified by PT that when working with patient, unsteady HR and symptomatic with dizziness and SOB, PA-C reportedly getting in touch with cardiology. Will hold OT eval until patient is medically appropriate. Will continue to follow     Maylin Benton, OTR/L

## 2023-12-26 NOTE — ASSESSMENT & PLAN NOTE
BP previously intermittently soft, since improved & orthostatic BP negative  Continue to hold Norvasc, torsemide for now  Restarted spironolactone, losartan based on hospital formulary  Continue Cardizem, increase Toprol-XL per cardiology

## 2023-12-26 NOTE — ASSESSMENT & PLAN NOTE
Lab Results   Component Value Date    HGBA1C 6.3 12/22/2023       Recent Labs     12/25/23  1531 12/25/23  2036 12/26/23  0721 12/26/23  1058   POCGLU 133 252* 130 210*         Blood Sugar Average: Last 72 hrs:  (P) [id number]    Hold home glipizide, metformin while inpatient  SSI coverage with Accu-Cheks ACHS  Hypoglycemia protocol, diabetic diet

## 2023-12-26 NOTE — CASE MANAGEMENT
Case Management Discharge Planning Note    Patient name Camryn Roblero  Location /-01 MRN [id number]  : 1940 Date 2023       Current Admission Date: 2023  Current Admission Diagnosis:Atrial flutter with rapid ventricular response (HCC)   Patient Active Problem List    Diagnosis Date Noted    Elevated troponin 2023    Tachycardia 2023    Atrial flutter with rapid ventricular response (HCC) 2023    Mixed hyperlipidemia 10/21/2023    Seasonal allergic rhinitis 2023    Osteopenia 2022    Chronic pain of both knees 2021    Cataract of both eyes 2021    Class 2 severe obesity due to excess calories with serious comorbidity and body mass index (BMI) of 36.0 to 36.9 in adult  2021    Multiple pulmonary nodules 10/05/2020    Colon polyps 10/05/2020    TOM (obstructive sleep apnea) 2020    Chronic diastolic CHF (congestive heart failure) (HCC) 2020    Persistent proteinuria 10/25/2019    Hyponatremia 2016    Essential hypertension 2016    Diabetes mellitus without complication (HCC) 2016    Carcinoid tumor of lung 2016    COPD (chronic obstructive pulmonary disease) (HCC) 2016      LOS (days): 4  Geometric Mean LOS (GMLOS) (days): 2.3  Days to GMLOS:-1.5     OBJECTIVE:  Risk of Unplanned Readmission Score: 8.78         Current admission status: Inpatient   Preferred Pharmacy:   [facility] [address] [place] [id number]  Phone: [phone] Fax: [phone]    [facility] - [facility] [address] [facility]  [address] [facility] [address]  Phone: [phone] Fax: [phone]    Primary Care Provider: Abiel Seals MD    Primary Insurance: MEDICARE  Secondary Insurance: AARP    DISCHARGE DETAILS:    Discharge planning discussed with:: patient  Freedom of Choice: Yes  Comments - Freedom of Choice: Cm met with  patient to review role of Cm. Patient reports that she lives with  and in agreement with recommendation for C serviecs. Patient in agreement with blanket referral. Cm placed referral.Cm attempted to call patient's daughter Shanta, but she did not . CM was to review recommendation for dc with daughter.  CM contacted family/caregiver?: No- see comments     Did patient/caregiver verbalize understanding of patient care needs?: Yes  Were patient/caregiver advised of the risks associated with not following Treatment Team discharge recommendations?: Yes         Requested Home Health Care         Is the patient interested in HHC at discharge?: Yes  Home Health Discipline requested:: Physical Therapy, Occupational Therapy, Nursing  Home Health Agency Name:: [facility] External Referral Reason (only applicable if external HH name selected): Patient has established relationship with provider  Home Health Follow-Up Provider:: PCP  Home Health Services Needed:: Gait/ADL Training, Evaluate Functional Status and Safety, Strengthening/Theraputic Exercises to Improve Function  Homebound Criteria Met:: Requires the Assistance of Another Person for Safe Ambulation or to Leave the Home, Uses an Assist Device (i.e. cane, walker, etc)  Supporting Clincal Findings:: Fatigues Easliy in Short Distances, Limited Endurance    DME Referral Provided  Referral made for DME?: No    Other Referral/Resources/Interventions Provided:  Referral Comments: [facility]         Treatment Team Recommendation: Home with Home Health Care  Discharge Destination Plan:: Home with Home Health Care  Transport at Discharge : Family           ETA of Transport (Date): 12/26/23  ETA of Transport (Time): 1500              IMM Given (Date):: 12/26/23  IMM Given to:: Patient     IMM reviewed with patient, patient agrees with discharge determination.

## 2023-12-26 NOTE — PROGRESS NOTES
[facility]  Progress Note  Name: Camryn Roblero I  MRN: [id number]  Unit/Bed#: -01 I Date of Admission: 12/22/2023   Date of Service: 12/26/2023 I Hospital Day: 4    Assessment/Plan   * Atrial flutter with rapid ventricular response (HCC)  Assessment & Plan  Presented from PCP office with tachycardia and hypotension, found to be in atrial flutter with RVR in ED. New diagnosis, denies prior history of arrhythmias although arrhythmia was noted on Echo in November.  S/p IV Cardizem 50 mg x 2 in ED with improvement HR  Cardizem 30 mg PO q6hrs -> Cardizem  mg daily (12/24)  [id number] of 6, IV heparin gtt -> PO Eliquis  CM price checked for $119/month, per CM able to get 1st month free & can apply online for more assistance through pharmaceutical website. This was discussed with daughter Naomie.  Telemetry reviewed, atrial flutter with -160s with ambulation, then with rest HR 50-70s  Cardiology following, with labile HR with ambulation increase Toprol-XL 50 mg daily & can continue Cardizem, Eliquis, valsartan, spironolactone, torsemide at discharge. Consider ablation down the line outpatient.    Elevated troponin  Assessment & Plan  Troponins trended up, peaked at 364 on arrival to ED  Suspect non-MI with demand ischemia in setting of atrial flutter with RVR  EKG showed atrial flutter with variable AV block  D-dimer negative with age adjustment  Notify provider if chest pain develops    Chronic diastolic CHF (congestive heart failure) (HCC)  Assessment & Plan  Wt Readings from Last 3 Encounters:   12/22/23 81.6 kg (179 lb 14.3 oz)   12/22/23 82.1 kg (181 lb)   11/24/23 83 kg (183 lb)       Does not appear to be in acute exacerbation   Echo (11/2023): EF >70%. Systolic function is hyperdynamic. G2DD.   Resumed spironolactone, holding home torsemide 3x/week  Resumed ARB, likely to hold Norvasc at discharge  Daily weights, I/Os    Carcinoid tumor of lung  Assessment & Plan  Known hx  carcinoid tumor of lung, recent CT chest in July 2023 with no change  Recently saw hematology in 5/2022, told that she only needs to F/U PRN   BC x2: NGTD (72 hrs). Urine cx with mixed contaminants.   Repeat CXR today personally reviewed, no significant changes compared to admission CXR    Essential hypertension  Assessment & Plan  BP previously intermittently soft, since improved & orthostatic BP negative  Continue to hold Norvasc, torsemide for now  Restarted spironolactone, losartan based on hospital formulary  Continue Cardizem, increase Toprol-XL per cardiology    Mixed hyperlipidemia  Assessment & Plan  Continue statin     COPD (chronic obstructive pulmonary disease) ([facility])  Assessment & Plan  Not in acute exacerbation  Follows with Dr. Garland from pulmonology, scheduled outpatient appointment in 2/2024  Reportedly her 6 min walk test showed desaturation to 82% with exertion, uses supplemental O2 PRN  Continue supportive care    Diabetes mellitus without complication (HCC)  Assessment & Plan  Lab Results   Component Value Date    HGBA1C 6.3 12/22/2023       Recent Labs     12/25/23  1116 12/25/23  1531 12/25/23  2036 12/26/23  0721   POCGLU 148* 133 252* 130         Blood Sugar Average: Last 72 hrs:  (P) [id number]    Hold home glipizide, metformin while inpatient  [facility]  Hypoglycemia protocol, diabetic diet           VTE Pharmacologic Prophylaxis: VTE Score: 6 High Risk (Score >/= 5) - Pharmacological DVT Prophylaxis Ordered: apixaban (Eliquis). Sequential Compression Devices Ordered.    Mobility:   Basic Mobility Inpatient Raw Score: 17  JH-HLM Goal: 5: Stand one or more mins  JH-HLM Achieved: 7: Walk 25 feet or more  HLM Goal achieved. Continue to encourage appropriate mobility.    Patient Centered Rounds: I performed bedside rounds with nursing staff today.   Discussions with Specialists or Other Care Team Provider: Cardiology    Education and Discussions with Family /  Patient: Patient declined call to .     Total Time Spent on Date of Encounter in care of patient: 35 mins. This time was spent on one or more of the following: performing physical exam; counseling and coordination of care; obtaining or reviewing history; documenting in the medical record; reviewing/ordering tests, medications or procedures; communicating with other healthcare professionals and discussing with patient's family/caregivers.    Current Length of Stay: 4 day(s)  Current Patient Status: Inpatient   Certification Statement: The patient will continue to require additional inpatient hospital stay due to improvement/stability of tachycardia  Discharge Plan: Anticipate discharge later today or tomorrow to discharge location to be determined pending rehab evaluations.    Code Status: Level 1 - Full Code    Subjective:   Patient seen at bedside this a.m., denies any acute complaints.  Patient seen ambulating with PT in hallway, notes feeling winded and short of breath with HR increasing up to 140s at the time, improved with rest for couple minutes.    Objective:     Vitals:   Temp (24hrs), Av.3 °F (36.8 °C), Min:97.6 °F (36.4 °C), Max:99.4 °F (37.4 °C)    Temp:  [97.6 °F (36.4 °C)-99.4 °F (37.4 °C)] 98.5 °F (36.9 °C)  HR:  [72-82] 82  Resp:  [16-18] 16  BP: (124-146)/(58-77) 124/58  SpO2:  [94 %-98 %] 94 %  Body mass index is 36.33 kg/m².     Input and Output Summary (last 24 hours):   No intake or output data in the 24 hours ending 23 1058    Physical Exam:   Physical Exam  Constitutional:       General: She is not in acute distress.     Appearance: She is not ill-appearing, toxic-appearing or diaphoretic.   Cardiovascular:      Rate and Rhythm: Tachycardia present. Rhythm irregular.      Pulses: Normal pulses.      Heart sounds: Normal heart sounds.   Pulmonary:      Effort: Pulmonary effort is normal. No respiratory distress.      Breath sounds: Normal breath sounds.   Abdominal:       General: Bowel sounds are normal. There is no distension.      Palpations: Abdomen is soft.      Tenderness: There is no abdominal tenderness.   Musculoskeletal:         General: No swelling or tenderness.   Skin:     General: Skin is warm.   Neurological:      General: No focal deficit present.      Mental Status: She is alert.   Psychiatric:         Mood and Affect: Mood normal.         Behavior: Behavior normal.          Additional Data:     Labs:  Results from last 7 days   Lab Units 12/24/23  0425 12/23/23  0452 12/22/23  1532   WBC Thousand/uL 6.84   < > 8.76   HEMOGLOBIN g/dL 12.4   < > 12.8   HEMATOCRIT % 38.7   < > 40.1   PLATELETS Thousands/uL 258   < > 273   NEUTROS PCT %  --   --  82*   LYMPHS PCT %  --   --  12*   MONOS PCT %  --   --  5   EOS PCT %  --   --  0    < > = values in this interval not displayed.     Results from last 7 days   Lab Units 12/24/23  0425   SODIUM mmol/L 135   POTASSIUM mmol/L 4.2   CHLORIDE mmol/L 104   CO2 mmol/L 24   BUN mg/dL 24   CREATININE mg/dL 0.92   ANION GAP mmol/L 7   CALCIUM mg/dL 9.3   ALBUMIN g/dL 3.8   TOTAL BILIRUBIN mg/dL 0.74   ALK PHOS U/L 47   ALT U/L 39   AST U/L 69*   GLUCOSE RANDOM mg/dL 112     Results from last 7 days   Lab Units 12/22/23  1606   INR  1.16     Results from last 7 days   Lab Units 12/26/23  0721 12/25/23  2036 12/25/23  1531 12/25/23  1116 12/25/23  0710 12/24/23  2031 12/24/23  1459 12/24/23  1142 12/24/23  0710 12/23/23  1656 12/23/23  1547 12/23/23  1107   POC GLUCOSE mg/dl 130 252* 133 148* 121 141* 86 191* 128 186* 104 109     Results from last 7 days   Lab Units 12/22/23  1455   HEMOGLOBIN A1C  6.3     Results from last 7 days   Lab Units 12/22/23  1845 12/22/23  1606   LACTIC ACID mmol/L 1.6 2.4*   PROCALCITONIN ng/ml  --  <0.05       Lines/Drains:  Invasive Devices       Peripheral Intravenous Line  Duration             Peripheral IV 12/22/23 Left Antecubital 3 days    Peripheral IV 12/22/23 Right Antecubital 3 days                       Telemetry:  Telemetry Orders (From admission, onward)               24 Hour Telemetry Monitoring  Continuous x 24 Hours (Telem)        Question:  Reason for 24 Hour Telemetry  Answer:  Arrhythmias requiring acute medical intervention / PPM or ICD malfunction                     Telemetry Reviewed: Atrial flutter. HR averaging   Indication for Continued Telemetry Use: Arrthymias requiring medical therapy             Imaging: Reviewed radiology reports from this admission including: chest xray    Recent Cultures (last 7 days):   Results from last 7 days   Lab Units 12/22/23  1832 12/22/23  1606   BLOOD CULTURE   --  No Growth at 72 hrs.  No Growth at 72 hrs.   URINE CULTURE  50,000-59,000 cfu/ml  --        Last 24 Hours Medication List:   Current Facility-Administered Medications   Medication Dose Route Frequency Provider Last Rate    acetaminophen  650 mg Oral Q6H PRN Debra Haddad MD      apixaban  5 mg Oral BID DEANNE Faria      atorvastatin  40 mg Oral Daily With Dinner Debra Haddad MD      diltiazem  120 mg Oral Daily DEANNE Faria      docusate sodium  100 mg Oral BID DEANNE Faria      glipiZIDE  5 mg Oral Daily Debra Haddad MD      insulin lispro  1-5 Units Subcutaneous TID AC Debra Haddad MD      levalbuterol  1.25 mg Nebulization Q8H PRN Debra Haddad MD      loratadine  10 mg Oral Daily Debra Haddad MD      losartan  100 mg Oral Daily Jory Nam PA-C      metoprolol succinate  50 mg Oral Daily Benedicto Wayne MD      ondansetron  4 mg Intravenous Q6H PRN Debra Haddad MD      oxyCODONE-acetaminophen  1 tablet Oral Q4H PRN Debra Haddad MD      senna  1 tablet Oral HS DEANNE Faria      spironolactone  25 mg Oral Daily Jory Nam PA-C          Today, Patient Was Seen By: Jory Nam PA-C    **Please Note: This note may have been constructed using a voice recognition system.**

## 2023-12-26 NOTE — ASSESSMENT & PLAN NOTE
Presented from PCP office with tachycardia and hypotension, found to be in atrial flutter with RVR in ED. New diagnosis, denies prior history of arrhythmias although arrhythmia was noted on Echo in November.  S/p IV Cardizem 50 mg x 2 in ED with improvement HR  Cardizem 30 mg PO q6hrs -> Cardizem  mg daily (12/24)  [id number] of 6, IV heparin gtt -> PO Eliquis  CM price checked for $119/month, per CM able to get 1st month free & can apply online for more assistance through pharmaceutical website. This was discussed with daughter Naomie.  Telemetry reviewed, atrial flutter with -160s with ambulation, then with rest HR 50-70s  Cardiology following, with labile HR with ambulation increase Toprol-XL 50 mg daily & can continue Cardizem, Eliquis, valsartan, spironolactone, torsemide at discharge. Consider ablation down the line outpatient.

## 2023-12-26 NOTE — PROGRESS NOTES
Cardiology Progress Note - Camryn Roblero 83 y.o. female MRN: [id number]    Unit/Bed#: -01 Encounter: [id number]        Hospital Problems:  Principal Problem:    Atrial flutter with rapid ventricular response (HCC)  Active Problems:    Essential hypertension    Diabetes mellitus without complication (HCC)    Carcinoid tumor of lung    Chronic diastolic CHF (congestive heart failure) (HCC)    COPD (chronic obstructive pulmonary disease) (HCC)    Mixed hyperlipidemia    Elevated troponin      Assessment/Recommendations:    Atrial flutter with elevated ventricular rates, new onset  [id number] score is 6.  On anticoagulation.  Currently on diltiazem and metoprolol.  Received both doses this morning.  Telemetry reviewed.  Overall rates are well-controlled at rest and overnight.  Lowest heart rate around 60.  With ambulation rates did go up very briefly.  Add additional beta-blockers for better rate control, to assess rate control with ambulation.  Long-term she will need consideration for atrial flutter ablation.  This can be arranged as outpatient.  Continue telemetry monitoring.    Chronic heart failure with preserved EF  On torsemide and spironolactone.  Reports dry cough for the last 2 days, no orthopnea or PND.  Edema is stable.  She also carries a diagnosis of COPD.  Check chest x-ray.    Hypertension  Amlodipine stopped recently and diltiazem started.  Blood pressures have been stable over the last 24 hours.    Discussed plan with patient and primary team.      Subjective:     Overnight events reviewed.  Heart rate continues to be elevated with ambulation.  Was seen by cardiology earlier for atrial flutter with RVR, new onset.  Placed on diltiazem and continued her metoprolol home medication.  Blood pressure remains controlled.  Heart rate is controlled at rest but increases with ambulation.  Prior EKG from 12/22/2023 showed atrial flutter with variable AV block, rate about 100.  She reports cough.  She can  sleep flat in the bed.  Edema is not worsening.    Review of Systems   Constitutional:  Positive for fatigue. Negative for fever.   Respiratory:  Positive for cough and shortness of breath.    Cardiovascular:  Positive for palpitations and leg swelling. Negative for chest pain.   Skin:  Negative for rash.   Neurological:  Negative for syncope.   Hematological:  Does not bruise/bleed easily.   All other systems reviewed and are negative.         Current Facility-Administered Medications:     acetaminophen (TYLENOL) tablet 650 mg, 650 mg, Oral, Q6H PRN, Debra Haddad MD    apixaban (ELIQUIS) tablet 5 mg, 5 mg, Oral, BID, DEANNE Faria, 5 mg at 12/26/23 0818    atorvastatin (LIPITOR) tablet 40 mg, 40 mg, Oral, Daily With Dinner, Debra Haddad MD, 40 mg at 12/25/23 1653    diltiazem (CARDIZEM CD) 24 hr capsule 120 mg, 120 mg, Oral, Daily, DEANNE Faria, 120 mg at 12/26/23 0817    docusate sodium (COLACE) capsule 100 mg, 100 mg, Oral, BID, DEANNE Faria, 100 mg at 12/26/23 0817    glipiZIDE (GLUCOTROL) tablet 5 mg, 5 mg, Oral, Daily, Debra Haddad MD, 5 mg at 12/26/23 0818    insulin lispro (HumaLOG) 100 units/mL subcutaneous injection 1-5 Units, 1-5 Units, Subcutaneous, TID AC, 1 Units at 12/24/23 1156 **AND** Fingerstick Glucose (POCT), , , TID AC, Debra Haddad MD    levalbuterol (XOPENEX) inhalation solution 1.25 mg, 1.25 mg, Nebulization, Q8H PRN, Debra Haddad MD    loratadine (CLARITIN) tablet 10 mg, 10 mg, Oral, Daily, Debra Haddad MD, 10 mg at 12/26/23 0817    losartan (COZAAR) tablet 100 mg, 100 mg, Oral, Daily, Jory Nam PA-C, 100 mg at 12/26/23 0818    metoprolol succinate (TOPROL-XL) 24 hr tablet 25 mg, 25 mg, Oral, Daily, Debra Haddad MD, 25 mg at 12/26/23 0818    ondansetron (ZOFRAN) injection 4 mg, 4 mg, Intravenous, Q6H PRN, Debra Haddad MD    oxyCODONE-acetaminophen (PERCOCET) 5-325 mg per tablet 1 tablet, 1 tablet, Oral, Q4H PRN,  "Debra Haddad MD    senna (SENOKOT) tablet 8.6 mg, 1 tablet, Oral, HS, DEANNE Faria, 8.6 mg at 23 0821    spironolactone (ALDACTONE) tablet 25 mg, 25 mg, Oral, Daily, Jory Nam PA-C, 25 mg at 23 0818      Objective:     Vitals:   Blood pressure 135/61, pulse 77, temperature 98.5 °F (36.9 °C), temperature source Oral, resp. rate 16, height 4' 11\" (1.499 m), weight 81.6 kg (179 lb 14.3 oz), SpO2 94%.  Body mass index is 36.33 kg/m².  Systolic (24hrs), Av , Min:127 , Max:146     Diastolic (24hrs), Av, Min:61, Max:77    No intake or output data in the 24 hours ending 23 0913  Weight (last 2 days)       None              Physical Exam  Constitutional:       General: She is not in acute distress.  HENT:      Mouth/Throat:      Mouth: Mucous membranes are moist.   Eyes:      Conjunctiva/sclera: Conjunctivae normal.   Neck:      Vascular: No carotid bruit.   Cardiovascular:      Rate and Rhythm: Rhythm irregularly irregular.      Heart sounds: S1 normal and S2 normal.   Pulmonary:      Breath sounds: Decreased breath sounds present. No rhonchi.      Comments: Posttussive wheezing noted  Abdominal:      General: Bowel sounds are normal.   Musculoskeletal:      Right lower leg: No edema.      Left lower leg: No edema.   Skin:     Findings: No lesion.   Neurological:      General: No focal deficit present.      Mental Status: She is alert. Mental status is at baseline.   Psychiatric:         Mood and Affect: Mood normal.           Labs & Results:    Lab Results   Component Value Date    SODIUM 135 2023    K 4.2 2023     2023    CO2 24 2023    BUN 24 2023    CREATININE 0.92 2023    GLUC 112 2023    CALCIUM 9.3 2023     Lab Results   Component Value Date    WBC 6.84 2023    WBC 6.90 2018    RBC 4.26 2023    HGB 12.4 2023    HCT 38.7 2023    MCV 91 2023    MCH 29.1 2023    RDW 14.0 " "12/24/2023     12/24/2023     Results from last 7 days   Lab Units 12/23/23  1254 12/23/23  0525 12/22/23  1606   PTT seconds 56* 95* 28   INR   --   --  1.16       Available cardiology studies, imaging and lab results independently reviewed today.      This note was completed in part utilizing voice recognition software.   Grammatical errors, random word insertion, spelling mistakes, occasional wrong word or \"sound-alike\" substitutions and incomplete sentences may be an occasional consequence of the system secondary to software limitations, ambient noise and hardware issues. At the time of dictation, efforts were made to edit, clarify and /or correct errors.  Please read the chart carefully and recognize, using context, where substitutions have occurred.  If you have any questions or concerns about the context, text or information contained within the body of this dictation, please contact myself, the provider, for further clarification.      "

## 2023-12-26 NOTE — CASE MANAGEMENT
Case Management Assessment & Discharge Planning Note    Patient name Camryn Roblero  Location /-01 MRN [id number]  : 1940 Date 2023       Current Admission Date: 2023  Current Admission Diagnosis:Atrial flutter with rapid ventricular response (HCC)   Patient Active Problem List    Diagnosis Date Noted    Elevated troponin 2023    Tachycardia 2023    Atrial flutter with rapid ventricular response (HCC) 2023    Mixed hyperlipidemia 10/21/2023    Seasonal allergic rhinitis 2023    Osteopenia 2022    Chronic pain of both knees 2021    Cataract of both eyes 2021    Class 2 severe obesity due to excess calories with serious comorbidity and body mass index (BMI) of 36.0 to 36.9 in adult  2021    Multiple pulmonary nodules 10/05/2020    Colon polyps 10/05/2020    TOM (obstructive sleep apnea) 2020    Chronic diastolic CHF (congestive heart failure) (HCC) 2020    Persistent proteinuria 10/25/2019    Hyponatremia 2016    Essential hypertension 2016    Diabetes mellitus without complication (HCC) 2016    Carcinoid tumor of lung 2016    COPD (chronic obstructive pulmonary disease) (HCC) 2016      LOS (days): 4  Geometric Mean LOS (GMLOS) (days): 2.3  Days to GMLOS:-1.5     OBJECTIVE:    Risk of Unplanned Readmission Score: 8.78         Current admission status: Inpatient       Preferred Pharmacy:   [facility] [address] [place] [id number]  Phone: [phone] Fax: [phone]    [facility] Mail [facility] - [facility] [address] [facility]  [address] [facility] [address]  Phone: [phone] Fax: [phone]    Primary Care Provider: Abiel Seals MD    Primary Insurance: MEDICARE  Secondary Insurance: AARP    ASSESSMENT:  Active Health Care Proxies    There are no active Health Care Proxies on file.                  Readmission Root Cause  30 Day Readmission: No    Patient Information  Admitted from:: Home  During Assessment patient was accompanied by: Not accompanied during assessment  Assessment information provided by:: Patient  Support Systems: Spouse/significant other, Family members  Home entry access options. Select all that apply.: Stairs  Number of steps to enter home.: One Flight (from basement to 1st floor of home)  Do the steps have railings?: Yes  Type of Current Residence: [facility]  Living Arrangements: Lives w/ Spouse/significant other    Activities of Daily Living Prior to Admission  Functional Status: Independent  Completes ADLs independently?: Yes  Ambulates independently?: Yes  Does patient use assisted devices?: Yes  Assisted Devices (DME) used: Stair Chair/Glide, Straight Cane  Does patient currently own DME?: Yes  What DME does the patient currently own?: Straight Cane, Walker  Does patient have a history of Outpatient Therapy (PT/OT)?: Yes  Does the patient have a history of Short-Term Rehab?: No  Does patient have a history of HHC?: Yes  Does patient currently have HHC?: No         Patient Information Continued  Income Source: Pension/long-term  Does patient have prescription coverage?: Yes  Does patient receive dialysis treatments?: No  Does patient have a history of substance abuse?: No  Does patient have a history of Mental Health Diagnosis?: No         Means of Transportation  Means of Transport to [facility]:: Family transport      Housing Stability: Unknown (12/26/2023)    Housing Stability Vital Sign     Unable to Pay for Housing in the Last Year: No     Number of Places Lived in the Last Year: Not on file     Unstable Housing in the Last Year: No   Food Insecurity: No Food Insecurity (12/26/2023)    Hunger Vital Sign     Worried About Running Out of Food in the Last Year: Never true     Ran Out of Food in the Last Year: Never true   Transportation Needs: No Transportation Needs (12/26/2023)     PRAPARE - Transportation     Lack of Transportation (Medical): No     Lack of Transportation (Non-Medical): No   Utilities: Not At Risk (12/26/2023)    [facility] Utilities     Threatened with loss of utilities: No       DISCHARGE DETAILS:    Discharge planning discussed with:: patient  Freedom of Choice: Yes  Comments - Freedom of Choice: Cm met with patient to review role of Cm. Patient reports that she lives with  and in agreement with recommendation for HHC serviecs. Patient in agreement with blanket referral. Cm placed referral.Cm attempted to call patient's daughter Shanta, but she did not . CM was to review recommendation for dc with daughter.  CM contacted family/caregiver?: No- see comments     Did patient/caregiver verbalize understanding of patient care needs?: Yes  Were patient/caregiver advised of the risks associated with not following Treatment Team discharge recommendations?: Yes         Requested Home Health Care         Is the patient interested in HHC at discharge?: Yes  Home Health Discipline requested:: Occupational Therapy, Physical Therapy, Nursing  Home Health Follow-Up Provider:: PCP  Home Health Services Needed:: Gait/ADL Training, Evaluate Functional Status and Safety  Homebound Criteria Met:: Requires the Assistance of Another Person for Safe Ambulation or to Leave the Home, Uses an Assist Device (i.e. cane, walker, etc)  Supporting Clincal Findings:: Fatigues Easliy in Short Distances, Limited Endurance    DME Referral Provided  Referral made for DME?: No    Other Referral/Resources/Interventions Provided:  Referral Comments: referral for C services placed         Treatment Team Recommendation: Home with Home Health Care  Discharge Destination Plan:: Home with Home Health Care  Transport at Discharge : Family           ETA of Transport (Date): 12/26/23  ETA of Transport (Time): 0500              IMM Given (Date):: 12/26/23  IMM Given to:: Patient      IMM reviewed with patient,  patient agrees with discharge determination.

## 2023-12-26 NOTE — PLAN OF CARE
Problem: Potential for Falls  Goal: Patient will remain free of falls  Description: INTERVENTIONS:  - Educate patient/family on patient safety including physical limitations  - Instruct patient to call for assistance with activity   - Consult OT/PT to assist with strengthening/mobility   - Keep Call bell within reach  - Keep bed low and locked with side rails adjusted as appropriate  - Keep care items and personal belongings within reach  - Initiate and maintain comfort rounds  - Make Fall Risk Sign visible to staff  - Offer Toileting every 2 Hours, in advance of need  - Initiate/Maintain bed and chair alarm  - Apply yellow socks and bracelet for high fall risk patients  - Consider moving patient to room near nurses station  Outcome: Progressing     Problem: Prexisting or High Potential for Compromised Skin Integrity  Goal: Skin integrity is maintained or improved  Description: INTERVENTIONS:  - Identify patients at risk for skin breakdown  - Assess and monitor skin integrity  - Assess and monitor nutrition and hydration status  - Monitor labs   - Assess for incontinence   - Turn and reposition patient  - Assist with mobility/ambulation  - Relieve pressure over bony prominences  - Avoid friction and shearing  - Provide appropriate hygiene as needed including keeping skin clean and dry  - Evaluate need for skin moisturizer/barrier cream  - Collaborate with interdisciplinary team   - Patient/family teaching  Outcome: Progressing     Problem: PAIN - ADULT  Goal: Verbalizes/displays adequate comfort level or baseline comfort level  Description: Interventions:  - Encourage patient to monitor pain and request assistance  - Assess pain using appropriate pain scale  - Administer analgesics based on type and severity of pain and evaluate response  - Implement non-pharmacological measures as appropriate and evaluate response  - Consider cultural and social influences on pain and pain management  - Notify physician/advanced  practitioner if interventions unsuccessful or patient reports new pain  Outcome: Progressing     Problem: INFECTION - ADULT  Goal: Absence or prevention of progression during hospitalization  Description: INTERVENTIONS:  - Assess and monitor for signs and symptoms of infection  - Monitor lab/diagnostic results  - Administer medications as ordered  - Instruct and encourage patient and family to use good hand hygiene technique  Outcome: Progressing  Goal: Absence of fever/infection during neutropenic period  Description: INTERVENTIONS:  - Monitor WBC    Outcome: Progressing     Problem: SAFETY ADULT  Goal: Patient will remain free of falls  Description: INTERVENTIONS:  - Educate patient/family on patient safety including physical limitations  - Instruct patient to call for assistance with activity   - Consult OT/PT to assist with strengthening/mobility   - Keep Call bell within reach  - Keep bed low and locked with side rails adjusted as appropriate  - Keep care items and personal belongings within reach  - Initiate and maintain comfort rounds  - Make Fall Risk Sign visible to staff  - Offer Toileting every 2 Hours, in advance of need  - Initiate/Maintain bed and chair alarm  - Apply yellow socks and bracelet for high fall risk patients  - Consider moving patient to room near nurses station  Outcome: Progressing  Goal: Maintain or return to baseline ADL function  Description: INTERVENTIONS:  -  Assess patient's ability to carry out ADLs; assess patient's baseline for ADL function and identify physical deficits which impact ability to perform ADLs (bathing, care of mouth/teeth, toileting, grooming, dressing, etc.)  - Assess/evaluate cause of self-care deficits   - Assess range of motion  - Assess patient's mobility; develop plan if impaired  - Assess patient's need for assistive devices and provide as appropriate  - Encourage maximum independence but intervene and supervise when necessary  - Involve family in  performance of ADLs  - Assess for home care needs following discharge   - Consider OT consult to assist with ADL evaluation and planning for discharge  - Provide patient education as appropriate  Outcome: Progressing  Goal: Maintains/Returns to pre admission functional level  Description: INTERVENTIONS:  - Perform AM-PAC 6 Click Basic Mobility/ Daily Activity assessment daily.  - Set and communicate daily mobility goal to care team and patient/family/caregiver.   - Collaborate with rehabilitation services on mobility goals if consulted  - Perform Range of Motion 3 times a day.  - Stand patient 3 times a day  - Ambulate patient 3 times a day  - Out of bed to chair 3 times a day   - Out of bed for meals 3 times a day  - Out of bed for toileting  - Record patient progress and toleration of activity level   Outcome: Progressing     Problem: DISCHARGE PLANNING  Goal: Discharge to home or other facility with appropriate resources  Description: INTERVENTIONS:  - Identify barriers to discharge w/patient and caregiver  - Arrange for needed discharge resources and transportation as appropriate  - Identify discharge learning needs (meds, wound care, etc.)  - Arrange for interpretive services to assist at discharge as needed  - Refer to Case Management Department for coordinating discharge planning if the patient needs post-hospital services based on physician/advanced practitioner order or complex needs related to functional status, cognitive ability, or social support system  Outcome: Progressing     Problem: Knowledge Deficit  Goal: Patient/family/caregiver demonstrates understanding of disease process, treatment plan, medications, and discharge instructions  Description: Complete learning assessment and assess knowledge base.  Interventions:  - Provide teaching at level of understanding  - Provide teaching via preferred learning methods  Outcome: Progressing     Problem: CARDIOVASCULAR - ADULT  Goal: Maintains optimal  cardiac output and hemodynamic stability  Description: INTERVENTIONS:  - Monitor I/O, vital signs and rhythm  - Monitor for S/S and trends of decreased cardiac output  - Administer and titrate ordered vasoactive medications to optimize hemodynamic stability  - Assess quality of pulses, skin color and temperature  - Assess for signs of decreased coronary artery perfusion  - Instruct patient to report change in severity of symptoms  Outcome: Progressing  Goal: Absence of cardiac dysrhythmias or at baseline rhythm  Description: INTERVENTIONS:  - Continuous cardiac monitoring, vital signs, obtain 12 lead EKG if ordered  - Administer antiarrhythmic and heart rate control medications as ordered  - Monitor electrolytes and administer replacement therapy as ordered  Outcome: Progressing

## 2023-12-26 NOTE — ASSESSMENT & PLAN NOTE
Lab Results   Component Value Date    HGBA1C 6.3 12/22/2023       Recent Labs     12/25/23  1116 12/25/23  1531 12/25/23 2036 12/26/23  0721   POCGLU 148* 133 252* 130         Blood Sugar Average: Last 72 hrs:  (P) [id number]    Hold home glipizide, metformin while inpatient  SSI coverage with Accu-Cheks ACHS  Hypoglycemia protocol, diabetic diet

## 2023-12-26 NOTE — ASSESSMENT & PLAN NOTE
Known hx carcinoid tumor of lung, recent CT chest in July 2023 with no change  Recently saw hematology in 5/2022, told that she only needs to F/U PRN   BC x2: NGTD (72 hrs). Urine cx with mixed contaminants.   Repeat CXR today personally reviewed, no significant changes compared to admission CXR

## 2023-12-26 NOTE — ASSESSMENT & PLAN NOTE
Presented from PCP office with tachycardia and hypotension, found to be in atrial flutter with RVR in ED. New diagnosis, denies prior history of arrhythmias although arrhythmia was noted on Echo in November.  S/p IV Cardizem 50 mg x 2 in ED with improvement HR  Cardizem 30 mg PO q6hrs -> Cardizem  mg daily (12/24)  [id number] of 6, IV heparin gtt -> PO Eliquis  CM price checked for $119/month, per CM able to get 1st month free & can apply online for more assistance through [employer] website. This was discussed with daughter Naomie.  Telemetry reviewed, atrial flutter with -160s with ambulation, then with rest HR 50-70s  Cardiology following:  Given tachycardia with ambulation, discharge with increased Toprol-XL 50mg BID, Cardizem and Eliquis.   Continue Cardizem, Eliquis, valsartan, spironolactone, torsemide at discharge.    Hold Norvasc, terazosin at discharge with increased BB + Cardizem.  To consider ablation in the future, further discussion in outpatient setting.  Discussed with patient purchasing pulse oximeter at home to monitor HR and SpO2  PT/OT evaluated, plan for HHS set up per CM at discharge

## 2023-12-26 NOTE — PLAN OF CARE
Problem: PHYSICAL THERAPY ADULT  Goal: Performs mobility at highest level of function for planned discharge setting.  See evaluation for individualized goals.  Description: Treatment/Interventions: Functional transfer training, LE strengthening/ROM, Elevations, Therapeutic exercise, Endurance training, Equipment eval/education, Bed mobility, Gait training          See flowsheet documentation for full assessment, interventions and recommendations.  Note: Prognosis: Fair  Problem List: Decreased strength, Decreased endurance, Decreased mobility (arrhytmia)  Assessment: Orders for PT eval and treat received. Pt's PMHx: OA, COPD, lung tumor, CHF. Pt exhibits physical deficits noted in problem list above.  Deficits listed contribute to functional limitations that are significant from the patient PLOF and include: tolerance for OOB functional activities, unsafe/inefficient ambulation, fall risk, and unable to safely manage stairs/steps/ramp    During today's session, initiated OOB activity and return to PLOF mobility to assess abiltiy to return home.  Pt's HR appeared unstable, moving quickly from tachycardia to bradycardia with symptomology.  Unable to continue with activities of exertion due to arrythmia.  PA present during symptoms and aware of performance.      The AM-PAC & Barthel Index outcome tools were used to assist in determining pt safety w/ mobility/self care & appropriate d/c recommendations, see above for scores. Patient's clinical presentation is unstable/unpredictable due to uncontrolled BP/HR, ongoing medical management needs, and complicated social/support system.     Considering the patient's PLOF, co-morbidities, acute functional limitations, functional outcome measures, and/or goal to progress functional independence; this patient would benefit from skilled Physical Therapy intervention in the acute care setting.  Barriers to Discharge: Decreased caregiver support, Inaccessible home environment      Rehab Resource Intensity Level, PT: III (Minimum Resource Intensity)    See flowsheet documentation for full assessment.

## 2023-12-26 NOTE — ASSESSMENT & PLAN NOTE
Not in acute exacerbation, remains stable on room air  Follows with Dr. Garland from pulmonology, scheduled outpatient appointment in 2/2024  Reportedly her 6 min walk test showed desaturation to 82% with exertion, uses supplemental O2 PRN  Continue outpatient follow-up with pulmonology

## 2023-12-26 NOTE — PLAN OF CARE
Problem: CARDIOVASCULAR - ADULT  Goal: Maintains optimal cardiac output and hemodynamic stability  Description: INTERVENTIONS:  - Monitor I/O, vital signs and rhythm  - Monitor for S/S and trends of decreased cardiac output  - Administer and titrate ordered vasoactive medications to optimize hemodynamic stability  - Assess quality of pulses, skin color and temperature  - Assess for signs of decreased coronary artery perfusion  - Instruct patient to report change in severity of symptoms  Outcome: Progressing  Goal: Absence of cardiac dysrhythmias or at baseline rhythm  Description: INTERVENTIONS:  - Continuous cardiac monitoring, vital signs, obtain 12 lead EKG if ordered  - Administer antiarrhythmic and heart rate control medications as ordered  - Monitor electrolytes and administer replacement therapy as ordered  Outcome: Progressing         Problem: Knowledge Deficit  Goal: Patient/family/caregiver demonstrates understanding of disease process, treatment plan, medications, and discharge instructions  Description: Complete learning assessment and assess knowledge base.  Interventions:  - Provide teaching at level of understanding  - Provide teaching via preferred learning methods  Outcome: Progressing      Problem: DISCHARGE PLANNING  Goal: Discharge to home or other facility with appropriate resources  Description: INTERVENTIONS:  - Identify barriers to discharge w/patient and caregiver  - Arrange for needed discharge resources and transportation as appropriate  - Identify discharge learning needs (meds, wound care, etc.)  - Arrange for interpretive services to assist at discharge as needed  - Refer to Case Management Department for coordinating discharge planning if the patient needs post-hospital services based on physician/advanced practitioner order or complex needs related to functional status, cognitive ability, or social support system  Outcome: Progressing         Problem: Potential for Falls  Goal:  Patient will remain free of falls  Description: INTERVENTIONS:  - Educate patient/family on patient safety including physical limitations  - Instruct patient to call for assistance with activity   - Consult OT/PT to assist with strengthening/mobility   - Keep Call bell within reach  - Keep bed low and locked with side rails adjusted as appropriate  - Keep care items and personal belongings within reach  - Initiate and maintain comfort rounds  - Make Fall Risk Sign visible to staff  - Initiate/Maintain bed alarm  - Obtain necessary fall risk management equipment:   - Apply yellow socks and bracelet for high fall risk patients  - Consider moving patient to room near nurses station  Outcome: Progressing

## 2023-12-26 NOTE — DISCHARGE INSTR - AVS FIRST PAGE
You were found to have new diagnosis of atrial flutter, which is an irregular heart rhythm. This is likely related to your symptoms of dizziness, shortness of breath and fatigue with exertion.  Cardiology evaluated you while inpatient. You will need to continue new medication called Cardizem, as well as an increased dose of your metoprolol to help with heart rate control.  Irregular heart rhythms like atrial flutter increases your risk of stroke, meaning you will need to be on anticoagulation or blood thinners.  You will need to continue Eliquis twice a day for anticoagulation, recommend going on the pharmaceutical website to look for discounts or coupons as needed if pricing after the first month is expensive.  Recommend purchasing a pulse oximeter at home to check your heart rate and oxygen saturation if your symptoms recur.  Recommend close outpatient follow-up with cardiology to continue management of atrial flutter, discuss further treatment options in the future.

## 2023-12-26 NOTE — ASSESSMENT & PLAN NOTE
Wt Readings from Last 3 Encounters:   12/22/23 81.6 kg (179 lb 14.3 oz)   12/22/23 82.1 kg (181 lb)   11/24/23 83 kg (183 lb)       Does not appear to be in acute exacerbation   Echo (11/2023): EF >70%. Systolic function is hyperdynamic. G2DD.   Resumed spironolactone, holding home torsemide 3x/week  Resumed ARB, hold Norvasc at discharge with increasing Toprol and starting Cardizem  Daily weights, I/Os
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
## 2023-12-27 LAB
BACTERIA BLD CULT: NORMAL
BACTERIA BLD CULT: NORMAL

## 2023-12-28 ENCOUNTER — TELEPHONE (OUTPATIENT)
Age: 83
End: 2023-12-28

## 2023-12-28 NOTE — TELEPHONE ENCOUNTER
Done- called regulo and told her we can sign- she will fax over when ready- itll be after the new year

## 2023-12-28 NOTE — TELEPHONE ENCOUNTER
Nurse called, patient was discharged from hospital and started to their services.  She will be getting Pt OT and nursing.  Calling to see if provider would be willing to sign orders.

## 2024-01-02 ENCOUNTER — TRANSITIONAL CARE MANAGEMENT (OUTPATIENT)
Dept: FAMILY MEDICINE CLINIC | Facility: CLINIC | Age: 84
End: 2024-01-02

## 2024-01-07 ENCOUNTER — NURSE TRIAGE (OUTPATIENT)
Dept: OTHER | Facility: OTHER | Age: 84
End: 2024-01-07

## 2024-01-07 NOTE — TELEPHONE ENCOUNTER
"Reason for Disposition  • Hives or itching    Answer Assessment - Initial Assessment Questions  1. APPEARANCE of RASH: \"Describe the rash.\" (e.g., spots, blisters, raised areas, skin peeling, scaly)      Red blotches on breasts and upper stomach    2. SIZE: \"How big are the spots?\" (e.g., tip of pen, eraser, coin; inches, centimeters)      Little dime sized red blotches     3. LOCATION: \"Where is the rash located?\"      Chest, stomach, upper legs, back.    4. COLOR: \"What color is the rash?\" (Note: It is difficult to assess rash color in people with darker-colored skin. When this situation occurs, simply ask the caller to describe what they see.)      Pinkish red    5. ONSET: \"When did the rash begin?\"      Noticed yesterday when she took bath at 1100    6. FEVER: \"Do you have a fever?\" If Yes, ask: \"What is your temperature, how was it measured, and when did it start?\"      Denies    7. ITCHING: \"Does the rash itch?\" If Yes, ask: \"How bad is the itch?\" (Scale 1-10; or mild, moderate, severe)      Mild itching    8. CAUSE: \"What do you think is causing the rash?\"      Unsure    9. NEW MEDICATION: \"What new medication are you taking?\" (e.g., name of antibiotic) \"When did you start taking this medication?\".       Cardizem     Eloquis      Atorvastatin    10. OTHER SYMPTOMS: \"Do you have any other symptoms?\" (e.g., sore throat, fever, joint pain)       Nose runs a lot; not coughing a lot    Patient reports intermittent SOB at baseline    Protocols used: Rash - Widespread On Drugs-ADULT-AH    "

## 2024-01-07 NOTE — TELEPHONE ENCOUNTER
"Regarding: Rash Chest/Belly/Legs. New meds recently started  ----- Message from Luisa Powell sent at 1/7/2024  9:50 AM EST -----  \"I received a call from the patient that she has a red, blotchy, itchy rash covering her chest/legs/and belly. She was recently started on new medications after discharge and patient is concerned she is having a reaction to them. She is asking if she should stop any of these medicines. Patient is due to been seen by nursing tomorrow\"    "

## 2024-01-07 NOTE — TELEPHONE ENCOUNTER
Advised Urgent Care or Emergency Department for evaluation; provider unable to make recommendations without an assessment. Patient unsure if she can get in to be seen today; reiterated need for eval. Advised patient to call 911 if any difficulty breathing or swallowing; otherwise have assessment of rash as soon as possible.

## 2024-01-08 ENCOUNTER — TELEPHONE (OUTPATIENT)
Age: 84
End: 2024-01-08

## 2024-01-08 NOTE — TELEPHONE ENCOUNTER
Nurse calling stating that patient has been having ongoing itching since New years weekend . Nurse stated that patient started two meds recently and she did not know if they would have a side effect of itching . She stated that the itching is from the neck all the way to her feet and its a flat red rash. Severity level is 5 and patient takes claritin everyday which helps some but not completely. Please call negra or patient back for further help or recommendation.  Raegan Garcia

## 2024-01-08 NOTE — TELEPHONE ENCOUNTER
Meghan from [facility] called to say the pt has a rash on her torso front and back down to her groin that is itchy. Pt thinks it started about 1/5/24 and is now worsening. Pt was triaged yesterday and advised to go to the ED, pt did not go as she feels fine otherwise.    Vitals are temp 97.2, HR 78, resp 18, /74, OS 97, blood sugar was 126. Pt denies any SOB and has no swelling. The only new meds are Cardizem and Eliquis.     Pt would have to try to find transportation if an in office appt is required. She could try to get someone to come this afternoon to help her do a virtual. Please advise pt as soon as possible.

## 2024-01-09 ENCOUNTER — OFFICE VISIT (OUTPATIENT)
Dept: FAMILY MEDICINE CLINIC | Facility: CLINIC | Age: 84
End: 2024-01-09
Payer: MEDICARE

## 2024-01-09 VITALS
OXYGEN SATURATION: 98 % | HEIGHT: 59 IN | SYSTOLIC BLOOD PRESSURE: 130 MMHG | DIASTOLIC BLOOD PRESSURE: 60 MMHG | WEIGHT: 179 LBS | BODY MASS INDEX: 36.08 KG/M2 | HEART RATE: 64 BPM | RESPIRATION RATE: 20 BRPM

## 2024-01-09 DIAGNOSIS — I10 ESSENTIAL HYPERTENSION: ICD-10-CM

## 2024-01-09 DIAGNOSIS — I50.32 CHRONIC DIASTOLIC CHF (CONGESTIVE HEART FAILURE) (HCC): ICD-10-CM

## 2024-01-09 DIAGNOSIS — J43.9 PULMONARY EMPHYSEMA, UNSPECIFIED EMPHYSEMA TYPE (HCC): ICD-10-CM

## 2024-01-09 DIAGNOSIS — I48.92 ATRIAL FLUTTER WITH RAPID VENTRICULAR RESPONSE (HCC): Primary | ICD-10-CM

## 2024-01-09 DIAGNOSIS — L27.0 ALLERGIC DRUG RASH: ICD-10-CM

## 2024-01-09 DIAGNOSIS — E78.2 MIXED HYPERLIPIDEMIA: ICD-10-CM

## 2024-01-09 DIAGNOSIS — E66.01 CLASS 2 SEVERE OBESITY DUE TO EXCESS CALORIES WITH SERIOUS COMORBIDITY AND BODY MASS INDEX (BMI) OF 36.0 TO 36.9 IN ADULT: ICD-10-CM

## 2024-01-09 DIAGNOSIS — E11.9 DIABETES MELLITUS WITHOUT COMPLICATION (HCC): ICD-10-CM

## 2024-01-09 DIAGNOSIS — D3A.090 BENIGN CARCINOID TUMOR OF THE BRONCHUS AND LUNG: ICD-10-CM

## 2024-01-09 PROCEDURE — 99495 TRANSJ CARE MGMT MOD F2F 14D: CPT | Performed by: FAMILY MEDICINE

## 2024-01-09 NOTE — ASSESSMENT & PLAN NOTE
Patient was admitted from 12/22 to 12/26. Patient was originally given IV Cardizem to bring HR down. Patient was transitioned to po cardizem and her metoprolol was doubled. Patient was started on eliquis 5 mg twice a day as well. Will change thinner to xarelto 15 mg daily due to allergic rash. Patient may need ablation and to see Cardiology on 1/29/24.

## 2024-01-09 NOTE — ASSESSMENT & PLAN NOTE
Wt Readings from Last 3 Encounters:   12/22/23 81.6 kg (179 lb 14.3 oz)   12/22/23 82.1 kg (181 lb)   11/24/23 83 kg (183 lb)     Stable. Continue current medications per Cardiology.

## 2024-01-09 NOTE — ASSESSMENT & PLAN NOTE
Blood pressure ok. Patient told to hold amlodipine and torsemide due to adding Cardizem and increasing metoprolol.

## 2024-01-09 NOTE — ASSESSMENT & PLAN NOTE
A1C 6.3 in December 2023. Continue metformin 1000 mg bid and glipizide 5 mg qd. Continue low carb diet. Eye exam done in April 2023.    Lab Results   Component Value Date    HGBA1C 6.3 12/22/2023
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
## 2024-01-09 NOTE — ASSESSMENT & PLAN NOTE
Started the day following discharge. Could be from eliquis. Will change to xarelto and patient to take benadryl for itching. Call if no better.

## 2024-01-18 ENCOUNTER — OFFICE VISIT (OUTPATIENT)
Dept: NEPHROLOGY | Facility: CLINIC | Age: 84
End: 2024-01-18
Payer: MEDICARE

## 2024-01-18 VITALS
BODY MASS INDEX: 35.08 KG/M2 | HEIGHT: 59 IN | SYSTOLIC BLOOD PRESSURE: 118 MMHG | HEART RATE: 66 BPM | WEIGHT: 174 LBS | DIASTOLIC BLOOD PRESSURE: 62 MMHG

## 2024-01-18 DIAGNOSIS — I10 BENIGN ESSENTIAL HTN: ICD-10-CM

## 2024-01-18 DIAGNOSIS — E87.1 HYPONATREMIA: ICD-10-CM

## 2024-01-18 DIAGNOSIS — N18.30 STAGE 3 CHRONIC KIDNEY DISEASE, UNSPECIFIED WHETHER STAGE 3A OR 3B CKD (HCC): Primary | ICD-10-CM

## 2024-01-18 PROCEDURE — 99214 OFFICE O/P EST MOD 30 MIN: CPT | Performed by: INTERNAL MEDICINE

## 2024-01-18 NOTE — PROGRESS NOTES
NEPHROLOGY OFFICE VISIT   Camryn Roblero 83 y.o. female MRN: [id number]  1/18/2024    Reason for Visit: HTN, hyponatremia    ASSESSMENT and PLAN:    I had the pleasure of seeing Ms Roblero today in the renal clinic for the continued management of HTN/hyponatremia.     84 yo Patient has a history of CHF, lung carcinoid, COPD on home O2, TOM, hyponatremia, hypertension, prior ischemic bowel during pregnancy, who is being seen as a follow-up for hyponatremia. The etiology of hyponatremia was felt to be secondary to volume overload in the setting of heart failure, and citalopram use during admission 2018 at [facility]. Pt is now presenting for f/u for HTN, hyponatremia, proteinuria.     Pertinent chart review of history:    2020 - Pt states that was taking torsemide daily but not taking it on days with appointment and if had to go somewhere. Noticed edema when not taking torsemide. Was taking 1/2 of spironolactone. Saw Cardiology and was advised to take 1/2 tab torsemide (so ten mg) and spironolactone 25 mg daily. Pt states with this change, has edema improved. Is feeling satisfied with the improvement.       May 2021- patient's blood pressures are below goal.  Patient is asymptomatic.  Patient states that she has now been compliant with her medications this week and prior was not taking some of her medications up to 1 to 2 times a week.  We reviewed that this makes it difficult to appropriately titrate medications as we are not sure what she is taking when she is seen in the office.  Patient agrees.  Therefore, now that the patient is taking all of her medications daily with the exception of diuretics on days when she has heard physician appointments, I advised the patient to hold amlodipine.  And check blood pressures once a day for 2 weeks and call with results.     February 2022-sodium level remains stable 134. Calcium borderline elevated and was advised to hold calcium supplement.  Protein level in the urine remains  stable 0.3.     March 2022 calcium level improved.     October 2022-hyponatremia 127.  Advised the patient to restart torsemide and lower spironolactone.  Repeat sodium level improved to 131.     7/2023 -appointment -  labs from 5/2023 - creat is stable 0.8. Na 134. Hb 12.6. UA 4-10. No urinary complaints. UPCR 0.34. SBP appropriate. To note, since last seen, spironolactone was increased to 25 mg once daily. Given this, we will have pt repeat BMP in 1 month.    July 2023-patient had desaturation with 6-minute walk test and was advised to continue oxygen.    August 2023-patient saw primary care physician and was advised to continue metformin and glipizide.    September 2023-patient saw pulmonary team-compliance with CPAP is good.  Continue using CPAP.  Oxygen reduced to 1 L/min.  Pulmonary nodules with repeat CT scan in July showed stable lung nodules    October 2023-appointment cardiology team atorvastatin was added to patient's regimen.  Patient was advised to have carotid duplex due to question of blurry vision.  - Carotid ultrasound less than 50% stenoses bilaterally internal carotids    December 22-patient was admitted until December 26, 2023 with tachycardia I do initial presenting complaint from PCP.  Patient was found to be in a flutter with rapid ventricular response.  Was started on anticoagulation with Eliquis.  Antihypertensive regimen with Cardizem added.  Metoprolol was increased to 50 mg twice daily.  Patient was also maintained on valsartan, spironolactone, torsemide.  Amlodipine and terazosin were held     1) hyponatremia     - secondary to volume overload prior  -serum osmolarity 270 on June 2021  -urine osmolarity 242, urine sodium 22 in June 2021  -sodium level is stable and then decreased in June 2021-128 after which patient was advised fluid restriction.  Patient was also advised to be compliant with diuretics.  - Sodium level decreased again on 10/2022-at that time it was noted that the patient  had self discontinued torsemide.  Was advised to restart torsemide and lower spironolactone slightly.  - SPEP, UPEP, free light chain unrevealing in October 2022  -on torsemide  - no longer on salt tab    Overall, patient seen in the office January 18, 2024 for follow-up.  Patient's renal function is stable 0.9 mg/dL with sodium level appropriate 135.  Patient did have hyponatremia on admission during hospitalization December 22 132 but on discharge December 24 sodium level was stable at 135.  Renal function was appropriate.  AST was slightly improving.  Urinalysis during hospitalization was bland with the exception of 10-20 WBC.  Electrolytes and acid-base status were appropriate.  - Patient's heart rate on manual check is 66.  Blood pressures are appropriate though lower end of normal but patient is asymptomatic.  Therefore we will continue current regimen.  I advised the patient to check blood pressures and heart rate for 2 weeks and send in log and patient's daughter who is at bedside with the patient will send to [employer].  They understand the risks and benefits of the anticoagulation and we reviewed this and I advised to continue anticoagulation until advised differently by the patient's cardiologist and they stated understanding.  I will not be making changes today to the patient's antihypertensive regimen as I believe she is on an appropriate regimen of diuretic given euvolemic volume state.       Plan:  - Lab work in 3 months  - Appointment in 6 months  - Continue valsartan, spironolactone, metoprolol, Cardizem, torsemide  - Torsemide can continue at 3 times a week  - Check blood pressures for 1 to 2 weeks and send in log  - Send in log of heart rate also       2) HTN - currently is on valsartan, spironolactone, metoprolol, Cardizem, torsemide with controlled blood pressures      -was started on spironolactone in April 2018.   -during visit in July 2018, patient's amlodipine was increased and valsartan was  changed to losartan due to recall.  -renal artery Doppler were unrevealing.  In the proximal arteries of the renal artery.  -May 2019-Lower terazosin to 5 mg, and lower metoprolol to 37.5 twice a day  - 7/2019 - metoprolol changed to 25 mg at night as pt was taking 37.5 BID dosing only once daily  -June 2022-patient saw cardiologist.  At this time was taking torsemide 10 mg 3 times per week and spironolactone was 3 times per week and was advised to take spironolactone once a day  - august 2022 - pt states is not taking torsemide regularly and last dose was in july  - October 2022-patient was not taking torsemide and was advised to restart and lower spironolactone due to hyponatremia  - 1/2023 - SBP ok for now (upper limit of nl but pt higher risk of fall)  - 4/2023 - spironolactone increased by Cardiologist to 25 mg daily  - 7/2023 - BP at goal  - December 2023-patient was admitted with A-fib with rapid response.  Antihypertensive regimen was adjusted-patient was discharged on metoprolol 50 mg twice daily XL, Cardizem 120 mg daily, valsartan, spironolactone, torsemide.  Amlodipine and terazosin were held.     3) renal function      -Renal function at baseline 0.8 to 1 mg/dL-appropriate     4) proteinuria     - prior SPEP, UPEP unrevealing  - on ARB and spironolactone  - last UA on 8/2019. None recent.   -  Repeat urinalysis unrevealing.  U PCR stable 5/2023-no recent check.  Will give patient new lab slips for next appointment check.     5) thyroid nodule - biopsied prior     6) electrolytes     -   Hyponatremia -sodium level has normalized  - calcium supplement held in February 2022 due to borderline hypercalcemia.  February 2022.   repeat testing with improved calcium level.  Holding calcium supplements.  - monitor closely as spironolactone increased 4/2023     7) COPD and pulm carcinoid and TOM     - follows with Pulmonary team and Hematology team  - on home O2 nocturnal     8) AST slight elevation - chronic. Per  PCP     9) atrial flutter with rapid rate recent admission December 2023-anticoagulation adjusted by PCP postdischarge due to possible rash.    - had rash with eliquis and PCP changed to xarelto with improvement in rash as of 1/18/2024 and now resolved     10) weight at home is 172-175 lbs.      - euvolemic on exam 1/18/2024     11) DM - being managed by PCP    It was a pleasure evaluating your patient in the office today. Thank you for allowing our team to participate in the care of Ms Camryn Roblero. Please do not hesitate to contact our team if further issues/questions shall arise in the interim.     No problem-specific Assessment & Plan notes found for this encounter.      HPI:    Pt no longer with congestion, cough. No fevers. No trouble with dysuria. On nocturnal O2.    PATIENT INSTRUCTIONS:    There are no Patient Instructions on file for this visit.      OBJECTIVE:  Current Weight:    There were no vitals filed for this visit. There is no height or weight on file to calculate BMI.      REVIEW OF SYSTEMS:    Review of Systems   All other systems reviewed and are negative.      PHYSICAL EXAM:      Physical Exam  Vitals and nursing note reviewed.   Constitutional:       General: She is not in acute distress.     Appearance: She is well-developed. She is not diaphoretic.   HENT:      Head: Normocephalic and atraumatic.   Eyes:      General: No scleral icterus.        Right eye: No discharge.         Left eye: No discharge.      Conjunctiva/sclera: Conjunctivae normal.   Neck:      Vascular: No JVD.   Cardiovascular:      Rate and Rhythm: Normal rate and regular rhythm.      Heart sounds: No murmur heard.     No friction rub. No gallop.   Pulmonary:      Effort: Pulmonary effort is normal. No respiratory distress.      Breath sounds: Normal breath sounds. No wheezing or rales.   Abdominal:      General: Bowel sounds are normal. There is no distension.      Palpations: Abdomen is soft.      Tenderness: There is no  abdominal tenderness. There is no rebound.   Musculoskeletal:         General: No tenderness or deformity. Normal range of motion.      Cervical back: Normal range of motion and neck supple.      Comments: Trace lower extremity edema bilaterally   Skin:     General: Skin is warm and dry.      Coloration: Skin is not pale.      Findings: No erythema or rash.      Comments: No longer with any rash on my exam on abdomen, face, hands   Neurological:      Mental Status: She is alert and oriented to person, place, and time.      Coordination: Coordination normal.   Psychiatric:         Behavior: Behavior normal.         Thought Content: Thought content normal.         Judgment: Judgment normal.         Medications:    Current Outpatient Medications:     Accu-Chek FastClix Lancets MISC, USE   TO CHECK GLUCOSE ONCE DAILY, Disp: 102 each, Rfl: 0    Accu-Chek SmartView test strip, Use 1 each daily Use as instructed, Disp: 100 each, Rfl: 3    acetaminophen (TYLENOL) 500 mg tablet, Take 500 mg by mouth every 6 (six) hours as needed for mild pain For pain, Disp: , Rfl:     atorvastatin (LIPITOR) 40 mg tablet, Take 1 tablet (40 mg total) by mouth daily, Disp: 90 tablet, Rfl: 3    AYR SALINE NASAL DROPS NA, Two sprays as needed, Disp: , Rfl:     diltiazem (CARDIZEM CD) 120 mg 24 hr capsule, Take 1 capsule (120 mg total) by mouth daily, Disp: 30 capsule, Rfl: 0    glipiZIDE (GLUCOTROL) 10 mg tablet, Take 0.5 tablets (5 mg total) by mouth in the morning, Disp: 45 tablet, Rfl: 2    loratadine (CLARITIN) 10 mg tablet, Take 1 tablet by mouth daily, Disp: , Rfl:     metFORMIN (GLUCOPHAGE) 500 mg tablet, Take 2 tablets (1,000 mg total) by mouth 2 (two) times a day, Disp: 360 tablet, Rfl: 1    metoprolol succinate (TOPROL-XL) 50 mg 24 hr tablet, Take 1 tablet (50 mg total) by mouth 2 (two) times a day, Disp: 60 tablet, Rfl: 0    rivaroxaban (XARELTO) 15 mg tablet, Take 1 tablet (15 mg total) by mouth daily with breakfast, Disp: 30 tablet,  "Rfl: 3    spironolactone (ALDACTONE) 25 mg tablet, Take 1 tablet (25 mg total) by mouth daily, Disp: 90 tablet, Rfl: 2    torsemide (DEMADEX) 20 mg tablet, Take 0.5 tablets (10 mg total) by mouth 3 (three) times a week (Patient taking differently: Take 10 mg by mouth once a week), Disp: 90 tablet, Rfl: 0    valsartan (DIOVAN) 320 MG tablet, TAKE 1 TABLET EVERY DAY, Disp: 90 tablet, Rfl: 2    Laboratory Results:        Invalid input(s): \"ALBUMIN\"    Results for orders placed or performed during the hospital encounter of 12/22/23   FLU/RSV/COVID - if FLU/RSV clinically relevant    Specimen: Nose; Nares   Result Value Ref Range    SARS-CoV-2 Negative Negative    INFLUENZA A PCR Negative Negative    INFLUENZA B PCR Negative Negative    RSV PCR Negative Negative   Blood culture #1    Specimen: Arm, Left; Blood   Result Value Ref Range    Blood Culture No Growth After 5 Days.    Blood culture #2    Specimen: Arm, Right; Blood   Result Value Ref Range    Blood Culture No Growth After 5 Days.    Urine culture    Specimen: Urine, Clean Catch   Result Value Ref Range    Urine Culture 50,000-59,000 cfu/ml    CBC and differential   Result Value Ref Range    WBC 8.76 4.31 - 10.16 Thousand/uL    RBC 4.42 3.81 - 5.12 Million/uL    Hemoglobin 12.8 11.5 - 15.4 g/dL    Hematocrit 40.1 34.8 - 46.1 %    MCV 91 82 - 98 fL    MCH 29.0 26.8 - 34.3 pg    MCHC 31.9 31.4 - 37.4 g/dL    RDW 13.8 11.6 - 15.1 %    MPV 10.4 8.9 - 12.7 fL    Platelets 273 149 - 390 Thousands/uL    nRBC 0 /100 WBCs    Neutrophils Relative 82 (H) 43 - 75 %    Immat GRANS % 0 0 - 2 %    Lymphocytes Relative 12 (L) 14 - 44 %    Monocytes Relative 5 4 - 12 %    Eosinophils Relative 0 0 - 6 %    Basophils Relative 1 0 - 1 %    Neutrophils Absolute 7.23 1.85 - 7.62 Thousands/µL    Immature Grans Absolute 0.03 0.00 - 0.20 Thousand/uL    Lymphocytes Absolute 1.01 0.60 - 4.47 Thousands/µL    Monocytes Absolute 0.43 0.17 - 1.22 Thousand/µL    Eosinophils Absolute 0.02 0.00 " "- 0.61 Thousand/µL    Basophils Absolute 0.04 0.00 - 0.10 Thousands/µL   Comprehensive metabolic panel   Result Value Ref Range    Sodium 132 (L) 135 - 147 mmol/L    Potassium 4.3 3.5 - 5.3 mmol/L    Chloride 100 96 - 108 mmol/L    CO2 20 (L) 21 - 32 mmol/L    ANION GAP 12 mmol/L    BUN 26 (H) 5 - 25 mg/dL    Creatinine 1.10 0.60 - 1.30 mg/dL    Glucose 147 (H) 65 - 140 mg/dL    Calcium 10.1 8.4 - 10.2 mg/dL    AST 82 (H) 13 - 39 U/L    ALT 44 7 - 52 U/L    Alkaline Phosphatase 56 34 - 104 U/L    Total Protein 7.8 6.4 - 8.4 g/dL    Albumin 4.3 3.5 - 5.0 g/dL    Total Bilirubin 0.77 0.20 - 1.00 mg/dL    eGFR 46 ml/min/1.73sq m   HS Troponin 0hr (reflex protocol)   Result Value Ref Range    hs TnI 0hr 147 (H) \"Refer to ACS Flowchart\"- see link ng/L   Magnesium   Result Value Ref Range    Magnesium 1.6 (L) 1.9 - 2.7 mg/dL   TSH   Result Value Ref Range    TSH 3RD GENERATON 1.460 0.450 - 4.500 uIU/mL   B-Type Natriuretic Peptide(BNP)   Result Value Ref Range     (H) 0 - 100 pg/mL   Lactic acid   Result Value Ref Range    LACTIC ACID 2.4 (HH) 0.5 - 2.0 mmol/L   Procalcitonin   Result Value Ref Range    Procalcitonin <0.05 <=0.25 ng/ml   Protime-INR   Result Value Ref Range    Protime 14.7 (H) 11.6 - 14.5 seconds    INR 1.16 0.84 - 1.19   APTT   Result Value Ref Range    PTT 28 23 - 37 seconds   UA w Reflex to Microscopic w Reflex to Culture    Specimen: Urine, Clean Catch   Result Value Ref Range    Color, UA Yellow Yellow    Clarity, UA Clear Clear    Specific Gravity, UA 1.020 1.001 - 1.030    pH, UA 5.5 5.0, 5.5, 6.0, 6.5, 7.0, 7.5, 8.0    Leukocytes, UA 2+ (A) Negative    Nitrite, UA Negative Negative    Protein, UA Negative Negative, Interference- unable to analyze mg/dl    Glucose, UA Negative Negative mg/dl    Ketones, UA Negative Negative mg/dl    Urobilinogen, UA 0.2 0.2, 1.0 E.U./dl E.U./dl    Bilirubin, UA Negative Negative    Occult Blood, UA Negative Negative   HS Troponin I 2hr   Result Value Ref " "Range    hs TnI 2hr 232 (H) \"Refer to ACS Flowchart\"- see link ng/L    Delta 2hr hsTnI 85 (H) <20 ng/L   HS Troponin I 4hr   Result Value Ref Range    hs TnI 4hr 364 (H) \"Refer to ACS Flowchart\"- see link ng/L    Delta 4hr hsTnI 217 (H) <20 ng/L   Lactic acid 2 Hours   Result Value Ref Range    LACTIC ACID 1.6 0.5 - 2.0 mmol/L   Urine Microscopic   Result Value Ref Range    RBC, UA 0-1 None Seen, 0-1, 1-2, 2-4, 0-5 /hpf    WBC, UA 10-20 (A) None Seen, 0-1, 1-2, 0-5, 2-4 /hpf    Epithelial Cells Occasional None Seen, Occasional /hpf    Bacteria, UA Occasional None Seen, Occasional /hpf   CBC (With Platelets)   Result Value Ref Range    WBC 9.50 4.31 - 10.16 Thousand/uL    RBC 4.27 3.81 - 5.12 Million/uL    Hemoglobin 12.4 11.5 - 15.4 g/dL    Hematocrit 37.9 34.8 - 46.1 %    MCV 89 82 - 98 fL    MCH 29.0 26.8 - 34.3 pg    MCHC 32.7 31.4 - 37.4 g/dL    RDW 13.8 11.6 - 15.1 %    Platelets 258 149 - 390 Thousands/uL    MPV 10.4 8.9 - 12.7 fL   Comprehensive metabolic panel   Result Value Ref Range    Sodium 136 135 - 147 mmol/L    Potassium 4.0 3.5 - 5.3 mmol/L    Chloride 103 96 - 108 mmol/L    CO2 24 21 - 32 mmol/L    ANION GAP 9 mmol/L    BUN 24 5 - 25 mg/dL    Creatinine 0.89 0.60 - 1.30 mg/dL    Glucose 87 65 - 140 mg/dL    Calcium 9.6 8.4 - 10.2 mg/dL    AST 71 (H) 13 - 39 U/L    ALT 39 7 - 52 U/L    Alkaline Phosphatase 47 34 - 104 U/L    Total Protein 7.3 6.4 - 8.4 g/dL    Albumin 3.9 3.5 - 5.0 g/dL    Total Bilirubin 1.05 (H) 0.20 - 1.00 mg/dL    eGFR 60 ml/min/1.73sq m   Lipid Panel with Direct LDL reflex   Result Value Ref Range    Cholesterol 78 See Comment mg/dL    Triglycerides 51 See Comment mg/dL    HDL, Direct 31 (L) >=50 mg/dL    LDL Calculated 37 0 - 100 mg/dL   D-dimer, quantitative   Result Value Ref Range    D-Dimer, Quant 0.61 (H) <0.50 ug/ml FEU   APTT   Result Value Ref Range    PTT 95 (H) 23 - 37 seconds   APTT   Result Value Ref Range    PTT 56 (H) 23 - 37 seconds   Magnesium   Result Value Ref " Range    Magnesium 1.9 1.9 - 2.7 mg/dL   CBC   Result Value Ref Range    WBC 6.84 4.31 - 10.16 Thousand/uL    RBC 4.26 3.81 - 5.12 Million/uL    Hemoglobin 12.4 11.5 - 15.4 g/dL    Hematocrit 38.7 34.8 - 46.1 %    MCV 91 82 - 98 fL    MCH 29.1 26.8 - 34.3 pg    MCHC 32.0 31.4 - 37.4 g/dL    RDW 14.0 11.6 - 15.1 %    Platelets 258 149 - 390 Thousands/uL    MPV 10.4 8.9 - 12.7 fL   Comprehensive metabolic panel   Result Value Ref Range    Sodium 135 135 - 147 mmol/L    Potassium 4.2 3.5 - 5.3 mmol/L    Chloride 104 96 - 108 mmol/L    CO2 24 21 - 32 mmol/L    ANION GAP 7 mmol/L    BUN 24 5 - 25 mg/dL    Creatinine 0.92 0.60 - 1.30 mg/dL    Glucose 112 65 - 140 mg/dL    Calcium 9.3 8.4 - 10.2 mg/dL    AST 69 (H) 13 - 39 U/L    ALT 39 7 - 52 U/L    Alkaline Phosphatase 47 34 - 104 U/L    Total Protein 7.2 6.4 - 8.4 g/dL    Albumin 3.8 3.5 - 5.0 g/dL    Total Bilirubin 0.74 0.20 - 1.00 mg/dL    eGFR 58 ml/min/1.73sq m   ECG 12 lead   Result Value Ref Range    Ventricular Rate 132 BPM    Atrial Rate 132 BPM    MT Interval 204 ms    QRSD Interval 102 ms    QT Interval 328 ms    QTC Interval 485 ms    P Axis 81 degrees    QRS Axis 66 degrees    T Wave Axis 261 degrees   ECG 12 lead   Result Value Ref Range    Ventricular Rate 133 BPM    Atrial Rate 394 BPM    MT Interval  ms    QRSD Interval 112 ms    QT Interval 330 ms    QTC Interval 491 ms    P Axis  degrees    QRS Axis 60 degrees    T Wave Axis 236 degrees   ECG 12 lead   Result Value Ref Range    Ventricular Rate 102 BPM    Atrial Rate 277 BPM    MT Interval  ms    QRSD Interval 104 ms    QT Interval 358 ms    QTC Interval 466 ms    P Axis  degrees    QRS Axis 57 degrees    T Wave Axis 256 degrees   ECG 12 lead   Result Value Ref Range    Ventricular Rate 71 BPM    Atrial Rate 381 BPM    MT Interval  ms    QRSD Interval 106 ms    QT Interval 392 ms    QTC Interval 425 ms    P Axis  degrees    QRS Axis 36 degrees    T Wave Axis 266 degrees   Fingerstick Glucose (POCT)    Result Value Ref Range    POC Glucose 161 (H) 65 - 140 mg/dl   Fingerstick Glucose (POCT)   Result Value Ref Range    POC Glucose 118 65 - 140 mg/dl   Fingerstick Glucose (POCT)   Result Value Ref Range    POC Glucose 109 65 - 140 mg/dl   Fingerstick Glucose (POCT)   Result Value Ref Range    POC Glucose 186 (H) 65 - 140 mg/dl   Fingerstick Glucose (POCT)   Result Value Ref Range    POC Glucose 104 65 - 140 mg/dl   Fingerstick Glucose (POCT)   Result Value Ref Range    POC Glucose 128 65 - 140 mg/dl   Fingerstick Glucose (POCT)   Result Value Ref Range    POC Glucose 191 (H) 65 - 140 mg/dl   Fingerstick Glucose (POCT)   Result Value Ref Range    POC Glucose 86 65 - 140 mg/dl   Fingerstick Glucose (POCT)   Result Value Ref Range    POC Glucose 141 (H) 65 - 140 mg/dl   Fingerstick Glucose (POCT)   Result Value Ref Range    POC Glucose 121 65 - 140 mg/dl   Fingerstick Glucose (POCT)   Result Value Ref Range    POC Glucose 148 (H) 65 - 140 mg/dl   Fingerstick Glucose (POCT)   Result Value Ref Range    POC Glucose 133 65 - 140 mg/dl   Fingerstick Glucose (POCT)   Result Value Ref Range    POC Glucose 252 (H) 65 - 140 mg/dl   Fingerstick Glucose (POCT)   Result Value Ref Range    POC Glucose 130 65 - 140 mg/dl   Fingerstick Glucose (POCT)   Result Value Ref Range    POC Glucose 210 (H) 65 - 140 mg/dl

## 2024-01-18 NOTE — PATIENT INSTRUCTIONS
1) Avoid NSAIDS - (Example - motrin, advil, ibuprofen, aleve, exederin, etc)  2) Always follow a low salt diet  3) If you have any issues with trouble with nausea, vomiting, urinating, blood in the urine, food tasting like metal, confusion, weakness, fatigue that is worsening, or any other questions, please call right away.  4) Please continue to follow regularly with your family physician and any other specialist that you are advised to see and continue to follow their recommendations  5) please check blood pressures once daily for 2 weeks and please send in log - can send to me on [employer]  6) no changes to your medications  7) labwork in 3 months  8) appointment in 6 months

## 2024-01-18 NOTE — LETTER
January 18, 2024     Abiel Seals MD  [address] [facility] [address]  [address]  [place] [address]    Patient: Camryn Roblero   YOB: 1940   Date of Visit: 1/18/2024       Dear Dr. Seals:    Thank you for referring Camryn Roblero to me for evaluation. Below are my notes for this consultation.    If you have questions, please do not hesitate to call me. I look forward to following your patient along with you.         Sincerely,        Arslan Perkins MD        CC: No Recipients    Arslan Perkins MD  1/18/2024  2:03 PM  Sign when Signing Visit  NEPHROLOGY OFFICE VISIT   Camryn Roblero 83 y.o. female MRN: [id number]  1/18/2024    Reason for Visit: HTN, hyponatremia    ASSESSMENT and PLAN:    I had the pleasure of seeing Ms Roblero today in the renal clinic for the continued management of HTN/hyponatremia.     84 yo Patient has a history of CHF, lung carcinoid, COPD on home O2, TOM, hyponatremia, hypertension, prior ischemic bowel during pregnancy, who is being seen as a follow-up for hyponatremia. The etiology of hyponatremia was felt to be secondary to volume overload in the setting of heart failure, and citalopram use during admission 2018 at [facility]. Pt is now presenting for f/u for HTN, hyponatremia, proteinuria.     Pertinent chart review of history:    2020 - Pt states that was taking torsemide daily but not taking it on days with appointment and if had to go somewhere. Noticed edema when not taking torsemide. Was taking 1/2 of spironolactone. Saw Cardiology and was advised to take 1/2 tab torsemide (so ten mg) and spironolactone 25 mg daily. Pt states with this change, has edema improved. Is feeling satisfied with the improvement.       May 2021- patient's blood pressures are below goal.  Patient is asymptomatic.  Patient states that she has now been compliant with her medications this week and prior was not taking some of her medications up to 1 to 2 times a week.  We reviewed that this makes it  difficult to appropriately titrate medications as we are not sure what she is taking when she is seen in the office.  Patient agrees.  Therefore, now that the patient is taking all of her medications daily with the exception of diuretics on days when she has heard physician appointments, I advised the patient to hold amlodipine.  And check blood pressures once a day for 2 weeks and call with results.     February 2022-sodium level remains stable 134. Calcium borderline elevated and was advised to hold calcium supplement.  Protein level in the urine remains stable 0.3.     March 2022 calcium level improved.     October 2022-hyponatremia 127.  Advised the patient to restart torsemide and lower spironolactone.  Repeat sodium level improved to 131.     7/2023 -appointment -  labs from 5/2023 - creat is stable 0.8. Na 134. Hb 12.6. UA 4-10. No urinary complaints. UPCR 0.34. SBP appropriate. To note, since last seen, spironolactone was increased to 25 mg once daily. Given this, we will have pt repeat BMP in 1 month.    July 2023-patient had desaturation with 6-minute walk test and was advised to continue oxygen.    August 2023-patient saw primary care physician and was advised to continue metformin and glipizide.    September 2023-patient saw pulmonary team-compliance with CPAP is good.  Continue using CPAP.  Oxygen reduced to 1 L/min.  Pulmonary nodules with repeat CT scan in July showed stable lung nodules    October 2023-appointment cardiology team atorvastatin was added to patient's regimen.  Patient was advised to have carotid duplex due to question of blurry vision.  - Carotid ultrasound less than 50% stenoses bilaterally internal carotids    December 22-patient was admitted until December 26, 2023 with tachycardia I do initial presenting complaint from PCP.  Patient was found to be in a flutter with rapid ventricular response.  Was started on anticoagulation with Eliquis.  Antihypertensive regimen with Cardizem  added.  Metoprolol was increased to 50 mg twice daily.  Patient was also maintained on valsartan, spironolactone, torsemide.  Amlodipine and terazosin were held     1) hyponatremia     - secondary to volume overload prior  -serum osmolarity 270 on June 2021  -urine osmolarity 242, urine sodium 22 in June 2021  -sodium level is stable and then decreased in June 2021-128 after which patient was advised fluid restriction.  Patient was also advised to be compliant with diuretics.  - Sodium level decreased again on 10/2022-at that time it was noted that the patient had self discontinued torsemide.  Was advised to restart torsemide and lower spironolactone slightly.  - SPEP, UPEP, free light chain unrevealing in October 2022  -on torsemide  - no longer on salt tab    Overall, patient seen in the office January 18, 2024 for follow-up.  Patient's renal function is stable 0.9 mg/dL with sodium level appropriate 135.  Patient did have hyponatremia on admission during hospitalization December 22 132 but on discharge December 24 sodium level was stable at 135.  Renal function was appropriate.  AST was slightly improving.  Urinalysis during hospitalization was bland with the exception of 10-20 WBC.  Electrolytes and acid-base status were appropriate.  - Patient's heart rate on manual check is 66.  Blood pressures are appropriate though lower end of normal but patient is asymptomatic.  Therefore we will continue current regimen.  I advised the patient to check blood pressures and heart rate for 2 weeks and send in log and patient's daughter who is at bedside with the patient will send to [facility].  They understand the risks and benefits of the anticoagulation and we reviewed this and I advised to continue anticoagulation until advised differently by the patient's cardiologist and they stated understanding.  I will not be making changes today to the patient's antihypertensive regimen as I believe she is on an appropriate regimen of  diuretic given euvolemic volume state.       Plan:  - Lab work in 3 months  - Appointment in 6 months  - Continue valsartan, spironolactone, metoprolol, Cardizem, torsemide  - Torsemide can continue at 3 times a week  - Check blood pressures for 1 to 2 weeks and send in log  - Send in log of heart rate also       2) HTN - currently is on valsartan, spironolactone, metoprolol, Cardizem, torsemide with controlled blood pressures      -was started on spironolactone in April 2018.   -during visit in July 2018, patient's amlodipine was increased and valsartan was changed to losartan due to recall.  -renal artery Doppler were unrevealing.  In the proximal arteries of the renal artery.  -May 2019-Lower terazosin to 5 mg, and lower metoprolol to 37.5 twice a day  - 7/2019 - metoprolol changed to 25 mg at night as pt was taking 37.5 BID dosing only once daily  -June 2022-patient saw cardiologist.  At this time was taking torsemide 10 mg 3 times per week and spironolactone was 3 times per week and was advised to take spironolactone once a day  - august 2022 - pt states is not taking torsemide regularly and last dose was in july  - October 2022-patient was not taking torsemide and was advised to restart and lower spironolactone due to hyponatremia  - 1/2023 - SBP ok for now (upper limit of nl but pt higher risk of fall)  - 4/2023 - spironolactone increased by Cardiologist to 25 mg daily  - 7/2023 - BP at goal  - December 2023-patient was admitted with A-fib with rapid response.  Antihypertensive regimen was adjusted-patient was discharged on metoprolol 50 mg twice daily XL, Cardizem 120 mg daily, valsartan, spironolactone, torsemide.  Amlodipine and terazosin were held.     3) renal function      -Renal function at baseline 0.8 to 1 mg/dL-appropriate     4) proteinuria     - prior SPEP, UPEP unrevealing  - on ARB and spironolactone  - last UA on 8/2019. None recent.   -  Repeat urinalysis unrevealing.  U PCR stable 5/2023-no  recent check.  Will give patient new lab slips for next appointment check.     5) thyroid nodule - biopsied prior     6) electrolytes     -   Hyponatremia -sodium level has normalized  - calcium supplement held in February 2022 due to borderline hypercalcemia.  February 2022.   repeat testing with improved calcium level.  Holding calcium supplements.  - monitor closely as spironolactone increased 4/2023     7) COPD and pulm carcinoid and TOM     - follows with Pulmonary team and Hematology team  - on home O2 nocturnal     8) AST slight elevation - chronic. Per PCP     9) atrial flutter with rapid rate recent admission December 2023-anticoagulation adjusted by PCP postdischarge due to possible rash.    - had rash with eliquis and PCP changed to xarelto with improvement in rash as of 1/18/2024 and now resolved     10) weight at home is 172-175 lbs.      - euvolemic on exam 1/18/2024     11) DM - being managed by PCP    It was a pleasure evaluating your patient in the office today. Thank you for allowing our team to participate in the care of Ms Camryn Roblero. Please do not hesitate to contact our team if further issues/questions shall arise in the interim.     No problem-specific Assessment & Plan notes found for this encounter.      HPI:    Pt no longer with congestion, cough. No fevers. No trouble with dysuria. On nocturnal O2.    PATIENT INSTRUCTIONS:    There are no Patient Instructions on file for this visit.      OBJECTIVE:  Current Weight:    There were no vitals filed for this visit. There is no height or weight on file to calculate BMI.      REVIEW OF SYSTEMS:    Review of Systems   All other systems reviewed and are negative.      PHYSICAL EXAM:      Physical Exam  Vitals and nursing note reviewed.   Constitutional:       General: She is not in acute distress.     Appearance: She is well-developed. She is not diaphoretic.   HENT:      Head: Normocephalic and atraumatic.   Eyes:      General: No scleral icterus.         Right eye: No discharge.         Left eye: No discharge.      Conjunctiva/sclera: Conjunctivae normal.   Neck:      Vascular: No JVD.   Cardiovascular:      Rate and Rhythm: Normal rate and regular rhythm.      Heart sounds: No murmur heard.     No friction rub. No gallop.   Pulmonary:      Effort: Pulmonary effort is normal. No respiratory distress.      Breath sounds: Normal breath sounds. No wheezing or rales.   Abdominal:      General: Bowel sounds are normal. There is no distension.      Palpations: Abdomen is soft.      Tenderness: There is no abdominal tenderness. There is no rebound.   Musculoskeletal:         General: No tenderness or deformity. Normal range of motion.      Cervical back: Normal range of motion and neck supple.      Comments: Trace lower extremity edema bilaterally   Skin:     General: Skin is warm and dry.      Coloration: Skin is not pale.      Findings: No erythema or rash.      Comments: No longer with any rash on my exam on abdomen, face, hands   Neurological:      Mental Status: She is alert and oriented to person, place, and time.      Coordination: Coordination normal.   Psychiatric:         Behavior: Behavior normal.         Thought Content: Thought content normal.         Judgment: Judgment normal.         Medications:    Current Outpatient Medications:   •  Accu-Chek FastClix Lancets MISC, USE   TO CHECK GLUCOSE ONCE DAILY, Disp: 102 each, Rfl: 0  •  Accu-Chek SmartView test strip, Use 1 each daily Use as instructed, Disp: 100 each, Rfl: 3  •  acetaminophen (TYLENOL) 500 mg tablet, Take 500 mg by mouth every 6 (six) hours as needed for mild pain For pain, Disp: , Rfl:   •  atorvastatin (LIPITOR) 40 mg tablet, Take 1 tablet (40 mg total) by mouth daily, Disp: 90 tablet, Rfl: 3  •  AYR SALINE NASAL DROPS NA, Two sprays as needed, Disp: , Rfl:   •  diltiazem (CARDIZEM CD) 120 mg 24 hr capsule, Take 1 capsule (120 mg total) by mouth daily, Disp: 30 capsule, Rfl: 0  •   "glipiZIDE (GLUCOTROL) 10 mg tablet, Take 0.5 tablets (5 mg total) by mouth in the morning, Disp: 45 tablet, Rfl: 2  •  loratadine (CLARITIN) 10 mg tablet, Take 1 tablet by mouth daily, Disp: , Rfl:   •  metFORMIN (GLUCOPHAGE) 500 mg tablet, Take 2 tablets (1,000 mg total) by mouth 2 (two) times a day, Disp: 360 tablet, Rfl: 1  •  metoprolol succinate (TOPROL-XL) 50 mg 24 hr tablet, Take 1 tablet (50 mg total) by mouth 2 (two) times a day, Disp: 60 tablet, Rfl: 0  •  rivaroxaban (XARELTO) 15 mg tablet, Take 1 tablet (15 mg total) by mouth daily with breakfast, Disp: 30 tablet, Rfl: 3  •  spironolactone (ALDACTONE) 25 mg tablet, Take 1 tablet (25 mg total) by mouth daily, Disp: 90 tablet, Rfl: 2  •  torsemide (DEMADEX) 20 mg tablet, Take 0.5 tablets (10 mg total) by mouth 3 (three) times a week (Patient taking differently: Take 10 mg by mouth once a week), Disp: 90 tablet, Rfl: 0  •  valsartan (DIOVAN) 320 MG tablet, TAKE 1 TABLET EVERY DAY, Disp: 90 tablet, Rfl: 2    Laboratory Results:        Invalid input(s): \"ALBUMIN\"    Results for orders placed or performed during the hospital encounter of 12/22/23   FLU/RSV/COVID - if FLU/RSV clinically relevant    Specimen: Nose; Nares   Result Value Ref Range    SARS-CoV-2 Negative Negative    INFLUENZA A PCR Negative Negative    INFLUENZA B PCR Negative Negative    RSV PCR Negative Negative   Blood culture #1    Specimen: Arm, Left; Blood   Result Value Ref Range    Blood Culture No Growth After 5 Days.    Blood culture #2    Specimen: Arm, Right; Blood   Result Value Ref Range    Blood Culture No Growth After 5 Days.    Urine culture    Specimen: Urine, Clean Catch   Result Value Ref Range    Urine Culture 50,000-59,000 cfu/ml    CBC and differential   Result Value Ref Range    WBC 8.76 4.31 - 10.16 Thousand/uL    RBC 4.42 3.81 - 5.12 Million/uL    Hemoglobin 12.8 11.5 - 15.4 g/dL    Hematocrit 40.1 34.8 - 46.1 %    MCV 91 82 - 98 fL    MCH 29.0 26.8 - 34.3 pg    MCHC 31.9 " "31.4 - 37.4 g/dL    RDW 13.8 11.6 - 15.1 %    MPV 10.4 8.9 - 12.7 fL    Platelets 273 149 - 390 Thousands/uL    nRBC 0 /100 WBCs    Neutrophils Relative 82 (H) 43 - 75 %    Immat GRANS % 0 0 - 2 %    Lymphocytes Relative 12 (L) 14 - 44 %    Monocytes Relative 5 4 - 12 %    Eosinophils Relative 0 0 - 6 %    Basophils Relative 1 0 - 1 %    Neutrophils Absolute 7.23 1.85 - 7.62 Thousands/µL    Immature Grans Absolute 0.03 0.00 - 0.20 Thousand/uL    Lymphocytes Absolute 1.01 0.60 - 4.47 Thousands/µL    Monocytes Absolute 0.43 0.17 - 1.22 Thousand/µL    Eosinophils Absolute 0.02 0.00 - 0.61 Thousand/µL    Basophils Absolute 0.04 0.00 - 0.10 Thousands/µL   Comprehensive metabolic panel   Result Value Ref Range    Sodium 132 (L) 135 - 147 mmol/L    Potassium 4.3 3.5 - 5.3 mmol/L    Chloride 100 96 - 108 mmol/L    CO2 20 (L) 21 - 32 mmol/L    ANION GAP 12 mmol/L    BUN 26 (H) 5 - 25 mg/dL    Creatinine 1.10 0.60 - 1.30 mg/dL    Glucose 147 (H) 65 - 140 mg/dL    Calcium 10.1 8.4 - 10.2 mg/dL    AST 82 (H) 13 - 39 U/L    ALT 44 7 - 52 U/L    Alkaline Phosphatase 56 34 - 104 U/L    Total Protein 7.8 6.4 - 8.4 g/dL    Albumin 4.3 3.5 - 5.0 g/dL    Total Bilirubin 0.77 0.20 - 1.00 mg/dL    eGFR 46 ml/min/1.73sq m   HS Troponin 0hr (reflex protocol)   Result Value Ref Range    hs TnI 0hr 147 (H) \"Refer to ACS Flowchart\"- see link ng/L   Magnesium   Result Value Ref Range    Magnesium 1.6 (L) 1.9 - 2.7 mg/dL   TSH   Result Value Ref Range    TSH 3RD GENERATON 1.460 0.450 - 4.500 uIU/mL   B-Type Natriuretic Peptide(BNP)   Result Value Ref Range     (H) 0 - 100 pg/mL   Lactic acid   Result Value Ref Range    LACTIC ACID 2.4 (HH) 0.5 - 2.0 mmol/L   Procalcitonin   Result Value Ref Range    Procalcitonin <0.05 <=0.25 ng/ml   Protime-INR   Result Value Ref Range    Protime 14.7 (H) 11.6 - 14.5 seconds    INR 1.16 0.84 - 1.19   APTT   Result Value Ref Range    PTT 28 23 - 37 seconds   UA w Reflex to Microscopic w Reflex to " "Culture    Specimen: Urine, Clean Catch   Result Value Ref Range    Color, UA Yellow Yellow    Clarity, UA Clear Clear    Specific Gravity, UA 1.020 1.001 - 1.030    pH, UA 5.5 5.0, 5.5, 6.0, 6.5, 7.0, 7.5, 8.0    Leukocytes, UA 2+ (A) Negative    Nitrite, UA Negative Negative    Protein, UA Negative Negative, Interference- unable to analyze mg/dl    Glucose, UA Negative Negative mg/dl    Ketones, UA Negative Negative mg/dl    Urobilinogen, UA 0.2 0.2, 1.0 E.U./dl E.U./dl    Bilirubin, UA Negative Negative    Occult Blood, UA Negative Negative   HS Troponin I 2hr   Result Value Ref Range    hs TnI 2hr 232 (H) \"Refer to ACS Flowchart\"- see link ng/L    Delta 2hr hsTnI 85 (H) <20 ng/L   HS Troponin I 4hr   Result Value Ref Range    hs TnI 4hr 364 (H) \"Refer to ACS Flowchart\"- see link ng/L    Delta 4hr hsTnI 217 (H) <20 ng/L   Lactic acid 2 Hours   Result Value Ref Range    LACTIC ACID 1.6 0.5 - 2.0 mmol/L   Urine Microscopic   Result Value Ref Range    RBC, UA 0-1 None Seen, 0-1, 1-2, 2-4, 0-5 /hpf    WBC, UA 10-20 (A) None Seen, 0-1, 1-2, 0-5, 2-4 /hpf    Epithelial Cells Occasional None Seen, Occasional /hpf    Bacteria, UA Occasional None Seen, Occasional /hpf   CBC (With Platelets)   Result Value Ref Range    WBC 9.50 4.31 - 10.16 Thousand/uL    RBC 4.27 3.81 - 5.12 Million/uL    Hemoglobin 12.4 11.5 - 15.4 g/dL    Hematocrit 37.9 34.8 - 46.1 %    MCV 89 82 - 98 fL    MCH 29.0 26.8 - 34.3 pg    MCHC 32.7 31.4 - 37.4 g/dL    RDW 13.8 11.6 - 15.1 %    Platelets 258 149 - 390 Thousands/uL    MPV 10.4 8.9 - 12.7 fL   Comprehensive metabolic panel   Result Value Ref Range    Sodium 136 135 - 147 mmol/L    Potassium 4.0 3.5 - 5.3 mmol/L    Chloride 103 96 - 108 mmol/L    CO2 24 21 - 32 mmol/L    ANION GAP 9 mmol/L    BUN 24 5 - 25 mg/dL    Creatinine 0.89 0.60 - 1.30 mg/dL    Glucose 87 65 - 140 mg/dL    Calcium 9.6 8.4 - 10.2 mg/dL    AST 71 (H) 13 - 39 U/L    ALT 39 7 - 52 U/L    Alkaline Phosphatase 47 34 - 104 U/L "    Total Protein 7.3 6.4 - 8.4 g/dL    Albumin 3.9 3.5 - 5.0 g/dL    Total Bilirubin 1.05 (H) 0.20 - 1.00 mg/dL    eGFR 60 ml/min/1.73sq m   Lipid Panel with Direct LDL reflex   Result Value Ref Range    Cholesterol 78 See Comment mg/dL    Triglycerides 51 See Comment mg/dL    HDL, Direct 31 (L) >=50 mg/dL    LDL Calculated 37 0 - 100 mg/dL   D-dimer, quantitative   Result Value Ref Range    D-Dimer, Quant 0.61 (H) <0.50 ug/ml FEU   APTT   Result Value Ref Range    PTT 95 (H) 23 - 37 seconds   APTT   Result Value Ref Range    PTT 56 (H) 23 - 37 seconds   Magnesium   Result Value Ref Range    Magnesium 1.9 1.9 - 2.7 mg/dL   CBC   Result Value Ref Range    WBC 6.84 4.31 - 10.16 Thousand/uL    RBC 4.26 3.81 - 5.12 Million/uL    Hemoglobin 12.4 11.5 - 15.4 g/dL    Hematocrit 38.7 34.8 - 46.1 %    MCV 91 82 - 98 fL    MCH 29.1 26.8 - 34.3 pg    MCHC 32.0 31.4 - 37.4 g/dL    RDW 14.0 11.6 - 15.1 %    Platelets 258 149 - 390 Thousands/uL    MPV 10.4 8.9 - 12.7 fL   Comprehensive metabolic panel   Result Value Ref Range    Sodium 135 135 - 147 mmol/L    Potassium 4.2 3.5 - 5.3 mmol/L    Chloride 104 96 - 108 mmol/L    CO2 24 21 - 32 mmol/L    ANION GAP 7 mmol/L    BUN 24 5 - 25 mg/dL    Creatinine 0.92 0.60 - 1.30 mg/dL    Glucose 112 65 - 140 mg/dL    Calcium 9.3 8.4 - 10.2 mg/dL    AST 69 (H) 13 - 39 U/L    ALT 39 7 - 52 U/L    Alkaline Phosphatase 47 34 - 104 U/L    Total Protein 7.2 6.4 - 8.4 g/dL    Albumin 3.8 3.5 - 5.0 g/dL    Total Bilirubin 0.74 0.20 - 1.00 mg/dL    eGFR 58 ml/min/1.73sq m   ECG 12 lead   Result Value Ref Range    Ventricular Rate 132 BPM    Atrial Rate 132 BPM    TN Interval 204 ms    QRSD Interval 102 ms    QT Interval 328 ms    QTC Interval 485 ms    P Axis 81 degrees    QRS Axis 66 degrees    T Wave Axis 261 degrees   ECG 12 lead   Result Value Ref Range    Ventricular Rate 133 BPM    Atrial Rate 394 BPM    TN Interval  ms    QRSD Interval 112 ms    QT Interval 330 ms    QTC Interval 491 ms     P Axis  degrees    QRS Axis 60 degrees    T Wave Axis 236 degrees   ECG 12 lead   Result Value Ref Range    Ventricular Rate 102 BPM    Atrial Rate 277 BPM    WV Interval  ms    QRSD Interval 104 ms    QT Interval 358 ms    QTC Interval 466 ms    P Axis  degrees    QRS Axis 57 degrees    T Wave Axis 256 degrees   ECG 12 lead   Result Value Ref Range    Ventricular Rate 71 BPM    Atrial Rate 381 BPM    WV Interval  ms    QRSD Interval 106 ms    QT Interval 392 ms    QTC Interval 425 ms    P Axis  degrees    QRS Axis 36 degrees    T Wave Axis 266 degrees   Fingerstick Glucose (POCT)   Result Value Ref Range    POC Glucose 161 (H) 65 - 140 mg/dl   Fingerstick Glucose (POCT)   Result Value Ref Range    POC Glucose 118 65 - 140 mg/dl   Fingerstick Glucose (POCT)   Result Value Ref Range    POC Glucose 109 65 - 140 mg/dl   Fingerstick Glucose (POCT)   Result Value Ref Range    POC Glucose 186 (H) 65 - 140 mg/dl   Fingerstick Glucose (POCT)   Result Value Ref Range    POC Glucose 104 65 - 140 mg/dl   Fingerstick Glucose (POCT)   Result Value Ref Range    POC Glucose 128 65 - 140 mg/dl   Fingerstick Glucose (POCT)   Result Value Ref Range    POC Glucose 191 (H) 65 - 140 mg/dl   Fingerstick Glucose (POCT)   Result Value Ref Range    POC Glucose 86 65 - 140 mg/dl   Fingerstick Glucose (POCT)   Result Value Ref Range    POC Glucose 141 (H) 65 - 140 mg/dl   Fingerstick Glucose (POCT)   Result Value Ref Range    POC Glucose 121 65 - 140 mg/dl   Fingerstick Glucose (POCT)   Result Value Ref Range    POC Glucose 148 (H) 65 - 140 mg/dl   Fingerstick Glucose (POCT)   Result Value Ref Range    POC Glucose 133 65 - 140 mg/dl   Fingerstick Glucose (POCT)   Result Value Ref Range    POC Glucose 252 (H) 65 - 140 mg/dl   Fingerstick Glucose (POCT)   Result Value Ref Range    POC Glucose 130 65 - 140 mg/dl   Fingerstick Glucose (POCT)   Result Value Ref Range    POC Glucose 210 (H) 65 - 140 mg/dl

## 2024-01-20 DIAGNOSIS — I48.92 ATRIAL FLUTTER WITH RAPID VENTRICULAR RESPONSE (HCC): ICD-10-CM

## 2024-01-22 RX ORDER — METOPROLOL SUCCINATE 50 MG/1
50 TABLET, EXTENDED RELEASE ORAL 2 TIMES DAILY
Qty: 60 TABLET | Refills: 0 | Status: SHIPPED | OUTPATIENT
Start: 2024-01-22 | End: 2024-01-23 | Stop reason: SDUPTHER

## 2024-01-22 RX ORDER — DILTIAZEM HYDROCHLORIDE 120 MG/1
120 CAPSULE, COATED, EXTENDED RELEASE ORAL DAILY
Qty: 30 CAPSULE | Refills: 0 | Status: SHIPPED | OUTPATIENT
Start: 2024-01-22 | End: 2024-01-23 | Stop reason: SDUPTHER

## 2024-01-23 DIAGNOSIS — I48.92 ATRIAL FLUTTER WITH RAPID VENTRICULAR RESPONSE (HCC): ICD-10-CM

## 2024-01-23 NOTE — TELEPHONE ENCOUNTER
Reason for call:   [x] Refill   [] Prior Auth  [] Other:     Office: [facility] [place]   [x] PCP/Provider - rashida   [] Specialty/Provider -     Medication: diltiazem, metoprolol     Dose/Frequency: 120 mg, 50 mg/ daily, twice daily     Quantity: 90 day supply     Pharmacy: [facility]

## 2024-01-24 RX ORDER — METOPROLOL SUCCINATE 50 MG/1
50 TABLET, EXTENDED RELEASE ORAL 2 TIMES DAILY
Qty: 180 TABLET | Refills: 1 | Status: SHIPPED | OUTPATIENT
Start: 2024-01-24 | End: 2024-07-22

## 2024-01-24 RX ORDER — DILTIAZEM HYDROCHLORIDE 120 MG/1
120 CAPSULE, COATED, EXTENDED RELEASE ORAL DAILY
Qty: 90 CAPSULE | Refills: 1 | Status: SHIPPED | OUTPATIENT
Start: 2024-01-24 | End: 2024-07-22

## 2024-01-29 ENCOUNTER — OFFICE VISIT (OUTPATIENT)
Dept: CARDIOLOGY CLINIC | Facility: CLINIC | Age: 84
End: 2024-01-29
Payer: MEDICARE

## 2024-01-29 VITALS
DIASTOLIC BLOOD PRESSURE: 68 MMHG | SYSTOLIC BLOOD PRESSURE: 138 MMHG | HEART RATE: 70 BPM | WEIGHT: 172.2 LBS | BODY MASS INDEX: 34.72 KG/M2 | HEIGHT: 59 IN | OXYGEN SATURATION: 98 %

## 2024-01-29 DIAGNOSIS — I48.3 TYPICAL ATRIAL FLUTTER (HCC): Primary | ICD-10-CM

## 2024-01-29 DIAGNOSIS — I48.92 ATRIAL FLUTTER WITH RAPID VENTRICULAR RESPONSE (HCC): ICD-10-CM

## 2024-01-29 DIAGNOSIS — I10 ESSENTIAL HYPERTENSION: ICD-10-CM

## 2024-01-29 DIAGNOSIS — I50.32 CHRONIC DIASTOLIC CHF (CONGESTIVE HEART FAILURE) (HCC): ICD-10-CM

## 2024-01-29 PROCEDURE — 99214 OFFICE O/P EST MOD 30 MIN: CPT | Performed by: INTERNAL MEDICINE

## 2024-01-29 PROCEDURE — 93000 ELECTROCARDIOGRAM COMPLETE: CPT | Performed by: INTERNAL MEDICINE

## 2024-01-29 NOTE — PATIENT INSTRUCTIONS
A-fib (Atrial Fibrillation)   AMBULATORY CARE:   Atrial fibrillation (A-fib)  is an irregular heartbeat. It reduces your heart's ability to pump blood through your body. A-fib may come and go, or it may be a long-term condition. A-fib can cause life-threatening blood clots, stroke, or heart failure. It is important to treat and manage A-fib to help prevent these problems.       Common signs and symptoms include the following:   A heartbeat that races, pounds, or flutters    Weakness, severe tiredness, or confusion    Feeling lightheaded, sweaty, dizzy, or faint    Shortness of breath or anxiety    Chest pain or pressure    Call your local emergency number (911 in the [place]) or have someone call if:   You have any of the following signs of a heart attack:      Squeezing, pressure, or pain in your chest    You may  also have any of the following:     Discomfort or pain in your back, neck, jaw, stomach, or arm    Shortness of breath    Nausea or vomiting    Lightheadedness or a sudden cold sweat    You have any of the following signs of a stroke:      Numbness or drooping on one side of your face     Weakness in an arm or leg    Confusion or difficulty speaking    Dizziness, a severe headache, or vision loss    Seek immediate care if:   Your arm or leg feels warm, tender, and painful. It may look swollen and red.    Your heart rate is more than 110 beats per minute.    You are short of breath, even at rest.    Call your doctor or cardiologist if:   You have new or worsening swelling in your legs, feet, ankles, or abdomen.    You have questions or concerns about your condition or care.    Treatment for A-fib:  Conditions that cause A-fib, such as thyroid disease, will be treated. You may also need any of the following:  Heart medicines  help control your heart rate or rhythm. You may need more than one medicine to treat your symptoms.    Antiplatelet or blood thinner medicines  help prevent blood clots and  stroke.    Cardioversion  is a procedure to return your heart rate and rhythm to normal. It can be done using medicines or electric shock.    A-fib ablation  is a procedure that uses energy to burn a small area of heart tissue. This creates scar tissue and prevents electrical signals that cause A-fib. You may need this procedure more than once. Ask for more information on A-fib ablation.    A pacemaker  may be inserted into your heart. A pacemaker is a device that controls your heartbeat. A pacemaker may be inserted during an ablation procedure or surgery. Ask your healthcare provider for more information on pacemakers.         Surgery  may be needed if other procedures do not work. During surgery your healthcare provider will make cuts in the upper part of your heart. The provider will stitch the cuts together to create scar tissue. The scar tissue will prevent electrical signals that cause A-fib.    Manage or prevent A-fib:   Get screening for A-fib, if recommended.  Screening means you are checked for A-fib, even if you do not have signs or symptoms. Screening can find problems early so treatment can begin. Early treatment can save your life. Your healthcare provider will talk to you about the benefits and risks of screening. Screening may be recommended starting at age 50. Your provider may recommend regular screenings if you stay at high risk for A-fib.    Know your target heart rate.  Learn how to check your pulse and monitor your heart rate.         Know the risks if you choose to drink alcohol.  Alcohol can increase your risk for A-fib or make A-fib harder to manage. Ask your healthcare provider if it is okay for you to drink any alcohol. He or she can help you set limits for the number of drinks you have in 24 hours and in a week. A drink of alcohol is 12 ounces of beer, 5 ounces of wine, or 1½ ounces of liquor.    Do not smoke.  Nicotine can cause heart damage and make it more difficult to manage your  A-fib. Do not use e-cigarettes or smokeless tobacco in place of cigarettes or to help you quit. They still contain nicotine. Ask your healthcare provider for information if you currently smoke and need help quitting.    Eat heart-healthy foods.  Heart healthy foods will help keep your cholesterol low. These include fruits, vegetables, whole-grain breads, low-fat dairy products, beans, lean meats, and fish. Replace butter and margarine with heart-healthy oils such as olive oil and canola oil.         Maintain a healthy weight.  Ask your healthcare provider what a healthy weight is for you. Ask him or her to help you create a safe weight loss plan if needed. Even a small goal of a 10% weight loss can improve your heart health.    Get regular physical activity.  Physical activity helps improve your heart health. Get at least 150 minutes of moderate aerobic physical activity each week. Your healthcare provider can help you create an activity plan.         Manage other health conditions.  This includes high blood pressure or cholesterol, sleep apnea, diabetes, and other heart conditions. Take medicine as directed and follow your treatment plan. Your healthcare provider may need to change a medicine you are taking if it is causing your A-fib. Do not  stop taking any medicine unless directed by your provider.    Follow up with your doctor or cardiologist as directed:  You will need regular blood tests and monitoring. Write down your questions so you remember to ask them during your visits.  © Copyright [employer] 2023 Information is for End User's use only and may not be sold, redistributed or otherwise used for commercial purposes.  The above information is an  only. It is not intended as medical advice for individual conditions or treatments. Talk to your doctor, nurse or pharmacist before following any medical regimen to see if it is safe and effective for you.
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
## 2024-02-01 DIAGNOSIS — I10 ESSENTIAL HYPERTENSION: ICD-10-CM

## 2024-02-02 RX ORDER — SPIRONOLACTONE 25 MG/1
25 TABLET ORAL DAILY
Qty: 90 TABLET | Refills: 3 | Status: SHIPPED | OUTPATIENT
Start: 2024-02-02

## 2024-02-22 ENCOUNTER — OFFICE VISIT (OUTPATIENT)
Dept: PULMONOLOGY | Facility: CLINIC | Age: 84
End: 2024-02-22
Payer: MEDICARE

## 2024-02-22 VITALS
TEMPERATURE: 97.6 F | SYSTOLIC BLOOD PRESSURE: 110 MMHG | DIASTOLIC BLOOD PRESSURE: 66 MMHG | HEIGHT: 59 IN | BODY MASS INDEX: 34.76 KG/M2 | HEART RATE: 72 BPM | WEIGHT: 172.4 LBS | OXYGEN SATURATION: 97 %

## 2024-02-22 DIAGNOSIS — J43.9 PULMONARY EMPHYSEMA, UNSPECIFIED EMPHYSEMA TYPE (HCC): ICD-10-CM

## 2024-02-22 DIAGNOSIS — R91.8 MULTIPLE PULMONARY NODULES: ICD-10-CM

## 2024-02-22 DIAGNOSIS — E66.01 CLASS 2 SEVERE OBESITY DUE TO EXCESS CALORIES WITH SERIOUS COMORBIDITY AND BODY MASS INDEX (BMI) OF 36.0 TO 36.9 IN ADULT: ICD-10-CM

## 2024-02-22 DIAGNOSIS — G47.33 OSA (OBSTRUCTIVE SLEEP APNEA): Primary | ICD-10-CM

## 2024-02-22 PROCEDURE — 99214 OFFICE O/P EST MOD 30 MIN: CPT | Performed by: INTERNAL MEDICINE

## 2024-02-22 RX ORDER — TERAZOSIN 5 MG/1
5 CAPSULE ORAL
COMMUNITY

## 2024-02-22 NOTE — ASSESSMENT & PLAN NOTE
She had a long history of snoring and daytime sleepiness.She was found to have mild TOM with oxygen desaturation on her overnight sleep study. Her CPAP titration study was suboptimal. She was started on CPAP therapy at 9 cm of water and has been using it. Her CPAP compliance was good. Her residual AHI has been low. She is getting benefit from CPAP therapy I have advised her to use the CPAP as before.  I have advised her to change the mask regularly.  She uses oxygen 1 L/min.

## 2024-02-22 NOTE — ASSESSMENT & PLAN NOTE
Mrs.Cleo Roblero has history of COPD and was on Advair Bid before. She also had  asthma as a child. She has occasional cough and no significant phlegm. She is a never smoker, however she has history of secondhand smoke exposure.Her PFT which showed moderate airflow obstruction with diffusion defect. There was no hyperinflation. Her 6 min walk test showed an oxygen desaturation to 82% with exertion. Her baseline oxygen saturation at rest was 90%. She also was found to have severe exercise limitation from SOB. She is not using Symbicort  now. I have advised her to use the nebulizer when necessary for her shortness of breath. I have advised her to continue with oxygen supplementation as needed.  I had a long discussion with her and her daughter who accompanied her today.  I answered all their questions.

## 2024-02-22 NOTE — PROGRESS NOTES
Assessment/Plan:    COPD (chronic obstructive pulmonary disease) (HCC)  Mrs.Cleo Roblero has history of COPD and was on Advair Bid before. She also had  asthma as a child. She has occasional cough and no significant phlegm. She is a never smoker, however she has history of secondhand smoke exposure.Her PFT which showed moderate airflow obstruction with diffusion defect. There was no hyperinflation. Her 6 min walk test showed an oxygen desaturation to 82% with exertion. Her baseline oxygen saturation at rest was 90%. She also was found to have severe exercise limitation from SOB. She is not using Symbicort  now. I have advised her to use the nebulizer when necessary for her shortness of breath. I have advised her to continue with oxygen supplementation as needed.  I had a long discussion with her and her daughter who accompanied her today.  I answered all their questions.    TOM (obstructive sleep apnea)  She had a long history of snoring and daytime sleepiness.She was found to have mild TOM with oxygen desaturation on her overnight sleep study. Her CPAP titration study was suboptimal. She was started on CPAP therapy at 9 cm of water and has been using it. Her CPAP compliance was good. Her residual AHI has been low. She is getting benefit from CPAP therapy I have advised her to use the CPAP as before.  I have advised her to change the mask regularly.  She uses oxygen 1 L/min.     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 She had also mediastinal lymphadenopathy. She was also noted to have a nodule in the thyroid gland and a nodular hepatic lesion. She had a CT guided biopsy of LLL lung lesion which was consistent with neuroendocrine tumor, carcinoid. She follows with Dr. Shruthi Benitez from Oncology. She has multiple lung nodules on her CT scan from Feb 2019.  Has been stable.    Multiple pulmonary  nodules  She has multiple lung nodules which were stable on the last CT scan from May 2022.  A repeat CT scan July 2023 showed stability of the lung nodules          Diagnoses and all orders for this visit:    TOM (obstructive sleep apnea)    Multiple pulmonary nodules    Pulmonary emphysema, unspecified emphysema type (HCC)    Class 2 severe obesity due to excess calories with serious comorbidity and body mass index (BMI) of 36.0 to 36.9 in adult     Other orders  -     amlodipine 1 mg/mL PO oral suspension; Take by mouth daily  -     terazosin (HYTRIN) 5 mg capsule; Take 5 mg by mouth daily at bedtime          Subjective:      Patient ID: Camryn Roblero is a 83 y.o. female.    Ms.Cleo Roblero came for follow-up for her obstructive sleep apnea COPD multiple lung nodules and previous lung carcinoid.  She has ambulatory dysfunction and uses a walker.  She was accompanied by her daughter today.  She was in a wheelchair.  She denied any undue shortness of breath or cough or phlegm wheeze or chest pain.  Currently she is using oxygen only at night.  She is not using any inhaler.  She has obstructive sleep apnea and has been on CPAP therapy.  She states that she is uses it every night.  She has not been getting supplies regularly and has not been changing the accessories periodically.  He denied any significant daytime sleepiness or morning headache.  She is motivated to continue on CPAP therapy.  She denied any weight loss or anorexia or hemoptysis. She has multiple lung nodules that have been stable.  She also has history of carcinoid.  There is also also been remaining stable.  She recently had atrial flutter.  She has been on systemic anticoagulation with Xarelto.        The following portions of the patient's history were reviewed and updated as appropriate: allergies, current medications, past family history, past medical history, past social history, past surgical history, and problem list.    Review of Systems  "  Constitutional:  Positive for fatigue. Negative for appetite change, chills and fever.   HENT:  Positive for hearing loss and rhinorrhea. Negative for sneezing, sore throat and trouble swallowing.    Eyes:  Negative for visual disturbance.   Respiratory:  Positive for cough and shortness of breath. Negative for chest tightness and wheezing.    Cardiovascular:  Negative for chest pain, palpitations and leg swelling.   Gastrointestinal:  Negative for abdominal pain, constipation, diarrhea, nausea and vomiting.   Genitourinary:  Positive for urgency. Negative for dysuria and frequency.   Musculoskeletal:  Positive for arthralgias and gait problem (walker). Negative for back pain.   Skin:  Negative for rash.   Allergic/Immunologic: Positive for environmental allergies.   Neurological:  Negative for dizziness, syncope, light-headedness and headaches.   Psychiatric/Behavioral:  Negative for agitation, confusion and sleep disturbance. The patient is nervous/anxious.          Objective:      /66 (BP Location: Left arm, Patient Position: Sitting, Cuff Size: Standard)   Pulse 72   Temp 97.6 °F (36.4 °C) (Tympanic)   Ht 4' 11\" (1.499 m)   Wt 78.2 kg (172 lb 6.4 oz)   SpO2 97%   BMI 34.82 kg/m²          Physical Exam  Vitals reviewed.   Constitutional:       General: She is not in acute distress.     Appearance: She is not ill-appearing, toxic-appearing or diaphoretic.   HENT:      Head: Normocephalic.      Mouth/Throat:      Mouth: Mucous membranes are moist.   Eyes:      General: No scleral icterus.     Conjunctiva/sclera: Conjunctivae normal.   Cardiovascular:      Rate and Rhythm: Normal rate and regular rhythm.      Heart sounds: Normal heart sounds. No murmur heard.  Pulmonary:      Effort: Pulmonary effort is normal. No respiratory distress.      Breath sounds: Normal breath sounds. No stridor. No wheezing, rhonchi or rales.   Abdominal:      General: Bowel sounds are normal.      Palpations: Abdomen is " soft.      Tenderness: There is no abdominal tenderness. There is no guarding.   Musculoskeletal:      Cervical back: Neck supple. No rigidity.      Right lower leg: No edema.      Left lower leg: No edema.   Lymphadenopathy:      Cervical: No cervical adenopathy.   Skin:     Coloration: Skin is not jaundiced or pale.      Findings: No rash.   Neurological:      Mental Status: She is alert and oriented to person, place, and time.      Gait: Gait abnormal.   Psychiatric:         Mood and Affect: Mood normal.         Behavior: Behavior normal.         Thought Content: Thought content normal.         Judgment: Judgment normal.      I spent 30 minutes of time taking care of this patient with complex medical issues.  The majority of this time was spent directly with the patient counseling as well as coordinating care.

## 2024-02-22 NOTE — ASSESSMENT & PLAN NOTE
She had a 2.0 x 2.0 lobulated lung mass in the left lower lobe and multiple lung nodules on the left side. She had also mediastinal lymphadenopathy. She was also noted to have a nodule in the thyroid gland and a nodular hepatic lesion. She had a CT guided biopsy of LLL lung lesion which was consistent with neuroendocrine tumor, carcinoid. She follows with Dr. Shruthi Benitez from Oncology. She has multiple lung nodules on her CT scan from Feb 2019.  Has been stable.

## 2024-02-25 DIAGNOSIS — E11.9 DIABETES MELLITUS WITHOUT COMPLICATION (HCC): ICD-10-CM

## 2024-02-25 RX ORDER — LANCETS
EACH MISCELLANEOUS
Qty: 102 EACH | Refills: 0 | Status: SHIPPED | OUTPATIENT
Start: 2024-02-25

## 2024-03-15 ENCOUNTER — TELEPHONE (OUTPATIENT)
Dept: GASTROENTEROLOGY | Facility: CLINIC | Age: 84
End: 2024-03-15

## 2024-03-15 NOTE — TELEPHONE ENCOUNTER
Please schedule pt for an office visit to discuss a colon recall due to age - hx of hyperplastic polyps. Pt is a Dr Maza pt and he has retired.  I lmom for daughter to please call back to schedule pt for an appt to discuss a possible repeat colonoscopy. Informed Dr Maza has retired.  Will call pt again if do not hear back from her.

## 2024-03-18 DIAGNOSIS — E11.9 DIABETES MELLITUS WITHOUT COMPLICATION (HCC): ICD-10-CM

## 2024-03-18 RX ORDER — BLOOD SUGAR DIAGNOSTIC
1 STRIP MISCELLANEOUS DAILY
Qty: 100 EACH | Refills: 1 | Status: SHIPPED | OUTPATIENT
Start: 2024-03-18

## 2024-03-18 RX ORDER — LANCETS
EACH MISCELLANEOUS
Qty: 102 EACH | Refills: 0 | Status: SHIPPED | OUTPATIENT
Start: 2024-03-18

## 2024-03-18 NOTE — TELEPHONE ENCOUNTER
Patient said she called the pharmacy for the refill of test strips and they said she needs a new prescription    Reason for call:   [x] Refill   [] Prior Auth  [] Other:     Office:   [x] PCP/Provider - [facility] SHELDON / Bozen[facility]  [] Specialty/Provider -     Medication: Accu-Chek SmartView test strips    Dose/Frequency: once a day testing    Quantity: 100    Pharmacy: [facility] [address] [place] [address]     Does the patient have enough for 3 days?   [] Yes   [x] No - Send as HP to POD

## 2024-03-19 NOTE — TELEPHONE ENCOUNTER
Patient called waiting refill on accu checksmart view test strips and lancets. Sent yesterday at 11 am

## 2024-04-19 ENCOUNTER — RA CDI HCC (OUTPATIENT)
Dept: OTHER | Facility: HOSPITAL | Age: 84
End: 2024-04-19

## 2024-04-19 NOTE — PROGRESS NOTES
E11.22, I13.0  HCC coding opportunities          Chart Reviewed number of suggestions sent to Provider: 2     Patients Insurance     Medicare Insurance: [employer]vantage

## 2024-04-26 ENCOUNTER — OFFICE VISIT (OUTPATIENT)
Dept: FAMILY MEDICINE CLINIC | Facility: CLINIC | Age: 84
End: 2024-04-26
Payer: MEDICARE

## 2024-04-26 VITALS
DIASTOLIC BLOOD PRESSURE: 68 MMHG | OXYGEN SATURATION: 95 % | HEIGHT: 59 IN | SYSTOLIC BLOOD PRESSURE: 120 MMHG | HEART RATE: 76 BPM | RESPIRATION RATE: 16 BRPM | TEMPERATURE: 98 F | BODY MASS INDEX: 33.26 KG/M2 | WEIGHT: 165 LBS

## 2024-04-26 DIAGNOSIS — Z12.31 ENCOUNTER FOR SCREENING MAMMOGRAM FOR BREAST CANCER: ICD-10-CM

## 2024-04-26 DIAGNOSIS — J43.9 PULMONARY EMPHYSEMA, UNSPECIFIED EMPHYSEMA TYPE (HCC): ICD-10-CM

## 2024-04-26 DIAGNOSIS — I48.92 ATRIAL FLUTTER WITH RAPID VENTRICULAR RESPONSE (HCC): ICD-10-CM

## 2024-04-26 DIAGNOSIS — I50.32 CHRONIC DIASTOLIC CHF (CONGESTIVE HEART FAILURE) (HCC): ICD-10-CM

## 2024-04-26 DIAGNOSIS — E78.2 MIXED HYPERLIPIDEMIA: ICD-10-CM

## 2024-04-26 DIAGNOSIS — E11.9 DIABETES MELLITUS WITHOUT COMPLICATION (HCC): ICD-10-CM

## 2024-04-26 DIAGNOSIS — I10 ESSENTIAL HYPERTENSION: Primary | ICD-10-CM

## 2024-04-26 DIAGNOSIS — R41.3 IMPAIRED MEMORY: ICD-10-CM

## 2024-04-26 LAB — SL AMB POCT HEMOGLOBIN AIC: 7.4 (ref ?–6.5)

## 2024-04-26 PROCEDURE — 83036 HEMOGLOBIN GLYCOSYLATED A1C: CPT | Performed by: FAMILY MEDICINE

## 2024-04-26 PROCEDURE — 99214 OFFICE O/P EST MOD 30 MIN: CPT | Performed by: FAMILY MEDICINE

## 2024-04-26 RX ORDER — DONEPEZIL HYDROCHLORIDE 5 MG/1
5 TABLET, FILM COATED ORAL
Qty: 30 TABLET | Refills: 3 | Status: SHIPPED | OUTPATIENT
Start: 2024-04-26

## 2024-04-26 NOTE — ASSESSMENT & PLAN NOTE
A1C done today and was 7.4. Continue metformin 1000 mg bid and glipizide 5 mg qd. Continue low carb diet. Eye exam done in April 2023. Foot exam done today.     Lab Results   Component Value Date    HGBA1C 6.3 12/22/2023

## 2024-04-26 NOTE — ASSESSMENT & PLAN NOTE
Wt Readings from Last 3 Encounters:   04/25/24 78.2 kg (172 lb 6.4 oz)   02/22/24 78.2 kg (172 lb 6.4 oz)   01/29/24 78.1 kg (172 lb 3.2 oz)     Has had more leg swelling. Continue current medications. Will try compression stockings.

## 2024-04-26 NOTE — ASSESSMENT & PLAN NOTE
Stable. Saw Pulmonary Dr Garland for follow-up in Feb 2024. Pt continues to use O2 supplementation as needed. Her 6 min walk test showed desaturation to 82% with exertion.

## 2024-04-26 NOTE — ASSESSMENT & PLAN NOTE
No recent episodes. Continue medications and management per Cardiology. Last appointment was in January 2024. Considering cardioversion.
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
## 2024-04-29 ENCOUNTER — TELEPHONE (OUTPATIENT)
Dept: LAB | Facility: HOSPITAL | Age: 84
End: 2024-04-29

## 2024-05-08 ENCOUNTER — TELEPHONE (OUTPATIENT)
Age: 84
End: 2024-05-08

## 2024-05-08 NOTE — TELEPHONE ENCOUNTER
Cha Joyner calling for O2 recertification. Requesting we fax most recent office visit notes and new script for home O2 with portability to [phone]

## 2024-05-09 ENCOUNTER — TELEPHONE (OUTPATIENT)
Dept: OTHER | Facility: HOSPITAL | Age: 84
End: 2024-05-09

## 2024-05-09 ENCOUNTER — HOSPITAL ENCOUNTER (INPATIENT)
Facility: HOSPITAL | Age: 84
LOS: 8 days | Discharge: HOME HEALTH | DRG: 644 | End: 2024-05-17
Attending: STUDENT IN AN ORGANIZED HEALTH CARE EDUCATION/TRAINING PROGRAM | Admitting: HOSPITALIST
Payer: MEDICARE

## 2024-05-09 ENCOUNTER — APPOINTMENT (OUTPATIENT)
Dept: LAB | Facility: HOSPITAL | Age: 84
End: 2024-05-09
Attending: INTERNAL MEDICINE
Payer: MEDICARE

## 2024-05-09 ENCOUNTER — APPOINTMENT (EMERGENCY)
Dept: RADIOLOGY | Facility: HOSPITAL | Age: 84
DRG: 644 | End: 2024-05-09
Payer: MEDICARE

## 2024-05-09 DIAGNOSIS — N18.2 CKD (CHRONIC KIDNEY DISEASE) STAGE 2, GFR 60-89 ML/MIN: ICD-10-CM

## 2024-05-09 DIAGNOSIS — E87.1 HYPONATREMIA: Primary | ICD-10-CM

## 2024-05-09 DIAGNOSIS — E87.5 HYPERKALEMIA: ICD-10-CM

## 2024-05-09 DIAGNOSIS — I10 ESSENTIAL HYPERTENSION: ICD-10-CM

## 2024-05-09 DIAGNOSIS — I10 BENIGN ESSENTIAL HYPERTENSION: ICD-10-CM

## 2024-05-09 DIAGNOSIS — E87.1 HYPONATREMIA: ICD-10-CM

## 2024-05-09 DIAGNOSIS — R93.89 ABNORMAL CHEST CT: ICD-10-CM

## 2024-05-09 DIAGNOSIS — N18.30 STAGE 3 CHRONIC KIDNEY DISEASE, UNSPECIFIED WHETHER STAGE 3A OR 3B CKD (HCC): ICD-10-CM

## 2024-05-09 DIAGNOSIS — E11.9 DIABETES MELLITUS WITHOUT COMPLICATION (HCC): ICD-10-CM

## 2024-05-09 DIAGNOSIS — R41.3 IMPAIRED MEMORY: ICD-10-CM

## 2024-05-09 DIAGNOSIS — E78.2 MIXED HYPERLIPIDEMIA: ICD-10-CM

## 2024-05-09 DIAGNOSIS — N39.0 UTI (URINARY TRACT INFECTION): ICD-10-CM

## 2024-05-09 DIAGNOSIS — I10 BENIGN ESSENTIAL HTN: ICD-10-CM

## 2024-05-09 DIAGNOSIS — R74.01 TRANSAMINITIS: ICD-10-CM

## 2024-05-09 DIAGNOSIS — I50.32 CHRONIC HEART FAILURE WITH PRESERVED EJECTION FRACTION (HCC): ICD-10-CM

## 2024-05-09 LAB
ALBUMIN SERPL BCP-MCNC: 3.6 G/DL (ref 3.5–5)
ALBUMIN SERPL BCP-MCNC: 3.7 G/DL (ref 3.5–5)
ALP SERPL-CCNC: 116 U/L (ref 34–104)
ALP SERPL-CCNC: 118 U/L (ref 34–104)
ALT SERPL W P-5'-P-CCNC: 32 U/L (ref 7–52)
ALT SERPL W P-5'-P-CCNC: 33 U/L (ref 7–52)
ANION GAP SERPL CALCULATED.3IONS-SCNC: 5 MMOL/L (ref 4–13)
ANION GAP SERPL CALCULATED.3IONS-SCNC: 6 MMOL/L (ref 4–13)
AST SERPL W P-5'-P-CCNC: 65 U/L (ref 13–39)
AST SERPL W P-5'-P-CCNC: 70 U/L (ref 13–39)
BACTERIA UR QL AUTO: ABNORMAL /HPF
BACTERIA UR QL AUTO: ABNORMAL /HPF
BASOPHILS # BLD AUTO: 0.07 THOUSANDS/ÂΜL (ref 0–0.1)
BASOPHILS NFR BLD AUTO: 1 % (ref 0–1)
BILIRUB SERPL-MCNC: 0.84 MG/DL (ref 0.2–1)
BILIRUB SERPL-MCNC: 0.96 MG/DL (ref 0.2–1)
BILIRUB UR QL STRIP: NEGATIVE
BILIRUB UR QL STRIP: NEGATIVE
BUN SERPL-MCNC: 26 MG/DL (ref 5–25)
BUN SERPL-MCNC: 26 MG/DL (ref 5–25)
CALCIUM SERPL-MCNC: 9.4 MG/DL (ref 8.4–10.2)
CALCIUM SERPL-MCNC: 9.6 MG/DL (ref 8.4–10.2)
CHLORIDE SERPL-SCNC: 88 MMOL/L (ref 96–108)
CHLORIDE SERPL-SCNC: 89 MMOL/L (ref 96–108)
CHOLEST SERPL-MCNC: 86 MG/DL
CLARITY UR: CLEAR
CLARITY UR: CLEAR
CO2 SERPL-SCNC: 26 MMOL/L (ref 21–32)
CO2 SERPL-SCNC: 26 MMOL/L (ref 21–32)
COLOR UR: ABNORMAL
COLOR UR: YELLOW
CREAT SERPL-MCNC: 0.87 MG/DL (ref 0.6–1.3)
CREAT SERPL-MCNC: 0.92 MG/DL (ref 0.6–1.3)
CREAT UR-MCNC: 32.6 MG/DL
CREAT UR-MCNC: 46.9 MG/DL
CREAT UR-MCNC: 47.2 MG/DL
EOSINOPHIL # BLD AUTO: 0.19 THOUSAND/ÂΜL (ref 0–0.61)
EOSINOPHIL NFR BLD AUTO: 2 % (ref 0–6)
ERYTHROCYTE [DISTWIDTH] IN BLOOD BY AUTOMATED COUNT: 14.1 % (ref 11.6–15.1)
ERYTHROCYTE [DISTWIDTH] IN BLOOD BY AUTOMATED COUNT: 14.4 % (ref 11.6–15.1)
GFR SERPL CREATININE-BSD FRML MDRD: 58 ML/MIN/1.73SQ M
GFR SERPL CREATININE-BSD FRML MDRD: 62 ML/MIN/1.73SQ M
GLUCOSE P FAST SERPL-MCNC: 113 MG/DL (ref 65–99)
GLUCOSE SERPL-MCNC: 99 MG/DL (ref 65–140)
GLUCOSE UR STRIP-MCNC: NEGATIVE MG/DL
GLUCOSE UR STRIP-MCNC: NEGATIVE MG/DL
HCT VFR BLD AUTO: 39 % (ref 34.8–46.1)
HCT VFR BLD AUTO: 42.9 % (ref 34.8–46.1)
HDLC SERPL-MCNC: 34 MG/DL
HGB BLD-MCNC: 13.1 G/DL (ref 11.5–15.4)
HGB BLD-MCNC: 13.8 G/DL (ref 11.5–15.4)
HGB UR QL STRIP.AUTO: NEGATIVE
HGB UR QL STRIP.AUTO: NEGATIVE
HYALINE CASTS #/AREA URNS LPF: ABNORMAL /LPF
IMM GRANULOCYTES # BLD AUTO: 0.03 THOUSAND/UL (ref 0–0.2)
IMM GRANULOCYTES NFR BLD AUTO: 0 % (ref 0–2)
KETONES UR STRIP-MCNC: NEGATIVE MG/DL
KETONES UR STRIP-MCNC: NEGATIVE MG/DL
LDLC SERPL CALC-MCNC: 40 MG/DL (ref 0–100)
LEUKOCYTE ESTERASE UR QL STRIP: ABNORMAL
LEUKOCYTE ESTERASE UR QL STRIP: ABNORMAL
LYMPHOCYTES # BLD AUTO: 0.95 THOUSANDS/ÂΜL (ref 0.6–4.47)
LYMPHOCYTES NFR BLD AUTO: 12 % (ref 14–44)
MAGNESIUM SERPL-MCNC: 1.7 MG/DL (ref 1.9–2.7)
MCH RBC QN AUTO: 27.3 PG (ref 26.8–34.3)
MCH RBC QN AUTO: 27.8 PG (ref 26.8–34.3)
MCHC RBC AUTO-ENTMCNC: 32.2 G/DL (ref 31.4–37.4)
MCHC RBC AUTO-ENTMCNC: 33.6 G/DL (ref 31.4–37.4)
MCV RBC AUTO: 83 FL (ref 82–98)
MCV RBC AUTO: 85 FL (ref 82–98)
MICROALBUMIN UR-MCNC: 47.1 MG/L
MICROALBUMIN/CREAT 24H UR: 100 MG/G CREATININE (ref 0–30)
MONOCYTES # BLD AUTO: 1 THOUSAND/ÂΜL (ref 0.17–1.22)
MONOCYTES NFR BLD AUTO: 13 % (ref 4–12)
NEUTROPHILS # BLD AUTO: 5.77 THOUSANDS/ÂΜL (ref 1.85–7.62)
NEUTS SEG NFR BLD AUTO: 72 % (ref 43–75)
NITRITE UR QL STRIP: NEGATIVE
NITRITE UR QL STRIP: NEGATIVE
NON-SQ EPI CELLS URNS QL MICRO: ABNORMAL /HPF
NON-SQ EPI CELLS URNS QL MICRO: ABNORMAL /HPF
NONHDLC SERPL-MCNC: 52 MG/DL
NRBC BLD AUTO-RTO: 0 /100 WBCS
OSMOLALITY UR/SERPL-RTO: 268 MMOL/KG (ref 282–298)
OSMOLALITY UR: 286 MMOL/KG
PH UR STRIP.AUTO: 5.5 [PH]
PH UR STRIP.AUTO: 6 [PH]
PHOSPHATE SERPL-MCNC: 3.6 MG/DL (ref 2.3–4.1)
PLATELET # BLD AUTO: 382 THOUSANDS/UL (ref 149–390)
PLATELET # BLD AUTO: 390 THOUSANDS/UL (ref 149–390)
PMV BLD AUTO: 9.3 FL (ref 8.9–12.7)
PMV BLD AUTO: 9.7 FL (ref 8.9–12.7)
POTASSIUM SERPL-SCNC: 4.9 MMOL/L (ref 3.5–5.3)
POTASSIUM SERPL-SCNC: 5.4 MMOL/L (ref 3.5–5.3)
PROT SERPL-MCNC: 7.8 G/DL (ref 6.4–8.4)
PROT SERPL-MCNC: 8 G/DL (ref 6.4–8.4)
PROT UR STRIP-MCNC: NEGATIVE MG/DL
PROT UR STRIP-MCNC: NEGATIVE MG/DL
PROT UR-MCNC: 12 MG/DL
PROT/CREAT UR: 0.26 MG/G{CREAT} (ref 0–0.1)
PTH-INTACT SERPL-MCNC: 20.5 PG/ML (ref 12–88)
RBC # BLD AUTO: 4.71 MILLION/UL (ref 3.81–5.12)
RBC # BLD AUTO: 5.05 MILLION/UL (ref 3.81–5.12)
RBC #/AREA URNS AUTO: ABNORMAL /HPF
RBC #/AREA URNS AUTO: ABNORMAL /HPF
SODIUM 24H UR-SCNC: 33 MOL/L
SODIUM SERPL-SCNC: 120 MMOL/L (ref 135–147)
SODIUM SERPL-SCNC: 120 MMOL/L (ref 135–147)
SP GR UR STRIP.AUTO: 1.01 (ref 1–1.03)
SP GR UR STRIP.AUTO: <=1.005 (ref 1–1.03)
TRIGL SERPL-MCNC: 59 MG/DL
TSH SERPL DL<=0.05 MIU/L-ACNC: 1.88 UIU/ML (ref 0.45–4.5)
UROBILINOGEN UR QL STRIP.AUTO: 0.2 E.U./DL
UROBILINOGEN UR QL STRIP.AUTO: 0.2 E.U./DL
WBC # BLD AUTO: 8.01 THOUSAND/UL (ref 4.31–10.16)
WBC # BLD AUTO: 8.11 THOUSAND/UL (ref 4.31–10.16)
WBC #/AREA URNS AUTO: ABNORMAL /HPF
WBC #/AREA URNS AUTO: ABNORMAL /HPF

## 2024-05-09 PROCEDURE — 82570 ASSAY OF URINE CREATININE: CPT

## 2024-05-09 PROCEDURE — 82570 ASSAY OF URINE CREATININE: CPT | Performed by: STUDENT IN AN ORGANIZED HEALTH CARE EDUCATION/TRAINING PROGRAM

## 2024-05-09 PROCEDURE — 99285 EMERGENCY DEPT VISIT HI MDM: CPT | Performed by: STUDENT IN AN ORGANIZED HEALTH CARE EDUCATION/TRAINING PROGRAM

## 2024-05-09 PROCEDURE — 84443 ASSAY THYROID STIM HORMONE: CPT

## 2024-05-09 PROCEDURE — 83970 ASSAY OF PARATHORMONE: CPT

## 2024-05-09 PROCEDURE — 81001 URINALYSIS AUTO W/SCOPE: CPT

## 2024-05-09 PROCEDURE — 71046 X-RAY EXAM CHEST 2 VIEWS: CPT

## 2024-05-09 PROCEDURE — 83930 ASSAY OF BLOOD OSMOLALITY: CPT

## 2024-05-09 PROCEDURE — 84300 ASSAY OF URINE SODIUM: CPT | Performed by: STUDENT IN AN ORGANIZED HEALTH CARE EDUCATION/TRAINING PROGRAM

## 2024-05-09 PROCEDURE — 36415 COLL VENOUS BLD VENIPUNCTURE: CPT

## 2024-05-09 PROCEDURE — 85027 COMPLETE CBC AUTOMATED: CPT

## 2024-05-09 PROCEDURE — 83735 ASSAY OF MAGNESIUM: CPT

## 2024-05-09 PROCEDURE — 83935 ASSAY OF URINE OSMOLALITY: CPT | Performed by: STUDENT IN AN ORGANIZED HEALTH CARE EDUCATION/TRAINING PROGRAM

## 2024-05-09 PROCEDURE — 80053 COMPREHEN METABOLIC PANEL: CPT

## 2024-05-09 PROCEDURE — 84100 ASSAY OF PHOSPHORUS: CPT

## 2024-05-09 PROCEDURE — 82043 UR ALBUMIN QUANTITATIVE: CPT

## 2024-05-09 PROCEDURE — 85025 COMPLETE CBC W/AUTO DIFF WBC: CPT

## 2024-05-09 PROCEDURE — 84156 ASSAY OF PROTEIN URINE: CPT

## 2024-05-09 PROCEDURE — 99285 EMERGENCY DEPT VISIT HI MDM: CPT

## 2024-05-09 PROCEDURE — 87086 URINE CULTURE/COLONY COUNT: CPT

## 2024-05-09 PROCEDURE — 80061 LIPID PANEL: CPT

## 2024-05-09 RX ORDER — LORATADINE 10 MG/1
10 TABLET ORAL DAILY
Status: DISCONTINUED | OUTPATIENT
Start: 2024-05-10 | End: 2024-05-17 | Stop reason: HOSPADM

## 2024-05-09 RX ORDER — METOPROLOL SUCCINATE 50 MG/1
50 TABLET, EXTENDED RELEASE ORAL 2 TIMES DAILY
Status: DISCONTINUED | OUTPATIENT
Start: 2024-05-09 | End: 2024-05-17 | Stop reason: HOSPADM

## 2024-05-09 RX ORDER — LOSARTAN POTASSIUM 50 MG/1
100 TABLET ORAL DAILY
Status: DISCONTINUED | OUTPATIENT
Start: 2024-05-10 | End: 2024-05-13

## 2024-05-09 RX ORDER — ATORVASTATIN CALCIUM 40 MG/1
40 TABLET, FILM COATED ORAL DAILY
Status: DISCONTINUED | OUTPATIENT
Start: 2024-05-10 | End: 2024-05-17 | Stop reason: HOSPADM

## 2024-05-09 RX ORDER — LATANOPROST 50 UG/ML
1 SOLUTION/ DROPS OPHTHALMIC
Status: DISCONTINUED | OUTPATIENT
Start: 2024-05-09 | End: 2024-05-17 | Stop reason: HOSPADM

## 2024-05-09 RX ORDER — DILTIAZEM HYDROCHLORIDE 120 MG/1
120 CAPSULE, COATED, EXTENDED RELEASE ORAL DAILY
Status: DISCONTINUED | OUTPATIENT
Start: 2024-05-10 | End: 2024-05-17 | Stop reason: HOSPADM

## 2024-05-09 RX ORDER — INSULIN LISPRO 100 [IU]/ML
1-6 INJECTION, SOLUTION INTRAVENOUS; SUBCUTANEOUS
Status: DISCONTINUED | OUTPATIENT
Start: 2024-05-10 | End: 2024-05-13

## 2024-05-09 RX ORDER — ACETAMINOPHEN 325 MG/1
650 TABLET ORAL EVERY 6 HOURS PRN
Status: DISCONTINUED | OUTPATIENT
Start: 2024-05-09 | End: 2024-05-17 | Stop reason: HOSPADM

## 2024-05-09 RX ORDER — SODIUM CHLORIDE 1 G/1
2 TABLET ORAL ONCE
Status: COMPLETED | OUTPATIENT
Start: 2024-05-09 | End: 2024-05-10

## 2024-05-09 RX ADMIN — METOPROLOL SUCCINATE 50 MG: 50 TABLET, EXTENDED RELEASE ORAL at 22:45

## 2024-05-09 RX ADMIN — LATANOPROST 1 DROP: 50 SOLUTION OPHTHALMIC at 22:46

## 2024-05-09 RX ADMIN — CEFTRIAXONE SODIUM 1000 MG: 10 INJECTION, POWDER, FOR SOLUTION INTRAVENOUS at 22:41

## 2024-05-09 NOTE — Clinical Note
Case was discussed with KIYA and the patient's admission status was agreed to be Admission Status: inpatient status to the service of Dr. LEAH Klein

## 2024-05-09 NOTE — ED ATTENDING ATTESTATION
5/9/2024  I, Carlo Bosch DO, saw and evaluated the patient. I have discussed the patient with the resident/non-physician practitioner and agree with the resident's/non-physician practitioner's findings, Plan of Care, and MDM as documented in the resident's/non-physician practitioner's note, except where noted. All available labs and Radiology studies were reviewed.  I was present for key portions of any procedure(s) performed by the resident/non-physician practitioner and I was immediately available to provide assistance.       At this point I agree with the current assessment done in the Emergency Department.  I have conducted an independent evaluation of this patient a history and physical is as follows:    83-year-old female presenting to the ED for evaluation of abnormal labs, generalized fatigue.  Fatigue has been an ongoing issue for at least several weeks.  Had labs done on an outpatient basis and was found to be hyponatremic, was referred to the ED as results.  No recent illness.  Reports decreased oral intake since the death of her  in April.  On exam, is resting comfortably no acute distress, speaking full sentences with no respiratory difficulty, normal heart sounds, normal lung sounds, abdomen soft nontender nondistended, dry mucous membranes, no lower extremity edema.  Chest x-ray shows mild right-sided pleural effusion which appears similar when compared to prior study as interpreted by myself pending final radiology read.  Labs show no leukocytosis, stable hemoglobin, hyponatremia 120, hyperkalemia 5.4, normal renal function, no acute LFT abnormalities, normal TSH, 8 urine concerning for possible urinary tract infection.  Urine studies were sent on behalf of the inpatient team.  Resident discussed results with patient/family.  Plan will be for hospitalization.  Patient is agreeable to the plan.  Resident discussed with admitting team.  Accepted onto their service for further care and  management.  Stable at the time of disposition    ED Course         Critical Care Time  Procedures

## 2024-05-09 NOTE — TELEPHONE ENCOUNTER
I received sodium levels for the patient 120. I called patient and daughter picked up. Pt is feeling ok but fatigued mildly. I advised pt to be taken to ER and reviewed risks of hyponatremia. Adt21 order placed. Pt will be going to [facility]

## 2024-05-10 LAB
ANION GAP SERPL CALCULATED.3IONS-SCNC: 5 MMOL/L (ref 4–13)
ANION GAP SERPL CALCULATED.3IONS-SCNC: 5 MMOL/L (ref 4–13)
ANION GAP SERPL CALCULATED.3IONS-SCNC: 6 MMOL/L (ref 4–13)
ANION GAP SERPL CALCULATED.3IONS-SCNC: 6 MMOL/L (ref 4–13)
ANION GAP SERPL CALCULATED.3IONS-SCNC: 7 MMOL/L (ref 4–13)
ANION GAP SERPL CALCULATED.3IONS-SCNC: 7 MMOL/L (ref 4–13)
BASOPHILS # BLD AUTO: 0.07 THOUSANDS/ÂΜL (ref 0–0.1)
BASOPHILS NFR BLD AUTO: 1 % (ref 0–1)
BUN SERPL-MCNC: 20 MG/DL (ref 5–25)
BUN SERPL-MCNC: 21 MG/DL (ref 5–25)
BUN SERPL-MCNC: 22 MG/DL (ref 5–25)
BUN SERPL-MCNC: 23 MG/DL (ref 5–25)
BUN SERPL-MCNC: 26 MG/DL (ref 5–25)
BUN SERPL-MCNC: 29 MG/DL (ref 5–25)
CALCIUM SERPL-MCNC: 8.9 MG/DL (ref 8.4–10.2)
CALCIUM SERPL-MCNC: 9 MG/DL (ref 8.4–10.2)
CALCIUM SERPL-MCNC: 9.2 MG/DL (ref 8.4–10.2)
CALCIUM SERPL-MCNC: 9.4 MG/DL (ref 8.4–10.2)
CHLORIDE SERPL-SCNC: 90 MMOL/L (ref 96–108)
CHLORIDE SERPL-SCNC: 91 MMOL/L (ref 96–108)
CHLORIDE SERPL-SCNC: 92 MMOL/L (ref 96–108)
CHLORIDE SERPL-SCNC: 93 MMOL/L (ref 96–108)
CO2 SERPL-SCNC: 24 MMOL/L (ref 21–32)
CO2 SERPL-SCNC: 25 MMOL/L (ref 21–32)
CO2 SERPL-SCNC: 26 MMOL/L (ref 21–32)
CO2 SERPL-SCNC: 26 MMOL/L (ref 21–32)
CO2 SERPL-SCNC: 27 MMOL/L (ref 21–32)
CO2 SERPL-SCNC: 27 MMOL/L (ref 21–32)
CREAT SERPL-MCNC: 0.81 MG/DL (ref 0.6–1.3)
CREAT SERPL-MCNC: 0.89 MG/DL (ref 0.6–1.3)
CREAT SERPL-MCNC: 0.9 MG/DL (ref 0.6–1.3)
CREAT SERPL-MCNC: 0.91 MG/DL (ref 0.6–1.3)
CREAT SERPL-MCNC: 0.96 MG/DL (ref 0.6–1.3)
CREAT SERPL-MCNC: 0.97 MG/DL (ref 0.6–1.3)
EOSINOPHIL # BLD AUTO: 0.16 THOUSAND/ÂΜL (ref 0–0.61)
EOSINOPHIL NFR BLD AUTO: 2 % (ref 0–6)
ERYTHROCYTE [DISTWIDTH] IN BLOOD BY AUTOMATED COUNT: 14.3 % (ref 11.6–15.1)
GFR SERPL CREATININE-BSD FRML MDRD: 54 ML/MIN/1.73SQ M
GFR SERPL CREATININE-BSD FRML MDRD: 55 ML/MIN/1.73SQ M
GFR SERPL CREATININE-BSD FRML MDRD: 58 ML/MIN/1.73SQ M
GFR SERPL CREATININE-BSD FRML MDRD: 59 ML/MIN/1.73SQ M
GFR SERPL CREATININE-BSD FRML MDRD: 60 ML/MIN/1.73SQ M
GFR SERPL CREATININE-BSD FRML MDRD: 67 ML/MIN/1.73SQ M
GLUCOSE SERPL-MCNC: 101 MG/DL (ref 65–140)
GLUCOSE SERPL-MCNC: 102 MG/DL (ref 65–140)
GLUCOSE SERPL-MCNC: 117 MG/DL (ref 65–140)
GLUCOSE SERPL-MCNC: 172 MG/DL (ref 65–140)
GLUCOSE SERPL-MCNC: 196 MG/DL (ref 65–140)
GLUCOSE SERPL-MCNC: 198 MG/DL (ref 65–140)
GLUCOSE SERPL-MCNC: 204 MG/DL (ref 65–140)
GLUCOSE SERPL-MCNC: 291 MG/DL (ref 65–140)
GLUCOSE SERPL-MCNC: 328 MG/DL (ref 65–140)
GLUCOSE SERPL-MCNC: 83 MG/DL (ref 65–140)
HCT VFR BLD AUTO: 39.7 % (ref 34.8–46.1)
HGB BLD-MCNC: 12.6 G/DL (ref 11.5–15.4)
IMM GRANULOCYTES # BLD AUTO: 0.04 THOUSAND/UL (ref 0–0.2)
IMM GRANULOCYTES NFR BLD AUTO: 1 % (ref 0–2)
LYMPHOCYTES # BLD AUTO: 0.97 THOUSANDS/ÂΜL (ref 0.6–4.47)
LYMPHOCYTES NFR BLD AUTO: 13 % (ref 14–44)
MCH RBC QN AUTO: 27.2 PG (ref 26.8–34.3)
MCHC RBC AUTO-ENTMCNC: 31.7 G/DL (ref 31.4–37.4)
MCV RBC AUTO: 86 FL (ref 82–98)
MONOCYTES # BLD AUTO: 1.11 THOUSAND/ÂΜL (ref 0.17–1.22)
MONOCYTES NFR BLD AUTO: 14 % (ref 4–12)
NEUTROPHILS # BLD AUTO: 5.39 THOUSANDS/ÂΜL (ref 1.85–7.62)
NEUTS SEG NFR BLD AUTO: 69 % (ref 43–75)
NRBC BLD AUTO-RTO: 0 /100 WBCS
PLATELET # BLD AUTO: 345 THOUSANDS/UL (ref 149–390)
PMV BLD AUTO: 9.1 FL (ref 8.9–12.7)
POTASSIUM SERPL-SCNC: 4.8 MMOL/L (ref 3.5–5.3)
POTASSIUM SERPL-SCNC: 4.9 MMOL/L (ref 3.5–5.3)
POTASSIUM SERPL-SCNC: 5.1 MMOL/L (ref 3.5–5.3)
POTASSIUM SERPL-SCNC: 5.5 MMOL/L (ref 3.5–5.3)
POTASSIUM SERPL-SCNC: 5.6 MMOL/L (ref 3.5–5.3)
POTASSIUM SERPL-SCNC: 5.7 MMOL/L (ref 3.5–5.3)
RBC # BLD AUTO: 4.63 MILLION/UL (ref 3.81–5.12)
SODIUM SERPL-SCNC: 121 MMOL/L (ref 135–147)
SODIUM SERPL-SCNC: 122 MMOL/L (ref 135–147)
SODIUM SERPL-SCNC: 123 MMOL/L (ref 135–147)
SODIUM SERPL-SCNC: 123 MMOL/L (ref 135–147)
SODIUM SERPL-SCNC: 124 MMOL/L (ref 135–147)
SODIUM SERPL-SCNC: 124 MMOL/L (ref 135–147)
TSH SERPL DL<=0.05 MIU/L-ACNC: 1.77 UIU/ML (ref 0.45–4.5)
WBC # BLD AUTO: 7.74 THOUSAND/UL (ref 4.31–10.16)

## 2024-05-10 PROCEDURE — 82948 REAGENT STRIP/BLOOD GLUCOSE: CPT

## 2024-05-10 PROCEDURE — 97167 OT EVAL HIGH COMPLEX 60 MIN: CPT

## 2024-05-10 PROCEDURE — 36415 COLL VENOUS BLD VENIPUNCTURE: CPT

## 2024-05-10 PROCEDURE — 99223 1ST HOSP IP/OBS HIGH 75: CPT | Performed by: HOSPITALIST

## 2024-05-10 PROCEDURE — 85025 COMPLETE CBC W/AUTO DIFF WBC: CPT

## 2024-05-10 PROCEDURE — 99223 1ST HOSP IP/OBS HIGH 75: CPT | Performed by: INTERNAL MEDICINE

## 2024-05-10 PROCEDURE — 94640 AIRWAY INHALATION TREATMENT: CPT

## 2024-05-10 PROCEDURE — 94760 N-INVAS EAR/PLS OXIMETRY 1: CPT

## 2024-05-10 PROCEDURE — 80048 BASIC METABOLIC PNL TOTAL CA: CPT

## 2024-05-10 RX ORDER — DONEPEZIL HYDROCHLORIDE 5 MG/1
5 TABLET, FILM COATED ORAL
Status: DISCONTINUED | OUTPATIENT
Start: 2024-05-10 | End: 2024-05-17 | Stop reason: HOSPADM

## 2024-05-10 RX ORDER — SODIUM CHLORIDE 9 MG/ML
60 INJECTION, SOLUTION INTRAVENOUS ONCE
Qty: 500 ML | Refills: 0 | Status: COMPLETED | OUTPATIENT
Start: 2024-05-10 | End: 2024-05-10

## 2024-05-10 RX ORDER — DEXTROSE MONOHYDRATE 25 G/50ML
25 INJECTION, SOLUTION INTRAVENOUS ONCE
Status: COMPLETED | OUTPATIENT
Start: 2024-05-10 | End: 2024-05-10

## 2024-05-10 RX ADMIN — INSULIN LISPRO 4 UNITS: 100 INJECTION, SOLUTION INTRAVENOUS; SUBCUTANEOUS at 10:56

## 2024-05-10 RX ADMIN — RIVAROXABAN 15 MG: 15 TABLET, FILM COATED ORAL at 09:18

## 2024-05-10 RX ADMIN — ALBUTEROL SULFATE 10 MG: 2.5 SOLUTION RESPIRATORY (INHALATION) at 03:47

## 2024-05-10 RX ADMIN — METOPROLOL SUCCINATE 50 MG: 50 TABLET, EXTENDED RELEASE ORAL at 09:18

## 2024-05-10 RX ADMIN — INSULIN LISPRO 2 UNITS: 100 INJECTION, SOLUTION INTRAVENOUS; SUBCUTANEOUS at 17:14

## 2024-05-10 RX ADMIN — SODIUM CHLORIDE 60 ML/HR: 0.9 INJECTION, SOLUTION INTRAVENOUS at 13:22

## 2024-05-10 RX ADMIN — INSULIN HUMAN 10 UNITS: 100 INJECTION, SOLUTION PARENTERAL at 03:57

## 2024-05-10 RX ADMIN — LORATADINE 10 MG: 10 TABLET ORAL at 09:18

## 2024-05-10 RX ADMIN — DONEPEZIL HYDROCHLORIDE 5 MG: 5 TABLET ORAL at 21:31

## 2024-05-10 RX ADMIN — DEXTROSE MONOHYDRATE 25 ML: 25 INJECTION, SOLUTION INTRAVENOUS at 03:57

## 2024-05-10 RX ADMIN — LOSARTAN POTASSIUM 100 MG: 50 TABLET, FILM COATED ORAL at 09:18

## 2024-05-10 RX ADMIN — LATANOPROST 1 DROP: 50 SOLUTION OPHTHALMIC at 21:32

## 2024-05-10 RX ADMIN — SODIUM CHLORIDE 2 G: 1 TABLET ORAL at 00:03

## 2024-05-10 RX ADMIN — DILTIAZEM HYDROCHLORIDE 120 MG: 120 CAPSULE, COATED, EXTENDED RELEASE ORAL at 09:18

## 2024-05-10 RX ADMIN — ATORVASTATIN CALCIUM 40 MG: 40 TABLET, FILM COATED ORAL at 09:18

## 2024-05-10 NOTE — PROGRESS NOTES
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  Does not appear hypervolemic on exam  Patient received 2 g salt tablets in the ED  Hypoosmolar hypovolemic hyponatremia    Plan:   Fluid restriction to 1500  Normal saline 60 mL/h for 8 hours  Goal sodium should not exceed 128 by tonight  Nephro consulted; appreciate recs    Chronic diastolic CHF (congestive heart failure) (HCC)  Assessment & Plan  Wt Readings from Last 3 Encounters:   24 75 kg (165 lb 5.5 oz)   24 74.8 kg (165 lb)   24 78.2 kg (172 lb 6.4 oz)     Was supposed to be taking torsemide and spironolactone, patient unsure she was taking torsemide as it does not sound familiar to her  50 pound weight loss in the last year unintentionally, patient does not seem volume overloaded on exam    Plan:  -Hold spironolactone  -Hold torsemide          Atrial flutter with rapid ventricular response (HCC)  Assessment & Plan  - Continue Cardizem  mg and Toprol 50 mg twice daily  -Anticoagulation on Xarelto 15 mg daily    Impaired memory  Assessment & Plan  Continue donepezil     Mixed hyperlipidemia  Assessment & Plan  Continue Lipitor 40 mg     Diabetes mellitus without complication (HCC)  Assessment & Plan  Lab Results   Component Value Date    HGBA1C 7.4 (A) 2024       Recent Labs     05/10/24  0817 05/10/24  1051   POCGLU 101 291*       Blood Sugar Average: Last 72 hrs:  (P) 196PTA glipizide and metformin    Plan:  SSI      Essential hypertension  Assessment & Plan  Patient on 320 mg  of valsartan daily; patient to 100 mg losartan per formulary             VTE Pharmacologic Prophylaxis:   Moderate Risk (Score 3-4) - Pharmacological DVT Prophylaxis Ordered: rivaroxaban (Xarelto).    Mobility:   Basic Mobility Inpatient Raw Score: 18  JH-HLM Goal: 6: Walk 10 steps or more  JH-HLM Achieved: 6: Walk 10 steps or more  JH-HLM Goal achieved. Continue to encourage appropriate mobility.    Patient Centered Rounds: I performed bedside rounds with nursing staff today.   Discussions with Specialists or Other Care Team Provider: Nephrology    Education and Discussions with Family / Patient: Updated  (daughter) via phone.    Total Time Spent on Date of Encounter in care of patient: 45 mins. This time was spent on one or more of the following: performing physical exam; counseling and coordination of care; obtaining or reviewing history; documenting in the medical record; reviewing/ordering tests, medications or procedures; communicating with other healthcare professionals and discussing with patient's family/caregivers.    Current Length of Stay: 1 day(s)  Current Patient Status: Inpatient   Certification Statement: The patient will continue to require additional inpatient hospital stay due to management of hyponatremia   Discharge Plan: Anticipate discharge in 24-48 hrs to home.    Code Status: Level 1 - Full Code    Subjective:   Patient is alert, endorses generalized weakness however feels better than yesterday.  Patient denies any chest pain, abdominal pain or shortness of breath.  No acute overnight events.    Objective:     Vitals:   Temp (24hrs), Av.9 °F (36.6 °C), Min:97.7 °F (36.5 °C), Max:98.1 °F (36.7 °C)    Temp:  [97.7 °F (36.5 °C)-98.1 °F (36.7 °C)] 97.7 °F (36.5 °C)  HR:  [56-87] 71  Resp:  [16-18] 18  BP: (104-142)/(55-75) 142/75  SpO2:  [90 %-98 %] 98 %  Body mass index is 33.4 kg/m².     Input and Output Summary (last 24 hours):   No intake or output data in the 24 hours  ending 05/10/24 1321    Physical Exam:   Physical Exam  HENT:      Head: Normocephalic and atraumatic.      Right Ear: External ear normal.      Left Ear: External ear normal.      Nose: Nose normal.      Mouth/Throat:      Mouth: Mucous membranes are dry.      Pharynx: Oropharynx is clear.   Eyes:      Extraocular Movements: Extraocular movements intact.      Conjunctiva/sclera: Conjunctivae normal.      Pupils: Pupils are equal, round, and reactive to light.   Cardiovascular:      Rate and Rhythm: Normal rate. Rhythm irregular.      Pulses: Normal pulses.      Heart sounds: Normal heart sounds.   Pulmonary:      Effort: Pulmonary effort is normal.      Breath sounds: Normal breath sounds.   Abdominal:      General: Bowel sounds are normal.      Palpations: Abdomen is soft.   Musculoskeletal:         General: Normal range of motion.   Skin:     General: Skin is warm.      Capillary Refill: Capillary refill takes less than 2 seconds.   Neurological:      General: No focal deficit present.      Mental Status: She is alert. Mental status is at baseline.          Additional Data:     Labs:  Results from last 7 days   Lab Units 05/10/24  0546   WBC Thousand/uL 7.74   HEMOGLOBIN g/dL 12.6   HEMATOCRIT % 39.7   PLATELETS Thousands/uL 345   SEGS PCT % 69   LYMPHO PCT % 13*   MONO PCT % 14*   EOS PCT % 2     Results from last 7 days   Lab Units 05/10/24  1136 05/10/24  0240 05/09/24  1929   SODIUM mmol/L 122*   < > 120*   POTASSIUM mmol/L 4.9   < > 5.4*   CHLORIDE mmol/L 90*   < > 89*   CO2 mmol/L 26   < > 26   BUN mg/dL 22   < > 26*   CREATININE mg/dL 0.90   < > 0.87   ANION GAP mmol/L 6   < > 5   CALCIUM mg/dL 8.9   < > 9.4   ALBUMIN g/dL  --   --  3.6   TOTAL BILIRUBIN mg/dL  --   --  0.84   ALK PHOS U/L  --   --  116*   ALT U/L  --   --  32   AST U/L  --   --  65*   GLUCOSE RANDOM mg/dL 328*   < > 99    < > = values in this interval not displayed.         Results from last 7 days   Lab Units 05/10/24  1051  05/10/24  0817   POC GLUCOSE mg/dl 291* 101               Lines/Drains:  Invasive Devices       Peripheral Intravenous Line  Duration             Peripheral IV 05/09/24 Left Antecubital <1 day                      Telemetry:  Telemetry Orders (From admission, onward)               24 Hour Telemetry Monitoring  Continuous x 24 Hours (Telem)        Question:  Reason for 24 Hour Telemetry  Answer:  Arrhythmias requiring acute medical intervention / PPM or ICD malfunction                     Telemetry Reviewed: Atrial fibrillation. HR averaging 70  Indication for Continued Telemetry Use: Arrthymias requiring medical therapy             Imaging: Reviewed radiology reports from this admission including: chest xray    Recent Cultures (last 7 days):         Last 24 Hours Medication List:   Current Facility-Administered Medications   Medication Dose Route Frequency Provider Last Rate    acetaminophen  650 mg Oral Q6H PRN Bia Moreno DO      atorvastatin  40 mg Oral Daily Bia Moreno, DO      diltiazem  120 mg Oral Daily Bia Moreno, DO      donepezil  5 mg Oral HS Osvaldo Corcoran MD      insulin lispro  1-6 Units Subcutaneous TID AC Bia Moreno DO      latanoprost  1 drop Both Eyes HS Bia Moreno DO      loratadine  10 mg Oral Daily Bia Moreno DO      losartan  100 mg Oral Daily Bia Moreno, DO      metoprolol succinate  50 mg Oral BID Bia Moreno, DO      rivaroxaban  15 mg Oral Daily With Breakfast Bia Moreno DO      sodium chloride  60 mL/hr Intravenous Once Vasu Perkins DO          Today, Patient Was Seen By: Osvaldo Corcoran MD    **Please Note: This note may have been constructed using a voice recognition system.**
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
## 2024-05-10 NOTE — ASSESSMENT & PLAN NOTE
Patient sent in by her nephrologist that she had a routine BMP today with sodium of 120  Patient has had severely decreased intake of both food and water since her    Also has known lung nodules that were previously biopsied as benign carcinoid tumors  Does not appear hypervolemic on exam  Patient received 2 g salt tablets in the ED  Hypoosmolar hypovolemic hyponatremia    Plan:   Fluid restriction to 1500  Normal saline 60 mL/h for 8 hours  Goal sodium should not exceed 128 by tonight  Nephro consulted; appreciate recs

## 2024-05-10 NOTE — ASSESSMENT & PLAN NOTE
Wt Readings from Last 3 Encounters:   05/09/24 75 kg (165 lb 5.5 oz)   04/26/24 74.8 kg (165 lb)   04/25/24 78.2 kg (172 lb 6.4 oz)     Was supposed to be taking torsemide and spironolactone, patient unsure she was taking torsemide as it does not sound familiar to her  50 pound weight loss in the last year unintentionally, patient does not seem volume overloaded on exam    Plan:  -Holding spironolactone in setting of hyperkalemia  -Can consider dose of torsemide if patient appears hypervolemic

## 2024-05-10 NOTE — ED NOTES
"Patient laying on stretcher with HOB elevated in negative distress. RA O2 Sat 90%. Pt admitted she uses home O2 1-2L NC \"as needed and when I sleep\". Pt placed on 1 L NC O2. O2 Sat increased to 96%.  MD updated     Neyda Oliver RN  05/09/24 2047    "

## 2024-05-10 NOTE — ASSESSMENT & PLAN NOTE
PTA donepezil?  Patient unsure if she takes this medication  Patient had severe memory impairment on my exam and states this has been going on for months  States memory has worsened since her   1 month ago

## 2024-05-10 NOTE — ASSESSMENT & PLAN NOTE
Wt Readings from Last 3 Encounters:   05/09/24 75 kg (165 lb 5.5 oz)   04/26/24 74.8 kg (165 lb)   04/25/24 78.2 kg (172 lb 6.4 oz)     Was supposed to be taking torsemide and spironolactone, patient unsure she was taking torsemide as it does not sound familiar to her  50 pound weight loss in the last year unintentionally, patient does not seem volume overloaded on exam    Plan:  -Hold spironolactone  -Hold torsemide

## 2024-05-10 NOTE — ASSESSMENT & PLAN NOTE
"Lab Results   Component Value Date    HGBA1C 7.4 (A) 04/26/2024       No results for input(s): \"POCGLU\" in the last 72 hours.    Blood Sugar Average: Last 72 hrs:  PTA glipizide and metformin    Plan:  SSI    "

## 2024-05-10 NOTE — ASSESSMENT & PLAN NOTE
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

## 2024-05-10 NOTE — ASSESSMENT & PLAN NOTE
Lab Results   Component Value Date    HGBA1C 7.4 (A) 04/26/2024       Recent Labs     05/10/24  0817 05/10/24  1051   POCGLU 101 291*       Blood Sugar Average: Last 72 hrs:  (P) 196PTA glipizide and metformin    Plan:  SSI

## 2024-05-10 NOTE — PLAN OF CARE
Problem: Potential for Falls  Goal: Patient will remain free of falls  Description: INTERVENTIONS:  - Educate patient/family on patient safety including physical limitations  - Instruct patient to call for assistance with activity   - Consult OT/PT to assist with strengthening/mobility   - Keep Call bell within reach  - Keep bed low and locked with side rails adjusted as appropriate  - Keep care items and personal belongings within reach  - Initiate and maintain comfort rounds  - Make Fall Risk Sign visible to staff  - Apply yellow socks and bracelet for high fall risk patients  - Consider moving patient to room near nurses station  Outcome: Progressing     Problem: Prexisting or High Potential for Compromised Skin Integrity  Goal: Skin integrity is maintained or improved  Description: INTERVENTIONS:  - Identify patients at risk for skin breakdown  - Assess and monitor skin integrity  - Assess and monitor nutrition and hydration status  - Monitor labs   - Assess for incontinence   - Turn and reposition patient  - Assist with mobility/ambulation  - Relieve pressure over bony prominences  - Avoid friction and shearing  - Provide appropriate hygiene as needed including keeping skin clean and dry  - Evaluate need for skin moisturizer/barrier cream  - Collaborate with interdisciplinary team   - Patient/family teaching  - Consider wound care consult   Outcome: Progressing     Problem: PAIN - ADULT  Goal: Verbalizes/displays adequate comfort level or baseline comfort level  Description: Interventions:  - Encourage patient to monitor pain and request assistance  - Assess pain using appropriate pain scale  - Administer analgesics based on type and severity of pain and evaluate response  - Implement non-pharmacological measures as appropriate and evaluate response  - Consider cultural and social influences on pain and pain management  - Notify physician/advanced practitioner if interventions unsuccessful or patient reports  new pain  Outcome: Progressing     Problem: INFECTION - ADULT  Goal: Absence or prevention of progression during hospitalization  Description: INTERVENTIONS:  - Assess and monitor for signs and symptoms of infection  - Monitor lab/diagnostic results  - Monitor all insertion sites, i.e. indwelling lines, tubes, and drains  - Monitor endotracheal if appropriate and nasal secretions for changes in amount and color  - [place] appropriate cooling/warming therapies per order  - Administer medications as ordered  - Instruct and encourage patient and family to use good hand hygiene technique  - Identify and instruct in appropriate isolation precautions for identified infection/condition  Outcome: Progressing  Goal: Absence of fever/infection during neutropenic period  Description: INTERVENTIONS:  - Monitor WBC    Outcome: Progressing     Problem: SAFETY ADULT  Goal: Patient will remain free of falls  Description: INTERVENTIONS:  - Educate patient/family on patient safety including physical limitations  - Instruct patient to call for assistance with activity   - Consult OT/PT to assist with strengthening/mobility   - Keep Call bell within reach  - Keep bed low and locked with side rails adjusted as appropriate  - Keep care items and personal belongings within reach  - Initiate and maintain comfort rounds  - Make Fall Risk Sign visible to staff  - Apply yellow socks and bracelet for high fall risk patients  - Consider moving patient to room near nurses station  Outcome: Progressing  Goal: Maintain or return to baseline ADL function  Description: INTERVENTIONS:  -  Assess patient's ability to carry out ADLs; assess patient's baseline for ADL function and identify physical deficits which impact ability to perform ADLs (bathing, care of mouth/teeth, toileting, grooming, dressing, etc.)  - Assess/evaluate cause of self-care deficits   - Assess range of motion  - Assess patient's mobility; develop plan if impaired  - Assess  patient's need for assistive devices and provide as appropriate  - Encourage maximum independence but intervene and supervise when necessary  - Involve family in performance of ADLs  - Assess for home care needs following discharge   - Consider OT consult to assist with ADL evaluation and planning for discharge  - Provide patient education as appropriate  Outcome: Progressing  Goal: Maintains/Returns to pre admission functional level  Description: INTERVENTIONS:  - Perform AM-PAC 6 Click Basic Mobility/ Daily Activity assessment daily.  - Set and communicate daily mobility goal to care team and patient/family/caregiver.   - Collaborate with rehabilitation services on mobility goals if consulted  - Out of bed for toileting  - Record patient progress and toleration of activity level   Outcome: Progressing     Problem: DISCHARGE PLANNING  Goal: Discharge to home or other facility with appropriate resources  Description: INTERVENTIONS:  - Identify barriers to discharge w/patient and caregiver  - Arrange for needed discharge resources and transportation as appropriate  - Identify discharge learning needs (meds, wound care, etc.)  - Arrange for interpretive services to assist at discharge as needed  - Refer to Case Management Department for coordinating discharge planning if the patient needs post-hospital services based on physician/advanced practitioner order or complex needs related to functional status, cognitive ability, or social support system  Outcome: Progressing     Problem: Knowledge Deficit  Goal: Patient/family/caregiver demonstrates understanding of disease process, treatment plan, medications, and discharge instructions  Description: Complete learning assessment and assess knowledge base.  Interventions:  - Provide teaching at level of understanding  - Provide teaching via preferred learning methods  Outcome: Progressing
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
## 2024-05-11 LAB
ANION GAP SERPL CALCULATED.3IONS-SCNC: 3 MMOL/L (ref 4–13)
ANION GAP SERPL CALCULATED.3IONS-SCNC: 4 MMOL/L (ref 4–13)
BACTERIA UR CULT: NORMAL
BUN SERPL-MCNC: 30 MG/DL (ref 5–25)
BUN SERPL-MCNC: 31 MG/DL (ref 5–25)
CALCIUM SERPL-MCNC: 8.6 MG/DL (ref 8.4–10.2)
CALCIUM SERPL-MCNC: 9 MG/DL (ref 8.4–10.2)
CHLORIDE SERPL-SCNC: 92 MMOL/L (ref 96–108)
CHLORIDE SERPL-SCNC: 96 MMOL/L (ref 96–108)
CO2 SERPL-SCNC: 25 MMOL/L (ref 21–32)
CO2 SERPL-SCNC: 27 MMOL/L (ref 21–32)
CREAT SERPL-MCNC: 0.86 MG/DL (ref 0.6–1.3)
CREAT SERPL-MCNC: 1 MG/DL (ref 0.6–1.3)
ERYTHROCYTE [DISTWIDTH] IN BLOOD BY AUTOMATED COUNT: 14.2 % (ref 11.6–15.1)
GFR SERPL CREATININE-BSD FRML MDRD: 52 ML/MIN/1.73SQ M
GFR SERPL CREATININE-BSD FRML MDRD: 63 ML/MIN/1.73SQ M
GLUCOSE SERPL-MCNC: 119 MG/DL (ref 65–140)
GLUCOSE SERPL-MCNC: 138 MG/DL (ref 65–140)
GLUCOSE SERPL-MCNC: 156 MG/DL (ref 65–140)
GLUCOSE SERPL-MCNC: 200 MG/DL (ref 65–140)
GLUCOSE SERPL-MCNC: 254 MG/DL (ref 65–140)
GLUCOSE SERPL-MCNC: 267 MG/DL (ref 65–140)
HCT VFR BLD AUTO: 37.7 % (ref 34.8–46.1)
HGB BLD-MCNC: 12.2 G/DL (ref 11.5–15.4)
MCH RBC QN AUTO: 27.4 PG (ref 26.8–34.3)
MCHC RBC AUTO-ENTMCNC: 32.4 G/DL (ref 31.4–37.4)
MCV RBC AUTO: 85 FL (ref 82–98)
PLATELET # BLD AUTO: 329 THOUSANDS/UL (ref 149–390)
PMV BLD AUTO: 9.1 FL (ref 8.9–12.7)
POTASSIUM SERPL-SCNC: 5.1 MMOL/L (ref 3.5–5.3)
POTASSIUM SERPL-SCNC: 5.3 MMOL/L (ref 3.5–5.3)
RBC # BLD AUTO: 4.46 MILLION/UL (ref 3.81–5.12)
SODIUM SERPL-SCNC: 122 MMOL/L (ref 135–147)
SODIUM SERPL-SCNC: 123 MMOL/L (ref 135–147)
SODIUM SERPL-SCNC: 125 MMOL/L (ref 135–147)
WBC # BLD AUTO: 8.37 THOUSAND/UL (ref 4.31–10.16)

## 2024-05-11 PROCEDURE — 80048 BASIC METABOLIC PNL TOTAL CA: CPT

## 2024-05-11 PROCEDURE — 84295 ASSAY OF SERUM SODIUM: CPT | Performed by: INTERNAL MEDICINE

## 2024-05-11 PROCEDURE — NC001 PR NO CHARGE: Performed by: INTERNAL MEDICINE

## 2024-05-11 PROCEDURE — 85027 COMPLETE CBC AUTOMATED: CPT

## 2024-05-11 PROCEDURE — 99232 SBSQ HOSP IP/OBS MODERATE 35: CPT | Performed by: INTERNAL MEDICINE

## 2024-05-11 PROCEDURE — 97163 PT EVAL HIGH COMPLEX 45 MIN: CPT

## 2024-05-11 PROCEDURE — 99232 SBSQ HOSP IP/OBS MODERATE 35: CPT | Performed by: HOSPITALIST

## 2024-05-11 PROCEDURE — 82948 REAGENT STRIP/BLOOD GLUCOSE: CPT

## 2024-05-11 RX ORDER — SODIUM CHLORIDE 1 G/1
2 TABLET ORAL ONCE
Status: COMPLETED | OUTPATIENT
Start: 2024-05-11 | End: 2024-05-11

## 2024-05-11 RX ADMIN — LATANOPROST 1 DROP: 50 SOLUTION OPHTHALMIC at 22:22

## 2024-05-11 RX ADMIN — LOSARTAN POTASSIUM 100 MG: 50 TABLET, FILM COATED ORAL at 10:56

## 2024-05-11 RX ADMIN — ATORVASTATIN CALCIUM 40 MG: 40 TABLET, FILM COATED ORAL at 10:55

## 2024-05-11 RX ADMIN — DONEPEZIL HYDROCHLORIDE 5 MG: 5 TABLET ORAL at 20:47

## 2024-05-11 RX ADMIN — METOPROLOL SUCCINATE 50 MG: 50 TABLET, EXTENDED RELEASE ORAL at 10:56

## 2024-05-11 RX ADMIN — INSULIN LISPRO 1 UNITS: 100 INJECTION, SOLUTION INTRAVENOUS; SUBCUTANEOUS at 07:30

## 2024-05-11 RX ADMIN — LORATADINE 10 MG: 10 TABLET ORAL at 10:56

## 2024-05-11 RX ADMIN — RIVAROXABAN 15 MG: 15 TABLET, FILM COATED ORAL at 11:43

## 2024-05-11 RX ADMIN — SODIUM CHLORIDE 2 G: 1 TABLET ORAL at 10:56

## 2024-05-11 RX ADMIN — INSULIN LISPRO 3 UNITS: 100 INJECTION, SOLUTION INTRAVENOUS; SUBCUTANEOUS at 13:04

## 2024-05-11 RX ADMIN — DILTIAZEM HYDROCHLORIDE 120 MG: 120 CAPSULE, COATED, EXTENDED RELEASE ORAL at 10:55

## 2024-05-11 RX ADMIN — METOPROLOL SUCCINATE 50 MG: 50 TABLET, EXTENDED RELEASE ORAL at 17:25

## 2024-05-11 NOTE — ASSESSMENT & PLAN NOTE
- Continue prior to admission Cardizem  mg and Toprol 50 mg twice daily  - Anticoagulation on Xarelto 15 mg daily

## 2024-05-11 NOTE — ASSESSMENT & PLAN NOTE
Blood Pressure: 134/61   Patient on 320 mg of valsartan daily; switch to to 100 mg losartan per formulary  Continue monitoring blood pressure
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
## 2024-05-11 NOTE — CONSULTS
Please see original consultation done by us yesterday on May 10.    This is a duplicate consult order

## 2024-05-11 NOTE — ASSESSMENT & PLAN NOTE
Lab Results   Component Value Date    HGBA1C 7.4 (A) 04/26/2024       Recent Labs     05/10/24  1051 05/10/24  1558 05/10/24  2053 05/11/24  0745   POCGLU 291* 196* 204* 156*         Blood Sugar Average: Last 72 hrs:  (P) 189.6PTA on glipizide and metformin  Plan:  Hold PTA glipizide and metformin  Continue sliding scale insulin  Continue Accu-Cheks  Hypoglycemia protocol

## 2024-05-11 NOTE — ASSESSMENT & PLAN NOTE
Wt Readings from Last 3 Encounters:   05/09/24 75 kg (165 lb 5.5 oz)   04/26/24 74.8 kg (165 lb)   04/25/24 78.2 kg (172 lb 6.4 oz)   Reportedly was supposed to be on Aldactone and Torsemide.  50 pound weight loss in the last year unintentionally, patient does not seem volume overloaded on exam  At this time does not examine volume overloaded    Plan:  Continue holding spironolactone and torsemide  Continue Toprol-XL 50 mg twice daily  Continue monitoring volume status.

## 2024-05-11 NOTE — ASSESSMENT & PLAN NOTE
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

## 2024-05-11 NOTE — PROGRESS NOTES
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  Does not appear hypervolemic on exam  Patient received 2 g salt tablets in the ED  Hypoosmolar hypovolemic hyponatremia    Plan:   Fluid restriction to 1500  One salt tablet 2g given today   Nephro consulted; appreciate recs  Continue to monitor Na levels in the evening     Chronic diastolic CHF (congestive heart failure) (HCC)  Assessment & Plan  Wt Readings from Last 3 Encounters:   24 75 kg (165 lb 5.5 oz)   24 74.8 kg (165 lb)   24 78.2 kg (172 lb 6.4 oz)     Was supposed to be taking torsemide and spironolactone, patient unsure she was taking torsemide as it does not sound familiar to her  50 pound weight loss in the last year unintentionally, patient does not seem volume overloaded on exam    Plan:  -Hold spironolactone  -Hold torsemide          Atrial flutter with rapid ventricular response (HCC)  Assessment & Plan  - Continue Cardizem  mg and Toprol 50 mg twice daily  -Anticoagulation on Xarelto 15 mg daily    Impaired memory  Assessment & Plan  Continue donepezil     Mixed hyperlipidemia  Assessment & Plan  Continue Lipitor 40 mg     Diabetes mellitus without complication (HCC)  Assessment & Plan  Lab Results   Component Value Date    HGBA1C 7.4 (A) 2024       Recent Labs     05/10/24  1051 05/10/24  1558 05/10/24  2053 24  0745   POCGLU 291* 196* 204* 156*         Blood Sugar Average: Last 72 hrs:  (P) 189.6PTA glipizide and metformin    Plan:  SSI      Essential  hypertension  Assessment & Plan  Patient on 320 mg of valsartan daily; patient to 100 mg losartan per formulary             VTE Pharmacologic Prophylaxis:   Moderate Risk (Score 3-4) - Pharmacological DVT Prophylaxis Ordered: rivaroxaban (Xarelto).    Mobility:   Basic Mobility Inpatient Raw Score: 18  JH-HLM Goal: 6: Walk 10 steps or more  JH-HLM Achieved: 6: Walk 10 steps or more  JH-HLM Goal achieved. Continue to encourage appropriate mobility.    Patient Centered Rounds: I performed bedside rounds with nursing staff today.   Discussions with Specialists or Other Care Team Provider: Nephrology    Education and Discussions with Family / Patient: Updated  (daughter) via phone.    Total Time Spent on Date of Encounter in care of patient: 45 mins. This time was spent on one or more of the following: performing physical exam; counseling and coordination of care; obtaining or reviewing history; documenting in the medical record; reviewing/ordering tests, medications or procedures; communicating with other healthcare professionals and discussing with patient's family/caregivers.    Current Length of Stay: 2 day(s)  Current Patient Status: Inpatient   Certification Statement: The patient will continue to require additional inpatient hospital stay due to management of hyponatremia   Discharge Plan: Anticipate discharge in 24-48 hrs to home.    Code Status: Level 1 - Full Code    Subjective:   Patient is alert. Patient denies any chest pain, abdominal pain or shortness of breath.  No acute overnight events.    Objective:     Vitals:   Temp (24hrs), Av.5 °F (36.9 °C), Min:98 °F (36.7 °C), Max:98.9 °F (37.2 °C)    Temp:  [98 °F (36.7 °C)-98.9 °F (37.2 °C)] 98.9 °F (37.2 °C)  HR:  [62-72] 72  Resp:  [17-18] 17  BP: (122-161)/(62-75) 142/70  SpO2:  [92 %-96 %] 96 %  Body mass index is 33.4 kg/m².     Input and Output Summary (last 24 hours):   No intake or output data in the 24 hours ending 24  1003    Physical Exam:   Physical Exam  HENT:      Head: Normocephalic and atraumatic.      Right Ear: External ear normal.      Left Ear: External ear normal.      Nose: Nose normal.      Mouth/Throat:      Mouth: Mucous membranes are dry.      Pharynx: Oropharynx is clear.   Eyes:      Extraocular Movements: Extraocular movements intact.      Conjunctiva/sclera: Conjunctivae normal.      Pupils: Pupils are equal, round, and reactive to light.   Cardiovascular:      Rate and Rhythm: Normal rate. Rhythm irregular.      Pulses: Normal pulses.      Heart sounds: Normal heart sounds.   Pulmonary:      Effort: Pulmonary effort is normal.      Breath sounds: Normal breath sounds.   Abdominal:      General: Bowel sounds are normal.      Palpations: Abdomen is soft.   Musculoskeletal:         General: Normal range of motion.   Skin:     General: Skin is warm.      Capillary Refill: Capillary refill takes less than 2 seconds.   Neurological:      General: No focal deficit present.      Mental Status: She is alert. Mental status is at baseline.          Additional Data:     Labs:  Results from last 7 days   Lab Units 05/11/24  0520 05/10/24  0546   WBC Thousand/uL 8.37 7.74   HEMOGLOBIN g/dL 12.2 12.6   HEMATOCRIT % 37.7 39.7   PLATELETS Thousands/uL 329 345   SEGS PCT %  --  69   LYMPHO PCT %  --  13*   MONO PCT %  --  14*   EOS PCT %  --  2     Results from last 7 days   Lab Units 05/11/24  0520 05/10/24  0240 05/09/24  1929   SODIUM mmol/L 125*   < > 120*   POTASSIUM mmol/L 5.1   < > 5.4*   CHLORIDE mmol/L 96   < > 89*   CO2 mmol/L 25   < > 26   BUN mg/dL 30*   < > 26*   CREATININE mg/dL 0.86   < > 0.87   ANION GAP mmol/L 4   < > 5   CALCIUM mg/dL 8.6   < > 9.4   ALBUMIN g/dL  --   --  3.6   TOTAL BILIRUBIN mg/dL  --   --  0.84   ALK PHOS U/L  --   --  116*   ALT U/L  --   --  32   AST U/L  --   --  65*   GLUCOSE RANDOM mg/dL 138   < > 99    < > = values in this interval not displayed.         Results from last 7 days    Lab Units 05/11/24  0745 05/10/24  2053 05/10/24  1558 05/10/24  1051 05/10/24  0817   POC GLUCOSE mg/dl 156* 204* 196* 291* 101               Lines/Drains:  Invasive Devices       Peripheral Intravenous Line  Duration             Peripheral IV 05/09/24 Left Antecubital 1 day                      Telemetry:  Telemetry Orders (From admission, onward)               24 Hour Telemetry Monitoring  Continuous x 24 Hours (Telem)        Question:  Reason for 24 Hour Telemetry  Answer:  Arrhythmias requiring acute medical intervention / PPM or ICD malfunction                     Telemetry Reviewed: Atrial fibrillation. HR averaging 70  Indication for Continued Telemetry Use: Arrthymias requiring medical therapy             Imaging: Reviewed radiology reports from this admission including: chest xray    Recent Cultures (last 7 days):   Results from last 7 days   Lab Units 05/09/24  1930   URINE CULTURE  Culture too young- will reincubate       Last 24 Hours Medication List:   Current Facility-Administered Medications   Medication Dose Route Frequency Provider Last Rate    acetaminophen  650 mg Oral Q6H PRN Bia Moreno, DO      atorvastatin  40 mg Oral Daily Bia Moreno, DO      diltiazem  120 mg Oral Daily Bia Moreno, DO      donepezil  5 mg Oral HS Osvaldo Corcoran MD      insulin lispro  1-6 Units Subcutaneous TID AC Bia Moreno, DO      latanoprost  1 drop Both Eyes HS Bia Moreno, DO      loratadine  10 mg Oral Daily Bia Moreno, DO      losartan  100 mg Oral Daily Bia Moreno, DO      metoprolol succinate  50 mg Oral BID Bia Moreno, DO      rivaroxaban  15 mg Oral Daily With Breakfast Bia Moreno, DO      sodium chloride  2 g Oral Once Erna Johnson MD          Today, Patient Was Seen By: Osvaldo Corcoran MD    **Please Note: This note may have been constructed using a voice recognition system.**

## 2024-05-11 NOTE — PLAN OF CARE
Problem: PHYSICAL THERAPY ADULT  Goal: Performs mobility at highest level of function for planned discharge setting.  See evaluation for individualized goals.  Description: Treatment/Interventions: Functional transfer training, LE strengthening/ROM, Elevations, Endurance training, Therapeutic exercise, Patient/family training, Equipment eval/education, Bed mobility, Gait training, Compensatory technique education, Cognitive reorientation          See flowsheet documentation for full assessment, interventions and recommendations.  5/11/2024 1405 by Annie Bueno PT  Note: Prognosis: Good  Problem List: Decreased strength, Decreased endurance, Impaired balance, Decreased mobility  Assessment: Camryn Roblero is a 83 y.o. Female who presents to [facility] on 5/9/24 due to abnormal lab value and fatigue, w/ diagnosis of hyponatremia. Orders for PT eval and treat received, w/ activity orders of up and OOB as tolerated. Comorbidities affecting pt's functional mobility at time of evaluation include: HTN, DM, CHF, impaired memory, COPD, chronic pain of both knees. Personal factors affecting DC include: inaccessible home environment, ambulating w/ assistive device, stairs to enter home, inability to navigate level surfaces w/o external assistance, limited home support, and inability to perform IADLs. At baseline, pt mobilizes mod I w/ rollator walker, and w/ 0 fall(s) in the previous 6 months. Upon evaluation, pt presents w/ the following deficits: impaired strength, impaired balance, decreased endurance/activity tolerance, and gait deviations. Pt currently requires  supervision for bed mobility, supervision-min Ax1 for transfers, min Ax1 progressing to supervision w/ RW for ambulation. Pt's clinical presentation is unstable/unpredictable due to abnormal lab values, need for increased assistance w/ functional mobility compared to baseline, need for input for mobility technique, ongoing medical management. From a PT/mobility  standpoint given the above findings, DC recommendation is level: II (Moderate Rehab Resource Intensity), however, pt may progress to level III pending progress w/ functional mobility and stair negotiation. During current admission, pt will benefit from continued skilled inpatient PT in the acute care setting in order to address the above deficits and to maximize function and mobility prior to DC from acute care.  Barriers to Discharge: Inaccessible home environment, Decreased caregiver support (full flight of FORREST, pt lives alone)     Rehab Resource Intensity Level, PT: II (Moderate Resource Intensity) (however, pt may progress to level III pending progress w/ functional mobility and stair negotiation)    See flowsheet documentation for full assessment.

## 2024-05-11 NOTE — PROGRESS NOTES
NEPHROLOGY PROGRESS NOTE   Camryn Roblero 83 y.o. female MRN: [id number]  Unit/Bed#: -01 Encounter: [id number]  Reason for Consult: Hyponatremia      SUMMARY:    83-year-old female with a history of chronic hyponatremia, diabetes mellitus type 2, diastolic heart failure, neuroendocrine tumor, COPD, obstructive sleep apnea who presented for abnormal outpatient blood work showing low serum sodium level.     ASSESSMENT and PLAN:    Hyponatremia  -- Acute on chronic  -- Admission sodium 120 at 7:30 PM on May 9th  -- Baseline sodium usually in the low 130s  -- Maintained on a loop diuretic and fluid restriction as an outpatient  --Received a course of intravenous fluids now off.  --No evidence of overcorrection  --Can discontinue serial BMP.  Will check a repeat sodium later this evening  -- Currently holding diuretics including torsemide and spironolactone  -- Continue gentle fluid restriction.  -- Urine studies which were done post diuretic reviewed with resident.  Serum osmolality consistent with hypoosmolar  --Sodium level slowly improving to 125  --Will give salt tablet 2 g x 1 dose     Hyperkalemia--resolved  -- Holding spironolactone and ARB at this time     Chronic kidney disease stage II  -- Baseline creatinine less than 1 mg/dL  -- Presumably secondary to diabetic kidney disease, hypertensive nephrosclerosis, cardiorenal syndrome, age-related nephron loss  --Renal function remained stable  -- Outpatient nephrologist Dr. Perkins    Hypertension  -- Euvolemic and blood pressure controlled      SUBJECTIVE / INTERVAL HISTORY:    Tolerating oral intake better.  Eating a lot now    OBJECTIVE:  Current Weight: Weight - Scale: 75 kg (165 lb 5.5 oz)  Vitals:    05/10/24 1503 05/10/24 1900 05/10/24 2245 05/11/24 0700   BP: 122/75 135/62 161/71 142/70   BP Location: Right arm Right arm Left arm Left arm   Pulse: 67 62 67 72   Resp: 17 18 18 17   Temp: 98 °F (36.7 °C) 98.6 °F (37 °C) 98.5 °F (36.9 °C) 98.9 °F (37.2 °C)    TempSrc: Oral Oral Oral Oral   SpO2: 94% 92% 95% 96%   Weight:         No intake or output data in the 24 hours ending 05/11/24 0958    Review of Systems:    Constitutional: Negative for chills and fever.   HENT: Negative for ear pain and sore throat.    Eyes: Negative for pain and visual disturbance.   Respiratory: Negative for cough and shortness of breath.    Cardiovascular: Negative for chest pain and palpitations.   Gastrointestinal: Negative for abdominal pain and vomiting.   Genitourinary: Negative for dysuria and hematuria.   Musculoskeletal: Negative for arthralgias and back pain.   Skin: Negative for color change and rash.   Neurological: Negative for seizures and syncope.   12 point ROS has been reviewed.    Physical Exam  Vitals and nursing note reviewed.   Constitutional:       General: She is not in acute distress.     Appearance: She is well-developed.   HENT:      Head: Normocephalic and atraumatic.   Eyes:      General: No scleral icterus.     Conjunctiva/sclera: Conjunctivae normal.      Pupils: Pupils are equal, round, and reactive to light.   Cardiovascular:      Rate and Rhythm: Normal rate and regular rhythm.      Heart sounds: S1 normal and S2 normal. No murmur heard.     No friction rub. No gallop.   Pulmonary:      Effort: Pulmonary effort is normal. No respiratory distress.      Breath sounds: Normal breath sounds. No wheezing or rales.   Abdominal:      General: Bowel sounds are normal.      Palpations: Abdomen is soft.      Tenderness: There is no abdominal tenderness. There is no rebound.   Musculoskeletal:         General: Normal range of motion.      Cervical back: Normal range of motion and neck supple.   Skin:     Findings: No rash.   Neurological:      Mental Status: She is alert and oriented to person, place, and time.   Psychiatric:         Behavior: Behavior normal.         Medications:    Current Facility-Administered Medications:     acetaminophen (TYLENOL) tablet 650 mg, 650  mg, Oral, Q6H PRN, Bia Moreno DO    atorvastatin (LIPITOR) tablet 40 mg, 40 mg, Oral, Daily, Bia Moreno DO, 40 mg at 05/10/24 0918    diltiazem (CARDIZEM CD) 24 hr capsule 120 mg, 120 mg, Oral, Daily, Bia Moreno DO, 120 mg at 05/10/24 0918    donepezil (ARICEPT) tablet 5 mg, 5 mg, Oral, HS, Osvaldo Corcoran MD, 5 mg at 05/10/24 2131    insulin lispro (HumALOG/ADMELOG) 100 units/mL subcutaneous injection 1-6 Units, 1-6 Units, Subcutaneous, TID AC, 2 Units at 05/10/24 1714 **AND** Fingerstick Glucose (POCT), , , TID AC, Bia Moreno DO    latanoprost (XALATAN) 0.005 % ophthalmic solution 1 drop, 1 drop, Both Eyes, HS, Bia Moreno DO, 1 drop at 05/10/24 2132    loratadine (CLARITIN) tablet 10 mg, 10 mg, Oral, Daily, Bia Moreno DO, 10 mg at 05/10/24 0918    losartan (COZAAR) tablet 100 mg, 100 mg, Oral, Daily, Bia Moreno DO, 100 mg at 05/10/24 0918    metoprolol succinate (TOPROL-XL) 24 hr tablet 50 mg, 50 mg, Oral, BID, Bia Moreno DO, 50 mg at 05/10/24 0918    rivaroxaban (XARELTO) tablet 15 mg, 15 mg, Oral, Daily With Breakfast, Bia Moreno DO, 15 mg at 05/10/24 0918    sodium chloride tablet 2 g, 2 g, Oral, Once, Erna Johnson MD    Laboratory Results:  Results from last 7 days   Lab Units 05/11/24  0520 05/11/24  0000 05/10/24  2006 05/10/24  1600 05/10/24  1136 05/10/24  0832 05/10/24  0546 05/10/24  0240 05/09/24  1929 05/09/24  1000   WBC Thousand/uL 8.37  --   --   --   --   --  7.74  --  8.01 8.11   HEMOGLOBIN g/dL 12.2  --   --   --   --   --  12.6  --  13.1 13.8   HEMATOCRIT % 37.7  --   --   --   --   --  39.7  --  39.0 42.9   PLATELETS Thousands/uL 329  --   --   --   --   --  345  --  382 390   POTASSIUM mmol/L 5.1 5.3 5.5* 5.6* 4.9 4.8 5.1   < > 5.4* 4.9   CHLORIDE mmol/L 96 92* 91* 90* 90* 92* 93*   < > 89* 88*   CO2 mmol/L 25 27 27 27 26 24 25   < > 26 26   BUN mg/dL 30* 31* 29* 26* 22 20 21   < > 26* 26*   CREATININE mg/dL 0.86 1.00 0.96 0.97  0.90 0.81 0.89   < > 0.87 0.92   CALCIUM mg/dL 8.6 9.0 9.2 9.4 8.9 9.0 9.0   < > 9.4 9.6   MAGNESIUM mg/dL  --   --   --   --   --   --   --   --   --  1.7*   PHOSPHORUS mg/dL  --   --   --   --   --   --   --   --   --  3.6    < > = values in this interval not displayed.       PLEASE NOTE:  This encounter was completed utilizing the M- Modal/Fluency Direct Speech Voice Recognition Software. Grammatical errors, random word insertions, pronoun errors and incomplete sentences are occasional consequences of the system due to software limitations, ambient noise and hardware issues.These may be missed by proof reading prior to affixing electronic signature. Any questions or concerns about the content, text or information contained within the body of this dictation should be directly addressed to the physician for clarification. Please do not hesitate to call me directly if you have any any questions or concerns.

## 2024-05-12 DIAGNOSIS — J43.9 PULMONARY EMPHYSEMA, UNSPECIFIED EMPHYSEMA TYPE (HCC): Primary | ICD-10-CM

## 2024-05-12 LAB
ANION GAP SERPL CALCULATED.3IONS-SCNC: 5 MMOL/L (ref 4–13)
BUN SERPL-MCNC: 27 MG/DL (ref 5–25)
CALCIUM SERPL-MCNC: 8.9 MG/DL (ref 8.4–10.2)
CHLORIDE SERPL-SCNC: 97 MMOL/L (ref 96–108)
CO2 SERPL-SCNC: 23 MMOL/L (ref 21–32)
CREAT SERPL-MCNC: 0.86 MG/DL (ref 0.6–1.3)
ERYTHROCYTE [DISTWIDTH] IN BLOOD BY AUTOMATED COUNT: 14.4 % (ref 11.6–15.1)
GFR SERPL CREATININE-BSD FRML MDRD: 63 ML/MIN/1.73SQ M
GLUCOSE SERPL-MCNC: 142 MG/DL (ref 65–140)
GLUCOSE SERPL-MCNC: 157 MG/DL (ref 65–140)
GLUCOSE SERPL-MCNC: 199 MG/DL (ref 65–140)
GLUCOSE SERPL-MCNC: 267 MG/DL (ref 65–140)
GLUCOSE SERPL-MCNC: 311 MG/DL (ref 65–140)
HCT VFR BLD AUTO: 38.7 % (ref 34.8–46.1)
HGB BLD-MCNC: 12.6 G/DL (ref 11.5–15.4)
MCH RBC QN AUTO: 27.7 PG (ref 26.8–34.3)
MCHC RBC AUTO-ENTMCNC: 32.6 G/DL (ref 31.4–37.4)
MCV RBC AUTO: 85 FL (ref 82–98)
PLATELET # BLD AUTO: 347 THOUSANDS/UL (ref 149–390)
PMV BLD AUTO: 9.6 FL (ref 8.9–12.7)
POTASSIUM SERPL-SCNC: 5 MMOL/L (ref 3.5–5.3)
RBC # BLD AUTO: 4.55 MILLION/UL (ref 3.81–5.12)
SODIUM SERPL-SCNC: 125 MMOL/L (ref 135–147)
SODIUM SERPL-SCNC: 125 MMOL/L (ref 135–147)
WBC # BLD AUTO: 7.78 THOUSAND/UL (ref 4.31–10.16)

## 2024-05-12 PROCEDURE — 99232 SBSQ HOSP IP/OBS MODERATE 35: CPT | Performed by: INTERNAL MEDICINE

## 2024-05-12 PROCEDURE — 85027 COMPLETE CBC AUTOMATED: CPT

## 2024-05-12 PROCEDURE — 99232 SBSQ HOSP IP/OBS MODERATE 35: CPT | Performed by: HOSPITALIST

## 2024-05-12 PROCEDURE — 84295 ASSAY OF SERUM SODIUM: CPT | Performed by: INTERNAL MEDICINE

## 2024-05-12 PROCEDURE — 80048 BASIC METABOLIC PNL TOTAL CA: CPT | Performed by: INTERNAL MEDICINE

## 2024-05-12 PROCEDURE — 82948 REAGENT STRIP/BLOOD GLUCOSE: CPT

## 2024-05-12 RX ORDER — SODIUM CHLORIDE 1 G/1
1 TABLET ORAL
Status: DISCONTINUED | OUTPATIENT
Start: 2024-05-12 | End: 2024-05-13

## 2024-05-12 RX ORDER — AMOXICILLIN 250 MG
1 CAPSULE ORAL ONCE
Status: DISCONTINUED | OUTPATIENT
Start: 2024-05-12 | End: 2024-05-12

## 2024-05-12 RX ORDER — AMOXICILLIN 250 MG
1 CAPSULE ORAL 2 TIMES DAILY
Status: DISCONTINUED | OUTPATIENT
Start: 2024-05-12 | End: 2024-05-17 | Stop reason: HOSPADM

## 2024-05-12 RX ADMIN — SODIUM CHLORIDE 1 G: 1 TABLET ORAL at 13:13

## 2024-05-12 RX ADMIN — INSULIN LISPRO 5 UNITS: 100 INJECTION, SOLUTION INTRAVENOUS; SUBCUTANEOUS at 13:13

## 2024-05-12 RX ADMIN — RIVAROXABAN 15 MG: 15 TABLET, FILM COATED ORAL at 09:46

## 2024-05-12 RX ADMIN — INSULIN LISPRO 1 UNITS: 100 INJECTION, SOLUTION INTRAVENOUS; SUBCUTANEOUS at 09:46

## 2024-05-12 RX ADMIN — SENNOSIDES AND DOCUSATE SODIUM 1 TABLET: 8.6; 5 TABLET ORAL at 09:45

## 2024-05-12 RX ADMIN — SODIUM CHLORIDE 1 G: 1 TABLET ORAL at 18:34

## 2024-05-12 RX ADMIN — DONEPEZIL HYDROCHLORIDE 5 MG: 5 TABLET ORAL at 21:36

## 2024-05-12 RX ADMIN — LATANOPROST 1 DROP: 50 SOLUTION OPHTHALMIC at 21:36

## 2024-05-12 RX ADMIN — METOPROLOL SUCCINATE 50 MG: 50 TABLET, EXTENDED RELEASE ORAL at 18:33

## 2024-05-12 RX ADMIN — ATORVASTATIN CALCIUM 40 MG: 40 TABLET, FILM COATED ORAL at 09:46

## 2024-05-12 RX ADMIN — LORATADINE 10 MG: 10 TABLET ORAL at 09:46

## 2024-05-12 RX ADMIN — METOPROLOL SUCCINATE 50 MG: 50 TABLET, EXTENDED RELEASE ORAL at 09:45

## 2024-05-12 RX ADMIN — INSULIN LISPRO 2 UNITS: 100 INJECTION, SOLUTION INTRAVENOUS; SUBCUTANEOUS at 18:34

## 2024-05-12 RX ADMIN — DILTIAZEM HYDROCHLORIDE 120 MG: 120 CAPSULE, COATED, EXTENDED RELEASE ORAL at 09:46

## 2024-05-12 RX ADMIN — SODIUM CHLORIDE 1 G: 1 TABLET ORAL at 09:45

## 2024-05-12 RX ADMIN — LOSARTAN POTASSIUM 100 MG: 50 TABLET, FILM COATED ORAL at 09:45

## 2024-05-12 RX ADMIN — SENNOSIDES AND DOCUSATE SODIUM 1 TABLET: 8.6; 5 TABLET ORAL at 18:34

## 2024-05-12 NOTE — PROGRESS NOTES
NEPHROLOGY PROGRESS NOTE   Camyrn Roblero 83 y.o. female MRN: [id number]  Unit/Bed#: S -01 Encounter: [id number]  Reason for Consult: Hyponatremia      SUMMARY:    83-year-old female with a history of chronic hyponatremia, diabetes mellitus type 2, diastolic heart failure, neuroendocrine tumor, COPD, obstructive sleep apnea who presented for abnormal outpatient blood work showing low serum sodium level.      ASSESSMENT and PLAN:     Hyponatremia  -- Acute on chronic  -- Admission sodium 120 at 7:30 PM on May 9th  -- Baseline sodium usually in the low 130s  -- Maintained on a loop diuretic and fluid restriction as an outpatient  --Off intravenous fluids  --No evidence of overcorrection  --Sodium level stable today at 125  -- Currently holding diuretics including torsemide and spironolactone  --Reduce fluid restriction to 1.5 L/day  -- Urine studies which were done post diuretic reviewed with resident.  Serum osmolality consistent with hypoosmolar  -- Sodium chloride tablet 1 g 3 times daily.  Recheck sodium in the evening if still low we may need to give a dose of a loop diuretic     Hyperkalemia--resolved  -- Continue holding spironolactone and ARB at this time     Chronic kidney disease stage II  -- Baseline creatinine less than 1 mg/dL  -- Presumably secondary to diabetic kidney disease, hypertensive nephrosclerosis, cardiorenal syndrome, age-related nephron loss  --Renal function remained stable  -- Outpatient nephrologist Dr. Perkins     Hypertension  -- Blood pressures currently stable holding off on the current medication      SUBJECTIVE / INTERVAL HISTORY:    Patient is eating well drinking well.  No complaints    OBJECTIVE:  Current Weight: Weight - Scale: 74.3 kg (163 lb 12.8 oz)  Vitals:    05/10/24 2245 05/11/24 0700 05/11/24 2005 05/12/24 0445   BP: 161/71 142/70 134/61    BP Location: Left arm Left arm Left arm    Pulse: 67 72 64    Resp: 18 17 18    Temp: 98.5 °F (36.9 °C) 98.9 °F (37.2 °C) 97.5 °F  (36.4 °C)    TempSrc: Oral Oral Oral    SpO2: 95% 96% 91%    Weight:    74.3 kg (163 lb 12.8 oz)       Intake/Output Summary (Last 24 hours) at 5/12/2024 0810  Last data filed at 5/11/2024 2301  Gross per 24 hour   Intake 840 ml   Output --   Net 840 ml       Review of Systems:    Constitutional: Negative for chills and fever.   HENT: Negative for ear pain and sore throat.    Eyes: Negative for pain and visual disturbance.   Respiratory: Negative for cough and shortness of breath.    Cardiovascular: Negative for chest pain and palpitations.   Gastrointestinal: Negative for abdominal pain and vomiting.   Genitourinary: Negative for dysuria and hematuria.   Musculoskeletal: Negative for arthralgias and back pain.   Skin: Negative for color change and rash.   Neurological: Negative for seizures and syncope.   12 point ROS has been reviewed.    Physical Exam  Vitals and nursing note reviewed.   Constitutional:       General: She is not in acute distress.     Appearance: She is well-developed.   HENT:      Head: Normocephalic and atraumatic.   Eyes:      General: No scleral icterus.     Conjunctiva/sclera: Conjunctivae normal.      Pupils: Pupils are equal, round, and reactive to light.   Cardiovascular:      Rate and Rhythm: Normal rate and regular rhythm.      Heart sounds: S1 normal and S2 normal. No murmur heard.     No friction rub. No gallop.   Pulmonary:      Effort: Pulmonary effort is normal. No respiratory distress.      Breath sounds: Normal breath sounds. No wheezing or rales.   Abdominal:      General: Bowel sounds are normal.      Palpations: Abdomen is soft.      Tenderness: There is no abdominal tenderness. There is no rebound.   Musculoskeletal:         General: Normal range of motion.      Cervical back: Normal range of motion and neck supple.   Skin:     Findings: No rash.   Neurological:      Mental Status: She is alert and oriented to person, place, and time.   Psychiatric:         Behavior: Behavior  normal.         Medications:    Current Facility-Administered Medications:     acetaminophen (TYLENOL) tablet 650 mg, 650 mg, Oral, Q6H PRN, Bia Moreno DO    atorvastatin (LIPITOR) tablet 40 mg, 40 mg, Oral, Daily, Bia Moreno DO, 40 mg at 05/11/24 1055    diltiazem (CARDIZEM CD) 24 hr capsule 120 mg, 120 mg, Oral, Daily, Bia Moreno DO, 120 mg at 05/11/24 1055    donepezil (ARICEPT) tablet 5 mg, 5 mg, Oral, HS, Osvaldo Corcoran MD, 5 mg at 05/11/24 2047    insulin lispro (HumALOG/ADMELOG) 100 units/mL subcutaneous injection 1-6 Units, 1-6 Units, Subcutaneous, TID AC, 3 Units at 05/11/24 1304 **AND** Fingerstick Glucose (POCT), , , TID AC, Bia Moreno DO    latanoprost (XALATAN) 0.005 % ophthalmic solution 1 drop, 1 drop, Both Eyes, HS, Bia Moreno DO, 1 drop at 05/11/24 2222    loratadine (CLARITIN) tablet 10 mg, 10 mg, Oral, Daily, Bia Moreno DO, 10 mg at 05/11/24 1056    losartan (COZAAR) tablet 100 mg, 100 mg, Oral, Daily, Bia Moreno DO, 100 mg at 05/11/24 1056    metoprolol succinate (TOPROL-XL) 24 hr tablet 50 mg, 50 mg, Oral, BID, Bia Moreno DO, 50 mg at 05/11/24 1725    rivaroxaban (XARELTO) tablet 15 mg, 15 mg, Oral, Daily With Breakfast, Bia Moreno DO, 15 mg at 05/11/24 1143    senna-docusate sodium (SENOKOT S) 8.6-50 mg per tablet 1 tablet, 1 tablet, Oral, BID, Jeison Chairez MD    sodium chloride tablet 1 g, 1 g, Oral, TID With Meals, Erna Johnson MD    Laboratory Results:  Results from last 7 days   Lab Units 05/12/24  0452 05/11/24  0520 05/11/24  0000 05/10/24  2006 05/10/24  1600 05/10/24  1136 05/10/24  0832 05/10/24  0546 05/10/24  0240 05/09/24  1929 05/09/24  1000   WBC Thousand/uL 7.78 8.37  --   --   --   --   --  7.74  --  8.01 8.11   HEMOGLOBIN g/dL 12.6 12.2  --   --   --   --   --  12.6  --  13.1 13.8   HEMATOCRIT % 38.7 37.7  --   --   --   --   --  39.7  --  39.0 42.9   PLATELETS Thousands/uL 347 329  --   --   --   --   --   345  --  382 390   POTASSIUM mmol/L 5.0 5.1 5.3 5.5* 5.6* 4.9 4.8 5.1   < > 5.4* 4.9   CHLORIDE mmol/L 97 96 92* 91* 90* 90* 92* 93*   < > 89* 88*   CO2 mmol/L 23 25 27 27 27 26 24 25   < > 26 26   BUN mg/dL 27* 30* 31* 29* 26* 22 20 21   < > 26* 26*   CREATININE mg/dL 0.86 0.86 1.00 0.96 0.97 0.90 0.81 0.89   < > 0.87 0.92   CALCIUM mg/dL 8.9 8.6 9.0 9.2 9.4 8.9 9.0 9.0   < > 9.4 9.6   MAGNESIUM mg/dL  --   --   --   --   --   --   --   --   --   --  1.7*   PHOSPHORUS mg/dL  --   --   --   --   --   --   --   --   --   --  3.6    < > = values in this interval not displayed.       PLEASE NOTE:  This encounter was completed utilizing the M- Modal/Fluency Direct Speech Voice Recognition Software. Grammatical errors, random word insertions, pronoun errors and incomplete sentences are occasional consequences of the system due to software limitations, ambient noise and hardware issues.These may be missed by proof reading prior to affixing electronic signature. Any questions or concerns about the content, text or information contained within the body of this dictation should be directly addressed to the physician for clarification. Please do not hesitate to call me directly if you have any any questions or concerns.

## 2024-05-12 NOTE — PROGRESS NOTES
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  Does not appear hypervolemic on exam  Patient received 2 g salt tablets in the ED  Suspect likely in the setting of poor PO intake    Plan:   Nephrology on board-recommendations appreciated   Continue fluid restriction to 1500  Transition to sodium chloride tablets 1 g 3 times daily  Continue to monitor Na levels in the evening -if they remain low, will give patient loop diuretic    Chronic diastolic CHF (congestive heart failure) (HCC)  Assessment & Plan  Wt Readings from Last 3 Encounters:   24 75 kg (165 lb 5.5 oz)   24 74.8 kg (165 lb)   24 78.2 kg (172 lb 6.4 oz)   Reportedly was supposed to be on Aldactone and Torsemide.  50 pound weight loss in the last year unintentionally, patient does not seem volume overloaded on exam  At this time does not examine volume overloaded    Plan:  Continue holding spironolactone and torsemide  Continue Toprol-XL 50 mg twice daily  Continue monitoring volume status.          Impaired memory  Assessment & Plan  Continue donepezil     Atrial flutter with rapid ventricular response (HCC)  Assessment & Plan  - Continue prior to admission Cardizem  mg and Toprol 50 mg twice daily  - Anticoagulation on Xarelto 15 mg daily    Mixed hyperlipidemia  Assessment & Plan  Continue Lipitor 40 mg     Diabetes mellitus without complication (HCC)  Assessment & Plan  Lab Results   Component Value Date    HGBA1C 7.4 (A) 2024       Recent Labs     05/10/24  1051  05/10/24  1558 05/10/24  2053 05/11/24  0745   POCGLU 291* 196* 204* 156*         Blood Sugar Average: Last 72 hrs:  (P) 189.6PTA on glipizide and metformin  Plan:  Hold PTA glipizide and metformin  Continue sliding scale insulin  Continue Accu-Cheks  Hypoglycemia protocol      Essential hypertension  Assessment & Plan  Blood Pressure: 134/61   Patient on 320 mg of valsartan daily; switch to to 100 mg losartan per formulary  Continue monitoring blood pressure             VTE Pharmacologic Prophylaxis:   Moderate Risk (Score 3-4) - Pharmacological DVT Prophylaxis Ordered: rivaroxaban (Xarelto).    Mobility:   Basic Mobility Inpatient Raw Score: 16  JH-HLM Goal: 5: Stand one or more mins  JH-HLM Achieved: 7: Walk 25 feet or more  JH-HLM Goal achieved. Continue to encourage appropriate mobility.    Patient Centered Rounds: I performed bedside rounds with nursing staff today.  Discussions with Specialists or Other Care Team Provider: Nephrology    Education and Discussions with Family / Patient: Updated  (daughter) via phone.    Current Length of Stay: 3 day(s)  Current Patient Status: Inpatient   Discharge Plan: Anticipate discharge in 24-48 hrs to home.    Code Status: Level 1 - Full Code    Subjective:   Patient seen and examined at the bedside, not in acute distress at the time of my evaluation.  She reports feeling fine.  Has not had bowel movement since admit.  Where she had no complaints.  Denied dizziness, lightheadedness, chest pain, shortness of breath, changes in urinary habits or lower extremity edema.    Objective:     Vitals:   Temp (24hrs), Av.5 °F (36.4 °C), Min:97.5 °F (36.4 °C), Max:97.5 °F (36.4 °C)    Temp:  [97.5 °F (36.4 °C)] 97.5 °F (36.4 °C)  HR:  [64] 64  Resp:  [18] 18  BP: (134)/(61) 134/61  SpO2:  [91 %] 91 %  Body mass index is 33.08 kg/m².     Input and Output Summary (last 24 hours):     Intake/Output Summary (Last 24 hours) at 2024 1115  Last data filed at 2024  2301  Gross per 24 hour   Intake 600 ml   Output --   Net 600 ml       Physical Exam:   Physical Exam  Vitals and nursing note reviewed.   Constitutional:       General: She is not in acute distress.     Appearance: Normal appearance. She is not ill-appearing, toxic-appearing or diaphoretic.   HENT:      Head: Normocephalic and atraumatic.      Nose: Nose normal.      Mouth/Throat:      Mouth: Mucous membranes are moist.      Pharynx: Oropharynx is clear.   Eyes:      General: No scleral icterus.        Right eye: No discharge.         Left eye: No discharge.      Extraocular Movements: Extraocular movements intact.      Conjunctiva/sclera: Conjunctivae normal.   Cardiovascular:      Rate and Rhythm: Normal rate and regular rhythm.      Pulses: Normal pulses.      Heart sounds: Normal heart sounds. No murmur heard.  Pulmonary:      Effort: Pulmonary effort is normal. No respiratory distress.      Breath sounds: Normal breath sounds. No wheezing, rhonchi or rales.   Abdominal:      General: Abdomen is flat. Bowel sounds are normal. There is no distension.      Palpations: Abdomen is soft.      Tenderness: There is no abdominal tenderness. There is no guarding or rebound.      Comments: Abdomen hyperresonant to percussion   Musculoskeletal:      Cervical back: Normal range of motion and neck supple.      Right lower leg: No edema.      Left lower leg: No edema.   Skin:     General: Skin is warm and dry.      Coloration: Skin is not jaundiced or pale.   Neurological:      Mental Status: She is alert and oriented to person, place, and time. Mental status is at baseline.   Psychiatric:         Mood and Affect: Mood normal.         Behavior: Behavior normal.         Thought Content: Thought content normal.         Judgment: Judgment normal.          Additional Data:     Labs:  Results from last 7 days   Lab Units 05/12/24  0452 05/11/24  0520 05/10/24  0546   WBC Thousand/uL 7.78   < > 7.74   HEMOGLOBIN g/dL 12.6   < >  12.6   HEMATOCRIT % 38.7   < > 39.7   PLATELETS Thousands/uL 347   < > 345   SEGS PCT %  --   --  69   LYMPHO PCT %  --   --  13*   MONO PCT %  --   --  14*   EOS PCT %  --   --  2    < > = values in this interval not displayed.     Results from last 7 days   Lab Units 05/12/24  0452 05/10/24  0240 05/09/24  1929   SODIUM mmol/L 125*   < > 120*   POTASSIUM mmol/L 5.0   < > 5.4*   CHLORIDE mmol/L 97   < > 89*   CO2 mmol/L 23   < > 26   BUN mg/dL 27*   < > 26*   CREATININE mg/dL 0.86   < > 0.87   ANION GAP mmol/L 5   < > 5   CALCIUM mg/dL 8.9   < > 9.4   ALBUMIN g/dL  --   --  3.6   TOTAL BILIRUBIN mg/dL  --   --  0.84   ALK PHOS U/L  --   --  116*   ALT U/L  --   --  32   AST U/L  --   --  65*   GLUCOSE RANDOM mg/dL 142*   < > 99    < > = values in this interval not displayed.         Results from last 7 days   Lab Units 05/12/24  1105 05/12/24  0750 05/11/24  2027 05/11/24  1636 05/11/24  1110 05/11/24  0745 05/10/24  2053 05/10/24  1558 05/10/24  1051 05/10/24  0817   POC GLUCOSE mg/dl 311* 157* 254* 119 267* 156* 204* 196* 291* 101               Lines/Drains:  Invasive Devices       Peripheral Intravenous Line  Duration             Peripheral IV 05/09/24 Left Antecubital 2 days                          Imaging: No pertinent imaging reviewed.    Recent Cultures (last 7 days):   Results from last 7 days   Lab Units 05/09/24  1930   URINE CULTURE  40,000-49,000 cfu/ml       Last 24 Hours Medication List:   Current Facility-Administered Medications   Medication Dose Route Frequency Provider Last Rate    acetaminophen  650 mg Oral Q6H PRN Bia Moreno DO      atorvastatin  40 mg Oral Daily Bia Moreno DO      diltiazem  120 mg Oral Daily Bia Moreno DO      donepezil  5 mg Oral HS Osvaldo Corcoran MD      insulin lispro  1-6 Units Subcutaneous TID AC Bia Moreno DO      latanoprost  1 drop Both Eyes HS Bia Moreno DO      loratadine  10 mg Oral Daily Bia Moreno DO      losartan  100  mg Oral Daily Bia Moreno DO      metoprolol succinate  50 mg Oral BID Bia Moreno DO      rivaroxaban  15 mg Oral Daily With Breakfast Bia Moreno DO      senna-docusate sodium  1 tablet Oral BID Jeison Chairez MD      sodium chloride  1 g Oral TID With Meals Erna Johnson MD          Today, Patient Was Seen By: Jeison Chairez MD    **Please Note: This note may have been constructed using a voice recognition system.**

## 2024-05-13 PROBLEM — E87.5 HYPERKALEMIA: Status: ACTIVE | Noted: 2024-05-13

## 2024-05-13 PROBLEM — N18.2 CKD (CHRONIC KIDNEY DISEASE) STAGE 2, GFR 60-89 ML/MIN: Status: ACTIVE | Noted: 2024-05-13

## 2024-05-13 PROBLEM — I50.30 HEART FAILURE WITH PRESERVED EJECTION FRACTION (HCC): Status: ACTIVE | Noted: 2020-07-21

## 2024-05-13 PROBLEM — R93.89 ABNORMAL CHEST X-RAY: Status: ACTIVE | Noted: 2024-05-13

## 2024-05-13 PROBLEM — E78.5 HYPERLIPIDEMIA: Status: ACTIVE | Noted: 2023-10-21

## 2024-05-13 LAB
ANION GAP SERPL CALCULATED.3IONS-SCNC: 6 MMOL/L (ref 4–13)
BASOPHILS # BLD AUTO: 0.11 THOUSANDS/ÂΜL (ref 0–0.1)
BASOPHILS NFR BLD AUTO: 1 % (ref 0–1)
BUN SERPL-MCNC: 28 MG/DL (ref 5–25)
CALCIUM SERPL-MCNC: 9.1 MG/DL (ref 8.4–10.2)
CHLORIDE SERPL-SCNC: 96 MMOL/L (ref 96–108)
CO2 SERPL-SCNC: 24 MMOL/L (ref 21–32)
CREAT SERPL-MCNC: 0.82 MG/DL (ref 0.6–1.3)
EOSINOPHIL # BLD AUTO: 0.48 THOUSAND/ÂΜL (ref 0–0.61)
EOSINOPHIL NFR BLD AUTO: 6 % (ref 0–6)
ERYTHROCYTE [DISTWIDTH] IN BLOOD BY AUTOMATED COUNT: 14.3 % (ref 11.6–15.1)
GFR SERPL CREATININE-BSD FRML MDRD: 66 ML/MIN/1.73SQ M
GLUCOSE SERPL-MCNC: 154 MG/DL (ref 65–140)
GLUCOSE SERPL-MCNC: 159 MG/DL (ref 65–140)
GLUCOSE SERPL-MCNC: 166 MG/DL (ref 65–140)
GLUCOSE SERPL-MCNC: 228 MG/DL (ref 65–140)
GLUCOSE SERPL-MCNC: 241 MG/DL (ref 65–140)
GLUCOSE SERPL-MCNC: 416 MG/DL (ref 65–140)
GLUCOSE SERPL-MCNC: 472 MG/DL (ref 65–140)
HCT VFR BLD AUTO: 40.3 % (ref 34.8–46.1)
HGB BLD-MCNC: 12.9 G/DL (ref 11.5–15.4)
IMM GRANULOCYTES # BLD AUTO: 0.04 THOUSAND/UL (ref 0–0.2)
IMM GRANULOCYTES NFR BLD AUTO: 1 % (ref 0–2)
LYMPHOCYTES # BLD AUTO: 1.43 THOUSANDS/ÂΜL (ref 0.6–4.47)
LYMPHOCYTES NFR BLD AUTO: 17 % (ref 14–44)
MAGNESIUM SERPL-MCNC: 1.7 MG/DL (ref 1.9–2.7)
MCH RBC QN AUTO: 27.4 PG (ref 26.8–34.3)
MCHC RBC AUTO-ENTMCNC: 32 G/DL (ref 31.4–37.4)
MCV RBC AUTO: 86 FL (ref 82–98)
MONOCYTES # BLD AUTO: 1.09 THOUSAND/ÂΜL (ref 0.17–1.22)
MONOCYTES NFR BLD AUTO: 13 % (ref 4–12)
NEUTROPHILS # BLD AUTO: 5.12 THOUSANDS/ÂΜL (ref 1.85–7.62)
NEUTS SEG NFR BLD AUTO: 62 % (ref 43–75)
NRBC BLD AUTO-RTO: 0 /100 WBCS
PLATELET # BLD AUTO: 353 THOUSANDS/UL (ref 149–390)
PMV BLD AUTO: 9.5 FL (ref 8.9–12.7)
POTASSIUM SERPL-SCNC: 5 MMOL/L (ref 3.5–5.3)
RBC # BLD AUTO: 4.71 MILLION/UL (ref 3.81–5.12)
SODIUM SERPL-SCNC: 126 MMOL/L (ref 135–147)
WBC # BLD AUTO: 8.27 THOUSAND/UL (ref 4.31–10.16)

## 2024-05-13 PROCEDURE — 82948 REAGENT STRIP/BLOOD GLUCOSE: CPT

## 2024-05-13 PROCEDURE — 94760 N-INVAS EAR/PLS OXIMETRY 1: CPT

## 2024-05-13 PROCEDURE — 85025 COMPLETE CBC W/AUTO DIFF WBC: CPT

## 2024-05-13 PROCEDURE — 94002 VENT MGMT INPAT INIT DAY: CPT

## 2024-05-13 PROCEDURE — 99232 SBSQ HOSP IP/OBS MODERATE 35: CPT | Performed by: INTERNAL MEDICINE

## 2024-05-13 PROCEDURE — 83735 ASSAY OF MAGNESIUM: CPT

## 2024-05-13 PROCEDURE — 80048 BASIC METABOLIC PNL TOTAL CA: CPT | Performed by: INTERNAL MEDICINE

## 2024-05-13 PROCEDURE — 99232 SBSQ HOSP IP/OBS MODERATE 35: CPT | Performed by: HOSPITALIST

## 2024-05-13 RX ORDER — SODIUM CHLORIDE 1 G/1
2 TABLET ORAL 2 TIMES DAILY WITH MEALS
Status: DISCONTINUED | OUTPATIENT
Start: 2024-05-13 | End: 2024-05-17 | Stop reason: HOSPADM

## 2024-05-13 RX ORDER — INSULIN LISPRO 100 [IU]/ML
1-6 INJECTION, SOLUTION INTRAVENOUS; SUBCUTANEOUS
Status: DISCONTINUED | OUTPATIENT
Start: 2024-05-13 | End: 2024-05-13

## 2024-05-13 RX ORDER — MAGNESIUM SULFATE HEPTAHYDRATE 40 MG/ML
2 INJECTION, SOLUTION INTRAVENOUS ONCE
Status: COMPLETED | OUTPATIENT
Start: 2024-05-13 | End: 2024-05-13

## 2024-05-13 RX ORDER — INSULIN LISPRO 100 [IU]/ML
1-6 INJECTION, SOLUTION INTRAVENOUS; SUBCUTANEOUS
Status: DISCONTINUED | OUTPATIENT
Start: 2024-05-13 | End: 2024-05-15

## 2024-05-13 RX ADMIN — SENNOSIDES AND DOCUSATE SODIUM 1 TABLET: 8.6; 5 TABLET ORAL at 08:10

## 2024-05-13 RX ADMIN — LOSARTAN POTASSIUM 100 MG: 50 TABLET, FILM COATED ORAL at 08:10

## 2024-05-13 RX ADMIN — INSULIN LISPRO 6 UNITS: 100 INJECTION, SOLUTION INTRAVENOUS; SUBCUTANEOUS at 11:38

## 2024-05-13 RX ADMIN — SODIUM CHLORIDE 2 G: 1 TABLET ORAL at 17:13

## 2024-05-13 RX ADMIN — LATANOPROST 1 DROP: 50 SOLUTION OPHTHALMIC at 22:16

## 2024-05-13 RX ADMIN — SENNOSIDES AND DOCUSATE SODIUM 1 TABLET: 8.6; 5 TABLET ORAL at 17:13

## 2024-05-13 RX ADMIN — METOPROLOL SUCCINATE 50 MG: 50 TABLET, EXTENDED RELEASE ORAL at 17:13

## 2024-05-13 RX ADMIN — INSULIN LISPRO 1 UNITS: 100 INJECTION, SOLUTION INTRAVENOUS; SUBCUTANEOUS at 08:05

## 2024-05-13 RX ADMIN — RIVAROXABAN 15 MG: 15 TABLET, FILM COATED ORAL at 08:10

## 2024-05-13 RX ADMIN — INSULIN LISPRO 1 UNITS: 100 INJECTION, SOLUTION INTRAVENOUS; SUBCUTANEOUS at 22:15

## 2024-05-13 RX ADMIN — LORATADINE 10 MG: 10 TABLET ORAL at 08:10

## 2024-05-13 RX ADMIN — METOPROLOL SUCCINATE 50 MG: 50 TABLET, EXTENDED RELEASE ORAL at 08:09

## 2024-05-13 RX ADMIN — DONEPEZIL HYDROCHLORIDE 5 MG: 5 TABLET ORAL at 22:15

## 2024-05-13 RX ADMIN — SODIUM CHLORIDE 1 G: 1 TABLET ORAL at 08:10

## 2024-05-13 RX ADMIN — ATORVASTATIN CALCIUM 40 MG: 40 TABLET, FILM COATED ORAL at 08:10

## 2024-05-13 RX ADMIN — DILTIAZEM HYDROCHLORIDE 120 MG: 120 CAPSULE, COATED, EXTENDED RELEASE ORAL at 08:10

## 2024-05-13 RX ADMIN — INSULIN LISPRO 2 UNITS: 100 INJECTION, SOLUTION INTRAVENOUS; SUBCUTANEOUS at 17:14

## 2024-05-13 RX ADMIN — MAGNESIUM SULFATE HEPTAHYDRATE 2 G: 40 INJECTION, SOLUTION INTRAVENOUS at 08:34

## 2024-05-13 NOTE — PROGRESS NOTES
[facility]  Progress Note  Name: Camryn Roblero I  MRN: [id number]  Unit/Bed#: S -01 I Date of Admission: 5/9/2024   Date of Service: 5/13/2024 I Hospital Day: 4    Assessment/Plan   * Hyponatremia  Assessment & Plan  - Had Na 120 on routine outpatient bloodwork. Sent in by nephrologist  - Has history of chronic hyponatremia  - Urine osmolality 286, urine sodium 33, serum osmolality 268.  Hypoosmolar, however, patient is on diuretics at home  - Endorses decreased food and water intake since death of  1 month ago  - Started on salt tablets 1 g TID by nephrology  - Improvement of Na to 126 today    Plan:   - Per nephrology, increase salt tablets to 2 g BID  - Continue fluid restriction of 1.5 L  - Check BMP in the morning  - Nephrology following, appreciate further recommendations    Diabetes mellitus without complication (HCC)  Assessment & Plan  Lab Results   Component Value Date    HGBA1C 7.4 (A) 04/26/2024       Recent Labs     05/12/24  2126 05/13/24  0739 05/13/24  1121 05/13/24  1124   POCGLU 267* 166* 416* 472*       - Home meds: Metformin 1000 mg BID, glipizide 5 mg    Plan:  - Hold home antihyperglycemic agents  - SSI  - Hypoglycemia protocol  - Recent values of 416 and 472 were after she ate a cupcake with icing that she brought from home. Will give 6   units SSI for now. Repeat fingerstick in 2 hours    Chronic diastolic CHF (congestive heart failure) (HCC)  Assessment & Plan  Wt Readings from Last 3 Encounters:   05/13/24 73.6 kg (162 lb 4.1 oz)   04/26/24 74.8 kg (165 lb)   04/25/24 78.2 kg (172 lb 6.4 oz)     - Home meds: Torsemide 10 mg 3 times/week, spironolactone 25 mg, Toprol 50 mg BID  - Per patient, 50 pound weight loss in the last year  - At this time does not examine volume overloaded    Plan:  - Continue holding spironolactone and torsemide. 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 Sequential Compression Devices Ordered.    Mobility:   Basic Mobility Inpatient Raw Score: 16  JH-HLM Goal: 5: Stand one or more mins  JH-HLM Achieved: 4: Move to chair/commode  JH-HLM Goal NOT achieved. Continue with multidisciplinary rounding and encourage appropriate mobility to improve upon JH-HLM goals.    Patient Centered Rounds: I performed bedside rounds with nursing staff today.  Discussions with Specialists or Other Care Team Provider: Nephrology    Education and Discussions with Family / Patient: Updated  (daughter) via phone.    Current Length of Stay: 4 day(s)  Current Patient Status: Inpatient   Discharge Plan: Anticipate discharge in 48-72 hrs to rehab facility.  However, per PT note, patient may progress to level 3 pending progress with functional mobility and stair association    Code Status: Level 1 - Full Code    Subjective:   No overnight events.  Patient was seen and examined at bedside.  She denies any complaints at this time.  She denies chest pain, difficulty breathing, headache,  unilateral weakness/tingling.  She is eating well.    Objective:     Vitals:   Temp (24hrs), Av.5 °F (36.4 °C), Min:97.5 °F (36.4 °C), Max:97.5 °F (36.4 °C)    Temp:  [97.5 °F (36.4 °C)] 97.5 °F (36.4 °C)  HR:  [61-72] 72  Resp:  [16] 16  BP: (105-150)/(63-80) 133/79  SpO2:  [91 %-93 %] 93 %  Body mass index is 32.77 kg/m².     Input and Output Summary (last 24 hours):     Intake/Output Summary (Last 24 hours) at 2024 1335  Last data filed at 2024 2200  Gross per 24 hour   Intake 480 ml   Output --   Net 480 ml       Physical Exam:   Physical Exam  Vitals and nursing note reviewed.   Constitutional:       General: She is not in acute distress.     Appearance: Normal appearance. She is not ill-appearing.   HENT:      Head: Normocephalic and atraumatic.      Mouth/Throat:      Mouth: Mucous membranes are moist.   Cardiovascular:      Rate and Rhythm: Normal rate and regular rhythm.      Heart sounds: No murmur heard.  Pulmonary:      Effort: Pulmonary effort is normal.      Breath sounds: No wheezing, rhonchi or rales.   Abdominal:      General: Bowel sounds are normal. There is distension (Mild).      Palpations: Abdomen is soft.      Tenderness: There is no abdominal tenderness. There is no guarding or rebound.   Musculoskeletal:         General: No swelling or tenderness.      Cervical back: Normal range of motion and neck supple.      Right lower leg: No edema.      Left lower leg: No edema.   Skin:     General: Skin is warm.      Capillary Refill: Capillary refill takes less than 2 seconds.   Neurological:      Mental Status: She is alert and oriented to person, place, and time. Mental status is at baseline.   Psychiatric:         Mood and Affect: Mood normal.         Behavior: Behavior normal.          Additional Data:     Labs:  Results from last 7 days   Lab Units 24  0535   WBC Thousand/uL 8.27   HEMOGLOBIN g/dL 12.9   HEMATOCRIT % 40.3   PLATELETS Thousands/uL 353   SEGS PCT % 62   LYMPHO  PCT % 17   MONO PCT % 13*   EOS PCT % 6     Results from last 7 days   Lab Units 05/13/24  0535 05/10/24  0240 05/09/24  1929   SODIUM mmol/L 126*   < > 120*   POTASSIUM mmol/L 5.0   < > 5.4*   CHLORIDE mmol/L 96   < > 89*   CO2 mmol/L 24   < > 26   BUN mg/dL 28*   < > 26*   CREATININE mg/dL 0.82   < > 0.87   ANION GAP mmol/L 6   < > 5   CALCIUM mg/dL 9.1   < > 9.4   ALBUMIN g/dL  --   --  3.6   TOTAL BILIRUBIN mg/dL  --   --  0.84   ALK PHOS U/L  --   --  116*   ALT U/L  --   --  32   AST U/L  --   --  65*   GLUCOSE RANDOM mg/dL 159*   < > 99    < > = values in this interval not displayed.         Results from last 7 days   Lab Units 05/13/24  1124 05/13/24  1121 05/13/24  0739 05/12/24  2126 05/12/24  1819 05/12/24  1105 05/12/24  0750 05/11/24  2027 05/11/24  1636 05/11/24  1110 05/11/24  0745 05/10/24  2053   POC GLUCOSE mg/dl 472* 416* 166* 267* 199* 311* 157* 254* 119 267* 156* 204*               Lines/Drains:  Invasive Devices       Peripheral Intravenous Line  Duration             Peripheral IV 05/13/24 Right;Ventral (anterior) Forearm <1 day                          Imaging: Reviewed radiology reports from this admission including: chest xray    Recent Cultures (last 7 days):   Results from last 7 days   Lab Units 05/09/24  1930   URINE CULTURE  40,000-49,000 cfu/ml       Last 24 Hours Medication List:   Current Facility-Administered Medications   Medication Dose Route Frequency Provider Last Rate    acetaminophen  650 mg Oral Q6H PRN Bia Moreno DO      atorvastatin  40 mg Oral Daily Bia Moreno DO      diltiazem  120 mg Oral Daily Bia Moreno DO      donepezil  5 mg Oral HS Osvaldo Corcoran MD      insulin lispro  1-6 Units Subcutaneous 4x Daily (PC & HS) Vasu Perkins DO      latanoprost  1 drop Both Eyes HS Bia Moreno DO      loratadine  10 mg Oral Daily Bia Moreno,       losartan  100 mg Oral Daily Bia Moreno,       metoprolol succinate  50 mg Oral BID Bia  DO Josh      rivaroxaban  15 mg Oral Daily With Breakfast Bia Moreno DO      senna-docusate sodium  1 tablet Oral BID Jeison Chairez MD      sodium chloride  2 g Oral BID With Meals Raghavendra Rubin MD          Today, Patient Was Seen By: Vasu Perkins DO    **Please Note: This note may have been constructed using a voice recognition system.**

## 2024-05-13 NOTE — PROGRESS NOTES
Progress Note - Nephrology   Camryn Roblero 83 y.o. female MRN: [id number]  Unit/Bed#: S -01 Encounter: [id number]    Assessment and Plan:  Hyponatremia  - Na 120 on routine outpatient labs and on arrival   - Symptomatic with fatigue and generalized weakness  -Hx of hyponatremia, Carcinoid Tumor, HFpEF   - Baseline Na 130s  -Outpatient regimen includes Torsemide, Aldactone, Losartan, and Fluid Restriction  - Urine osmolality 286, urine sodium 33, serum osmolality 268  - Initially placed on fluids now discontinued, Na tabs 1g TID then transitioned to 2g BID   - Current Na 126, improving  -Recheck Na tomorrow in AM   -Will hold on Torsemide today, as volume status better that baseline   -Continue Na 2g BID with meals       Chronic Kidney Disease Stage 2  - Baseline appears to be around 1  - Secondary to diabetic kidney disease, hypertensive nephrosclerosis, cardiorenal, age-related nephron loss   -Renal function stable       Hypomagnesemia  -Mg 1.7  -Replete electrolytes as appropriate       Neuroendocrine Tumor- Carcinoid  Follows Pulmonology as outpatient  Chest xray on admission- Prominent R hilum lymphadenopathy vs mass  Question if Carcinoid spread or new mass?  Suspicion for worsening SIADH   Recommend CT Chest       Hypertension  - Home regimen includes Toprol, Valsartan, and Aldactone       HFpEF  - Home regimen includes Low Na diet, Fluid Restriction, Torsemide 10mg 3x/week, Aldactone   - Last Echo 11/23: EF >70%, G2DD      Subjective:   Patient feels well and has no complaints at the time of my evaluation. Endorses needing to have a BM soon.  Patient denies any fevers, CP, SOB, abdominal pain/distension, NVD, urinary sx, HA, dizziness, fatigue, weakness, or any other sx at this time.     Objective:     Vitals: Blood pressure 133/79, pulse 72, temperature 97.5 °F (36.4 °C), temperature source Oral, resp. rate 16, weight 73.6 kg (162 lb 4.1 oz), SpO2 93%.,Body mass index is 32.77 kg/m².    Weight (last 2  days)       Date/Time Weight    05/13/24 0507 73.6 (162.26)    05/12/24 0445 74.3 (163.8)              Intake/Output Summary (Last 24 hours) at 5/13/2024 0843  Last data filed at 5/12/2024 2200  Gross per 24 hour   Intake 980 ml   Output --   Net 980 ml         Physical Exam: /79   Pulse 72   Temp 97.5 °F (36.4 °C) (Oral)   Resp 16   Wt 73.6 kg (162 lb 4.1 oz)   SpO2 93%   BMI 32.77 kg/m²     General Appearance:    Alert, cooperative, no distress, appears stated age   Head:    Normocephalic, without obvious abnormality, atraumatic   Eyes:    PERRL, conjunctiva/corneas clear, EOM's intact, fundi     benign, both eyes   Ears:    Normal TM's and external ear canals, both ears   Nose:   Nares normal, septum midline, mucosa normal, no drainage  or sinus tenderness   Throat:   Lips, mucosa, and tongue normal; teeth and gums normal   Neck:   Supple, symmetrical, trachea midline, no adenopathy;     thyroid:  no enlargement/tenderness/nodules; no carotid    bruit or JVD   Back:     Symmetric, no curvature, ROM normal, no CVA tenderness   Lungs:     Mild crackles at the bases bilaterally, respirations unlabored   Chest Wall:    No tenderness or deformity    Heart:    Regular rate and rhythm, S1 and S2 normal, no murmur, rub   or gallop   Breast Exam:    No tenderness, masses, or nipple abnormality   Abdomen:     Soft, non-tender, bowel sounds active all four quadrants,     no masses, no organomegaly           Extremities:   Trace edema 1+ pitting edema, atraumatic, no cyanosis   Pulses:   2+ and symmetric all extremities   Skin:   Skin color, texture, turgor normal, no rashes or lesions   Lymph nodes:   Cervical, supraclavicular, and axillary nodes normal   Neurologic:   CNII-XII intact, normal strength, sensation and reflexes     throughout        Lab, Imaging and other studies: I have personally reviewed pertinent labs.  CMP:   Lab Results   Component Value Date    SODIUM 126 (L) 05/13/2024    K 5.0 05/13/2024     CL 96 05/13/2024    CO2 24 05/13/2024    BUN 28 (H) 05/13/2024    CREATININE 0.82 05/13/2024    CALCIUM 9.1 05/13/2024    EGFR 66 05/13/2024     Recent Labs     05/12/24  0452 05/12/24 2038 05/13/24  0535   SODIUM 125* 125* 126*     Lab Results   Component Value Date    OSMOUA 286 05/09/2024    NAUR 33 05/09/2024    OSMOLALITSER 268 (L) 05/09/2024

## 2024-05-13 NOTE — ASSESSMENT & PLAN NOTE
- Had Na 120 on routine outpatient bloodwork. Sent in by nephrologist  - Has history of chronic hyponatremia  - Urine osmolality 286, urine sodium 33, serum osmolality 268.  Hypoosmolar, however, patient is on diuretics at home  - Endorses decreased food and water intake since death of  1 month ago  - Started on salt tablets 1 g TID by nephrology  - Improvement of Na to 126 today    Plan:   - Per nephrology, increase salt tablets to 2 g BID  - Continue fluid restriction of 1.5 L  - Check BMP in the morning  - Nephrology following, appreciate further recommendations

## 2024-05-13 NOTE — ASSESSMENT & PLAN NOTE
- Home meds: Valsartan 320 mg  - Blood pressure is adequately controlled    Plan:  - Continue formulary equivalent losartan 100 mg

## 2024-05-13 NOTE — ASSESSMENT & PLAN NOTE
Wt Readings from Last 3 Encounters:   05/13/24 73.6 kg (162 lb 4.1 oz)   04/26/24 74.8 kg (165 lb)   04/25/24 78.2 kg (172 lb 6.4 oz)     - Home meds: Torsemide 10 mg 3 times/week, spironolactone 25 mg, Toprol 50 mg BID  - Per patient, 50 pound weight loss in the last year  - At this time does not examine volume overloaded    Plan:  - Continue holding spironolactone and torsemide. Nephrogy to determine when to restart  - Continue Toprol 50 mg BID  - Continue monitoring volume status

## 2024-05-13 NOTE — ASSESSMENT & PLAN NOTE
Lab Results   Component Value Date    HGBA1C 7.4 (A) 04/26/2024       Recent Labs     05/12/24  2126 05/13/24  0739 05/13/24  1121 05/13/24  1124   POCGLU 267* 166* 416* 472*       - Home meds: Metformin 1000 mg BID, glipizide 5 mg    Plan:  - Hold home antihyperglycemic agents  - SSI  - Hypoglycemia protocol  - Recent values of 416 and 472 were after she ate a cupcake with icing that she brought from home. Will give 6   units SSI for now. Repeat fingerstick in 2 hours

## 2024-05-13 NOTE — PLAN OF CARE
Problem: Potential for Falls  Goal: Patient will remain free of falls  Description: INTERVENTIONS:  - Educate patient/family on patient safety including physical limitations  - Instruct patient to call for assistance with activity   - Consult OT/PT to assist with strengthening/mobility   - Keep Call bell within reach  - Keep bed low and locked with side rails adjusted as appropriate  - Keep care items and personal belongings within reach  - Initiate and maintain comfort rounds  - Make Fall Risk Sign visible to staff  - Offer Toileting every 2 Hours, in advance of need  - Initiate/Maintain alarm  - Obtain necessary fall risk management equipment:   - Apply yellow socks and bracelet for high fall risk patients  - Consider moving patient to room near nurses station  Outcome: Progressing     Problem: Prexisting or High Potential for Compromised Skin Integrity  Goal: Skin integrity is maintained or improved  Description: INTERVENTIONS:  - Identify patients at risk for skin breakdown  - Assess and monitor skin integrity  - Assess and monitor nutrition and hydration status  - Monitor labs   - Assess for incontinence   - Turn and reposition patient  - Assist with mobility/ambulation  - Relieve pressure over bony prominences  - Avoid friction and shearing  - Provide appropriate hygiene as needed including keeping skin clean and dry  - Evaluate need for skin moisturizer/barrier cream  - Collaborate with interdisciplinary team   - Patient/family teaching  - Consider wound care consult   Outcome: Progressing     Problem: PAIN - ADULT  Goal: Verbalizes/displays adequate comfort level or baseline comfort level  Description: Interventions:  - Encourage patient to monitor pain and request assistance  - Assess pain using appropriate pain scale  - Administer analgesics based on type and severity of pain and evaluate response  - Implement non-pharmacological measures as appropriate and evaluate response  - Consider cultural and  social influences on pain and pain management  - Notify physician/advanced practitioner if interventions unsuccessful or patient reports new pain  Outcome: Progressing     Problem: INFECTION - ADULT  Goal: Absence or prevention of progression during hospitalization  Description: INTERVENTIONS:  - Assess and monitor for signs and symptoms of infection  - Monitor lab/diagnostic results  - Monitor all insertion sites, i.e. indwelling lines, tubes, and drains  - Monitor endotracheal if appropriate and nasal secretions for changes in amount and color  - Bolt appropriate cooling/warming therapies per order  - Administer medications as ordered  - Instruct and encourage patient and family to use good hand hygiene technique  - Identify and instruct in appropriate isolation precautions for identified infection/condition  Outcome: Progressing  Goal: Absence of fever/infection during neutropenic period  Description: INTERVENTIONS:  - Monitor WBC    Outcome: Progressing     Problem: SAFETY ADULT  Goal: Patient will remain free of falls  Description: INTERVENTIONS:  - Educate patient/family on patient safety including physical limitations  - Instruct patient to call for assistance with activity   - Consult OT/PT to assist with strengthening/mobility   - Keep Call bell within reach  - Keep bed low and locked with side rails adjusted as appropriate  - Keep care items and personal belongings within reach  - Initiate and maintain comfort rounds  - Make Fall Risk Sign visible to staff  - Offer Toileting every 2 Hours, in advance of need  - Initiate/Maintain alarm  - Obtain necessary fall risk management equipment:   - Apply yellow socks and bracelet for high fall risk patients  - Consider moving patient to room near nurses station  Outcome: Progressing  Goal: Maintain or return to baseline ADL function  Description: INTERVENTIONS:  -  Assess patient's ability to carry out ADLs; assess patient's baseline for ADL function and  identify physical deficits which impact ability to perform ADLs (bathing, care of mouth/teeth, toileting, grooming, dressing, etc.)  - Assess/evaluate cause of self-care deficits   - Assess range of motion  - Assess patient's mobility; develop plan if impaired  - Assess patient's need for assistive devices and provide as appropriate  - Encourage maximum independence but intervene and supervise when necessary  - Involve family in performance of ADLs  - Assess for home care needs following discharge   - Consider OT consult to assist with ADL evaluation and planning for discharge  - Provide patient education as appropriate  Outcome: Progressing  Goal: Maintains/Returns to pre admission functional level  Description: INTERVENTIONS:  - Perform AM-PAC 6 Click Basic Mobility/ Daily Activity assessment daily.  - Set and communicate daily mobility goal to care team and patient/family/caregiver.   - Collaborate with rehabilitation services on mobility goals if consulted  - Perform Range of Motion 2 times a day.  - Reposition patient every 2 hours.  - Dangle patient 2 times a day  - Stand patient 2 times a day  - Ambulate patient 2 times a day  - Out of bed to chair 2 times a day   - Out of bed for meals 2 times a day  - Out of bed for toileting  - Record patient progress and toleration of activity level   Outcome: Progressing     Problem: DISCHARGE PLANNING  Goal: Discharge to home or other facility with appropriate resources  Description: INTERVENTIONS:  - Identify barriers to discharge w/patient and caregiver  - Arrange for needed discharge resources and transportation as appropriate  - Identify discharge learning needs (meds, wound care, etc.)  - Arrange for interpretive services to assist at discharge as needed  - Refer to Case Management Department for coordinating discharge planning if the patient needs post-hospital services based on physician/advanced practitioner order or complex needs related to functional status,  cognitive ability, or social support system  Outcome: Progressing     Problem: Knowledge Deficit  Goal: Patient/family/caregiver demonstrates understanding of disease process, treatment plan, medications, and discharge instructions  Description: Complete learning assessment and assess knowledge base.  Interventions:  - Provide teaching at level of understanding  - Provide teaching via preferred learning methods  Outcome: Progressing

## 2024-05-13 NOTE — CASE MANAGEMENT
Case Management Assessment & Discharge Planning Note    Patient name Camryn Roblero  Location S /S -01 MRN [id number]  : 1940 Date 2024       Current Admission Date: 2024  Current Admission Diagnosis:Hyponatremia   Patient Active Problem List    Diagnosis Date Noted    Impaired memory 2024    Allergic drug rash 2024    Elevated troponin 2023    Tachycardia 2023    Atrial flutter (HCC) 2023    Mixed hyperlipidemia 10/21/2023    Seasonal allergic rhinitis 2023    Osteopenia 2022    Chronic pain of both knees 2021    Cataract of both eyes 2021    Class 2 severe obesity due to excess calories with serious comorbidity and body mass index (BMI) of 36.0 to 36.9 in adult (HCC) 2021    Multiple pulmonary nodules 10/05/2020    Colon polyps 10/05/2020    TOM (obstructive sleep apnea) 2020    Chronic diastolic CHF (congestive heart failure) ([facility]) 2020    Persistent proteinuria 10/25/2019    Hyponatremia 2016    Essential hypertension 2016    Diabetes mellitus without complication ([facility]) 2016    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    COPD (chronic obstructive pulmonary disease) ([facility]) 2016      LOS (days): 4  Geometric Mean LOS (GMLOS) (days): 3.6  Days to GMLOS:-0.2     OBJECTIVE:    Risk of Unplanned Readmission Score: 15.39         Current admission status: Inpatient       Preferred Pharmacy:   [facility] - [place], [place] - [address]  [address] [address]  Phone: [phone] Fax: [phone]    [facility] [address] [place] [address]  Phone: [phone] Fax: [phone]    Primary Care Provider: Abiel Seals MD    Primary Insurance: MEDICARE  Secondary Insurance: AARP    ASSESSMENT:  Active Health Care Proxies    There are no active Health Care Proxies on  file.                      Patient Information  Admitted from:: Home  Mental Status: Alert  During Assessment patient was accompanied by: Not accompanied during assessment  Assessment information provided by:: Patient  Primary Caregiver: Self  Support Systems: Self, Children  County of Residence: [place]  What city do you live in?: [place]  Type of Current Residence: [facility]  Living Arrangements: Lives Alone    Activities of Daily Living Prior to Admission  Functional Status: Independent  Completes ADLs independently?: Yes  Ambulates independently?: Yes  Does patient use assisted devices?: Yes  Assisted Devices (DME) used: Walker, Straight Cane  Does patient currently own DME?: Yes  What DME does the patient currently own?: Straight Cane, Walker  Does patient have a history of Outpatient Therapy (PT/OT)?: No  Does the patient have a history of Short-Term Rehab?: Yes  Does patient have a history of HHC?: Yes ([facility])  Does patient currently have HHC?: No         Patient Information Continued  Does patient have prescription coverage?: Yes  Does patient receive dialysis treatments?: No  Does patient have a history of substance abuse?: No  Does patient have a history of Mental Health Diagnosis?: No         Means of Transportation  Means of Transport to Appts:: Family transport      Social Determinants of Health (SDOH)      Flowsheet Row Most Recent Value   Housing Stability    In the last 12 months, was there a time when you were not able to pay the mortgage or rent on time? N   In the last 12 months, how many places have you lived? 1   In the last 12 months, was there a time when you did not have a steady place to sleep or slept in a shelter (including now)? N   Transportation Needs    In the past 12 months, has lack of transportation kept you from medical appointments or from getting medications? no   In the past 12 months, has lack of transportation kept you from meetings, work, or from getting things  needed for daily living? No   Food Insecurity    Within the past 12 months, you worried that your food would run out before you got the money to buy more. Never true   Within the past 12 months, the food you bought just didn't last and you didn't have money to get more. Never true   Utilities    In the past 12 months has the electric, gas, oil, or water company threatened to shut off services in your home? No            DISCHARGE DETAILS:    Discharge planning discussed with:: patient at bedside  [place] of Choice: Yes (re: rehab vs home care)  Comments - Freedom of Choice: rehab vs home care services - wants to think about options, but preference is for home care if possible     Were Treatment Team discharge recommendations reviewed with patient/caregiver?: Yes  Did patient/caregiver verbalize understanding of patient care needs?: Yes  Were patient/caregiver advised of the risks associated with not following Treatment Team discharge recommendations?: Yes    Contacts  Patient Contacts: delma Akbar  Relationship to Patient:: Family    Requested Home Health Care         Is the patient interested in HHC at discharge?: Yes  Home Health Discipline requested:: Nursing, Occupational Therapy, Physical Therapy  Home Health Agency Name:: Other  HHA External Referral Reason (only applicable if external HHA name selected): Patient has established relationship with provider  Home Health Follow-Up Provider:: PCP  Home Health Services Needed:: Evaluate Functional Status and Safety, Gait/ADL Training, COPD Management, Heart Failure Management, Strengthening/Theraputic Exercises to Improve Function  Homebound Criteria Met:: Requires the Assistance of Another Person for Safe Ambulation or to Leave the Home, Uses an Assist Device (i.e. cane, walker, etc)  Supporting Clincal Findings:: Fatigues Easliy in Short Distances, Limited Endurance    DME Referral Provided  Referral made for DME?: No    Other  Referral/Resources/Interventions Provided:  Interventions: [facility], Short Term Rehab  Referral Comments: Patient admitted due to hyponatremia. PT/OT recommending level II, but report possible progression to level III pending improvements with mobility. Met with patient at bedside to complete assessment. Patient reports that she lives at home alone - has stairs to get into the home (either from front door or from garage) but no steps once on main level. Reports that spouse recently passed away (4/12). Does have daughters who stop in and check on her, but she's normally by herself. Patient has history of home care services through [facility]. Daughter, Naomie, works at [employer]. Patient does have walkers and canes she uses as needed. Reports that she's independent with ADLs and ambulation. Does not drive, but does back/drive the car up/down the driveway to get her mail. Children drive her to all appointments and to run errands. Patient aware of rehab recommendation, but hopes to improve and be able to discharge to home. Referrals made for home care services in anticipation of need - will follow-up with patient/daughter re: rehab again if mobility not improving prior to discharge, however patient reports she's been getting up and to the bathroom on her own. Patient reports that daughter, Naomie, has POA and would be decision-maker if needed. Business card provided for this writer if patient/family had questions.    Would you like to participate in our [facility] service program?  : No - Declined

## 2024-05-13 NOTE — ASSESSMENT & PLAN NOTE
- Continue home Cardizem 120 mg, Toprol 50 mg BID, Xarelto 15 mg  - Without RVR at this time, rates adequately controlled

## 2024-05-14 LAB
ANION GAP SERPL CALCULATED.3IONS-SCNC: 3 MMOL/L (ref 4–13)
BUN SERPL-MCNC: 36 MG/DL (ref 5–25)
CALCIUM SERPL-MCNC: 8.8 MG/DL (ref 8.4–10.2)
CHLORIDE SERPL-SCNC: 96 MMOL/L (ref 96–108)
CO2 SERPL-SCNC: 27 MMOL/L (ref 21–32)
CREAT SERPL-MCNC: 1.15 MG/DL (ref 0.6–1.3)
GFR SERPL CREATININE-BSD FRML MDRD: 44 ML/MIN/1.73SQ M
GLUCOSE SERPL-MCNC: 136 MG/DL (ref 65–140)
GLUCOSE SERPL-MCNC: 165 MG/DL (ref 65–140)
GLUCOSE SERPL-MCNC: 171 MG/DL (ref 65–140)
GLUCOSE SERPL-MCNC: 291 MG/DL (ref 65–140)
GLUCOSE SERPL-MCNC: 363 MG/DL (ref 65–140)
MAGNESIUM SERPL-MCNC: 2.1 MG/DL (ref 1.9–2.7)
POTASSIUM SERPL-SCNC: 5.3 MMOL/L (ref 3.5–5.3)
SODIUM SERPL-SCNC: 126 MMOL/L (ref 135–147)

## 2024-05-14 PROCEDURE — 80048 BASIC METABOLIC PNL TOTAL CA: CPT

## 2024-05-14 PROCEDURE — 94660 CPAP INITIATION&MGMT: CPT

## 2024-05-14 PROCEDURE — 94760 N-INVAS EAR/PLS OXIMETRY 1: CPT

## 2024-05-14 PROCEDURE — 82948 REAGENT STRIP/BLOOD GLUCOSE: CPT

## 2024-05-14 PROCEDURE — 83735 ASSAY OF MAGNESIUM: CPT

## 2024-05-14 PROCEDURE — 99232 SBSQ HOSP IP/OBS MODERATE 35: CPT | Performed by: INTERNAL MEDICINE

## 2024-05-14 RX ORDER — DIPHENHYDRAMINE HCL 25 MG
50 TABLET ORAL
Status: DISCONTINUED | OUTPATIENT
Start: 2024-05-14 | End: 2024-05-17 | Stop reason: HOSPADM

## 2024-05-14 RX ORDER — METHYLPREDNISOLONE 16 MG/1
32 TABLET ORAL
Status: DISCONTINUED | OUTPATIENT
Start: 2024-05-14 | End: 2024-05-14

## 2024-05-14 RX ORDER — BISACODYL 10 MG
10 SUPPOSITORY, RECTAL RECTAL DAILY PRN
Status: DISCONTINUED | OUTPATIENT
Start: 2024-05-14 | End: 2024-05-17 | Stop reason: HOSPADM

## 2024-05-14 RX ORDER — SODIUM CHLORIDE 9 MG/ML
50 INJECTION, SOLUTION INTRAVENOUS CONTINUOUS
Status: DISCONTINUED | OUTPATIENT
Start: 2024-05-15 | End: 2024-05-15

## 2024-05-14 RX ORDER — METHYLPREDNISOLONE 16 MG/1
32 TABLET ORAL
Status: COMPLETED | OUTPATIENT
Start: 2024-05-14 | End: 2024-05-15

## 2024-05-14 RX ORDER — POLYETHYLENE GLYCOL 3350 17 G/17G
17 POWDER, FOR SOLUTION ORAL DAILY
Status: DISCONTINUED | OUTPATIENT
Start: 2024-05-14 | End: 2024-05-17 | Stop reason: HOSPADM

## 2024-05-14 RX ADMIN — SODIUM CHLORIDE 2 G: 1 TABLET ORAL at 17:06

## 2024-05-14 RX ADMIN — SENNOSIDES AND DOCUSATE SODIUM 1 TABLET: 8.6; 5 TABLET ORAL at 08:52

## 2024-05-14 RX ADMIN — METOPROLOL SUCCINATE 50 MG: 50 TABLET, EXTENDED RELEASE ORAL at 17:06

## 2024-05-14 RX ADMIN — INSULIN LISPRO 4 UNITS: 100 INJECTION, SOLUTION INTRAVENOUS; SUBCUTANEOUS at 17:07

## 2024-05-14 RX ADMIN — DONEPEZIL HYDROCHLORIDE 5 MG: 5 TABLET ORAL at 21:06

## 2024-05-14 RX ADMIN — RIVAROXABAN 15 MG: 15 TABLET, FILM COATED ORAL at 08:52

## 2024-05-14 RX ADMIN — ATORVASTATIN CALCIUM 40 MG: 40 TABLET, FILM COATED ORAL at 08:52

## 2024-05-14 RX ADMIN — DILTIAZEM HYDROCHLORIDE 120 MG: 120 CAPSULE, COATED, EXTENDED RELEASE ORAL at 08:52

## 2024-05-14 RX ADMIN — INSULIN LISPRO 6 UNITS: 100 INJECTION, SOLUTION INTRAVENOUS; SUBCUTANEOUS at 12:15

## 2024-05-14 RX ADMIN — SENNOSIDES AND DOCUSATE SODIUM 1 TABLET: 8.6; 5 TABLET ORAL at 17:06

## 2024-05-14 RX ADMIN — METOPROLOL SUCCINATE 50 MG: 50 TABLET, EXTENDED RELEASE ORAL at 08:52

## 2024-05-14 RX ADMIN — INSULIN LISPRO 1 UNITS: 100 INJECTION, SOLUTION INTRAVENOUS; SUBCUTANEOUS at 08:15

## 2024-05-14 RX ADMIN — METHYLPREDNISOLONE 32 MG: 16 TABLET ORAL at 20:06

## 2024-05-14 RX ADMIN — LORATADINE 10 MG: 10 TABLET ORAL at 08:52

## 2024-05-14 RX ADMIN — POLYETHYLENE GLYCOL 3350 17 G: 17 POWDER, FOR SOLUTION ORAL at 08:55

## 2024-05-14 RX ADMIN — LATANOPROST 1 DROP: 50 SOLUTION OPHTHALMIC at 21:08

## 2024-05-14 RX ADMIN — SODIUM CHLORIDE 2 G: 1 TABLET ORAL at 08:52

## 2024-05-14 NOTE — PLAN OF CARE
Problem: Potential for Falls  Goal: Patient will remain free of falls  Description: INTERVENTIONS:  - Educate patient/family on patient safety including physical limitations  - Instruct patient to call for assistance with activity   - Consult OT/PT to assist with strengthening/mobility   - Keep Call bell within reach  - Keep bed low and locked with side rails adjusted as appropriate  - Keep care items and personal belongings within reach  - Initiate and maintain comfort rounds  - Make Fall Risk Sign visible to staff  - Offer Toileting every 2 Hours, in advance of need  - Initiate/Maintain alarm  - Obtain necessary fall risk management equipment:   - Apply yellow socks and bracelet for high fall risk patients  - Consider moving patient to room near nurses station  Outcome: Progressing     Problem: Prexisting or High Potential for Compromised Skin Integrity  Goal: Skin integrity is maintained or improved  Description: INTERVENTIONS:  - Identify patients at risk for skin breakdown  - Assess and monitor skin integrity  - Assess and monitor nutrition and hydration status  - Monitor labs   - Assess for incontinence   - Turn and reposition patient  - Assist with mobility/ambulation  - Relieve pressure over bony prominences  - Avoid friction and shearing  - Provide appropriate hygiene as needed including keeping skin clean and dry  - Evaluate need for skin moisturizer/barrier cream  - Collaborate with interdisciplinary team   - Patient/family teaching  - Consider wound care consult   Outcome: Progressing     Problem: PAIN - ADULT  Goal: Verbalizes/displays adequate comfort level or baseline comfort level  Description: Interventions:  - Encourage patient to monitor pain and request assistance  - Assess pain using appropriate pain scale  - Administer analgesics based on type and severity of pain and evaluate response  - Implement non-pharmacological measures as appropriate and evaluate response  - Consider cultural and  social influences on pain and pain management  - Notify physician/advanced practitioner if interventions unsuccessful or patient reports new pain  Outcome: Progressing     Problem: INFECTION - ADULT  Goal: Absence or prevention of progression during hospitalization  Description: INTERVENTIONS:  - Assess and monitor for signs and symptoms of infection  - Monitor lab/diagnostic results  - Monitor all insertion sites, i.e. indwelling lines, tubes, and drains  - Monitor endotracheal if appropriate and nasal secretions for changes in amount and color  - [place] appropriate cooling/warming therapies per order  - Administer medications as ordered  - Instruct and encourage patient and family to use good hand hygiene technique  - Identify and instruct in appropriate isolation precautions for identified infection/condition  Outcome: Progressing  Goal: Absence of fever/infection during neutropenic period  Description: INTERVENTIONS:  - Monitor WBC    Outcome: Progressing     Problem: SAFETY ADULT  Goal: Patient will remain free of falls  Description: INTERVENTIONS:  - Educate patient/family on patient safety including physical limitations  - Instruct patient to call for assistance with activity   - Consult OT/PT to assist with strengthening/mobility   - Keep Call bell within reach  - Keep bed low and locked with side rails adjusted as appropriate  - Keep care items and personal belongings within reach  - Initiate and maintain comfort rounds  - Make Fall Risk Sign visible to staff  - Offer Toileting every 2 Hours, in advance of need  - Initiate/Maintain alarm  - Obtain necessary fall risk management equipment:   - Apply yellow socks and bracelet for high fall risk patients  - Consider moving patient to room near nurses station  Outcome: Progressing  Goal: Maintain or return to baseline ADL function  Description: INTERVENTIONS:  -  Assess patient's ability to carry out ADLs; assess patient's baseline for ADL function and  identify physical deficits which impact ability to perform ADLs (bathing, care of mouth/teeth, toileting, grooming, dressing, etc.)  - Assess/evaluate cause of self-care deficits   - Assess range of motion  - Assess patient's mobility; develop plan if impaired  - Assess patient's need for assistive devices and provide as appropriate  - Encourage maximum independence but intervene and supervise when necessary  - Involve family in performance of ADLs  - Assess for home care needs following discharge   - Consider OT consult to assist with ADL evaluation and planning for discharge  - Provide patient education as appropriate  Outcome: Progressing  Goal: Maintains/Returns to pre admission functional level  Description: INTERVENTIONS:  - Perform AM-PAC 6 Click Basic Mobility/ Daily Activity assessment daily.  - Set and communicate daily mobility goal to care team and patient/family/caregiver.   - Collaborate with rehabilitation services on mobility goals if consulted  - Perform Range of Motion 2 times a day.  - Reposition patient every 2 hours.  - Dangle patient 2 times a day  - Stand patient 2 times a day  - Ambulate patient 2 times a day  - Out of bed to chair 2 times a day   - Out of bed for meals 2 times a day  - Out of bed for toileting  - Record patient progress and toleration of activity level   Outcome: Progressing     Problem: DISCHARGE PLANNING  Goal: Discharge to home or other facility with appropriate resources  Description: INTERVENTIONS:  - Identify barriers to discharge w/patient and caregiver  - Arrange for needed discharge resources and transportation as appropriate  - Identify discharge learning needs (meds, wound care, etc.)  - Arrange for interpretive services to assist at discharge as needed  - Refer to Case Management Department for coordinating discharge planning if the patient needs post-hospital services based on physician/advanced practitioner order or complex needs related to functional status,  cognitive ability, or social support system  Outcome: Progressing     Problem: Knowledge Deficit  Goal: Patient/family/caregiver demonstrates understanding of disease process, treatment plan, medications, and discharge instructions  Description: Complete learning assessment and assess knowledge base.  Interventions:  - Provide teaching at level of understanding  - Provide teaching via preferred learning methods  Outcome: Progressing

## 2024-05-14 NOTE — ASSESSMENT & PLAN NOTE
- Continue home Cardizem 120 mg, Toprol 50 mg BID, Xarelto 15 mg  - Without RVR at this time, rates adequately controlled    Plan:  - Continue home Cardizem, Toprol, Xarelto

## 2024-05-14 NOTE — PROGRESS NOTES
[facility]  Progress Note  Name: Camryn Roblero I  MRN: [id number]  Unit/Bed#: S -01 I Date of Admission: 5/9/2024   Date of Service: 5/14/2024 I Hospital Day: 5    Assessment/Plan   * Hyponatremia  Assessment & Plan  - Had Na 120 on routine outpatient bloodwork. Sent in by nephrologist Dr. Perkins  - Has history of chronic hyponatremia. Baseline Na in the low 130s  - Endorsed decreased food and water intake since death of  1 month ago  - Urine osm 286, urine sodium 33, serum osm 268. Hypoosmolar, however, this is skewed because patient is on diuretics at home  - Endorses decreased food and water intake since death of  1 month ago  - Started on salt tablets 1 g TID   - Na stable at 126 today    Plan:   - Continue salt tablets 2 g BID  - Continue fluid restriction of 1.5 L  - Hold home spironolactone, torsemide  - Check BMP daily  - If Na drops, nephrology will consider dose of Lasix or torsemide  - I/O's  - Nephrology following, appreciate further recommendations    Abnormal chest x-ray  Assessment & Plan  - CXR (5/9) - prominent right hilum. Lymphadenopathy versus mass in the differential. CT chest with contrast recommended   - Nephrology recommends CT CAP to rule out malignancy given hyponatremia and recent 50 pound weight loss  - Patient has history of allergy to iodinated contrast    Plan:  - CTAP with IV contrast at 0800 tomorrow.  Contrast allergy prep starting from tonight at 2000.   - Per nephrology, give  cc/hr starting at midnight for 6 hours for pre-contrast hydration    Type 2 diabetes mellitus, without long-term current use of insulin (HCC)  Assessment & Plan  - Home meds: metformin 1000 mg BID, glipizide 5 mg  - HgbA1c 7.4% (4/26/24)  - Most recent blood sugar in the high 100s    Plan:  - Hold home antihyperglycemic agents  - SSI  - Hypoglycemia protocol    Hyperkalemia  Assessment & Plan  - K as high as 5.6 during hospital stay  - K today is  5.0    Plan:  - Hold home spironolactone, losartan  - Check BMP daily  - If hyperkalemic, can do Lokelma    Essential hypertension  Assessment & Plan  - Home meds: Valsartan 320 mg, Cardizem 120 mg, Toprol 50 mg BID, Spironolactone 25 mg, Torsemide 10 mg three times per week  - Blood pressure is adequately controlled     Plan:  - Continue home Cardizem and Toprol  - Hold home Torsemide, spironolactone  - Hold formulary equivalent losartan    CKD (chronic kidney disease) stage 2, GFR 60-89 ml/min  Assessment & Plan  - Baseline Cr < 1 with eGFR in 60s  - Cr increased to 1.15 today. Yesterday it was 0.82    Plan:  - Avoid hypotension and nephrotoxins  - Check BMP daily    Hyperlipidemia  Assessment & Plan  - Lipid panel (5/9/24) - TC 86, TG 59, LDL 40, HDL 34  - Home med: atorvastatin 40 mg     Plan:  - Continue home atorvastatin    Impaired memory  Assessment & Plan  - Continue home donepezil 5 mg    Heart failure with preserved ejection fraction (HCC)  Assessment & Plan  Wt Readings from Last 3 Encounters:   05/13/24 73.6 kg (162 lb 4.1 oz)   04/26/24 74.8 kg (165 lb)   04/25/24 78.2 kg (172 lb 6.4 oz)     - Home meds: Torsemide 10 mg 3 times/week, spironolactone 25 mg, Toprol 50 mg BID  - Echo (11/2023) - EF > 70%. G2DD. LA severely dilated. PASP 37 mmHg  - Per patient, 50 pound weight loss in the last year  - Not in exacerbation at this time    Plan:  - Hold home spironolactone and torsemide.  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 History of exposure to secondhand smoke  - Baseline oxygen is 1 L NC prn  - Currently on room air  - Not currently in exacerbation  - Follows with pulmonology as outpatient    Plan:  - Oxygen supplementation as necessary    TOM (obstructive sleep apnea)  Assessment & Plan  - Has mild TOM with oxygen desaturation on overnight sleep study  - Baseline oxygen is 1 L NC prn    Plan:  - CPAP qhs         VTE Pharmacologic Prophylaxis: VTE Score: 5 High Risk (Score >/= 5) - Pharmacological DVT Prophylaxis Ordered: heparin. Sequential Compression Devices Ordered.    Mobility:   Basic Mobility Inpatient Raw Score: 16  JH-HLM Goal: 5: Stand one or more mins  JH-HLM Achieved: 6: Walk 10 steps or more  JH-HLM Goal achieved. Continue to encourage appropriate mobility.    Patient Centered Rounds: I performed bedside rounds with nursing staff today.  Discussions with Specialists or Other Care Team Provider: Nephrology    Education and Discussions with Family / Patient: Updated  (daughter) via phone.    Current Length of Stay: 5 day(s)  Current Patient Status: Inpatient   Discharge Plan: Anticipate discharge in 48-72 hrs to rehab facility.    Code Status: Level 1 - Full Code    Subjective:   Patient was seen and examined at bedside.  She denies chest pain, difficulty breathing.  She stated that her last bowel movement yesterday was hard in nature.  Will start MiraLAX in addition to the senna that she is currently getting.  Will also add Dulcolax suppository as needed.  Agrees with plan for CT CAP tomorrow.    Objective:     Vitals:   Temp (24hrs), Av.4 °F (36.3 °C), Min:97.4 °F (36.3 °C), Max:97.5 °F (36.4 °C)    Temp:  [97.4 °F (36.3 °C)-97.5 °F (36.4 °C)] 97.4 °F (36.3 °C)  HR:  [2-70] 70  Resp:  [18] 18  BP: (112-150)/(9-64) 124/59  SpO2:  [90 %-98 %] 90 %  Body mass index is 33.04 kg/m².     Input  and Output Summary (last 24 hours):     Intake/Output Summary (Last 24 hours) at 5/14/2024 1428  Last data filed at 5/14/2024 0946  Gross per 24 hour   Intake 460 ml   Output 400 ml   Net 60 ml       Physical Exam:   Physical Exam  Vitals and nursing note reviewed.   Constitutional:       General: She is not in acute distress.     Appearance: Normal appearance. She is not ill-appearing.   HENT:      Head: Normocephalic and atraumatic.      Mouth/Throat:      Mouth: Mucous membranes are moist.   Cardiovascular:      Rate and Rhythm: Normal rate and regular rhythm.      Heart sounds: No murmur heard.  Pulmonary:      Effort: Pulmonary effort is normal.      Breath sounds: No wheezing, rhonchi or rales.   Abdominal:      General: Bowel sounds are normal. There is no distension.      Palpations: Abdomen is soft.      Tenderness: There is no abdominal tenderness. There is no guarding or rebound.   Musculoskeletal:         General: Swelling present. No tenderness.      Cervical back: Normal range of motion and neck supple.      Right lower leg: Edema (1+) present.      Left lower leg: Edema (1+) present.   Skin:     General: Skin is warm.      Capillary Refill: Capillary refill takes less than 2 seconds.   Neurological:      Mental Status: She is alert and oriented to person, place, and time. Mental status is at baseline.   Psychiatric:         Mood and Affect: Mood normal.         Behavior: Behavior normal.            Additional Data:     Labs:  Results from last 7 days   Lab Units 05/13/24  0535   WBC Thousand/uL 8.27   HEMOGLOBIN g/dL 12.9   HEMATOCRIT % 40.3   PLATELETS Thousands/uL 353   SEGS PCT % 62   LYMPHO PCT % 17   MONO PCT % 13*   EOS PCT % 6     Results from last 7 days   Lab Units 05/14/24  0454 05/10/24  0240 05/09/24  1929   SODIUM mmol/L 126*   < > 120*   POTASSIUM mmol/L 5.3   < > 5.4*   CHLORIDE mmol/L 96   < > 89*   CO2 mmol/L 27   < > 26   BUN mg/dL 36*   < > 26*   CREATININE mg/dL 1.15   < > 0.87    ANION GAP mmol/L 3*   < > 5   CALCIUM mg/dL 8.8   < > 9.4   ALBUMIN g/dL  --   --  3.6   TOTAL BILIRUBIN mg/dL  --   --  0.84   ALK PHOS U/L  --   --  116*   ALT U/L  --   --  32   AST U/L  --   --  65*   GLUCOSE RANDOM mg/dL 171*   < > 99    < > = values in this interval not displayed.         Results from last 7 days   Lab Units 05/14/24  1124 05/14/24  0745 05/13/24  2207 05/13/24  1649 05/13/24  1422 05/13/24  1124 05/13/24  1121 05/13/24  0739 05/12/24  2126 05/12/24  1819 05/12/24  1105 05/12/24  0750   POC GLUCOSE mg/dl 363* 165* 154* 228* 241* 472* 416* 166* 267* 199* 311* 157*               Lines/Drains:  Invasive Devices       Peripheral Intravenous Line  Duration             Peripheral IV 05/13/24 Right;Ventral (anterior) Forearm 1 day                          Imaging: Reviewed radiology reports from this admission including: chest xray    Recent Cultures (last 7 days):   Results from last 7 days   Lab Units 05/09/24  1930   URINE CULTURE  40,000-49,000 cfu/ml       Last 24 Hours Medication List:   Current Facility-Administered Medications   Medication Dose Route Frequency Provider Last Rate    acetaminophen  650 mg Oral Q6H PRN Bia Moreno DO      atorvastatin  40 mg Oral Daily Bia Moreno DO      bisacodyl  10 mg Rectal Daily PRN Vasu Perkins DO      diltiazem  120 mg Oral Daily Bia Moreno DO      diphenhydrAMINE  50 mg Oral 60 Min Pre-Op Vasu Perkins DO      donepezil  5 mg Oral HS Osvaldo Corcoran MD      insulin lispro  1-6 Units Subcutaneous 4x Daily (PC & HS) Vasu Perkins DO      latanoprost  1 drop Both Eyes HS Bia Moreno DO      loratadine  10 mg Oral Daily Bia Moreno DO      methylPREDNISolone  32 mg Oral Q10H Vasu Perkins DO      metoprolol succinate  50 mg Oral BID Bia Moreno DO      polyethylene glycol  17 g Oral Daily Vasu Perkins DO      rivaroxaban  15 mg Oral Daily With Breakfast DO giovanna Murillo-docusate sodium  1 tablet  Oral BID Jeison Chairez MD      [START ON 5/15/2024] sodium chloride  50 mL/hr Intravenous Continuous Arslan Perkins MD      sodium chloride  2 g Oral BID With Meals Raghavendra Rubin MD          Today, Patient Was Seen By: Vasu Perkins DO    **Please Note: This note may have been constructed using a voice recognition system.**

## 2024-05-14 NOTE — ASSESSMENT & PLAN NOTE
- Never smoker. History of exposure to secondhand smoke  - Baseline oxygen is 1 L NC prn  - Currently on room air  - Not currently in exacerbation  - Follows with pulmonology as outpatient    Plan:  - Oxygen supplementation as necessary

## 2024-05-14 NOTE — ASSESSMENT & PLAN NOTE
- K as high as 5.6 during hospital stay  - K today is 5.3    Plan:  - Hold home spironolactone, losartan  - Check BMP daily  - If hyperkalemic, can do Lokelma

## 2024-05-14 NOTE — ASSESSMENT & PLAN NOTE
- Has mild TOM with oxygen desaturation on overnight sleep study  - Baseline oxygen is 1 L NC prn    Plan:  - CPAP qhs

## 2024-05-14 NOTE — ASSESSMENT & PLAN NOTE
- Had Na 120 on routine outpatient bloodwork. Sent in by nephrologist Dr. Perkins  - Has history of chronic hyponatremia. Baseline Na in the low 130s  - Endorsed decreased food and water intake since death of  1 month ago  - Urine osm 286, urine sodium 33, serum osm 268. Hypoosmolar, however, this is skewed because patient is on diuretics at home  - Endorses decreased food and water intake since death of  1 month ago  - Started on salt tablets 1 g TID   - Na stable at 126 today    Plan:   - Continue salt tablets 2 g BID  - Continue fluid restriction of 1.5 L  - Hold home spironolactone, torsemide  - Check BMP daily  - If Na drops, nephrology will consider dose of Lasix or torsemide  - I/O's  - Nephrology following, appreciate further recommendations

## 2024-05-14 NOTE — ASSESSMENT & PLAN NOTE
- Baseline Cr < 1 with eGFR in 60s  - Cr increased to 1.15 today. Yesterday it was 0.82    Plan:  - Avoid hypotension and nephrotoxins  - Check BMP daily

## 2024-05-14 NOTE — ASSESSMENT & PLAN NOTE
- History of lobulated lung mass in LLL and multiple lung nodules on left side. 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

## 2024-05-14 NOTE — ASSESSMENT & PLAN NOTE
- CXR (5/9) - prominent right hilum. Lymphadenopathy versus mass in the differential. CT chest with contrast recommended   - Nephrology recommends CT CAP to rule out malignancy given hyponatremia and recent 50 pound weight loss  - Patient has history of allergy to iodinated contrast    Plan:  - CTAP with IV contrast at 0800 tomorrow.  Contrast allergy prep starting from tonight at 2000.   - Per nephrology, give  cc/hr starting at midnight for 6 hours for pre-contrast hydration

## 2024-05-14 NOTE — ASSESSMENT & PLAN NOTE
Wt Readings from Last 3 Encounters:   05/13/24 73.6 kg (162 lb 4.1 oz)   04/26/24 74.8 kg (165 lb)   04/25/24 78.2 kg (172 lb 6.4 oz)     - Home meds: Torsemide 10 mg 3 times/week, spironolactone 25 mg, Toprol 50 mg BID  - Echo (11/2023) - EF > 70%. G2DD. LA severely dilated. PASP 37 mmHg  - Per patient, 50 pound weight loss in the last year  - Not in exacerbation at this time    Plan:  - Hold home spironolactone and torsemide.  - Continue Toprol 50 mg BID  - I/O's

## 2024-05-14 NOTE — ASSESSMENT & PLAN NOTE
- Lipid panel (5/9/24) - TC 86, TG 59, LDL 40, HDL 34  - Home med: atorvastatin 40 mg     Plan:  - Continue home atorvastatin

## 2024-05-14 NOTE — PROGRESS NOTES
Progress Note - Nephrology   Camryn Roblero 83 y.o. female MRN: [id number]  Unit/Bed#: S -01 Encounter: [id number]      Assessment and Plan:  Hyponatremia  - Na 120 on routine outpatient labs and on arrival   - Symptomatic with fatigue and generalized weakness  -Hx of hyponatremia, Carcinoid Tumor, HFpEF   - Baseline Na 130s  -Outpatient regimen includes Torsemide, Aldactone, Losartan, and Fluid Restriction  -Torsemide and Aldactone on hold   - Has been receiving Losartan since 5/10 but discontinued last night?  -If CT scan today will continue to hold Losartan   - Urine osmolality 286, urine sodium 33, serum osmolality 268  - Initially placed on fluids now discontinued, Na tabs 1g TID then transitioned to 2g BID  -I/O-  Inaccurately documented, pt using bathroom on own   -Weight unchanged but down overall   - Current Na 126, stable   -Recheck Na tomorrow in AM   -Will continue to  hold Torsemide for now    -Continue Na 2g BID with meals         Elevated Creatinine with Chronic Kidney Disease Stage 2  - Baseline appears to be around 1  - Secondary to diabetic kidney disease, hypertensive nephrosclerosis, cardiorenal, age-related nephron loss   -Renal function stable   -BUN/Cr rising from yesterday   -28/0.82 to 36/1.15 today   - GFR today 44  -Urinary retention protocol, will bladder scan now  -Closely monitor I/Os  -If continues to worsen will consider Renal US  -Will monitor and encourage BM to eliminate a possible cause of retention             Hypomagnesemia- Resolved   -Mg 2.1  -Replete electrolytes as appropriate         Neuroendocrine Tumor- Carcinoid  -Follows Pulmonology as outpatient  -Unintentional weight loss  -Chest xray on admission- Prominent R hilum lymphadenopathy vs mass  -Question if Carcinoid spread or new mass?  Suspicion for worsening SIADH   -Recommend CT Chest   -Will discuss with primary team again today, and recommend   -Allergy to contrast would need IVF and prophylaxis           Hypertension  - Home regimen includes Toprol, Valsartan, and Aldactone   -/59        HFpEF  - Home regimen includes Low Na diet, Fluid Restriction, Torsemide 10mg 3x/week, Aldactone   - Last Echo 11/23: EF >70%, G2DD  -Euvolemic on exam         Subjective:   Patient was seen and examined at the bedside. Patient was resting comfortably in no overt acute distress. Patient endorses she has not had a bowel movement during hospitalization but does endorse passing flatus. She does note that her urination does trickle at times but she is able completely empty bladder. Patient denies any complaints at the time of my evaluation. Patient denies HA, dizziness, fatigue, CP, SOB, palpitations, overt abdominal pain, NVD, urinary sx, urinary retention, flank pain, worsening leg swelling or any other sx at this time.     Objective:     Vitals: Blood pressure 124/59, pulse 70, temperature (!) 97.4 °F (36.3 °C), resp. rate 18, weight 74.2 kg (163 lb 9.3 oz), SpO2 90%.,Body mass index is 33.04 kg/m².    Weight (last 2 days)       Date/Time Weight    05/14/24 0600 74.2 (163.58)    05/13/24 0507 73.6 (162.26)    05/12/24 0445 74.3 (163.8)              Intake/Output Summary (Last 24 hours) at 5/14/2024 0830  Last data filed at 5/13/2024 2000  Gross per 24 hour   Intake 240 ml   Output --   Net 240 ml            Physical Exam: /59   Pulse 70   Temp (!) 97.4 °F (36.3 °C)   Resp 18   Wt 74.2 kg (163 lb 9.3 oz)   SpO2 90%   BMI 33.04 kg/m²     General Appearance:    Alert, cooperative, no distress, appears stated age   Head:    Normocephalic, without obvious abnormality, atraumatic   Eyes:    PERRL, conjunctiva/corneas clear, EOM's intact, fundi     benign, both eyes   Ears:    Normal TM's and external ear canals, both ears   Nose:   Nares normal, septum midline, mucosa normal, no drainage    or sinus tenderness   Throat:   Lips, mucosa, and tongue normal; teeth and gums normal   Neck:   Supple, symmetrical, trachea  midline, no adenopathy;     thyroid:  no enlargement/tenderness/nodules; no carotid    bruit or JVD   Back:     Symmetric, no curvature, ROM normal, no CVA tenderness   Lungs:     Minimal crackles at the bases bilaterally, respirations unlabored   Chest Wall:    No tenderness or deformity    Heart:    Regular rate and rhythm, S1 and S2 normal, no murmur, rub or gallop       Abdomen:     Soft, mild diffuse tenderness, bowel sounds active all four quadrants, no masses, no organomegaly           Extremities:   Extremities normal, atraumatic, no cyanosis or edema   Pulses:   2+ and symmetric all extremities   Skin:   Skin color, texture, turgor normal, no rashes or lesions   Lymph nodes:   Cervical, supraclavicular, and axillary nodes normal   Neurologic:   CNII-XII intact, normal strength, sensation and reflexes     throughout        Lab, Imaging and other studies: I have personally reviewed pertinent labs.  CMP:   Lab Results   Component Value Date    SODIUM 126 (L) 05/14/2024    K 5.3 05/14/2024    CL 96 05/14/2024    CO2 27 05/14/2024    BUN 36 (H) 05/14/2024    CREATININE 1.15 05/14/2024    CALCIUM 8.8 05/14/2024    EGFR 44 05/14/2024     Magnesium:   Lab Results   Component Value Date    MG 2.1 05/14/2024       Recent Labs     05/12/24 2038 05/13/24  0535 05/14/24  0454   SODIUM 125* 126* 126*     Lab Results   Component Value Date    OSMOUA 286 05/09/2024    NAUR 33 05/09/2024    OSMOLALITSER 268 (L) 05/09/2024

## 2024-05-14 NOTE — ASSESSMENT & PLAN NOTE
- Home meds: metformin 1000 mg BID, glipizide 5 mg  - HgbA1c 7.4% (4/26/24)  - Most recent blood sugar in the high 100s    Plan:  - Hold home antihyperglycemic agents  - SSI  - Hypoglycemia protocol

## 2024-05-15 ENCOUNTER — APPOINTMENT (INPATIENT)
Dept: CT IMAGING | Facility: HOSPITAL | Age: 84
DRG: 644 | End: 2024-05-15
Payer: MEDICARE

## 2024-05-15 LAB
ANION GAP SERPL CALCULATED.3IONS-SCNC: 6 MMOL/L (ref 4–13)
ANION GAP SERPL CALCULATED.3IONS-SCNC: 7 MMOL/L (ref 4–13)
ANION GAP SERPL CALCULATED.3IONS-SCNC: 7 MMOL/L (ref 4–13)
B-OH-BUTYR SERPL-MCNC: <0.05 MMOL/L (ref 0.02–0.27)
BUN SERPL-MCNC: 43 MG/DL (ref 5–25)
BUN SERPL-MCNC: 51 MG/DL (ref 5–25)
BUN SERPL-MCNC: 51 MG/DL (ref 5–25)
CALCIUM SERPL-MCNC: 9 MG/DL (ref 8.4–10.2)
CALCIUM SERPL-MCNC: 9.1 MG/DL (ref 8.4–10.2)
CALCIUM SERPL-MCNC: 9.3 MG/DL (ref 8.4–10.2)
CHLORIDE SERPL-SCNC: 96 MMOL/L (ref 96–108)
CHLORIDE SERPL-SCNC: 97 MMOL/L (ref 96–108)
CHLORIDE SERPL-SCNC: 99 MMOL/L (ref 96–108)
CO2 SERPL-SCNC: 24 MMOL/L (ref 21–32)
CO2 SERPL-SCNC: 24 MMOL/L (ref 21–32)
CO2 SERPL-SCNC: 25 MMOL/L (ref 21–32)
CREAT SERPL-MCNC: 0.98 MG/DL (ref 0.6–1.3)
CREAT SERPL-MCNC: 1.26 MG/DL (ref 0.6–1.3)
CREAT SERPL-MCNC: 1.31 MG/DL (ref 0.6–1.3)
GFR SERPL CREATININE-BSD FRML MDRD: 37 ML/MIN/1.73SQ M
GFR SERPL CREATININE-BSD FRML MDRD: 39 ML/MIN/1.73SQ M
GFR SERPL CREATININE-BSD FRML MDRD: 53 ML/MIN/1.73SQ M
GLUCOSE SERPL-MCNC: 161 MG/DL (ref 65–140)
GLUCOSE SERPL-MCNC: 271 MG/DL (ref 65–140)
GLUCOSE SERPL-MCNC: 291 MG/DL (ref 65–140)
GLUCOSE SERPL-MCNC: 297 MG/DL (ref 65–140)
GLUCOSE SERPL-MCNC: 414 MG/DL (ref 65–140)
GLUCOSE SERPL-MCNC: 423 MG/DL (ref 65–140)
GLUCOSE SERPL-MCNC: 429 MG/DL (ref 65–140)
GLUCOSE SERPL-MCNC: 495 MG/DL (ref 65–140)
GLUCOSE SERPL-MCNC: 512 MG/DL (ref 65–140)
GLUCOSE SERPL-MCNC: 524 MG/DL (ref 65–140)
GLUCOSE SERPL-MCNC: >600 MG/DL (ref 65–140)
MAGNESIUM SERPL-MCNC: 2 MG/DL (ref 1.9–2.7)
PHOSPHATE SERPL-MCNC: 4 MG/DL (ref 2.3–4.1)
POTASSIUM SERPL-SCNC: 4.5 MMOL/L (ref 3.5–5.3)
POTASSIUM SERPL-SCNC: 5.5 MMOL/L (ref 3.5–5.3)
POTASSIUM SERPL-SCNC: 5.5 MMOL/L (ref 3.5–5.3)
SODIUM SERPL-SCNC: 127 MMOL/L (ref 135–147)
SODIUM SERPL-SCNC: 128 MMOL/L (ref 135–147)
SODIUM SERPL-SCNC: 130 MMOL/L (ref 135–147)

## 2024-05-15 PROCEDURE — 80048 BASIC METABOLIC PNL TOTAL CA: CPT | Performed by: INTERNAL MEDICINE

## 2024-05-15 PROCEDURE — 94660 CPAP INITIATION&MGMT: CPT

## 2024-05-15 PROCEDURE — 84100 ASSAY OF PHOSPHORUS: CPT | Performed by: INTERNAL MEDICINE

## 2024-05-15 PROCEDURE — 99232 SBSQ HOSP IP/OBS MODERATE 35: CPT | Performed by: INTERNAL MEDICINE

## 2024-05-15 PROCEDURE — 94760 N-INVAS EAR/PLS OXIMETRY 1: CPT

## 2024-05-15 PROCEDURE — 83735 ASSAY OF MAGNESIUM: CPT | Performed by: INTERNAL MEDICINE

## 2024-05-15 PROCEDURE — 97530 THERAPEUTIC ACTIVITIES: CPT

## 2024-05-15 PROCEDURE — 74177 CT ABD & PELVIS W/CONTRAST: CPT

## 2024-05-15 PROCEDURE — 82948 REAGENT STRIP/BLOOD GLUCOSE: CPT

## 2024-05-15 PROCEDURE — 71260 CT THORAX DX C+: CPT

## 2024-05-15 PROCEDURE — 82010 KETONE BODYS QUAN: CPT

## 2024-05-15 PROCEDURE — 97116 GAIT TRAINING THERAPY: CPT

## 2024-05-15 PROCEDURE — 99497 ADVNCD CARE PLAN 30 MIN: CPT | Performed by: INTERNAL MEDICINE

## 2024-05-15 RX ORDER — INSULIN GLARGINE 100 [IU]/ML
20 INJECTION, SOLUTION SUBCUTANEOUS
Status: DISCONTINUED | OUTPATIENT
Start: 2024-05-15 | End: 2024-05-15

## 2024-05-15 RX ORDER — INSULIN LISPRO 100 [IU]/ML
3 INJECTION, SOLUTION INTRAVENOUS; SUBCUTANEOUS
Status: DISCONTINUED | OUTPATIENT
Start: 2024-05-15 | End: 2024-05-15

## 2024-05-15 RX ORDER — INSULIN LISPRO 100 [IU]/ML
3 INJECTION, SOLUTION INTRAVENOUS; SUBCUTANEOUS
Status: DISCONTINUED | OUTPATIENT
Start: 2024-05-16 | End: 2024-05-15

## 2024-05-15 RX ORDER — INSULIN LISPRO 100 [IU]/ML
1-6 INJECTION, SOLUTION INTRAVENOUS; SUBCUTANEOUS
Status: DISCONTINUED | OUTPATIENT
Start: 2024-05-16 | End: 2024-05-16

## 2024-05-15 RX ORDER — INSULIN GLARGINE 100 [IU]/ML
10 INJECTION, SOLUTION SUBCUTANEOUS
Status: DISCONTINUED | OUTPATIENT
Start: 2024-05-15 | End: 2024-05-15

## 2024-05-15 RX ORDER — INSULIN LISPRO 100 [IU]/ML
1-6 INJECTION, SOLUTION INTRAVENOUS; SUBCUTANEOUS
Status: DISCONTINUED | OUTPATIENT
Start: 2024-05-16 | End: 2024-05-15

## 2024-05-15 RX ORDER — INSULIN GLARGINE 100 [IU]/ML
10 INJECTION, SOLUTION SUBCUTANEOUS
Status: DISCONTINUED | OUTPATIENT
Start: 2024-05-15 | End: 2024-05-17 | Stop reason: HOSPADM

## 2024-05-15 RX ORDER — INSULIN LISPRO 100 [IU]/ML
3 INJECTION, SOLUTION INTRAVENOUS; SUBCUTANEOUS
Status: DISCONTINUED | OUTPATIENT
Start: 2024-05-16 | End: 2024-05-17 | Stop reason: HOSPADM

## 2024-05-15 RX ADMIN — SODIUM CHLORIDE 2 G: 1 TABLET ORAL at 16:32

## 2024-05-15 RX ADMIN — IOHEXOL 100 ML: 350 INJECTION, SOLUTION INTRAVENOUS at 09:21

## 2024-05-15 RX ADMIN — DIPHENHYDRAMINE HYDROCHLORIDE 50 MG: 25 TABLET ORAL at 08:30

## 2024-05-15 RX ADMIN — SODIUM CHLORIDE 12 UNITS/HR: 9 INJECTION, SOLUTION INTRAVENOUS at 18:40

## 2024-05-15 RX ADMIN — SENNOSIDES AND DOCUSATE SODIUM 1 TABLET: 8.6; 5 TABLET ORAL at 18:14

## 2024-05-15 RX ADMIN — METOPROLOL SUCCINATE 50 MG: 50 TABLET, EXTENDED RELEASE ORAL at 18:14

## 2024-05-15 RX ADMIN — SODIUM ZIRCONIUM CYCLOSILICATE 10 G: 10 POWDER, FOR SUSPENSION ORAL at 10:10

## 2024-05-15 RX ADMIN — SODIUM CHLORIDE 10 UNITS/HR: 9 INJECTION, SOLUTION INTRAVENOUS at 16:14

## 2024-05-15 RX ADMIN — INSULIN GLARGINE 10 UNITS: 100 INJECTION, SOLUTION SUBCUTANEOUS at 23:05

## 2024-05-15 RX ADMIN — SODIUM CHLORIDE 2 G: 1 TABLET ORAL at 08:02

## 2024-05-15 RX ADMIN — METOPROLOL SUCCINATE 50 MG: 50 TABLET, EXTENDED RELEASE ORAL at 08:02

## 2024-05-15 RX ADMIN — POLYETHYLENE GLYCOL 3350 17 G: 17 POWDER, FOR SOLUTION ORAL at 08:02

## 2024-05-15 RX ADMIN — INSULIN LISPRO 4 UNITS: 100 INJECTION, SOLUTION INTRAVENOUS; SUBCUTANEOUS at 08:02

## 2024-05-15 RX ADMIN — SODIUM CHLORIDE 15 UNITS/HR: 9 INJECTION, SOLUTION INTRAVENOUS at 19:15

## 2024-05-15 RX ADMIN — RIVAROXABAN 15 MG: 15 TABLET, FILM COATED ORAL at 08:02

## 2024-05-15 RX ADMIN — METHYLPREDNISOLONE 32 MG: 16 TABLET ORAL at 06:26

## 2024-05-15 RX ADMIN — DILTIAZEM HYDROCHLORIDE 120 MG: 120 CAPSULE, COATED, EXTENDED RELEASE ORAL at 08:02

## 2024-05-15 RX ADMIN — LATANOPROST 1 DROP: 50 SOLUTION OPHTHALMIC at 21:23

## 2024-05-15 RX ADMIN — SENNOSIDES AND DOCUSATE SODIUM 1 TABLET: 8.6; 5 TABLET ORAL at 08:02

## 2024-05-15 RX ADMIN — DONEPEZIL HYDROCHLORIDE 5 MG: 5 TABLET ORAL at 21:22

## 2024-05-15 RX ADMIN — LORATADINE 10 MG: 10 TABLET ORAL at 08:02

## 2024-05-15 RX ADMIN — INSULIN LISPRO 6 UNITS: 100 INJECTION, SOLUTION INTRAVENOUS; SUBCUTANEOUS at 11:32

## 2024-05-15 RX ADMIN — ATORVASTATIN CALCIUM 40 MG: 40 TABLET, FILM COATED ORAL at 08:02

## 2024-05-15 RX ADMIN — SODIUM CHLORIDE 50 ML/HR: 0.9 INJECTION, SOLUTION INTRAVENOUS at 00:00

## 2024-05-15 NOTE — ACP (ADVANCE CARE PLANNING)
"Advanced Care Planning Progress Note    I first discussed with the patient the purpose of this advance care planning and serious illness conversations and she was okay to proceed.      Serious Illness Conversation    1. What is your understanding now of where you are with your illness?  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  Patient also has history of atrial flutter and diabetes mellitus.  Patient knew that the pulmonary nodules were benign.  She sees a pulmonologist and oncologist for the carcinoid tumor of the bronchus and lung.  She told me, that at times, it is hard to breathe, especially on climbing on steps, but not too hard (difficulty of breathing was tolerable).  She told me that it takes only a few minutes and she is able to recover and get her breathing back.  Patient continued to be on room air with good oxygen saturations here, however, there is a progression of her pulmonary nodules on CT scan of the chest on this admission.     2. How much information about what is likely to be ahead with your illness would you like to have?  Information: patient wants to be fully informed     3. What did you (clinician) communicate to the patient?  Prognostic Communication: Uncertain - It can be difficult to predict what will happen with your illness. I hope you will continue to live well for a long time but I’m worried that you could get sick quickly, and I think it is important to prepare for that possibility.     4. If your health situation worsens, what are your most important goals?  Goals: not be a burden  \"I know my children will do everything to take care of me, but I do not want to be a burden to them as much as possible, as my they have children of their " "own.\"  Thus, when the time comes, patient wants \"to exist/live somewhere where I can get my meals, sleep and take shower, and not be a burden to my children\".  She told me that her  just , 2024, and that she is just being used to be living alone now.  Patient also told me, that for a time, she was not eating well, but improving now.     5. What are the biggest fears and worries about the future and your health?  That she won't to get better.  However, she also expressed to me: \"I know I won't get better better.\"     6. What abilities are so critical to your life that you cannot imagine living without them?  \"I wish I could still drive to go to the grocery, post office and bank.\"     7. What gives you strength as you think about the future with your illness?  \"Going to Religious.\"     8. If you become sicker, how much are you willing to go through for the possibility of gaining more time?  Be in the hospital: Yes    Be in the ICU: Yes    On the question of being on the ventilator: \"If it will help, yes.  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  On the question of have a feeding tube: \"I do not know.  My  had it and did not tolerate it and he pulled it out.\"  On the question of living in a nursing home: \"Up to a certain point (depends on payment/cost, Medicare coverage).\"  On the question of be uncomfortable: \"That is a decision that I will make on the dot.\"  On the question of undergo aggressive tests and/or procedures: \"Up to a certain point.  It depends.\"    9. How much does your proxy and family know about your priorities and wishes?  Discussion Discussion: some discussion but incomplete  With patient's daughters: \"Meri Aba, oldest daughter who lives in [place]; Naomie Hartmann, youngest daughter who also lives in [place]; and " "Suzanne Roblero, a daughter who lives at [facility], [address], and works at [employer].\"     From my discussion with her, she will continue to follow-up with her pulmonologist, Dr. RAYSA Garland, and with her oncologist, Dr. Benitez, from [facility], for her COPD and pulmonary nodules.  She will also continue following up with her cardiologist, Dr. Marquez.  Presently she is being managed here for hyponatremia.    How does this plan sound to you? I will do everything I can to help you through this.  Patient verbalized understanding of the plan     I have spent 30 minutes speaking with my patient on advanced care planning today or during this visit              Jessica Diaz MD       "

## 2024-05-15 NOTE — PLAN OF CARE
Problem: Potential for Falls  Goal: Patient will remain free of falls  Description: INTERVENTIONS:  - Educate patient/family on patient safety including physical limitations  - Instruct patient to call for assistance with activity   - Consult OT/PT to assist with strengthening/mobility   - Keep Call bell within reach  - Keep bed low and locked with side rails adjusted as appropriate  - Keep care items and personal belongings within reach  - Initiate and maintain comfort rounds  - Make Fall Risk Sign visible to staff  - Offer Toileting every  Hours, in advance of need  - Initiate/Maintain alarm  - Obtain necessary fall risk management equipment:   - Apply yellow socks and bracelet for high fall risk patients  - Consider moving patient to room near nurses station  Outcome: Progressing     Problem: Prexisting or High Potential for Compromised Skin Integrity  Goal: Skin integrity is maintained or improved  Description: INTERVENTIONS:  - Identify patients at risk for skin breakdown  - Assess and monitor skin integrity  - Assess and monitor nutrition and hydration status  - Monitor labs   - Assess for incontinence   - Turn and reposition patient  - Assist with mobility/ambulation  - Relieve pressure over bony prominences  - Avoid friction and shearing  - Provide appropriate hygiene as needed including keeping skin clean and dry  - Evaluate need for skin moisturizer/barrier cream  - Collaborate with interdisciplinary team   - Patient/family teaching  - Consider wound care consult   Outcome: Progressing     Problem: PAIN - ADULT  Goal: Verbalizes/displays adequate comfort level or baseline comfort level  Description: Interventions:  - Encourage patient to monitor pain and request assistance  - Assess pain using appropriate pain scale  - Administer analgesics based on type and severity of pain and evaluate response  - Implement non-pharmacological measures as appropriate and evaluate response  - Consider cultural and  social influences on pain and pain management  - Notify physician/advanced practitioner if interventions unsuccessful or patient reports new pain  Outcome: Progressing     Problem: INFECTION - ADULT  Goal: Absence or prevention of progression during hospitalization  Description: INTERVENTIONS:  - Assess and monitor for signs and symptoms of infection  - Monitor lab/diagnostic results  - Monitor all insertion sites, i.e. indwelling lines, tubes, and drains  - Monitor endotracheal if appropriate and nasal secretions for changes in amount and color  - [place] appropriate cooling/warming therapies per order  - Administer medications as ordered  - Instruct and encourage patient and family to use good hand hygiene technique  - Identify and instruct in appropriate isolation precautions for identified infection/condition  Outcome: Progressing  Goal: Absence of fever/infection during neutropenic period  Description: INTERVENTIONS:  - Monitor WBC    Outcome: Progressing     Problem: SAFETY ADULT  Goal: Patient will remain free of falls  Description: INTERVENTIONS:  - Educate patient/family on patient safety including physical limitations  - Instruct patient to call for assistance with activity   - Consult OT/PT to assist with strengthening/mobility   - Keep Call bell within reach  - Keep bed low and locked with side rails adjusted as appropriate  - Keep care items and personal belongings within reach  - Initiate and maintain comfort rounds  - Make Fall Risk Sign visible to staff  - Offer Toileting every  Hours, in advance of need  - Initiate/Maintain alarm  - Obtain necessary fall risk management equipment:   - Apply yellow socks and bracelet for high fall risk patients  - Consider moving patient to room near nurses station  Outcome: Progressing  Goal: Maintain or return to baseline ADL function  Description: INTERVENTIONS:  -  Assess patient's ability to carry out ADLs; assess patient's baseline for ADL function and  identify physical deficits which impact ability to perform ADLs (bathing, care of mouth/teeth, toileting, grooming, dressing, etc.)  - Assess/evaluate cause of self-care deficits   - Assess range of motion  - Assess patient's mobility; develop plan if impaired  - Assess patient's need for assistive devices and provide as appropriate  - Encourage maximum independence but intervene and supervise when necessary  - Involve family in performance of ADLs  - Assess for home care needs following discharge   - Consider OT consult to assist with ADL evaluation and planning for discharge  - Provide patient education as appropriate  Outcome: Progressing  Goal: Maintains/Returns to pre admission functional level  Description: INTERVENTIONS:  - Perform AM-PAC 6 Click Basic Mobility/ Daily Activity assessment daily.  - Set and communicate daily mobility goal to care team and patient/family/caregiver.   - Collaborate with rehabilitation services on mobility goals if consulted  - Perform Range of Motion  times a day.  - Reposition patient every  hours.  - Dangle patient  times a day  - Stand patient  times a day  - Ambulate patient  times a day  - Out of bed to chair  times a day   - Out of bed for meals  times a day  - Out of bed for toileting  - Record patient progress and toleration of activity level   Outcome: Progressing     Problem: DISCHARGE PLANNING  Goal: Discharge to home or other facility with appropriate resources  Description: INTERVENTIONS:  - Identify barriers to discharge w/patient and caregiver  - Arrange for needed discharge resources and transportation as appropriate  - Identify discharge learning needs (meds, wound care, etc.)  - Arrange for interpretive services to assist at discharge as needed  - Refer to Case Management Department for coordinating discharge planning if the patient needs post-hospital services based on physician/advanced practitioner order or complex needs related to functional status,  cognitive ability, or social support system  Outcome: Progressing     Problem: Knowledge Deficit  Goal: Patient/family/caregiver demonstrates understanding of disease process, treatment plan, medications, and discharge instructions  Description: Complete learning assessment and assess knowledge base.  Interventions:  - Provide teaching at level of understanding  - Provide teaching via preferred learning methods  Outcome: Progressing

## 2024-05-15 NOTE — ASSESSMENT & PLAN NOTE
- Home meds: metformin 1000 mg BID, glipizide 5 mg  - HgbA1c 7.4% (4/26/24)  - Most recent blood sugars in the 400s    Plan:  - Hold home antihyperglycemic agents  - 24 hour insulin requirement is 20 units.  Will start on 10 units Lantus.  Will start on 3 units insulin lispro TID mealtime  - SSI  - Hypoglycemia protocol

## 2024-05-15 NOTE — ASSESSMENT & PLAN NOTE
Lab Results   Component Value Date    K 5.5 (H) 05/15/2024    K 5.3 05/14/2024    K 5.0 05/13/2024         Plan:  - Hold home spironolactone, losartan  - Check BMP daily  - Will give 1 dose of Lokelma

## 2024-05-15 NOTE — PROGRESS NOTES
Progress Note - Nephrology   Camryn Roblero 83 y.o. female MRN: [id number]  Unit/Bed#: S -01 Encounter: [id number]      Assessment and Plan:  Hyponatremia  - Na 120 on routine outpatient labs and on arrival   - Symptomatic with fatigue and generalized weakness  -Hx of hyponatremia, Carcinoid Tumor, HFpEF   - Baseline Na 130s  -Outpatient regimen includes Torsemide, Aldactone, Losartan, and Fluid Restriction  -Torsemide, Losartan, and Aldactone on hold    -If CT scan today will continue to hold Losartan   - Urine osmolality 286, urine sodium 33, serum osmolality 268  - Initially placed on fluids now discontinued, Na tabs 1g TID then transitioned to 2g BID  -I/O-  Inaccurately documented, pt using bathroom on own   -Weight unchanged but down overall   - Current Na 128, improved with IVF in preparation for CT CAP today   -Recheck Na tomorrow in AM   -Will continue to  hold Torsemide and Losartan for now    -Continue Na 2g BID with meals         Chronic Kidney Disease Stage 2  - Baseline appears to be around 1  - Secondary to diabetic kidney disease, hypertensive nephrosclerosis, cardiorenal, age-related nephron loss   -Renal function stable   -BUN/Cr 43/0.98 near baseline   -Urinary retention protocol  -Closely monitor I/Os  -Repeat BMP 2pm after scan     Hyperkalemia  Today 5.5  Low K diet  Lokelma   Repeat BMP around 2pm           Hypomagnesemia- Resolved   -Mg 2.1  -Replete electrolytes as appropriate         Neuroendocrine Tumor- Carcinoid  -Follows Pulmonology as outpatient  -Unintentional weight loss  -Chest xray on admission- Prominent R hilum lymphadenopathy vs mass  -Question if Carcinoid spread or new mass?  Suspicion for worsening SIADH   -CT CAP today, prophylaxis and IVF given            Hypertension  - Home regimen includes Toprol, Valsartan, and Aldactone   -/62        HFpEF  - Home regimen includes Low Na diet, Fluid Restriction, Torsemide 10mg 3x/week, Aldactone   - Last Echo 11/23: EF >70%,  G2DD  -Weight stable   -Euvolemic on exam     Subjective:   Patient was seen and examined at the bedside.  Patient was resting comfortably in no overt acute distress in the chair.  Patient endorsed no complaints at the time my evaluation.  Patient denies headache, dizziness, fatigue, chest pain, shortness of breath, abdominal pain, NVD, constipation, overt leg swelling, or any other symptoms at this time.    Objective:     Vitals: Blood pressure 121/62, pulse 74, temperature 98.2 °F (36.8 °C), resp. rate 18, weight 74.1 kg (163 lb 5.8 oz), SpO2 93%.,Body mass index is 32.99 kg/m².    Weight (last 2 days)       Date/Time Weight    05/15/24 0600 74.1 (163.36)    05/14/24 0600 74.2 (163.58)    05/13/24 0507 73.6 (162.26)              Intake/Output Summary (Last 24 hours) at 5/15/2024 0834  Last data filed at 5/15/2024 0000  Gross per 24 hour   Intake 700 ml   Output 1125 ml   Net -425 ml            Physical Exam: /62   Pulse 74   Temp 98.2 °F (36.8 °C)   Resp 18   Wt 74.1 kg (163 lb 5.8 oz)   SpO2 93%   BMI 32.99 kg/m²     General Appearance:    Alert, cooperative, no distress, appears stated age   Head:    Normocephalic, without obvious abnormality, atraumatic   Eyes:    PERRL, conjunctiva/corneas clear, EOM's intact, fundi     benign, both eyes   Ears:    Normal TM's and external ear canals, both ears   Nose:   Nares normal, septum midline, mucosa normal, no drainage    or sinus tenderness   Throat:   Lips, mucosa, and tongue normal; teeth and gums normal   Neck:   Supple, symmetrical, trachea midline, no adenopathy;     thyroid:  no enlargement/tenderness/nodules; no carotid    bruit or JVD   Back:     Symmetric, no curvature, ROM normal, no CVA tenderness   Lungs:     Clear to auscultation bilaterally, respirations unlabored   Chest Wall:    No tenderness or deformity    Heart:    Regular rate and rhythm, S1 and S2 normal, no murmur, rub   or gallop       Abdomen:     Soft, non-tender, bowel sounds  active all four quadrants,     no masses, no organomegaly           Extremities: Trace edema bilaterally extremities, atraumatic, no cyanosis   Pulses:   2+ and symmetric all extremities   Skin:   Skin color, texture, turgor normal, no rashes or lesions   Lymph nodes:   Cervical, supraclavicular, and axillary nodes normal   Neurologic:   CNII-XII intact, normal strength, sensation and reflexes     throughout        Lab, Imaging and other studies: I have personally reviewed pertinent labs.  CMP:   Lab Results   Component Value Date    SODIUM 128 (L) 05/15/2024    K 5.5 (H) 05/15/2024    CL 97 05/15/2024    CO2 25 05/15/2024    BUN 43 (H) 05/15/2024    CREATININE 0.98 05/15/2024    CALCIUM 9.1 05/15/2024    EGFR 53 05/15/2024     Phosphorus:   Lab Results   Component Value Date    PHOS 4.0 05/15/2024     Magnesium:   Lab Results   Component Value Date    MG 2.0 05/15/2024     Radiology review: CT CAP today

## 2024-05-15 NOTE — ASSESSMENT & PLAN NOTE
- Home meds: metformin 1000 mg BID, glipizide 5 mg  - HgbA1c 7.4% (4/26/24)  - Most recent blood sugars in the 400s    Plan:  - Hold home antihyperglycemic agents  - Now off insulin drip start. Continue 10 units Lantus. Continue  3 units insulin lispro TID mealtime  - SSI  - Hypoglycemia protocol

## 2024-05-15 NOTE — ASSESSMENT & PLAN NOTE
Lab Results   Component Value Date    K 5.0 05/16/2024    K 5.6 (H) 05/16/2024    K 4.5 05/15/2024     Plan:  - Hold home spironolactone, losartan  - Check BMP daily  - Will give 1 dose of Lokelma

## 2024-05-15 NOTE — ASSESSMENT & PLAN NOTE
- Home meds: Valsartan 320 mg, Cardizem 120 mg, Toprol 50 mg BID, Spironolactone 25 mg, Torsemide 10 mg three times per week  - Blood pressure is adequately controlled     Plan:  - Continue home Cardizem and Toprol  - Hold home Torsemide, spironolactone  - Hold formulary equivalent losartan
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
## 2024-05-15 NOTE — ASSESSMENT & PLAN NOTE
Lab Results   Component Value Date    CREATININE 1.22 05/16/2024    CREATININE 1.12 05/16/2024    CREATININE 1.31 (H) 05/15/2024     Plan:  - Avoid hypotension and nephrotoxins  - Check BMP daily

## 2024-05-15 NOTE — ASSESSMENT & PLAN NOTE
- Had Na 120 on routine outpatient bloodwork. Sent in by nephrologist Dr. Perkins  - Has history of chronic hyponatremia. Baseline Na in the low 130s  - Endorsed decreased food and water intake since death of  1 month ago  - Urine osm 286, urine sodium 33, serum osm 268. Hypoosmolar, however, this is skewed because patient is on diuretics at home  - Endorses decreased food and water intake since death of  1 month ago  - Started on salt tablets 1 g TID   - Na improved to 129 today    Plan:   - Continue salt tablets 2 g BID  - Restart home torsemide 10 mg every other day  - Hold home spironolactone  - Check BMP daily  - I/O's  - Nephrology following, appreciate further recommendations

## 2024-05-15 NOTE — ASSESSMENT & PLAN NOTE
- CXR (5/9) - prominent right hilum. Lymphadenopathy versus mass in the differential. CT chest with contrast recommended   - Nephrology recommends CT CAP to rule out malignancy given hyponatremia and recent 50 pound weight loss  - Patient has history of allergy to iodinated contrast    Plan:  - Patient successfully underwent CTAP with IV contrast without any allergic reactions.  Follow-up on results

## 2024-05-15 NOTE — ASSESSMENT & PLAN NOTE
- Had Na 120 on routine outpatient bloodwork. Sent in by nephrologist Dr. Perkins  - Has history of chronic hyponatremia. Baseline Na in the low 130s  - Endorsed decreased food and water intake since death of  1 month ago  - Urine osm 286, urine sodium 33, serum osm 268. Hypoosmolar, however, this is skewed because patient is on diuretics at home  - Endorses decreased food and water intake since death of  1 month ago  - Started on salt tablets 1 g TID   - Na improved to 128 today    Plan:   - Continue salt tablets 2 g BID  - Continue fluid restriction of 1.5 L  - Hold home spironolactone, torsemide  - Check BMP daily  - I/O's  - Nephrology following, appreciate further recommendations

## 2024-05-15 NOTE — PROGRESS NOTES
[facility]  Progress Note  Name: Camryn Roblero I  MRN: [id number]  Unit/Bed#: S -01 I Date of Admission: 5/9/2024   Date of Service: 5/15/2024 I Hospital Day: 6    Assessment & Plan   * Hyponatremia  Assessment & Plan  - Had Na 120 on routine outpatient bloodwork. Sent in by nephrologist Dr. Perkins  - Has history of chronic hyponatremia. Baseline Na in the low 130s  - Endorsed decreased food and water intake since death of  1 month ago  - Urine osm 286, urine sodium 33, serum osm 268. Hypoosmolar, however, this is skewed because patient is on diuretics at home  - Endorses decreased food and water intake since death of  1 month ago  - Started on salt tablets 1 g TID   - Na improved to 128 today    Plan:   - Continue salt tablets 2 g BID  - Continue fluid restriction of 1.5 L  - Hold home spironolactone, torsemide  - Check BMP daily  - I/O's  - Nephrology following, appreciate further recommendations    Abnormal chest x-ray  Assessment & Plan  - CXR (5/9) - prominent right hilum. Lymphadenopathy versus mass in the differential. CT chest with contrast recommended   - Nephrology recommends CT CAP to rule out malignancy given hyponatremia and recent 50 pound weight loss  - Patient has history of allergy to iodinated contrast    Plan:  - Patient successfully underwent CTAP with IV contrast without any allergic reactions.  Follow-up on results    Type 2 diabetes mellitus, without long-term current use of insulin (HCC)  Assessment & Plan  - Home meds: metformin 1000 mg BID, glipizide 5 mg  - HgbA1c 7.4% (4/26/24)  - Most recent blood sugars in the 400s    Plan:  - Hold home antihyperglycemic agents  - 24 hour insulin requirement is 20 units.  Will start on 10 units Lantus.  Will start on 3 units insulin lispro TID mealtime  - SSI  - Hypoglycemia protocol    Hyperkalemia  Assessment & Plan  Lab Results   Component Value Date    K 5.5 (H) 05/15/2024    K 5.3 05/14/2024    K 5.0  05/13/2024     Plan:  - Hold home spironolactone, losartan  - Check BMP daily  - Will give 1 dose of Lokelma    Essential hypertension  Assessment & Plan  - Home meds: Valsartan 320 mg, Cardizem 120 mg, Toprol 50 mg BID, Spironolactone 25 mg, Torsemide 10 mg three times per week  - Blood pressure is adequately controlled     Plan:  - Continue home Cardizem and Toprol  - Hold home Torsemide, spironolactone  - Hold formulary equivalent losartan    CKD (chronic kidney disease) stage 2, GFR 60-89 ml/min  Assessment & Plan  - Baseline Cr < 1 with eGFR in 60s  - Cr improved to 0.98.  Yesterday it was 1.15    Plan:  - Avoid hypotension and nephrotoxins  - Check BMP daily    Hyperlipidemia  Assessment & Plan  - Lipid panel (5/9/24) - TC 86, TG 59, LDL 40, HDL 34  - Home med: atorvastatin 40 mg     Plan:  - Continue home atorvastatin    Impaired memory  Assessment & Plan  - Continue home donepezil 5 mg    Heart failure with preserved ejection fraction (HCC)  Assessment & Plan  Wt Readings from Last 3 Encounters:   05/15/24 74.1 kg (163 lb 5.8 oz)   04/26/24 74.8 kg (165 lb)   04/25/24 78.2 kg (172 lb 6.4 oz)     - Home meds: Torsemide 10 mg 3 times/week, spironolactone 25 mg, Toprol 50 mg BID  - Echo (11/2023) - EF > 70%. G2DD. LA severely dilated. PASP 37 mmHg  - Per patient, 50 pound weight loss in the last year  - Not in exacerbation at this time    Plan:  - Hold home spironolactone and torsemide.  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 History of exposure to secondhand smoke  - Baseline oxygen is 1 L NC prn  - Currently on room air  - Not currently in exacerbation  - Follows with pulmonology as outpatient    Plan:  - Oxygen supplementation as necessary    TOM (obstructive sleep apnea)  Assessment & Plan  - Has mild TOM with oxygen desaturation on overnight sleep study  - Baseline oxygen is 1 L NC prn    Plan:  - CPAP qhs           VTE Pharmacologic Prophylaxis: VTE Score: 5 High Risk (Score >/= 5) - Pharmacological DVT Prophylaxis Ordered: rivaroxaban (Xarelto). Sequential Compression Devices Ordered.    Mobility:   Basic Mobility Inpatient Raw Score: 18  JH-HLM Goal: 6: Walk 10 steps or more  JH-HLM Achieved: 7: Walk 25 feet or more  JH-HLM Goal achieved. Continue to encourage appropriate mobility.    Patient Centered Rounds: I performed bedside rounds with nursing staff today.  Discussions with Specialists or Other Care Team Provider: Nephrology    Education and Discussions with Family / Patient: Updated  (daughter) via phone.    Current Length of Stay: 6 day(s)  Current Patient Status: Inpatient   Discharge Plan: Anticipate discharge in 24-48 hrs to rehab facility.  PT recommended level 2 but patient wants home with home health    Code Status: Level 1 - Full Code    Subjective:   Patient was seen and examined at bedside.  She denied chest pain, difficulty breathing, lightheadedness, abdominal pain, nausea, or vomiting.  She had a bowel movement yesterday.  She has been eating.  She is back from CT scan this morning.    Objective:     Vitals:   Temp (24hrs), Av.2 °F (36.8 °C), Min:98.2 °F (36.8 °C), Max:98.2 °F (36.8 °C)    Temp:  [98.2 °F (36.8 °C)] 98.2 °F (36.8 °C)  HR:  [59-74] 74  Resp:  [18] 18  BP: (121-142)/(53-70) 121/62  SpO2:  [93 %-96 %] 93 %  Body mass index is 32.99 kg/m².     Input and Output  Summary (last 24 hours):     Intake/Output Summary (Last 24 hours) at 5/15/2024 1417  Last data filed at 5/15/2024 0000  Gross per 24 hour   Intake 240 ml   Output 725 ml   Net -485 ml       Physical Exam:   Physical Exam  Vitals and nursing note reviewed.   Constitutional:       General: She is not in acute distress.     Appearance: Normal appearance. She is well-developed. She is not ill-appearing.   HENT:      Head: Normocephalic and atraumatic.   Cardiovascular:      Rate and Rhythm: Normal rate and regular rhythm.      Heart sounds: No murmur heard.  Pulmonary:      Breath sounds: Normal breath sounds. No wheezing, rhonchi or rales.   Abdominal:      General: Bowel sounds are normal.      Palpations: Abdomen is soft.      Tenderness: There is no abdominal tenderness. There is no guarding or rebound.   Musculoskeletal:         General: No swelling.      Cervical back: Normal range of motion and neck supple.   Skin:     General: Skin is warm and dry.      Capillary Refill: Capillary refill takes less than 2 seconds.   Neurological:      Mental Status: She is alert and oriented to person, place, and time. Mental status is at baseline.   Psychiatric:         Mood and Affect: Mood normal.         Behavior: Behavior normal.        Additional Data:     Labs:  Results from last 7 days   Lab Units 05/13/24  0535   WBC Thousand/uL 8.27   HEMOGLOBIN g/dL 12.9   HEMATOCRIT % 40.3   PLATELETS Thousands/uL 353   SEGS PCT % 62   LYMPHO PCT % 17   MONO PCT % 13*   EOS PCT % 6     Results from last 7 days   Lab Units 05/15/24  0507 05/10/24  0240 05/09/24  1929   SODIUM mmol/L 128*   < > 120*   POTASSIUM mmol/L 5.5*   < > 5.4*   CHLORIDE mmol/L 97   < > 89*   CO2 mmol/L 25   < > 26   BUN mg/dL 43*   < > 26*   CREATININE mg/dL 0.98   < > 0.87   ANION GAP mmol/L 6   < > 5   CALCIUM mg/dL 9.1   < > 9.4   ALBUMIN g/dL  --   --  3.6   TOTAL BILIRUBIN mg/dL  --   --  0.84   ALK PHOS U/L  --   --  116*   ALT U/L  --   --  32   AST U/L   --   --  65*   GLUCOSE RANDOM mg/dL 271*   < > 99    < > = values in this interval not displayed.         Results from last 7 days   Lab Units 05/15/24  1258 05/15/24  1107 05/15/24  1106 05/15/24  0757 05/14/24  2054 05/14/24  1515 05/14/24  1124 05/14/24  0745 05/13/24  2207 05/13/24  1649 05/13/24  1422 05/13/24  1124   POC GLUCOSE mg/dl 414* 423* 429* 291* 136 291* 363* 165* 154* 228* 241* 472*               Lines/Drains:  Invasive Devices       Peripheral Intravenous Line  Duration             Peripheral IV 05/13/24 Right;Ventral (anterior) Forearm 2 days                          Imaging: Reviewed radiology reports from this admission including: chest xray, chest CT scan, and abdominal/pelvic CT    Recent Cultures (last 7 days):   Results from last 7 days   Lab Units 05/09/24  1930   URINE CULTURE  40,000-49,000 cfu/ml       Last 24 Hours Medication List:   Current Facility-Administered Medications   Medication Dose Route Frequency Provider Last Rate    acetaminophen  650 mg Oral Q6H PRN Bia Moreno, DO      atorvastatin  40 mg Oral Daily Bia Moreno DO      bisacodyl  10 mg Rectal Daily PRN Vasu Perkins, DO      diltiazem  120 mg Oral Daily Bia Moreno, DO      diphenhydrAMINE  50 mg Oral 60 Min Pre-Op Vaus Perkins, DO      donepezil  5 mg Oral HS Osvaldo Corcoran MD      insulin glargine  10 Units Subcutaneous HS Vasu Perkins, DO      insulin lispro  1-6 Units Subcutaneous 4x Daily (PC & HS) Vasu Perkins, DO      insulin lispro  3 Units Subcutaneous TID With Meals Vasu Perkins, DO      latanoprost  1 drop Both Eyes HS Bia Moreno, DO      loratadine  10 mg Oral Daily Bia Moreno, DO      metoprolol succinate  50 mg Oral BID Bia Moreno, DO      polyethylene glycol  17 g Oral Daily Vasu Perkins, DO      rivaroxaban  15 mg Oral Daily With Breakfast Bia Moreno DO      senna-docusate sodium  1 tablet Oral BID Jeison Chairez MD      sodium chloride  2 g Oral BID  With Meals Raghavendra Rubin MD          Today, Patient Was Seen By: Vasu Perkins DO    **Please Note: This note may have been constructed using a voice recognition system.**     No indicators present

## 2024-05-15 NOTE — ASSESSMENT & PLAN NOTE
- CXR (5/9) - prominent right hilum. Lymphadenopathy versus mass in the differential. CT chest with contrast recommended   - Nephrology recommends CT CAP to rule out malignancy given hyponatremia and recent 50 pound weight loss  - Patient has history of allergy to iodinated contrast  CT CAP  Multiple pulmonary nodules including biopsy-proven carcinoid in the left lower lobe are stable from 2018. However, there is a 1.4 cm nodule in the left lower lobe which, while stable from the most recent prior CT, has demonstrated slow growth since  2018 and may also represent carcinoid.  New right lower lobe pulmonary nodule measuring 6 mm with surrounding groundglass density. Short interval follow-up in 3-6 months is recommended.  Multiple pancreatic cysts measuring up to 1.2 cm. For simple cyst(s) less than 1.5 cm, recommend follow-up every 2 years for 2 times. After 4 years, no more follow-ups. Recommend next follow-up in 2 years. Preferred imaging modality: abdomen MRI and MRCP with and without IV contrast, or triple phase abdomen CT with IV contrast, or abdomen MRI and MRCP without IV contrast.  Indeterminate left renal hypodensity, possibly a hemorrhagic cyst, not present in 2017. 6-month follow-up renal protocol CT is recommended to evaluate for growth and enhancement.  Plan:  - For follow-up with pulmonology, oncology as outpatient  - Will give referral for GI and urology follow-up as outpatient

## 2024-05-15 NOTE — ASSESSMENT & PLAN NOTE
- Baseline Cr < 1 with eGFR in 60s  - Cr improved to 0.98.  Yesterday it was 1.15    Plan:  - Avoid hypotension and nephrotoxins  - Check BMP daily

## 2024-05-15 NOTE — NURSING NOTE
Patient arrived to Radiology for CT chest abdomen pelvis with contrast. Patient is allergic to iodinated contrast and took Medrol 32 mg 12 hours and 2 hours before to arriving for the scan per protocol, but Benadryl 50 mg was not administered 1 hour prior to her scan as ordered. Spoke to Radiologist MD Vasquez who recommends giving Benadryl now and waiting 30-40 minutes after administration to scan patient. Benadryl given to patient at 08:30. CT tech Stefani chen.

## 2024-05-15 NOTE — PLAN OF CARE
Problem: PHYSICAL THERAPY ADULT  Goal: Performs mobility at highest level of function for planned discharge setting.  See evaluation for individualized goals.  Description: Treatment/Interventions: Functional transfer training, LE strengthening/ROM, Elevations, Endurance training, Therapeutic exercise, Patient/family training, Equipment eval/education, Bed mobility, Gait training, Compensatory technique education, Cognitive reorientation          See flowsheet documentation for full assessment, interventions and recommendations.  Outcome: Progressing  Note: Prognosis: Good  Problem List: Decreased strength, Decreased endurance, Impaired balance, Decreased mobility, Pain  Assessment: PT treatment provided today to address deficits of: impaired balance, gait deviations, and decreased activity tolerance. Interventions provided include: transfer, gait, balance, endurance, and stair negotiation in order to challenge pt's activity tolerance and to maximize pt independence w/ functional mobility during current admission w/ hyponatremia. Compared to previous session, pt w/ overall improved activity tolerance and mobility. Pt demonstrated ability to perform multiple gait trials w/ RW and negotiate 12 stairs w/ UE support. Pt did not require external physical support throughout mobility w/ pt demonstrating good safety awareness and hand placement w/ transfers. Pt will continue benefit from PT to promote independence w/ functional mobility, address mobility deficits, and progress towards set goals. Recommend DC w/ level: III (Minimum Rehab Resource Intensity) when medically cleared.  Barriers to Discharge:  (pt reports daughters visit throughout the week)     Rehab Resource Intensity Level, PT: III (Minimum Resource Intensity)    See flowsheet documentation for full assessment.

## 2024-05-15 NOTE — ASSESSMENT & PLAN NOTE
Wt Readings from Last 3 Encounters:   05/15/24 74.1 kg (163 lb 5.8 oz)   04/26/24 74.8 kg (165 lb)   04/25/24 78.2 kg (172 lb 6.4 oz)     - Home meds: Torsemide 10 mg 3 times/week, spironolactone 25 mg, Toprol 50 mg BID  - Echo (11/2023) - EF > 70%. G2DD. LA severely dilated. PASP 37 mmHg  - Per patient, 50 pound weight loss in the last year  - Not in exacerbation at this time    Plan:  - Hold home spironolactone and torsemide.  - Continue Toprol 50 mg BID  - I/O's

## 2024-05-16 ENCOUNTER — TELEPHONE (OUTPATIENT)
Dept: NEPHROLOGY | Facility: CLINIC | Age: 84
End: 2024-05-16

## 2024-05-16 DIAGNOSIS — N18.30 STAGE 3 CHRONIC KIDNEY DISEASE, UNSPECIFIED WHETHER STAGE 3A OR 3B CKD (HCC): Primary | ICD-10-CM

## 2024-05-16 PROBLEM — D72.829 LEUKOCYTOSIS: Status: ACTIVE | Noted: 2024-05-16

## 2024-05-16 PROBLEM — E44.1 MILD PROTEIN-CALORIE MALNUTRITION (HCC): Status: ACTIVE | Noted: 2024-05-16

## 2024-05-16 LAB
ANION GAP SERPL CALCULATED.3IONS-SCNC: 4 MMOL/L (ref 4–13)
ANION GAP SERPL CALCULATED.3IONS-SCNC: 4 MMOL/L (ref 4–13)
BUN SERPL-MCNC: 50 MG/DL (ref 5–25)
BUN SERPL-MCNC: 53 MG/DL (ref 5–25)
CALCIUM SERPL-MCNC: 9 MG/DL (ref 8.4–10.2)
CALCIUM SERPL-MCNC: 9.4 MG/DL (ref 8.4–10.2)
CHLORIDE SERPL-SCNC: 98 MMOL/L (ref 96–108)
CHLORIDE SERPL-SCNC: 99 MMOL/L (ref 96–108)
CO2 SERPL-SCNC: 26 MMOL/L (ref 21–32)
CO2 SERPL-SCNC: 27 MMOL/L (ref 21–32)
CREAT SERPL-MCNC: 1.12 MG/DL (ref 0.6–1.3)
CREAT SERPL-MCNC: 1.22 MG/DL (ref 0.6–1.3)
ERYTHROCYTE [DISTWIDTH] IN BLOOD BY AUTOMATED COUNT: 14.7 % (ref 11.6–15.1)
GFR SERPL CREATININE-BSD FRML MDRD: 41 ML/MIN/1.73SQ M
GFR SERPL CREATININE-BSD FRML MDRD: 45 ML/MIN/1.73SQ M
GLUCOSE SERPL-MCNC: 132 MG/DL (ref 65–140)
GLUCOSE SERPL-MCNC: 139 MG/DL (ref 65–140)
GLUCOSE SERPL-MCNC: 155 MG/DL (ref 65–140)
GLUCOSE SERPL-MCNC: 167 MG/DL (ref 65–140)
GLUCOSE SERPL-MCNC: 178 MG/DL (ref 65–140)
GLUCOSE SERPL-MCNC: 218 MG/DL (ref 65–140)
GLUCOSE SERPL-MCNC: 244 MG/DL (ref 65–140)
GLUCOSE SERPL-MCNC: 281 MG/DL (ref 65–140)
GLUCOSE SERPL-MCNC: 50 MG/DL (ref 65–140)
GLUCOSE SERPL-MCNC: 71 MG/DL (ref 65–140)
HCT VFR BLD AUTO: 37.7 % (ref 34.8–46.1)
HGB BLD-MCNC: 12.3 G/DL (ref 11.5–15.4)
MAGNESIUM SERPL-MCNC: 2 MG/DL (ref 1.9–2.7)
MCH RBC QN AUTO: 27.6 PG (ref 26.8–34.3)
MCHC RBC AUTO-ENTMCNC: 32.6 G/DL (ref 31.4–37.4)
MCV RBC AUTO: 85 FL (ref 82–98)
PHOSPHATE SERPL-MCNC: 3.4 MG/DL (ref 2.3–4.1)
PLATELET # BLD AUTO: 350 THOUSANDS/UL (ref 149–390)
PMV BLD AUTO: 9.7 FL (ref 8.9–12.7)
POTASSIUM SERPL-SCNC: 5 MMOL/L (ref 3.5–5.3)
POTASSIUM SERPL-SCNC: 5.6 MMOL/L (ref 3.5–5.3)
RBC # BLD AUTO: 4.45 MILLION/UL (ref 3.81–5.12)
SODIUM SERPL-SCNC: 129 MMOL/L (ref 135–147)
SODIUM SERPL-SCNC: 129 MMOL/L (ref 135–147)
WBC # BLD AUTO: 13.55 THOUSAND/UL (ref 4.31–10.16)

## 2024-05-16 PROCEDURE — 97535 SELF CARE MNGMENT TRAINING: CPT

## 2024-05-16 PROCEDURE — 84100 ASSAY OF PHOSPHORUS: CPT

## 2024-05-16 PROCEDURE — 80048 BASIC METABOLIC PNL TOTAL CA: CPT | Performed by: INTERNAL MEDICINE

## 2024-05-16 PROCEDURE — 94660 CPAP INITIATION&MGMT: CPT

## 2024-05-16 PROCEDURE — 85027 COMPLETE CBC AUTOMATED: CPT

## 2024-05-16 PROCEDURE — 99232 SBSQ HOSP IP/OBS MODERATE 35: CPT | Performed by: INTERNAL MEDICINE

## 2024-05-16 PROCEDURE — 97530 THERAPEUTIC ACTIVITIES: CPT

## 2024-05-16 PROCEDURE — 94760 N-INVAS EAR/PLS OXIMETRY 1: CPT

## 2024-05-16 PROCEDURE — 82948 REAGENT STRIP/BLOOD GLUCOSE: CPT

## 2024-05-16 PROCEDURE — 94664 DEMO&/EVAL PT USE INHALER: CPT

## 2024-05-16 PROCEDURE — 83735 ASSAY OF MAGNESIUM: CPT

## 2024-05-16 RX ORDER — TORSEMIDE 10 MG/1
10 TABLET ORAL EVERY OTHER DAY
Status: DISCONTINUED | OUTPATIENT
Start: 2024-05-16 | End: 2024-05-17 | Stop reason: HOSPADM

## 2024-05-16 RX ORDER — INSULIN LISPRO 100 [IU]/ML
1-6 INJECTION, SOLUTION INTRAVENOUS; SUBCUTANEOUS
Status: DISCONTINUED | OUTPATIENT
Start: 2024-05-16 | End: 2024-05-17 | Stop reason: HOSPADM

## 2024-05-16 RX ADMIN — SODIUM CHLORIDE 2 G: 1 TABLET ORAL at 08:24

## 2024-05-16 RX ADMIN — LORATADINE 10 MG: 10 TABLET ORAL at 08:24

## 2024-05-16 RX ADMIN — INSULIN LISPRO 3 UNITS: 100 INJECTION, SOLUTION INTRAVENOUS; SUBCUTANEOUS at 08:23

## 2024-05-16 RX ADMIN — POLYETHYLENE GLYCOL 3350 17 G: 17 POWDER, FOR SOLUTION ORAL at 08:24

## 2024-05-16 RX ADMIN — METOPROLOL SUCCINATE 50 MG: 50 TABLET, EXTENDED RELEASE ORAL at 08:24

## 2024-05-16 RX ADMIN — INSULIN LISPRO 3 UNITS: 100 INJECTION, SOLUTION INTRAVENOUS; SUBCUTANEOUS at 17:14

## 2024-05-16 RX ADMIN — RIVAROXABAN 15 MG: 15 TABLET, FILM COATED ORAL at 08:24

## 2024-05-16 RX ADMIN — INSULIN LISPRO 3 UNITS: 100 INJECTION, SOLUTION INTRAVENOUS; SUBCUTANEOUS at 11:24

## 2024-05-16 RX ADMIN — SENNOSIDES AND DOCUSATE SODIUM 1 TABLET: 8.6; 5 TABLET ORAL at 08:24

## 2024-05-16 RX ADMIN — DONEPEZIL HYDROCHLORIDE 5 MG: 5 TABLET ORAL at 21:37

## 2024-05-16 RX ADMIN — SODIUM ZIRCONIUM CYCLOSILICATE 10 G: 10 POWDER, FOR SUSPENSION ORAL at 11:03

## 2024-05-16 RX ADMIN — DILTIAZEM HYDROCHLORIDE 120 MG: 120 CAPSULE, COATED, EXTENDED RELEASE ORAL at 08:24

## 2024-05-16 RX ADMIN — SENNOSIDES AND DOCUSATE SODIUM 1 TABLET: 8.6; 5 TABLET ORAL at 17:14

## 2024-05-16 RX ADMIN — INSULIN LISPRO 4 UNITS: 100 INJECTION, SOLUTION INTRAVENOUS; SUBCUTANEOUS at 11:24

## 2024-05-16 RX ADMIN — METOPROLOL SUCCINATE 50 MG: 50 TABLET, EXTENDED RELEASE ORAL at 17:14

## 2024-05-16 RX ADMIN — LATANOPROST 1 DROP: 50 SOLUTION OPHTHALMIC at 21:37

## 2024-05-16 RX ADMIN — TORSEMIDE 10 MG: 10 TABLET ORAL at 11:36

## 2024-05-16 RX ADMIN — ATORVASTATIN CALCIUM 40 MG: 40 TABLET, FILM COATED ORAL at 08:24

## 2024-05-16 RX ADMIN — SODIUM CHLORIDE 2 G: 1 TABLET ORAL at 17:14

## 2024-05-16 RX ADMIN — INSULIN GLARGINE 10 UNITS: 100 INJECTION, SOLUTION SUBCUTANEOUS at 21:37

## 2024-05-16 RX ADMIN — INSULIN LISPRO 1 UNITS: 100 INJECTION, SOLUTION INTRAVENOUS; SUBCUTANEOUS at 08:23

## 2024-05-16 NOTE — PROGRESS NOTES
[facility] hospital  Progress Note  Name: Camryn Roblero I  MRN: [id number]  Unit/Bed#: S -01 I Date of Admission: 5/9/2024   Date of Service: 5/16/2024 I Hospital Day: 7    Assessment & Plan   * Hyponatremia  Assessment & Plan  - Had Na 120 on routine outpatient bloodwork. Sent in by nephrologist Dr. Perkins  - Has history of chronic hyponatremia. Baseline Na in the low 130s  - Endorsed decreased food and water intake since death of  1 month ago  - Urine osm 286, urine sodium 33, serum osm 268. Hypoosmolar, however, this is skewed because patient is on diuretics at home  - Endorses decreased food and water intake since death of  1 month ago  - Started on salt tablets 1 g TID   - Na improved to 129 today    Plan:   - Continue salt tablets 2 g BID  - Restart home torsemide 10 mg every other day  - Hold home spironolactone  - Check BMP daily  - I/O's  - Nephrology following, appreciate further recommendations    Abnormal chest CT  Assessment & Plan  - CXR (5/9) - prominent right hilum. Lymphadenopathy versus mass in the differential. CT chest with contrast recommended   - Nephrology recommends CT CAP to rule out malignancy given hyponatremia and recent 50 pound weight loss  - Patient has history of allergy to iodinated contrast  CT CAP  Multiple pulmonary nodules including biopsy-proven carcinoid in the left lower lobe are stable from 2018. However, there is a 1.4 cm nodule in the left lower lobe which, while stable from the most recent prior CT, has demonstrated slow growth since  2018 and may also represent carcinoid.  New right lower lobe pulmonary nodule measuring 6 mm with surrounding groundglass density. Short interval follow-up in 3-6 months is recommended.  Multiple pancreatic cysts measuring up to 1.2 cm. For simple cyst(s) less than 1.5 cm, recommend follow-up every 2 years for 2 times. After 4 years, no more follow-ups. Recommend next follow-up in 2 years. Preferred imaging  modality: abdomen MRI and MRCP with and without IV contrast, or triple phase abdomen CT with IV contrast, or abdomen MRI and MRCP without IV contrast.  Indeterminate left renal hypodensity, possibly a hemorrhagic cyst, not present in 2017. 6-month follow-up renal protocol CT is recommended to evaluate for growth and enhancement.  Plan:  - For follow-up with pulmonology, oncology as outpatient  - Will give referral for GI and urology follow-up as outpatient    Type 2 diabetes mellitus, without long-term current use of insulin (HCC)  Assessment & Plan  - Home meds: metformin 1000 mg BID, glipizide 5 mg  - HgbA1c 7.4% (4/26/24)  - Most recent blood sugars in the 400s    Plan:  - Hold home antihyperglycemic agents  - Now off insulin drip start. Continue 10 units Lantus. Continue  3 units insulin lispro TID mealtime  - SSI  - Hypoglycemia protocol    Hyperkalemia  Assessment & Plan  Lab Results   Component Value Date    K 5.0 05/16/2024    K 5.6 (H) 05/16/2024    K 4.5 05/15/2024     Plan:  - Hold home spironolactone, losartan  - Check BMP daily  - Will give 1 dose of Lokelma    Essential hypertension  Assessment & Plan  - Home meds: Valsartan 320 mg, Cardizem 120 mg, Toprol 50 mg BID, Spironolactone 25 mg, Torsemide 10 mg three times per week  - Blood pressure is adequately controlled     Plan:  - Continue home Cardizem and Toprol  - Hold home Torsemide, spironolactone  - Hold formulary equivalent losartan    CKD (chronic kidney disease) stage 2, GFR 60-89 ml/min  Assessment & Plan  Lab Results   Component Value Date    CREATININE 1.22 05/16/2024    CREATININE 1.12 05/16/2024    CREATININE 1.31 (H) 05/15/2024     Plan:  - Avoid hypotension and nephrotoxins  - Check BMP daily    Hyperlipidemia  Assessment & Plan  - Lipid panel (5/9/24) - TC 86, TG 59, LDL 40, HDL 34  - Home med: atorvastatin 40 mg     Plan:  - Continue home atorvastatin    Impaired memory  Assessment & Plan  - Continue home donepezil 5 mg    Heart  failure with preserved ejection fraction (HCC)  Assessment & Plan  Wt Readings from Last 3 Encounters:   05/15/24 74.1 kg (163 lb 5.8 oz)   04/26/24 74.8 kg (165 lb)   04/25/24 78.2 kg (172 lb 6.4 oz)     - Home meds: Torsemide 10 mg 3 times/week, spironolactone 25 mg, Toprol 50 mg BID  - Echo (11/2023) - EF > 70%. G2DD. LA severely dilated. PASP 37 mmHg  - Per patient, 50 pound weight loss in the last year  - Not in exacerbation at this time    Plan:  - Hold home spironolactone and torsemide.  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 History of exposure to secondhand smoke  - Baseline oxygen is 1 L NC prn  - Currently on room air  - Not currently in exacerbation  - Follows with pulmonology as outpatient    Plan:  - Oxygen supplementation as necessary    TOM (obstructive sleep apnea)  Assessment & Plan  - Has mild TOM with oxygen desaturation on overnight sleep study  - Baseline oxygen is 1 L NC prn    Plan:  - CPAP qhs         VTE Pharmacologic Prophylaxis: VTE Score: 5 High Risk (Score >/= 5) - Pharmacological DVT Prophylaxis Ordered: rivaroxaban (Xarelto). Sequential Compression Devices Ordered.    Mobility:   Basic Mobility Inpatient Raw Score: 18  JH-HLM Goal: 6: Walk 10 steps or more  JH-HLM Achieved: 6: Walk 10 steps or more  JH-HLM Goal achieved. Continue to encourage appropriate mobility.    Patient Centered  Rounds: I performed bedside rounds with nursing staff today.  Discussions with Specialists or Other Care Team Provider: Nephrology    Education and Discussions with Family / Patient: Updated  (daughter) via phone.    Current Length of Stay: 7 day(s)  Current Patient Status: Inpatient   Discharge Plan: Anticipate discharge in 24-48 hrs to home.    Code Status: Level 1 - Full Code    Subjective:   Overnight, patient was transitioned from insulin drip to subcutaneous.  She had hypoglycemic episode with blood sugar 50, which improved.  Patient was seen and examined at bedside.  She was sitting comfortably in bed eating breakfast.  She does not have any complaints at this time.  Specifically, she denies chest pain, difficulty breathing, abdominal pain, nausea, vomiting.  She moved her bowels.    Objective:     Vitals:   Temp (24hrs), Av °F (36.7 °C), Min:97.5 °F (36.4 °C), Max:98.4 °F (36.9 °C)    Temp:  [97.5 °F (36.4 °C)-98.4 °F (36.9 °C)] 98.4 °F (36.9 °C)  HR:  [64-69] 69  Resp:  [18] 18  BP: (117-143)/(61-79) 117/61  SpO2:  [92 %-96 %] 92 %  Body mass index is 32.91 kg/m².     Input and Output Summary (last 24 hours):     Intake/Output Summary (Last 24 hours) at 2024 1741  Last data filed at 2024 1100  Gross per 24 hour   Intake 600 ml   Output 1250 ml   Net -650 ml       Physical Exam:   Physical Exam  Vitals and nursing note reviewed.   Constitutional:       General: She is not in acute distress.     Appearance: Normal appearance. She is well-developed. She is not ill-appearing.   HENT:      Head: Normocephalic and atraumatic.   Cardiovascular:      Rate and Rhythm: Normal rate and regular rhythm.      Heart sounds: No murmur heard.  Pulmonary:      Breath sounds: Normal breath sounds. No wheezing, rhonchi or rales.   Abdominal:      General: Bowel sounds are normal.      Palpations: Abdomen is soft.      Tenderness: There is no abdominal tenderness. There is no guarding or rebound.    Musculoskeletal:         General: Swelling present. No deformity.      Cervical back: Normal range of motion and neck supple.      Right lower leg: Edema (1+) present.      Left lower leg: Edema (1+) present.   Skin:     General: Skin is warm and dry.      Capillary Refill: Capillary refill takes less than 2 seconds.   Neurological:      Mental Status: She is alert and oriented to person, place, and time. Mental status is at baseline.   Psychiatric:         Mood and Affect: Mood normal.         Behavior: Behavior normal.        Additional Data:     Labs:  Results from last 7 days   Lab Units 05/16/24  0514 05/13/24  0535   WBC Thousand/uL 13.55* 8.27   HEMOGLOBIN g/dL 12.3 12.9   HEMATOCRIT % 37.7 40.3   PLATELETS Thousands/uL 350 353   SEGS PCT %  --  62   LYMPHO PCT %  --  17   MONO PCT %  --  13*   EOS PCT %  --  6     Results from last 7 days   Lab Units 05/16/24  1640 05/10/24  0240 05/09/24  1929   SODIUM mmol/L 129*   < > 120*   POTASSIUM mmol/L 5.0   < > 5.4*   CHLORIDE mmol/L 98   < > 89*   CO2 mmol/L 27   < > 26   BUN mg/dL 53*   < > 26*   CREATININE mg/dL 1.22   < > 0.87   ANION GAP mmol/L 4   < > 5   CALCIUM mg/dL 9.4   < > 9.4   ALBUMIN g/dL  --   --  3.6   TOTAL BILIRUBIN mg/dL  --   --  0.84   ALK PHOS U/L  --   --  116*   ALT U/L  --   --  32   AST U/L  --   --  65*   GLUCOSE RANDOM mg/dL 218*   < > 99    < > = values in this interval not displayed.         Results from last 7 days   Lab Units 05/16/24  1537 05/16/24  1122 05/16/24  0730 05/16/24  0215 05/16/24  0115 05/16/24  0043 05/16/24  0011 05/15/24  2115 05/15/24  1816 05/15/24  1521 05/15/24  1443 05/15/24  1258   POC GLUCOSE mg/dl 244* 281* 178* 132 155* 50* 71 297* 524* 512* >600* 414*               Lines/Drains:  Invasive Devices       Peripheral Intravenous Line  Duration             Peripheral IV 05/13/24 Right;Ventral (anterior) Forearm 3 days                          Imaging: Reviewed radiology reports from this admission including:  chest xray, chest CT scan, and abdominal/pelvic CT    Recent Cultures (last 7 days):   Results from last 7 days   Lab Units 05/09/24  1930   URINE CULTURE  40,000-49,000 cfu/ml       Last 24 Hours Medication List:   Current Facility-Administered Medications   Medication Dose Route Frequency Provider Last Rate    acetaminophen  650 mg Oral Q6H PRN Bia Moreno DO      atorvastatin  40 mg Oral Daily Bia Moreno DO      bisacodyl  10 mg Rectal Daily PRN Vasu Perkins DO      diltiazem  120 mg Oral Daily Bia Moreno DO      diphenhydrAMINE  50 mg Oral 60 Min Pre-Op Vasu Perkins DO      donepezil  5 mg Oral HS Osvaldo Corcoran MD      insulin glargine  10 Units Subcutaneous HS Bia Moreno DO      insulin lispro  1-6 Units Subcutaneous 4x Daily (AC & HS) Vasu Perkins DO      insulin lispro  3 Units Subcutaneous TID With Meals Bia Moreno DO      latanoprost  1 drop Both Eyes HS Bia Moreno DO      loratadine  10 mg Oral Daily Bia Moreno DO      metoprolol succinate  50 mg Oral BID Bia Moreno DO      polyethylene glycol  17 g Oral Daily Vasu Perkins DO      rivaroxaban  15 mg Oral Daily With Breakfast Bia Moreno DO      senna-docusate sodium  1 tablet Oral BID Jeison Chairez MD      sodium chloride  2 g Oral BID With Meals Raghavendra Rubin MD      torsemide  10 mg Oral Every Other Day Arslan Perkins MD          Today, Patient Was Seen By: Vasu Perkins DO    **Please Note: This note may have been constructed using a voice recognition system.**

## 2024-05-16 NOTE — MALNUTRITION/BMI
This medical record reflects one or more clinical indicators suggestive of malnutrition.    Malnutrition Findings:   Adult Malnutrition type: Unknown (comment)  Adult Degree of Malnutrition: Malnutrition of mild degree  Malnutrition Characteristics: Inadequate energy, Weight loss                  360 Statement: Mild malnutrition r/t decreased appetite/lack of desire to eat, as evidenced by intake meeting <75% of estimated needs, and 5% wt loss x 1 month (5/15/24: 163#, 4/25/24: 172#). Treatment: PO diet + nutrition supplements     Body mass index is 32.91 kg/m².     See Nutrition note dated 5/16/2024 for additional details.  Completed nutrition assessment is viewable in the nutrition documentation.

## 2024-05-16 NOTE — TELEPHONE ENCOUNTER
----- Message from Arslan Perkins MD sent at 5/16/2024 11:36 AM EDT -----  Hello    Pt potentially d/c tomorrow. Can she have BMP in one week for me.     Thank you    np

## 2024-05-16 NOTE — RESPIRATORY THERAPY NOTE
05/16/24 0804   Respiratory Assessment   Assessment Type Assess only   General Appearance Awake;Alert   Respiratory Pattern Normal   Chest Assessment Chest expansion symmetrical   Bilateral Breath Sounds Clear   Resp Comments Patient taken off CPAP to RA. Patient without SOB and WOB.   Oxygen Therapy/Pulse Ox   O2 Device None (Room air)   SpO2 96 %   SpO2 Activity At Rest   $ Pulse Oximetry Spot Check Charge Completed

## 2024-05-16 NOTE — PLAN OF CARE
Problem: Potential for Falls  Goal: Patient will remain free of falls  Description: INTERVENTIONS:  - Educate patient/family on patient safety including physical limitations  - Instruct patient to call for assistance with activity   - Consult OT/PT to assist with strengthening/mobility   - Keep Call bell within reach  - Keep bed low and locked with side rails adjusted as appropriate  - Keep care items and personal belongings within reach  - Initiate and maintain comfort rounds  - Make Fall Risk Sign visible to staff  - Offer Toileting every  Hours, in advance of need  - Initiate/Maintain alarm  - Obtain necessary fall risk management equipment:   - Apply yellow socks and bracelet for high fall risk patients  - Consider moving patient to room near nurses station  Outcome: Progressing     Problem: Prexisting or High Potential for Compromised Skin Integrity  Goal: Skin integrity is maintained or improved  Description: INTERVENTIONS:  - Identify patients at risk for skin breakdown  - Assess and monitor skin integrity  - Assess and monitor nutrition and hydration status  - Monitor labs   - Assess for incontinence   - Turn and reposition patient  - Assist with mobility/ambulation  - Relieve pressure over bony prominences  - Avoid friction and shearing  - Provide appropriate hygiene as needed including keeping skin clean and dry  - Evaluate need for skin moisturizer/barrier cream  - Collaborate with interdisciplinary team   - Patient/family teaching  - Consider wound care consult   Outcome: Progressing     Problem: PAIN - ADULT  Goal: Verbalizes/displays adequate comfort level or baseline comfort level  Description: Interventions:  - Encourage patient to monitor pain and request assistance  - Assess pain using appropriate pain scale  - Administer analgesics based on type and severity of pain and evaluate response  - Implement non-pharmacological measures as appropriate and evaluate response  - Consider cultural and  social influences on pain and pain management  - Notify physician/advanced practitioner if interventions unsuccessful or patient reports new pain  Outcome: Progressing     Problem: INFECTION - ADULT  Goal: Absence or prevention of progression during hospitalization  Description: INTERVENTIONS:  - Assess and monitor for signs and symptoms of infection  - Monitor lab/diagnostic results  - Monitor all insertion sites, i.e. indwelling lines, tubes, and drains  - Monitor endotracheal if appropriate and nasal secretions for changes in amount and color  - [place] appropriate cooling/warming therapies per order  - Administer medications as ordered  - Instruct and encourage patient and family to use good hand hygiene technique  - Identify and instruct in appropriate isolation precautions for identified infection/condition  Outcome: Progressing  Goal: Absence of fever/infection during neutropenic period  Description: INTERVENTIONS:  - Monitor WBC    Outcome: Progressing     Problem: SAFETY ADULT  Goal: Patient will remain free of falls  Description: INTERVENTIONS:  - Educate patient/family on patient safety including physical limitations  - Instruct patient to call for assistance with activity   - Consult OT/PT to assist with strengthening/mobility   - Keep Call bell within reach  - Keep bed low and locked with side rails adjusted as appropriate  - Keep care items and personal belongings within reach  - Initiate and maintain comfort rounds  - Make Fall Risk Sign visible to staff  - Offer Toileting every  Hours, in advance of need  - Initiate/Maintain alarm  - Obtain necessary fall risk management equipment:   - Apply yellow socks and bracelet for high fall risk patients  - Consider moving patient to room near nurses station  Outcome: Progressing  Goal: Maintain or return to baseline ADL function  Description: INTERVENTIONS:  -  Assess patient's ability to carry out ADLs; assess patient's baseline for ADL function and  identify physical deficits which impact ability to perform ADLs (bathing, care of mouth/teeth, toileting, grooming, dressing, etc.)  - Assess/evaluate cause of self-care deficits   - Assess range of motion  - Assess patient's mobility; develop plan if impaired  - Assess patient's need for assistive devices and provide as appropriate  - Encourage maximum independence but intervene and supervise when necessary  - Involve family in performance of ADLs  - Assess for home care needs following discharge   - Consider OT consult to assist with ADL evaluation and planning for discharge  - Provide patient education as appropriate  Outcome: Progressing  Goal: Maintains/Returns to pre admission functional level  Description: INTERVENTIONS:  - Perform AM-PAC 6 Click Basic Mobility/ Daily Activity assessment daily.  - Set and communicate daily mobility goal to care team and patient/family/caregiver.   - Collaborate with rehabilitation services on mobility goals if consulted  - Perform Range of Motion  times a day.  - Reposition patient every  hours.  - Dangle patient  times a day  - Stand patient  times a day  - Ambulate patient  times a day  - Out of bed to chair  times a day   - Out of bed for meals  times a day  - Out of bed for toileting  - Record patient progress and toleration of activity level   Outcome: Progressing     Problem: DISCHARGE PLANNING  Goal: Discharge to home or other facility with appropriate resources  Description: INTERVENTIONS:  - Identify barriers to discharge w/patient and caregiver  - Arrange for needed discharge resources and transportation as appropriate  - Identify discharge learning needs (meds, wound care, etc.)  - Arrange for interpretive services to assist at discharge as needed  - Refer to Case Management Department for coordinating discharge planning if the patient needs post-hospital services based on physician/advanced practitioner order or complex needs related to functional status,  cognitive ability, or social support system  Outcome: Progressing     Problem: Knowledge Deficit  Goal: Patient/family/caregiver demonstrates understanding of disease process, treatment plan, medications, and discharge instructions  Description: Complete learning assessment and assess knowledge base.  Interventions:  - Provide teaching at level of understanding  - Provide teaching via preferred learning methods  Outcome: Progressing

## 2024-05-16 NOTE — PROGRESS NOTES
Progress Note - Nephrology   Camryn Roblero 83 y.o. female MRN: [id number]  Unit/Bed#: S -01 Encounter: [id number]      Assessment and Plan:  Hyponatremia  - Na 120 on routine outpatient labs and on arrival   - Symptomatic with fatigue and generalized weakness  -Hx of hyponatremia, Carcinoid Tumor, HFpEF   - Baseline Na 130s  -Outpatient regimen includes Torsemide, Aldactone, Losartan, and Fluid Restriction  -Torsemide, Losartan, and Aldactone on hold    -If CT scan today will continue to hold Losartan   - Urine osmolality 286, urine sodium 33, serum osmolality 268  - Initially placed on fluids now discontinued, Na tabs 1g TID then transitioned to 2g BID  -I/O-  Inaccurately documented, pt using bathroom on own   -Weight unchanged but down overall   - Current Na 129, 130  -Recheck Na tomorrow in AM   -Restart Torsemide today   -Continue Na 2g BID with meals         Chronic Kidney Disease Stage 2  - Baseline appears to be around 1  - Secondary to diabetic kidney disease, hypertensive nephrosclerosis, cardiorenal, age-related nephron loss   -Renal function stable   -BUN/Cr   -Urinary retention protocol  -Closely monitor I/Os      T2DM   - Home regimen includes Glipizide and Metformin   - HbA1c 6.4  -Uncontrolled DM with hyperglycemia yesterday   -Started on insulin gtt and turned of near 9pm last night  -Regimen inpatient Lantus 10U HS and Lispro 3 TID w/ meals   -Monitor BG            Hyperkalemia  Today 5.6, yesterday 5.5   Low K diet  Lokelma total 2 doses           Hypomagnesemia- Resolved   -Mg 2.1  -Replete electrolytes as appropriate         Neuroendocrine Tumor- Carcinoid  -Follows Pulmonology as outpatient  -Unintentional weight loss  -Chest xray on admission- Prominent R hilum lymphadenopathy vs mass  -Question if Carcinoid spread or new mass?  Suspicion for worsening SIADH   -CT CAP demonstrates multiple pulmonary nodules, demonstrated with slow growth of previous nodule, new right lower lobe pulmonary  nodule 6 mm with surrounding groundglass density, multiple pancreatic cysts, indeterminate left renal hypodensity questionable hemorrhagic cyst  -Spoke with primary team yesterday to consider inpatient pulmonology consult to organize/set up close follow-up for new pulmonary nodules, as well as outpatient urology follow-up for left renal hypodensity           Hypertension  - Home regimen includes Toprol, Valsartan, Torsemide, and Aldactone   -Valsartan, Torsemide Aldactone on hold  -/69     HFpEF  - Home regimen includes Low Na diet, Fluid Restriction, Torsemide 10mg 3x/week, Aldactone   - Last Echo 11/23: EF >70%, G2DD  -Weight stable   -Overall Euvolemic on exam, with trace LE edema        Subjective:   Patient was seen and examined at the bedside.  Patient was resting comfortably in the bed and in no overt acute distress.  Patient reports she feels well and has no complaints of abdominal evaluation.  Upon questioning she did mention that her legs feel slightly tighter than yesterday.  Patient denied headache, dizziness, fatigue, chest pain, shortness of breath, palpitations, abdominal bloating/pain, NVD, urinary changes or symptoms, or any other symptoms at this time.    Objective:     Vitals: Blood pressure 117/69, pulse 66, temperature 98 °F (36.7 °C), resp. rate 18, weight 73.9 kg (162 lb 14.7 oz), SpO2 96%.,Body mass index is 32.91 kg/m².    Weight (last 2 days)       Date/Time Weight    05/16/24 0558 73.9 (162.92)    05/15/24 0600 74.1 (163.36)    05/14/24 0600 74.2 (163.58)              Intake/Output Summary (Last 24 hours) at 5/16/2024 0821  Last data filed at 5/15/2024 2200  Gross per 24 hour   Intake 600 ml   Output 700 ml   Net -100 ml            Physical Exam: /69   Pulse 66   Temp 98 °F (36.7 °C)   Resp 18   Wt 73.9 kg (162 lb 14.7 oz)   SpO2 96%   BMI 32.91 kg/m²     General Appearance:    Alert, cooperative, no distress, appears stated age   Head:    Normocephalic, without obvious  abnormality, atraumatic   Eyes:    PERRL, conjunctiva/corneas clear, EOM's intact, fundi     benign, both eyes   Ears:    Normal TM's and external ear canals, both ears   Nose:   Nares normal, septum midline, mucosa normal, no drainage    or sinus tenderness   Throat:   Lips, mucosa, and tongue normal; teeth and gums normal   Neck:   Supple, symmetrical, trachea midline, no adenopathy;     thyroid:  no enlargement/tenderness/nodules; no carotid    bruit or JVD   Back:     Symmetric, no curvature, ROM normal, no CVA tenderness   Lungs:     Clear to auscultation bilaterally, respirations unlabored   Chest Wall:    No tenderness or deformity    Heart:    Regular rate and rhythm, S1 and S2 normal, no murmur, rub   or gallop       Abdomen:     Soft, non-tender, bowel sounds active all four quadrants,     no masses, no organomegaly           Extremities: Trace to 1+ pitting edema bilaterally, atraumatic, no cyanosis or edema   Pulses:   2+ and symmetric all extremities   Skin:   Skin color, texture, turgor normal, no rashes or lesions       Neurologic:   CNII-XII intact, normal strength, sensation and reflexes     throughout        Lab, Imaging and other studies: I have personally reviewed pertinent labs.  CBC:   Lab Results   Component Value Date    WBC 13.55 (H) 05/16/2024    HGB 12.3 05/16/2024    HCT 37.7 05/16/2024    MCV 85 05/16/2024     05/16/2024    RBC 4.45 05/16/2024    MCH 27.6 05/16/2024    MCHC 32.6 05/16/2024    RDW 14.7 05/16/2024    MPV 9.7 05/16/2024     CMP:   Lab Results   Component Value Date    SODIUM 129 (L) 05/16/2024    K 5.6 (H) 05/16/2024    CL 99 05/16/2024    CO2 26 05/16/2024    BUN 50 (H) 05/16/2024    CREATININE 1.12 05/16/2024    CALCIUM 9.0 05/16/2024    EGFR 45 05/16/2024     Phosphorus:   Lab Results   Component Value Date    PHOS 3.4 05/16/2024     Magnesium:   Lab Results   Component Value Date    MG 2.0 05/16/2024

## 2024-05-16 NOTE — OCCUPATIONAL THERAPY NOTE
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    Heart failure with preserved ejection fraction (HCC)    COPD (chronic obstructive pulmonary disease) (HCC)    TOM (obstructive sleep apnea)    Hyperlipidemia    Atrial flutter (HCC)    Impaired memory    Hyperkalemia    Abnormal chest x-ray    CKD (chronic kidney disease) stage 2, GFR 60-89 ml/min    Leukocytosis    Mild protein-calorie malnutrition (HCC)          05/16/24 1406   OT Last Visit   OT Visit Date 05/16/24   Note Type   Note Type Treatment   Pain Assessment   Pain Assessment Tool 0-10   Pain Score No Pain   Restrictions/Precautions   Weight Bearing Precautions Per Order No   Other Precautions Chair Alarm;Bed Alarm;Fall Risk;Telemetry;Cognitive  (1L O2)   Lifestyle   Autonomy Pt reports being I with ADLs, IADLs, and functional mobility with use of rollator.  However, appears to have not been eating appropriately, not managing meds appropriately.  Pt reports she hasn't driven for ~1 month per recommendation of her cardiologist.  Family provides transport to appts and reminders for meds   [employer] Relations[employer] resides alone after recent death of spouse ~1 month ago.  Appears to have had a significant decline in functioning and apathy since then.  Has 3 supportive kids that visit almost every day   Service to Others retired   ADL   Where Assessed Chair  (vs bathroom)   Eating Assistance 7  Independent   UB Bathing Assistance   (declines at this time)   UB Bathing Comments   (declines at this tme)   UB Dressing Assistance 6  Modified independent   UB Dressing Deficit Increased time to complete;Setup  (doffed and donned house coat)   LB Dressing Assistance 5  Supervision/Setup   LB Dressing  Deficit Increased time to complete;Supervision/safety;Setup  (donned underwear, pants without assistance. increased time required. threads BLE without difficulity.  supervision for pulling over hips)   Toileting Assistance  5  Supervision/Setup   Toileting Deficit Grab bar use;Increased time to complete;Setup   Functional Standing Tolerance   Time 5 min   Activity functional mobility, standing functional tasks   Comments no LOB, intermittent self steadying with UE on RW   Bed Mobility   Additional Comments seated OOB in recliner upon arrval and at end of session   Transfers   Sit to Stand 5  Supervision   Additional items Assist x 1;Increased time required;Verbal cues   Stand to Sit 5  Supervision   Additional items Assist x 1;Increased time required;Verbal cues   Toilet transfer 5  Supervision   Additional items Assist x 1;Increased time required;Verbal cues;Standard toilet  (GB)   Additional Comments multiple sit<>stand transfers completed, pt consistently demonstrates safe body mechanics   Functional Mobility   Functional Mobility 5  Supervision   Additional Comments communiy distance within hallway. intermittent standing rest breaks, min s/s of exertion, no LOB and denies pain   Additional items Rolling walker   Toilet Transfers   Toilet Transfer From Rolling walker   Toilet Transfer Type To and from   Toilet Transfer to Standard toilet   Toilet Transfer Technique Ambulating   Toilet Transfers Supervision;Verbal cues   Toilet Transfers Comments self initiates GB use   Subjective   Subjective Pt denies concerns re: completing functional tasks upon returning home   Cognition   Overall Cognitive Status WFL   Arousal/Participation Alert;Cooperative   Attention Attends with cues to redirect   Orientation Level Oriented X4   Memory Within functional limits   Following Commands Follows one step commands without difficulty   Comments Pt agreeable to OT session, very pleasant and engaged. Continues to demonstrates some  mild memory deficits, but demonstrates appropriate safety awareness and recall of precautions throughout session.   Activity Tolerance   Activity Tolerance Patient tolerated treatment well   Medical Staff Made Aware RN   Assessment   Assessment Patient seen for OT treatment on 5/16/2024 s/p admission for Hyponatremia Patient agreeable to OT session. Patient participated in toileting, dressing , self-care transfers, and functional mobility with intervention focus on maximizing functional independence and safety during ADLs. Camryn RAMAKRISHNA Roblero is showing improvements in standing balance, LB ADLs, functional mobility and activity tolerance. Personal factors continuing to impact D/C include: decreased caregiver status  From OT standpoint, patient would benefit from skilled intervention to maximize independence with ADLs and functional mobility. Goals remain appropriate, continue POC. At this time, recommending D/C to: Level 3: minimum resource intensity   Plan   Treatment Interventions ADL retraining;Functional transfer training;Endurance training;Equipment evaluation/education;Patient/family training;Compensatory technique education;Activityengagement   Goal Expiration Date 05/20/24   OT Treatment Day 1   OT Frequency 2-3x/wk   Discharge Recommendation   Rehab Resource Intensity Level, OT III (Minimum Resource Intensity)   Additional Comments  The patient's raw score on the AM-PAC Daily Activity Inpatient Short Form is 20. A raw score of greater than or equal to 19 suggests the patient may benefit from discharge to home. Please refer to the recommendation of the Occupational Therapist for safe discharge planning.   AM-PAC Daily Activity Inpatient   Lower Body Dressing 3   Bathing 3   Toileting 3   Upper Body Dressing 3   Grooming 4   Eating 4   Daily Activity Raw Score 20   Daily Activity Standardized Score (Calc for Raw Score >=11) 42.03   AM-[facility] Applied Cognition Inpatient   Following a Speech/Presentation 3   Understanding  Ordinary Conversation 4   Taking Medications 3   Remembering Where Things Are Placed or Put Away 3   Remembering List of 4-5 Errands 3   Taking Care of Complicated Tasks 3   Applied Cognition Raw Score 19   Applied Cognition Standardized Score 39.77   End of Consult   Education Provided Yes   Patient Position at End of Consult Bed/Chair alarm activated;All needs within reach;Bedside chair   Nurse Communication Nurse aware of consult   Goals:  Pt will be attentive for 100% of formal cognitive assessment for safe discharge/planning. PROGRESSING      Pt will complete all self care tasks with Cee. PROGRESSING      Pt will complete functional transfers on/off all surfaces with Cee with use of DME/AD as appropriate and with good safety/balance. PROGRESSING      Pt will complete household distance functional mobility with Cee with use of DME/AD as appropriate and with good safety, balance, endurance. MET - progress to community distances      Pt will maintain standing with Cee for at least 10 min while completing a dynamic functional activity with good balance and endurance. PROGRESSING       Miryam Morfin, OT

## 2024-05-16 NOTE — PLAN OF CARE
Problem: OCCUPATIONAL THERAPY ADULT  Goal: Performs self-care activities at highest level of function for planned discharge setting.  See evaluation for individualized goals.  Description: Treatment Interventions: ADL retraining, Functional transfer training, Endurance training, Equipment evaluation/education, Patient/family training, Compensatory technique education, Activityengagement  Equipment Recommended:  (lifealert)       See flowsheet documentation for full assessment, interventions and recommendations.   Note: Limitation: Decreased ADL status, Decreased UE strength, Decreased cognition, Decreased endurance, Decreased high-level ADLs, Decreased self-care trans  Prognosis: Fair  Assessment: Patient seen for OT treatment on 5/16/2024 s/p admission for Hyponatremia Patient agreeable to OT session. Patient participated in toileting, dressing , self-care transfers, and functional mobility with intervention focus on maximizing functional independence and safety during ADLs. Camryn Roblero is showing improvements in standing balance, LB ADLs, functional mobility and activity tolerance. Personal factors continuing to impact D/C include: decreased caregiver status  From OT standpoint, patient would benefit from skilled intervention to maximize independence with ADLs and functional mobility. Goals remain appropriate, continue POC. At this time, recommending D/C to: Level 3: minimum resource intensity  Recommendation: (S) Geriatric Consult, Psych Consult (pastoral care)  Rehab Resource Intensity Level, OT: III (Minimum Resource Intensity)        Miryam Morfin OT

## 2024-05-17 VITALS
TEMPERATURE: 97.8 F | SYSTOLIC BLOOD PRESSURE: 157 MMHG | DIASTOLIC BLOOD PRESSURE: 85 MMHG | BODY MASS INDEX: 33.53 KG/M2 | RESPIRATION RATE: 17 BRPM | WEIGHT: 166.01 LBS | OXYGEN SATURATION: 94 % | HEART RATE: 75 BPM

## 2024-05-17 LAB
ANION GAP SERPL CALCULATED.3IONS-SCNC: 4 MMOL/L (ref 4–13)
BUN SERPL-MCNC: 50 MG/DL (ref 5–25)
CALCIUM SERPL-MCNC: 9 MG/DL (ref 8.4–10.2)
CHLORIDE SERPL-SCNC: 102 MMOL/L (ref 96–108)
CO2 SERPL-SCNC: 26 MMOL/L (ref 21–32)
CREAT SERPL-MCNC: 1.11 MG/DL (ref 0.6–1.3)
ERYTHROCYTE [DISTWIDTH] IN BLOOD BY AUTOMATED COUNT: 14.8 % (ref 11.6–15.1)
GFR SERPL CREATININE-BSD FRML MDRD: 46 ML/MIN/1.73SQ M
GLUCOSE SERPL-MCNC: 146 MG/DL (ref 65–140)
GLUCOSE SERPL-MCNC: 156 MG/DL (ref 65–140)
GLUCOSE SERPL-MCNC: 186 MG/DL (ref 65–140)
GLUCOSE SERPL-MCNC: 206 MG/DL (ref 65–140)
HCT VFR BLD AUTO: 37.9 % (ref 34.8–46.1)
HGB BLD-MCNC: 12 G/DL (ref 11.5–15.4)
MAGNESIUM SERPL-MCNC: 2 MG/DL (ref 1.9–2.7)
MCH RBC QN AUTO: 27.2 PG (ref 26.8–34.3)
MCHC RBC AUTO-ENTMCNC: 31.7 G/DL (ref 31.4–37.4)
MCV RBC AUTO: 86 FL (ref 82–98)
PHOSPHATE SERPL-MCNC: 3.5 MG/DL (ref 2.3–4.1)
PLATELET # BLD AUTO: 354 THOUSANDS/UL (ref 149–390)
PMV BLD AUTO: 9.9 FL (ref 8.9–12.7)
POTASSIUM SERPL-SCNC: 5 MMOL/L (ref 3.5–5.3)
RBC # BLD AUTO: 4.41 MILLION/UL (ref 3.81–5.12)
SODIUM SERPL-SCNC: 132 MMOL/L (ref 135–147)
WBC # BLD AUTO: 9.47 THOUSAND/UL (ref 4.31–10.16)

## 2024-05-17 PROCEDURE — 83735 ASSAY OF MAGNESIUM: CPT | Performed by: INTERNAL MEDICINE

## 2024-05-17 PROCEDURE — 84100 ASSAY OF PHOSPHORUS: CPT | Performed by: INTERNAL MEDICINE

## 2024-05-17 PROCEDURE — 99232 SBSQ HOSP IP/OBS MODERATE 35: CPT | Performed by: INTERNAL MEDICINE

## 2024-05-17 PROCEDURE — 94660 CPAP INITIATION&MGMT: CPT

## 2024-05-17 PROCEDURE — 99239 HOSP IP/OBS DSCHRG MGMT >30: CPT | Performed by: INTERNAL MEDICINE

## 2024-05-17 PROCEDURE — 85027 COMPLETE CBC AUTOMATED: CPT | Performed by: INTERNAL MEDICINE

## 2024-05-17 PROCEDURE — 80048 BASIC METABOLIC PNL TOTAL CA: CPT | Performed by: INTERNAL MEDICINE

## 2024-05-17 PROCEDURE — 82948 REAGENT STRIP/BLOOD GLUCOSE: CPT

## 2024-05-17 RX ORDER — SODIUM CHLORIDE 1 G/1
2 TABLET ORAL 2 TIMES DAILY WITH MEALS
Qty: 120 TABLET | Refills: 0 | Status: SHIPPED | OUTPATIENT
Start: 2024-05-17 | End: 2024-06-16

## 2024-05-17 RX ORDER — TORSEMIDE 10 MG/1
10 TABLET ORAL EVERY OTHER DAY
Qty: 15 TABLET | Refills: 0 | Status: SHIPPED | OUTPATIENT
Start: 2024-05-18 | End: 2024-05-31 | Stop reason: SDUPTHER

## 2024-05-17 RX ADMIN — INSULIN LISPRO 3 UNITS: 100 INJECTION, SOLUTION INTRAVENOUS; SUBCUTANEOUS at 08:29

## 2024-05-17 RX ADMIN — POLYETHYLENE GLYCOL 3350 17 G: 17 POWDER, FOR SOLUTION ORAL at 08:28

## 2024-05-17 RX ADMIN — INSULIN LISPRO 1 UNITS: 100 INJECTION, SOLUTION INTRAVENOUS; SUBCUTANEOUS at 12:22

## 2024-05-17 RX ADMIN — METOPROLOL SUCCINATE 50 MG: 50 TABLET, EXTENDED RELEASE ORAL at 08:28

## 2024-05-17 RX ADMIN — RIVAROXABAN 15 MG: 15 TABLET, FILM COATED ORAL at 08:28

## 2024-05-17 RX ADMIN — SENNOSIDES AND DOCUSATE SODIUM 1 TABLET: 8.6; 5 TABLET ORAL at 08:28

## 2024-05-17 RX ADMIN — INSULIN LISPRO 3 UNITS: 100 INJECTION, SOLUTION INTRAVENOUS; SUBCUTANEOUS at 12:22

## 2024-05-17 RX ADMIN — ATORVASTATIN CALCIUM 40 MG: 40 TABLET, FILM COATED ORAL at 08:28

## 2024-05-17 RX ADMIN — LORATADINE 10 MG: 10 TABLET ORAL at 08:28

## 2024-05-17 RX ADMIN — DILTIAZEM HYDROCHLORIDE 120 MG: 120 CAPSULE, COATED, EXTENDED RELEASE ORAL at 08:28

## 2024-05-17 RX ADMIN — SODIUM CHLORIDE 2 G: 1 TABLET ORAL at 08:28

## 2024-05-17 NOTE — INCIDENTAL FINDINGS
The following findings require follow up:  Radiographic finding    CT chest abdomen pelvis w contrast: 1. 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 However, there is a 1.4 cm nodule in the left lower lobe which, while stable from the most recent prior CT, has demonstrated slow growth since , 2018 and may also represent carcinoid., 2. New right lower lobe pulmonary nodule measuring 6 mm with surrounding groundglass density. Short interval follow-up in 3-6 months is recommended., 3. Multiple pancreatic cysts measuring up to 1.2 cm. For simple cyst(s) less than 1.5 cm, recommend follow-up every 2 years for 2 times. After 4 years, no more follow-ups. Recommend next follow-up in 2 years. Preferred imaging modality: abdomen MRI and , MRCP with and without IV contrast, or triple phase abdomen CT with IV contrast, or abdomen MRI and MRCP without IV contrast., 4. Indeterminate left renal hypodensity, possibly a hemorrhagic cyst, not present in 2017. 6-month follow-up renal protocol CT is recommended to evaluate for growth and enhancement., 5. Mild enlargement of the pulmonary arterial tree which may represent pulmonary hypertension      Follow up required: Yes   Follow up should be done within 3-6 months for pulmonary nodule   Follow up should be done within 2 years for pancreatic cyst   Follow up should be down within 6 months for renal cyst.    Please notify the following clinician to assist with the follow up:   PCP Dr. Seals

## 2024-05-17 NOTE — DISCHARGE SUMMARY
[facility]  Discharge- Camryn DURBIN Osbaldo 1940, 83 y.o. female MRN: [id number]  Unit/Bed#: S -Carol Encounter: [id number]  Primary Care Provider: Abiel Seals MD   Date and time admitted to hospital: 5/9/2024  5:09 PM    * Hyponatremia  Assessment & Plan  - Had Na 120 on routine outpatient bloodwork. Sent in by nephrologist Dr. Perkins  - Has history of chronic hyponatremia. Baseline Na in the low 130s  - Endorsed decreased food and water intake since death of  1 month ago  - Urine osm 286, urine sodium 33, serum osm 268. Hypoosmolar, however, this is skewed because patient is on diuretics at home  - Endorses decreased food and water intake since death of  1 month ago  - Started on salt tablets 1 g TID   - Na improved to 132 today    Plan:   - Continue salt tablets 2 g BID  - Spironolactone and ARB will currently remain on hold  - Continue torsemide 10 mg every other day  - BMP early next week  - Patient has upcoming appointment with nephrology, keep same appointment  - Low potassium diet    Abnormal CT scan  Assessment & Plan  - CXR (5/9) - prominent right hilum. Lymphadenopathy versus mass in the differential. CT chest with contrast recommended   - Nephrology recommends CT CAP to rule out malignancy given hyponatremia and recent 50 pound weight loss  - Patient has history of allergy to iodinated contrast  CT CAP  Multiple pulmonary nodules including biopsy-proven carcinoid in the left lower lobe are stable from 2018. However, there is a 1.4 cm nodule in the left lower lobe which, while stable from the most recent prior CT, has demonstrated slow growth since  2018 and may also represent carcinoid.  New right lower lobe pulmonary nodule measuring 6 mm with surrounding groundglass density. Short interval follow-up in 3-6 months is recommended.  Multiple pancreatic cysts measuring up to 1.2 cm. For simple cyst(s) less than 1.5 cm, recommend follow-up every 2 years for 2 times.  After 4 years, no more follow-ups. Recommend next follow-up in 2 years. Preferred imaging modality: abdomen MRI and MRCP with and without IV contrast, or triple phase abdomen CT with IV contrast, or abdomen MRI and MRCP without IV contrast.  Indeterminate left renal hypodensity, possibly a hemorrhagic cyst, not present in 2017. 6-month follow-up renal protocol CT is recommended to evaluate for growth and enhancement.  Plan:  - For follow-up with pulmonology, oncology as outpatient  - Will give referral for GI and urology follow-up as outpatient    Type 2 diabetes mellitus, without long-term current use of insulin ([facility])  Assessment & Plan  - Home meds: metformin 1000 mg BID, glipizide 5 mg  - HgbA1c 7.4% (4/26/24)  - Most recent blood sugars in the 400s    Plan:  -Restart home medications upon discharge    Hyperkalemia  Assessment & Plan  Lab Results   Component Value Date    K 5.0 05/17/2024    K 5.0 05/16/2024    K 5.6 (H) 05/16/2024     Plan:  - Follow-up BMP early next week    Essential hypertension  Assessment & Plan  - Home meds: Valsartan 320 mg, Cardizem 120 mg, Toprol 50 mg BID, Spironolactone 25 mg, Torsemide 10 mg three times per week  - Blood pressure is adequately controlled     Plan:  - Continue home Cardizem and Toprol  - Continue torsemide 10 mg qd  - Hold home spironolactone  - Hold ARB upon discharge      CKD (chronic kidney disease) stage 2, GFR 60-89 ml/min  Assessment & Plan  Lab Results   Component Value Date    CREATININE 1.11 05/17/2024    CREATININE 1.22 05/16/2024    CREATININE 1.12 05/16/2024     Plan:  -Follow-up with nephrology as outpatient    Hyperlipidemia  Assessment & Plan  - Lipid panel (5/9/24) - TC 86, TG 59, LDL 40, HDL 34  - Home med: atorvastatin 40 mg     Plan:  - Continue home atorvastatin    Impaired memory  Assessment & Plan  - Continue home donepezil 5 mg    Heart failure with preserved ejection fraction (HCC)  Assessment & Plan  Wt Readings from Last 3 Encounters:    05/17/24 75.3 kg (166 lb 0.1 oz)   04/26/24 74.8 kg (165 lb)   04/25/24 78.2 kg (172 lb 6.4 oz)     - Home meds: Torsemide 10 mg 3 times/week, spironolactone 25 mg, Toprol 50 mg BID  - Echo (11/2023) - EF > 70%. G2DD. LA severely dilated. 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 History of exposure to secondhand smoke  - Baseline oxygen is 1 L NC prn  - Currently on room air  - Not currently in exacerbation  - Follows with pulmonology as outpatient    Plan:  - Oxygen supplementation as necessary    TOM (obstructive sleep apnea)  Assessment & Plan  - Has mild TOM with oxygen desaturation on overnight sleep study  - Baseline oxygen is 1 L NC prn    Plan:  - CPAP qhs      Medical Problems       Resolved Problems  Date Reviewed: 5/16/2024   None       Discharging Resident: Vasu Perkins DO  Discharging Attending: No att. providers found  PCP: Abiel Seals MD  Admission Date:   Admission Orders (From admission, onward)       Ordered        05/09/24 2150  INPATIENT ADMISSION  Once                          Discharge Date: 05/17/24    Consultations During Hospital Stay:  Nephrology    Procedures Performed:   None    Significant Findings / Test Results:    CXR (5/9) - prominent R hilum. LAD vs mass in differential. Recommend CT chest w/ contrast  CT CAP (5/15) - multiple pulmonary nodules including biopsy-proven carcinoid in LLL, stable. New 1.4 cm nodule in LLL, may represent carcinoid. New RLL pulmonary nodule measuring 6 mm. Multiple pancreatic cysts. Left renal hypodensity, likely hemorrhagic cyst.    Incidental Findings:   CT chest abdomen pelvis w contrast: 1. 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 However, there is a 1.4 cm nodule in the left lower lobe which, while stable from the most recent prior CT, has demonstrated slow growth since , 2018 and may also represent carcinoid., 2. New right lower lobe pulmonary nodule measuring 6 mm with surrounding groundglass density. Short interval follow-up in 3-6 months is recommended., 3. Multiple pancreatic cysts measuring up to 1.2 cm. For simple cyst(s) less than 1.5 cm, recommend follow-up every 2 years for 2 times. After 4 years, no more follow-ups. Recommend next follow-up in 2 years. Preferred imaging modality: abdomen MRI and , MRCP with and without IV contrast, or triple phase abdomen CT with IV contrast, or abdomen MRI and MRCP without IV contrast., 4. Indeterminate left renal hypodensity, possibly a hemorrhagic cyst, not present in 2017. 6-month follow-up renal protocol CT is recommended to evaluate for growth and enhancement., 5. Mild enlargement of the pulmonary arterial tree which may represent pulmonary hypertension     Test Results Pending at Discharge (will require follow up):  None     Outpatient Tests Requested:  [facility] early next week    Complications:  None    Reason for Admission: Hyponatremia    Hospital Course:   Camryn Roblero is a 83 y.o. female patient who originally presented to the hospital on 5/9/2024 due to hyponatremia, 120, on routine outpatient blood work.  Sent to emergency room by nephrologist.  During course of stay,  patient's hyponatremia slowly improved to 132 on day of discharge with help of salt tablets and holding home torsemide, spironolactone, and ARB.  She had abnormal chest x-ray findings of possible lymphadenopathy versus mass in the differential especially given recent 50 pound weight loss in the last month.  Therefore, a CT chest abdomen and pelvis was performed.  Patient has a history of allergy to iodinated contrast.  She was adequately prepped before IV contrast was given.  She tolerated the CT scan well.  The findings showed old stable pulmonary nodule, new pulmonary nodule, new pancreatic cysts, new left renal hypodensity.  Due to steroids given as part of allergy prep, patient had extremely high blood sugars in the > 600 mg/dL range for which she was started on insulin drip and transition to subcutaneous insulin.  Her transient hyperkalemia was treated with Lokelma.  On the day of discharge, patient was discharged home on torsemide 10 mg every other day.  Her home spironolactone and ARB continued to be held.  Her blood sugar levels were normalized, she was switched from subcutaneous insulin to being discharged home on her regimen of metformin and glipizide. Plan for outpatient follow-up with primary care physician within 1 week.  Plan for follow-up with nephrology next week with [facility].  Plan for outpatient follow-up with patient's own pulmonologist and oncologist.  Referrals for gastroenterology and urology were given for incidental findings follow-up.  Stable for discharge.    Please see above list of diagnoses and related plan for additional information.     Condition at Discharge: stable    Discharge Day Visit / Exam:   Subjective: Patient was seen and examined at bedside.  She denies any chest pain, difficulty breathing, abdominal pain, nausea, vomiting.    Vitals: Blood Pressure: 157/85 (05/17/24 1524)  Pulse: 75 (05/17/24 1524)  Temperature: 97.8 °F (36.6 °C) (05/17/24 1524)  Temp Source: Oral (05/15/24  2100)  Respirations: 17 (05/16/24 2139)  Weight - Scale: 75.3 kg (166 lb 0.1 oz) (05/17/24 0539)  SpO2: 94 % (05/17/24 1524)  Exam:   Physical Exam  Vitals and nursing note reviewed.   Constitutional:       General: She is not in acute distress.     Appearance: Normal appearance. She is well-developed. She is not ill-appearing.   HENT:      Head: Normocephalic and atraumatic.   Cardiovascular:      Rate and Rhythm: Normal rate and regular rhythm.      Heart sounds: No murmur heard.  Pulmonary:      Breath sounds: Normal breath sounds. No wheezing, rhonchi or rales.   Abdominal:      General: Bowel sounds are normal.      Palpations: Abdomen is soft.      Tenderness: There is no abdominal tenderness. There is no guarding or rebound.   Musculoskeletal:         General: No swelling.      Cervical back: Normal range of motion and neck supple.   Skin:     General: Skin is warm and dry.      Capillary Refill: Capillary refill takes less than 2 seconds.   Neurological:      Mental Status: She is alert and oriented to person, place, and time. Mental status is at baseline.   Psychiatric:         Mood and Affect: Mood normal.         Behavior: Behavior normal.         Discussion with Family: Updated  (daughter) via phone.    Discharge instructions/Information to patient and family:   See after visit summary for information provided to patient and family.      Provisions for Follow-Up Care:  See after visit summary for information related to follow-up care and any pertinent home health orders.      Mobility at time of Discharge:   Basic Mobility Inpatient Raw Score: 18  JH-HLM Goal: 6: Walk 10 steps or more  JH-HLM Achieved: 6: Walk 10 steps or more  HLM Goal achieved. Continue to encourage appropriate mobility.     Disposition:   Home    Planned Readmission: No    Discharge Medications:  See after visit summary for reconciled discharge medications provided to patient and/or family.      **Please Note: This note  may have been constructed using a voice recognition system**

## 2024-05-17 NOTE — PROGRESS NOTES
Progress Note - Nephrology   Camryn Roblero 83 y.o. female MRN: [id number]  Unit/Bed#: S -01 Encounter: [id number]      Assessment and Plan:  Hyponatremia  - Na 120 on routine outpatient labs and on arrival   - Symptomatic with fatigue and generalized weakness  -Hx of hyponatremia, Carcinoid Tumor, HFpEF   - Baseline Na 130s  -Outpatient regimen includes Torsemide, Aldactone, Losartan, and Fluid Restriction  -Torsemide, Losartan, and Aldactone on hold    -If CT scan today will continue to hold Losartan   - Urine osmolality 286, urine sodium 33, serum osmolality 268  - Initially placed on fluids now discontinued, Na tabs 1g TID then transitioned to 2g BID  -I/O-  550  -Weight unchanged but down overall   - Na 132  -Continue Torsemide QOD  -Hold on Aldactone and Losartan in outpatient setting   -Continue Na 2g BID with meals   -BMP next week, has been set up by Nephrology         Chronic Kidney Disease Stage 2  - Baseline appears to be around 1  - Secondary to diabetic kidney disease, hypertensive nephrosclerosis, cardiorenal, age-related nephron loss   -Renal function stable   -BUN/Cr   -Urinary retention protocol  -Closely monitor I/Os        T2DM   - Home regimen includes Glipizide and Metformin   - HbA1c 6.4  -Required insulin gtt inpatient, now off   -Regimen inpatient Lantus 10U HS and Lispro 3 TID w/ meals   -Monitor BG               Hyperkalemia  Today 5.0  Low K diet  Lokelma total 2 doses  Monitor BMP next week            Hypomagnesemia- Resolved   -Mg 2.0  -Replete electrolytes as appropriate         Neuroendocrine Tumor- Carcinoid  -Follows Pulmonology as outpatient  -Unintentional weight loss  -Chest xray on admission- Prominent R hilum lymphadenopathy vs mass  -Question if Carcinoid spread or new mass?  Suspicion for worsening SIADH   -CT CAP demonstrates multiple pulmonary nodules, demonstrated with slow growth of previous nodule, new right lower lobe pulmonary nodule 6 mm with surrounding groundglass  density, multiple pancreatic cysts, indeterminate left renal hypodensity questionable hemorrhagic cyst  -Close follow up as outpatient with Pulmonology for new/worsening nodules, as well as outpatient urology follow-up for left renal hypodensity           Hypertension  - Home regimen includes Toprol, Valsartan, Torsemide, and Aldactone   -Valsartan, Torsemide Aldactone on hold  -/58     HFpEF  - Home regimen includes Low Na diet, Fluid Restriction, Torsemide 10mg 3x/week, Aldactone   - Last Echo 11/23: EF >70%, G2DD  -Weight stable   -Overall Euvolemic on exam       Subjective:   Patient was seen and examined at the bedside.  Patient was resting in the chair comfortably in no overt acute distress.  Patient denied any headache, dizziness, fatigue, chest pain, shortness of breath, palpitations, abdominal pain, NVD, leg pain, or any other symptoms time.  She does endorse that her legs improving yesterday.    Objective:     Vitals: Blood pressure 111/58, pulse 65, temperature 97.7 °F (36.5 °C), resp. rate 17, weight 75.3 kg (166 lb 0.1 oz), SpO2 99%.,Body mass index is 33.53 kg/m².    Weight (last 2 days)       Date/Time Weight    05/17/24 0539 75.3 (166.01)    05/17/24 0516 75.3 (166.01)    05/16/24 0558 73.9 (162.92)    05/15/24 0600 74.1 (163.36)              Intake/Output Summary (Last 24 hours) at 5/17/2024 0947  Last data filed at 5/16/2024 1100  Gross per 24 hour   Intake --   Output 550 ml   Net -550 ml            Physical Exam: /58   Pulse 65   Temp 97.7 °F (36.5 °C)   Resp 17   Wt 75.3 kg (166 lb 0.1 oz)   SpO2 99%   BMI 33.53 kg/m²     General Appearance:    Alert, cooperative, no distress, appears stated age   Head:    Normocephalic, without obvious abnormality, atraumatic   Eyes:    PERRL, conjunctiva/corneas clear, EOM's intact, fundi     benign, both eyes   Ears:    Normal TM's and external ear canals, both ears   Nose:   Nares normal, septum midline, mucosa normal, no drainage    or  sinus tenderness   Throat:   Lips, mucosa, and tongue normal; teeth and gums normal   Neck:   Supple, symmetrical, trachea midline, no adenopathy;     thyroid:  no enlargement/tenderness/nodules; no carotid    bruit or JVD   Back:     Symmetric, no curvature, ROM normal, no CVA tenderness   Lungs:     Clear to auscultation bilaterally, respirations unlabored   Chest Wall:    No tenderness or deformity    Heart:    Regular rate and rhythm, S1 and S2 normal, no murmur, rub   or gallop       Abdomen:     Soft, non-tender, bowel sounds active all four quadrants,     no masses, no organomegaly           Extremities: Trace pitting edema bilaterally, atraumatic, no cyanosis   Pulses:   2+ and symmetric all extremities   Skin:   Skin color, texture, turgor normal, no rashes or lesions   Lymph nodes:   Cervical, supraclavicular, and axillary nodes normal   Neurologic:   CNII-XII intact, normal strength, sensation and reflexes     throughout        Lab, Imaging and other studies: I have personally reviewed pertinent labs.  CBC:   Lab Results   Component Value Date    WBC 9.47 05/17/2024    HGB 12.0 05/17/2024    HCT 37.9 05/17/2024    MCV 86 05/17/2024     05/17/2024    RBC 4.41 05/17/2024    MCH 27.2 05/17/2024    MCHC 31.7 05/17/2024    RDW 14.8 05/17/2024    MPV 9.9 05/17/2024     CMP:   Lab Results   Component Value Date    SODIUM 132 (L) 05/17/2024    K 5.0 05/17/2024     05/17/2024    CO2 26 05/17/2024    BUN 50 (H) 05/17/2024    CREATININE 1.11 05/17/2024    CALCIUM 9.0 05/17/2024    EGFR 46 05/17/2024

## 2024-05-17 NOTE — ASSESSMENT & PLAN NOTE
- Home meds: Valsartan 320 mg, Cardizem 120 mg, Toprol 50 mg BID, Spironolactone 25 mg, Torsemide 10 mg three times per week  - Blood pressure is adequately controlled     Plan:  - Continue home Cardizem and Toprol  - Continue torsemide 10 mg qd  - Hold home spironolactone  - Hold ARB upon discharge

## 2024-05-17 NOTE — CASE MANAGEMENT
Case Management Discharge Planning Note    Patient name Camryn Roblero  Location S /S -01 MRN [id number]  : 1940 Date 2024       Current Admission Date: 2024  Current Admission Diagnosis:Hyponatremia   Patient Active Problem List    Diagnosis Date Noted Date Diagnosed    Leukocytosis 2024     Mild protein-calorie malnutrition (HCC) 2024     Hyperkalemia 2024     Abnormal chest CT 2024     CKD (chronic kidney disease) stage 2, GFR 60-89 ml/min 2024     Impaired memory 2024     Allergic drug rash 2024     Elevated troponin 2023     Tachycardia 2023     Atrial flutter (HCC) 2023     Hyperlipidemia 10/21/2023     Seasonal allergic rhinitis 2023     Osteopenia 2022     Chronic pain of both knees 2021     Cataract of both eyes 2021     Class 2 severe obesity due to excess calories with serious comorbidity and body mass index (BMI) of 36.0 to 36.9 in adult ([facility]) 2021     Multiple pulmonary nodules 10/05/2020     Colon polyps 10/05/2020     TOM (obstructive sleep apnea) 2020     Heart failure with preserved ejection fraction ([facility]) 2020     Persistent proteinuria 10/25/2019     Hyponatremia 2016     Essential hypertension 2016     Type 2 diabetes mellitus, without long-term current use of insulin ([facility]) 2016     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     COPD (chronic obstructive pulmonary disease) ([facility]) 2016       LOS (days): 8  Geometric Mean LOS (GMLOS) (days): 3.6  Days to GMLOS:-4     OBJECTIVE:  Risk of Unplanned Readmission Score: 18.7         Current admission status: Inpatient   Preferred Pharmacy:   [facility] - [place], [place] - [address]  [address] [address]  Phone: [phone] Fax: [phone]    [facility] [address] [place] [address]  RO[address]  [place] [address]  Phone: [phone] Fax: [phone]    Primary Care Provider: Abiel Seals MD    Primary Insurance: MEDICARE  Secondary Insurance: AARP    DISCHARGE DETAILS:    Discharge planning discussed with:: Patient     Comments - [place] of Choice: Home HHC confirmed with [facility]. Patient's family will proved transport                Contacts  Patient Contacts: delma Akbar  Relationship to Patient:: Family  Contact Method: Phone  Phone Number: [phone]  Reason/Outcome: Discharge Planning    Requested Home Health Care         Is the patient interested in HHC at discharge?: Yes  Home Health Discipline requested:: Nursing, Physical Therapy, Occupational Therapy  Home Health Agency Name:: [facility]  HHA External Referral Reason (only applicable if external HHA name selected): Scheduling access issues  Home Health Follow-Up Provider:: PCP  Home Health Services Needed:: Evaluate Functional Status and Safety, Gait/ADL Training, Heart Failure Management, Strengthening/Theraputic Exercises to Improve Function  Homebound Criteria Met:: Requires the Assistance of Another Person for Safe Ambulation or to Leave the Home, Uses an Assist Device (i.e. cane, walker, etc)  Supporting Clincal Findings:: Dyspnea with Exertion, Limited Endurance, Fatigues Easliy in Short Distances         Other Referral/Resources/Interventions Provided:  Interventions: HHC  Referral Comments: Home with [facility]. Family to transport

## 2024-05-17 NOTE — ASSESSMENT & PLAN NOTE
- Had Na 120 on routine outpatient bloodwork. Sent in by nephrologist Dr. Perkins  - Has history of chronic hyponatremia. Baseline Na in the low 130s  - Endorsed decreased food and water intake since death of  1 month ago  - Urine osm 286, urine sodium 33, serum osm 268. Hypoosmolar, however, this is skewed because patient is on diuretics at home  - Endorses decreased food and water intake since death of  1 month ago  - Started on salt tablets 1 g TID   - Na improved to 132 today    Plan:   - Continue salt tablets 2 g BID  - Spironolactone and ARB will currently remain on hold  - Continue torsemide 10 mg every other day  - BMP early next week  - Patient has upcoming appointment with nephrology, keep same appointment  - Low potassium diet

## 2024-05-17 NOTE — DISCHARGE INSTR - AVS FIRST PAGE
Dear aCmryn Roblero,     It was our pleasure to care for you here at [facility].  It is our hope that we were always able to exceed the expected standards for your care during your stay.  You were hospitalized due to low sodium levels.  You were cared for on the South 3rd floor by Vasu Perkins DO under the service of Susana Perkins MD with the [facility] who covers for your primary care physician (PCP), Abiel Seals MD, while you were hospitalized.  If you have any questions or concerns related to this hospitalization, you may contact us at .  For follow up as well as any medication refills, we recommend that you follow up with your primary care physician.  A registered nurse will reach out to you by phone within a few days after your discharge to answer any additional questions that you may have after going home.  However, at this time we provide for you here, the most important instructions / recommendations at discharge:     Notable Medication Adjustments -   STOP taking Spironolactone (Aldactone) and Valsartan (DIOVAN).   Please start taking sodium chloride (Salt tablets) 1g tablet: Take 2 tablets (2 grams) twice daily with meals.   Please take Torsemide (DEMADEX) 10mg, 1 tablet every other day.   Please continue taking the rest of your medications as prescribed.  Testing Required after Discharge -   Please obtain a Basic Metabolic Panel early next week, an order has been provided for you.   ** Please contact your PCP to request testing orders for any of the testing recommended here **  Important follow up information -   Please follow up with your PCP within a week of discharge.   Please follow up with nephrology on outpatient basis  Please follow up with gastroenterology on outpatient basis for further evaluation for the pancreatic cysts seen on the CT scan performed in the hospital. A referral has been provided for you.  Please  follow up with urology on outpatient basis for further evaluation of the renal cyst that was also seen on the CT scan. A referral was provided for you.  Other Instructions -   Please contact your primary care provider, call 911 or go to the nearest emergency department for worsening symptoms  Please review this entire after visit summary as additional general instructions including medication list, appointments, activity, diet, any pertinent wound care, and other additional recommendations from your care team that may be provided for you.      Sincerely,     Vasu Perkins, DO

## 2024-05-17 NOTE — ASSESSMENT & PLAN NOTE
Lab Results   Component Value Date    CREATININE 1.11 05/17/2024    CREATININE 1.22 05/16/2024    CREATININE 1.12 05/16/2024     Plan:  -Follow-up with nephrology as outpatient

## 2024-05-17 NOTE — ASSESSMENT & PLAN NOTE
Wt Readings from Last 3 Encounters:   05/17/24 75.3 kg (166 lb 0.1 oz)   04/26/24 74.8 kg (165 lb)   04/25/24 78.2 kg (172 lb 6.4 oz)     - Home meds: Torsemide 10 mg 3 times/week, spironolactone 25 mg, Toprol 50 mg BID  - Echo (11/2023) - EF > 70%. G2DD. LA severely dilated. PASP 37 mmHg  - Per patient, 50 pound weight loss in the last year  - Not in exacerbation at this time    Plan:  - Hold home spironolactone  - Continue Toprol 50 mg BID  -Continue torsemide 10 mg every other day

## 2024-05-17 NOTE — ASSESSMENT & PLAN NOTE
Lab Results   Component Value Date    K 5.0 05/17/2024    K 5.0 05/16/2024    K 5.6 (H) 05/16/2024     Plan:  - Follow-up BMP early next week

## 2024-05-17 NOTE — ASSESSMENT & PLAN NOTE
- Home meds: metformin 1000 mg BID, glipizide 5 mg  - HgbA1c 7.4% (4/26/24)  - Most recent blood sugars in the 400s    Plan:  -Restart home medications upon discharge

## 2024-05-19 ENCOUNTER — HOSPITAL ENCOUNTER (OUTPATIENT)
Dept: MAMMOGRAPHY | Facility: HOSPITAL | Age: 84
Discharge: HOME | End: 2024-05-19
Payer: MEDICARE

## 2024-05-19 VITALS — BODY MASS INDEX: 33.47 KG/M2 | HEIGHT: 59 IN | WEIGHT: 166 LBS

## 2024-05-19 DIAGNOSIS — Z12.31 ENCOUNTER FOR SCREENING MAMMOGRAM FOR BREAST CANCER: ICD-10-CM

## 2024-05-19 DIAGNOSIS — E11.9 DIABETES MELLITUS WITHOUT COMPLICATION (HCC): ICD-10-CM

## 2024-05-19 PROCEDURE — 77067 SCR MAMMO BI INCL CAD: CPT

## 2024-05-19 PROCEDURE — 77063 BREAST TOMOSYNTHESIS BI: CPT

## 2024-05-20 ENCOUNTER — TRANSITIONAL CARE MANAGEMENT (OUTPATIENT)
Dept: FAMILY MEDICINE CLINIC | Facility: CLINIC | Age: 84
End: 2024-05-20

## 2024-05-20 ENCOUNTER — TELEPHONE (OUTPATIENT)
Dept: LAB | Facility: HOSPITAL | Age: 84
End: 2024-05-20

## 2024-05-20 ENCOUNTER — TELEPHONE (OUTPATIENT)
Dept: FAMILY MEDICINE CLINIC | Facility: CLINIC | Age: 84
End: 2024-05-20

## 2024-05-20 ENCOUNTER — TELEPHONE (OUTPATIENT)
Age: 84
End: 2024-05-20

## 2024-05-20 DIAGNOSIS — I48.92 ATRIAL FLUTTER WITH RAPID VENTRICULAR RESPONSE (HCC): ICD-10-CM

## 2024-05-20 DIAGNOSIS — R41.3 IMPAIRED MEMORY: ICD-10-CM

## 2024-05-20 RX ORDER — GLIPIZIDE 10 MG/1
TABLET ORAL
Qty: 45 TABLET | Refills: 1 | Status: SHIPPED | OUTPATIENT
Start: 2024-05-20

## 2024-05-20 RX ORDER — DILTIAZEM HYDROCHLORIDE 120 MG/1
120 CAPSULE, COATED, EXTENDED RELEASE ORAL DAILY
Qty: 90 CAPSULE | Refills: 1 | Status: CANCELLED | OUTPATIENT
Start: 2024-05-20 | End: 2024-11-16

## 2024-05-20 NOTE — TELEPHONE ENCOUNTER
Patient can take extra dose of her torsemide 10 mg and patient's daughter can call her Cardiologist office to let them know as well.

## 2024-05-20 NOTE — TELEPHONE ENCOUNTER
Fax from [facility] requesting refill for Donepezil 5mg #[phone] with 3 refills

## 2024-05-20 NOTE — TELEPHONE ENCOUNTER
Call received from [facility]. Pt is up three pounds since yesterday and her ankles are 1-2 cm bigger than two days ago. No s/s of distress. Please advise.

## 2024-05-21 ENCOUNTER — TELEPHONE (OUTPATIENT)
Dept: LAB | Facility: HOSPITAL | Age: 84
End: 2024-05-21

## 2024-05-21 RX ORDER — DONEPEZIL HYDROCHLORIDE 5 MG/1
5 TABLET, FILM COATED ORAL
Qty: 90 TABLET | Refills: 3 | Status: SHIPPED | OUTPATIENT
Start: 2024-05-21

## 2024-05-23 ENCOUNTER — LAB (OUTPATIENT)
Dept: LAB | Facility: HOSPITAL | Age: 84
End: 2024-05-23
Payer: MEDICARE

## 2024-05-23 ENCOUNTER — TELEPHONE (OUTPATIENT)
Dept: LAB | Facility: HOSPITAL | Age: 84
End: 2024-05-23

## 2024-05-23 DIAGNOSIS — N18.30 STAGE 3 CHRONIC KIDNEY DISEASE, UNSPECIFIED WHETHER STAGE 3A OR 3B CKD (HCC): ICD-10-CM

## 2024-05-23 LAB
ANION GAP SERPL CALCULATED.3IONS-SCNC: 5 MMOL/L (ref 4–13)
BUN SERPL-MCNC: 25 MG/DL (ref 5–25)
CALCIUM SERPL-MCNC: 8.8 MG/DL (ref 8.4–10.2)
CHLORIDE SERPL-SCNC: 99 MMOL/L (ref 96–108)
CO2 SERPL-SCNC: 27 MMOL/L (ref 21–32)
CREAT SERPL-MCNC: 0.91 MG/DL (ref 0.6–1.3)
GFR SERPL CREATININE-BSD FRML MDRD: 58 ML/MIN/1.73SQ M
GLUCOSE SERPL-MCNC: 160 MG/DL (ref 65–140)
POTASSIUM SERPL-SCNC: 4.3 MMOL/L (ref 3.5–5.3)
SODIUM SERPL-SCNC: 131 MMOL/L (ref 135–147)

## 2024-05-23 PROCEDURE — 36415 COLL VENOUS BLD VENIPUNCTURE: CPT

## 2024-05-23 PROCEDURE — 80048 BASIC METABOLIC PNL TOTAL CA: CPT

## 2024-05-24 ENCOUNTER — TELEPHONE (OUTPATIENT)
Dept: NEPHROLOGY | Facility: CLINIC | Age: 84
End: 2024-05-24

## 2024-05-24 ENCOUNTER — TELEPHONE (OUTPATIENT)
Age: 84
End: 2024-05-24

## 2024-05-24 DIAGNOSIS — N18.30 STAGE 3 CHRONIC KIDNEY DISEASE, UNSPECIFIED WHETHER STAGE 3A OR 3B CKD (HCC): Primary | ICD-10-CM

## 2024-05-24 NOTE — TELEPHONE ENCOUNTER
----- Message from Arslan Perkins MD sent at 5/24/2024  3:19 PM EDT -----  Hello    Patient normally is followed up by Ms Yuliet Mars.     Please check in with the patient to see how she is doing.  As long as her blood pressures are staying stable less than 1 30-1 40 systolic, and she is feeling well overall and swelling is minimal then no changes to medications  - Please let her know her sodium level is within her baseline 131.  She should continue her torsemide tablet every other day.  Continue holding losartan and continue holding HCTZ.  These were held at the recent hospitalization  - Repeat BMP in 2 weeks  - Continue free water restriction 1.8 L/day    Thank you    np

## 2024-05-24 NOTE — RESULT ENCOUNTER NOTE
Hello    Patient normally is followed up by Ms Yuliet Mars.     Please check in with the patient to see how she is doing.  As long as her blood pressures are staying stable less than 1 30-1 40 systolic, and she is feeling well overall and swelling is minimal then no changes to medications  - Please let her know her sodium level is within her baseline 131.  She should continue her torsemide tablet every other day.  Continue holding losartan and continue holding HCTZ.  These were held at the recent hospitalization  - Repeat BMP in 2 weeks  - Continue free water restriction 1.8 L/day    Thank you    np

## 2024-05-24 NOTE — TELEPHONE ENCOUNTER
New Patient    What is the reason for the patient’s appointment?: Pt daughter calling to schedule ED f/u appt. Pt was seen in the ED and discharged on 5/17. Pt had CT scan completed on 5/15 which showed: IMPRESSION:     1.  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 However, there is a 1.4 cm nodule in the left lower lobe which, while stable from the most recent prior CT, has demonstrated slow growth since   2018 and may also represent carcinoid.     2.  New right lower lobe pulmonary nodule measuring 6 mm with surrounding groundglass density. Short interval follow-up in 3-6 months is recommended.     3.  Multiple pancreatic cysts measuring up to 1.2 cm. For simple cyst(s) less than 1.5 cm, recommend follow-up every 2 years for 2 times. After 4 years, no more follow-ups. Recommend next follow-up in 2 years. Preferred imaging modality: abdomen MRI and   MRCP with and without IV contrast, or triple phase abdomen CT with IV contrast, or abdomen MRI and MRCP without IV contrast.     4.  Indeterminate left renal hypodensity, possibly a hemorrhagic cyst, not present in 2017. 6-month follow-up renal protocol CT is recommended to evaluate for growth and enhancement.     5.  Mild enlargement of the pulmonary arterial tree which may represent pulmonary hypertension.    Decision tree said to schedule within 4 weeks and no appts available in that timeframe. Please review.     What office location does the patient prefer?: [place]    Does patient have Imaging/Lab Results: CT scan 5/15    Have patient records been requested?: records in [employer]   If No, are the records showing in Epic:       INSURANCE:   Do we accept the patient's insurance or is the patient Self-Pay?:    Insurance Provider:MEDICARE, [email or web address]   Plan Type/Number:   Member ID#: [id number],  [id number]       HISTORY:   Has the patient had any previous Urologist(s)?: no    Was the patient seen in the ED?:  yes    Has the patient had any outside testing done?:    Does the patient have a personal history of cancer?: yes small nodule on lung       Pt call back- [phone] Naomie (daughter)

## 2024-05-24 NOTE — TELEPHONE ENCOUNTER
Pt's daughter informed with results and recommendations. Lab orders in the system. She will update us with BP.

## 2024-05-28 ENCOUNTER — TELEPHONE (OUTPATIENT)
Dept: LAB | Facility: HOSPITAL | Age: 84
End: 2024-05-28

## 2024-05-28 ENCOUNTER — OFFICE VISIT (OUTPATIENT)
Dept: FAMILY MEDICINE CLINIC | Facility: CLINIC | Age: 84
End: 2024-05-28
Payer: MEDICARE

## 2024-05-28 VITALS — OXYGEN SATURATION: 95 % | HEART RATE: 74 BPM | DIASTOLIC BLOOD PRESSURE: 64 MMHG | SYSTOLIC BLOOD PRESSURE: 130 MMHG

## 2024-05-28 DIAGNOSIS — E11.65 TYPE 2 DIABETES MELLITUS WITH HYPERGLYCEMIA, WITHOUT LONG-TERM CURRENT USE OF INSULIN (HCC): ICD-10-CM

## 2024-05-28 DIAGNOSIS — E87.1 HYPONATREMIA: Primary | ICD-10-CM

## 2024-05-28 DIAGNOSIS — Z00.00 MEDICARE ANNUAL WELLNESS VISIT, SUBSEQUENT: ICD-10-CM

## 2024-05-28 DIAGNOSIS — I10 ESSENTIAL HYPERTENSION: ICD-10-CM

## 2024-05-28 DIAGNOSIS — I50.32 CHRONIC HEART FAILURE WITH PRESERVED EJECTION FRACTION (HCC): ICD-10-CM

## 2024-05-28 DIAGNOSIS — E78.5 HYPERLIPIDEMIA, UNSPECIFIED HYPERLIPIDEMIA TYPE: ICD-10-CM

## 2024-05-28 DIAGNOSIS — R91.8 MULTIPLE PULMONARY NODULES: ICD-10-CM

## 2024-05-28 DIAGNOSIS — E11.9 DIABETES MELLITUS WITHOUT COMPLICATION (HCC): ICD-10-CM

## 2024-05-28 DIAGNOSIS — N18.31 STAGE 3A CHRONIC KIDNEY DISEASE (HCC): ICD-10-CM

## 2024-05-28 PROCEDURE — 99214 OFFICE O/P EST MOD 30 MIN: CPT | Performed by: FAMILY MEDICINE

## 2024-05-28 PROCEDURE — G0439 PPPS, SUBSEQ VISIT: HCPCS | Performed by: FAMILY MEDICINE

## 2024-05-28 RX ORDER — LANCETS
EACH MISCELLANEOUS
Qty: 102 EACH | Refills: 3 | Status: SHIPPED | OUTPATIENT
Start: 2024-05-28

## 2024-05-28 NOTE — ASSESSMENT & PLAN NOTE
Patient had CT in May 2024 and has new 0.6 cm nodule in right lower lobe. Will recheck CT in 3-6 months.

## 2024-05-28 NOTE — ASSESSMENT & PLAN NOTE
Wt Readings from Last 3 Encounters:   05/19/24 75.3 kg (166 lb)   05/17/24 75.3 kg (166 lb 0.1 oz)   04/26/24 74.8 kg (165 lb)   .   Stable. Continue current medications. Last echocardiogram was in November 2023 and EF 70%.

## 2024-05-28 NOTE — ASSESSMENT & PLAN NOTE
Lab Results   Component Value Date    EGFR 58 05/23/2024    EGFR 46 05/17/2024    EGFR 41 05/16/2024    CREATININE 0.91 05/23/2024    CREATININE 1.11 05/17/2024    CREATININE 1.22 05/16/2024      GFR better at 58 in May 2024.

## 2024-05-28 NOTE — PATIENT INSTRUCTIONS
Medicare Preventive Visit Patient Instructions  Thank you for completing your Welcome to Medicare Visit or Medicare Annual Wellness Visit today. Your next wellness visit will be due in one year (5/29/2025).  The screening/preventive services that you may require over the next 5-10 years are detailed below. Some tests may not apply to you based off risk factors and/or age. Screening tests ordered at today's visit but not completed yet may show as past due. Also, please note that scanned in results may not display below.  Preventive Screenings:  Service Recommendations Previous Testing/Comments   Colorectal Cancer Screening  * Colonoscopy    * Fecal Occult Blood Test (FOBT)/Fecal Immunochemical Test (FIT)  * Fecal DNA/Cologuard Test  * Flexible Sigmoidoscopy Age: 45-75 years old   Colonoscopy: every 10 years (may be performed more frequently if at higher risk)  OR  FOBT/FIT: every 1 year  OR  Cologuard: every 3 years  OR  Sigmoidoscopy: every 5 years  Screening may be recommended earlier than age 45 if at higher risk for colorectal cancer. Also, an individualized decision between you and your healthcare provider will decide whether screening between the ages of 76-85 would be appropriate. Colonoscopy: Not on file  FOBT/FIT: Not on file  Cologuard: Not on file  Sigmoidoscopy: Not on file          Breast Cancer Screening Age: 40+ years old  Frequency: every 1-2 years  Not required if history of left and right mastectomy Mammogram: 05/19/2024    Screening Current   Cervical Cancer Screening Between the ages of 21-29, pap smear recommended once every 3 years.   Between the ages of 30-65, can perform pap smear with HPV co-testing every 5 years.   Recommendations may differ for women with a history of total hysterectomy, cervical cancer, or abnormal pap smears in past. Pap Smear: Not on file    Screening Not Indicated   Hepatitis C Screening Once for adults born between 1945 and 1965  More frequently in patients at high  risk for Hepatitis C Hep C Antibody: Not on file        Diabetes Screening 1-2 times per year if you're at risk for diabetes or have pre-diabetes Fasting glucose: 113 mg/dL (5/9/2024)  A1C: 7.4 (4/26/2024)  Screening Not Indicated  History Diabetes   Cholesterol Screening Once every 5 years if you don't have a lipid disorder. May order more often based on risk factors. Lipid panel: 05/09/2024    Screening Not Indicated  History Lipid Disorder     Other Preventive Screenings Covered by Medicare:  Abdominal Aortic Aneurysm (AAA) Screening: covered once if your at risk. You're considered to be at risk if you have a family history of AAA.  Lung Cancer Screening: covers low dose CT scan once per year if you meet all of the following conditions: (1) Age 55-77; (2) No signs or symptoms of lung cancer; (3) Current smoker or have quit smoking within the last 15 years; (4) You have a tobacco smoking history of at least 20 pack years (packs per day multiplied by number of years you smoked); (5) You get a written order from a healthcare provider.  Glaucoma Screening: covered annually if you're considered high risk: (1) You have diabetes OR (2) Family history of glaucoma OR (3)  aged 50 and older OR (4)  American aged 65 and older  Osteoporosis Screening: covered every 2 years if you meet one of the following conditions: (1) You're estrogen deficient and at risk for osteoporosis based off medical history and other findings; (2) Have a vertebral abnormality; (3) On glucocorticoid therapy for more than 3 months; (4) Have primary hyperparathyroidism; (5) On osteoporosis medications and need to assess response to drug therapy.   Last bone density test (DXA Scan): 08/23/2022.  HIV Screening: covered annually if you're between the age of 15-65. Also covered annually if you are younger than 15 and older than 65 with risk factors for HIV infection. For pregnant patients, it is covered up to 3 times per  pregnancy.    Immunizations:  Immunization Recommendations   Influenza Vaccine Annual influenza vaccination during flu season is recommended for all persons aged >= 6 months who do not have contraindications   Pneumococcal Vaccine   * Pneumococcal conjugate vaccine = PCV13 (Prevnar 13), PCV15 (Vaxneuvance), PCV20 (Prevnar 20)  * Pneumococcal polysaccharide vaccine = PPSV23 (Pneumovax) Adults 19-65 yo with certain risk factors or if 65+ yo  If never received any pneumonia vaccine: recommend Prevnar 20 (PCV20)  Give PCV20 if previously received 1 dose of PCV13 or PPSV23   Hepatitis B Vaccine 3 dose series if at intermediate or high risk (ex: diabetes, end stage renal disease, liver disease)   Respiratory syncytial virus (RSV) Vaccine - COVERED BY MEDICARE PART D  * RSVPreF3 (Arexvy) CDC recommends that adults 60 years of age and older may receive a single dose of RSV vaccine using shared clinical decision-making (SCDM)   Tetanus (Td) Vaccine - COST NOT COVERED BY MEDICARE PART B Following completion of primary series, a booster dose should be given every 10 years to maintain immunity against tetanus. Td may also be given as tetanus wound prophylaxis.   Tdap Vaccine - COST NOT COVERED BY MEDICARE PART B Recommended at least once for all adults. For pregnant patients, recommended with each pregnancy.   Shingles Vaccine (Shingrix) - COST NOT COVERED BY MEDICARE PART B  2 shot series recommended in those 19 years and older who have or will have weakened immune systems or those 50 years and older     Health Maintenance Due:  There are no preventive care reminders to display for this patient.  Immunizations Due:      Topic Date Due   • COVID-19 Vaccine (5 - 2023-24 season) 09/01/2023     Advance Directives   What are advance directives?  Advance directives are legal documents that state your wishes and plans for medical care. These plans are made ahead of time in case you lose your ability to make decisions for yourself.  Advance directives can apply to any medical decision, such as the treatments you want, and if you want to donate organs.   What are the types of advance directives?  There are many types of advance directives, and each state has rules about how to use them. You may choose a combination of any of the following:  Living will:  This is a written record of the treatment you want. You can also choose which treatments you do not want, which to limit, and which to stop at a certain time. This includes surgery, medicine, IV fluid, and tube feedings.   Durable power of  for healthcare (DPAHC):  This is a written record that states who you want to make healthcare choices for you when you are unable to make them for yourself. This person, called a proxy, is usually a family member or a friend. You may choose more than 1 proxy.  Do not resuscitate (DNR) order:  A DNR order is used in case your heart stops beating or you stop breathing. It is a request not to have certain forms of treatment, such as CPR. A DNR order may be included in other types of advance directives.  Medical directive:  This covers the care that you want if you are in a coma, near death, or unable to make decisions for yourself. You can list the treatments you want for each condition. Treatment may include pain medicine, surgery, blood transfusions, dialysis, IV or tube feedings, and a ventilator (breathing machine).  Values history:  This document has questions about your views, beliefs, and how you feel and think about life. This information can help others choose the care that you would choose.  Why are advance directives important?  An advance directive helps you control your care. Although spoken wishes may be used, it is better to have your wishes written down. Spoken wishes can be misunderstood, or not followed. Treatments may be given even if you do not want them. An advance directive may make it easier for your family to make difficult  choices about your care.   Urinary Incontinence   Urinary incontinence (UI)  is when you lose control of your bladder. UI develops because your bladder cannot store or empty urine properly. The 3 most common types of UI are stress incontinence, urge incontinence, or both.  Medicines:   May be given to help strengthen your bladder control. Report any side effects of medication to your healthcare provider.  Do pelvic muscle exercises often:  Your pelvic muscles help you stop urinating. Squeeze these muscles tight for 5 seconds, then relax for 5 seconds. Gradually work up to squeezing for 10 seconds. Do 3 sets of 15 repetitions a day, or as directed. This will help strengthen your pelvic muscles and improve bladder control.  Train your bladder:  Go to the bathroom at set times, such as every 2 hours, even if you do not feel the urge to go. You can also try to hold your urine when you feel the urge to go. For example, hold your urine for 5 minutes when you feel the urge to go. As that becomes easier, hold your urine for 10 minutes.   Self-care:   Keep a UI record.  Write down how often you leak urine and how much you leak. Make a note of what you were doing when you leaked urine.  Drink liquids as directed. You may need to limit the amount of liquid you drink to help control your urine leakage. Do not drink any liquid right before you go to bed. Limit or do not have drinks that contain caffeine or alcohol.   Prevent constipation.  Eat a variety of high-fiber foods. Good examples are high-fiber cereals, beans, vegetables, and whole-grain breads. Walking is the best way to trigger your intestines to have a bowel movement.  Exercise regularly and maintain a healthy weight.  Weight loss and exercise will decrease pressure on your bladder and help you control your leakage.   Use a catheter as directed  to help empty your bladder. A catheter is a tiny, plastic tube that is put into your bladder to drain your urine.   Go to  behavior therapy as directed.  Behavior therapy may be used to help you learn to control your urge to urinate.    Weight Management   Why it is important to manage your weight:  Being overweight increases your risk of health conditions such as heart disease, high blood pressure, type 2 diabetes, and certain types of cancer. It can also increase your risk for osteoarthritis, sleep apnea, and other respiratory problems. Aim for a slow, steady weight loss. Even a small amount of weight loss can lower your risk of health problems.  How to lose weight safely:  A safe and healthy way to lose weight is to eat fewer calories and get regular exercise. You can lose up about 1 pound a week by decreasing the number of calories you eat by 500 calories each day.   Healthy meal plan for weight management:  A healthy meal plan includes a variety of foods, contains fewer calories, and helps you stay healthy. A healthy meal plan includes the following:  Eat whole-grain foods more often.  A healthy meal plan should contain fiber. Fiber is the part of grains, fruits, and vegetables that is not broken down by your body. Whole-grain foods are healthy and provide extra fiber in your diet. Some examples of whole-grain foods are whole-wheat breads and pastas, oatmeal, brown rice, and bulgur.  Eat a variety of vegetables every day.  Include dark, leafy greens such as spinach, kale, sammy greens, and mustard greens. Eat yellow and orange vegetables such as carrots, sweet potatoes, and winter squash.   Eat a variety of fruits every day.  Choose fresh or canned fruit (canned in its own juice or light syrup) instead of juice. Fruit juice has very little or no fiber.  Eat low-fat dairy foods.  Drink fat-free (skim) milk or 1% milk. Eat fat-free yogurt and low-fat cottage cheese. Try low-fat cheeses such as mozzarella and other reduced-fat cheeses.  Choose meat and other protein foods that are low in fat.  Choose beans or other legumes such as  split peas or lentils. Choose fish, skinless poultry (chicken or turkey), or lean cuts of red meat (beef or pork). Before you cook meat or poultry, cut off any visible fat.   Use less fat and oil.  Try baking foods instead of frying them. Add less fat, such as margarine, sour cream, regular salad dressing and mayonnaise to foods. Eat fewer high-fat foods. Some examples of high-fat foods include french fries, doughnuts, ice cream, and cakes.  Eat fewer sweets.  Limit foods and drinks that are high in sugar. This includes candy, cookies, regular soda, and sweetened drinks.  Exercise:  Exercise at least 30 minutes per day on most days of the week. Some examples of exercise include walking, biking, dancing, and swimming. You can also fit in more physical activity by taking the stairs instead of the elevator or parking farther away from stores. Ask your healthcare provider about the best exercise plan for you.      © Copyright [employer] 2018 Information is for End User's use only and may not be sold, redistributed or otherwise used for commercial purposes. All illustrations and images included in CareNotes® are the copyrighted property of [employer] or [employer]

## 2024-05-28 NOTE — PROGRESS NOTES
Ambulatory Visit  Name: Camryn Roblero      : 1940      MRN: [id number]  Encounter Provider: Abiel Seals MD  Encounter Date: 2024   Encounter department: [facility]    Assessment & Plan   1. Hyponatremia  Assessment & Plan:  Level was down to 120 on admission. Patient was started on Na pills 2g two times a day. Na up to 131 on 24. Patient to see Nephrology in 2024.   2. Medicare annual wellness visit, subsequent  Assessment & Plan:  Wellness exam done. Had flu shot, COVID vaccines, Prevnar 13 and Pneumovacc 23. Recommend Tdap and Shingrix. Labs are up to date. Had mammogram in May 2024 and dexascan in Aug 2022. No recent falls. Mood ok. Has living will.   3. Type 2 diabetes mellitus with hyperglycemia, without long-term current use of insulin (HCC)  Assessment & Plan:  A1C 7.4 in 2024. Sugars higher when admitted to hospital but better now. Continue metformin 1000 mg bid and glipizide 5 mg qd. Continue low carb diet. Eye exam done in 2023.     Lab Results   Component Value Date    HGBA1C 7.4 (A) 2024     4. Essential hypertension  Assessment & Plan:  Blood pressure good. Continue current medications.  5. Chronic heart failure with preserved ejection fraction (HCC)  Assessment & Plan:  Wt Readings from Last 3 Encounters:   24 75.3 kg (166 lb)   24 75.3 kg (166 lb 0.1 oz)   24 74.8 kg (165 lb)   .   Stable. Continue current medications. Last echocardiogram was in 2023 and EF 70%.        6. Hyperlipidemia, unspecified hyperlipidemia type  Assessment & Plan:  LDL 37 and LFTs ok in 2023. Continue atorvastatin 40 mg daily at bedtime.   7. Multiple pulmonary nodules  Assessment & Plan:  Patient had CT in May 2024 and has new 0.6 cm nodule in right lower lobe. Will recheck CT in 3-6 months.   8. Stage 3a chronic kidney disease (HCC)  Assessment & Plan:  Lab Results   Component Value Date    EGFR 58 2024    EGFR 46  05/17/2024    EGFR 41 05/16/2024    CREATININE 0.91 05/23/2024    CREATININE 1.11 05/17/2024    CREATININE 1.22 05/16/2024      GFR better at 58 in May 2024.  9. Diabetes mellitus without complication (HCC)  -     Accu-Chek FastClix Lancets MISC; USE TO CHECK BLOOD GLUCOSE Twice DAILY      Depression Screening and Follow-up Plan: Patient was screened for depression during today's encounter. They screened negative with a PHQ-2 score of 0.      Preventive health issues were discussed with patient, and age appropriate screening tests were ordered as noted in patient's After Visit Summary. Personalized health advice and appropriate referrals for health education or preventive services given if needed, as noted in patient's After Visit Summary.    History of Present Illness     Patient here for hospital follow-up and wellness exam. Patient was sent to ER when found to have Na level at 120 as outpatient. Patient was feeling tired and confused. Patient had low Na in the past. Patient was admitted from 5/9 to 5/17. Patient was started on Na pill. Feeling better now. Has [facility] coming to house and getting physical therapy. No chest pain. Gets shortness of breath at times. Uses O2 at night. Sees Pulmonary later this week.        Patient Care Team:  Abiel Seals MD as PCP - General (Family Medicine)  Arslan Perkins MD (Nephrology)    Review of Systems   Constitutional:  Negative for fatigue.   Eyes:  Negative for visual disturbance.   Respiratory:  Positive for shortness of breath. Negative for cough.    Cardiovascular:  Negative for chest pain.   Gastrointestinal:  Negative for abdominal pain, constipation, diarrhea, nausea and vomiting.   Endocrine: Negative for polydipsia, polyphagia and polyuria.   Genitourinary:  Negative for difficulty urinating.   Musculoskeletal:  Positive for arthralgias and gait problem.   Skin:  Negative for rash and wound.   Neurological:  Negative for dizziness, light-headedness, numbness and  headaches.     Medical History Reviewed by provider this encounter:  Tobacco  Allergies  Meds  Problems  Med Hx  Surg Hx  Fam Hx       Annual Wellness Visit Questionnaire   Camryn is here for her Subsequent Wellness visit.     Health Risk Assessment:   Patient rates overall health as fair. Patient feels that their physical health rating is slightly worse. Patient is satisfied with their life. Eyesight was rated as same. Hearing was rated as same. Patient feels that their emotional and mental health rating is same. Patients states they are sometimes angry. Patient states they are often unusually tired/fatigued. Pain experienced in the last 7 days has been none. Patient states that she has experienced no weight loss or gain in last 6 months.     Depression Screening:   PHQ-2 Score: 0      Fall Risk Screening:   In the past year, patient has experienced: no history of falling in past year      Urinary Incontinence Screening:   Patient has leaked urine accidently in the last six months.     Home Safety:  Patient has trouble with stairs inside or outside of their home. Patient has working smoke alarms and has working carbon monoxide detector. Home safety hazards include: none.     Nutrition:   Current diet is Regular.     Medications:   Patient is not currently taking any over-the-counter supplements. Patient is able to manage medications.     Activities of Daily Living (ADLs)/Instrumental Activities of Daily Living (IADLs):   Walk and transfer into and out of bed and chair?: Yes  Dress and groom yourself?: Yes    Bathe or shower yourself?: Yes    Feed yourself? Yes  Do your laundry/housekeeping?: No  Manage your money, pay your bills and track your expenses?: Yes  Make your own meals?: No    Do your own shopping?: No    Previous Hospitalizations:   Any hospitalizations or ED visits within the last 12 months?: Yes    How many hospitalizations have you had in the last year?: 1-2    Advance Care Planning:   Living  will: No    Durable POA for healthcare: No    Advanced directive: No    Advanced directive counseling given: Yes    ACP document given: Yes    Patient declined ACP directive: No      Cognitive Screening:   Provider or family/friend/caregiver concerned regarding cognition?: No    PREVENTIVE SCREENINGS      Cardiovascular Screening:    General: Screening Not Indicated and History Lipid Disorder      Diabetes Screening:     General: Screening Not Indicated and History Diabetes      Colorectal Cancer Screening:     General: Screening Not Indicated      Breast Cancer Screening:     General: Screening Current      Cervical Cancer Screening:    General: Screening Not Indicated      Osteoporosis Screening:    General: Screening Not Indicated      Abdominal Aortic Aneurysm (AAA) Screening:        General: Screening Not Indicated      Lung Cancer Screening:     General: Screening Not Indicated    Screening, Brief Intervention, and Referral to Treatment (SBIRT)    Screening  Typical number of drinks in a day: 0  Typical number of drinks in a week: 0  Interpretation: Low risk drinking behavior.    AUDIT-C Screenin) How often did you have a drink containing alcohol in the past year? never  2) How many drinks did you have on a typical day when you were drinking in the past year? 0  3) How often did you have 6 or more drinks on one occasion in the past year? never    AUDIT-C Score: 0  Interpretation: Score 0-2 (female): Negative screen for alcohol misuse    Single Item Drug Screening:  How often have you used an illegal drug (including marijuana) or a prescription medication for non-medical reasons in the past year? never    Single Item Drug Screen Score: 0  Interpretation: Negative screen for possible drug use disorder    Brief Intervention  Alcohol & drug use screenings were reviewed. No concerns regarding substance use disorder identified.     Social Determinants of Health     Financial Resource Strain: Low Risk   (4/28/2023)    Overall Financial Resource Strain (CARDIA)    • Difficulty of Paying Living Expenses: Not hard at all   Food Insecurity: No Food Insecurity (5/28/2024)    Hunger Vital Sign    • Worried About Running Out of Food in the Last Year: Never true    • Ran Out of Food in the Last Year: Never true   Transportation Needs: No Transportation Needs (5/28/2024)    PRAPARE - Transportation    • Lack of Transportation (Medical): No    • Lack of Transportation (Non-Medical): No   Housing Stability: Unknown (5/28/2024)    Housing Stability Vital Sign    • Unable to Pay for Housing in the Last Year: No    • Homeless in the Last Year: No   Utilities: Not At Risk (5/28/2024)    [facility] Utilities    • Threatened with loss of utilities: No     No results found.    Objective     /64 (BP Location: Left arm, Patient Position: Sitting, Cuff Size: Large)   Pulse 74   SpO2 95%     Physical Exam  Vitals and nursing note reviewed.   Constitutional:       General: She is not in acute distress.     Appearance: Normal appearance. She is well-developed. She is obese.      Comments: Walks with walker.    Neck:      Vascular: No carotid bruit.   Cardiovascular:      Rate and Rhythm: Normal rate and regular rhythm.      Heart sounds: No murmur heard.  Pulmonary:      Effort: Pulmonary effort is normal. No respiratory distress.      Breath sounds: Normal breath sounds.   Musculoskeletal:      Cervical back: Normal range of motion and neck supple.      Right lower leg: No edema.      Left lower leg: No edema.   Lymphadenopathy:      Cervical: No cervical adenopathy.   Neurological:      Mental Status: She is alert.   Psychiatric:         Mood and Affect: Mood normal.         Behavior: Behavior normal.         Thought Content: Thought content normal.         Judgment: Judgment normal.       Administrative Statements

## 2024-05-28 NOTE — ASSESSMENT & PLAN NOTE
A1C 7.4 in April 2024. Sugars higher when admitted to hospital but better now. Continue metformin 1000 mg bid and glipizide 5 mg qd. Continue low carb diet. Eye exam done in April 2023.     Lab Results   Component Value Date    HGBA1C 7.4 (A) 04/26/2024

## 2024-05-28 NOTE — ASSESSMENT & PLAN NOTE
Wellness exam done. Had flu shot, COVID vaccines, Prevnar 13 and Pneumovacc 23. Recommend Tdap and Shingrix. Labs are up to date. Had mammogram in May 2024 and dexascan in Aug 2022. No recent falls. Mood ok. Has living will.

## 2024-05-28 NOTE — ASSESSMENT & PLAN NOTE
Level was down to 120 on admission. Patient was started on Na pills 2g two times a day. Na up to 131 on 5/23/24. Patient to see Nephrology in July 2024.

## 2024-05-29 ENCOUNTER — NURSE TRIAGE (OUTPATIENT)
Age: 84
End: 2024-05-29

## 2024-05-29 NOTE — TELEPHONE ENCOUNTER
Guillermina from [facility] called back stating that there is correction from her previous message. Guillermina states that she spoke to pt's daughter Naomie and she said pt has been taking torsemide 10mg every other day      Guillermina's ph#[phone]    [facility]

## 2024-05-29 NOTE — TELEPHONE ENCOUNTER
"Reason for Disposition  • MILD swelling of both ankles (i.e., pedal edema) AND new-onset or worsening    Answer Assessment - Initial Assessment Questions  1. ONSET: \"When did the swelling start?\" (e.g., minutes, hours, days)      Ongoing, increasing  2. LOCATION: \"What part of the leg is swollen?\"  \"Are both legs swollen or just one leg?\"      Ankle, feet  3. SEVERITY: \"How bad is the swelling?\" (e.g., localized; mild, moderate, severe)   - Localized - small area of swelling localized to one leg   - MILD pedal edema - swelling limited to foot and ankle, pitting edema < 1/4 inch (6 mm) deep, rest and elevation eliminate most or all swelling   - MODERATE edema - swelling of lower leg to knee, pitting edema > 1/4 inch (6 mm) deep, rest and elevation only partially reduce swelling   - SEVERE edema - swelling extends above knee, facial or hand swelling present       Mild to moderate  4. REDNESS: \"Does the swelling look red or infected?\"      denies  5. PAIN: \"Is the swelling painful to touch?\" If Yes, ask: \"How painful is it?\"   (Scale 1-10; mild, moderate or severe)      denies  6. FEVER: \"Do you have a fever?\" If Yes, ask: \"What is it, how was it measured, and when did it start?\"       denies  7. CAUSE: \"What do you think is causing the leg swelling?\"      Ongoing issue  8. MEDICAL HISTORY: \"Do you have a history of heart failure, kidney disease, liver failure, or cancer?\"      Diastolic heart failure, Aflutter, HTN  9. RECURRENT SYMPTOM: \"Have you had leg swelling before?\" If Yes, ask: \"When was the last time?\" \"What happened that time?\"      ongoing  10. OTHER SYMPTOMS: \"Do you have any other symptoms?\" (e.g., chest pain, difficulty breathing)        Shortness of breath with activity    Protocols used: Leg Swelling and Edema-ADULT-[place]    "

## 2024-05-29 NOTE — TELEPHONE ENCOUNTER
Spoke with Guillermina from [facility], at visit with patient. Concerned regarding weight gain, increased swelling in legs/ankle by 2cm,  shortness of breath with activity.    Weights: 5/18 165lbs                  5/21 162lbs                 5/29 168.4lbs    Total gain 6.4lbs in a week. Patient ordered Torsemide 10mg every other day but did not understand how to take it, so has not been taking it but she did take a dose yesterday and feels much better today.     Vital signs: /70, HR 62, Pulseox 97% on room air.    Please advise.

## 2024-05-30 ENCOUNTER — OFFICE VISIT (OUTPATIENT)
Dept: PULMONOLOGY | Facility: CLINIC | Age: 84
End: 2024-05-30
Payer: MEDICARE

## 2024-05-30 VITALS
HEIGHT: 59 IN | BODY MASS INDEX: 35.28 KG/M2 | OXYGEN SATURATION: 92 % | SYSTOLIC BLOOD PRESSURE: 150 MMHG | DIASTOLIC BLOOD PRESSURE: 84 MMHG | TEMPERATURE: 98.4 F | HEART RATE: 86 BPM | WEIGHT: 175 LBS

## 2024-05-30 DIAGNOSIS — G47.33 OSA (OBSTRUCTIVE SLEEP APNEA): ICD-10-CM

## 2024-05-30 DIAGNOSIS — R91.8 MULTIPLE PULMONARY NODULES: ICD-10-CM

## 2024-05-30 DIAGNOSIS — D3A.090 BENIGN CARCINOID TUMOR OF THE BRONCHUS AND LUNG: ICD-10-CM

## 2024-05-30 DIAGNOSIS — J43.9 PULMONARY EMPHYSEMA, UNSPECIFIED EMPHYSEMA TYPE (HCC): Primary | ICD-10-CM

## 2024-05-30 PROCEDURE — 99215 OFFICE O/P EST HI 40 MIN: CPT | Performed by: INTERNAL MEDICINE

## 2024-05-30 RX ORDER — IPRATROPIUM BROMIDE AND ALBUTEROL SULFATE 2.5; .5 MG/3ML; MG/3ML
3 SOLUTION RESPIRATORY (INHALATION) EVERY 6 HOURS PRN
Qty: 360 ML | Refills: 2 | Status: SHIPPED | OUTPATIENT
Start: 2024-05-30 | End: 2024-06-04 | Stop reason: SDUPTHER

## 2024-05-30 RX ORDER — BUDESONIDE 0.5 MG/2ML
0.5 INHALANT ORAL 2 TIMES DAILY
Qty: 120 ML | Refills: 2 | Status: SHIPPED | OUTPATIENT
Start: 2024-05-30 | End: 2024-06-04 | Stop reason: SDUPTHER

## 2024-05-30 NOTE — PROGRESS NOTES
Ambulatory Visit  Name: Camryn Roblero      : 1940      MRN: [id number]  Encounter Provider: Ban Garland MD  Encounter Date: 2024   Encounter department: [facility] PULMONARY & [facility]    Assessment & Plan   1. Pulmonary emphysema, unspecified emphysema type (HCC)  -     budesonide (Pulmicort) 0.5 mg/2 mL nebulizer solution; Take 2 mL (0.5 mg total) by nebulization 2 (two) times a day Rinse mouth after use.  -     ipratropium-albuterol (DUO-NEB) 0.5-2.5 mg/3 mL nebulizer solution; Take 3 mL by nebulization every 6 (six) hours as needed for wheezing or shortness of breath  -     Nebulizer  2. Multiple pulmonary nodules  Assessment & Plan:  She has multiple lung nodules which were stable on the last CT scan from May 2022.  A repeat CT scan 2023 showed stability of the lung nodules recent CT scan showed enlarging lung nodule on the left different from the previous benign carcinoid nodule.  I have ordered a CT PET scan.  She denied any anorexia or weight loss.  3. TOM (obstructive sleep apnea)  Assessment & Plan:  She had a long history of snoring and daytime sleepiness.She was found to have mild TOM with oxygen desaturation on her overnight sleep study. Her CPAP titration study was suboptimal. She was started on CPAP therapy at 9 cm of water and was using it. Her CPAP compliance was good. Her residual AHI was low. She was getting benefit from CPAP therapy She uses oxygen at 2 l/min with CPAP therapy.  Recently her CPAP machine broke and I ordered a new one.  I have advised her to continue with CPAP therapy as before.  Orders:  -     Cpap DME  4. 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 She had also mediastinal lymphadenopathy. She was also noted to have a nodule in the thyroid gland and a nodular hepatic lesion. She had a CT guided biopsy of  LLL lung lesion which was consistent with neuroendocrine tumor, carcinoid. She follows with Dr. Shruthi Benitez from Oncology.       History of Present Illness       Camryn Roblero is a 83 y.o. female with past medical history of COPD obstructive sleep apnea on CPAP therapy and benign carcinoid and multiple lung nodules who recently lost her  about 2 weeks back.  She is in bereavement and feels very low.  She is tearful and depressed.  She was recently admitted to hospital with hyponatremia.  She has noted worsening shortness of breath since then.  Her exercise tolerance is limited because of her ambulatory issues.  She has bilateral leg edema and has been on Demadex.  She has not been using any inhaler or nebulizer.  She was previously on inhalers.  She is a never smoker but has history of secondhand smoke exposure.  She is currently not on any oxygen at rest.  She is known to desaturate with exertion but her activities are limited at this time.  She is awaiting a repeat PFT.  She has obstructive sleep apnea and has been on CPAP therapy.  Her CPAP machine broke yesterday.  She has been using 2 L of oxygen supplementation with the CPAP.  A new machine has been ordered.  For her COPD I have ordered nebulizer therapy with budesonide and DuoNeb.  She has had multiple lung nodules.  She also has benign lung carcinoid nodule.  Recent CT scan showed enlarging lung nodule on the left different from the previous benign carcinoid nodule.  Needs a CT PET scan.  He denied any weight loss or anorexia.  She lives alone.    Review of Systems   Constitutional:  Positive for fatigue. Negative for appetite change, chills and fever.   HENT:  Positive for hearing loss and rhinorrhea. Negative for sneezing and trouble swallowing.    Respiratory:  Positive for shortness of breath. Negative for cough and wheezing.    Cardiovascular:  Positive for leg swelling. Negative for chest pain.   Gastrointestinal:  Positive for constipation.  "Negative for abdominal pain, diarrhea, nausea and vomiting.   Genitourinary:  Negative for dysuria, frequency and urgency.   Musculoskeletal:  Positive for arthralgias and gait problem.   Skin:  Negative for rash.   Allergic/Immunologic: Positive for environmental allergies.   Neurological:  Negative for dizziness, light-headedness and headaches.   Psychiatric/Behavioral:  Positive for confusion and sleep disturbance. Negative for agitation. The patient is nervous/anxious.        Objective     /84 (BP Location: Left arm, Patient Position: Sitting, Cuff Size: Standard)   Pulse 86   Temp 98.4 °F (36.9 °C) (Tympanic)   Ht 4' 11\" (1.499 m)   Wt 79.4 kg (175 lb)   SpO2 92%   BMI 35.35 kg/m²     Physical Exam  Constitutional:       General: She is not in acute distress.     Appearance: She is obese. She is not ill-appearing, toxic-appearing or diaphoretic.   HENT:      Head: Normocephalic.      Mouth/Throat:      Mouth: Mucous membranes are moist.   Eyes:      General: No scleral icterus.     Conjunctiva/sclera: Conjunctivae normal.   Cardiovascular:      Rate and Rhythm: Normal rate.      Heart sounds: Normal heart sounds. No murmur heard.  Pulmonary:      Effort: No respiratory distress.      Breath sounds: No stridor. No wheezing, rhonchi or rales.   Chest:      Chest wall: No tenderness.   Abdominal:      General: Bowel sounds are normal. There is distension.      Palpations: Abdomen is soft.      Tenderness: There is no abdominal tenderness. There is no guarding.   Musculoskeletal:      Cervical back: Neck supple. No rigidity.      Right lower leg: Edema present.      Left lower leg: Edema present.   Lymphadenopathy:      Cervical: No cervical adenopathy.   Skin:     Coloration: Skin is not jaundiced or pale.      Findings: No rash.   Neurological:      Mental Status: She is alert and oriented to person, place, and time.      Gait: Gait abnormal (Uses walker).   Psychiatric:         Thought Content: " Thought content normal.         Judgment: Judgment normal.       Administrative Statements       I spent 45 minutes of time during care of this patient with complex medical issues.  The majority of this time was spent directly with the patient /daughter counseling as well as coordinating care.

## 2024-05-30 NOTE — TELEPHONE ENCOUNTER
Guillermina returns call.  Relayed message from Dr. Marquez.  She will inform patient to take torsemide 10 mg daily.

## 2024-05-31 DIAGNOSIS — I50.32 CHRONIC HEART FAILURE WITH PRESERVED EJECTION FRACTION (HCC): ICD-10-CM

## 2024-05-31 DIAGNOSIS — E11.9 DIABETES MELLITUS WITHOUT COMPLICATION (HCC): ICD-10-CM

## 2024-05-31 RX ORDER — TORSEMIDE 10 MG/1
10 TABLET ORAL DAILY
Qty: 30 TABLET | Refills: 11 | Status: SHIPPED | OUTPATIENT
Start: 2024-05-31 | End: 2024-06-05 | Stop reason: SDUPTHER

## 2024-05-31 NOTE — TELEPHONE ENCOUNTER
Brain physical therapist from [facility] states that he is visiting with pt and she was not aware to start taking Torsemide so her weight today is up 2lbs , wt today 173lbs, wt yesterday 171lbs. Pt is going to take her dose of torsemide now.     Pt needs a refill of Torsemide with updated directions sent to [facility] pharmacy.     Please advise

## 2024-06-01 DIAGNOSIS — E87.1 HYPONATREMIA: ICD-10-CM

## 2024-06-02 ENCOUNTER — TELEPHONE (OUTPATIENT)
Dept: CARDIOLOGY CLINIC | Facility: CLINIC | Age: 84
End: 2024-06-02

## 2024-06-02 ENCOUNTER — TELEPHONE (OUTPATIENT)
Dept: OTHER | Facility: OTHER | Age: 84
End: 2024-06-02

## 2024-06-02 NOTE — ASSESSMENT & PLAN NOTE
She had a 2.0 x 2.0 lobulated lung mass in the left lower lobe and multiple lung nodules on the left side. She had also mediastinal lymphadenopathy. She was also noted to have a nodule in the thyroid gland and a nodular hepatic lesion. She had a CT guided biopsy of LLL lung lesion which was consistent with neuroendocrine tumor, carcinoid. She follows with Dr. Shruthi Benitez from Oncology.

## 2024-06-02 NOTE — TELEPHONE ENCOUNTER
Refill must be reviewed and completed by the office or provider. The refill is unable to be approved or denied by the medication management team.    Last ordered by another provider. Please review.   Hospital Encounter:  5/9/2024 - 5/17/2024 (8 days)  [facility]

## 2024-06-02 NOTE — ASSESSMENT & PLAN NOTE
She has multiple lung nodules which were stable on the last CT scan from May 2022.  A repeat CT scan July 2023 showed stability of the lung nodules recent CT scan showed enlarging lung nodule on the left different from the previous benign carcinoid nodule.  I have ordered a CT PET scan.  She denied any anorexia or weight loss.

## 2024-06-02 NOTE — ASSESSMENT & PLAN NOTE
Mrs.Cleo Roblero has COPD and was on Advair Bid before. She also had  asthma as a child. She has occasional cough and no significant phlegm. She is a never smoker, however she has history of secondhand smoke exposure.Her PFT which showed moderate airflow obstruction with diffusion defect. There was no hyperinflation. Her 6 min walk test showed an oxygen desaturation to 82% with exertion. Her baseline oxygen saturation at rest was 90% before.. She also was found to have severe exercise limitation from SOB. She is not using Symbicort  now.  She has shortness of breath on minimal exertion now.  I have started her on nebulized bronchodilators DuoNeb and budesonide.  I have advised her to continue with oxygen supplementation as needed.  I had a long discussion with her and her daughter who accompanied her today.  I answered all their questions

## 2024-06-02 NOTE — TELEPHONE ENCOUNTER
[phone] / Camryn Roblero : 1940 / Patient's weight has gone from 168 to 173 from Friday to today.

## 2024-06-02 NOTE — TELEPHONE ENCOUNTER
Received a message on Tiger text through connect to care about patient that she has had weight gain of 5 pounds from Friday to today.    I contacted the number provided, but it was within correct daughter who did not know the issues, and as a result she provided me the number for the POA daughter Naomie.  I reached out to her subsequently and she told me that patient was recently in the hospital with hyponatremia on lab work and had adjustments made to her medications including stopping spironolactone, adding salt tablets 2 g twice daily.  After this, her sodium has improved but she is now having increased issues with lower extremity edema and shortness of breath.  She spoke to somebody about it and they increased her torsemide to 10 mg daily a couple of days ago.  But her weight has increased further by 5 pounds over the weekend.    Impression  Volume overload possibly secondary to excess salt administration    Recommendations  Increase torsemide to 20 mg daily until weight is back down to 168 pounds, after which she can continue torsemide 10 mg daily  Decrease salt tablets to 2 g in a.m./1 g in p.m. and plan to decrease further over the coming days  Follow-up BMP is already ordered for 6/7/2024  Follow-up in the office is arranged with Carolann Monteiro on 6/13/2024  If continues to have weight gain or increased shortness of breath then she will call us back or go to the ED for further evaluation

## 2024-06-02 NOTE — ASSESSMENT & PLAN NOTE
She had a long history of snoring and daytime sleepiness.She was found to have mild TOM with oxygen desaturation on her overnight sleep study. Her CPAP titration study was suboptimal. She was started on CPAP therapy at 9 cm of water and was using it. Her CPAP compliance was good. Her residual AHI was low. She was getting benefit from CPAP therapy She uses oxygen at 2 l/min with CPAP therapy.  Recently her CPAP machine broke and I ordered a new one.  I have advised her to continue with CPAP therapy as before.

## 2024-06-03 RX ORDER — SODIUM CHLORIDE 1 G/1
2 TABLET ORAL 2 TIMES DAILY WITH MEALS
Qty: 120 TABLET | Refills: 0 | Status: SHIPPED | OUTPATIENT
Start: 2024-06-03 | End: 2024-06-05

## 2024-06-03 NOTE — TELEPHONE ENCOUNTER
[facility] for callback.  Pt unable to come in tomorrow 6/4.  Can schedule with Dr. Rader on Wednesday 6/5.

## 2024-06-03 NOTE — TELEPHONE ENCOUNTER
Pt scheduled for urgent visit with Dr. Rader on 6/5 for HF symptoms including weight gain of about 11lbs in the last 7-14days.

## 2024-06-03 NOTE — TELEPHONE ENCOUNTER
[facility] for callback to schedule apt tomorrow with Dr. Marquez or .

## 2024-06-04 ENCOUNTER — HOSPITAL ENCOUNTER (OUTPATIENT)
Dept: PULMONOLOGY | Facility: HOSPITAL | Age: 84
Discharge: HOME | End: 2024-06-04
Attending: INTERNAL MEDICINE
Payer: MEDICARE

## 2024-06-04 DIAGNOSIS — J43.9 PULMONARY EMPHYSEMA, UNSPECIFIED EMPHYSEMA TYPE (HCC): ICD-10-CM

## 2024-06-04 PROCEDURE — 94729 DIFFUSING CAPACITY: CPT

## 2024-06-04 PROCEDURE — 94060 EVALUATION OF WHEEZING: CPT

## 2024-06-04 PROCEDURE — 94726 PLETHYSMOGRAPHY LUNG VOLUMES: CPT | Performed by: INTERNAL MEDICINE

## 2024-06-04 PROCEDURE — 94618 PULMONARY STRESS TESTING: CPT | Performed by: INTERNAL MEDICINE

## 2024-06-04 PROCEDURE — 94618 PULMONARY STRESS TESTING: CPT

## 2024-06-04 PROCEDURE — 94726 PLETHYSMOGRAPHY LUNG VOLUMES: CPT

## 2024-06-04 PROCEDURE — 94729 DIFFUSING CAPACITY: CPT | Performed by: INTERNAL MEDICINE

## 2024-06-04 PROCEDURE — 94060 EVALUATION OF WHEEZING: CPT | Performed by: INTERNAL MEDICINE

## 2024-06-04 RX ORDER — ALBUTEROL SULFATE 2.5 MG/3ML
2.5 SOLUTION RESPIRATORY (INHALATION) ONCE
Status: COMPLETED | OUTPATIENT
Start: 2024-06-04 | End: 2024-06-04

## 2024-06-04 RX ORDER — BUDESONIDE 0.5 MG/2ML
0.5 INHALANT ORAL 2 TIMES DAILY
Qty: 120 ML | Refills: 2 | Status: SHIPPED | OUTPATIENT
Start: 2024-06-04

## 2024-06-04 RX ORDER — IPRATROPIUM BROMIDE AND ALBUTEROL SULFATE 2.5; .5 MG/3ML; MG/3ML
3 SOLUTION RESPIRATORY (INHALATION) EVERY 6 HOURS PRN
Qty: 360 ML | Refills: 2 | Status: SHIPPED | OUTPATIENT
Start: 2024-06-04

## 2024-06-04 RX ADMIN — ALBUTEROL SULFATE 2.5 MG: 2.5 SOLUTION RESPIRATORY (INHALATION) at 09:53

## 2024-06-04 NOTE — TELEPHONE ENCOUNTER
Pt's daughter, Naomie, calling in regarding nebulizer solution. States she was notified by the pharmacy that the medications were not covered by insurance. Explained to Naomie that the medications need to be run through part B rather than the drug plan and will call pharmacy to discuss.    Spoke with Lisa at [facility]. She states they are unable to bill through part B.     Called and spoke with Naomie and explained above. Offered to send to [facility] instead. Naomie agreeable and will let us know if she has any problems picking up there.

## 2024-06-05 ENCOUNTER — APPOINTMENT (OUTPATIENT)
Dept: LAB | Facility: CLINIC | Age: 84
End: 2024-06-05
Payer: MEDICARE

## 2024-06-05 ENCOUNTER — OFFICE VISIT (OUTPATIENT)
Dept: CARDIOLOGY CLINIC | Facility: CLINIC | Age: 84
End: 2024-06-05
Payer: MEDICARE

## 2024-06-05 VITALS
DIASTOLIC BLOOD PRESSURE: 60 MMHG | HEIGHT: 59 IN | BODY MASS INDEX: 35.62 KG/M2 | SYSTOLIC BLOOD PRESSURE: 124 MMHG | WEIGHT: 176.7 LBS | HEART RATE: 80 BPM

## 2024-06-05 DIAGNOSIS — I48.91 ATRIAL FIBRILLATION, UNSPECIFIED TYPE (HCC): ICD-10-CM

## 2024-06-05 DIAGNOSIS — I50.33 ACUTE ON CHRONIC HEART FAILURE WITH PRESERVED EJECTION FRACTION (HCC): Primary | ICD-10-CM

## 2024-06-05 DIAGNOSIS — I10 ESSENTIAL HYPERTENSION: ICD-10-CM

## 2024-06-05 DIAGNOSIS — E87.1 HYPONATREMIA: ICD-10-CM

## 2024-06-05 DIAGNOSIS — I50.32 CHRONIC HEART FAILURE WITH PRESERVED EJECTION FRACTION (HCC): ICD-10-CM

## 2024-06-05 LAB
ANION GAP SERPL CALCULATED.3IONS-SCNC: 8 MMOL/L (ref 4–13)
BUN SERPL-MCNC: 29 MG/DL (ref 5–25)
CALCIUM SERPL-MCNC: 9.4 MG/DL (ref 8.4–10.2)
CHLORIDE SERPL-SCNC: 97 MMOL/L (ref 96–108)
CO2 SERPL-SCNC: 31 MMOL/L (ref 21–32)
CREAT SERPL-MCNC: 1.02 MG/DL (ref 0.6–1.3)
GFR SERPL CREATININE-BSD FRML MDRD: 51 ML/MIN/1.73SQ M
GLUCOSE SERPL-MCNC: 172 MG/DL (ref 65–140)
POTASSIUM SERPL-SCNC: 4.1 MMOL/L (ref 3.5–5.3)
SODIUM SERPL-SCNC: 136 MMOL/L (ref 135–147)

## 2024-06-05 PROCEDURE — 99215 OFFICE O/P EST HI 40 MIN: CPT | Performed by: INTERNAL MEDICINE

## 2024-06-05 PROCEDURE — 80048 BASIC METABOLIC PNL TOTAL CA: CPT

## 2024-06-05 PROCEDURE — 36415 COLL VENOUS BLD VENIPUNCTURE: CPT

## 2024-06-05 RX ORDER — TORSEMIDE 10 MG/1
20 TABLET ORAL 2 TIMES DAILY
Qty: 120 TABLET | Refills: 11 | Status: SHIPPED | OUTPATIENT
Start: 2024-06-05 | End: 2025-05-31

## 2024-06-05 RX ORDER — SODIUM CHLORIDE 1 G/1
1 TABLET ORAL 2 TIMES DAILY
Start: 2024-06-05 | End: 2024-07-05

## 2024-06-05 NOTE — PATIENT INSTRUCTIONS
Increase torsemide to 20mg two times a day   Decrease salt tablets to 1 tablet two times a day  Obtain BMP today  ~1800 mL fluid intake  Daily weights
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
## 2024-06-06 ENCOUNTER — TELEPHONE (OUTPATIENT)
Age: 84
End: 2024-06-06

## 2024-06-06 PROCEDURE — 93000 ELECTROCARDIOGRAM COMPLETE: CPT | Performed by: INTERNAL MEDICINE

## 2024-06-06 NOTE — TELEPHONE ENCOUNTER
Nurse called, they are extending her home visits because of new medication changes and O2 orders.  Asking if PCP will continue to sign orders.

## 2024-06-06 NOTE — TELEPHONE ENCOUNTER
[facility] health RN:    Calling to inform us that patient does not have her nebulizer machine from [facility].

## 2024-06-07 ENCOUNTER — LAB (OUTPATIENT)
Dept: LAB | Facility: HOSPITAL | Age: 84
End: 2024-06-07
Attending: INTERNAL MEDICINE
Payer: MEDICARE

## 2024-06-07 DIAGNOSIS — N18.30 STAGE 3 CHRONIC KIDNEY DISEASE, UNSPECIFIED WHETHER STAGE 3A OR 3B CKD (HCC): ICD-10-CM

## 2024-06-07 LAB
ANION GAP SERPL CALCULATED.3IONS-SCNC: 12 MMOL/L (ref 4–13)
BUN SERPL-MCNC: 28 MG/DL (ref 5–25)
CALCIUM SERPL-MCNC: 9.4 MG/DL (ref 8.4–10.2)
CHLORIDE SERPL-SCNC: 96 MMOL/L (ref 96–108)
CO2 SERPL-SCNC: 32 MMOL/L (ref 21–32)
CREAT SERPL-MCNC: 1.06 MG/DL (ref 0.6–1.3)
DME PARACHUTE DELIVERY DATE REQUESTED: NORMAL
DME PARACHUTE ITEM DESCRIPTION: NORMAL
DME PARACHUTE ORDER STATUS: NORMAL
DME PARACHUTE SUPPLIER NAME: NORMAL
DME PARACHUTE SUPPLIER PHONE: NORMAL
GFR SERPL CREATININE-BSD FRML MDRD: 49 ML/MIN/1.73SQ M
GLUCOSE SERPL-MCNC: 243 MG/DL (ref 65–140)
POTASSIUM SERPL-SCNC: 3.5 MMOL/L (ref 3.5–5.3)
SODIUM SERPL-SCNC: 140 MMOL/L (ref 135–147)

## 2024-06-07 PROCEDURE — 80048 BASIC METABOLIC PNL TOTAL CA: CPT

## 2024-06-07 PROCEDURE — 36415 COLL VENOUS BLD VENIPUNCTURE: CPT

## 2024-06-07 NOTE — RESULT ENCOUNTER NOTE
Hello    Patient normally is followed up by Ms Yuliet Mars.     MA team -Can you please asked patient to hold salt tablets.  I saw that the cardiologist has increased torsemide.  The sodium level remains normal at 140.  - Please asked patient to repeat a BMP in 1 to 2 weeks.  As we need to follow the sodium closely and the potassium with increase in diuretic.    Dr Prasanth OCAMPO above    Thank you    np

## 2024-06-10 ENCOUNTER — DOCUMENTATION (OUTPATIENT)
Dept: NEPHROLOGY | Facility: CLINIC | Age: 84
End: 2024-06-10

## 2024-06-10 ENCOUNTER — TELEPHONE (OUTPATIENT)
Age: 84
End: 2024-06-10

## 2024-06-10 ENCOUNTER — TELEPHONE (OUTPATIENT)
Dept: NEPHROLOGY | Facility: CLINIC | Age: 84
End: 2024-06-10

## 2024-06-10 DIAGNOSIS — N18.30 STAGE 3 CHRONIC KIDNEY DISEASE, UNSPECIFIED WHETHER STAGE 3A OR 3B CKD (HCC): Primary | ICD-10-CM

## 2024-06-10 NOTE — TELEPHONE ENCOUNTER
Meghan from [facility] reporting pt hasn't gotten new CPAP yet, Ar needs office notes indicating needing CPAP.       Please fax to [facility] [phone]

## 2024-06-10 NOTE — TELEPHONE ENCOUNTER
----- Message from Arslan Perkins MD sent at 6/7/2024  6:24 PM EDT -----  Hello    Patient normally is followed up by Ms Yuliet Mars.     MA team -Can you please asked patient to hold salt tablets.  I saw that the cardiologist has increased torsemide.  The sodium level remains normal at 140.  - Please asked patient to repeat a BMP in 1 to 2 weeks.  As we need to follow the sodium closely and the potassium with increase in diuretic.    Dr Prasanth OCAMPO above    Thank you    np

## 2024-06-10 NOTE — TELEPHONE ENCOUNTER
Patients daughter called to verify that she received the VM. She has no further questions/concerns. No further actions needed.

## 2024-06-10 NOTE — TELEPHONE ENCOUNTER
Please advise to decrease torsemide to 20 mg daily.  Blood work as scheduled.  Take additional 20 mg of torsemide in p.m. as needed for weight gain of 3 lbs in a day or 5 lbs in 5-7 days.

## 2024-06-10 NOTE — TELEPHONE ENCOUNTER
Caller: Naomie Gonzales    Doctor: Dr. Rader    Reason for call: Verify dosage of torsemide    Call back#: [phone]    Daughter would like a call back to go over dosage of torsemide.  Patient's weight has now decreased to normal and she wants to be sure of medication dosage.

## 2024-06-10 NOTE — TELEPHONE ENCOUNTER
Left a detailed message for pt with recommendations. Asked that she calls back to confirm that was received and with any questions. Lab order in the system.

## 2024-06-10 NOTE — TELEPHONE ENCOUNTER
Called spoke to Naomie, and advised that mom weight as of yesterday was 163.00 lbs.    Do you want her to continue torsemide 20 mg bid ?    Naomie advised getting bw done on Thursday    Advised to continue until further instructions from Dr Rader.    Dr Perkins has d/c sodium tablets also.    Please advise

## 2024-06-11 DIAGNOSIS — I50.33 ACUTE ON CHRONIC HEART FAILURE WITH PRESERVED EJECTION FRACTION (HCC): ICD-10-CM

## 2024-06-11 RX ORDER — TORSEMIDE 10 MG/1
TABLET ORAL
Qty: 180 TABLET | Refills: 3 | Status: SHIPPED | OUTPATIENT
Start: 2024-06-11 | End: 2024-06-13 | Stop reason: SDUPTHER

## 2024-06-11 NOTE — TELEPHONE ENCOUNTER
Patient had direction change by  yesterday needs new prescription sent for 1 Tablet BID     Reason for call:   [x] Refill   [] Prior Auth  [] Other:     Office:   [x] PCP/Provider - Heidy Rader   [] Specialty/Provider -     Medication: Torsemide     Dose/Frequency: 10 mg BID    Quantity: 180    Pharmacy: [facility] [place] [facility]    Does the patient have enough for 3 days?   [] Yes   [x] No - Send as HP to POD   Needs refill sent to retail will figure out mail order at later date

## 2024-06-12 LAB
DME PARACHUTE DELIVERY DATE REQUESTED: NORMAL
DME PARACHUTE ITEM DESCRIPTION: NORMAL
DME PARACHUTE ORDER STATUS: NORMAL
DME PARACHUTE SUPPLIER NAME: NORMAL
DME PARACHUTE SUPPLIER PHONE: NORMAL

## 2024-06-13 ENCOUNTER — NURSE TRIAGE (OUTPATIENT)
Dept: OTHER | Facility: OTHER | Age: 84
End: 2024-06-13

## 2024-06-13 ENCOUNTER — TELEPHONE (OUTPATIENT)
Dept: LAB | Facility: HOSPITAL | Age: 84
End: 2024-06-13

## 2024-06-13 ENCOUNTER — OFFICE VISIT (OUTPATIENT)
Dept: CARDIOLOGY CLINIC | Facility: CLINIC | Age: 84
End: 2024-06-13
Payer: MEDICARE

## 2024-06-13 ENCOUNTER — PATIENT MESSAGE (OUTPATIENT)
Dept: FAMILY MEDICINE CLINIC | Facility: CLINIC | Age: 84
End: 2024-06-13

## 2024-06-13 ENCOUNTER — NURSE TRIAGE (OUTPATIENT)
Age: 84
End: 2024-06-13

## 2024-06-13 VITALS
SYSTOLIC BLOOD PRESSURE: 126 MMHG | DIASTOLIC BLOOD PRESSURE: 76 MMHG | HEIGHT: 59 IN | HEART RATE: 79 BPM | OXYGEN SATURATION: 93 % | BODY MASS INDEX: 33.02 KG/M2 | WEIGHT: 163.8 LBS

## 2024-06-13 DIAGNOSIS — G47.33 OSA (OBSTRUCTIVE SLEEP APNEA): ICD-10-CM

## 2024-06-13 DIAGNOSIS — I50.32 CHRONIC HEART FAILURE WITH PRESERVED EJECTION FRACTION (HFPEF) (HCC): Primary | ICD-10-CM

## 2024-06-13 DIAGNOSIS — I50.33 ACUTE ON CHRONIC HEART FAILURE WITH PRESERVED EJECTION FRACTION (HCC): ICD-10-CM

## 2024-06-13 DIAGNOSIS — I48.92 ATRIAL FLUTTER, UNSPECIFIED TYPE (HCC): ICD-10-CM

## 2024-06-13 DIAGNOSIS — N18.31 STAGE 3A CHRONIC KIDNEY DISEASE (HCC): ICD-10-CM

## 2024-06-13 DIAGNOSIS — I10 ESSENTIAL HYPERTENSION: ICD-10-CM

## 2024-06-13 DIAGNOSIS — E78.5 HYPERLIPIDEMIA, UNSPECIFIED HYPERLIPIDEMIA TYPE: ICD-10-CM

## 2024-06-13 DIAGNOSIS — E66.01 CLASS 2 SEVERE OBESITY DUE TO EXCESS CALORIES WITH SERIOUS COMORBIDITY AND BODY MASS INDEX (BMI) OF 36.0 TO 36.9 IN ADULT (HCC): ICD-10-CM

## 2024-06-13 DIAGNOSIS — E11.65 TYPE 2 DIABETES MELLITUS WITH HYPERGLYCEMIA, WITHOUT LONG-TERM CURRENT USE OF INSULIN (HCC): ICD-10-CM

## 2024-06-13 PROCEDURE — 99214 OFFICE O/P EST MOD 30 MIN: CPT | Performed by: NURSE PRACTITIONER

## 2024-06-13 RX ORDER — TORSEMIDE 10 MG/1
TABLET ORAL
Qty: 180 TABLET | Refills: 3 | Status: SHIPPED | OUTPATIENT
Start: 2024-06-13

## 2024-06-13 NOTE — PATIENT COMMUNICATION
Patient's daughter confirmed an appointment for tomorrow 6/14, see RN triage. Patient's daughter asked Dr. Seals to review the photo of the rash, to let her know if treatment can be prescribed over the phone.

## 2024-06-13 NOTE — TELEPHONE ENCOUNTER
"Reason for Disposition  • [1] Cause unknown AND [2] present > 7 days    Answer Assessment - Initial Assessment Questions  1. DESCRIPTION: \"Describe the itching you are having.\" \"Where is it located?\"      Blotchy red rash on a left arm   2. SEVERITY: \"How bad is it?\"     - MILD - doesn't interfere with normal activities    - MODERATE-SEVERE: interferes with work, school, sleep, or other activities       Moderate   3. SCRATCHING: \"Are there any scratch marks? Bleeding?\"      Scratching.   4. ONSET: \"When did the itching begin?\"       Upon discharge from the hospital.   5. CAUSE: \"What do you think is causing the itching?\"       Possible reaction to the tape, Unknown.   6. OTHER SYMPTOMS: \"Do you have any other symptoms?\"       No   7. PREGNANCY: \"Is there any chance you are pregnant?\" \"When was your last menstrual period?\"      N/A    Protocols used: Itching - Localized-ADULT-    "
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
## 2024-06-13 NOTE — TELEPHONE ENCOUNTER
"Reason for Disposition   Localized rash present > 7 days    Answer Assessment - Initial Assessment Questions  1. APPEARANCE of RASH: \"Describe the rash.\"       Red raised  and fluid filled bumps   2. LOCATION: \"Where is the rash located?\"       Left arm   3. NUMBER: \"How many spots are there?\"       More than ten   4. SIZE: \"How big are the spots?\" (Inches, centimeters or compare to size of a coin)       Multiple spots   5. ONSET: \"When did the rash start?\"       6/9  6. ITCHING: \"Does the rash itch?\" If Yes, ask: \"How bad is the itch?\"  (Scale 1-10; or mild, moderate, severe)      Yes ,moderate  7. PAIN: \"Does the rash hurt?\" If Yes, ask: \"How bad is the pain?\"  (Scale 1-10; or mild, moderate, severe)      No pain   8. OTHER SYMPTOMS: \"Do you have any other symptoms?\" (e.g., fever)      Patient denies all other symptoms  Visiting nurse called wasn't at patients home during triage call  ,was at patients home earlier .Visiting nurse will call  daughter to schedule an appt.  Advised VN that patient needs to be seen.    Protocols used: Rash or Redness - Localized-ADULT-[place]    "

## 2024-06-14 ENCOUNTER — OFFICE VISIT (OUTPATIENT)
Dept: FAMILY MEDICINE CLINIC | Facility: CLINIC | Age: 84
End: 2024-06-14
Payer: MEDICARE

## 2024-06-14 ENCOUNTER — TELEPHONE (OUTPATIENT)
Dept: CARDIOLOGY CLINIC | Facility: CLINIC | Age: 84
End: 2024-06-14

## 2024-06-14 VITALS
BODY MASS INDEX: 32.86 KG/M2 | WEIGHT: 163 LBS | SYSTOLIC BLOOD PRESSURE: 138 MMHG | HEART RATE: 71 BPM | TEMPERATURE: 96.7 F | OXYGEN SATURATION: 96 % | HEIGHT: 59 IN | DIASTOLIC BLOOD PRESSURE: 70 MMHG

## 2024-06-14 DIAGNOSIS — E11.9 TYPE 2 DIABETES MELLITUS WITHOUT COMPLICATION, WITHOUT LONG-TERM CURRENT USE OF INSULIN (HCC): ICD-10-CM

## 2024-06-14 DIAGNOSIS — B02.9 HERPES ZOSTER WITHOUT COMPLICATION: Primary | ICD-10-CM

## 2024-06-14 PROCEDURE — G2211 COMPLEX E/M VISIT ADD ON: HCPCS

## 2024-06-14 PROCEDURE — 99214 OFFICE O/P EST MOD 30 MIN: CPT

## 2024-06-14 RX ORDER — VALACYCLOVIR HYDROCHLORIDE 500 MG/1
500 TABLET, FILM COATED ORAL 3 TIMES DAILY
Qty: 21 TABLET | Refills: 0 | Status: SHIPPED | OUTPATIENT
Start: 2024-06-14 | End: 2024-06-21

## 2024-06-14 NOTE — TELEPHONE ENCOUNTER
Spoke with daughter, Naomie. Pt is doing better. Wt today 160 lbs. She did loss the 10 lbs and went back to torsemide 20 mg qd with extra dose prn..  Still has blle edema but gone from thighs and abd. She has more energy and able to do things around the house.    She saw Carolann ALICEA yesterday.      Thank you

## 2024-06-14 NOTE — TELEPHONE ENCOUNTER
----- Message from Heidy Rader MD sent at 6/6/2024  5:09 AM EDT -----  Please call her on Friday and see how she is responding to increased dose of torsemide. Thanks

## 2024-06-14 NOTE — ASSESSMENT & PLAN NOTE
Lab Results   Component Value Date    HGBA1C 7.4 (A) 04/26/2024     Dm foot exam completed. Continue current medication regimen.

## 2024-06-14 NOTE — PROGRESS NOTES
Ambulatory Visit  Name: Camryn Roblero      : 1940      MRN: [id number]  Encounter Provider: DEANNE Hermosillo  Encounter Date: 2024   Encounter department: [facility]    Assessment & Plan   1. Herpes zoster without complication  Assessment & Plan:  Vesicular rash along single dermatome or RUE in various stages of crusting. Pt and daughter uncertain if shingles vaccine was received. Start valtrex will adjust dose given GFR< 50. Start valtrex 500 mg tid x 7 days, educate can take tylenol prn for pain. Pt has been using hydrocortisone cream to help with itching.    Orders:  -     valACYclovir (VALTREX) 500 mg tablet; Take 1 tablet (500 mg total) by mouth 3 (three) times a day for 7 days  2. Type 2 diabetes mellitus without complication, without long-term current use of insulin ([facility])  Assessment & Plan:    Lab Results   Component Value Date    HGBA1C 7.4 (A) 2024     Dm foot exam completed. Continue current medication regimen.       History of Present Illness     Patient presents with a scaling, erythema, vesicular rash  isolated along right side of shoulder and down the right arm.  Vesicles are in various stages of crusting.  Patient states that the rash is painful, itchy, and burning, has been using over the counter cortisol cream to help with the itching.  Patient denies any fever, chills, or sick contacts.  Patient was unable to recall if she has had her shingles vaccine.          Review of Systems   Constitutional:  Negative for chills, fatigue and fever.   HENT:  Negative for congestion and facial swelling.    Respiratory:  Negative for cough, chest tightness and shortness of breath.    Cardiovascular:  Negative for chest pain.   Gastrointestinal:  Negative for abdominal distention.   Skin:  Positive for color change and rash.   Allergic/Immunologic: Negative for environmental allergies.   Neurological:  Negative for weakness and numbness.     Current  Outpatient Medications on File Prior to Visit   Medication Sig Dispense Refill    Accu-Chek FastClix Lancets MISC USE TO CHECK BLOOD GLUCOSE Twice DAILY 102 each 3    Accu-Chek SmartView test strip Use 1 each daily Use as instructed 100 each 1    acetaminophen (TYLENOL) 500 mg tablet Take 500 mg by mouth every 6 (six) hours as needed for mild pain For pain      atorvastatin (LIPITOR) 40 mg tablet Take 1 tablet (40 mg total) by mouth daily 90 tablet 3    AYR SALINE NASAL DROPS NA       budesonide (Pulmicort) 0.5 mg/2 mL nebulizer solution Take 2 mL (0.5 mg total) by nebulization 2 (two) times a day Rinse mouth after use. 120 mL 2    diltiazem (CARDIZEM CD) 120 mg 24 hr capsule Take 1 capsule (120 mg total) by mouth daily 90 capsule 1    donepezil (ARICEPT) 5 mg tablet Take 1 tablet (5 mg total) by mouth daily at bedtime 90 tablet 3    glipiZIDE (GLUCOTROL) 10 mg tablet TAKE 1/2 TABLET EVERY MORNING 45 tablet 1    ipratropium-albuterol (DUO-NEB) 0.5-2.5 mg/3 mL nebulizer solution Take 3 mL by nebulization every 6 (six) hours as needed for wheezing or shortness of breath 360 mL 2    latanoprost (XALATAN) 0.005 % ophthalmic solution       loratadine (CLARITIN) 10 mg tablet Take 1 tablet by mouth daily      metFORMIN (GLUCOPHAGE) 500 mg tablet TAKE 2 TABLETS TWICE DAILY 360 tablet 1    metoprolol succinate (TOPROL-XL) 50 mg 24 hr tablet Take 1 tablet (50 mg total) by mouth 2 (two) times a day 180 tablet 1    rivaroxaban (XARELTO) 15 mg tablet Take 1 tablet (15 mg total) by mouth daily with breakfast 30 tablet 5    torsemide (DEMADEX) 10 mg tablet Take 20 mg daily, take an additional 20 mg of torsemide as needed for weight gain of 3 lbs in a day or 5 lbs in 5-7 days. 180 tablet 3     No current facility-administered medications on file prior to visit.      Social History     Tobacco Use    Smoking status: Never    Smokeless tobacco: Never   Vaping Use    Vaping status: Never Used   Substance and Sexual Activity    Alcohol  "use: Never    Drug use: No    Sexual activity: Not Currently     Objective     /70 (BP Location: Left arm, Patient Position: Sitting, Cuff Size: Standard)   Pulse 71   Temp (!) 96.7 °F (35.9 °C) (Tympanic)   Ht 4' 11\" (1.499 m)   Wt 73.9 kg (163 lb)   SpO2 96%   BMI 32.92 kg/m²     Physical Exam  Vitals and nursing note reviewed.   Constitutional:       General: She is not in acute distress.     Appearance: Normal appearance. She is not ill-appearing, toxic-appearing or diaphoretic.   HENT:      Head: Normocephalic and atraumatic.      Right Ear: Tympanic membrane, ear canal and external ear normal. There is no impacted cerumen.      Left Ear: Tympanic membrane, ear canal and external ear normal. There is no impacted cerumen.      Nose: Nose normal. No congestion or rhinorrhea.      Mouth/Throat:      Mouth: Mucous membranes are moist.      Pharynx: Oropharynx is clear. No oropharyngeal exudate or posterior oropharyngeal erythema.   Eyes:      General: No scleral icterus.        Right eye: No discharge.         Left eye: No discharge.      Extraocular Movements: Extraocular movements intact.      Conjunctiva/sclera: Conjunctivae normal.      Pupils: Pupils are equal, round, and reactive to light.   Neck:      Vascular: No carotid bruit.   Cardiovascular:      Rate and Rhythm: Normal rate and regular rhythm.      Pulses: Normal pulses. no weak pulses.           Dorsalis pedis pulses are 2+ on the right side and 2+ on the left side.        Posterior tibial pulses are 2+ on the right side and 2+ on the left side.      Heart sounds: Normal heart sounds. No murmur heard.  Pulmonary:      Effort: Pulmonary effort is normal. No respiratory distress.      Breath sounds: Normal breath sounds. No stridor. No wheezing, rhonchi or rales.   Chest:      Chest wall: No tenderness.   Abdominal:      General: Bowel sounds are normal. There is no distension.      Palpations: Abdomen is soft. There is no mass.      " Tenderness: There is no abdominal tenderness. There is no right CVA tenderness, left CVA tenderness, guarding or rebound.      Hernia: No hernia is present.   Musculoskeletal:         General: No swelling, tenderness, deformity or signs of injury. Normal range of motion.      Cervical back: Normal range of motion and neck supple. No rigidity or tenderness.      Right lower leg: Edema (2+ pitting) present.      Left lower leg: Edema (2+ pitting) present.   Feet:      Right foot:      Skin integrity: No ulcer, skin breakdown, erythema, warmth, callus or dry skin.      Left foot:      Skin integrity: No ulcer, skin breakdown, erythema, warmth, callus or dry skin.   Lymphadenopathy:      Cervical: No cervical adenopathy.   Skin:     General: Skin is warm and dry.      Capillary Refill: Capillary refill takes less than 2 seconds.      Coloration: Skin is not ashen, cyanotic, jaundiced, mottled, pale or sallow.      Findings: Erythema and rash present. No bruising or lesion. Rash is crusting, scaling and vesicular.      Comments:  scaling, vesicular, erythema rash along right side of shoulder down the right arm.  Vesicles are in various stages of crusting.   Neurological:      General: No focal deficit present.      Mental Status: She is alert and oriented to person, place, and time.      Cranial Nerves: No cranial nerve deficit.      Sensory: No sensory deficit.      Motor: No weakness.      Coordination: Coordination normal.      Gait: Gait normal.      Deep Tendon Reflexes: Reflexes normal.   Psychiatric:         Mood and Affect: Mood normal.         Behavior: Behavior normal.         Thought Content: Thought content normal.         Judgment: Judgment normal.           Diabetic Foot Exam    Patient's shoes and socks removed.    Right Foot/Ankle   Right Foot Inspection  Skin Exam: skin normal and skin intact. No dry skin, no warmth, no callus, no erythema, no maceration, no abnormal color, no pre-ulcer, no ulcer and no  callus.     Toe Exam: ROM and strength within normal limits. No swelling, no tenderness, erythema and  no right toe deformity    Sensory   Monofilament testing: intact    Vascular  Capillary refills: < 3 seconds  The right DP pulse is 2+. The right PT pulse is 2+.     Left Foot/Ankle  Left Foot Inspection  Skin Exam: skin normal and skin intact. No dry skin, no warmth, no erythema, no maceration, normal color, no pre-ulcer, no ulcer and no callus.     Toe Exam: ROM and strength within normal limits. No swelling, no tenderness, no erythema and no left toe deformity.     Sensory   Monofilament testing: intact    Vascular  Capillary refills: < 3 seconds  The left DP pulse is 2+. The left PT pulse is 2+.     Assign Risk Category  No deformity present  Loss of protective sensation  No weak pulses  Risk: 1

## 2024-06-14 NOTE — ASSESSMENT & PLAN NOTE
Vesicular rash along single dermatome or RUE in various stages of crusting. Pt and daughter uncertain if shingles vaccine was received. Start valtrex will adjust dose given GFR< 50. Start valtrex 500 mg tid x 7 days, educate can take tylenol prn for pain. Pt has been using hydrocortisone cream to help with itching.

## 2024-06-18 ENCOUNTER — TELEPHONE (OUTPATIENT)
Dept: UROLOGY | Facility: CLINIC | Age: 84
End: 2024-06-18

## 2024-06-18 ENCOUNTER — OFFICE VISIT (OUTPATIENT)
Dept: UROLOGY | Facility: CLINIC | Age: 84
End: 2024-06-18
Payer: MEDICARE

## 2024-06-18 VITALS
WEIGHT: 160 LBS | OXYGEN SATURATION: 92 % | HEART RATE: 70 BPM | BODY MASS INDEX: 32.25 KG/M2 | DIASTOLIC BLOOD PRESSURE: 70 MMHG | HEIGHT: 59 IN | SYSTOLIC BLOOD PRESSURE: 110 MMHG

## 2024-06-18 DIAGNOSIS — N28.9 RENAL LESION: Primary | ICD-10-CM

## 2024-06-18 DIAGNOSIS — R93.89 ABNORMAL CHEST CT: ICD-10-CM

## 2024-06-18 PROCEDURE — 99203 OFFICE O/P NEW LOW 30 MIN: CPT

## 2024-06-18 RX ORDER — DIPHENHYDRAMINE HCL 25 MG
TABLET ORAL
Qty: 1 TABLET | Refills: 0 | Status: SHIPPED | OUTPATIENT
Start: 2024-06-18

## 2024-06-18 RX ORDER — METHYLPREDNISOLONE 32 MG/1
TABLET ORAL
Qty: 2 TABLET | Refills: 0 | Status: SHIPPED | OUTPATIENT
Start: 2024-06-18
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
## 2024-06-18 NOTE — TELEPHONE ENCOUNTER
I called and scheduled patient for 12/10 with DEBBIE. I also advised on CT scan 2-3 weeks prior to appt. Patient's daughter will call to schedule that now.

## 2024-06-18 NOTE — TELEPHONE ENCOUNTER
I spoke with patient's daughter, Naomie. She should have a repeat CT scan in 6 months and see one of our surgeons at that time. I did look at prior CT images with one of our surgeons today, and they recommend that she should continue follow-up.

## 2024-06-19 ENCOUNTER — HOSPITAL ENCOUNTER (OUTPATIENT)
Dept: RADIOLOGY | Age: 84
Discharge: HOME | End: 2024-06-19
Payer: MEDICARE

## 2024-06-19 DIAGNOSIS — D38.1 NEOPLASM OF UNCERTAIN BEHAVIOR OF RIGHT LOWER LOBE OF LUNG: ICD-10-CM

## 2024-06-19 DIAGNOSIS — R91.8 MULTIPLE PULMONARY NODULES: ICD-10-CM

## 2024-06-19 DIAGNOSIS — D38.1 NEOPLASM OF UNCERTAIN BEHAVIOR OF LEFT LOWER LOBE OF LUNG: ICD-10-CM

## 2024-06-19 LAB — GLUCOSE SERPL-MCNC: 114 MG/DL (ref 65–140)

## 2024-06-19 PROCEDURE — A9552 F18 FDG: HCPCS

## 2024-06-19 PROCEDURE — 82948 REAGENT STRIP/BLOOD GLUCOSE: CPT

## 2024-06-19 PROCEDURE — 78815 PET IMAGE W/CT SKULL-THIGH: CPT

## 2024-06-20 ENCOUNTER — LAB (OUTPATIENT)
Dept: LAB | Facility: HOSPITAL | Age: 84
End: 2024-06-20
Payer: MEDICARE

## 2024-06-20 DIAGNOSIS — N18.30 STAGE 3 CHRONIC KIDNEY DISEASE, UNSPECIFIED WHETHER STAGE 3A OR 3B CKD (HCC): ICD-10-CM

## 2024-06-20 LAB
ANION GAP SERPL CALCULATED.3IONS-SCNC: 9 MMOL/L (ref 4–13)
BUN SERPL-MCNC: 26 MG/DL (ref 5–25)
CALCIUM SERPL-MCNC: 9.3 MG/DL (ref 8.4–10.2)
CHLORIDE SERPL-SCNC: 92 MMOL/L (ref 96–108)
CO2 SERPL-SCNC: 33 MMOL/L (ref 21–32)
CREAT SERPL-MCNC: 0.95 MG/DL (ref 0.6–1.3)
GFR SERPL CREATININE-BSD FRML MDRD: 55 ML/MIN/1.73SQ M
GLUCOSE SERPL-MCNC: 204 MG/DL (ref 65–140)
POTASSIUM SERPL-SCNC: 4 MMOL/L (ref 3.5–5.3)
SODIUM SERPL-SCNC: 134 MMOL/L (ref 135–147)

## 2024-06-20 PROCEDURE — 80048 BASIC METABOLIC PNL TOTAL CA: CPT

## 2024-06-20 PROCEDURE — 36415 COLL VENOUS BLD VENIPUNCTURE: CPT

## 2024-06-21 ENCOUNTER — TELEPHONE (OUTPATIENT)
Dept: LAB | Facility: HOSPITAL | Age: 84
End: 2024-06-21

## 2024-06-21 ENCOUNTER — TELEPHONE (OUTPATIENT)
Age: 84
End: 2024-06-21

## 2024-06-21 NOTE — TELEPHONE ENCOUNTER
Patients daughter calling. She states for labs done yesterday her mother was not fasting as she was unaware that it had to be fasting.  Please advise.

## 2024-06-21 NOTE — RESULT ENCOUNTER NOTE
Hello    Patient normally is followed up by Ms Yuliet Mars.     Please let the patient know that the sodium level is 134 but when corrected for elevated blood sugar it is normal.  Therefore no changes for now.  Creatinine is at baseline.  Please have her to work with her PCP regarding blood sugar management given hyperglycemia  - Please asked patient to repeat a BMP 1 or 2 days with her upcoming appointment with us in July    Thank you    np

## 2024-06-24 ENCOUNTER — TELEPHONE (OUTPATIENT)
Dept: NEPHROLOGY | Facility: CLINIC | Age: 84
End: 2024-06-24

## 2024-06-24 DIAGNOSIS — E87.1 HYPONATREMIA: Primary | ICD-10-CM

## 2024-06-24 NOTE — TELEPHONE ENCOUNTER
Talked with patient's daughter. She sees  Dr. Seals for blood sugar mgmt.  Daughter will make arrangements with  mobile lab for repeat BMP prior to 7/9/24 followup visit.  Order placed.      ----- Message from Arslan Perkins MD sent at 6/20/2024 10:04 PM EDT -----  Hello    Patient normally is followed up by Ms Yuliet Mars.     Please let the patient know that the sodium level is 134 but when corrected for elevated blood sugar it is normal.  Therefore no changes for now.  Creatinine is at baseline.  Please have her to work with her PCP regarding blood sugar management given hyperglycemia  - Please asked patient to repeat a BMP 1 or 2 days with her upcoming appointment with us in July    Thank you    np

## 2024-06-25 ENCOUNTER — TELEPHONE (OUTPATIENT)
Age: 84
End: 2024-06-25

## 2024-06-25 NOTE — TELEPHONE ENCOUNTER
Patient should elevate legs as much as possible to decrease swelling. Patient can also take an extra dose of her diuretic. Can apply bacitracin to open area twice a day and cover with gauze dressing. Continue to check weight daily and call if > 2 lbs weight gain

## 2024-06-25 NOTE — TELEPHONE ENCOUNTER
Nurse  called from [facility] and stated the patient has the following symptoms.Fine rales left base of lungs.Patient gained one pound this week.B/L leg circumference increased right leg 1.5 cm.Left leg .75 cm. Patient also has a pin point size opening with serous fluid drainage.right lower base of leg and a small area back of right ankle from possible the patients slipper rubbing. Nurse requesting wound dressing orders to be placed.

## 2024-06-26 DIAGNOSIS — I48.92 ATRIAL FLUTTER WITH RAPID VENTRICULAR RESPONSE (HCC): ICD-10-CM

## 2024-06-26 RX ORDER — DILTIAZEM HYDROCHLORIDE 120 MG/1
120 CAPSULE, COATED, EXTENDED RELEASE ORAL DAILY
Qty: 90 CAPSULE | Refills: 1 | Status: SHIPPED | OUTPATIENT
Start: 2024-06-26

## 2024-06-26 NOTE — TELEPHONE ENCOUNTER
Bernarda, nurse from [facility] called.  Asking to add visits to patient plan of care.  They are currently seeing her weekly, would like to see her twice a week, temporarily to do wound care.  Attempted to warm transfer to office, office unavailable.

## 2024-06-26 NOTE — TELEPHONE ENCOUNTER
Left message for home health nurse with instructions and also if she needs written order for wound care. Also, spoke to pts miladys Akbar and advised her of instructions for wound care

## 2024-06-26 NOTE — TELEPHONE ENCOUNTER
Lidia is calling back. She states she got Carole's message and she will be writing a verbal order for the provider to sign to increase the nursing visits to twice a week. She says she spoke with the patient's daughter, Naomie, and she is aware of what's going on. And lastly, the patient is aware to take an extra dose of the diuretic.

## 2024-06-27 PROBLEM — Z00.00 MEDICARE ANNUAL WELLNESS VISIT, SUBSEQUENT: Status: RESOLVED | Noted: 2021-02-08 | Resolved: 2024-06-27

## 2024-06-28 ENCOUNTER — TELEPHONE (OUTPATIENT)
Age: 84
End: 2024-06-28

## 2024-06-28 LAB

## 2024-06-28 NOTE — TELEPHONE ENCOUNTER
Received call from rachel Murillo RN with updated report on patient.     Saw patient on Tuesday 6/25 with increase weight and PCP order to increase diuretic with 1 extra tablet torsemide 10 mg on Wednesday 6/26 Rlajdt=819.6 lbs. 6/25 159.6 lbs 6/27 and today 161.8 lbs. Slight increase in weight. Patient weighs self at same time daily.    Vitals today VE=310/70 HR-76 PO2 2L=97% Lungs CTA except  x left lower lobe decreased.  Ankle circumference is L=25.5 cm; down from 26.75  R=26.5 down from 27.5 with diuretic and elevation    Patient has two open woinds:   1 posterior R leg, and  R lateral wound with serious drainage; ordered bacitracin and change 2x per day; depends on daughter tor dressing changes only 1 x time day for past 2 days. Looking pink around edge (.3 cm [place])    New wound on left leg posterior with serous drainage; using bacitracin and dressing,    Visiting RN will call over the weekend to check in on patient. Please follow up with patinent for any changes in diuretic and wound care if appropriate.

## 2024-06-28 NOTE — TELEPHONE ENCOUNTER
Spoke with Bernarda from [facility] regarding weight gain.    Weights:  June 22nd 156lbs                 June 25th   160lbs                 June 26th   160.6lbs                 June 27th   159.6lbs                 June 28th   161.8lbs    Patient has some tiny areas of legs weeping clear fluid; spoke with PCP on Wednesday,  ordered bacitracin & dressing on the areas, advised extra Torsemide dose, weight gain.    Weight did decrease Wednesday to Thursday but increased from Thursday to Friday; not quite in parameters to take extra Torsemide.    VS stable, lungs clear slight decreased left side, no shortness of breath, patient states she feels good.    Inquiring if they can order a different type of compression sleeve for her, she has a hard time getting TEDS on.    Also inquiring if you would like any additional Torsemide taken.    Please advise.

## 2024-07-01 NOTE — TELEPHONE ENCOUNTER
Spoke to [facility] and gave instructions. Also, left message for pts dtg Naomie. Advised her to call office back and let us know that she got the message

## 2024-07-01 NOTE — TELEPHONE ENCOUNTER
Weight reported at 158 lb after the weekend. Patient still has some weeping at three locations on lower extremities and is doing well with bacitracin and wound dressing changes per her daughter. [facility] is coming today for further evaluation.

## 2024-07-01 NOTE — TELEPHONE ENCOUNTER
Patient should take extra dose of diuretic today and call Cardiology for further advice. Can use duoderm dressing to open areas and change every 48 hours.

## 2024-07-03 ENCOUNTER — LAB (OUTPATIENT)
Dept: LAB | Facility: HOSPITAL | Age: 84
End: 2024-07-03
Payer: MEDICARE

## 2024-07-03 DIAGNOSIS — E87.1 HYPONATREMIA: ICD-10-CM

## 2024-07-03 LAB
ANION GAP SERPL CALCULATED.3IONS-SCNC: 10 MMOL/L (ref 4–13)
BUN SERPL-MCNC: 22 MG/DL (ref 5–25)
CALCIUM SERPL-MCNC: 9.3 MG/DL (ref 8.4–10.2)
CHLORIDE SERPL-SCNC: 92 MMOL/L (ref 96–108)
CO2 SERPL-SCNC: 30 MMOL/L (ref 21–32)
CREAT SERPL-MCNC: 0.77 MG/DL (ref 0.6–1.3)
GFR SERPL CREATININE-BSD FRML MDRD: 72 ML/MIN/1.73SQ M
GLUCOSE P FAST SERPL-MCNC: 159 MG/DL (ref 65–99)
POTASSIUM SERPL-SCNC: 4.1 MMOL/L (ref 3.5–5.3)
SODIUM SERPL-SCNC: 132 MMOL/L (ref 135–147)

## 2024-07-03 PROCEDURE — 36415 COLL VENOUS BLD VENIPUNCTURE: CPT

## 2024-07-03 PROCEDURE — 80048 BASIC METABOLIC PNL TOTAL CA: CPT

## 2024-07-05 ENCOUNTER — TELEPHONE (OUTPATIENT)
Age: 84
End: 2024-07-05

## 2024-07-05 ENCOUNTER — TELEPHONE (OUTPATIENT)
Dept: NEPHROLOGY | Facility: CLINIC | Age: 84
End: 2024-07-05

## 2024-07-05 NOTE — RESULT ENCOUNTER NOTE
Hello    Patient normally is followed up by Ms Yuliet Mars.     Please let pt know Na 132, lower end of pt goal. Restart sodium chloride  1 tab BID. BMP one week    Thank you    np

## 2024-07-05 NOTE — TELEPHONE ENCOUNTER
----- Message from Arslan Perkins MD sent at 7/5/2024  9:00 AM EDT -----  Hello    Patient normally is followed up by Ms Yuliet Mars.     Please let pt know Na 132, lower end of pt goal. Restart sodium chloride  1 tab BID. BMP one week    Thank you    np

## 2024-07-05 NOTE — TELEPHONE ENCOUNTER
Received a call from Guillermina, a nurse with [facility]. She is seeing patient today.  She asked if ok to apply compression stockings to patient as she has small blisters on her legs.  Nurse needs permission to do so.

## 2024-07-05 NOTE — TELEPHONE ENCOUNTER
Talked with patient's daughter.  Pt has appt with Dr. Perkins on 7/9/24.  Daughter is somewhat reluctant to start salt tablets as her legs swell even more so when taking the salt tablets and her legs are currently weeping already.  Daughter asked that I check with  Dr. Perkins for any further recommendations.    ----- Message from Arslan Perkins MD sent at 7/5/2024  9:00 AM EDT -----  Hello    Patient normally is followed up by Ms Yuliet Mars.     Please let pt know Na 132, lower end of pt goal. Restart sodium chloride  1 tab BID. BMP one week    Thank you    np

## 2024-07-09 ENCOUNTER — OFFICE VISIT (OUTPATIENT)
Dept: NEPHROLOGY | Facility: CLINIC | Age: 84
End: 2024-07-09
Payer: MEDICARE

## 2024-07-09 VITALS
HEART RATE: 77 BPM | BODY MASS INDEX: 33.06 KG/M2 | SYSTOLIC BLOOD PRESSURE: 142 MMHG | HEIGHT: 59 IN | WEIGHT: 164 LBS | DIASTOLIC BLOOD PRESSURE: 78 MMHG

## 2024-07-09 DIAGNOSIS — N18.30 STAGE 3 CHRONIC KIDNEY DISEASE, UNSPECIFIED WHETHER STAGE 3A OR 3B CKD (HCC): ICD-10-CM

## 2024-07-09 DIAGNOSIS — I50.33 ACUTE ON CHRONIC HEART FAILURE WITH PRESERVED EJECTION FRACTION (HCC): ICD-10-CM

## 2024-07-09 DIAGNOSIS — E87.1 HYPONATREMIA: Primary | ICD-10-CM

## 2024-07-09 PROCEDURE — 99214 OFFICE O/P EST MOD 30 MIN: CPT | Performed by: INTERNAL MEDICINE

## 2024-07-09 RX ORDER — TORSEMIDE 10 MG/1
TABLET ORAL
Qty: 360 TABLET | Refills: 3 | Status: SHIPPED | OUTPATIENT
Start: 2024-07-09

## 2024-07-09 NOTE — PROGRESS NOTES
NEPHROLOGY OFFICE VISIT   Camryn Roblero 83 y.o. female MRN: [id number]  7/9/2024    Reason for Visit: hyponatremia    ASSESSMENT and PLAN:      82 yo Patient has a history of CHF, lung carcinoid, COPD on home O2, TOM, hyponatremia, hypertension, prior ischemic bowel during pregnancy, who is being seen as a follow-up for hyponatremia. The etiology of hyponatremia was felt to be secondary to volume overload in the setting of heart failure, and citalopram use during admission 2018 at [facility]. Pt is now presenting for f/u for HTN, hyponatremia, proteinuria.      Pertinent chart review of history:     2020 - Pt states that was taking torsemide daily but not taking it on days with appointment and if had to go somewhere. Noticed edema when not taking torsemide. Was taking 1/2 of spironolactone. Saw Cardiology and was advised to take 1/2 tab torsemide (so ten mg) and spironolactone 25 mg daily. Pt states with this change, has edema improved. Is feeling satisfied with the improvement.       May 2021- patient's blood pressures are below goal.  Patient is asymptomatic.  Patient states that she has now been compliant with her medications this week and prior was not taking some of her medications up to 1 to 2 times a week.  We reviewed that this makes it difficult to appropriately titrate medications as we are not sure what she is taking when she is seen in the office.  Patient agrees.  Therefore, now that the patient is taking all of her medications daily with the exception of diuretics on days when she has heard physician appointments, I advised the patient to hold amlodipine.  And check blood pressures once a day for 2 weeks and call with results.     February 2022-sodium level remains stable 134. Calcium borderline elevated and was advised to hold calcium supplement.  Protein level in the urine remains stable 0.3.     March 2022 calcium level improved.     October 2022-hyponatremia 127.  Advised the patient to restart  torsemide and lower spironolactone.  Repeat sodium level improved to 131.     7/2023 -appointment -  labs from 5/2023 - creat is stable 0.8. Na 134. Hb 12.6. UA 4-10. No urinary complaints. UPCR 0.34. SBP appropriate. To note, since last seen, spironolactone was increased to 25 mg once daily. Given this, we will have pt repeat BMP in 1 month.     July 2023-patient had desaturation with 6-minute walk test and was advised to continue oxygen.     August 2023-patient saw primary care physician and was advised to continue metformin and glipizide.     September 2023-patient saw pulmonary team-compliance with CPAP is good.  Continue using CPAP.  Oxygen reduced to 1 L/min.  Pulmonary nodules with repeat CT scan in July showed stable lung nodules     October 2023-appointment cardiology team atorvastatin was added to patient's regimen.  Patient was advised to have carotid duplex due to question of blurry vision.  - Carotid ultrasound less than 50% stenoses bilaterally internal carotids     December 22-patient was admitted until December 26, 2023 with tachycardia I do initial presenting complaint from PCP.  Patient was found to be in a flutter with rapid ventricular response.  Was started on anticoagulation with Eliquis.  Antihypertensive regimen with Cardizem added.  Metoprolol was increased to 50 mg twice daily.  Patient was also maintained on valsartan, spironolactone, torsemide.  Amlodipine and terazosin were held    Hospitalization May 9 until May 17-patient was initially sent in with acute hyponatremia sodium of 120.  Urine osmolarity 286, urine sodium 33, serum osmolarity 268.  Was hypoosmolar.  Was started on salt tablets.  Spironolactone and ARB was held.  Was restarted on torsemide on discharge.  Was also noted to have pancreatic cysts and pulmonary nodules and was advised to follow with pulmonary and GI teams as outpatient.     1) hyponatremia       - SPEP, UPEP, free light chain unrevealing in October  2022  -Recently patient sodium level acutely decreased in May 20 24-1 20 prompting admission to the hospital.  Patient was started on salt tablets.  Was restarted on torsemide.  Sodium level improved by discharge.  Was noted to be hyperosmolar.  - Kat 10, 2024-I advised patient to hold salt tablets completely.  Sodium level is 140.  Cardiology team had recently increased diuretic to torsemide 20 mg twice daily.  - Kat 10, 2024, patient's weight was improving 163 pounds and was advised to reduce torsemide to 20 mg daily by cardiology team.    Overall, patient's sodium level is slightly decreased to 132.  Initially had improved to 140 by June 7th.  Patient ultimately required holding of salt tablets when sodium level reached 140.  Subsequently was noted to have decrease in torsemide as outlined above.  If still having lower extremity swelling and wounds. Weight log reviewed.  Patient's weight is now stable approximately 159 to 160 pounds.  She still has significant edema lower extremities approximately 2+.  She has wounds that are healing but still weeping at times.  I have advised patient to increase torsemide slightly to 20 mg in the morningevery day and 20 mg in the evening on MWF.  Patient's blood pressures are slightly above goal but as we are increasing the diuretic, anticipate blood pressures to improve.  Will continue holding salt tablets.        Plan:  - Lab work in 2 to 3 weeks  - Increase torsemide to 20 mg in the morning daily, with extra dose in the evening MWF  - Hold evening dose of torsemide once weight is less than 157 pounds  - Repeat appointment in 2 months for volume check  - Continue holding valsartan, spironolactone, terazosin  - Patient's daughter will send weight log in 1 to 2 week        2) HTN - currently is on diltiazem, metoprolol, torsemide     -was started on spironolactone in April 2018.   -during visit in July 2018, patient's amlodipine was increased and valsartan was changed to  losartan due to recall.  -renal artery Doppler were unrevealing.  In the proximal arteries of the renal artery.  -May 2019-Lower terazosin to 5 mg, and lower metoprolol to 37.5 twice a day  - 7/2019 - metoprolol changed to 25 mg at night as pt was taking 37.5 BID dosing only once daily  -June 2022-patient saw cardiologist.  At this time was taking torsemide 10 mg 3 times per week and spironolactone was 3 times per week and was advised to take spironolactone once a day  - august 2022 - pt states is not taking torsemide regularly and last dose was in july  - October 2022-patient was not taking torsemide and was advised to restart and lower spironolactone due to hyponatremia  - 1/2023 - SBP ok for now (upper limit of nl but pt higher risk of fall)  - 4/2023 - spironolactone increased by Cardiologist to 25 mg daily  - 7/2023 - BP at goal  - December 2023-patient was admitted with A-fib with rapid response.  Antihypertensive regimen was adjusted-patient was discharged on metoprolol 50 mg twice daily XL, Cardizem 120 mg daily, valsartan, spironolactone, torsemide.  Amlodipine and terazosin were held.  - May 2024-during hospitalization, due to hypotension, patient's valsartan, spironolactone, were held.  Blood pressures remain appropriate  - July 9, 2024-continued on diltiazem, metoprolol, torsemide and blood pressures relatively stable     3) renal function      -Renal function at baseline 0.8 to 1 mg/dL-appropriate     4) proteinuria     - prior SPEP, UPEP unrevealing  - on ARB and spironolactone  - last UA on 8/2019. None recent.   -  Repeat urinalysis unrevealing.  U PCR stable 5/2023-no recent check.  Will give patient new lab slips for next appointment check.     5) thyroid nodule - biopsied prior     6) electrolytes     -   Hyponatremia -sodium level has normalized  - calcium supplement held in February 2022 due to borderline hypercalcemia.  February 2022.   repeat testing with improved calcium level.  Holding  calcium supplements.  - Hyponatremia as above     7) COPD and pulm carcinoid and TOM     - follows with Pulmonary team and Hematology team  - on home O2 nocturnal     8) AST slight elevation - chronic. Per PCP     9) atrial flutter with rapid rate recent admission December 2023-anticoagulation adjusted by PCP postdischarge due to possible rash.     - had rash with eliquis and PCP changed to xarelto with improvement in rash as of 1/18/2024 and now resolved     10) DM - being managed by PCP    11-pulmonary nodules-noted on CT scan in the hospitalization recently May 2024    - Saw pulmonary team as follow-up outpatient.  Was advised to have PET scan.    12-heart failure with preserved ejection fraction    - Last saw cardiology June 5, 2024  - Weight was increased by 10 pounds with discharge weight 166 pounds and weight at the office visit was 176 pounds on June 5 with cardiology team.  Was advised to increase torsemide to 20 mg 2 times a day and decrease salt tablets to 1 g twice a day.  And subsequently salt tablets were held completely by myself in June as sodium level had improved to 140  - See plan above regarding diuretics    13-herpes zoster-started on Valtrex on June 14, 2024 due to new rash on right shoulder and right arm    14-left renal cyst-follows with urology team.  Was advised to have repeat appointment in 6 months.  Recently saw urology June 18, 2024.       It was a pleasure evaluating your patient in the office today. Thank you for allowing our team to participate in the care of Ms Camryn Roblero. Please do not hesitate to contact our team if further issues/questions shall arise in the interim.     No problem-specific Assessment & Plan notes found for this encounter.        HPI:    Patient still with swelling lower extremities.  Denies other complaints.  No shortness of breath.  Daughter is present for appointment.    PATIENT INSTRUCTIONS:    Patient Instructions   1) Avoid NSAIDS - (Example - motrin,  "advil, ibuprofen, aleve, exederin, etc)  2) Always follow a low salt diet  3) If you have any issues with trouble with nausea, vomiting, urinating, blood in the urine, food tasting like metal, confusion, weakness, fatigue that is worsening, or any other questions, please call right away.  4) Please continue to follow regularly with your family physician and any other specialist that you are advised to see and continue to follow their recommendations  5) If you have any emergencies, please go to the nearest urgent care or emergency room and please inform your family physician and our office once you are able to.  6) please take torsemide 20 mg once daily in AM, and extra 20 mg in PM on Monday, Wednesday, and Friday  7) hold the evening torsemide once your weight is 156  pounds or below and please call  8) please have labwork in 2-3 weeks  9) appointment in 2 months  10) no other changes to medications today      OBJECTIVE:  Current Weight: Weight - Scale: 74.4 kg (164 lb)  Vitals:    07/09/24 1449   BP: 142/78   BP Location: Left arm   Patient Position: Sitting   Cuff Size: Standard   Pulse: 77   Weight: 74.4 kg (164 lb)   Height: 4' 11\" (1.499 m)    Body mass index is 33.12 kg/m².      REVIEW OF SYSTEMS:    Review of Systems   All other systems reviewed and are negative.      PHYSICAL EXAM:      Physical Exam  Vitals and nursing note reviewed.   Constitutional:       General: She is not in acute distress.     Appearance: She is well-developed. She is not diaphoretic.   HENT:      Head: Normocephalic and atraumatic.   Eyes:      General: No scleral icterus.        Right eye: No discharge.         Left eye: No discharge.      Conjunctiva/sclera: Conjunctivae normal.   Neck:      Vascular: No JVD.   Cardiovascular:      Rate and Rhythm: Normal rate and regular rhythm.      Heart sounds: Murmur heard.      No friction rub. No gallop.   Pulmonary:      Effort: Pulmonary effort is normal. No respiratory distress.      " Breath sounds: Normal breath sounds. No wheezing or rales.   Abdominal:      General: Bowel sounds are normal. There is no distension.      Palpations: Abdomen is soft.      Tenderness: There is no abdominal tenderness. There is no rebound.   Musculoskeletal:         General: No tenderness or deformity. Normal range of motion.      Cervical back: Normal range of motion and neck supple.      Right lower leg: Edema present.      Left lower leg: Edema present.      Comments: Bilateral lower extremity wounds wrapped.  Wounds appear to be healing on exam with right lateral wound visualized and almost closed completely.  Slight erythema noted.  2+ lower extremity edema especially pedal edema.   Skin:     General: Skin is warm and dry.      Coloration: Skin is not pale.      Findings: No erythema or rash.   Neurological:      Mental Status: She is alert and oriented to person, place, and time.      Coordination: Coordination normal.   Psychiatric:         Mood and Affect: Mood and affect normal.         Behavior: Behavior normal.         Thought Content: Thought content normal.         Judgment: Judgment normal.         Medications:    Current Outpatient Medications:     Accu-Chek FastClix Lancets MISC, USE TO CHECK BLOOD GLUCOSE Twice DAILY, Disp: 102 each, Rfl: 3    Accu-Chek SmartView test strip, Use 1 each daily Use as instructed, Disp: 100 each, Rfl: 1    acetaminophen (TYLENOL) 500 mg tablet, Take 500 mg by mouth every 6 (six) hours as needed for mild pain For pain, Disp: , Rfl:     atorvastatin (LIPITOR) 40 mg tablet, Take 1 tablet (40 mg total) by mouth daily, Disp: 90 tablet, Rfl: 3    AYR SALINE NASAL DROPS NA, , Disp: , Rfl:     budesonide (Pulmicort) 0.5 mg/2 mL nebulizer solution, Take 2 mL (0.5 mg total) by nebulization 2 (two) times a day Rinse mouth after use., Disp: 120 mL, Rfl: 2    diltiazem (CARDIZEM CD) 120 mg 24 hr capsule, TAKE 1 CAPSULE EVERY DAY, Disp: 90 capsule, Rfl: 1    diphenhydrAMINE  (BENADRYL) 25 mg tablet, Take 1 hour prior to CT with contrast for contrast allergy., Disp: 1 tablet, Rfl: 0    donepezil (ARICEPT) 5 mg tablet, Take 1 tablet (5 mg total) by mouth daily at bedtime, Disp: 90 tablet, Rfl: 3    glipiZIDE (GLUCOTROL) 10 mg tablet, TAKE 1/2 TABLET EVERY MORNING, Disp: 45 tablet, Rfl: 1    ipratropium-albuterol (DUO-NEB) 0.5-2.5 mg/3 mL nebulizer solution, Take 3 mL by nebulization every 6 (six) hours as needed for wheezing or shortness of breath, Disp: 360 mL, Rfl: 2    latanoprost (XALATAN) 0.005 % ophthalmic solution, , Disp: , Rfl:     loratadine (CLARITIN) 10 mg tablet, Take 1 tablet by mouth daily, Disp: , Rfl:     metFORMIN (GLUCOPHAGE) 500 mg tablet, TAKE 2 TABLETS TWICE DAILY, Disp: 360 tablet, Rfl: 1    methylPREDNISolone (MEDROL) 32 MG tablet, Take 1 tablet 12 hours and 2 hours prior to CT with contrast due to contrast allergy., Disp: 2 tablet, Rfl: 0    metoprolol succinate (TOPROL-XL) 50 mg 24 hr tablet, Take 1 tablet (50 mg total) by mouth 2 (two) times a day, Disp: 180 tablet, Rfl: 1    rivaroxaban (XARELTO) 15 mg tablet, Take 1 tablet (15 mg total) by mouth daily with breakfast, Disp: 30 tablet, Rfl: 5    torsemide (DEMADEX) 10 mg tablet, Take 20 mg daily, take an additional 20 mg on MWF in evening, Disp: 360 tablet, Rfl: 3    Laboratory Results:  Results from last 7 days   Lab Units 07/03/24  0830   POTASSIUM mmol/L 4.1   CHLORIDE mmol/L 92*   CO2 mmol/L 30   BUN mg/dL 22   CREATININE mg/dL 0.77   CALCIUM mg/dL 9.3       Results for orders placed or performed in visit on 07/03/24   Basic metabolic panel   Result Value Ref Range    Sodium 132 (L) 135 - 147 mmol/L    Potassium 4.1 3.5 - 5.3 mmol/L    Chloride 92 (L) 96 - 108 mmol/L    CO2 30 21 - 32 mmol/L    ANION GAP 10 4 - 13 mmol/L    BUN 22 5 - 25 mg/dL    Creatinine 0.77 0.60 - 1.30 mg/dL    Glucose, Fasting 159 (H) 65 - 99 mg/dL    Calcium 9.3 8.4 - 10.2 mg/dL    eGFR 72 ml/min/1.73sq m

## 2024-07-09 NOTE — PATIENT INSTRUCTIONS
1) Avoid NSAIDS - (Example - motrin, advil, ibuprofen, aleve, exederin, etc)  2) Always follow a low salt diet  3) If you have any issues with trouble with nausea, vomiting, urinating, blood in the urine, food tasting like metal, confusion, weakness, fatigue that is worsening, or any other questions, please call right away.  4) Please continue to follow regularly with your family physician and any other specialist that you are advised to see and continue to follow their recommendations  5) If you have any emergencies, please go to the nearest urgent care or emergency room and please inform your family physician and our office once you are able to.  6) please take torsemide 20 mg once daily in AM, and extra 20 mg in PM on Monday, Wednesday, and Friday  7) hold the evening torsemide once your weight is 156  pounds or below and please call  8) please have labwork in 2-3 weeks  9) appointment in 2 months  10) no other changes to medications today

## 2024-07-09 NOTE — LETTER
July 9, 2024     Abiel Seals MD  [address]  [address]  [place] [address]    Patient: Camryn Roblero   YOB: 1940   Date of Visit: 7/9/2024       Dear Dr. Seals:    Thank you for referring Camryn Roblero to me for evaluation. Below are my notes for this consultation.    If you have questions, please do not hesitate to call me. I look forward to following your patient along with you.         Sincerely,        Arslan Perkins MD        CC: MD Arslan Real MD  7/9/2024  3:26 PM  Sign when Signing Visit  NEPHROLOGY OFFICE VISIT   Camryn Roblero 83 y.o. female MRN: [id number]  7/9/2024    Reason for Visit: hyponatremia    ASSESSMENT and PLAN:      82 yo Patient has a history of CHF, lung carcinoid, COPD on home O2, TOM, hyponatremia, hypertension, prior ischemic bowel during pregnancy, who is being seen as a follow-up for hyponatremia. The etiology of hyponatremia was felt to be secondary to volume overload in the setting of heart failure, and citalopram use during admission 2018 at [facility]. Pt is now presenting for f/u for HTN, hyponatremia, proteinuria.      Pertinent chart review of history:     2020 - Pt states that was taking torsemide daily but not taking it on days with appointment and if had to go somewhere. Noticed edema when not taking torsemide. Was taking 1/2 of spironolactone. Saw Cardiology and was advised to take 1/2 tab torsemide (so ten mg) and spironolactone 25 mg daily. Pt states with this change, has edema improved. Is feeling satisfied with the improvement.       May 2021- patient's blood pressures are below goal.  Patient is asymptomatic.  Patient states that she has now been compliant with her medications this week and prior was not taking some of her medications up to 1 to 2 times a week.  We reviewed that this makes it difficult to appropriately titrate medications as we are not sure what she is taking when she is seen in the office.   Patient agrees.  Therefore, now that the patient is taking all of her medications daily with the exception of diuretics on days when she has heard physician appointments, I advised the patient to hold amlodipine.  And check blood pressures once a day for 2 weeks and call with results.     February 2022-sodium level remains stable 134. Calcium borderline elevated and was advised to hold calcium supplement.  Protein level in the urine remains stable 0.3.     March 2022 calcium level improved.     October 2022-hyponatremia 127.  Advised the patient to restart torsemide and lower spironolactone.  Repeat sodium level improved to 131.     7/2023 -appointment -  labs from 5/2023 - creat is stable 0.8. Na 134. Hb 12.6. UA 4-10. No urinary complaints. UPCR 0.34. SBP appropriate. To note, since last seen, spironolactone was increased to 25 mg once daily. Given this, we will have pt repeat BMP in 1 month.     July 2023-patient had desaturation with 6-minute walk test and was advised to continue oxygen.     August 2023-patient saw primary care physician and was advised to continue metformin and glipizide.     September 2023-patient saw pulmonary team-compliance with CPAP is good.  Continue using CPAP.  Oxygen reduced to 1 L/min.  Pulmonary nodules with repeat CT scan in July showed stable lung nodules     October 2023-appointment cardiology team atorvastatin was added to patient's regimen.  Patient was advised to have carotid duplex due to question of blurry vision.  - Carotid ultrasound less than 50% stenoses bilaterally internal carotids     December 22-patient was admitted until December 26, 2023 with tachycardia I do initial presenting complaint from PCP.  Patient was found to be in a flutter with rapid ventricular response.  Was started on anticoagulation with Eliquis.  Antihypertensive regimen with Cardizem added.  Metoprolol was increased to 50 mg twice daily.  Patient was also maintained on valsartan, spironolactone,  torsemide.  Amlodipine and terazosin were held    Hospitalization May 9 until May 17-patient was initially sent in with acute hyponatremia sodium of 120.  Urine osmolarity 286, urine sodium 33, serum osmolarity 268.  Was hypoosmolar.  Was started on salt tablets.  Spironolactone and ARB was held.  Was restarted on torsemide on discharge.  Was also noted to have pancreatic cysts and pulmonary nodules and was advised to follow with pulmonary and GI teams as outpatient.     1) hyponatremia       - SPEP, UPEP, free light chain unrevealing in October 2022  -Recently patient sodium level acutely decreased in May 20 24-1 20 prompting admission to the hospital.  Patient was started on salt tablets.  Was restarted on torsemide.  Sodium level improved by discharge.  Was noted to be hyperosmolar.  - Kat 10, 2024-I advised patient to hold salt tablets completely.  Sodium level is 140.  Cardiology team had recently increased diuretic to torsemide 20 mg twice daily.  - Kat 10, 2024, patient's weight was improving 163 pounds and was advised to reduce torsemide to 20 mg daily by cardiology team.    Overall, patient's sodium level is slightly decreased to 132.  Initially had improved to 140 by June 7th.  Patient ultimately required holding of salt tablets when sodium level reached 140.  Subsequently was noted to have decrease in torsemide as outlined above.  If still having lower extremity swelling and wounds. Weight log reviewed.  Patient's weight is now stable approximately 159 to 160 pounds.  She still has significant edema lower extremities approximately 2+.  She has wounds that are healing but still weeping at times.  I have advised patient to increase torsemide slightly to 20 mg in the morningevery day and 20 mg in the evening on MWF.  Patient's blood pressures are slightly above goal but as we are increasing the diuretic, anticipate blood pressures to improve.  Will continue holding salt tablets.        Plan:  - Lab work in  2 to 3 weeks  - Increase torsemide to 20 mg in the morning daily, with extra dose in the evening MWF  - Hold evening dose of torsemide once weight is less than 157 pounds  - Repeat appointment in 2 months for volume check  - Continue holding valsartan, spironolactone, terazosin  - Patient's daughter will send weight log in 1 to 2 week        2) HTN - currently is on diltiazem, metoprolol, torsemide     -was started on spironolactone in April 2018.   -during visit in July 2018, patient's amlodipine was increased and valsartan was changed to losartan due to recall.  -renal artery Doppler were unrevealing.  In the proximal arteries of the renal artery.  -May 2019-Lower terazosin to 5 mg, and lower metoprolol to 37.5 twice a day  - 7/2019 - metoprolol changed to 25 mg at night as pt was taking 37.5 BID dosing only once daily  -June 2022-patient saw cardiologist.  At this time was taking torsemide 10 mg 3 times per week and spironolactone was 3 times per week and was advised to take spironolactone once a day  - august 2022 - pt states is not taking torsemide regularly and last dose was in july  - October 2022-patient was not taking torsemide and was advised to restart and lower spironolactone due to hyponatremia  - 1/2023 - SBP ok for now (upper limit of nl but pt higher risk of fall)  - 4/2023 - spironolactone increased by Cardiologist to 25 mg daily  - 7/2023 - BP at goal  - December 2023-patient was admitted with A-fib with rapid response.  Antihypertensive regimen was adjusted-patient was discharged on metoprolol 50 mg twice daily XL, Cardizem 120 mg daily, valsartan, spironolactone, torsemide.  Amlodipine and terazosin were held.  - May 2024-during hospitalization, due to hypotension, patient's valsartan, spironolactone, were held.  Blood pressures remain appropriate  - July 9, 2024-continued on diltiazem, metoprolol, torsemide and blood pressures relatively stable     3) renal function      -Renal function at  baseline 0.8 to 1 mg/dL-appropriate     4) proteinuria     - prior SPEP, UPEP unrevealing  - on ARB and spironolactone  - last UA on 8/2019. None recent.   -  Repeat urinalysis unrevealing.  U PCR stable 5/2023-no recent check.  Will give patient new lab slips for next appointment check.     5) thyroid nodule - biopsied prior     6) electrolytes     -   Hyponatremia -sodium level has normalized  - calcium supplement held in February 2022 due to borderline hypercalcemia.  February 2022.   repeat testing with improved calcium level.  Holding calcium supplements.  - Hyponatremia as above     7) COPD and pulm carcinoid and TOM     - follows with Pulmonary team and Hematology team  - on home O2 nocturnal     8) AST slight elevation - chronic. Per PCP     9) atrial flutter with rapid rate recent admission December 2023-anticoagulation adjusted by PCP postdischarge due to possible rash.     - had rash with eliquis and PCP changed to xarelto with improvement in rash as of 1/18/2024 and now resolved     10) DM - being managed by PCP    11-pulmonary nodules-noted on CT scan in the hospitalization recently May 2024    - Saw pulmonary team as follow-up outpatient.  Was advised to have PET scan.    12-heart failure with preserved ejection fraction    - Last saw cardiology June 5, 2024  - Weight was increased by 10 pounds with discharge weight 166 pounds and weight at the office visit was 176 pounds on June 5 with cardiology team.  Was advised to increase torsemide to 20 mg 2 times a day and decrease salt tablets to 1 g twice a day.  And subsequently salt tablets were held completely by myself in June as sodium level had improved to 140  - See plan above regarding diuretics    13-herpes zoster-started on Valtrex on June 14, 2024 due to new rash on right shoulder and right arm    14-left renal cyst-follows with urology team.  Was advised to have repeat appointment in 6 months.  Recently saw urology June 18, 2024.       It was a  "pleasure evaluating your patient in the office today. Thank you for allowing our team to participate in the care of Ms Camryn Roblero. Please do not hesitate to contact our team if further issues/questions shall arise in the interim.     No problem-specific Assessment & Plan notes found for this encounter.        HPI:    Patient still with swelling lower extremities.  Denies other complaints.  No shortness of breath.  Daughter is present for appointment.    PATIENT INSTRUCTIONS:    Patient Instructions   1) Avoid NSAIDS - (Example - motrin, advil, ibuprofen, aleve, exederin, etc)  2) Always follow a low salt diet  3) If you have any issues with trouble with nausea, vomiting, urinating, blood in the urine, food tasting like metal, confusion, weakness, fatigue that is worsening, or any other questions, please call right away.  4) Please continue to follow regularly with your family physician and any other specialist that you are advised to see and continue to follow their recommendations  5) If you have any emergencies, please go to the nearest urgent care or emergency room and please inform your family physician and our office once you are able to.  6) please take torsemide 20 mg once daily in AM, and extra 20 mg in PM on Monday, Wednesday, and Friday  7) hold the evening torsemide once your weight is 156  pounds or below and please call  8) please have labwork in 2-3 weeks  9) appointment in 2 months  10) no other changes to medications today      OBJECTIVE:  Current Weight: Weight - Scale: 74.4 kg (164 lb)  Vitals:    07/09/24 1449   BP: 142/78   BP Location: Left arm   Patient Position: Sitting   Cuff Size: Standard   Pulse: 77   Weight: 74.4 kg (164 lb)   Height: 4' 11\" (1.499 m)    Body mass index is 33.12 kg/m².      REVIEW OF SYSTEMS:    Review of Systems   All other systems reviewed and are negative.      PHYSICAL EXAM:      Physical Exam  Vitals and nursing note reviewed.   Constitutional:       General: She is " not in acute distress.     Appearance: She is well-developed. She is not diaphoretic.   HENT:      Head: Normocephalic and atraumatic.   Eyes:      General: No scleral icterus.        Right eye: No discharge.         Left eye: No discharge.      Conjunctiva/sclera: Conjunctivae normal.   Neck:      Vascular: No JVD.   Cardiovascular:      Rate and Rhythm: Normal rate and regular rhythm.      Heart sounds: Murmur heard.      No friction rub. No gallop.   Pulmonary:      Effort: Pulmonary effort is normal. No respiratory distress.      Breath sounds: Normal breath sounds. No wheezing or rales.   Abdominal:      General: Bowel sounds are normal. There is no distension.      Palpations: Abdomen is soft.      Tenderness: There is no abdominal tenderness. There is no rebound.   Musculoskeletal:         General: No tenderness or deformity. Normal range of motion.      Cervical back: Normal range of motion and neck supple.      Right lower leg: Edema present.      Left lower leg: Edema present.      Comments: Bilateral lower extremity wounds wrapped.  Wounds appear to be healing on exam with right lateral wound visualized and almost closed completely.  Slight erythema noted.  2+ lower extremity edema especially pedal edema.   Skin:     General: Skin is warm and dry.      Coloration: Skin is not pale.      Findings: No erythema or rash.   Neurological:      Mental Status: She is alert and oriented to person, place, and time.      Coordination: Coordination normal.   Psychiatric:         Mood and Affect: Mood and affect normal.         Behavior: Behavior normal.         Thought Content: Thought content normal.         Judgment: Judgment normal.         Medications:    Current Outpatient Medications:   •  Accu-Chek FastClix Lancets MISC, USE TO CHECK BLOOD GLUCOSE Twice DAILY, Disp: 102 each, Rfl: 3  •  Accu-Chek SmartView test strip, Use 1 each daily Use as instructed, Disp: 100 each, Rfl: 1  •  acetaminophen (TYLENOL) 500 mg  tablet, Take 500 mg by mouth every 6 (six) hours as needed for mild pain For pain, Disp: , Rfl:   •  atorvastatin (LIPITOR) 40 mg tablet, Take 1 tablet (40 mg total) by mouth daily, Disp: 90 tablet, Rfl: 3  •  AYR SALINE NASAL DROPS NA, , Disp: , Rfl:   •  budesonide (Pulmicort) 0.5 mg/2 mL nebulizer solution, Take 2 mL (0.5 mg total) by nebulization 2 (two) times a day Rinse mouth after use., Disp: 120 mL, Rfl: 2  •  diltiazem (CARDIZEM CD) 120 mg 24 hr capsule, TAKE 1 CAPSULE EVERY DAY, Disp: 90 capsule, Rfl: 1  •  diphenhydrAMINE (BENADRYL) 25 mg tablet, Take 1 hour prior to CT with contrast for contrast allergy., Disp: 1 tablet, Rfl: 0  •  donepezil (ARICEPT) 5 mg tablet, Take 1 tablet (5 mg total) by mouth daily at bedtime, Disp: 90 tablet, Rfl: 3  •  glipiZIDE (GLUCOTROL) 10 mg tablet, TAKE 1/2 TABLET EVERY MORNING, Disp: 45 tablet, Rfl: 1  •  ipratropium-albuterol (DUO-NEB) 0.5-2.5 mg/3 mL nebulizer solution, Take 3 mL by nebulization every 6 (six) hours as needed for wheezing or shortness of breath, Disp: 360 mL, Rfl: 2  •  latanoprost (XALATAN) 0.005 % ophthalmic solution, , Disp: , Rfl:   •  loratadine (CLARITIN) 10 mg tablet, Take 1 tablet by mouth daily, Disp: , Rfl:   •  metFORMIN (GLUCOPHAGE) 500 mg tablet, TAKE 2 TABLETS TWICE DAILY, Disp: 360 tablet, Rfl: 1  •  methylPREDNISolone (MEDROL) 32 MG tablet, Take 1 tablet 12 hours and 2 hours prior to CT with contrast due to contrast allergy., Disp: 2 tablet, Rfl: 0  •  metoprolol succinate (TOPROL-XL) 50 mg 24 hr tablet, Take 1 tablet (50 mg total) by mouth 2 (two) times a day, Disp: 180 tablet, Rfl: 1  •  rivaroxaban (XARELTO) 15 mg tablet, Take 1 tablet (15 mg total) by mouth daily with breakfast, Disp: 30 tablet, Rfl: 5  •  torsemide (DEMADEX) 10 mg tablet, Take 20 mg daily, take an additional 20 mg on MWF in evening, Disp: 360 tablet, Rfl: 3    Laboratory Results:  Results from last 7 days   Lab Units 07/03/24  0830   POTASSIUM mmol/L 4.1   CHLORIDE  mmol/L 92*   CO2 mmol/L 30   BUN mg/dL 22   CREATININE mg/dL 0.77   CALCIUM mg/dL 9.3       Results for orders placed or performed in visit on 07/03/24   Basic metabolic panel   Result Value Ref Range    Sodium 132 (L) 135 - 147 mmol/L    Potassium 4.1 3.5 - 5.3 mmol/L    Chloride 92 (L) 96 - 108 mmol/L    CO2 30 21 - 32 mmol/L    ANION GAP 10 4 - 13 mmol/L    BUN 22 5 - 25 mg/dL    Creatinine 0.77 0.60 - 1.30 mg/dL    Glucose, Fasting 159 (H) 65 - 99 mg/dL    Calcium 9.3 8.4 - 10.2 mg/dL    eGFR 72 ml/min/1.73sq m

## 2024-07-15 ENCOUNTER — TELEPHONE (OUTPATIENT)
Age: 84
End: 2024-07-15

## 2024-07-15 ENCOUNTER — TELEPHONE (OUTPATIENT)
Dept: LAB | Facility: HOSPITAL | Age: 84
End: 2024-07-15

## 2024-07-17 ENCOUNTER — TELEPHONE (OUTPATIENT)
Dept: FAMILY MEDICINE CLINIC | Facility: CLINIC | Age: 84
End: 2024-07-17

## 2024-07-17 NOTE — TELEPHONE ENCOUNTER
"Meghan from [facility] called regarding needing status of patients order that was \"faxed to us\" for Diabetic monitor Freestyle Mango 2.  I don't see an order in her chart, (nor do I see one on anyone's desk for this).  I asked Rep, if we could call her back, but she wanted to hold, but after I looked all over for it, she had hung up.  "

## 2024-07-18 NOTE — TELEPHONE ENCOUNTER
Meghan from [facility] will be faxing over paperwork again. Please look out for it, and fax it back to them ASAP, as its time sensitive.    Thank You

## 2024-07-19 NOTE — TELEPHONE ENCOUNTER
Meghan, from [facility], called to follow up on the order that was faxed again yesterday as, she stated, they would need to receive it signed, clinical notes and patient demographics by the end of the day.  Spoke with the practice for an alternate fax number as the form still had not been received.  Provided Meghan with the fax:[phone].  She will fax the order again.

## 2024-07-22 NOTE — TELEPHONE ENCOUNTER
Meghan, pharmacist [facility]- faxed over patient diabetic monitor but has not received back yet- advised we sent on 7/19 and confirmed the fax number,  She provided an alternative and asked if it could be sent again to both [phone] and [phone] along with the clinical notes as they did not receive it from the 19th

## 2024-07-25 ENCOUNTER — TELEPHONE (OUTPATIENT)
Age: 84
End: 2024-07-25

## 2024-07-25 NOTE — TELEPHONE ENCOUNTER
Marlene from [facility] calls to report left leg healed.  They are only treating the right leg now and will be decreasing treatment from twice a week to once per week.  Also reporting they are using silicone foam dressing for treatment.

## 2024-07-26 ENCOUNTER — LAB (OUTPATIENT)
Dept: LAB | Facility: HOSPITAL | Age: 84
End: 2024-07-26
Attending: INTERNAL MEDICINE
Payer: MEDICARE

## 2024-07-26 DIAGNOSIS — N18.30 STAGE 3 CHRONIC KIDNEY DISEASE, UNSPECIFIED WHETHER STAGE 3A OR 3B CKD (HCC): ICD-10-CM

## 2024-07-26 DIAGNOSIS — E87.1 HYPONATREMIA: ICD-10-CM

## 2024-07-26 LAB
ANION GAP SERPL CALCULATED.3IONS-SCNC: 6 MMOL/L (ref 4–13)
BUN SERPL-MCNC: 21 MG/DL (ref 5–25)
CALCIUM SERPL-MCNC: 9.3 MG/DL (ref 8.4–10.2)
CHLORIDE SERPL-SCNC: 93 MMOL/L (ref 96–108)
CO2 SERPL-SCNC: 32 MMOL/L (ref 21–32)
CREAT SERPL-MCNC: 0.74 MG/DL (ref 0.6–1.3)
GFR SERPL CREATININE-BSD FRML MDRD: 75 ML/MIN/1.73SQ M
GLUCOSE SERPL-MCNC: 146 MG/DL (ref 65–140)
MAGNESIUM SERPL-MCNC: 1.6 MG/DL (ref 1.9–2.7)
PHOSPHATE SERPL-MCNC: 3.3 MG/DL (ref 2.3–4.1)
POTASSIUM SERPL-SCNC: 3.8 MMOL/L (ref 3.5–5.3)
SODIUM SERPL-SCNC: 131 MMOL/L (ref 135–147)

## 2024-07-26 PROCEDURE — 84100 ASSAY OF PHOSPHORUS: CPT

## 2024-07-26 PROCEDURE — 36415 COLL VENOUS BLD VENIPUNCTURE: CPT

## 2024-07-26 PROCEDURE — 80048 BASIC METABOLIC PNL TOTAL CA: CPT

## 2024-07-26 PROCEDURE — 83735 ASSAY OF MAGNESIUM: CPT

## 2024-07-26 NOTE — RESULT ENCOUNTER NOTE
Hello    Patient normally is followed up by Ms Yuliet Mars.     I had sent a message back earlier about the [employer] message that the patient daughter sent about the weights.  Please combine the task request with this-that the weights look like they are overall stable/improved.  That she should continue with the current plan.  Her sodium level is slightly lower at 131.  But appears to be stabilizing.  - Repeat BMP in 1 to 2 weeks  - Free water restriction 1.5 L    Thank you    np

## 2024-07-29 ENCOUNTER — TELEPHONE (OUTPATIENT)
Dept: NEPHROLOGY | Facility: CLINIC | Age: 84
End: 2024-07-29

## 2024-07-29 DIAGNOSIS — N18.30 STAGE 3 CHRONIC KIDNEY DISEASE, UNSPECIFIED WHETHER STAGE 3A OR 3B CKD (HCC): Primary | ICD-10-CM

## 2024-07-29 NOTE — TELEPHONE ENCOUNTER
----- Message from Arslan Perkins MD sent at 7/26/2024  6:00 PM EDT -----  Hello    Patient normally is followed up by Ms Yuliet Mars.     I had sent a message back earlier about the MyChart message that the patient daughter sent about the weights.  Please combine the task request with this-that the weights look like they are overall stable/improved.  That she should continue with the current plan.  Her sodium level is slightly lower at 131.  But appears to be stabilizing.  - Repeat BMP in 1 to 2 weeks  - Free water restriction 1.5 L    Thank you    np

## 2024-07-31 ENCOUNTER — TELEPHONE (OUTPATIENT)
Dept: LAB | Facility: HOSPITAL | Age: 84
End: 2024-07-31

## 2024-08-04 DIAGNOSIS — I48.92 ATRIAL FLUTTER WITH RAPID VENTRICULAR RESPONSE (HCC): ICD-10-CM

## 2024-08-05 ENCOUNTER — OFFICE VISIT (OUTPATIENT)
Dept: PULMONOLOGY | Facility: CLINIC | Age: 84
End: 2024-08-05
Payer: MEDICARE

## 2024-08-05 VITALS
DIASTOLIC BLOOD PRESSURE: 80 MMHG | SYSTOLIC BLOOD PRESSURE: 144 MMHG | HEIGHT: 59 IN | TEMPERATURE: 98.8 F | BODY MASS INDEX: 33.12 KG/M2 | HEART RATE: 86 BPM | OXYGEN SATURATION: 93 %

## 2024-08-05 DIAGNOSIS — J43.9 PULMONARY EMPHYSEMA, UNSPECIFIED EMPHYSEMA TYPE (HCC): Primary | ICD-10-CM

## 2024-08-05 DIAGNOSIS — E66.09 CLASS 1 OBESITY DUE TO EXCESS CALORIES WITH SERIOUS COMORBIDITY AND BODY MASS INDEX (BMI) OF 33.0 TO 33.9 IN ADULT: ICD-10-CM

## 2024-08-05 DIAGNOSIS — R91.8 MULTIPLE PULMONARY NODULES: ICD-10-CM

## 2024-08-05 DIAGNOSIS — D3A.090 BENIGN CARCINOID TUMOR OF THE BRONCHUS AND LUNG: ICD-10-CM

## 2024-08-05 DIAGNOSIS — G47.33 OSA (OBSTRUCTIVE SLEEP APNEA): ICD-10-CM

## 2024-08-05 PROCEDURE — 99214 OFFICE O/P EST MOD 30 MIN: CPT | Performed by: INTERNAL MEDICINE

## 2024-08-05 RX ORDER — RIVAROXABAN 15 MG/1
15 TABLET, FILM COATED ORAL
Qty: 30 TABLET | Refills: 0 | Status: SHIPPED | OUTPATIENT
Start: 2024-08-05

## 2024-08-05 NOTE — PROGRESS NOTES
Ambulatory Visit  Name: Camryn Roblero      : 1940      MRN: [id number]  Encounter Provider: Ban Garland MD  Encounter Date: 2024   Encounter department: [facility] PULMONARY & [facility]    Assessment & Plan   1. Pulmonary emphysema, unspecified emphysema type (HCC)  Assessment & Plan:  Mrs.Cleo Roblero has history of COPD and was on Advair Bid before. She also had  asthma as a child. She has occasional cough and no significant phlegm. She is a never smoker, however she has history of secondhand smoke exposure.Her PFT done on 2024 showed severe airflow obstruction with severe diffusion defect, hyperinflation and air trapping diagnostic of emphysema.  Her baseline oxygen saturation at rest was 94% 6-minute walk test.  She could not complete 6-minute walk test and stopped after 2 minutes.  She did not desaturate during this short.  She has severe exercise limitation due to SOB. She has been on nebulized budesonide which she has not been using regularly.  I have advised her to use the nebulizer when necessary for her shortness of breath. I have advised her to continue with oxygen supplementation as needed.  I had a long discussion with her and her daughter who accompanied her.  I answered all their questions   2. TOM (obstructive sleep apnea)  Assessment & Plan:  She had a long history of snoring and daytime sleepiness.She was found to have mild TOM with oxygen desaturation on her overnight sleep study. Her CPAP titration study was suboptimal. She was started on CPAP therapy at 9 cm of water and has been using it. Her CPAP compliance has been good and her residual AHI was low. She is getting benefit from CPAP therapy I have advised her to use the CPAP as before.  I have advised her to change the mask regularly.  She uses oxygen 1 L/min.   3. Class 1 obesity due to excess calories with serious comorbidity and body mass index (BMI) of 33.0 to 33.9 in adult  Assessment & Plan:  She is  obese and understands the need for weight reduction.  Currently she cannot exercise much because of her ambulatory issues.  4. Multiple pulmonary nodules  Assessment & Plan:  She has multiple lung nodules which were stable on the last CT scan from May 2022.  A repeat CT scan July 2023 showed stability of the lung nodules recent CT scan showed enlarging lung nodule on the left different from the previous benign carcinoid nodule.  She denied any anorexia or weight loss.  Her CT PET scan from 6/19/2024 showed only nonspecific intake.  Continued surveillance of the lung nodules is required.  5. 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 She had also mediastinal lymphadenopathy. She was also noted to have a nodule in the thyroid gland and a nodular hepatic lesion. She had a CT guided biopsy of LLL lung lesion which was consistent with neuroendocrine tumor, carcinoid. She follows with Dr. Shruthi Benitez from Oncology       Assessment & Plan   1. Pulmonary emphysema, unspecified emphysema type (HCC)  Assessment & Plan:  Mrs.Cleo Roblero has history of COPD and was on Advair Bid before. She also had  asthma as a child. She has occasional cough and no significant phlegm. She is a never smoker, however she has history of secondhand smoke exposure.Her PFT done on 6/4/2024 showed severe airflow obstruction with severe diffusion defect, hyperinflation and air trapping diagnostic of emphysema.  Her baseline oxygen saturation at rest was 94% 6-minute walk test.  She could not complete 6-minute walk test and stopped after 2 minutes.  She did not desaturate during this short.  She has severe exercise limitation due to SOB. She has been on nebulized budesonide which she has not been using regularly.  I have advised her to use the nebulizer when necessary for her shortness of breath. I have advised her to  continue with oxygen supplementation as needed.  I had a long discussion with her and her daughter who accompanied her.  I answered all their questions   2. TOM (obstructive sleep apnea)  Assessment & Plan:  She had a long history of snoring and daytime sleepiness.She was found to have mild TOM with oxygen desaturation on her overnight sleep study. Her CPAP titration study was suboptimal. She was started on CPAP therapy at 9 cm of water and has been using it. Her CPAP compliance has been good and her residual AHI was low. She is getting benefit from CPAP therapy I have advised her to use the CPAP as before.  I have advised her to change the mask regularly.  She uses oxygen 1 L/min.   3. Class 1 obesity due to excess calories with serious comorbidity and body mass index (BMI) of 33.0 to 33.9 in adult  Assessment & Plan:  She is obese and understands the need for weight reduction.  Currently she cannot exercise much because of her ambulatory issues.  4. Multiple pulmonary nodules  Assessment & Plan:  She has multiple lung nodules which were stable on the last CT scan from May 2022.  A repeat CT scan July 2023 showed stability of the lung nodules recent CT scan showed enlarging lung nodule on the left different from the previous benign carcinoid nodule.  She denied any anorexia or weight loss.  Her CT PET scan from 6/19/2024 showed only nonspecific intake.  Continued surveillance of the lung nodules is required.  5. 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 She had also mediastinal lymphadenopathy. She was also noted to have a nodule in the thyroid gland and a nodular hepatic lesion. She had a CT guided biopsy of LLL lung lesion which was consistent with neuroendocrine tumor, carcinoid. She follows with Dr. Shruthi Benitez from Oncology       History of Present Illness     Camryn Roblero is  a 83 y.o. female with past medical history of obstructive sleep apnea on CPAP therapy, pulmonary emphysema obesity and multiple lung nodules who has come for follow-up.  Currently she is using the CPAP well and is comfortable with the mask and pressure.  She has no significant daytime sleepiness or morning headache.  I reviewed her CPAP compliance records and they are satisfactory.  She uses 2 L/min of oxygen supplementation at night with CPAP.  She has ambulatory dysfunction and uses a wheelchair.  Her exercise tolerance is limited.  She does not use any oxygen with exercise.  She has been on budesonide and DuoNeb.  She has not been using this nebulizers regularly.  She has no hoarseness of voice or dysphagia.  She has had multiple lung nodules including a carcinoid tumor.  Recent CT PET scan from June 2024 did not show any significant uptake.  She denied any significant weight loss or anorexia.  She has chronic bilateral leg edema.  She has been on diuretic therapy.  She is on chronic systemic anticoagulation with Xarelto.  PFT from 6/4/2024 showed severe airflow obstruction with severe diffusion defect at trapping and hyperinflation diagnostic of emphysema.  Her baseline oxygen saturation was 94% at rest.  She could walk only for 2 minutes during the 6-minute walk test.  He did not desaturate during the limited walk.    Review of Systems   Constitutional:  Negative for appetite change, chills, fatigue and fever.   HENT:  Positive for rhinorrhea. Negative for hearing loss, sneezing, sore throat and trouble swallowing.    Respiratory:  Positive for shortness of breath. Negative for cough, chest tightness and wheezing.    Cardiovascular:  Negative for chest pain, palpitations and leg swelling.   Gastrointestinal:  Negative for abdominal pain, constipation, diarrhea, nausea and vomiting.   Genitourinary:  Positive for urgency. Negative for dysuria and frequency.   Musculoskeletal:  Positive for gait problem and neck  "pain. Negative for arthralgias.   Skin:  Negative for rash.   Allergic/Immunologic: Positive for environmental allergies.   Neurological:  Negative for dizziness, syncope, light-headedness and headaches.   Psychiatric/Behavioral:  Negative for agitation, confusion and sleep disturbance.        Objective     /80 (BP Location: Left arm, Patient Position: Sitting, Cuff Size: Standard)   Pulse 86   Temp 98.8 °F (37.1 °C) (Tympanic)   Ht 4' 11\" (1.499 m)   SpO2 93%   BMI 33.12 kg/m²     Physical Exam  Vitals reviewed.   Constitutional:       General: She is not in acute distress.     Appearance: She is obese. She is not ill-appearing, toxic-appearing or diaphoretic.   HENT:      Head: Normocephalic.      Mouth/Throat:      Mouth: Mucous membranes are moist.      Pharynx: Oropharynx is clear.   Eyes:      General: No scleral icterus.     Conjunctiva/sclera: Conjunctivae normal.   Cardiovascular:      Rate and Rhythm: Normal rate and regular rhythm.      Heart sounds: Normal heart sounds. No murmur heard.  Pulmonary:      Effort: Pulmonary effort is normal. No respiratory distress.      Breath sounds: No stridor. Rales (occasional crackles at lung bases) present. No wheezing or rhonchi.   Chest:      Chest wall: No tenderness.   Abdominal:      General: Bowel sounds are normal.      Palpations: Abdomen is soft.      Tenderness: There is no abdominal tenderness. There is no guarding.   Musculoskeletal:      Cervical back: Neck supple. No rigidity.      Right lower leg: Edema present.      Left lower leg: Edema present.   Lymphadenopathy:      Cervical: No cervical adenopathy.   Skin:     Coloration: Skin is not jaundiced or pale.      Findings: No rash.   Neurological:      Mental Status: She is alert and oriented to person, place, and time.      Gait: Gait normal.   Psychiatric:         Mood and Affect: Mood normal.         Behavior: Behavior normal.         Thought Content: Thought content normal.         " Judgment: Judgment normal.       Administrative Statements       I spent 30 minutes of time taking care of this patient with complex medical issues.  The majority of this time was spent directly with the patient counseling as well as coordinating care.

## 2024-08-06 PROBLEM — E66.811 CLASS 1 OBESITY DUE TO EXCESS CALORIES WITH SERIOUS COMORBIDITY AND BODY MASS INDEX (BMI) OF 33.0 TO 33.9 IN ADULT: Status: ACTIVE | Noted: 2021-05-13

## 2024-08-06 PROBLEM — E66.09 CLASS 1 OBESITY DUE TO EXCESS CALORIES WITH SERIOUS COMORBIDITY AND BODY MASS INDEX (BMI) OF 33.0 TO 33.9 IN ADULT: Status: ACTIVE | Noted: 2021-05-13

## 2024-08-06 NOTE — ASSESSMENT & PLAN NOTE
She had a long history of snoring and daytime sleepiness.She was found to have mild TOM with oxygen desaturation on her overnight sleep study. Her CPAP titration study was suboptimal. She was started on CPAP therapy at 9 cm of water and has been using it. Her CPAP compliance has been good and her residual AHI was low. She is getting benefit from CPAP therapy I have advised her to use the CPAP as before.  I have advised her to change the mask regularly.  She uses oxygen 1 L/min.

## 2024-08-06 NOTE — ASSESSMENT & PLAN NOTE
Mrs.Cleo Roblero has history of COPD and was on Advair Bid before. She also had  asthma as a child. She has occasional cough and no significant phlegm. She is a never smoker, however she has history of secondhand smoke exposure.Her PFT done on 6/4/2024 showed severe airflow obstruction with severe diffusion defect, hyperinflation and air trapping diagnostic of emphysema.  Her baseline oxygen saturation at rest was 94% 6-minute walk test.  She could not complete 6-minute walk test and stopped after 2 minutes.  She did not desaturate during this short.  She has severe exercise limitation due to SOB. She has been on nebulized budesonide which she has not been using regularly.  I have advised her to use the nebulizer when necessary for her shortness of breath. I have advised her to continue with oxygen supplementation as needed.  I had a long discussion with her and her daughter who accompanied her.  I answered all their questions

## 2024-08-07 ENCOUNTER — OFFICE VISIT (OUTPATIENT)
Dept: CARDIOLOGY CLINIC | Facility: CLINIC | Age: 84
End: 2024-08-07
Payer: MEDICARE

## 2024-08-07 VITALS
HEART RATE: 98 BPM | OXYGEN SATURATION: 98 % | SYSTOLIC BLOOD PRESSURE: 156 MMHG | WEIGHT: 159.6 LBS | BODY MASS INDEX: 32.17 KG/M2 | HEIGHT: 59 IN | DIASTOLIC BLOOD PRESSURE: 70 MMHG

## 2024-08-07 DIAGNOSIS — I10 ESSENTIAL HYPERTENSION: ICD-10-CM

## 2024-08-07 DIAGNOSIS — E87.1 HYPONATREMIA: ICD-10-CM

## 2024-08-07 DIAGNOSIS — N18.31 STAGE 3A CHRONIC KIDNEY DISEASE (HCC): ICD-10-CM

## 2024-08-07 DIAGNOSIS — I50.32 CHRONIC HEART FAILURE WITH PRESERVED EJECTION FRACTION (HFPEF) (HCC): Primary | ICD-10-CM

## 2024-08-07 PROCEDURE — 99214 OFFICE O/P EST MOD 30 MIN: CPT | Performed by: INTERNAL MEDICINE

## 2024-08-07 PROCEDURE — G2211 COMPLEX E/M VISIT ADD ON: HCPCS | Performed by: INTERNAL MEDICINE

## 2024-08-07 NOTE — ASSESSMENT & PLAN NOTE
She has multiple lung nodules which were stable on the last CT scan from May 2022.  A repeat CT scan July 2023 showed stability of the lung nodules recent CT scan showed enlarging lung nodule on the left different from the previous benign carcinoid nodule.  She denied any anorexia or weight loss.  Her CT PET scan from 6/19/2024 showed only nonspecific intake.  Continued surveillance of the lung nodules is required.
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
## 2024-08-07 NOTE — ASSESSMENT & PLAN NOTE
She had a 2.0 x 2.0 lobulated lung mass in the left lower lobe and multiple lung nodules on the left side. She had also mediastinal lymphadenopathy. She was also noted to have a nodule in the thyroid gland and a nodular hepatic lesion. She had a CT guided biopsy of LLL lung lesion which was consistent with neuroendocrine tumor, carcinoid. She follows with Dr. Shruthi Benitez from Oncology

## 2024-08-07 NOTE — PATIENT INSTRUCTIONS
No medication changes today  Repeat BMP per nephrology  Daily weights, as needed dose of torsemide in the afternoon as prescribed

## 2024-08-07 NOTE — ASSESSMENT & PLAN NOTE
She is obese and understands the need for weight reduction.  Currently she cannot exercise much because of her ambulatory issues.

## 2024-08-11 DIAGNOSIS — I48.92 ATRIAL FLUTTER WITH RAPID VENTRICULAR RESPONSE (HCC): ICD-10-CM

## 2024-08-11 RX ORDER — METOPROLOL SUCCINATE 50 MG/1
50 TABLET, EXTENDED RELEASE ORAL 2 TIMES DAILY
Qty: 180 TABLET | Refills: 1 | Status: SHIPPED | OUTPATIENT
Start: 2024-08-11

## 2024-08-12 ENCOUNTER — TELEPHONE (OUTPATIENT)
Age: 84
End: 2024-08-12
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
## 2024-08-15 ENCOUNTER — LAB (OUTPATIENT)
Dept: LAB | Facility: HOSPITAL | Age: 84
End: 2024-08-15
Attending: INTERNAL MEDICINE
Payer: MEDICARE

## 2024-08-15 DIAGNOSIS — N18.30 STAGE 3 CHRONIC KIDNEY DISEASE, UNSPECIFIED WHETHER STAGE 3A OR 3B CKD (HCC): ICD-10-CM

## 2024-08-15 LAB
ANION GAP SERPL CALCULATED.3IONS-SCNC: 9 MMOL/L (ref 4–13)
BUN SERPL-MCNC: 22 MG/DL (ref 5–25)
CALCIUM SERPL-MCNC: 9.4 MG/DL (ref 8.4–10.2)
CHLORIDE SERPL-SCNC: 95 MMOL/L (ref 96–108)
CO2 SERPL-SCNC: 30 MMOL/L (ref 21–32)
CREAT SERPL-MCNC: 0.81 MG/DL (ref 0.6–1.3)
GFR SERPL CREATININE-BSD FRML MDRD: 67 ML/MIN/1.73SQ M
GLUCOSE P FAST SERPL-MCNC: 130 MG/DL (ref 65–99)
POTASSIUM SERPL-SCNC: 3.7 MMOL/L (ref 3.5–5.3)
SODIUM SERPL-SCNC: 134 MMOL/L (ref 135–147)

## 2024-08-15 PROCEDURE — 36415 COLL VENOUS BLD VENIPUNCTURE: CPT

## 2024-08-15 PROCEDURE — 80048 BASIC METABOLIC PNL TOTAL CA: CPT

## 2024-08-16 ENCOUNTER — CONSULT (OUTPATIENT)
Dept: GASTROENTEROLOGY | Facility: AMBULARY SURGERY CENTER | Age: 84
End: 2024-08-16
Payer: MEDICARE

## 2024-08-16 VITALS
SYSTOLIC BLOOD PRESSURE: 158 MMHG | HEIGHT: 59 IN | HEART RATE: 72 BPM | DIASTOLIC BLOOD PRESSURE: 70 MMHG | OXYGEN SATURATION: 94 % | WEIGHT: 158.4 LBS | BODY MASS INDEX: 31.93 KG/M2

## 2024-08-16 DIAGNOSIS — R93.89 ABNORMAL CHEST CT: ICD-10-CM

## 2024-08-16 DIAGNOSIS — R10.13 EPIGASTRIC PAIN: ICD-10-CM

## 2024-08-16 DIAGNOSIS — K86.2 PANCREAS CYST: Primary | ICD-10-CM

## 2024-08-16 DIAGNOSIS — Z86.010 HISTORY OF COLON POLYPS: ICD-10-CM

## 2024-08-16 PROBLEM — Z86.0100 HISTORY OF COLON POLYPS: Status: ACTIVE | Noted: 2024-08-16

## 2024-08-16 PROCEDURE — 99203 OFFICE O/P NEW LOW 30 MIN: CPT | Performed by: PHYSICIAN ASSISTANT
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
## 2024-08-18 NOTE — RESULT ENCOUNTER NOTE
Hello    Patient normally is followed up by Ms Yuliet Mars.     Please let pt know that creat stable. Na improved 134. I saw note in chart that she is still having significant edema on ankles. Please ask her to follow heart failure team recommendations to take torsemide in evening when weight is above 156 lbs. What is her current weight and is she doing the extra torsemide. Repeat bmp in 2 weeks    Thank you    np

## 2024-08-19 ENCOUNTER — TELEPHONE (OUTPATIENT)
Dept: NEPHROLOGY | Facility: CLINIC | Age: 84
End: 2024-08-19

## 2024-08-19 DIAGNOSIS — N18.31 STAGE 3A CHRONIC KIDNEY DISEASE (HCC): Primary | ICD-10-CM

## 2024-08-19 DIAGNOSIS — E11.9 DIABETES MELLITUS WITHOUT COMPLICATION (HCC): ICD-10-CM

## 2024-08-19 NOTE — TELEPHONE ENCOUNTER
LM for patient's daughter, Naomie, about the following, asked her to call back about weights and torsemide:    ----- Message from Arslan Perkins MD sent at 8/18/2024  3:09 PM EDT -----  Hello    Patient normally is followed up by Ms Yuliet Mars.     Please let pt know that creat stable. Na improved 134. I saw note in chart that she is still having significant edema on ankles. Please ask her to follow heart failure team recommendations to take torsemide in evening when weight is above 156 lbs. What is her current weight and is she doing the extra torsemide. Repeat bmp in 2 weeks    Thank you    np

## 2024-08-20 RX ORDER — BLOOD SUGAR DIAGNOSTIC
1 STRIP MISCELLANEOUS DAILY
Qty: 100 EACH | Refills: 1 | Status: SHIPPED | OUTPATIENT
Start: 2024-08-20 | End: 2024-08-23 | Stop reason: SDUPTHER

## 2024-08-23 ENCOUNTER — TELEPHONE (OUTPATIENT)
Dept: OTHER | Facility: HOSPITAL | Age: 84
End: 2024-08-23

## 2024-08-23 ENCOUNTER — PATIENT MESSAGE (OUTPATIENT)
Dept: NEPHROLOGY | Facility: CLINIC | Age: 84
End: 2024-08-23

## 2024-08-23 ENCOUNTER — OFFICE VISIT (OUTPATIENT)
Dept: FAMILY MEDICINE CLINIC | Facility: CLINIC | Age: 84
End: 2024-08-23
Payer: MEDICARE

## 2024-08-23 VITALS
WEIGHT: 157 LBS | HEART RATE: 68 BPM | DIASTOLIC BLOOD PRESSURE: 70 MMHG | SYSTOLIC BLOOD PRESSURE: 134 MMHG | OXYGEN SATURATION: 93 % | HEIGHT: 59 IN | BODY MASS INDEX: 31.65 KG/M2

## 2024-08-23 DIAGNOSIS — N18.31 TYPE 2 DIABETES MELLITUS WITH STAGE 3A CHRONIC KIDNEY DISEASE, WITHOUT LONG-TERM CURRENT USE OF INSULIN (HCC): ICD-10-CM

## 2024-08-23 DIAGNOSIS — R91.8 MULTIPLE PULMONARY NODULES: ICD-10-CM

## 2024-08-23 DIAGNOSIS — E11.22 TYPE 2 DIABETES MELLITUS WITH STAGE 3A CHRONIC KIDNEY DISEASE, WITHOUT LONG-TERM CURRENT USE OF INSULIN (HCC): ICD-10-CM

## 2024-08-23 DIAGNOSIS — E78.00 PURE HYPERCHOLESTEROLEMIA: ICD-10-CM

## 2024-08-23 DIAGNOSIS — E87.1 HYPONATREMIA: ICD-10-CM

## 2024-08-23 DIAGNOSIS — E11.9 DIABETES MELLITUS WITHOUT COMPLICATION (HCC): ICD-10-CM

## 2024-08-23 DIAGNOSIS — I10 ESSENTIAL HYPERTENSION: Primary | ICD-10-CM

## 2024-08-23 DIAGNOSIS — I50.30 HEART FAILURE WITH PRESERVED EJECTION FRACTION, UNSPECIFIED HF CHRONICITY (HCC): ICD-10-CM

## 2024-08-23 LAB — SL AMB POCT HEMOGLOBIN AIC: 7.1 (ref ?–6.5)

## 2024-08-23 PROCEDURE — 99214 OFFICE O/P EST MOD 30 MIN: CPT | Performed by: FAMILY MEDICINE

## 2024-08-23 PROCEDURE — 83036 HEMOGLOBIN GLYCOSYLATED A1C: CPT | Performed by: FAMILY MEDICINE

## 2024-08-23 RX ORDER — BLOOD SUGAR DIAGNOSTIC
1 STRIP MISCELLANEOUS DAILY
Qty: 100 EACH | Refills: 5 | Status: SHIPPED | OUTPATIENT
Start: 2024-08-23

## 2024-08-23 NOTE — ASSESSMENT & PLAN NOTE
Wt Readings from Last 3 Encounters:   08/16/24 71.8 kg (158 lb 6.4 oz)   08/07/24 72.4 kg (159 lb 9.6 oz)   07/09/24 74.4 kg (164 lb)     Stable. Next appointment with Cardiology is in November 2024.

## 2024-08-23 NOTE — TELEPHONE ENCOUNTER
I spoke to the patient's daughter over the phone.  Swelling has overall improved but still present slightly.  I asked the daughter to talk to the nurses next week who do her wound care.  If the swelling is inhibiting further wound healing, we will try to have a lower dry weight of 150 pounds.  She is currently stable between 151 and 153 pounds.  Daughter will message me back next week.    Patient will need potassium repletion if we are to increase diuretics.

## 2024-08-23 NOTE — ASSESSMENT & PLAN NOTE
Level was down to 120 on admission. Patient was started on Na pills 2g two times a day. Na up to 134 in August 2024. Patient to see Nephrology in October 2024.

## 2024-08-23 NOTE — ASSESSMENT & PLAN NOTE
Patient had CT in May 2024 and has new 0.6 cm nodule in right lower lobe. Had PET scan in June 2024 and ok. Patient saw Pulmonary in August 2024 and will follow.
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
## 2024-08-23 NOTE — ASSESSMENT & PLAN NOTE
A1C done today and was 7.1. Continue metformin 1000 mg bid and glipizide 5 mg qd. Continue low carb diet. Eye exam done in October 2023. Foot exam done in June 2024.     Lab Results   Component Value Date    HGBA1C 7.4 (A) 04/26/2024      No

## 2024-08-27 ENCOUNTER — TELEPHONE (OUTPATIENT)
Dept: LAB | Facility: HOSPITAL | Age: 84
End: 2024-08-27

## 2024-09-04 DIAGNOSIS — I48.92 ATRIAL FLUTTER WITH RAPID VENTRICULAR RESPONSE (HCC): ICD-10-CM

## 2024-09-10 ENCOUNTER — LAB (OUTPATIENT)
Dept: LAB | Facility: HOSPITAL | Age: 84
End: 2024-09-10
Attending: INTERNAL MEDICINE
Payer: MEDICARE

## 2024-09-10 DIAGNOSIS — N18.31 STAGE 3A CHRONIC KIDNEY DISEASE (HCC): ICD-10-CM

## 2024-09-10 LAB
ANION GAP SERPL CALCULATED.3IONS-SCNC: 6 MMOL/L (ref 4–13)
BUN SERPL-MCNC: 28 MG/DL (ref 5–25)
CALCIUM SERPL-MCNC: 9.5 MG/DL (ref 8.4–10.2)
CHLORIDE SERPL-SCNC: 97 MMOL/L (ref 96–108)
CO2 SERPL-SCNC: 37 MMOL/L (ref 21–32)
CREAT SERPL-MCNC: 1 MG/DL (ref 0.6–1.3)
GFR SERPL CREATININE-BSD FRML MDRD: 52 ML/MIN/1.73SQ M
GLUCOSE P FAST SERPL-MCNC: 121 MG/DL (ref 65–99)
POTASSIUM SERPL-SCNC: 4 MMOL/L (ref 3.5–5.3)
SODIUM SERPL-SCNC: 140 MMOL/L (ref 135–147)

## 2024-09-10 PROCEDURE — 80048 BASIC METABOLIC PNL TOTAL CA: CPT

## 2024-09-10 PROCEDURE — 36415 COLL VENOUS BLD VENIPUNCTURE: CPT

## 2024-09-11 ENCOUNTER — TELEPHONE (OUTPATIENT)
Dept: NEPHROLOGY | Facility: CLINIC | Age: 84
End: 2024-09-11

## 2024-09-11 NOTE — TELEPHONE ENCOUNTER
Talked with patient's daughter, Naomie.  Pt is doing better.  Weight is down to 149 lbs.  (She will send a pic of weight log later this week)   Left leg is unwrapped and healed.  Right leg has a very small area yet but is getting better.    Advised her that CR is at 1, per Dr. Perkins probably due to diuretics.  K stable.  Sodium normalized.      ----- Message from Arslan Perkins MD sent at 9/10/2024 10:25 PM EDT -----  Hello    Patient normally is followed up by Ms Yuliet Mars.     Please let pt daughter know that creat is 1, slightly higher than baseline but likely reflection of diuretics. Na now normalized, K stable.   - can you please find out pt weight?  - no changes for now as long as weight has improved    Thank you    np

## 2024-09-11 NOTE — RESULT ENCOUNTER NOTE
Hello    Patient normally is followed up by Ms Yuliet Mars.     Please let pt daughter know that creat is 1, slightly higher than baseline but likely reflection of diuretics. Na now normalized, K stable.   - can you please find out pt weight?  - no changes for now as long as weight has improved    Thank you    np

## 2024-09-15 ENCOUNTER — PATIENT MESSAGE (OUTPATIENT)
Dept: NEPHROLOGY | Facility: CLINIC | Age: 84
End: 2024-09-15

## 2024-09-29 DIAGNOSIS — I48.92 ATRIAL FLUTTER WITH RAPID VENTRICULAR RESPONSE (HCC): ICD-10-CM

## 2024-09-30 ENCOUNTER — TELEPHONE (OUTPATIENT)
Age: 84
End: 2024-09-30

## 2024-09-30 NOTE — TELEPHONE ENCOUNTER
CHEYENNEI:Aimee called and stated patient will continue to receive home care services due to the wound on patients right lower extremity x once weekly.Patient will also continue to have physical therapy x1 weekly due to balancing issues.

## 2024-10-08 ENCOUNTER — PATIENT MESSAGE (OUTPATIENT)
Dept: NEPHROLOGY | Facility: CLINIC | Age: 84
End: 2024-10-08

## 2024-10-08 DIAGNOSIS — N18.31 STAGE 3A CHRONIC KIDNEY DISEASE (HCC): Primary | ICD-10-CM

## 2024-10-09 NOTE — PATIENT COMMUNICATION
Pt's daughter aware with recommendations. Lab orders in the system. She will call the mobile lab. She will continue to log weight and bp and bring to appointment.

## 2024-10-10 ENCOUNTER — TELEPHONE (OUTPATIENT)
Dept: LAB | Facility: HOSPITAL | Age: 84
End: 2024-10-10

## 2024-10-10 ENCOUNTER — TELEPHONE (OUTPATIENT)
Age: 84
End: 2024-10-10

## 2024-10-11 ENCOUNTER — LAB (OUTPATIENT)
Dept: LAB | Facility: HOSPITAL | Age: 84
End: 2024-10-11
Attending: INTERNAL MEDICINE
Payer: MEDICARE

## 2024-10-11 DIAGNOSIS — N18.31 STAGE 3A CHRONIC KIDNEY DISEASE (HCC): ICD-10-CM

## 2024-10-11 LAB
ANION GAP SERPL CALCULATED.3IONS-SCNC: 7 MMOL/L (ref 4–13)
BUN SERPL-MCNC: 35 MG/DL (ref 5–25)
CALCIUM SERPL-MCNC: 9.6 MG/DL (ref 8.4–10.2)
CHLORIDE SERPL-SCNC: 94 MMOL/L (ref 96–108)
CO2 SERPL-SCNC: 30 MMOL/L (ref 21–32)
CREAT SERPL-MCNC: 1.06 MG/DL (ref 0.6–1.3)
GFR SERPL CREATININE-BSD FRML MDRD: 48 ML/MIN/1.73SQ M
GLUCOSE SERPL-MCNC: 278 MG/DL (ref 65–140)
MAGNESIUM SERPL-MCNC: 1.7 MG/DL (ref 1.9–2.7)
PHOSPHATE SERPL-MCNC: 3.7 MG/DL (ref 2.3–4.1)
POTASSIUM SERPL-SCNC: 4.4 MMOL/L (ref 3.5–5.3)
SODIUM SERPL-SCNC: 131 MMOL/L (ref 135–147)

## 2024-10-11 PROCEDURE — 84100 ASSAY OF PHOSPHORUS: CPT

## 2024-10-11 PROCEDURE — 83735 ASSAY OF MAGNESIUM: CPT

## 2024-10-11 PROCEDURE — 36415 COLL VENOUS BLD VENIPUNCTURE: CPT

## 2024-10-11 PROCEDURE — 80048 BASIC METABOLIC PNL TOTAL CA: CPT

## 2024-10-11 NOTE — RESULT ENCOUNTER NOTE
Hello    Patient normally is followed up by Ms Yuliet Mars.     Please let the patient's daughter know that the creatinine is stable 1.1 mg/dL.  Sodium level is lower at 131 but her blood sugars are elevated to 278.  Please advise him to work with their primary care and endocrinologist for blood sugar control.  I believe this is why the sodium level is lower.  But given prior history of severe hyponatremia, please asked patient to complete a BMP on Monday again.    Thank you    np

## 2024-10-14 ENCOUNTER — TELEPHONE (OUTPATIENT)
Dept: NEPHROLOGY | Facility: CLINIC | Age: 84
End: 2024-10-14

## 2024-10-14 DIAGNOSIS — E87.1 HYPONATREMIA: ICD-10-CM

## 2024-10-14 DIAGNOSIS — N18.31 STAGE 3A CHRONIC KIDNEY DISEASE (HCC): Primary | ICD-10-CM

## 2024-10-14 NOTE — TELEPHONE ENCOUNTER
Pt's daughter informed with results and recommendations. She states the blood work was not fasting, that's why sugar was elevated. She will take her tomorrow for labs prior to the visit with Desi, but again will not be a fasting blood work.

## 2024-10-14 NOTE — PROGRESS NOTES
NEPHROLOGY OFFICE VISIT   Camryn Roblero 84 y.o. female MRN: [id number]  10/15/2024    Reason for Visit: Follow-up    INTERVAL HISTORY and SUBJECTIVE:    I had the pleasure of seeing Camryn today in the renal clinic. She is a 84-year-old female who is being followed for management of hyponatremia.  Her main issues most recently have been hyponatremia and volume overload.  3 lb weight gain in the last week.  Reports some mild shortness of breath out of the ordinary.  No change in diet.  No change in oral intake.  Long discussion regarding fluid restriction.  She gets Meals on Wheels and they believe that they are low-sodium diet.    Follows with Dr. BERENICE Perkins for management of hyponatremia    Last labs obtained on 10/11/2024 reviewed with Camryn    ASSESSMENT AND PLAN:  Diabetes mellitus type 2:  Management per PCP  Hemoglobin A1c 7.1% near goal.  On oral medications    Hypertension/volume status:  Volume status:  History of heart failure with preserved ejection fraction.  Prior echocardiogram ejection fraction greater than 70%.  Grade 2 diastolic dysfunction.  Mild to moderate MR, mild aortic regurgitation.  Small pericardial effusion posterior to the heart similar to prior study.  Right ventricular systolic function normal.  Mild volume overload likely with 3 pound weight gain  Management: On diltiazem, metoprolol and torsemide  Blood pressure acceptable  Currently on torsemide daily with extra dose in the afternoon on Monday, Wednesday, Friday  Diuretic dose increased to twice a day for the rest of the week.  If weight declines patient instructed to go back to prior dosing.  Checking follow-up BMP.  Will discuss volume status when we report the results of labs.    Follow-up BMP ordered    Hyponatremia:  Etiology likely multifactorial with volume fluctuations, COPD/pulmonary disease  Last sodium level 131 corrected to near 134 due to hyperglycemia.  Generally in the low 130s with occasional increases into normal  ranges  Volume overload: Significant lower extremity edema on torsemide which may be contributing to decline in sodium  History of pulmonary nodules.  Follows with pulmonary  Dry weight near 159 to 160 pounds  Diuretic dose increased.  See above.  Salt tablets discontinued previously due to volume overload/edema  Urged quality diet with adequate protein intake.  Diet supplement suggested if needed  Discussed fluid restriction at length.  She was previously not measuring her fluid and uncertain about how much fluid she was consuming therefore I encouraged her to measure her fluids for a period of time.    Proteinuria:  Baseline creatinine near 0.7-1.1  Prior SPEP and UPEP unrevealing  On double RAAS blockade with ARB and spironolactone    COPD with pulmonary carcinoid and TOM  Follows with pulmonary and hematology  Nocturnal oxygen requirement    Mineral bone: Phosphorus normal.  Magnesium slightly low 1.7    Atrial flutter:  Managed on Xarelto.  Developed a rash with Eliquis    Thyroid nodule status post biopsy    History of herpes zoster June 2024    Left renal cyst follows with urology    PATIENT INSTRUCTIONS:    Patient Instructions   Thank you for your visit today.  You were seen today for continued monitoring of sodium level, fluid status and kidney function.  At this time kidney function is stable.  Sodium level is slightly lower than prior but again with the elevated blood sugar if we corrected it is up to 133, approximately.    Recommendations:    Weigh yourself daily in the morning  Increase torsemide to daily this week.  Limit fluid intake to 50 oz per day  Low salt diet  Magnesium supplement daily or every other day-- prefer slo mag  Follow up blood work by the end of the week to reassess sodium level  No other changes  Follow up in 3 months      Orders Placed This Encounter   Procedures    Basic metabolic panel     This is a patient instruction: Patient fasting for 8 hours or longer recommended.      "Standing Status:   Future     Standing Expiration Date:   10/15/2025    Magnesium     Standing Status:   Future     Standing Expiration Date:   3/1/2025    CBC     Standing Status:   Future     Standing Expiration Date:   3/1/2025    Basic metabolic panel     This is a patient instruction: Patient fasting for 8 hours or longer recommended.     Standing Status:   Future     Standing Expiration Date:   3/1/2025       OBJECTIVE:  Current Weight: Weight - Scale: 74.4 kg (164 lb)  Vitals:    10/15/24 1408   BP: 122/58   BP Location: Left arm   Patient Position: Sitting   Cuff Size: Standard   Pulse: 84   Weight: 74.4 kg (164 lb)   Height: 4' 11\" (1.499 m)    Body mass index is 33.12 kg/m².      REVIEW OF SYSTEMS:    Review of Systems   Constitutional:  Positive for fatigue. Negative for activity change, appetite change, chills and fever.   HENT:  Negative for ear pain and sore throat.    Eyes:  Negative for pain and visual disturbance.   Respiratory:  Negative for cough and shortness of breath.    Cardiovascular:  Positive for leg swelling. Negative for chest pain and palpitations.   Gastrointestinal:  Positive for abdominal distention. Negative for abdominal pain, constipation, diarrhea (Occasional diarrhea), nausea and vomiting.   Genitourinary:  Negative for dysuria and hematuria.   Musculoskeletal:  Positive for arthralgias and gait problem. Negative for back pain.   Skin:  Negative for color change and rash.   Allergic/Immunologic: Negative for immunocompromised state.   Neurological:  Negative for dizziness, seizures, syncope, weakness and headaches.   Hematological:  Does not bruise/bleed easily.   Psychiatric/Behavioral:  The patient is nervous/anxious.    All other systems reviewed and are negative.      PHYSICAL EXAM:      Physical Exam  Constitutional:       General: She is not in acute distress.     Appearance: She is well-developed. She is ill-appearing (Chronically ill-appearing). She is not toxic-appearing " or diaphoretic.   HENT:      Head: Normocephalic and atraumatic.      Nose: Nose normal. No congestion.      Mouth/Throat:      Mouth: Mucous membranes are moist.      Pharynx: No oropharyngeal exudate.   Eyes:      General: No scleral icterus.        Right eye: No discharge.         Left eye: No discharge.      Extraocular Movements: Extraocular movements intact.      Conjunctiva/sclera: Conjunctivae normal.      Pupils: Pupils are equal, round, and reactive to light.   Neck:      Thyroid: No thyromegaly.      Vascular: No JVD.      Trachea: No tracheal deviation.   Cardiovascular:      Rate and Rhythm: Normal rate. Rhythm irregular.      Heart sounds: No murmur heard.     No friction rub. No gallop.   Pulmonary:      Effort: Pulmonary effort is normal.      Breath sounds: Normal breath sounds.   Abdominal:      General: Bowel sounds are normal. There is no distension.      Palpations: Abdomen is soft. There is no mass.      Tenderness: There is no abdominal tenderness. There is no guarding or rebound.   Musculoskeletal:         General: No tenderness, deformity or signs of injury. Normal range of motion.      Cervical back: Normal range of motion and neck supple.      Right lower le+ Edema present.      Left lower le+ Edema present.   Skin:     General: Skin is warm and dry.      Coloration: Skin is not jaundiced or pale.      Findings: No erythema or rash.   Neurological:      Mental Status: She is alert and oriented to person, place, and time.   Psychiatric:         Mood and Affect: Mood normal.         Behavior: Behavior normal.         Thought Content: Thought content normal.         Judgment: Judgment normal.         Medications:    Current Outpatient Medications:     acetaminophen (TYLENOL) 500 mg tablet, Take 500 mg by mouth every 6 (six) hours as needed for mild pain For pain, Disp: , Rfl:     atorvastatin (LIPITOR) 40 mg tablet, Take 1 tablet (40 mg total) by mouth daily, Disp: 90 tablet, Rfl: 3     AYR SALINE NASAL DROPS NA, , Disp: , Rfl:     budesonide (Pulmicort) 0.5 mg/2 mL nebulizer solution, Take 2 mL (0.5 mg total) by nebulization 2 (two) times a day Rinse mouth after use., Disp: 120 mL, Rfl: 2    diltiazem (CARDIZEM CD) 120 mg 24 hr capsule, TAKE 1 CAPSULE EVERY DAY, Disp: 90 capsule, Rfl: 1    donepezil (ARICEPT) 5 mg tablet, Take 1 tablet (5 mg total) by mouth daily at bedtime, Disp: 90 tablet, Rfl: 3    glipiZIDE (GLUCOTROL) 10 mg tablet, TAKE 1/2 TABLET EVERY MORNING, Disp: 45 tablet, Rfl: 1    ipratropium-albuterol (DUO-NEB) 0.5-2.5 mg/3 mL nebulizer solution, Take 3 mL by nebulization every 6 (six) hours as needed for wheezing or shortness of breath, Disp: 360 mL, Rfl: 2    latanoprost (XALATAN) 0.005 % ophthalmic solution, , Disp: , Rfl:     loratadine (CLARITIN) 10 mg tablet, Take 1 tablet by mouth daily, Disp: , Rfl:     metFORMIN (GLUCOPHAGE) 500 mg tablet, TAKE 2 TABLETS TWICE DAILY, Disp: 360 tablet, Rfl: 1    metoprolol succinate (TOPROL-XL) 50 mg 24 hr tablet, TAKE 1 TABLET TWICE DAILY, Disp: 180 tablet, Rfl: 1    rivaroxaban (Xarelto) 15 mg tablet, Take 1 tablet (15 mg total) by mouth daily with breakfast, Disp: 30 tablet, Rfl: 5    torsemide (DEMADEX) 10 mg tablet, Take 20 mg daily, take an additional 20 mg on MWF in evening, Disp: 360 tablet, Rfl: 3    Accu-Chek FastClix Lancets MISC, USE TO CHECK BLOOD GLUCOSE Twice DAILY, Disp: 102 each, Rfl: 3    Accu-Chek SmartView test strip, Use 1 each daily Use as instructed, Disp: 100 each, Rfl: 5    diphenhydrAMINE (BENADRYL) 25 mg tablet, Take 1 hour prior to CT with contrast for contrast allergy. (Patient not taking: Reported on 8/5/2024), Disp: 1 tablet, Rfl: 0    Laboratory Results:  Results from last 7 days   Lab Units 10/11/24  0956   POTASSIUM mmol/L 4.4   CHLORIDE mmol/L 94*   CO2 mmol/L 30   BUN mg/dL 35*   CREATININE mg/dL 1.06   CALCIUM mg/dL 9.6   MAGNESIUM mg/dL 1.7*   PHOSPHORUS mg/dL 3.7       Results for orders placed or  performed in visit on 10/11/24   Basic metabolic panel    Collection Time: 10/11/24  9:56 AM   Result Value Ref Range    Sodium 131 (L) 135 - 147 mmol/L    Potassium 4.4 3.5 - 5.3 mmol/L    Chloride 94 (L) 96 - 108 mmol/L    CO2 30 21 - 32 mmol/L    ANION GAP 7 4 - 13 mmol/L    BUN 35 (H) 5 - 25 mg/dL    Creatinine 1.06 0.60 - 1.30 mg/dL    Glucose 278 (H) 65 - 140 mg/dL    Calcium 9.6 8.4 - 10.2 mg/dL    eGFR 48 ml/min/1.73sq m   Magnesium    Collection Time: 10/11/24  9:56 AM   Result Value Ref Range    Magnesium 1.7 (L) 1.9 - 2.7 mg/dL   Phosphorus    Collection Time: 10/11/24  9:56 AM   Result Value Ref Range    Phosphorus 3.7 2.3 - 4.1 mg/dL

## 2024-10-14 NOTE — TELEPHONE ENCOUNTER
----- Message from Arslan Perkins MD sent at 10/11/2024  4:31 PM EDT -----  Hello    Patient normally is followed up by Ms Yuliet Mars.     Please let the patient's daughter know that the creatinine is stable 1.1 mg/dL.  Sodium level is lower at 131 but her blood sugars are elevated to 278.  Please advise him to work with their primary care and endocrinologist for blood sugar control.  I believe this is why the sodium level is lower.  But given prior history of severe hyponatremia, please asked patient to complete a BMP on Monday again.    Thank you    np

## 2024-10-15 ENCOUNTER — OFFICE VISIT (OUTPATIENT)
Dept: NEPHROLOGY | Facility: CLINIC | Age: 84
End: 2024-10-15
Payer: MEDICARE

## 2024-10-15 VITALS
WEIGHT: 164 LBS | HEART RATE: 84 BPM | HEIGHT: 59 IN | BODY MASS INDEX: 33.06 KG/M2 | DIASTOLIC BLOOD PRESSURE: 58 MMHG | SYSTOLIC BLOOD PRESSURE: 122 MMHG

## 2024-10-15 DIAGNOSIS — E87.1 HYPONATREMIA: Primary | ICD-10-CM

## 2024-10-15 DIAGNOSIS — E11.9 DIABETES MELLITUS WITHOUT COMPLICATION (HCC): ICD-10-CM

## 2024-10-15 DIAGNOSIS — I10 ESSENTIAL HYPERTENSION: ICD-10-CM

## 2024-10-15 PROCEDURE — 99214 OFFICE O/P EST MOD 30 MIN: CPT | Performed by: NURSE PRACTITIONER

## 2024-10-15 NOTE — PATIENT INSTRUCTIONS
Thank you for your visit today.  You were seen today for continued monitoring of sodium level, fluid status and kidney function.  At this time kidney function is stable.  Sodium level is slightly lower than prior but again with the elevated blood sugar if we corrected it is up to 133, approximately.    Recommendations:    Weigh yourself daily in the morning  Increase torsemide to daily this week.  Limit fluid intake to 50 oz per day  Low salt diet  Magnesium supplement daily or every other day-- prefer slo mag  Follow up blood work by the end of the week to reassess sodium level  No other changes  Follow up in 3 months

## 2024-10-16 RX ORDER — GLIPIZIDE 10 MG/1
TABLET ORAL
Qty: 45 TABLET | Refills: 1 | Status: ON HOLD | OUTPATIENT
Start: 2024-10-16

## 2024-10-18 ENCOUNTER — NURSE TRIAGE (OUTPATIENT)
Age: 84
End: 2024-10-18

## 2024-10-18 ENCOUNTER — TELEPHONE (OUTPATIENT)
Age: 84
End: 2024-10-18

## 2024-10-18 NOTE — TELEPHONE ENCOUNTER
Guillermina, pt's visiting nurse calls to report weight gain as well.    10/15: 155 lbs, 10/16: 158 lbs, 10/17: 158 lbs, 10/18: 159 lbs.  Pt has bilateral edema.  Right leg has a weeping wound and right leg is starting to develop blisters.  She has exertional dyspnea.  She gave pt a breathing treatment which improved her shortness of breath. She states pt's lungs are clear.    /70, pulse 76, SpO2 96%    Advised her pt's daughter called today and a message was sent to Dr. Narda Rader to review.  She asks that we contact pt's daughter back with instructions.    If Guillermina needs to be contacted, her phone # is [phone].

## 2024-10-18 NOTE — TELEPHONE ENCOUNTER
Spoke with daughter and advised. I will call her on Monday for update.  Advised daughter if things get worse to call office over weekend and talk with on-call nurse.  Verbally understood.

## 2024-10-18 NOTE — TELEPHONE ENCOUNTER
Guillermina called in stating patient continues to gain weight and her legs are swollen again. Guillermina called the patients Cardiologist to make them aware and they will be helping with the weight gain. Gain would like to know if Dr. Seals is okay with her double wrapping Camryn's legs again. Please advise. Thank you.    Phone number is [phone]

## 2024-10-18 NOTE — TELEPHONE ENCOUNTER
Chief Complaint: weight gain     History of Present Illness (HPI): Pt's Daughter Naomie called stating that patient has been having increased weights. She states that pt went to see nephrology on Tuesday. Desi ALICEA from Nephrology advised to take the additional torsemide every day this week.Pt also has b/l leg swelling to the thigh and reports that right leg is worse than left. Pt also has SOB with exertion.    VS/Weight:   Home Scale   monday 3lb weight gain   10/15-Tuesday 157lbs  10/16-wed 155 lbs  10/17-thurs 158 lbs  10/18-friday 159 lbs    Nephrology documented a weight of 164lb however daughter confirmed that she had all her clothes on and had already eaten.    Please advise  Pain: No    Risk Factors: Heart Failure    Recent Testing: Other EKG 6/6/24    Medicine: Torsemide 20mg BID since Monday    Upcoming Office Visit: Yes    Last Office Visit: 8/7/24

## 2024-10-21 ENCOUNTER — LAB (OUTPATIENT)
Dept: LAB | Facility: HOSPITAL | Age: 84
End: 2024-10-21
Attending: INTERNAL MEDICINE
Payer: MEDICARE

## 2024-10-21 ENCOUNTER — TELEPHONE (OUTPATIENT)
Age: 84
End: 2024-10-21

## 2024-10-21 DIAGNOSIS — N18.31 STAGE 3A CHRONIC KIDNEY DISEASE (HCC): ICD-10-CM

## 2024-10-21 DIAGNOSIS — E87.1 HYPONATREMIA: ICD-10-CM

## 2024-10-21 LAB
ANION GAP SERPL CALCULATED.3IONS-SCNC: 6 MMOL/L (ref 4–13)
BUN SERPL-MCNC: 32 MG/DL (ref 5–25)
CALCIUM SERPL-MCNC: 9.4 MG/DL (ref 8.4–10.2)
CHLORIDE SERPL-SCNC: 93 MMOL/L (ref 96–108)
CO2 SERPL-SCNC: 34 MMOL/L (ref 21–32)
CREAT SERPL-MCNC: 1.02 MG/DL (ref 0.6–1.3)
GFR SERPL CREATININE-BSD FRML MDRD: 50 ML/MIN/1.73SQ M
GLUCOSE SERPL-MCNC: 144 MG/DL (ref 65–140)
POTASSIUM SERPL-SCNC: 4 MMOL/L (ref 3.5–5.3)
SODIUM SERPL-SCNC: 133 MMOL/L (ref 135–147)

## 2024-10-21 PROCEDURE — 36415 COLL VENOUS BLD VENIPUNCTURE: CPT

## 2024-10-21 PROCEDURE — 80048 BASIC METABOLIC PNL TOTAL CA: CPT

## 2024-10-21 NOTE — TELEPHONE ENCOUNTER
Returned call to patient's daughter Naomie to clarify Torsemide doses over the last few days. Naomie stated before Friday, patient took torsemide 20 mg in the AM daily & 20 mg in the PM, M-W-F.     On Saturday, 1019, Naomie stated she had patient take Torsemide 20 mg in the AM & PM, due to patient's reported weight loss of 9 lbs. Wt - 150 lb, overnight.    On 10/20, patient gained 7 lbs - Wt 157 lb overnight. Patient took Torsemide 40 mg in AM & 20 mg in PM as ordered on 10/20 & today.

## 2024-10-21 NOTE — TELEPHONE ENCOUNTER
Austen/ Ar    Stating pts family called and requesting smaller tanks for when pt goes out/ I asked POC/ he said they are just requesting smaller tanks.    He is requesting a script/ and O2 testing    [phone]

## 2024-10-21 NOTE — TELEPHONE ENCOUNTER
Returned call to patient/daughter Naomie, & left a message with c/b number & request for them to return call to office.

## 2024-10-21 NOTE — TELEPHONE ENCOUNTER
Her weight continues to trend up on increased dose of torsemide. Advise to increase torsemide to 40mg BID and will see what her BMP shows tomorrow. She may have to go to the hospital if she is not responding to increasing diuretic or if she has more symptoms or shortness of breath.

## 2024-10-21 NOTE — TELEPHONE ENCOUNTER
Naomie returned call to office. Read her Dr Rader orders & instructions.    Naomie verbalized understanding.

## 2024-10-21 NOTE — RESULT ENCOUNTER NOTE
Hello    I saw that you already addressing the diuretics and the lab work.  I saw your note that you are having the patient complete a repeat BMP tomorrow.  Please let me know if we can help from the renal standpoint in any way.  Agree from our standpoint that if the weight keeps rising, she will need inpatient for IV diuretics.  We had increased her diuretics recently as outpatient    Thank you

## 2024-10-21 NOTE — TELEPHONE ENCOUNTER
Marlene,visiting nurse with [facility], called office to update. Patient had gained weight & her right leg wound is weeping large amounts of fluid.    Home Scale  10/14 (M) 3 lb weight gain  10/15 (T) Wt - 155 lb  10/16 (W) Wt - 158 lb  10/17 (Th) Wt - 158 lb  10/18 (F) Wt - 159 lb. Dr Rader advise Torsemide increased to 40 mg in AM & 20 mg In PM  10/19 (S) Wt - 150 lb  10/20 (Núñez) Wt - 157 lb  10/21 (M) Wt - 158 lb 8 oz    Continual O2 @ 2 L & Nebulizer Tx as ordered, patient continues with SOB.    Patient continues with bilateral edema. Right leg is weeping large amount of fluid, even when wrapped  with extra dressing.    Called patient's daughter Naomie & advised that patient be examined & scheduled an OV for patient with Carolann on Wednesday, 10/23.    Naomie stated that patient will have mobile blood draw tomorrow @ 12:30 PM for BMP.

## 2024-10-22 NOTE — PROGRESS NOTES
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  RVIDd: 4.1 cm  SV MOD A4C: 84.04 ml  SV(Teich): 89.03 ml    CW  AV Env.Ti: 298.76 ms  AV MaxPG: 10.27 mmHg  AV VTI: 28.82 cm  AV Vmax: 1.6 m/s  AV Vmean: 0.96 m/s  AV meanP.41 mmHg  TR MaxP.9 mmHg  TR Vmax: 3.77 m/s    MM  TAPSE: 2.29 cm    PW  E' Sept: 0.05 m/s  E/E' Sept: 23.56  LVOT Env.Ti: 300.67 ms  LVOT VTI: 25.48 cm  LVOT Vmax: 1.23 m/s  LVOT Vmean: 0.85 m/s  LVOT maxP.12 mmHg  LVOT meanPG: 3.26 mmHg  MV A Matteo: 0.99 m/s  MV Dec Norman: 6.89 m/s2  MV DecT: 186.95 ms  MV E Matteo: 1.29 m/s  MV E/A Ratio: 1.3  MV PHT: 54.22 ms  MVA By PHT: 4.06 cm2    [employer] Accredited Echocardiography Laboratory    Prepared and electronically signed by    Lit Jane MD  Signed 15-Oct-2020 17:48:51    No results found for this or any previous visit.        This note was completed in part utilizing [employer] direct voice recognition software.   Grammatical errors, random word insertion, spelling mistakes, and incomplete sentences may be an occasional consequence of the system secondary to software limitations, ambient noise and hardware issues. At the time of dictation, efforts were made to edit, clarify and /or correct errors.  Please read the chart carefully and recognize, using context, where  substitutions have occurred.  If you have any questions or concerns about the context, text or information contained within the body of this dictation, please contact myself, the provider, for further clarification

## 2024-10-22 NOTE — TELEPHONE ENCOUNTER
Pt's daughter Naomie calls in and states that that pt was wondering if they can get and order for O2 portable concentrator. Naomie thinks pt does need it now. She is very SOB  with Little activity. Pt oxygen drops when walking. How do they go about getting pt O2 continuously

## 2024-10-22 NOTE — TELEPHONE ENCOUNTER
She needs to have a 6-minute walk test to qualify for oxygen supplementation.  Please bring her in for a visit

## 2024-10-23 ENCOUNTER — OFFICE VISIT (OUTPATIENT)
Dept: CARDIOLOGY CLINIC | Facility: CLINIC | Age: 84
End: 2024-10-23
Payer: MEDICARE

## 2024-10-23 ENCOUNTER — APPOINTMENT (EMERGENCY)
Dept: RADIOLOGY | Facility: HOSPITAL | Age: 84
DRG: 291 | End: 2024-10-23
Payer: MEDICARE

## 2024-10-23 ENCOUNTER — APPOINTMENT (INPATIENT)
Dept: NON INVASIVE DIAGNOSTICS | Facility: HOSPITAL | Age: 84
DRG: 291 | End: 2024-10-23
Payer: MEDICARE

## 2024-10-23 ENCOUNTER — HOSPITAL ENCOUNTER (INPATIENT)
Facility: HOSPITAL | Age: 84
LOS: 9 days | Discharge: NURSING, REHAB OR LONG-TERM CARE | DRG: 291 | End: 2024-11-01
Attending: EMERGENCY MEDICINE | Admitting: INTERNAL MEDICINE
Payer: MEDICARE

## 2024-10-23 VITALS
DIASTOLIC BLOOD PRESSURE: 72 MMHG | BODY MASS INDEX: 32.86 KG/M2 | SYSTOLIC BLOOD PRESSURE: 122 MMHG | OXYGEN SATURATION: 99 % | HEIGHT: 59 IN | HEART RATE: 60 BPM | WEIGHT: 163 LBS

## 2024-10-23 DIAGNOSIS — R41.3 IMPAIRED MEMORY: ICD-10-CM

## 2024-10-23 DIAGNOSIS — I10 ESSENTIAL HYPERTENSION: ICD-10-CM

## 2024-10-23 DIAGNOSIS — J43.9 PULMONARY EMPHYSEMA, UNSPECIFIED EMPHYSEMA TYPE (HCC): ICD-10-CM

## 2024-10-23 DIAGNOSIS — E78.5 HYPERLIPIDEMIA, UNSPECIFIED HYPERLIPIDEMIA TYPE: ICD-10-CM

## 2024-10-23 DIAGNOSIS — Z99.81 DEPENDENCE ON SUPPLEMENTAL OXYGEN: ICD-10-CM

## 2024-10-23 DIAGNOSIS — K59.00 CONSTIPATION: ICD-10-CM

## 2024-10-23 DIAGNOSIS — I50.9 ACUTE EXACERBATION OF CHF (CONGESTIVE HEART FAILURE) (HCC): Primary | ICD-10-CM

## 2024-10-23 DIAGNOSIS — I50.31 ACUTE HEART FAILURE WITH PRESERVED EJECTION FRACTION (HCC): Primary | ICD-10-CM

## 2024-10-23 DIAGNOSIS — E11.9 TYPE 2 DIABETES MELLITUS WITHOUT COMPLICATION, WITHOUT LONG-TERM CURRENT USE OF INSULIN (HCC): ICD-10-CM

## 2024-10-23 PROBLEM — I48.20 CHRONIC ATRIAL FIBRILLATION (HCC): Status: ACTIVE | Noted: 2024-10-23

## 2024-10-23 PROBLEM — E83.42 HYPOMAGNESEMIA: Status: ACTIVE | Noted: 2024-10-23

## 2024-10-23 LAB
2HR DELTA HS TROPONIN: -5 NG/L
ALBUMIN SERPL BCG-MCNC: 4 G/DL (ref 3.5–5)
ALP SERPL-CCNC: 90 U/L (ref 34–104)
ALT SERPL W P-5'-P-CCNC: 28 U/L (ref 7–52)
ANION GAP SERPL CALCULATED.3IONS-SCNC: 8 MMOL/L (ref 4–13)
AORTIC ROOT: 2.9 CM
APICAL FOUR CHAMBER EJECTION FRACTION: 75 %
ASCENDING AORTA: 3.5 CM
AST SERPL W P-5'-P-CCNC: 77 U/L (ref 13–39)
ATRIAL RATE: 178 BPM
AV REGURGITATION PRESSURE HALF TIME: 563 MS
BASOPHILS # BLD AUTO: 0.02 THOUSANDS/ΜL (ref 0–0.1)
BASOPHILS NFR BLD AUTO: 0 % (ref 0–1)
BILIRUB SERPL-MCNC: 0.98 MG/DL (ref 0.2–1)
BNP SERPL-MCNC: 1154 PG/ML (ref 0–100)
BSA FOR ECHO PROCEDURE: 1.69 M2
BUN SERPL-MCNC: 34 MG/DL (ref 5–25)
CALCIUM SERPL-MCNC: 9.7 MG/DL (ref 8.4–10.2)
CARDIAC TROPONIN I PNL SERPL HS: 29 NG/L
CARDIAC TROPONIN I PNL SERPL HS: 34 NG/L
CHLORIDE SERPL-SCNC: 90 MMOL/L (ref 96–108)
CO2 SERPL-SCNC: 34 MMOL/L (ref 21–32)
CREAT SERPL-MCNC: 1.04 MG/DL (ref 0.6–1.3)
DOP CALC LVOT AREA: 3.14 CM2
DOP CALC LVOT DIAMETER: 2 CM
E WAVE DECELERATION TIME: 139 MS
E/A RATIO: 2.35
EOSINOPHIL # BLD AUTO: 0.07 THOUSAND/ΜL (ref 0–0.61)
EOSINOPHIL NFR BLD AUTO: 1 % (ref 0–6)
ERYTHROCYTE [DISTWIDTH] IN BLOOD BY AUTOMATED COUNT: 16.3 % (ref 11.6–15.1)
EST. AVERAGE GLUCOSE BLD GHB EST-MCNC: 166 MG/DL
FLUAV AG UPPER RESP QL IA.RAPID: NEGATIVE
FLUBV AG UPPER RESP QL IA.RAPID: NEGATIVE
FRACTIONAL SHORTENING: 33 (ref 28–44)
GFR SERPL CREATININE-BSD FRML MDRD: 49 ML/MIN/1.73SQ M
GLUCOSE SERPL-MCNC: 109 MG/DL (ref 65–140)
GLUCOSE SERPL-MCNC: 136 MG/DL (ref 65–140)
GLUCOSE SERPL-MCNC: 189 MG/DL (ref 65–140)
HBA1C MFR BLD: 7.4 %
HCT VFR BLD AUTO: 41.8 % (ref 34.8–46.1)
HGB BLD-MCNC: 13.1 G/DL (ref 11.5–15.4)
IMM GRANULOCYTES # BLD AUTO: 0.03 THOUSAND/UL (ref 0–0.2)
IMM GRANULOCYTES NFR BLD AUTO: 0 % (ref 0–2)
INTERVENTRICULAR SEPTUM IN DIASTOLE (PARASTERNAL SHORT AXIS VIEW): 0.9 CM
INTERVENTRICULAR SEPTUM: 0.9 CM (ref 0.6–1.1)
LAAS-AP2: 30 CM2
LAAS-AP4: 29.6 CM2
LEFT ATRIUM SIZE: 6.1 CM
LEFT ATRIUM VOLUME (MOD BIPLANE): 99 ML
LEFT ATRIUM VOLUME INDEX (MOD BIPLANE): 58.6 ML/M2
LEFT INTERNAL DIMENSION IN SYSTOLE: 2.8 CM (ref 2.1–4)
LEFT VENTRICULAR INTERNAL DIMENSION IN DIASTOLE: 4.2 CM (ref 3.5–6)
LEFT VENTRICULAR POSTERIOR WALL IN END DIASTOLE: 1.4 CM
LEFT VENTRICULAR STROKE VOLUME: 49 ML
LVSV (TEICH): 49 ML
LYMPHOCYTES # BLD AUTO: 0.6 THOUSANDS/ΜL (ref 0.6–4.47)
LYMPHOCYTES NFR BLD AUTO: 7 % (ref 14–44)
MAGNESIUM SERPL-MCNC: 1.5 MG/DL (ref 1.9–2.7)
MCH RBC QN AUTO: 27.1 PG (ref 26.8–34.3)
MCHC RBC AUTO-ENTMCNC: 31.3 G/DL (ref 31.4–37.4)
MCV RBC AUTO: 87 FL (ref 82–98)
MONOCYTES # BLD AUTO: 0.49 THOUSAND/ΜL (ref 0.17–1.22)
MONOCYTES NFR BLD AUTO: 6 % (ref 4–12)
MV PEAK A VEL: 0.46 M/S
MV PEAK E VEL: 108 CM/S
MV STENOSIS PRESSURE HALF TIME: 40 MS
MV VALVE AREA P 1/2 METHOD: 5.5
NEUTROPHILS # BLD AUTO: 7.21 THOUSANDS/ΜL (ref 1.85–7.62)
NEUTS SEG NFR BLD AUTO: 86 % (ref 43–75)
NRBC BLD AUTO-RTO: 0 /100 WBCS
PLATELET # BLD AUTO: 246 THOUSANDS/UL (ref 149–390)
PMV BLD AUTO: 9.9 FL (ref 8.9–12.7)
POTASSIUM SERPL-SCNC: 4.2 MMOL/L (ref 3.5–5.3)
PROT SERPL-MCNC: 8.2 G/DL (ref 6.4–8.4)
QRS AXIS: 62 DEGREES
QRSD INTERVAL: 110 MS
QT INTERVAL: 390 MS
QTC INTERVAL: 455 MS
RA PRESSURE ESTIMATED: 15 MMHG
RBC # BLD AUTO: 4.83 MILLION/UL (ref 3.81–5.12)
RIGHT ATRIAL 2D VOLUME: 64 ML
RIGHT ATRIUM AREA SYSTOLE A4C: 23.5 CM2
RIGHT VENTRICLE ID DIMENSION: 4.5 CM
RV PSP: 98 MMHG
SARS-COV+SARS-COV-2 AG RESP QL IA.RAPID: NEGATIVE
SINOTUBULAR JUNCTION: 2.9 CM
SL CV AV DECELERATION TIME RETROGRADE: 1943 MS
SL CV AV PEAK GRADIENT RETROGRADE: 93 MMHG
SL CV LEFT ATRIUM LENGTH A2C: 7.3 CM
SL CV LV EF: 65
SL CV PED ECHO LEFT VENTRICLE DIASTOLIC VOLUME (MOD BIPLANE) 2D: 79 ML
SL CV PED ECHO LEFT VENTRICLE SYSTOLIC VOLUME (MOD BIPLANE) 2D: 30 ML
SL CV SINUS OF VALSALVA 2D: 2.2 CM
SODIUM SERPL-SCNC: 132 MMOL/L (ref 135–147)
STJ: 2.9 CM
T WAVE AXIS: 238 DEGREES
TR MAX PG: 83 MMHG
TR PEAK VELOCITY: 4.6 M/S
TRICUSPID ANNULAR PLANE SYSTOLIC EXCURSION: 1.2 CM
TRICUSPID VALVE PEAK REGURGITATION VELOCITY: 4.56 M/S
VENTRICULAR RATE: 82 BPM
WBC # BLD AUTO: 8.42 THOUSAND/UL (ref 4.31–10.16)

## 2024-10-23 PROCEDURE — 85025 COMPLETE CBC W/AUTO DIFF WBC: CPT

## 2024-10-23 PROCEDURE — 93005 ELECTROCARDIOGRAM TRACING: CPT

## 2024-10-23 PROCEDURE — 99285 EMERGENCY DEPT VISIT HI MDM: CPT | Performed by: EMERGENCY MEDICINE

## 2024-10-23 PROCEDURE — 99223 1ST HOSP IP/OBS HIGH 75: CPT | Performed by: HOSPITALIST

## 2024-10-23 PROCEDURE — 94760 N-INVAS EAR/PLS OXIMETRY 1: CPT

## 2024-10-23 PROCEDURE — 99215 OFFICE O/P EST HI 40 MIN: CPT | Performed by: NURSE PRACTITIONER

## 2024-10-23 PROCEDURE — 80053 COMPREHEN METABOLIC PANEL: CPT

## 2024-10-23 PROCEDURE — 36415 COLL VENOUS BLD VENIPUNCTURE: CPT

## 2024-10-23 PROCEDURE — 87804 INFLUENZA ASSAY W/OPTIC: CPT | Performed by: EMERGENCY MEDICINE

## 2024-10-23 PROCEDURE — 82948 REAGENT STRIP/BLOOD GLUCOSE: CPT

## 2024-10-23 PROCEDURE — 71045 X-RAY EXAM CHEST 1 VIEW: CPT

## 2024-10-23 PROCEDURE — 83880 ASSAY OF NATRIURETIC PEPTIDE: CPT | Performed by: EMERGENCY MEDICINE

## 2024-10-23 PROCEDURE — 83735 ASSAY OF MAGNESIUM: CPT | Performed by: EMERGENCY MEDICINE

## 2024-10-23 PROCEDURE — 87811 SARS-COV-2 COVID19 W/OPTIC: CPT | Performed by: EMERGENCY MEDICINE

## 2024-10-23 PROCEDURE — 93010 ELECTROCARDIOGRAM REPORT: CPT | Performed by: INTERNAL MEDICINE

## 2024-10-23 PROCEDURE — 93306 TTE W/DOPPLER COMPLETE: CPT | Performed by: INTERNAL MEDICINE

## 2024-10-23 PROCEDURE — 84484 ASSAY OF TROPONIN QUANT: CPT

## 2024-10-23 PROCEDURE — 83036 HEMOGLOBIN GLYCOSYLATED A1C: CPT | Performed by: HOSPITALIST

## 2024-10-23 PROCEDURE — 94664 DEMO&/EVAL PT USE INHALER: CPT

## 2024-10-23 PROCEDURE — 99285 EMERGENCY DEPT VISIT HI MDM: CPT

## 2024-10-23 PROCEDURE — 99222 1ST HOSP IP/OBS MODERATE 55: CPT | Performed by: INTERNAL MEDICINE

## 2024-10-23 PROCEDURE — 93306 TTE W/DOPPLER COMPLETE: CPT

## 2024-10-23 RX ORDER — BUMETANIDE 0.25 MG/ML
2 INJECTION, SOLUTION INTRAMUSCULAR; INTRAVENOUS 2 TIMES DAILY
Status: DISCONTINUED | OUTPATIENT
Start: 2024-10-23 | End: 2024-10-28

## 2024-10-23 RX ORDER — FUROSEMIDE 10 MG/ML
20 INJECTION INTRAMUSCULAR; INTRAVENOUS ONCE
Status: COMPLETED | OUTPATIENT
Start: 2024-10-23 | End: 2024-10-23

## 2024-10-23 RX ORDER — LANOLIN ALCOHOL/MO/W.PET/CERES
400 CREAM (GRAM) TOPICAL DAILY
Status: DISCONTINUED | OUTPATIENT
Start: 2024-10-24 | End: 2024-11-01 | Stop reason: HOSPADM

## 2024-10-23 RX ORDER — FUROSEMIDE 10 MG/ML
100 INJECTION INTRAMUSCULAR; INTRAVENOUS 2 TIMES DAILY
Status: DISCONTINUED | OUTPATIENT
Start: 2024-10-23 | End: 2024-10-23

## 2024-10-23 RX ORDER — DILTIAZEM HYDROCHLORIDE 120 MG/1
120 CAPSULE, COATED, EXTENDED RELEASE ORAL DAILY
Status: DISCONTINUED | OUTPATIENT
Start: 2024-10-24 | End: 2024-11-01 | Stop reason: HOSPADM

## 2024-10-23 RX ORDER — MAGNESIUM L-LACTATE 84 MG
84 TABLET, EXTENDED RELEASE ORAL DAILY
Status: ON HOLD | COMMUNITY

## 2024-10-23 RX ORDER — LATANOPROST 50 UG/ML
1 SOLUTION/ DROPS OPHTHALMIC
Status: DISCONTINUED | OUTPATIENT
Start: 2024-10-23 | End: 2024-11-01 | Stop reason: HOSPADM

## 2024-10-23 RX ORDER — ACETAMINOPHEN 325 MG/1
650 TABLET ORAL EVERY 6 HOURS PRN
Status: DISCONTINUED | OUTPATIENT
Start: 2024-10-23 | End: 2024-11-01 | Stop reason: HOSPADM

## 2024-10-23 RX ORDER — METOPROLOL SUCCINATE 50 MG/1
50 TABLET, EXTENDED RELEASE ORAL 2 TIMES DAILY
Status: DISCONTINUED | OUTPATIENT
Start: 2024-10-23 | End: 2024-11-01 | Stop reason: HOSPADM

## 2024-10-23 RX ORDER — BUDESONIDE 0.5 MG/2ML
0.5 INHALANT ORAL 2 TIMES DAILY
Status: DISCONTINUED | OUTPATIENT
Start: 2024-10-23 | End: 2024-10-24

## 2024-10-23 RX ORDER — IPRATROPIUM BROMIDE AND ALBUTEROL SULFATE 2.5; .5 MG/3ML; MG/3ML
3 SOLUTION RESPIRATORY (INHALATION) EVERY 6 HOURS PRN
Status: DISCONTINUED | OUTPATIENT
Start: 2024-10-23 | End: 2024-11-01 | Stop reason: HOSPADM

## 2024-10-23 RX ORDER — DONEPEZIL HYDROCHLORIDE 5 MG/1
5 TABLET, FILM COATED ORAL
Status: DISCONTINUED | OUTPATIENT
Start: 2024-10-23 | End: 2024-11-01 | Stop reason: HOSPADM

## 2024-10-23 RX ORDER — INSULIN LISPRO 100 [IU]/ML
1-5 INJECTION, SOLUTION INTRAVENOUS; SUBCUTANEOUS
Status: DISCONTINUED | OUTPATIENT
Start: 2024-10-23 | End: 2024-11-01 | Stop reason: HOSPADM

## 2024-10-23 RX ORDER — ATORVASTATIN CALCIUM 40 MG/1
40 TABLET, FILM COATED ORAL DAILY
Status: DISCONTINUED | OUTPATIENT
Start: 2024-10-24 | End: 2024-11-01 | Stop reason: HOSPADM

## 2024-10-23 RX ORDER — FUROSEMIDE 10 MG/ML
80 INJECTION INTRAMUSCULAR; INTRAVENOUS ONCE
Status: COMPLETED | OUTPATIENT
Start: 2024-10-23 | End: 2024-10-23

## 2024-10-23 RX ORDER — LORATADINE 10 MG/1
10 TABLET ORAL DAILY
Status: DISCONTINUED | OUTPATIENT
Start: 2024-10-24 | End: 2024-11-01 | Stop reason: HOSPADM

## 2024-10-23 RX ORDER — MAGNESIUM SULFATE HEPTAHYDRATE 40 MG/ML
2 INJECTION, SOLUTION INTRAVENOUS ONCE
Status: COMPLETED | OUTPATIENT
Start: 2024-10-23 | End: 2024-10-23

## 2024-10-23 RX ADMIN — FUROSEMIDE 80 MG: 10 INJECTION, SOLUTION INTRAMUSCULAR; INTRAVENOUS at 12:18

## 2024-10-23 RX ADMIN — DONEPEZIL HYDROCHLORIDE 5 MG: 5 TABLET ORAL at 23:52

## 2024-10-23 RX ADMIN — MAGNESIUM SULFATE HEPTAHYDRATE 2 G: 40 INJECTION, SOLUTION INTRAVENOUS at 13:11

## 2024-10-23 RX ADMIN — LATANOPROST 1 DROP: 50 SOLUTION OPHTHALMIC at 23:56

## 2024-10-23 RX ADMIN — BUDESONIDE 0.5 MG: 0.5 INHALANT ORAL at 19:48

## 2024-10-23 RX ADMIN — FUROSEMIDE 20 MG: 10 INJECTION, SOLUTION INTRAMUSCULAR; INTRAVENOUS at 13:09

## 2024-10-23 RX ADMIN — METOPROLOL SUCCINATE 50 MG: 50 TABLET, EXTENDED RELEASE ORAL at 23:52

## 2024-10-23 RX ADMIN — BUMETANIDE 2 MG: 0.25 INJECTION INTRAMUSCULAR; INTRAVENOUS at 18:26

## 2024-10-23 NOTE — PATIENT INSTRUCTIONS
"Patient Education     DASH diet   The Basics   Written by the doctors and editors at [facility]   What is the DASH diet? -- DASH stands for \"dietary approaches to stop hypertension.\" It is an eating plan that can help lower blood pressure. It can also help prevent high blood pressure, which doctors call \"hypertension.\" You don't need special foods or recipes to follow the DASH diet. It is more about eating certain types of foods in certain amounts.  The DASH diet has lots of fruits and vegetables, whole grains, lean meats, healthy fats, and low-fat or fat-free dairy products (figure 1). It is low in saturated fats, trans fats, cholesterol, added sugars, and sodium (salt).  The standard DASH diet limits sodium to no more than 2300 mg a day. Your doctor or nurse can talk to you about what your specific goals should be.  Why do I need the DASH diet? -- The DASH diet can help you:   Lower your blood pressure and cholesterol   Lower your risk for cancer, heart disease, heart attack, and stroke. It might also lower your risk for heart failure, kidney stones, and diabetes.   Lose weight or keep a healthy weight  What can I eat and drink on the DASH diet? -- Below are some guidelines and examples for your daily and weekly nutrition goals. These are based on a 2000-calorie-per-day eating plan.  Daily goals:   Grains - Try to eat 6 to 8 servings of whole-grain, high-fiber foods each day. Examples of a serving include 1 slice of bread, 1 ounce (30 grams) of dry cereal, or 1/2 cup (120 grams) of cooked cereal, pasta, or brown rice.   Fruits - Try to eat 4 to 5 servings of fruit each day. Examples of a serving include 1 medium fruit or 1/2 cup (75 grams) of fresh, frozen, or canned fruit. Try to eat different kinds and colors. Frozen or canned fruit should not have added sugar. Look for frozen or canned fruits with 100 percent fruit juice or water.   Vegetables - Try to eat 4 to 5 servings of vegetables each day. Examples of a " "serving include 1 cup (40 grams) of leafy greens or 1/2 cup (75 grams) of fresh or cooked vegetables. Try to pick many kinds and colors. If you buy canned vegetables, look for \"low sodium\" or \"salt free.\" Buy plain, frozen vegetables to avoid added fat and sodium.   Dairy - Try to eat 2 to 3 servings of fat-free or low-fat milk products each day. Examples of a serving include 1 cup (240 mL) of milk or yogurt or 1.5 ounces (45 grams) of cheese.   Lean meats, poultry, and seafood - Try to eat 6 or fewer servings of lean meat, poultry, and seafood each day. Examples of a serving include 1 egg or 1 ounce (30 grams) of cooked meat, poultry, or fish. Try to choose more low-fat or lean meats like chicken, fish, or turkey. Eat less red meat.   Fats and oils - Try to eat 2 to 3 servings of fats and oils each day. Examples of a serving include 1 teaspoon (5 mL) of soft margarine or vegetable oil, or 1 tablespoon (18 grams) of mayonnaise. Eat healthy fats like those found in fish, nuts, and avocados. Try using olive oil or vegetable oils such as canola oil. You can also try corn, safflower, sunflower, or soybean oils. Use low-sodium and low-fat salad dressing and mayonnaise.  Weekly goals:   Nuts, seeds, and legumes (dry beans and peas) - Try to eat 4 to 5 servings each week. Examples of a serving include 1/3 cup (45 grams) of nuts, 2 tablespoons (50 grams) of nut butter or seeds, or 1/2 cup (75 grams) of cooked legumes. Try almonds and walnuts, sunflower seeds, peanut or other nut butters, soybeans, lentils, kidney beans, and split peas.   Sweets - Try to eat fewer than 5 servings each week. Examples of a serving include 1 tablespoon (14 grams) of sugar or jelly, or 1/2 cup (120 grams) of gelatin. Choose low-fat and trans fat-free desserts. These include fruit-flavored gelatin, sorbet, jellybeans, amy crackers, animal crackers, low-fat fig bars, and lesvia snaps. Eat fruit to satisfy the desire for sweets.  To add flavor, " use pepper, herbs, spices, vinegar, or lemon or lime juices. Choose low-sodium or salt-free products whenever you can. This is especially important for foods like broths, soups, or soy sauce.  What foods and drinks should I avoid on the DASH diet?    Grains to avoid - Salted breads, rolls, crackers, quick breads, self-rising flours, biscuit mixes, regular breadcrumbs, instant hot cereals, commercially prepared rice, pasta, stuffing mixes.   Fruits and vegetables to avoid - Store-bought prepared potatoes and vegetable mixes, regular canned vegetables and juices, vegetables frozen with sauce, pickled vegetables, processed fruits with salt or sodium.   Dairy products to avoid - Whole milk, malted milk, chocolate milk, buttermilk, full-fat cheese, ice cream.   Meats to avoid - Smoked, cured, salted, or canned fish such as sardines or anchovies. High-fat cuts of meat like beef, lamb, pork, sandoval and sausage, and chicken with the skin on it.   Fats and oils to avoid - Eat fewer solid fats like butter, lard, and hard stick margarine. Eat less saturated fat, trans fat, and total fat.   Condiments and snacks to avoid - Salted and canned peas, beans, and olives. Salted snack foods, fried foods, soda, other sweetened drinks.   Sweets to avoid - High-fat baked goods such as muffins, donuts, pastries, and commercial baked goods. Candy bars.   Alcohol - If you choose to drink alcohol, limit the amount. Most doctors recommend limiting alcohol to no more than 1 drink a day (for females) or 2 drinks a day (for males).  What else do I need to know?    Get regular physical activity to make this diet help you even more. Even gentle forms of activity, like walking, are good for your health.   Try baking or broiling instead of frying foods.   Write down the foods that you eat. This will help you track what you have eaten each week.   When you go to the grocery store, have a list or a meal plan. Don't shop when you are hungry, since this  might lead you to buy more unhealthy foods.   Read food labels with care (figure 2). They show you how much is in a serving. The amount is given as a percentage of the total amount that you need each day. Reading labels helps you make healthy food choices.  All topics are updated as new evidence becomes available and our peer review process is complete.  This topic retrieved from [employer] on: Feb 26, 2024.  Topic 785113 Version 1.0  Release: 32.2.4 - C32.56  © 2024 UpToDate, Inc. and/or its affiliates. All rights reserved.  figure 1: DASH diet     Graphic 737488 Version 1.0  figure 2: Food label     Graphic 526225 Version 1.0  Consumer Information Use and Disclaimer   Disclaimer: This generalized information is a limited summary of diagnosis, treatment, and/or medication information. It is not meant to be comprehensive and should be used as a tool to help the user understand and/or assess potential diagnostic and treatment options. It does NOT include all information about conditions, treatments, medications, side effects, or risks that may apply to a specific patient. It is not intended to be medical advice or a substitute for the medical advice, diagnosis, or treatment of a health care provider based on the health care provider's examination and assessment of a patient's specific and unique circumstances. Patients must speak with a health care provider for complete information about their health, medical questions, and treatment options, including any risks or benefits regarding use of medications. This information does not endorse any treatments or medications as safe, effective, or approved for treating a specific patient. UpToDate, Inc. and its affiliates disclaim any warranty or liability relating to this information or the use thereof.The use of this information is governed by the Terms of Use, available at https://www.[employer] Mediauwer.com/en/know/clinical-effectiveness-terms. 2024© UpToDate, Inc. and its  affiliates and/or licensors. All rights reserved.  Copyright   © 2024 [employer] and/or its affiliates. All rights reserved.
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
## 2024-10-23 NOTE — ASSESSMENT & PLAN NOTE
Wt Readings from Last 3 Encounters:   10/23/24 73.9 kg (163 lb)   10/15/24 74.4 kg (164 lb)   08/23/24 71.2 kg (157 lb)     -Patient presents with 10 pound weight gain above dry weight despite increase in torsemide over the last 2 weeks  -CXR: Moderate pulmonary vascular congestion. Small right greater than left bilateral pleural effusions.   -BNP 1154  -Update echocardiogram as it has been nearly a year  -Lasix 80mg IV x 1 given in the ER, will give an additional 20mg IV Lasix followed by 100mg IV Q12H  -cont BB  -trend trop (first hsTrop NEG)  -check ECG  -c/s cards  -daily wts, I/Os, FR

## 2024-10-23 NOTE — LETTER
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 4.8 cm  LAAs A2C: 38.98 cm2  LAAs A4C: 33.86 cm2  LAESV A-L A2C: 166.64 ml  LAESV A-L A4C: 132.15 ml  LAESV Index (A-L): 83.14 ml/m2  LAESV MOD A2C: 158.66 ml  LAESV MOD A4C: 124.34 ml  LAESV(A-L): 152.15 ml  LAESV(MOD BP): 143.9 ml  LALs A2C: 7.74 cm  LALs A4C: 7.36 cm  LVEDV MOD A4C: 99.9 ml  LVEF MOD A4C: 84.12 %  LVESV MOD A4C: 15.86 ml  LVIDd: 5.16 cm  LVIDs: 3.11 cm  LVLd A4C: 7.53 cm  LVLs A4C: 5.77 cm  LVPWd: 0.91 cm  RVIDd: 4.1 cm  SV MOD A4C: 84.04 ml  SV(Teich): 89.03 ml    CW  AV Env.Ti: 298.76 ms  AV MaxPG: 10.27 mmHg  AV VTI: 28.82 cm  AV Vmax: 1.6 m/s  AV Vmean: 0.96 m/s  AV meanP.41 mmHg  TR MaxP.9 mmHg  TR Vmax: 3.77 m/s    MM  TAPSE: 2.29 cm    PW  E' Sept: 0.05 m/s  E/E' Sept: 23.56  LVOT Env.Ti: 300.67 ms  LVOT VTI: 25.48 cm  LVOT Vmax: 1.23 m/s  LVOT Vmean: 0.85 m/s  LVOT maxP.12 mmHg  LVOT meanPG: 3.26 mmHg  MV A Matteo: 0.99 m/s  MV Dec Gates: 6.89 m/s2  MV DecT: 186.95 ms  MV E Matteo: 1.29 m/s  MV E/A Ratio: 1.3  MV PHT: 54.22 ms  MVA By PHT: 4.06 cm2    [facility] Accredited Echocardiography Laboratory    Prepared and  electronically signed by    Lit Jane MD  Signed 15-Oct-2020 17:48:51    No results found for this or any previous visit.        This note was completed in part utilizing [email or web address] direct voice recognition software.   Grammatical errors, random word insertion, spelling mistakes, and incomplete sentences may be an occasional consequence of the system secondary to software limitations, ambient noise and hardware issues. At the time of dictation, efforts were made to edit, clarify and /or correct errors.  Please read the chart carefully and recognize, using context, where substitutions have occurred.  If you have any questions or concerns about the context, text or information contained within the body of this dictation, please contact myself, the provider, for further clarification
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
## 2024-10-23 NOTE — ASSESSMENT & PLAN NOTE
"Lab Results   Component Value Date    HGBA1C 7.1 (A) 08/23/2024       No results for input(s): \"POCGLU\" in the last 72 hours.    Blood Sugar Average: Last 72 hrs:    -hold home glipizide/Metformin for now  -SSI/accuchecks/diabetic diet    "

## 2024-10-24 ENCOUNTER — TELEPHONE (OUTPATIENT)
Dept: OTHER | Facility: HOSPITAL | Age: 84
End: 2024-10-24

## 2024-10-24 DIAGNOSIS — E87.1 HYPONATREMIA: Primary | ICD-10-CM

## 2024-10-24 LAB
ANION GAP SERPL CALCULATED.3IONS-SCNC: 7 MMOL/L (ref 4–13)
BUN SERPL-MCNC: 30 MG/DL (ref 5–25)
CALCIUM SERPL-MCNC: 8.9 MG/DL (ref 8.4–10.2)
CHLORIDE SERPL-SCNC: 94 MMOL/L (ref 96–108)
CO2 SERPL-SCNC: 35 MMOL/L (ref 21–32)
CREAT SERPL-MCNC: 0.86 MG/DL (ref 0.6–1.3)
ERYTHROCYTE [DISTWIDTH] IN BLOOD BY AUTOMATED COUNT: 16.3 % (ref 11.6–15.1)
GFR SERPL CREATININE-BSD FRML MDRD: 62 ML/MIN/1.73SQ M
GLUCOSE SERPL-MCNC: 134 MG/DL (ref 65–140)
GLUCOSE SERPL-MCNC: 146 MG/DL (ref 65–140)
GLUCOSE SERPL-MCNC: 185 MG/DL (ref 65–140)
GLUCOSE SERPL-MCNC: 199 MG/DL (ref 65–140)
GLUCOSE SERPL-MCNC: 216 MG/DL (ref 65–140)
HCT VFR BLD AUTO: 37.3 % (ref 34.8–46.1)
HGB BLD-MCNC: 11.8 G/DL (ref 11.5–15.4)
MCH RBC QN AUTO: 27.1 PG (ref 26.8–34.3)
MCHC RBC AUTO-ENTMCNC: 31.6 G/DL (ref 31.4–37.4)
MCV RBC AUTO: 86 FL (ref 82–98)
PLATELET # BLD AUTO: 230 THOUSANDS/UL (ref 149–390)
PMV BLD AUTO: 10.3 FL (ref 8.9–12.7)
POTASSIUM SERPL-SCNC: 3.1 MMOL/L (ref 3.5–5.3)
RBC # BLD AUTO: 4.36 MILLION/UL (ref 3.81–5.12)
SODIUM SERPL-SCNC: 136 MMOL/L (ref 135–147)
WBC # BLD AUTO: 8.04 THOUSAND/UL (ref 4.31–10.16)

## 2024-10-24 PROCEDURE — 99232 SBSQ HOSP IP/OBS MODERATE 35: CPT

## 2024-10-24 PROCEDURE — 82948 REAGENT STRIP/BLOOD GLUCOSE: CPT

## 2024-10-24 PROCEDURE — 80048 BASIC METABOLIC PNL TOTAL CA: CPT | Performed by: HOSPITALIST

## 2024-10-24 PROCEDURE — 94640 AIRWAY INHALATION TREATMENT: CPT

## 2024-10-24 PROCEDURE — 97535 SELF CARE MNGMENT TRAINING: CPT

## 2024-10-24 PROCEDURE — 85027 COMPLETE CBC AUTOMATED: CPT | Performed by: HOSPITALIST

## 2024-10-24 PROCEDURE — 99232 SBSQ HOSP IP/OBS MODERATE 35: CPT | Performed by: INTERNAL MEDICINE

## 2024-10-24 PROCEDURE — 94760 N-INVAS EAR/PLS OXIMETRY 1: CPT

## 2024-10-24 PROCEDURE — 97167 OT EVAL HIGH COMPLEX 60 MIN: CPT

## 2024-10-24 RX ORDER — BUDESONIDE 0.5 MG/2ML
0.5 INHALANT ORAL
Status: DISCONTINUED | OUTPATIENT
Start: 2024-10-25 | End: 2024-11-01 | Stop reason: HOSPADM

## 2024-10-24 RX ORDER — POTASSIUM CHLORIDE 1500 MG/1
20 TABLET, EXTENDED RELEASE ORAL 2 TIMES DAILY
Status: DISCONTINUED | OUTPATIENT
Start: 2024-10-24 | End: 2024-11-01 | Stop reason: HOSPADM

## 2024-10-24 RX ORDER — POTASSIUM CHLORIDE 14.9 MG/ML
20 INJECTION INTRAVENOUS
Status: COMPLETED | OUTPATIENT
Start: 2024-10-24 | End: 2024-10-24

## 2024-10-24 RX ADMIN — DONEPEZIL HYDROCHLORIDE 5 MG: 5 TABLET ORAL at 21:56

## 2024-10-24 RX ADMIN — POTASSIUM CHLORIDE 20 MEQ: 200 INJECTION, SOLUTION INTRAVENOUS at 09:00

## 2024-10-24 RX ADMIN — BUMETANIDE 2 MG: 0.25 INJECTION INTRAMUSCULAR; INTRAVENOUS at 08:45

## 2024-10-24 RX ADMIN — INSULIN LISPRO 1 UNITS: 100 INJECTION, SOLUTION INTRAVENOUS; SUBCUTANEOUS at 12:43

## 2024-10-24 RX ADMIN — INSULIN LISPRO 2 UNITS: 100 INJECTION, SOLUTION INTRAVENOUS; SUBCUTANEOUS at 17:38

## 2024-10-24 RX ADMIN — Medication 400 MG: at 08:45

## 2024-10-24 RX ADMIN — BUMETANIDE 2 MG: 0.25 INJECTION INTRAMUSCULAR; INTRAVENOUS at 17:31

## 2024-10-24 RX ADMIN — POTASSIUM CHLORIDE 20 MEQ: 1500 TABLET, EXTENDED RELEASE ORAL at 17:31

## 2024-10-24 RX ADMIN — METOPROLOL SUCCINATE 50 MG: 50 TABLET, EXTENDED RELEASE ORAL at 17:31

## 2024-10-24 RX ADMIN — ATORVASTATIN CALCIUM 40 MG: 40 TABLET, FILM COATED ORAL at 08:45

## 2024-10-24 RX ADMIN — BUDESONIDE 0.5 MG: 0.5 INHALANT ORAL at 09:39

## 2024-10-24 RX ADMIN — METOPROLOL SUCCINATE 50 MG: 50 TABLET, EXTENDED RELEASE ORAL at 08:45

## 2024-10-24 RX ADMIN — DILTIAZEM HYDROCHLORIDE 120 MG: 120 CAPSULE, COATED, EXTENDED RELEASE ORAL at 08:45

## 2024-10-24 RX ADMIN — RIVAROXABAN 15 MG: 15 TABLET, FILM COATED ORAL at 08:44

## 2024-10-24 RX ADMIN — BUDESONIDE 0.5 MG: 0.5 INHALANT ORAL at 20:24

## 2024-10-24 RX ADMIN — POTASSIUM CHLORIDE 20 MEQ: 200 INJECTION, SOLUTION INTRAVENOUS at 10:26

## 2024-10-24 RX ADMIN — LORATADINE 10 MG: 10 TABLET ORAL at 08:45

## 2024-10-24 RX ADMIN — LATANOPROST 1 DROP: 50 SOLUTION OPHTHALMIC at 21:56

## 2024-10-24 NOTE — CASE MANAGEMENT
Case Management Assessment & Discharge Planning Note    Patient name Camryn Roblero  Location S /S -01 MRN [id number]  : 1940 Date 10/24/2024       Current Admission Date: 10/23/2024  Current Admission Diagnosis:Acute heart failure with preserved ejection fraction (HCC)   Patient Active Problem List    Diagnosis Date Noted Date Diagnosed    Dependence on supplemental oxygen 10/23/2024     Chronic atrial fibrillation (HCC) 10/23/2024     Hypomagnesemia 10/23/2024     Pancreas cyst 2024     History of colon polyps 2024     Herpes zoster without complication 2024     Pulmonary emphysema, unspecified emphysema type (HCC) 2024     Leukocytosis 2024     Mild protein-calorie malnutrition (HCC) 2024     Hyperkalemia 2024     Abnormal CT scan 2024     Impaired memory 2024     Allergic drug rash 2024     Elevated troponin 2023     Tachycardia 2023     Atrial flutter (HCC) 2023     Hyperlipidemia 10/21/2023     Seasonal allergic rhinitis 2023     Osteopenia 2022     Chronic pain of both knees 2021     Cataract of both eyes 2021     Class 1 obesity due to excess calories with serious comorbidity and body mass index (BMI) of 33.0 to 33.9 in adult 2021     Multiple pulmonary nodules 10/05/2020     Colon polyps 10/05/2020     TOM (obstructive sleep apnea) 2020     Acute heart failure with preserved ejection fraction (HCC) 2020     Persistent proteinuria 10/25/2019     Hyponatremia 2016     Essential hypertension 2016     Type 2 diabetes mellitus, without long-term current use of insulin ([facility]) 2016     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     COPD (chronic obstructive pulmonary disease) ([facility]) 2016       LOS (days): 1  Geometric Mean LOS (GMLOS) (days): 3.9  Days to GMLOS:2.9     OBJECTIVE:    Risk of Unplanned Readmission Score: 16.58          Current admission status: Inpatient       Preferred Pharmacy:   [facility] - [place], [place] - [address]  [address] [address]  Phone: [phone] Fax: [phone]    [facility]  [place] - [address]  [address] [address]  Phone: [phone] Fax: [phone]    Primary Care Provider: Abiel Seals MD    Primary Insurance: MEDICARE  Secondary Insurance: AARP    ASSESSMENT:  Active Health Care Proxies    There are no active Health Care Proxies on file.                 Readmission Root Cause  30 Day Readmission: No    Patient Information  Admitted from:: Home  Mental Status: Alert  During Assessment patient was accompanied by: Not accompanied during assessment  Assessment information provided by:: Patient  Primary Caregiver: Self  Support Systems: Daughter, Friends/neighbors, Self  County of Residence: [place]  What city do you live in?: [place]  Home entry access options. Select all that apply.: Stairs  Number of steps to enter home.: 2 (2 to enter, 12 to get down to ground level)  Living Arrangements: Lives Alone    Activities of Daily Living Prior to Admission  Functional Status: Independent  Completes ADLs independently?: No  Level of ADL dependence: Assistance  Ambulates independently?: Yes  Does patient use assisted devices?: Yes  Assisted Devices (DME) used: Walker  Does patient currently own DME?: Yes  What DME does the patient currently own?: Straight Cane, Walker, Wheelchair  Does patient have a history of Outpatient Therapy (PT/OT)?: No  Does the patient have a history of Short-Term Rehab?: No  Does patient have a history of HHC?: Yes  Does patient currently have HHC?: Yes ([facility])    Current Home Health Care  Type of Current Home Care Services: Nurse visit  Home Health Agency Name:: [facility]  Current Home Health Follow-Up Provider:: PCP    Patient Information Continued  Income Source:  Pension/care home  Does patient receive dialysis treatments?: No  Does patient have a history of substance abuse?: No  Does patient have a history of Mental Health Diagnosis?: No      Means of Transportation  Means of Transport to Appts:: Family transport    DISCHARGE DETAILS:    Discharge planning discussed with:: Patient at bedside  Freedom of Choice: Yes         Contacts  Reason/Outcome: Continuity of Care, Discharge Planning, Referral    Requested Home Health Care         Is the patient interested in HHC at discharge?: Yes  Home Health Discipline requested:: Nursing, Occupational Therapy, Physical Therapy  Home Health Agency Name:: [facility]  HHA External Referral Reason (only applicable if external HHA name selected): Patient has established relationship with provider  Home Health Follow-Up Provider:: PCP  Home Health Services Needed:: Evaluate Functional Status and Safety, Gait/ADL Training, Oxygen Via Nasal Cannula, Wound/Ostomy Care, Strengthening/Theraputic Exercises to Improve Function  Oxygen LPM Ordered (if applicable based on home health services needed):: 2 LPM  Homebound Criteria Met:: Uses an Assist Device (i.e. cane, walker, etc)  Supporting Clincal Findings:: Limited Endurance, Requires Oxygen, Fatigues Easliy in Short Distances         Other Referral/Resources/Interventions Provided:  Referral Comments: DESHAWN referral for [facility] submitted in [place]      CM met with Pt at bedside to discus her eventual DC plan. Pt resides alone in a ranch home, 2 nathaly. Pt has to navigate 12 descending steps to get to her driveway where her family is able to assist her into the car. In order to get into her house, her daughter pushes her in her WC to the back porch where the Pt has to go up 2 steps.  Pt uses a RW  and 2L oxygen at baseline.  Pt receives Meals on Wheels deliveries weekly. Pts daughter provides transportation. Pt is open with [facility] for wound care. DESHAWN referral submitted in [place].

## 2024-10-24 NOTE — PROGRESS NOTES
Patient:    MRN:  [id number]    Aidin Request ID:  [id number]    Level of care reserved:  Home Health Agency    Partner Reserved:  [facility], [place] [address] [phone]    Clinical needs requested:    Geography searched:  [id number]    Start of Service:    Request sent:  11:52am EDT on 10/24/2024 by Arabella Glass    Partner reserved:  12:35pm EDT on 10/24/2024 by Arabella Glass    Choice list shared:  12:34pm EDT on 10/24/2024 by Arabella Glass

## 2024-10-24 NOTE — PROGRESS NOTES
General Cardiology   Progress Note -  Team One   Camryn Roblero 84 y.o. female MRN: [id number]    Unit/Bed#: S -01 Encounter: [id number]    Assessment  1.  Acute on chronic HFpEF, likely 2/2 to ineffective diuretic dosing; no report of any recent dietary indiscretions. Remains in afib but rates are well controlled.   -Volume overloaded on exam/imaging  -BNP 1154.  -Chest x-ray 10/23; moderate pulmonary vascular congestion, small right > than left bilateral pleural effusions.  -TTE 11/24/2023; LVEF 70%, wall motion normal, grade 2 DD, RV normal size/systolic function, LA severely dilated, RA mildly dilated, mild AI, mild to moderate MR.  -Outpatient diuretic regimen; torsemide 40 mg twice daily  -Inpatient diuretic regimen; IV Bumex 2 mg BID  -24-hour I&O balance; -910 ml; overall -910 ml  -Dry weight around 154-155 pounds.  Office weight today was 163 pounds. BS weight 157 lbs today.   2. Chronic atrial fibrillation /atrial flutter  -Occasional palpitations.   -ECG on admission demonstrated rate controlled atrial fibrillation  -Outpatient rate/rhythm control; Cardizem  mg daily and metoprolol succinate 50 mg twice daily  -Inpatient rate/rhythm control; Cardizem  mg daily and metoprolol succinate 50 mg twice daily  -On apixaban 5 mg twice daily.  3. Hypertension  -BP last recorded at 141/77  -Outpatient BP regimen; Cardizem CD1 120 mg daily and metoprolol succinate 50 mg twice daily  -Inpatient rate/rhythm control; Cardizem  mg daily and metoprolol succinate 50 mg twice daily  4. CKD stage III  -Baseline creatinine around 0.8-1.1.  -Creatinine 1.04 on admission, currently 0.84  5. DM type II  -HgbA1c 7        Plan  -Pt currently feels mildly short of breath at rest and dyspneic w/exertion.  She remains on 4 L of supplemental O2 (up from 2L yesterday. But O2 sat %); wean supplemental O2. Volume status improved from yesterday, but still overloaded. I do suspect inaccuracies in her documented  outputs. Discussed w/RN staff that a standing scale weight needs to be obtained this a.m.  -Continue IV Bumex 2 mg BID today, will assess outputs closely, if UO <1L in 6 hrs, increase to TID dosing.   -HRs well controlled in afib. Continue Cardizem  mg daily and metoprolol succinate 50 mg twice daily  -Continue apixaban 5 mg BID  -Monitor renal function and electrolytes closely.  Replete to maintain K+ level 4.0 magnesium level 2.0. K+ level 3.1 this a/m (ordered replacement), add mag level.   -Strict I's and O's, daily standing weights, 2 g Na+ diet 1.8 LFR.  -No indication for telemetry.        Subjective  Review of Systems   Constitutional: Negative for chills, fever and malaise/fatigue.   Eyes:  Negative for visual disturbance.   Cardiovascular:  Positive for dyspnea on exertion and leg swelling. Negative for chest pain, orthopnea and palpitations.   Respiratory:  Negative for cough and shortness of breath.    Gastrointestinal:  Positive for bloating. Negative for abdominal pain.   Neurological:  Negative for dizziness, headaches and weakness.       Objective:   Physical Exam  Vitals and nursing note reviewed.   Constitutional:       General: She is not in acute distress.     Appearance: She is not diaphoretic.   HENT:      Head: Normocephalic and atraumatic.   Eyes:      General: No scleral icterus.  Neck:      Comments: jvd  Cardiovascular:      Rate and Rhythm: Normal rate. Rhythm irregular.      Pulses: Normal pulses.   Pulmonary:      Effort: Pulmonary effort is normal.      Breath sounds: Rales present. No wheezing.   Abdominal:      General: There is distension.      Palpations: Abdomen is soft.   Musculoskeletal:         General: Swelling present.      Right lower leg: Edema present.      Left lower leg: Edema present.   Skin:     General: Skin is warm and dry.      Capillary Refill: Capillary refill takes less than 2 seconds.   Neurological:      General: No focal deficit present.      Mental  "Status: She is alert and oriented to person, place, and time.   Psychiatric:         Mood and Affect: Mood normal.         Vitals: Blood pressure 141/77, pulse 63, temperature 98.3 °F (36.8 °C), resp. rate 16, height 4' 11\" (1.499 m), weight 71.4 kg (157 lb 6.5 oz), SpO2 98%.,     Body mass index is 31.79 kg/m².,   Systolic (24hrs), Av , Min:119 , Max:157     Diastolic (24hrs), Av, Min:65, Max:77      Intake/Output Summary (Last 24 hours) at 10/24/2024 0956  Last data filed at 10/24/2024 0900  Gross per 24 hour   Intake 470 ml   Output 1600 ml   Net -1130 ml     Weight (last 2 days)       Date/Time Weight    10/24/24 0500 71.4 (157.41)    10/23/24 1445 73.5 (162.04)            LABORATORY RESULTS      CBC with diff:   Results from last 7 days   Lab Units 10/24/24  0513 10/23/24  1107   WBC Thousand/uL 8.04 8.42   HEMOGLOBIN g/dL 11.8 13.1   HEMATOCRIT % 37.3 41.8   MCV fL 86 87   PLATELETS Thousands/uL 230 246   RBC Million/uL 4.36 4.83   MCH pg 27.1 27.1   MCHC g/dL 31.6 31.3*   RDW % 16.3* 16.3*   MPV fL 10.3 9.9   NRBC AUTO /100 WBCs  --  0       CMP:  Results from last 7 days   Lab Units 10/24/24  0513 10/23/24  1107 10/21/24  1240   POTASSIUM mmol/L 3.1* 4.2 4.0   CHLORIDE mmol/L 94* 90* 93*   CO2 mmol/L 35* 34* 34*   BUN mg/dL 30* 34* 32*   CREATININE mg/dL 0.86 1.04 1.02   CALCIUM mg/dL 8.9 9.7 9.4   AST U/L  --  77*  --    ALT U/L  --  28  --    ALK PHOS U/L  --  90  --    EGFR ml/min/1.73sq m 62 49 50       BMP:  Results from last 7 days   Lab Units 10/24/24  0513 10/23/24  1107 10/21/24  1240   POTASSIUM mmol/L 3.1* 4.2 4.0   CHLORIDE mmol/L 94* 90* 93*   CO2 mmol/L 35* 34* 34*   BUN mg/dL 30* 34* 32*   CREATININE mg/dL 0.86 1.04 1.02   CALCIUM mg/dL 8.9 9.7 9.4       No results found for: \"NTBNP\"     Results from last 7 days   Lab Units 10/23/24  1107   MAGNESIUM mg/dL 1.5*       Results from last 7 days   Lab Units 10/23/24  1700   HEMOGLOBIN A1C % 7.4*                   Lipid Profile:   No " "results found for: \"CHOL\"  Lab Results   Component Value Date    HDL 34 (L) 2024    HDL 31 (L) 2023    HDL 39 (L) 2023     Lab Results   Component Value Date    LDLCALC 40 2024    LDLCALC 37 2023    LDLCALC 86 2023     Lab Results   Component Value Date    TRIG 59 2024    TRIG 51 2023    TRIG 91 2023       Cardiac testing:   Results for orders placed during the hospital encounter of 10/15/20    Echo complete with contrast if indicated    [facility]  [address] [place], [place] [id number] [phone]    Transthoracic Echocardiogram  2D, M-mode, Doppler, and Color Doppler    Study date:  15-Oct-2020    Patient: MARANDA MO  MR number: [id number]  Account number: [id number]  : 1940  Age: 80 years  Gender: Female  Status: Outpatient  Location: [facility]  Height: 60 in  Weight: 191.6 lb  BP: 166/ 78 mmHg    Indications: Chronic diastolic heart failure.    Diagnoses: I50.32 - Chronic diastolic (congestive) heart failure    Sonographer:  MAURICIO Mckeon  Primary Physician:  Abiel Seals MD  Referring Physician:  Bradley Marquez MD  Group:  [facility]  Interpreting Physician:  Lit Jane MD    SUMMARY    LEFT VENTRICLE:  Systolic function was normal. Ejection fraction was estimated to be 70 %.  There were no regional wall motion abnormalities.  The ratio of systolic to diastolic pulmonary vein flow was reduced (diastolic predominant).  Features were consistent with a pseudonormal left ventricular filling pattern, with concomitant abnormal relaxation and increased filling pressure (grade 2 diastolic dysfunction).    LEFT ATRIUM:  The atrium was markedly dilated.    MITRAL VALVE:  There was mild to moderate regurgitation.    AORTIC VALVE:  There was mild regurgitation.    TRICUSPID VALVE:  There was mild regurgitation.  Pulmonary artery systolic pressure was moderately to markedly " increased.  Estimated peak PA pressure was 60 mmHg.    PERICARDIUM:  A small pericardial effusion was identified circumferential to the heart. The fluid exhibited a fibrinous appearance.    HISTORY: PRIOR HISTORY: HTN, HLD, DM, lung cancer, COPD, TOM.    PROCEDURE: The study was performed in the [facility]. This was a routine study. The transthoracic approach was used. The study included complete 2D imaging, M-mode, complete spectral Doppler, and color Doppler. The  heart rate was 62 bpm, at the start of the study. Images were obtained from the parasternal, apical, subcostal, and suprasternal notch acoustic windows. Echocardiographic views were limited due to chest wall deformity, poor acoustic window  availability, decreased penetration, and lung interference. This was a technically difficult study.    LEFT VENTRICLE: Size was normal. Systolic function was normal. Ejection fraction was estimated to be 70 %. There were no regional wall motion abnormalities. Wall thickness was normal. DOPPLER: The ratio of systolic to diastolic pulmonary  vein flow was reduced (diastolic predominant). Features were consistent with a pseudonormal left ventricular filling pattern, with concomitant abnormal relaxation and increased filling pressure (grade 2 diastolic dysfunction).    RIGHT VENTRICLE: The size was normal. Systolic function was normal. Wall thickness was normal.    LEFT ATRIUM: The atrium was markedly dilated.    RIGHT ATRIUM: Size was normal.    MITRAL VALVE: Valve structure was normal. There was normal leaflet separation. DOPPLER: The transmitral velocity was within the normal range. There was no evidence for stenosis. There was mild to moderate regurgitation.    AORTIC VALVE: The valve was trileaflet. Leaflets exhibited normal thickness and normal cuspal separation. DOPPLER: Transaortic velocity was within the normal range. There was no evidence for stenosis. There was mild  regurgitation.    TRICUSPID VALVE: The valve structure was normal. There was normal leaflet separation. DOPPLER: The transtricuspid velocity was within the normal range. There was no evidence for stenosis. There was mild regurgitation. Pulmonary artery  systolic pressure was moderately to markedly increased. Estimated peak PA pressure was 60 mmHg.    PULMONIC VALVE: Leaflets exhibited normal thickness, no calcification, and normal cuspal separation. DOPPLER: The transpulmonic velocity was within the normal range. There was no significant regurgitation.    PERICARDIUM: A small pericardial effusion was identified circumferential to the heart. The fluid exhibited a fibrinous appearance. The pericardium was normal in appearance.    AORTA: The root exhibited normal size.    SYSTEMIC VEINS: IVC: The inferior vena cava was normal in size.    SYSTEM MEASUREMENT TABLES    2D  %FS: 39.72 %  Ao Diam: 3.28 cm  EDV(Teich): 127.28 ml  EF(Teich): 69.95 %  ESV(Teich): 38.25 ml  IVSd: 0.88 cm  LA Diam: 4.8 cm  LAAs A2C: 38.98 cm2  LAAs A4C: 33.86 cm2  LAESV A-L A2C: 166.64 ml  LAESV A-L A4C: 132.15 ml  LAESV Index (A-L): 83.14 ml/m2  LAESV MOD A2C: 158.66 ml  LAESV MOD A4C: 124.34 ml  LAESV(A-L): 152.15 ml  LAESV(MOD BP): 143.9 ml  LALs A2C: 7.74 cm  LALs A4C: 7.36 cm  LVEDV MOD A4C: 99.9 ml  LVEF MOD A4C: 84.12 %  LVESV MOD A4C: 15.86 ml  LVIDd: 5.16 cm  LVIDs: 3.11 cm  LVLd A4C: 7.53 cm  LVLs A4C: 5.77 cm  LVPWd: 0.91 cm  RVIDd: 4.1 cm  SV MOD A4C: 84.04 ml  SV(Teich): 89.03 ml    CW  AV Env.Ti: 298.76 ms  AV MaxPG: 10.27 mmHg  AV VTI: 28.82 cm  AV Vmax: 1.6 m/s  AV Vmean: 0.96 m/s  AV meanP.41 mmHg  TR MaxP.9 mmHg  TR Vmax: 3.77 m/s    MM  TAPSE: 2.29 cm    PW  E' Sept: 0.05 m/s  E/E' Sept: 23.56  LVOT Env.Ti: 300.67 ms  LVOT VTI: 25.48 cm  LVOT Vmax: 1.23 m/s  LVOT Vmean: 0.85 m/s  LVOT maxP.12 mmHg  LVOT meanPG: 3.26 mmHg  MV A Matteo: 0.99 m/s  MV Dec [place]ford: 6.89 m/s2  MV DecT: 186.95 ms  MV E Matteo: 1.29 m/s  MV E/A  Ratio: 1.3  MV PHT: 54.22 ms  MVA By PHT: 4.06 cm2    [facility]    Prepared and electronically signed by    Lit Jane MD  Signed 15-Oct-2020 17:48:51    No results found for this or any previous visit.    No results found for this or any previous visit.    No valid procedures specified.  No results found for this or any previous visit.      Meds/Allergies   all current active meds have been reviewed and current meds:   Current Facility-Administered Medications:     acetaminophen (TYLENOL) tablet 650 mg, Q6H PRN    atorvastatin (LIPITOR) tablet 40 mg, Daily    budesonide (PULMICORT) inhalation solution 0.5 mg, BID    bumetanide (BUMEX) injection 2 mg, BID    diltiazem (CARDIZEM CD) 24 hr capsule 120 mg, Daily    donepezil (ARICEPT) tablet 5 mg, HS    insulin lispro (HumALOG/ADMELOG) 100 units/mL subcutaneous injection 1-5 Units, TID AC **AND** Fingerstick Glucose (POCT), TID AC    ipratropium-albuterol (DUO-NEB) 0.5-2.5 mg/3 mL inhalation solution 3 mL, Q6H PRN    latanoprost (XALATAN) 0.005 % ophthalmic solution 1 drop, HS    loratadine (CLARITIN) tablet 10 mg, Daily    magnesium Oxide (MAG-OX) tablet 400 mg, Daily    metoprolol succinate (TOPROL-XL) 24 hr tablet 50 mg, BID    potassium chloride 20 mEq IVPB (premix), Q2H, Last Rate: 20 mEq (10/24/24 0900)    rivaroxaban (XARELTO) tablet 15 mg, Daily With Breakfast           Counseling / Coordination of Care  Total floor / unit time spent today 20 minutes.  Greater than 50% of total time was spent with the patient and / or family counseling and / or coordination of care.      ** Please Note: Dragon 360 Dictation voice to text software may have been used in the creation of this document. **

## 2024-10-24 NOTE — WOUND OSTOMY CARE
Consult Note - Wound   Camryn DURBIN Osbaldo 84 y.o. female MRN: [id number]  Unit/Bed#: S -01 Encounter: [id number]        History and Present Illness:  Patient is a 85 yo female that was admitted to [facility]  for treatment of acute heart failure with preserved ejection fraction. Patient has a PMH of HTN, DM 2, COPD, A-fib, obesity. Patient is alert and oriented times two and agreeable to assessment. Patient is assist of one to two for turning and repositioning, dependent for care, set up for meals. Patient is occasionally incontinent of bowel and bladder. On assessment, patient is in bed with pillows in place for offloading.    Wound Care was consulted for right leg wound.     Assessment Findings:   B/L heels are dry intact and puneet with no skin loss or wounds present. Recommend preventative Hydraguard Cream and proper offloading/ repositioning.      B/L sacro-buttocks is dry, intact, pink in color and blanches. No skin loss or wounds present. Recommend preventative hydragaurd to area.     Right lower leg wound - small irregular shaped are of full thickness skin loss. Wound bed is covered in 100% yellow slough. Moderate amount of serosanguinous drainage. Rachele wound is macerated, pink, and fragile. Patient unclear how long wound present; endorses having visiting nursing. Recommend silver alginate with dry dressing.     No induration, fluctuance, odor, warmth/temperature differences, redness, or purulence noted to the above noted wounds and skin areas assessed. New dressings applied per orders listed below. Patient tolerated well- no s/s of non-verbal pain or discomfort observed during the encounter. Bedside nurse aware of plan of care. See flow sheets for more detailed assessment findings.      Orders listed below and wound care will continue to follow, call or Secure Chat with questions.     Skin care plans:  1-Hydraguard to bilateral sacrum, buttock and heels BID and PRN  2-Elevate heels to offload pressure.  3-Ehob  cushion in chair when out of bed.  4-Moisturize skin daily with skin nourishing cream.  5-Turn/reposition q2h for pressure re-distribution on skin.  6-Right lower leg: Irrigate with NSS. Pat dry. Apply thin layer of calazime/zinc oxide paste to rica wound. Cover wound bed with Silver Alginate and ABD. Wrap with Ronit, and ACE wrap for compression. Change every other day or PRN for soilage/dislodgement.    Wounds:    Wound 10/24/24 Pretibial Distal;Right (Active)   Wound Image   10/24/24 1046   Wound Description Pink;Fragile;Slough;Yellow 10/24/24 1046   Rica-wound Assessment Fragile;Pink;Maceration 10/24/24 1046   Wound Length (cm) 1 cm 10/24/24 1046   Wound Width (cm) 1 cm 10/24/24 1046   Wound Depth (cm) 0 cm 10/24/24 1046   Wound Surface Area (cm^2) 1 cm^2 10/24/24 1046   Wound Volume (cm^3) 0 cm^3 10/24/24 1046   Calculated Wound Volume (cm^3) 0 cm^3 10/24/24 1046   Drainage Amount Scant 10/24/24 1046   Drainage Description Serous;Yellow 10/24/24 1046   Non-staged Wound Description Full thickness 10/24/24 1046   Treatments Cleansed;Site care 10/24/24 1046   Dressing Calcium Alginate with Silver;ABD;Gauze;Dry dressing 10/24/24 1046   Wound packed? No 10/24/24 1046   Packing- # removed 0 10/24/24 1046   Packing- # inserted 0 10/24/24 1046   Dressing Changed New 10/24/24 1046   Patient Tolerance Tolerated well 10/24/24 1046   Dressing Status Clean;Dry;Intact 10/24/24 1046               She Eller RN, BSN, CCRN

## 2024-10-24 NOTE — PLAN OF CARE
Problem: OCCUPATIONAL THERAPY ADULT  Goal: Performs self-care activities at highest level of function for planned discharge setting.  See evaluation for individualized goals.  Description: Treatment Interventions: ADL retraining, Functional transfer training, Endurance training, Cognitive reorientation, Patient/family training, Equipment evaluation/education, Compensatory technique education, Energy conservation, Activityengagement          See flowsheet documentation for full assessment, interventions and recommendations.   Note: Limitation: Decreased ADL status, Decreased Safe judgement during ADL, Decreased cognition, Decreased endurance, Decreased self-care trans, Decreased high-level ADLs (impaired balance, fxnl mobility, act pamela, fxnl reach, standing pamela, strength, attention to task, direction following, safety awareness, insight, pacing, problem solving)  Prognosis: Good  Assessment: Pt is a 84 y.o. female seen for OT evaluation s/p admission to [facility] on 10/23/2024 due to SOB. Diagnosed with Acute heart failure with preserved ejection fraction (HCC). Personal and env factors supporting pt at time of IE include (I) PLOF, supportive daughter is local, and [employer]. Personal and env factors inhibiting engagement in occupations include lifestyle patterns, inaccessible home environment, difficulty completing ADLs, and difficulty completing IADLs. Performance deficits that affect the pt’s occupational performance can be seen above. Due to pt's current functional limitations and medical complications pt is functioning below baseline. Pt would benefit from continued skilled OT treatment in order to maximize safety, independence and overall performance with ADLs, functional mobility, functional transfers, and cognition in order to achieve highest level of function.     Rehab Resource Intensity Level, OT: I (Maximum Resource Intensity)

## 2024-10-24 NOTE — ASSESSMENT & PLAN NOTE
Rate controlled with Cardizem and metoprolol  Continue pta Xarelto for anticoagulation  Bedside telemetry with A.Fib, HR 83
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
## 2024-10-24 NOTE — ASSESSMENT & PLAN NOTE
Chronic hypoxemic respiratory failure, on 2L NC O2 at baseline during the day and 4L overnight  Currently on 4L O2 nc supplemental oxygenation.

## 2024-10-24 NOTE — TELEPHONE ENCOUNTER
I called Camryn's daughter to discuss blood work and discovered that Camryn was hospitalized with volume overload, 10 pound weight gain.  According to her daughter her weight was improving with increased dose of torsemide but then cardiology saw her and decided to admit her due to volume overload and it was discovered that she was 10 pounds above her dry weight.  She is receiving high-dose IV Lasix for diuresis.  We will follow after discharge.

## 2024-10-24 NOTE — PROGRESS NOTES
Progress Note - Hospitalist   Name: Camryn Roblero 84 y.o. female I MRN: [id number]  Unit/Bed#: S -01 I Date of Admission: 10/23/2024   Date of Service: 10/24/2024 I Hospital Day: 1    Assessment & Plan  Acute heart failure with preserved ejection fraction (HCC)  Wt Readings from Last 3 Encounters:   10/24/24 69.7 kg (153 lb 10.6 oz)   10/23/24 73.9 kg (163 lb)   10/15/24 74.4 kg (164 lb)     Patient presents with 10 pound weight gain above dry weight despite increase in torsemide over the last 2 weeks.  CXR: Moderate pulmonary vascular congestion. Small right greater than left bilateral pleural effusions.   ECHO 10/23 with EF 65%, There is both systolic and diastolic flattening of the interventricular septum consistent with right ventricle pressure and volume overload. Right ventricular cavity size is moderately dilated. Systolic function is moderately reduced. Changes noted when compared to prior ECHO last year. Changes include: Right ventricular dilation is notable, with an increase in mitral regurgitation, and pulmonary arterial pressures.  Lasix 80mg IV x 1 given in the ER, given additional 20mg IV Lasix 10/23  24-hour I&O balance; -910 ml; overall -910 ml  Dry weight around 154-155 pounds. Admission weight 162 lb; now at 153 lb this morning.  Appreciate Cardiology recommendations  Started on IV Bumex 2 mg BID yesterday 10/23; to continue and reassess in am regarding PO dosing.  Daily weights, I/Os  Continue 1,200mL fluid restriction  Hyponatremia  Chronic, baseline appears to be 131-134  Sodium 136 this morning  Hypomagnesemia  2g IV Mg repleted  Continue PO Mg  Chronic atrial fibrillation (HCC)  Rate controlled with Cardizem and metoprolol  Continue pta Xarelto for anticoagulation  Bedside telemetry with A.Fib, HR 83  Essential hypertension  Cont BB/Cardizem  /77  Type 2 diabetes mellitus, without long-term current use of insulin (HCC)  Lab Results   Component Value Date    HGBA1C 7.4 (H) 10/23/2024      Recent Labs     10/23/24  1835 10/23/24  2127 10/24/24  0735   POCGLU 109 136 146*     Blood Sugar Average: Last 72 hrs:  (P) 130.2667078659047274  Hold home glipizide/Metformin for now  SSI/accuchecks/diabetic diet  COPD (chronic obstructive pulmonary disease) (HCC)  Chronic hypoxemic respiratory failure, on 2L NC O2 at baseline during the day and 4L overnight  Currently on 4L O2 nc supplemental oxygenation.  Class 1 obesity due to excess calories with serious comorbidity and body mass index (BMI) of 33.0 to 33.9 in adult  Encourage weight loss, dietary and lifestyle modifications    VTE Pharmacologic Prophylaxis: VTE Score: 5 High Risk (Score >/= 5) - Pharmacological DVT Prophylaxis Ordered: rivaroxaban (Xarelto). Sequential Compression Devices Ordered.    Mobility:   Basic Mobility Inpatient Raw Score: 11  JH-HLM Goal: 4: Move to chair/commode  JH-HLM Achieved: 2: Bed activities/Dependent transfer  JH-HLM Goal NOT achieved. Continue with multidisciplinary rounding and encourage appropriate mobility to improve upon JH-HLM goals.    Patient Centered Rounds: I performed bedside rounds with nursing staff today.   Discussions with Specialists or Other Care Team Provider: CM    Education and Discussions with Family / Patient: Attempted to update  (daughter, Meri) via phone. Left voicemail.     Current Length of Stay: 1 day(s)  Current Patient Status: Inpatient   Certification Statement: The patient will continue to require additional inpatient hospital stay due to IV diuresis, close monitoring of hemodynamic status related to acute on chronic heart failure.  Discharge Plan: Anticipate discharge in 48-72 hrs to discharge location to be determined pending rehab evaluations.    Code Status: Level 1 - Full Code    Subjective   Patient seen and examined at bedside with no new complaints. No acute events overnight. She states she has had a good appetite. Denies any SOB, CP, palpitations, dizziness, or  lightheadedness. She endorses mild GOMEZ and has been requiring 4L O2 nc instead of her usual 2L. She endorses that her lower extremities are slightly less swollen; however, the right leg is chronically more swollen than her left leg.    Objective :  Temp:  [97.8 °F (36.6 °C)-98.4 °F (36.9 °C)] 98.3 °F (36.8 °C)  HR:  [63-83] 63  BP: (119-157)/(67-77) 141/77  Resp:  [16-30] 16  SpO2:  [84 %-100 %] 98 %  O2 Device: Nasal cannula  Nasal Cannula O2 Flow Rate (L/min):  [2 L/min-4 L/min] 4 L/min    Body mass index is 31.04 kg/m².     Input and Output Summary (last 24 hours):     Intake/Output Summary (Last 24 hours) at 10/24/2024 1136  Last data filed at 10/24/2024 0900  Gross per 24 hour   Intake 470 ml   Output 1600 ml   Net -1130 ml       Physical Exam  Vitals reviewed.   Constitutional:       General: She is not in acute distress.     Appearance: She is well-developed. She is obese. She is not ill-appearing or toxic-appearing.   HENT:      Head: Normocephalic and atraumatic.      Mouth/Throat:      Mouth: Mucous membranes are moist.      Pharynx: Oropharynx is clear.   Eyes:      Conjunctiva/sclera: Conjunctivae normal.      Pupils: Pupils are equal, round, and reactive to light.   Cardiovascular:      Rate and Rhythm: Normal rate. Rhythm irregular.      Pulses: Normal pulses.      Heart sounds: Murmur heard.   Pulmonary:      Effort: Pulmonary effort is normal. No respiratory distress.      Breath sounds: No stridor. Rales present. No wheezing or rhonchi.   Abdominal:      General: Bowel sounds are normal.      Palpations: Abdomen is soft.      Tenderness: There is no abdominal tenderness.   Musculoskeletal:         General: No swelling.      Cervical back: Neck supple.      Right lower leg: Swelling present. No tenderness. 3+ Pitting Edema present.      Left lower leg: Swelling present. No tenderness. 2+ Pitting Edema present.   Skin:     General: Skin is warm and dry.      Capillary Refill: Capillary refill takes  less than 2 seconds.   Neurological:      General: No focal deficit present.      Mental Status: She is alert and oriented to person, place, and time. Mental status is at baseline.   Psychiatric:         Mood and Affect: Mood normal.         Thought Content: Thought content normal.       Lines/Drains:  Lines/Drains/Airways       Active Status       Name Placement date Placement time Site Days    External Urinary Catheter 10/23/24  1900  -- less than 1                  Telemetry:  Telemetry Orders (From admission, onward)               24 Hour Telemetry Monitoring  Continuous x 24 Hours (Telem)        Question:  Reason for 24 Hour Telemetry  Answer:  Decompensated CHF- and any one of the following: continuous diuretic infusion or total diuretic dose >200 mg daily, associated electrolyte derangement (I.e. K < 3.0), ionotropic drip (continuous infusion), hx of ventricular arrhythmia, or new EF < 35%                     Telemetry Reviewed: Atrial fibrillation. HR averaging 70-80s  Indication for Continued Telemetry Use: Arrthymias requiring medical therapy               Lab Results: I have reviewed the following results:   Results from last 7 days   Lab Units 10/24/24  0513 10/23/24  1107   WBC Thousand/uL 8.04 8.42   HEMOGLOBIN g/dL 11.8 13.1   HEMATOCRIT % 37.3 41.8   PLATELETS Thousands/uL 230 246   SEGS PCT %  --  86*   LYMPHO PCT %  --  7*   MONO PCT %  --  6   EOS PCT %  --  1     Results from last 7 days   Lab Units 10/24/24  0513 10/23/24  1107   SODIUM mmol/L 136 132*   POTASSIUM mmol/L 3.1* 4.2   CHLORIDE mmol/L 94* 90*   CO2 mmol/L 35* 34*   BUN mg/dL 30* 34*   CREATININE mg/dL 0.86 1.04   ANION GAP mmol/L 7 8   CALCIUM mg/dL 8.9 9.7   ALBUMIN g/dL  --  4.0   TOTAL BILIRUBIN mg/dL  --  0.98   ALK PHOS U/L  --  90   ALT U/L  --  28   AST U/L  --  77*   GLUCOSE RANDOM mg/dL 134 189*         Results from last 7 days   Lab Units 10/24/24  0735 10/23/24  2127 10/23/24  1835   POC GLUCOSE mg/dl 146* 136 109      Results from last 7 days   Lab Units 10/23/24  1700   HEMOGLOBIN A1C % 7.4*           Recent Cultures (last 7 days):         Imaging Results Review: I reviewed radiology reports from this admission including: chest xray and Echocardiogram.  Other Study Results Review: EKG was reviewed.     Last 24 Hours Medication List:     Current Facility-Administered Medications:     acetaminophen (TYLENOL) tablet 650 mg, Q6H PRN    atorvastatin (LIPITOR) tablet 40 mg, Daily    budesonide (PULMICORT) inhalation solution 0.5 mg, BID    bumetanide (BUMEX) injection 2 mg, BID    diltiazem (CARDIZEM CD) 24 hr capsule 120 mg, Daily    donepezil (ARICEPT) tablet 5 mg, HS    insulin lispro (HumALOG/ADMELOG) 100 units/mL subcutaneous injection 1-5 Units, TID AC **AND** Fingerstick Glucose (POCT), TID AC    ipratropium-albuterol (DUO-NEB) 0.5-2.5 mg/3 mL inhalation solution 3 mL, Q6H PRN    latanoprost (XALATAN) 0.005 % ophthalmic solution 1 drop, HS    loratadine (CLARITIN) tablet 10 mg, Daily    magnesium Oxide (MAG-OX) tablet 400 mg, Daily    metoprolol succinate (TOPROL-XL) 24 hr tablet 50 mg, BID    potassium chloride (Klor-Con M20) CR tablet 20 mEq, BID    potassium chloride 20 mEq IVPB (premix), Q2H, Last Rate: 20 mEq (10/24/24 1026)    rivaroxaban (XARELTO) tablet 15 mg, Daily With Breakfast    Administrative Statements   Today, Patient Was Seen By: DEANNE Baez  I have spent a total time of 35 minutes in caring for this patient on the day of the visit/encounter including Diagnostic results, Importance of tx compliance, Risk factor reductions, Impressions, Counseling / Coordination of care, Documenting in the medical record, Reviewing / ordering tests, medicine, procedures  , Obtaining or reviewing history  , and Communicating with other healthcare professionals .    **Please Note: This note may have been constructed using a voice recognition system.**

## 2024-10-24 NOTE — ASSESSMENT & PLAN NOTE
Lab Results   Component Value Date    HGBA1C 7.4 (H) 10/23/2024     Recent Labs     10/23/24  1835 10/23/24  2127 10/24/24  0735   POCGLU 109 136 146*     Blood Sugar Average: Last 72 hrs:  (P) 130.9756428270527087  Hold home glipizide/Metformin for now  SSI/accuchecks/diabetic diet

## 2024-10-24 NOTE — ASSESSMENT & PLAN NOTE
Wt Readings from Last 3 Encounters:   10/24/24 69.7 kg (153 lb 10.6 oz)   10/23/24 73.9 kg (163 lb)   10/15/24 74.4 kg (164 lb)     Patient presents with 10 pound weight gain above dry weight despite increase in torsemide over the last 2 weeks.  CXR: Moderate pulmonary vascular congestion. Small right greater than left bilateral pleural effusions.   ECHO 10/23 with EF 65%, There is both systolic and diastolic flattening of the interventricular septum consistent with right ventricle pressure and volume overload. Right ventricular cavity size is moderately dilated. Systolic function is moderately reduced. Changes noted when compared to prior ECHO last year. Changes include: Right ventricular dilation is notable, with an increase in mitral regurgitation, and pulmonary arterial pressures.  Lasix 80mg IV x 1 given in the ER, given additional 20mg IV Lasix 10/23  24-hour I&O balance; -910 ml; overall -910 ml  Dry weight around 154-155 pounds. Admission weight 162 lb; now at 153 lb this morning.  Appreciate Cardiology recommendations  Started on IV Bumex 2 mg BID yesterday 10/23; to continue and reassess in am regarding PO dosing.  Daily weights, I/Os  Continue 1,200mL fluid restriction

## 2024-10-25 LAB
ANION GAP SERPL CALCULATED.3IONS-SCNC: 2 MMOL/L (ref 4–13)
BUN SERPL-MCNC: 30 MG/DL (ref 5–25)
CALCIUM SERPL-MCNC: 9.4 MG/DL (ref 8.4–10.2)
CHLORIDE SERPL-SCNC: 96 MMOL/L (ref 96–108)
CO2 SERPL-SCNC: 41 MMOL/L (ref 21–32)
CREAT SERPL-MCNC: 1.02 MG/DL (ref 0.6–1.3)
ERYTHROCYTE [DISTWIDTH] IN BLOOD BY AUTOMATED COUNT: 16.4 % (ref 11.6–15.1)
GFR SERPL CREATININE-BSD FRML MDRD: 50 ML/MIN/1.73SQ M
GLUCOSE SERPL-MCNC: 156 MG/DL (ref 65–140)
GLUCOSE SERPL-MCNC: 160 MG/DL (ref 65–140)
GLUCOSE SERPL-MCNC: 196 MG/DL (ref 65–140)
GLUCOSE SERPL-MCNC: 224 MG/DL (ref 65–140)
GLUCOSE SERPL-MCNC: 231 MG/DL (ref 65–140)
HCT VFR BLD AUTO: 42.6 % (ref 34.8–46.1)
HGB BLD-MCNC: 12.8 G/DL (ref 11.5–15.4)
MAGNESIUM SERPL-MCNC: 1.8 MG/DL (ref 1.9–2.7)
MCH RBC QN AUTO: 26.9 PG (ref 26.8–34.3)
MCHC RBC AUTO-ENTMCNC: 30 G/DL (ref 31.4–37.4)
MCV RBC AUTO: 90 FL (ref 82–98)
PLATELET # BLD AUTO: 237 THOUSANDS/UL (ref 149–390)
PMV BLD AUTO: 10.2 FL (ref 8.9–12.7)
POTASSIUM SERPL-SCNC: 4.1 MMOL/L (ref 3.5–5.3)
RBC # BLD AUTO: 4.75 MILLION/UL (ref 3.81–5.12)
SODIUM SERPL-SCNC: 139 MMOL/L (ref 135–147)
WBC # BLD AUTO: 7.68 THOUSAND/UL (ref 4.31–10.16)

## 2024-10-25 PROCEDURE — 97116 GAIT TRAINING THERAPY: CPT

## 2024-10-25 PROCEDURE — 99232 SBSQ HOSP IP/OBS MODERATE 35: CPT

## 2024-10-25 PROCEDURE — 82948 REAGENT STRIP/BLOOD GLUCOSE: CPT

## 2024-10-25 PROCEDURE — 80048 BASIC METABOLIC PNL TOTAL CA: CPT

## 2024-10-25 PROCEDURE — 94760 N-INVAS EAR/PLS OXIMETRY 1: CPT

## 2024-10-25 PROCEDURE — 94640 AIRWAY INHALATION TREATMENT: CPT

## 2024-10-25 PROCEDURE — 83735 ASSAY OF MAGNESIUM: CPT

## 2024-10-25 PROCEDURE — 99232 SBSQ HOSP IP/OBS MODERATE 35: CPT | Performed by: INTERNAL MEDICINE

## 2024-10-25 PROCEDURE — 97163 PT EVAL HIGH COMPLEX 45 MIN: CPT

## 2024-10-25 PROCEDURE — 85027 COMPLETE CBC AUTOMATED: CPT

## 2024-10-25 RX ORDER — MAGNESIUM SULFATE HEPTAHYDRATE 40 MG/ML
2 INJECTION, SOLUTION INTRAVENOUS ONCE
Status: COMPLETED | OUTPATIENT
Start: 2024-10-25 | End: 2024-10-25

## 2024-10-25 RX ADMIN — LORATADINE 10 MG: 10 TABLET ORAL at 09:50

## 2024-10-25 RX ADMIN — INSULIN LISPRO 1 UNITS: 100 INJECTION, SOLUTION INTRAVENOUS; SUBCUTANEOUS at 17:58

## 2024-10-25 RX ADMIN — Medication 400 MG: at 09:50

## 2024-10-25 RX ADMIN — ATORVASTATIN CALCIUM 40 MG: 40 TABLET, FILM COATED ORAL at 09:50

## 2024-10-25 RX ADMIN — BUDESONIDE 0.5 MG: 0.5 INHALANT ORAL at 20:37

## 2024-10-25 RX ADMIN — BUMETANIDE 2 MG: 0.25 INJECTION INTRAMUSCULAR; INTRAVENOUS at 09:50

## 2024-10-25 RX ADMIN — INSULIN LISPRO 1 UNITS: 100 INJECTION, SOLUTION INTRAVENOUS; SUBCUTANEOUS at 09:51

## 2024-10-25 RX ADMIN — BUDESONIDE 0.5 MG: 0.5 INHALANT ORAL at 07:32

## 2024-10-25 RX ADMIN — LATANOPROST 1 DROP: 50 SOLUTION OPHTHALMIC at 21:16

## 2024-10-25 RX ADMIN — POTASSIUM CHLORIDE 20 MEQ: 1500 TABLET, EXTENDED RELEASE ORAL at 09:50

## 2024-10-25 RX ADMIN — POTASSIUM CHLORIDE 20 MEQ: 1500 TABLET, EXTENDED RELEASE ORAL at 17:26

## 2024-10-25 RX ADMIN — BUMETANIDE 2 MG: 0.25 INJECTION INTRAMUSCULAR; INTRAVENOUS at 17:26

## 2024-10-25 RX ADMIN — DONEPEZIL HYDROCHLORIDE 5 MG: 5 TABLET ORAL at 21:15

## 2024-10-25 RX ADMIN — DILTIAZEM HYDROCHLORIDE 120 MG: 120 CAPSULE, COATED, EXTENDED RELEASE ORAL at 09:50

## 2024-10-25 RX ADMIN — METOPROLOL SUCCINATE 50 MG: 50 TABLET, EXTENDED RELEASE ORAL at 17:26

## 2024-10-25 RX ADMIN — INSULIN LISPRO 2 UNITS: 100 INJECTION, SOLUTION INTRAVENOUS; SUBCUTANEOUS at 12:40

## 2024-10-25 RX ADMIN — METOPROLOL SUCCINATE 50 MG: 50 TABLET, EXTENDED RELEASE ORAL at 09:51

## 2024-10-25 RX ADMIN — MAGNESIUM SULFATE HEPTAHYDRATE 2 G: 40 INJECTION, SOLUTION INTRAVENOUS at 09:53

## 2024-10-25 RX ADMIN — RIVAROXABAN 15 MG: 15 TABLET, FILM COATED ORAL at 09:52
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
## 2024-10-25 NOTE — PROGRESS NOTES
Progress Note - Cardiology   Name: Camryn Roblero 84 y.o. female I MRN: [id number]  Unit/Bed#: S -01 I Date of Admission: 10/23/2024   Date of Service: 10/25/2024 I Hospital Day: 2     Assessment & Plan  Acute heart failure with preserved ejection fraction (HCC)  Wt Readings from Last 3 Encounters:   10/25/24 69.1 kg (152 lb 5.4 oz)   10/23/24 73.9 kg (163 lb)   10/15/24 74.4 kg (164 lb)   Patient presented volume overloaded on 10/23 with volume overload on chest x-ray, elevated BNP 1154  Outpatient diuretic torsemide 40mg BID; recent changes  No dietary indiscretions and her A-fib rates have been well-controlled.  Possible exacerbation is due to ineffective diuretic dosing.  She was started on Bumex IV 2 mg twice daily  I/O: Minimal urine output documented, overall -430 mL  Weight: Down to 152 on standing scale today from 157 on admission  Creatinine: Stable 1.02 within baseline  Remains on 2 L nasal cannula.     Hyponatremia  Improved, sodium 139 today  Essential hypertension  Adequately controlled most recent blood pressure 132/77  COPD (chronic obstructive pulmonary disease) (HCC)  Home O2 2 L nasal cannula  Chronic atrial fibrillation (HCC)  Rates are well-controlled on vital sign check and exam continue diltiazem 120 mg daily, metoprolol succinate 50 mg twice daily and Xarelto 15 mg daily  Hypomagnesemia  Getting intravenous magnesium sulfate this morning    Plan: Patient currently getting intravenous Bumex 2 mg twice daily.  Minimal output documented.  I did discuss with RN and she feels that they were not documenting her occurrences as she there was no hat in the toilet.  She will start to do this accurately.  She is still volume overloaded on exam with Rales, minimal lower extremity edema.  Creatinine stable within baseline at 1.02.  Will continue intravenous Bumex 2 mg twice daily today  BMP in a.m.  Start strict I's and O's  Daily weight in a.m.  Continue remainder of medications for her atrial  "fibrillation.  Electrolytes being replaced by primary team.    Subjective: Patient seen for follow-up.  No significant events overnight.  She reports feeling okay today.  Breathing has improved but she does not feels back to her baseline.  Denies any chest pain pressure dizziness lightheadedness.  Remains on 2 L nasal cannula which is chronic for her    Review of Systems   Constitutional: Positive for malaise/fatigue. Negative for decreased appetite and fever.   Cardiovascular:  Positive for dyspnea on exertion and orthopnea. Negative for chest pain, leg swelling, palpitations and syncope.   Respiratory:  Positive for sleep disturbances due to breathing. Negative for cough and wheezing.    Gastrointestinal:  Negative for bloating, nausea and vomiting.   Genitourinary:  Negative for dysuria.   Neurological:  Negative for dizziness and light-headedness.   Psychiatric/Behavioral:  Negative for altered mental status.    All other systems reviewed and are negative.    Objective:   Vitals: Blood pressure 143/85, pulse 69, temperature 97.6 °F (36.4 °C), resp. rate 16, height 4' 11\" (1.499 m), weight 69.1 kg (152 lb 5.4 oz), SpO2 100%.,     Body mass index is 30.77 kg/m².,     Systolic (24hrs), Av , Min:128 , Max:143     Diastolic (24hrs), Av, Min:74, Max:85      Intake/Output Summary (Last 24 hours) at 10/25/2024 1201  Last data filed at 10/25/2024 0852  Gross per 24 hour   Intake 1200 ml   Output 500 ml   Net 700 ml     Weight (last 2 days)       Date/Time Weight    10/25/24 0500 69.1 (152.34)    10/24/24 1025 69.7 (153.66)    10/24/24 0500 71.4 (157.41)    10/23/24 1445 73.5 (162.04)          Telemetry Review: No telemetry    Physical Exam  Vitals reviewed.   Constitutional:       General: She is not in acute distress.     Appearance: Normal appearance.   HENT:      Head: Normocephalic.      Mouth/Throat:      Mouth: Mucous membranes are moist.   Cardiovascular:      Rate and Rhythm: Normal rate. Rhythm " "irregularly irregular.      Heart sounds: S1 normal and S2 normal.      Comments: Trace lower extremity edema; right lower extremity Ace wrap intact  Pulmonary:      Breath sounds: Rales present.      Comments: On O2 via nasal cannula 2 L  Abdominal:      Palpations: Abdomen is soft.   Skin:     General: Skin is warm.   Neurological:      Mental Status: She is alert. Mental status is at baseline.   Psychiatric:         Mood and Affect: Mood normal.       LABORATORY RESULTS      CBC with diff:   Results from last 7 days   Lab Units 10/25/24  0531 10/24/24  0513 10/23/24  1107   WBC Thousand/uL 7.68 8.04 8.42   HEMOGLOBIN g/dL 12.8 11.8 13.1   HEMATOCRIT % 42.6 37.3 41.8   MCV fL 90 86 87   PLATELETS Thousands/uL 237 230 246   RBC Million/uL 4.75 4.36 4.83   MCH pg 26.9 27.1 27.1   MCHC g/dL 30.0* 31.6 31.3*   RDW % 16.4* 16.3* 16.3*   MPV fL 10.2 10.3 9.9   NRBC AUTO /100 WBCs  --   --  0       CMP:  Results from last 7 days   Lab Units 10/25/24  0531 10/24/24  0513 10/23/24  1107 10/21/24  1240   POTASSIUM mmol/L 4.1 3.1* 4.2 4.0   CHLORIDE mmol/L 96 94* 90* 93*   CO2 mmol/L 41* 35* 34* 34*   BUN mg/dL 30* 30* 34* 32*   CREATININE mg/dL 1.02 0.86 1.04 1.02   CALCIUM mg/dL 9.4 8.9 9.7 9.4   AST U/L  --   --  77*  --    ALT U/L  --   --  28  --    ALK PHOS U/L  --   --  90  --    EGFR ml/min/1.73sq m 50 62 49 50       BMP:  Results from last 7 days   Lab Units 10/25/24  0531 10/24/24  0513 10/23/24  1107 10/21/24  1240   POTASSIUM mmol/L 4.1 3.1* 4.2 4.0   CHLORIDE mmol/L 96 94* 90* 93*   CO2 mmol/L 41* 35* 34* 34*   BUN mg/dL 30* 30* 34* 32*   CREATININE mg/dL 1.02 0.86 1.04 1.02   CALCIUM mg/dL 9.4 8.9 9.7 9.4       No results found for: \"NTBNP\"          Results from last 7 days   Lab Units 10/25/24  0531 10/23/24  1107   MAGNESIUM mg/dL 1.8* 1.5*          Results from last 7 days   Lab Units 10/23/24  1700   HEMOGLOBIN A1C % 7.4*                    Lipid Profile:   No results found for: \"CHOL\"  Lab Results "   Component Value Date    HDL 34 (L) 2024    HDL 31 (L) 2023    HDL 39 (L) 2023     Lab Results   Component Value Date    LDLCALC 40 2024    LDLCALC 37 2023    LDLCALC 86 2023     Lab Results   Component Value Date    TRIG 59 2024    TRIG 51 2023    TRIG 91 2023       Cardiac testing:   Results for orders placed during the hospital encounter of 10/15/20    Echo complete with contrast if indicated    [facility]  [address] [place] [address]  [phone]    Transthoracic Echocardiogram  2D, M-mode, Doppler, and Color Doppler    Study date:  15-Oct-2020    Patient: MARANDA MO  MR number: [id number]  Account number: [id number]  : 1940  Age: 80 years  Gender: Female  Status: Outpatient  Location: [facility]  Height: 60 in  Weight: 191.6 lb  BP: 166/ 78 mmHg    Indications: Chronic diastolic heart failure.    Diagnoses: I50.32 - Chronic diastolic (congestive) heart failure    Sonographer:  MAURICIO Mckeon  Primary Physician:  Abiel Seals MD  Referring Physician:  Bradley Marquez MD  Group:  [facility]  Interpreting Physician:  Lit Jane MD    SUMMARY    LEFT VENTRICLE:  Systolic function was normal. Ejection fraction was estimated to be 70 %.  There were no regional wall motion abnormalities.  The ratio of systolic to diastolic pulmonary vein flow was reduced (diastolic predominant).  Features were consistent with a pseudonormal left ventricular filling pattern, with concomitant abnormal relaxation and increased filling pressure (grade 2 diastolic dysfunction).    LEFT ATRIUM:  The atrium was markedly dilated.    MITRAL VALVE:  There was mild to moderate regurgitation.    AORTIC VALVE:  There was mild regurgitation.    TRICUSPID VALVE:  There was mild regurgitation.  Pulmonary artery systolic pressure was moderately to markedly increased.  Estimated peak PA pressure was 60  mmHg.    PERICARDIUM:  A small pericardial effusion was identified circumferential to the heart. The fluid exhibited a fibrinous appearance.    HISTORY: PRIOR HISTORY: HTN, HLD, DM, lung cancer, COPD, TOM.    PROCEDURE: The study was performed in the [facility]. This was a routine study. The transthoracic approach was used. The study included complete 2D imaging, M-mode, complete spectral Doppler, and color Doppler. The  heart rate was 62 bpm, at the start of the study. Images were obtained from the parasternal, apical, subcostal, and suprasternal notch acoustic windows. Echocardiographic views were limited due to chest wall deformity, poor acoustic window  availability, decreased penetration, and lung interference. This was a technically difficult study.    LEFT VENTRICLE: Size was normal. Systolic function was normal. Ejection fraction was estimated to be 70 %. There were no regional wall motion abnormalities. Wall thickness was normal. DOPPLER: The ratio of systolic to diastolic pulmonary  vein flow was reduced (diastolic predominant). Features were consistent with a pseudonormal left ventricular filling pattern, with concomitant abnormal relaxation and increased filling pressure (grade 2 diastolic dysfunction).    RIGHT VENTRICLE: The size was normal. Systolic function was normal. Wall thickness was normal.    LEFT ATRIUM: The atrium was markedly dilated.    RIGHT ATRIUM: Size was normal.    MITRAL VALVE: Valve structure was normal. There was normal leaflet separation. DOPPLER: The transmitral velocity was within the normal range. There was no evidence for stenosis. There was mild to moderate regurgitation.    AORTIC VALVE: The valve was trileaflet. Leaflets exhibited normal thickness and normal cuspal separation. DOPPLER: Transaortic velocity was within the normal range. There was no evidence for stenosis. There was mild regurgitation.    TRICUSPID VALVE: The valve structure was normal.  There was normal leaflet separation. DOPPLER: The transtricuspid velocity was within the normal range. There was no evidence for stenosis. There was mild regurgitation. Pulmonary artery  systolic pressure was moderately to markedly increased. Estimated peak PA pressure was 60 mmHg.    PULMONIC VALVE: Leaflets exhibited normal thickness, no calcification, and normal cuspal separation. DOPPLER: The transpulmonic velocity was within the normal range. There was no significant regurgitation.    PERICARDIUM: A small pericardial effusion was identified circumferential to the heart. The fluid exhibited a fibrinous appearance. The pericardium was normal in appearance.    AORTA: The root exhibited normal size.    SYSTEMIC VEINS: IVC: The inferior vena cava was normal in size.    SYSTEM MEASUREMENT TABLES    2D  %FS: 39.72 %  Ao Diam: 3.28 cm  EDV(Teich): 127.28 ml  EF(Teich): 69.95 %  ESV(Teich): 38.25 ml  IVSd: 0.88 cm  LA Diam: 4.8 cm  LAAs A2C: 38.98 cm2  LAAs A4C: 33.86 cm2  LAESV A-L A2C: 166.64 ml  LAESV A-L A4C: 132.15 ml  LAESV Index (A-L): 83.14 ml/m2  LAESV MOD A2C: 158.66 ml  LAESV MOD A4C: 124.34 ml  LAESV(A-L): 152.15 ml  LAESV(MOD BP): 143.9 ml  LALs A2C: 7.74 cm  LALs A4C: 7.36 cm  LVEDV MOD A4C: 99.9 ml  LVEF MOD A4C: 84.12 %  LVESV MOD A4C: 15.86 ml  LVIDd: 5.16 cm  LVIDs: 3.11 cm  LVLd A4C: 7.53 cm  LVLs A4C: 5.77 cm  LVPWd: 0.91 cm  RVIDd: 4.1 cm  SV MOD A4C: 84.04 ml  SV(Teich): 89.03 ml    CW  AV Env.Ti: 298.76 ms  AV MaxPG: 10.27 mmHg  AV VTI: 28.82 cm  AV Vmax: 1.6 m/s  AV Vmean: 0.96 m/s  AV meanP.41 mmHg  TR MaxP.9 mmHg  TR Vmax: 3.77 m/s    MM  TAPSE: 2.29 cm    PW  E' Sept: 0.05 m/s  E/E' Sept: 23.56  LVOT Env.Ti: 300.67 ms  LVOT VTI: 25.48 cm  LVOT Vmax: 1.23 m/s  LVOT Vmean: 0.85 m/s  LVOT maxP.12 mmHg  LVOT meanPG: 3.26 mmHg  MV A Matteo: 0.99 m/s  MV Dec Robertson: 6.89 m/s2  MV DecT: 186.95 ms  MV E Matteo: 1.29 m/s  MV E/A Ratio: 1.3  MV PHT: 54.22 ms  MVA By PHT: 4.06 cm2    Intersocietal  Commission Accredited Echocardiography Laboratory    Prepared and electronically signed by    Lit Jane MD  Signed 15-Oct-2020 17:48:51    Meds/Allergies   current meds:   Current Facility-Administered Medications:     acetaminophen (TYLENOL) tablet 650 mg, Q6H PRN    atorvastatin (LIPITOR) tablet 40 mg, Daily    budesonide (PULMICORT) inhalation solution 0.5 mg, Q12H    bumetanide (BUMEX) injection 2 mg, BID    diltiazem (CARDIZEM CD) 24 hr capsule 120 mg, Daily    donepezil (ARICEPT) tablet 5 mg, HS    insulin lispro (HumALOG/ADMELOG) 100 units/mL subcutaneous injection 1-5 Units, TID AC **AND** Fingerstick Glucose (POCT), TID AC    ipratropium-albuterol (DUO-NEB) 0.5-2.5 mg/3 mL inhalation solution 3 mL, Q6H PRN    latanoprost (XALATAN) 0.005 % ophthalmic solution 1 drop, HS    loratadine (CLARITIN) tablet 10 mg, Daily    magnesium Oxide (MAG-OX) tablet 400 mg, Daily    magnesium sulfate 2 g/50 mL IVPB (premix) 2 g, Once, Last Rate: 2 g (10/25/24 0953)    metoprolol succinate (TOPROL-XL) 24 hr tablet 50 mg, BID    potassium chloride (Klor-Con M20) CR tablet 20 mEq, BID    rivaroxaban (XARELTO) tablet 15 mg, Daily With Breakfast    Medications Prior to Admission:     Accu-Chek FastClix Lancets MISC    Accu-Chek SmartView test strip    acetaminophen (TYLENOL) 500 mg tablet    atorvastatin (LIPITOR) 40 mg tablet    AYR SALINE NASAL DROPS NA    budesonide (Pulmicort) 0.5 mg/2 mL nebulizer solution    diltiazem (CARDIZEM CD) 120 mg 24 hr capsule    diphenhydrAMINE (BENADRYL) 25 mg tablet    donepezil (ARICEPT) 5 mg tablet    glipiZIDE (GLUCOTROL) 10 mg tablet    ipratropium-albuterol (DUO-NEB) 0.5-2.5 mg/3 mL nebulizer solution    latanoprost (XALATAN) 0.005 % ophthalmic solution    loratadine (CLARITIN) 10 mg tablet    magnesium (MAGTAB) 84 MG (7MEQ) TBCR    metFORMIN (GLUCOPHAGE) 500 mg tablet    metoprolol succinate (TOPROL-XL) 50 mg 24 hr tablet    rivaroxaban (Xarelto) 15 mg tablet    torsemide (DEMADEX) 10 mg  tablet    Assessment:  Principal Problem:    Acute heart failure with preserved ejection fraction ([facility])  Active Problems:    Hyponatremia    Essential hypertension    Type 2 diabetes mellitus, without long-term current use of insulin ([facility])    COPD (chronic obstructive pulmonary disease) ([facility])    Class 1 obesity due to excess calories with serious comorbidity and body mass index (BMI) of 33.0 to 33.9 in adult    Chronic atrial fibrillation ([facility])    Hypomagnesemia    Counseling / Coordination of Care  Total floor / unit time spent today 20 minutes.  Greater than 50% of total time was spent with the patient and / or family counseling and / or coordination of care.      ** Please Note: Dragon 360 Dictation voice to text software may have been used in the creation of this document. **

## 2024-10-25 NOTE — ASSESSMENT & PLAN NOTE
Wt Readings from Last 3 Encounters:   10/25/24 69.1 kg (152 lb 5.4 oz)   10/23/24 73.9 kg (163 lb)   10/15/24 74.4 kg (164 lb)   Patient presented volume overloaded on 10/23 with volume overload on chest x-ray, elevated BNP 1154  Outpatient diuretic torsemide 40mg BID; recent changes  No dietary indiscretions and her A-fib rates have been well-controlled.  Possible exacerbation is due to ineffective diuretic dosing.  She was started on Bumex IV 2 mg twice daily  I/O: Minimal urine output documented, overall -430 mL  Weight: Down to 152 on standing scale today from 157 on admission  Creatinine: Stable 1.02 within baseline  Remains on 2 L nasal cannula.

## 2024-10-25 NOTE — ASSESSMENT & PLAN NOTE
Chronic hypoxemic respiratory failure, on 2L NC O2 at baseline during the day and 4L overnight  Currently on 3L O2 nc supplemental oxygenation, continue to titrate to baseline

## 2024-10-25 NOTE — ASSESSMENT & PLAN NOTE
Getting intravenous magnesium sulfate this morning    Plan: Patient currently getting intravenous Bumex 2 mg twice daily.  Minimal output documented.  I did discuss with RN and she feels that they were not documenting her occurrences as she there was no hat in the toilet.  She will start to do this accurately.  She is still volume overloaded on exam with Rales, minimal lower extremity edema.  Creatinine stable within baseline at 1.02.  Will continue intravenous Bumex 2 mg twice daily today  BMP in a.m.  Start strict I's and O's  Daily weight in a.m.  Continue remainder of medications for her atrial fibrillation.  Electrolytes being replaced by primary team.

## 2024-10-25 NOTE — PROGRESS NOTES
Progress Note - Hospitalist   Name: Camryn Roblero 84 y.o. female I MRN: [id number]  Unit/Bed#: S -01 I Date of Admission: 10/23/2024   Date of Service: 10/25/2024 I Hospital Day: 2    Assessment & Plan  Acute heart failure with preserved ejection fraction (HCC)  Wt Readings from Last 3 Encounters:   10/25/24 69.1 kg (152 lb 5.4 oz)   10/23/24 73.9 kg (163 lb)   10/15/24 74.4 kg (164 lb)     Patient presents with 10 pound weight gain above dry weight despite increase in torsemide over the last 2 weeks.  CXR: Moderate pulmonary vascular congestion. Small right greater than left bilateral pleural effusions.   ECHO 10/23 with EF 65%, There is both systolic and diastolic flattening of the interventricular septum consistent with right ventricle pressure and volume overload. Right ventricular cavity size is moderately dilated. Systolic function is moderately reduced. Changes noted when compared to prior ECHO last year. Changes include: Right ventricular dilation is notable, with an increase in mitral regurgitation, and pulmonary arterial pressures.  Lasix 80mg IV x 1 given in the ER, given additional 20mg IV Lasix 10/23  24-hour I&O balance overall -430 ml; however, doubt accuracy in charting  Dry weight around 154-155 pounds. Admission weight 162 lb; now at 152 lb this morning.  Appreciate Cardiology recommendations  Started on IV Bumex 2 mg BID 10/23 --diuresing well, consider change to p.o. dosing tomorrow   Daily weights, I/Os  Continue 1,200mL fluid restriction  Hyponatremia  Chronic, baseline appears to be 131-134  Sodium 139 this morning  Hypomagnesemia  Magnesium 1.8; repleted with 2g IV Mg today  Continue PO Mg  Chronic atrial fibrillation (HCC)  Rate controlled with Cardizem and metoprolol  Continue pta Xarelto for anticoagulation  Bedside telemetry with A.Fib, HR 80s  Essential hypertension  Cont BB/Cardizem  /85  Type 2 diabetes mellitus, without long-term current use of insulin (HCC)  Lab Results    Component Value Date    HGBA1C 7.4 (H) 10/23/2024     Recent Labs     10/24/24  1159 10/24/24  1738 10/24/24  2052 10/25/24  0735   POCGLU 185* 216* 199* 156*     Blood Sugar Average: Last 72 hrs:  (P) 163.5854292268715741  Hold home glipizide/Metformin for now  SSI/accuchecks/diabetic diet  COPD (chronic obstructive pulmonary disease) (HCC)  Chronic hypoxemic respiratory failure, on 2L NC O2 at baseline during the day and 4L overnight  Currently on 3L O2 nc supplemental oxygenation, continue to titrate to baseline  Class 1 obesity due to excess calories with serious comorbidity and body mass index (BMI) of 33.0 to 33.9 in adult  Encourage weight loss, dietary and lifestyle modifications    VTE Pharmacologic Prophylaxis: VTE Score: 5 High Risk (Score >/= 5) - Pharmacological DVT Prophylaxis Ordered: rivaroxaban (Xarelto). Sequential Compression Devices Ordered.    Mobility:   Basic Mobility Inpatient Raw Score: 11  JH-HLM Goal: 4: Move to chair/commode  JH-HLM Achieved: 4: Move to chair/commode  JH-HLM Goal NOT achieved. Continue with multidisciplinary rounding and encourage appropriate mobility to improve upon JH-HLM goals.    Patient Centered Rounds: I performed bedside rounds with nursing staff today.     Education and Discussions with Family / Patient: Updated  (daughter, Naomie) via phone.    Current Length of Stay: 2 day(s)  Current Patient Status: Inpatient   Certification Statement: The patient will continue to require additional inpatient hospital stay due to requires de-escalation of diuretics from IV to p.o., requires titration of oxygen back to baseline, and dispo planning.  Discharge Plan: Anticipate discharge in 24-48 hrs to home.    Code Status: Level 1 - Full Code    Subjective   Patient seen and examined at bedside offers no new complaints.  No acute events overnight, patient denies any shortness of breath however endorses GOMEZ.  Denies any chest pain or palpitations.    Objective  :  Temp:  [97.6 °F (36.4 °C)-98.3 °F (36.8 °C)] 97.6 °F (36.4 °C)  HR:  [66-81] 69  BP: (128-143)/(74-85) 143/85  Resp:  [16-19] 16  SpO2:  [97 %-100 %] 100 %  O2 Device: Nasal cannula  Nasal Cannula O2 Flow Rate (L/min):  [4 L/min] 4 L/min    Body mass index is 30.77 kg/m².     Input and Output Summary (last 24 hours):     Intake/Output Summary (Last 24 hours) at 10/25/2024 0923  Last data filed at 10/25/2024 0852  Gross per 24 hour   Intake 1200 ml   Output 500 ml   Net 700 ml       Physical Exam  Vitals reviewed.   Constitutional:       General: She is not in acute distress.     Appearance: Normal appearance. She is well-developed. She is not ill-appearing or toxic-appearing.   HENT:      Head: Normocephalic and atraumatic.      Mouth/Throat:      Mouth: Mucous membranes are moist.      Pharynx: Oropharynx is clear.   Eyes:      Conjunctiva/sclera: Conjunctivae normal.   Cardiovascular:      Rate and Rhythm: Normal rate. Rhythm irregular.      Pulses: Normal pulses.      Heart sounds: Murmur heard.   Pulmonary:      Effort: Pulmonary effort is normal. No respiratory distress.      Breath sounds: Normal breath sounds. No wheezing or rhonchi.   Abdominal:      Palpations: Abdomen is soft.      Tenderness: There is no abdominal tenderness.   Musculoskeletal:         General: No swelling.      Cervical back: Neck supple.      Right lower le+ Pitting Edema present.      Left lower le+ Pitting Edema present.   Skin:     General: Skin is warm and dry.      Findings: Wound present.   Neurological:      Mental Status: She is alert and oriented to person, place, and time. Mental status is at baseline.   Psychiatric:         Mood and Affect: Mood normal.       Lines/Drains:  Lines/Drains/Airways       Active Status       Name Placement date Placement time Site Days    External Urinary Catheter 10/23/24  1900  -- 1                  Telemetry:  Telemetry Orders (From admission, onward)               24 Hour Telemetry  Monitoring  Continuous x 24 Hours (Telem)        Expiring   Question:  Reason for 24 Hour Telemetry  Answer:  Decompensated CHF- and any one of the following: continuous diuretic infusion or total diuretic dose >200 mg daily, associated electrolyte derangement (I.e. K < 3.0), ionotropic drip (continuous infusion), hx of ventricular arrhythmia, or new EF < 35%                     Telemetry Reviewed: Atrial fibrillation. HR averaging 80s  Indication for Continued Telemetry Use: Acute CHF on >200 mg lasix/day or equivalent dose or with new reduced EF.                Lab Results: I have reviewed the following results:   Results from last 7 days   Lab Units 10/25/24  0531 10/24/24  0513 10/23/24  1107   WBC Thousand/uL 7.68   < > 8.42   HEMOGLOBIN g/dL 12.8   < > 13.1   HEMATOCRIT % 42.6   < > 41.8   PLATELETS Thousands/uL 237   < > 246   SEGS PCT %  --   --  86*   LYMPHO PCT %  --   --  7*   MONO PCT %  --   --  6   EOS PCT %  --   --  1    < > = values in this interval not displayed.     Results from last 7 days   Lab Units 10/25/24  0531 10/24/24  0513 10/23/24  1107   SODIUM mmol/L 139   < > 132*   POTASSIUM mmol/L 4.1   < > 4.2   CHLORIDE mmol/L 96   < > 90*   CO2 mmol/L 41*   < > 34*   BUN mg/dL 30*   < > 34*   CREATININE mg/dL 1.02   < > 1.04   ANION GAP mmol/L 2*   < > 8   CALCIUM mg/dL 9.4   < > 9.7   ALBUMIN g/dL  --   --  4.0   TOTAL BILIRUBIN mg/dL  --   --  0.98   ALK PHOS U/L  --   --  90   ALT U/L  --   --  28   AST U/L  --   --  77*   GLUCOSE RANDOM mg/dL 160*   < > 189*    < > = values in this interval not displayed.         Results from last 7 days   Lab Units 10/25/24  0735 10/24/24  2052 10/24/24  1738 10/24/24  1159 10/24/24  0735 10/23/24  2127 10/23/24  1835   POC GLUCOSE mg/dl 156* 199* 216* 185* 146* 136 109     Results from last 7 days   Lab Units 10/23/24  1700   HEMOGLOBIN A1C % 7.4*           Recent Cultures (last 7 days):         Imaging Results Review: No pertinent imaging studies  reviewed.  Other Study Results Review: No additional pertinent studies reviewed.    Last 24 Hours Medication List:     Current Facility-Administered Medications:     acetaminophen (TYLENOL) tablet 650 mg, Q6H PRN    atorvastatin (LIPITOR) tablet 40 mg, Daily    budesonide (PULMICORT) inhalation solution 0.5 mg, Q12H    bumetanide (BUMEX) injection 2 mg, BID    diltiazem (CARDIZEM CD) 24 hr capsule 120 mg, Daily    donepezil (ARICEPT) tablet 5 mg, HS    insulin lispro (HumALOG/ADMELOG) 100 units/mL subcutaneous injection 1-5 Units, TID AC **AND** Fingerstick Glucose (POCT), TID AC    ipratropium-albuterol (DUO-NEB) 0.5-2.5 mg/3 mL inhalation solution 3 mL, Q6H PRN    latanoprost (XALATAN) 0.005 % ophthalmic solution 1 drop, HS    loratadine (CLARITIN) tablet 10 mg, Daily    magnesium Oxide (MAG-OX) tablet 400 mg, Daily    magnesium sulfate 2 g/50 mL IVPB (premix) 2 g, Once    metoprolol succinate (TOPROL-XL) 24 hr tablet 50 mg, BID    potassium chloride (Klor-Con M20) CR tablet 20 mEq, BID    rivaroxaban (XARELTO) tablet 15 mg, Daily With Breakfast    Administrative Statements   Today, Patient Was Seen By: DEANNE Baez      **Please Note: This note may have been constructed using a voice recognition system.**

## 2024-10-25 NOTE — CASE MANAGEMENT
Case Management Discharge Planning Note    Patient name Camryn Roblero  Location S /S -01 MRN [id number]  : 1940 Date 10/25/2024       Current Admission Date: 10/23/2024  Current Admission Diagnosis:Acute heart failure with preserved ejection fraction (HCC)   Patient Active Problem List    Diagnosis Date Noted Date Diagnosed    Dependence on supplemental oxygen 10/23/2024     Chronic atrial fibrillation (HCC) 10/23/2024     Hypomagnesemia 10/23/2024     Pancreas cyst 2024     History of colon polyps 2024     Herpes zoster without complication 2024     Pulmonary emphysema, unspecified emphysema type (HCC) 2024     Leukocytosis 2024     Mild protein-calorie malnutrition (HCC) 2024     Hyperkalemia 2024     Abnormal CT scan 2024     Impaired memory 2024     Allergic drug rash 2024     Elevated troponin 2023     Tachycardia 2023     Atrial flutter (HCC) 2023     Hyperlipidemia 10/21/2023     Seasonal allergic rhinitis 2023     Osteopenia 2022     Chronic pain of both knees 2021     Cataract of both eyes 2021     Class 1 obesity due to excess calories with serious comorbidity and body mass index (BMI) of 33.0 to 33.9 in adult 2021     Multiple pulmonary nodules 10/05/2020     Colon polyps 10/05/2020     TOM (obstructive sleep apnea) 2020     Acute heart failure with preserved ejection fraction (HCC) 2020     Persistent proteinuria 10/25/2019     Hyponatremia 2016     Essential hypertension 2016     Type 2 diabetes mellitus, without long-term current use of insulin (HCC) 2016     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 - [place], [place] - [address]  [address] [address]  Phone: [phone] Fax: [phone]    [facility] [address] [place] [address] [address] [address]  Phone: [phone] Fax: [phone]    Primary Care Provider: Abiel Seals MD    Primary Insurance: MEDICARE  Secondary Insurance: AARP    DISCHARGE DETAILS:       Additional Comments: CM met with Pt to discuss  acute vs sub-acute rehab. Pt states thinks she would do better at sub-acute. Pt requesting CM contact her daughter to discuss.  TC to Pts daughter Naomie who is agreeable to acute rehab referrals as well as referral for [facility], [facility], and [facility], submitted in [facility].     2:55pm update: TC to Naomie to review accepting rehab choices. Naomie states that she will get back to this CM with a decision tomorrow.

## 2024-10-25 NOTE — ASSESSMENT & PLAN NOTE
Wt Readings from Last 3 Encounters:   10/25/24 69.1 kg (152 lb 5.4 oz)   10/23/24 73.9 kg (163 lb)   10/15/24 74.4 kg (164 lb)     Patient presents with 10 pound weight gain above dry weight despite increase in torsemide over the last 2 weeks.  CXR: Moderate pulmonary vascular congestion. Small right greater than left bilateral pleural effusions.   ECHO 10/23 with EF 65%, There is both systolic and diastolic flattening of the interventricular septum consistent with right ventricle pressure and volume overload. Right ventricular cavity size is moderately dilated. Systolic function is moderately reduced. Changes noted when compared to prior ECHO last year. Changes include: Right ventricular dilation is notable, with an increase in mitral regurgitation, and pulmonary arterial pressures.  Lasix 80mg IV x 1 given in the ER, given additional 20mg IV Lasix 10/23  24-hour I&O balance overall -430 ml; however, doubt accuracy in charting  Dry weight around 154-155 pounds. Admission weight 162 lb; now at 152 lb this morning.  Appreciate Cardiology recommendations  Started on IV Bumex 2 mg BID 10/23 --diuresing well, consider change to p.o. dosing tomorrow   Daily weights, I/Os  Continue 1,200mL fluid restriction

## 2024-10-25 NOTE — PLAN OF CARE
Problem: Prexisting or High Potential for Compromised Skin Integrity  Goal: Skin integrity is maintained or improved  Description: INTERVENTIONS:  - Identify patients at risk for skin breakdown  - Assess and monitor skin integrity  - Assess and monitor nutrition and hydration status  - Monitor labs   - Assess for incontinence   - Turn and reposition patient  - Assist with mobility/ambulation  - Relieve pressure over bony prominences  - Avoid friction and shearing  - Provide appropriate hygiene as needed including keeping skin clean and dry  - Evaluate need for skin moisturizer/barrier cream  - Collaborate with interdisciplinary team   - Patient/family teaching  - Consider wound care consult   Outcome: Progressing     Problem: Potential for Falls  Goal: Patient will remain free of falls  Description: INTERVENTIONS:  - Educate patient/family on patient safety including physical limitations  - Instruct patient to call for assistance with activity   - Consult OT/PT to assist with strengthening/mobility   - Keep Call bell within reach  - Keep bed low and locked with side rails adjusted as appropriate  - Keep care items and personal belongings within reach  - Initiate and maintain comfort rounds  - Make Fall Risk Sign visible to staff  - Offer Toileting every  Hours, in advance of need  - Initiate/Maintain alarm  - Obtain necessary fall risk management equipment:  - Apply yellow socks and bracelet for high fall risk patients  - Consider moving patient to room near nurses station  Outcome: Progressing     Problem: Nutrition/Hydration-ADULT  Goal: Nutrient/Hydration intake appropriate for improving, restoring or maintaining nutritional needs  Description: Monitor and assess patient's nutrition/hydration status for malnutrition. Collaborate with interdisciplinary team and initiate plan and interventions as ordered.  Monitor patient's weight and dietary intake as ordered or per policy. Utilize nutrition screening tool and  intervene as necessary. Determine patient's food preferences and provide high-protein, high-caloric foods as appropriate.     INTERVENTIONS:  - Monitor oral intake, urinary output, labs, and treatment plans  - Assess nutrition and hydration status and recommend course of action  - Evaluate amount of meals eaten  - Assist patient with eating if necessary   - Allow adequate time for meals  - Recommend/ encourage appropriate diets, oral nutritional supplements, and vitamin/mineral supplements  - Order, calculate, and assess calorie counts as needed  - Recommend, monitor, and adjust tube feedings and TPN/PPN based on assessed needs  - Assess need for intravenous fluids  - Provide specific nutrition/hydration education as appropriate  - Include patient/family/caregiver in decisions related to nutrition  Outcome: Progressing

## 2024-10-25 NOTE — PLAN OF CARE
Problem: PHYSICAL THERAPY ADULT  Goal: Performs mobility at highest level of function for planned discharge setting.  See evaluation for individualized goals.  Description: Treatment/Interventions: Functional transfer training, LE strengthening/ROM, Elevations, Therapeutic exercise, Endurance training, Cognitive reorientation, Patient/family training, Equipment eval/education, Bed mobility, Gait training, Compensatory technique education          See flowsheet documentation for full assessment, interventions and recommendations.  Note:    Problem List: Decreased strength, Decreased endurance, Impaired balance, Decreased mobility, Decreased safety awareness, Obesity, Decreased skin integrity  Assessment: Pt presents with chief complaints of shortness of breath, weight gain, and worsening lower extremity edema. Dx: acute heart failure, hyponatremia, hypomagnesemia, chronic a-fib, essential HTN, Dm, and COPD. order placed for PT eval and tx, w/ activity order of up as tolerated. pt presents w/ comorbidities of anemia, arthritis, asthma, DM, COPD, hyperlipidemia, ILD, obesity, and osteopenia and personal factors of advanced age, mobilizing w/ assistive device, stair(s) to enter home, limited home support, and inability to perform IADLs. pt presents w/ weakness, decreased endurance, impaired balance, gait deviations, decreased safety awareness, fall risk, LE edema, and impaired skin integrity. these impairments are evident in findings from physical examination (weakness, impaired skin integrity, and edema of extremities), mobility assessment (need for min assist w/ all phases of mobility when usually mobilizing independently, tolerance to only 15 feet of ambulation, and need for cueing for mobility and breathing technique), and Barthel Index: 45/100. pt needed input for task focus and mobility technique/safety. pt is at risk for falls due to physical and safety awareness deficits. pt's clinical presentation is  unstable/unpredictable (evident in poor blood sugar control, need for assist w/ all phases of mobility when usually mobilizing independently, tolerance to only 15 feet of ambulation, need for supplemental oxygen in order to maintain oxygen saturation, and need for input for task focus and mobility technique/safety). pt needs inpatient PT tx to improve mobility deficits and progress mobility training as appropriate.        Rehab Resource Intensity Level, PT: I (Maximum Resource Intensity)    See flowsheet documentation for full assessment.

## 2024-10-25 NOTE — ASSESSMENT & PLAN NOTE
Rates are well-controlled on vital sign check and exam continue diltiazem 120 mg daily, metoprolol succinate 50 mg twice daily and Xarelto 15 mg daily

## 2024-10-25 NOTE — ASSESSMENT & PLAN NOTE
Lab Results   Component Value Date    HGBA1C 7.4 (H) 10/23/2024     Recent Labs     10/24/24  1159 10/24/24  1738 10/24/24  2052 10/25/24  0735   POCGLU 185* 216* 199* 156*     Blood Sugar Average: Last 72 hrs:  (P) [id number]  Hold home glipizide/Metformin for now  SSI/accuchecks/diabetic diet

## 2024-10-25 NOTE — ASSESSMENT & PLAN NOTE
Rate controlled with Cardizem and metoprolol  Continue pta Xarelto for anticoagulation  Bedside telemetry with A.Fib, HR 80s

## 2024-10-26 PROBLEM — R26.2 AMBULATORY DYSFUNCTION: Status: ACTIVE | Noted: 2024-10-26

## 2024-10-26 PROBLEM — E83.42 HYPOMAGNESEMIA: Status: RESOLVED | Noted: 2024-10-23 | Resolved: 2024-10-26

## 2024-10-26 LAB
ANION GAP SERPL CALCULATED.3IONS-SCNC: 7 MMOL/L (ref 4–13)
BUN SERPL-MCNC: 31 MG/DL (ref 5–25)
CALCIUM SERPL-MCNC: 9.2 MG/DL (ref 8.4–10.2)
CHLORIDE SERPL-SCNC: 98 MMOL/L (ref 96–108)
CO2 SERPL-SCNC: 36 MMOL/L (ref 21–32)
CREAT SERPL-MCNC: 0.93 MG/DL (ref 0.6–1.3)
GFR SERPL CREATININE-BSD FRML MDRD: 57 ML/MIN/1.73SQ M
GLUCOSE SERPL-MCNC: 168 MG/DL (ref 65–140)
GLUCOSE SERPL-MCNC: 179 MG/DL (ref 65–140)
GLUCOSE SERPL-MCNC: 245 MG/DL (ref 65–140)
GLUCOSE SERPL-MCNC: 277 MG/DL (ref 65–140)
GLUCOSE SERPL-MCNC: 295 MG/DL (ref 65–140)
MAGNESIUM SERPL-MCNC: 1.9 MG/DL (ref 1.9–2.7)
POTASSIUM SERPL-SCNC: 3.9 MMOL/L (ref 3.5–5.3)
SODIUM SERPL-SCNC: 141 MMOL/L (ref 135–147)

## 2024-10-26 PROCEDURE — 82948 REAGENT STRIP/BLOOD GLUCOSE: CPT

## 2024-10-26 PROCEDURE — 94760 N-INVAS EAR/PLS OXIMETRY 1: CPT

## 2024-10-26 PROCEDURE — 99232 SBSQ HOSP IP/OBS MODERATE 35: CPT | Performed by: INTERNAL MEDICINE

## 2024-10-26 PROCEDURE — 83735 ASSAY OF MAGNESIUM: CPT

## 2024-10-26 PROCEDURE — 99232 SBSQ HOSP IP/OBS MODERATE 35: CPT | Performed by: PHYSICIAN ASSISTANT

## 2024-10-26 PROCEDURE — 94640 AIRWAY INHALATION TREATMENT: CPT

## 2024-10-26 PROCEDURE — 80048 BASIC METABOLIC PNL TOTAL CA: CPT

## 2024-10-26 RX ADMIN — BUMETANIDE 2 MG: 0.25 INJECTION INTRAMUSCULAR; INTRAVENOUS at 17:54

## 2024-10-26 RX ADMIN — Medication 400 MG: at 08:47

## 2024-10-26 RX ADMIN — LATANOPROST 1 DROP: 50 SOLUTION OPHTHALMIC at 21:53

## 2024-10-26 RX ADMIN — DONEPEZIL HYDROCHLORIDE 5 MG: 5 TABLET ORAL at 21:53

## 2024-10-26 RX ADMIN — INSULIN LISPRO 1 UNITS: 100 INJECTION, SOLUTION INTRAVENOUS; SUBCUTANEOUS at 08:46

## 2024-10-26 RX ADMIN — DILTIAZEM HYDROCHLORIDE 120 MG: 120 CAPSULE, COATED, EXTENDED RELEASE ORAL at 08:47

## 2024-10-26 RX ADMIN — POTASSIUM CHLORIDE 20 MEQ: 1500 TABLET, EXTENDED RELEASE ORAL at 17:54

## 2024-10-26 RX ADMIN — ATORVASTATIN CALCIUM 40 MG: 40 TABLET, FILM COATED ORAL at 08:47

## 2024-10-26 RX ADMIN — LORATADINE 10 MG: 10 TABLET ORAL at 08:47

## 2024-10-26 RX ADMIN — INSULIN LISPRO 2 UNITS: 100 INJECTION, SOLUTION INTRAVENOUS; SUBCUTANEOUS at 12:38

## 2024-10-26 RX ADMIN — INSULIN LISPRO 3 UNITS: 100 INJECTION, SOLUTION INTRAVENOUS; SUBCUTANEOUS at 17:53

## 2024-10-26 RX ADMIN — RIVAROXABAN 15 MG: 15 TABLET, FILM COATED ORAL at 08:48

## 2024-10-26 RX ADMIN — BUMETANIDE 2 MG: 0.25 INJECTION INTRAMUSCULAR; INTRAVENOUS at 08:46

## 2024-10-26 RX ADMIN — BUDESONIDE 0.5 MG: 0.5 INHALANT ORAL at 19:27

## 2024-10-26 RX ADMIN — METOPROLOL SUCCINATE 50 MG: 50 TABLET, EXTENDED RELEASE ORAL at 08:47

## 2024-10-26 RX ADMIN — POTASSIUM CHLORIDE 20 MEQ: 1500 TABLET, EXTENDED RELEASE ORAL at 08:47

## 2024-10-26 RX ADMIN — BUDESONIDE 0.5 MG: 0.5 INHALANT ORAL at 07:54

## 2024-10-26 RX ADMIN — METOPROLOL SUCCINATE 50 MG: 50 TABLET, EXTENDED RELEASE ORAL at 17:53

## 2024-10-26 NOTE — CASE MANAGEMENT
Case Management Discharge Planning Note    Patient name Camryn Roblero  Location S /S -01 MRN [id number]  : 1940 Date 10/26/2024       Current Admission Date: 10/23/2024  Current Admission Diagnosis:Acute heart failure with preserved ejection fraction (HCC)   Patient Active Problem List    Diagnosis Date Noted Date Diagnosed    Dependence on supplemental oxygen 10/23/2024     Chronic atrial fibrillation (HCC) 10/23/2024     Hypomagnesemia 10/23/2024     Pancreas cyst 2024     History of colon polyps 2024     Herpes zoster without complication 2024     Pulmonary emphysema, unspecified emphysema type (HCC) 2024     Leukocytosis 2024     Mild protein-calorie malnutrition (HCC) 2024     Hyperkalemia 2024     Abnormal CT scan 2024     Impaired memory 2024     Allergic drug rash 2024     Elevated troponin 2023     Tachycardia 2023     Atrial flutter (HCC) 2023     Hyperlipidemia 10/21/2023     Seasonal allergic rhinitis 2023     Osteopenia 2022     Chronic pain of both knees 2021     Cataract of both eyes 2021     Class 1 obesity due to excess calories with serious comorbidity and body mass index (BMI) of 33.0 to 33.9 in adult 2021     Multiple pulmonary nodules 10/05/2020     Colon polyps 10/05/2020     TOM (obstructive sleep apnea) 2020     Acute heart failure with preserved ejection fraction (HCC) 2020     Persistent proteinuria 10/25/2019     Hyponatremia 2016     Essential hypertension 2016     Type 2 diabetes mellitus, without long-term current use of insulin (HCC) 2016     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     COPD (chronic obstructive pulmonary disease) ([facility]) 2016       LOS (days): 3  Geometric Mean LOS (GMLOS) (days): 3.9  Days to GMLOS:1     OBJECTIVE:  Risk of Unplanned Readmission Score: 16.89         Current admission  status: Inpatient   Preferred Pharmacy:   [facility] - [place], [place] - [address]  [address] [address]  Phone: [phone] Fax: [phone]    [facility] [place] - [address]  [address] [address]  Phone: [phone] Fax: [phone]    Primary Care Provider: Abiel Seals MD    Primary Insurance: MEDICARE  Secondary Insurance: [facility]    DISCHARGE DETAILS:  CM spoke with patient's daughter Naomie at . Introduced self and role. Patient's daughter reaching out to discuss preference for inpatient rehab at discharge. Daughter aware that [facility] and [facility] able to offer an inpatient rehab bed. [facility] also able to offer an acute rehab bed. Daughter stated patient's preference is [facility]. Patient feels at this time acute rehab would be too much for her. Daughter aware CM will reach out to [facility] and reserve bed. Daughter expressed understanding and aware that patient is not medically stable for discharge today. All questions/concerns answered at this time.     [facility] reserved in [place]. Able to offer an inpatient rehab bed this Monday 10/28/24.

## 2024-10-26 NOTE — ASSESSMENT & PLAN NOTE
Lab Results   Component Value Date    HGBA1C 7.4 (H) 10/23/2024     Recent Labs     10/25/24  1626 10/25/24  2131 10/26/24  0810 10/26/24  1114   POCGLU 196* 231* 168* 245*     Blood Sugar Average: Last 72 hrs:  (P) 184.25  Hold home glipizide/Metformin for now  SSI/accuchecks/diabetic diet

## 2024-10-26 NOTE — ASSESSMENT & PLAN NOTE
Wt Readings from Last 3 Encounters:   10/26/24 68.6 kg (151 lb 3.8 oz)   10/23/24 73.9 kg (163 lb)   10/15/24 74.4 kg (164 lb)   Patient presented volume overloaded on 10/23 with volume overload on chest x-ray, elevated BNP 1154  Outpatient diuretic torsemide 40mg BID; recent changes  No dietary indiscretions and her A-fib rates have been well-controlled.  Possible exacerbation is due to ineffective diuretic dosing.  She was started on Bumex IV 2 mg twice daily  I/O: Minimal urine output documented, overall -424 mL  Weight: Down to 151 on standing scale today from 157 on admission  Creatinine: Stable 0.93 within baseline  Remains on 2 L nasal cannula.   Continue current diuretic regimen another day and reevaluate for changes in dosing in a.m.  Strict I's and O's and daily weights

## 2024-10-26 NOTE — ARC ADMISSION
After review, patient is pre-approved for [facility] pending medical stability.  CM closed [facility] referral is [place] because patient chose another facility for her rehab.  Patient did not feel she could tolerate acute rehab so she chose a SNF facility.

## 2024-10-26 NOTE — ASSESSMENT & PLAN NOTE
By physical therapy and recommended for rehab.  Initially was recommended for acute level rehab however patient's daughter and patient do not feel she could tolerate this intensive rehab and therefore opting for subacute rehab.  Likely plan for [facility] but daughter works at [employer] [facility] so considering there as well

## 2024-10-26 NOTE — PROGRESS NOTES
Progress Note - Hospitalist   Name: Camryn Roblero 84 y.o. female I MRN: [id number]  Unit/Bed#: S -01 I Date of Admission: 10/23/2024   Date of Service: 10/26/2024 I Hospital Day: 3    Assessment & Plan  Acute heart failure with preserved ejection fraction (HCC)  Wt Readings from Last 3 Encounters:   10/26/24 68.6 kg (151 lb 3.8 oz)   10/23/24 73.9 kg (163 lb)   10/15/24 74.4 kg (164 lb)   Presented with 10 pound weight gain despite increase in torsemide over the last 2 weeks.  CXR: Moderate pulmonary vascular congestion. Small right greater than left bilateral pleural effusions.   ECHO 10/23 with EF 65%, There is both systolic and diastolic flattening of the interventricular septum consistent with right ventricle pressure and volume overload. Changes include: Right ventricular dilation is notable, with an increase in mitral regurgitation, and pulmonary arterial pressures.  Dry weight around 154-155 pounds. Admission weight 162 lb; now at 151 lb this morning.  Cardiology following - planning to continue Bumex IV 2 mg twice daily and monitor response.  Potential transition to oral diuretics soon.  Chronic atrial fibrillation (HCC)  Rate controlled with Cardizem and metoprolol  Continue Xarelto  Essential hypertension  Cont BB/Cardizem  /86  Type 2 diabetes mellitus, without long-term current use of insulin (HCC)  Lab Results   Component Value Date    HGBA1C 7.4 (H) 10/23/2024     Recent Labs     10/25/24  1626 10/25/24  2131 10/26/24  0810 10/26/24  1114   POCGLU 196* 231* 168* 245*     Blood Sugar Average: Last 72 hrs:  (P) 184.25  Hold home glipizide/Metformin for now  SSI/accuchecks/diabetic diet  Hyponatremia  Chronic, baseline appears to be 131-134  Sodium 141 this morning  Hypomagnesemia (Resolved: 10/26/2024)  Magnesium was 1.8 yesterday.  Repleted and currently 1.9  COPD (chronic obstructive pulmonary disease) ([facility])  With chronic hypoxemic respiratory failure, on 2L NC O2 at baseline during the day  and 4L overnight  No acute exacerbation  Class 1 obesity due to excess calories with serious comorbidity and body mass index (BMI) of 30.0 to 30.9 in adult  Encourage weight loss, dietary and lifestyle modifications  Body mass index is 30.55 kg/m².  Ambulatory dysfunction  By physical therapy and recommended for rehab.  Initially was recommended for acute level rehab however patient's daughter and patient do not feel she could tolerate this intensive rehab and therefore opting for subacute rehab.  Likely plan for [facility] but daughter works at [place] [facility] so considering there as well    VTE Pharmacologic Prophylaxis: VTE Score: 5 High Risk (Score >/= 5) - Pharmacological DVT Prophylaxis Ordered: rivaroxaban (Xarelto). Sequential Compression Devices Ordered.    Mobility:   Basic Mobility Inpatient Raw Score: 15  JH-HLM Goal: 4: Move to chair/commode  JH-HLM Achieved: 4: Move to chair/commode  JH-HLM Goal achieved. Continue to encourage appropriate mobility.    Patient Centered Rounds: I performed bedside rounds with nursing staff today.   Discussions with Specialists or Other Care Team Provider: nursing    Education and Discussions with Family / Patient: Updated  (daughter) via phone.    Current Length of Stay: 3 day(s)  Current Patient Status: Inpatient   Certification Statement: The patient will continue to require additional inpatient hospital stay due to IV diuretics  Discharge Plan: Anticipate discharge in 48-72 hrs to rehab facility.    Code Status: Level 1 - Full Code    Subjective   Reports that she is feeling better however not 100% back to baseline.  She does feel that her breathing is overall better.  She feels tired.  She does not think that she can tolerate acute rehab.    Objective :  Temp:  [97.6 °F (36.4 °C)-98.6 °F (37 °C)] 97.6 °F (36.4 °C)  HR:  [67-87] 87  BP: (128-145)/(66-86) 145/86  Resp:  [17] 17  SpO2:  [92 %-96 %] 93 %  O2 Device: Nasal cannula  Nasal Cannula  O2 Flow Rate (L/min):  [2 L/min] 2 L/min    Body mass index is 30.55 kg/m².     Input and Output Summary (last 24 hours):     Intake/Output Summary (Last 24 hours) at 10/26/2024 1238  Last data filed at 10/26/2024 1106  Gross per 24 hour   Intake 748 ml   Output 1484 ml   Net -736 ml       Physical Exam  Vitals and nursing note reviewed.   Constitutional:       General: She is not in acute distress.     Appearance: Normal appearance. She is obese. She is not diaphoretic.      Comments: Sitting in chair   HENT:      Head: Normocephalic and atraumatic.      Mouth/Throat:      Mouth: Mucous membranes are moist.   Cardiovascular:      Rate and Rhythm: Normal rate and regular rhythm.   Pulmonary:      Effort: Pulmonary effort is normal.      Breath sounds: Normal breath sounds. No wheezing or rales.      Comments: 3L NC  Abdominal:      General: Bowel sounds are normal.      Palpations: Abdomen is soft. There is no mass.      Tenderness: There is no abdominal tenderness. There is no guarding.   Musculoskeletal:      Right lower leg: Edema present.      Left lower leg: Edema present.   Skin:     General: Skin is warm and dry.   Neurological:      Mental Status: She is alert.   Psychiatric:         Mood and Affect: Mood normal.         Behavior: Behavior normal.           Lines/Drains:  Lines/Drains/Airways       Active Status       Name Placement date Placement time Site Days    External Urinary Catheter 10/23/24  1900  -- 2                  Telemetry:  Telemetry Orders (From admission, onward)               24 Hour Telemetry Monitoring  Continuous x 24 Hours ([place])        Expiring   Question:  Reason for 24 Hour Telemetry  Answer:  Decompensated CHF- and any one of the following: continuous diuretic infusion or total diuretic dose >200 mg daily, associated electrolyte derangement (I.e. K < 3.0), ionotropic drip (continuous infusion), hx of ventricular arrhythmia, or new EF < 35%                     Telemetry Reviewed:  Atrial fibrillation. HR averaging 80  Indication for Continued Telemetry Use: Acute CHF on >200 mg lasix/day or equivalent dose or with new reduced EF.                Lab Results: I have reviewed the following results:   Results from last 7 days   Lab Units 10/25/24  0531 10/24/24  0513 10/23/24  1107   WBC Thousand/uL 7.68   < > 8.42   HEMOGLOBIN g/dL 12.8   < > 13.1   HEMATOCRIT % 42.6   < > 41.8   PLATELETS Thousands/uL 237   < > 246   SEGS PCT %  --   --  86*   LYMPHO PCT %  --   --  7*   MONO PCT %  --   --  6   EOS PCT %  --   --  1    < > = values in this interval not displayed.     Results from last 7 days   Lab Units 10/26/24  0442 10/24/24  0513 10/23/24  1107   SODIUM mmol/L 141   < > 132*   POTASSIUM mmol/L 3.9   < > 4.2   CHLORIDE mmol/L 98   < > 90*   CO2 mmol/L 36*   < > 34*   BUN mg/dL 31*   < > 34*   CREATININE mg/dL 0.93   < > 1.04   ANION GAP mmol/L 7   < > 8   CALCIUM mg/dL 9.2   < > 9.7   ALBUMIN g/dL  --   --  4.0   TOTAL BILIRUBIN mg/dL  --   --  0.98   ALK PHOS U/L  --   --  90   ALT U/L  --   --  28   AST U/L  --   --  77*   GLUCOSE RANDOM mg/dL 179*   < > 189*    < > = values in this interval not displayed.         Results from last 7 days   Lab Units 10/26/24  1114 10/26/24  0810 10/25/24  2131 10/25/24  1626 10/25/24  1149 10/25/24  0735 10/24/24  2052 10/24/24  1738 10/24/24  1159 10/24/24  0735 10/23/24  2127 10/23/24  1835   POC GLUCOSE mg/dl 245* 168* 231* 196* 224* 156* 199* 216* 185* 146* 136 109     Results from last 7 days   Lab Units 10/23/24  1700   HEMOGLOBIN A1C % 7.4*           Recent Cultures (last 7 days):         Imaging Results Review: No pertinent imaging studies reviewed.  Other Study Results Review: No additional pertinent studies reviewed.    Last 24 Hours Medication List:     Current Facility-Administered Medications:     acetaminophen (TYLENOL) tablet 650 mg, Q6H PRN    atorvastatin (LIPITOR) tablet 40 mg, Daily    budesonide (PULMICORT) inhalation solution 0.5 mg,  Q12H    bumetanide (BUMEX) injection 2 mg, BID    diltiazem (CARDIZEM CD) 24 hr capsule 120 mg, Daily    donepezil (ARICEPT) tablet 5 mg, HS    insulin lispro (HumALOG/ADMELOG) 100 units/mL subcutaneous injection 1-5 Units, TID AC **AND** Fingerstick Glucose (POCT), TID AC    ipratropium-albuterol (DUO-NEB) 0.5-2.5 mg/3 mL inhalation solution 3 mL, Q6H PRN    latanoprost (XALATAN) 0.005 % ophthalmic solution 1 drop, HS    loratadine (CLARITIN) tablet 10 mg, Daily    magnesium Oxide (MAG-OX) tablet 400 mg, Daily    metoprolol succinate (TOPROL-XL) 24 hr tablet 50 mg, BID    potassium chloride (Klor-Con M20) CR tablet 20 mEq, BID    rivaroxaban (XARELTO) tablet 15 mg, Daily With Breakfast    Administrative Statements   Today, Patient Was Seen By: Mireille Montoya PA-C      **Please Note: This note may have been constructed using a voice recognition system.**

## 2024-10-26 NOTE — PROGRESS NOTES
Cardiology Progress Note - Camryn Roblero 84 y.o. female MRN: [id number]    Unit/Bed#: S -01 Encounter: [id number]        Assessment & Plan  Acute heart failure with preserved ejection fraction (HCC)  Wt Readings from Last 3 Encounters:   10/26/24 68.6 kg (151 lb 3.8 oz)   10/23/24 73.9 kg (163 lb)   10/15/24 74.4 kg (164 lb)   Patient presented volume overloaded on 10/23 with volume overload on chest x-ray, elevated BNP 1154  Outpatient diuretic torsemide 40mg BID; recent changes  No dietary indiscretions and her A-fib rates have been well-controlled.  Possible exacerbation is due to ineffective diuretic dosing.  She was started on Bumex IV 2 mg twice daily  I/O: Minimal urine output documented, overall -424 mL  Weight: Down to 151 on standing scale today from 157 on admission  Creatinine: Stable 0.93 within baseline  Remains on 2 L nasal cannula.   Continue current diuretic regimen another day and reevaluate for changes in dosing in a.m.  Strict I's and O's and daily weights    Hyponatremia  Improved, sodium 141 today with diuresis  Essential hypertension  Adequately controlled most recent blood pressure 132/77  Continue current regiment  COPD (chronic obstructive pulmonary disease) (HCC)  Home O2 2 L nasal cannula, currently at baseline requirements  Chronic atrial fibrillation (HCC)  Rates are well-controlled on vital sign check and exam continue diltiazem 120 mg daily, metoprolol succinate 50 mg twice daily and Xarelto 15 mg daily  Hypomagnesemia  Getting p.o. magnesium oxide this morning    Type 2 diabetes mellitus, without long-term current use of insulin ([facility])  Lab Results   Component Value Date    HGBA1C 7.4 (H) 10/23/2024     Management as per primary team  Recent Labs     10/25/24  1149 10/25/24  1626 10/25/24  2131 10/26/24  0810   POCGLU 224* 196* 231* 168*       Blood Sugar Average: Last 72 hrs:  (P) 178.9565568132438819    Class 1 obesity due to excess calories with serious comorbidity and body mass  "index (BMI) of 33.0 to 33.9 in adult  [place] lifestyle measures as an outpatient    Subjective:   Patient seen and examined.  No significant events overnight.  stable dyspnea on exertion, lower extremity edema, ; pertinent negatives - chest pain, irregular heart beat, and palpitations.    Objective:     Vitals: Blood pressure 145/86, pulse 87, temperature 97.6 °F (36.4 °C), resp. rate 17, height 4' 11\" (1.499 m), weight 68.6 kg (151 lb 3.8 oz), SpO2 93%., Body mass index is 30.55 kg/m².,   Orthostatic Blood Pressures      Flowsheet Row Most Recent Value   Blood Pressure 145/86 filed at 10/26/2024 0734   Patient Position - Orthostatic VS Lying filed at 10/24/2024 1522              Intake/Output Summary (Last 24 hours) at 10/26/2024 0921  Last data filed at 10/26/2024 0608  Gross per 24 hour   Intake 660 ml   Output 1084 ml   Net -424 ml       TELE: No significant arrhythmias seen.    Physical Exam:    GEN: Camryn Roblero appears well, alert and oriented x 3, pleasant and cooperative   HEENT: pupils equal, round, and reactive to light; extraocular muscles intact  NECK: supple, no carotid bruits   HEART: regular rhythm, normal S1 and S2, + systolic murmur, no clicks, gallops or rubs   LUNGS: clear to auscultation bilaterally; no wheezes, rales, or rhonchi   ABDOMEN: normal bowel sounds, soft, no tenderness, + distention  EXTREMITIES: peripheral pulses normal; no clubbing, cyanosis, + edema  NEURO: no focal findings   SKIN: normal without suspicious lesions on exposed skin    Medications:      Current Facility-Administered Medications:     acetaminophen (TYLENOL) tablet 650 mg, 650 mg, Oral, Q6H PRN, Tonny Gasca MD    atorvastatin (LIPITOR) tablet 40 mg, 40 mg, Oral, Daily, Tonny Gasca MD, 40 mg at 10/26/24 0847    budesonide (PULMICORT) inhalation solution 0.5 mg, 0.5 mg, Nebulization, Q12H, Amanda Navarro MD, 0.5 mg at 10/26/24 0754    bumetanide (BUMEX) injection 2 mg, 2 mg, Intravenous, BID, Juan Manuel QUEZADA" DEANNE David, 2 mg at 10/26/24 0846    diltiazem (CARDIZEM CD) 24 hr capsule 120 mg, 120 mg, Oral, Daily, Tonny Gasca MD, 120 mg at 10/26/24 0847    donepezil (ARICEPT) tablet 5 mg, 5 mg, Oral, HS, Tonny Gasca MD, 5 mg at 10/25/24 2115    insulin lispro (HumALOG/ADMELOG) 100 units/mL subcutaneous injection 1-5 Units, 1-5 Units, Subcutaneous, TID AC, 1 Units at 10/26/24 0846 **AND** Fingerstick Glucose (POCT), , , TID AC, Tonny Gasca MD    ipratropium-albuterol (DUO-NEB) 0.5-2.5 mg/3 mL inhalation solution 3 mL, 3 mL, Nebulization, Q6H PRN, Tonny Gasca MD    latanoprost (XALATAN) 0.005 % ophthalmic solution 1 drop, 1 drop, Both Eyes, HS, Tonny Gasca MD, 1 drop at 10/25/24 2116    loratadine (CLARITIN) tablet 10 mg, 10 mg, Oral, Daily, Tonny Gasca MD, 10 mg at 10/26/24 0847    magnesium Oxide (MAG-OX) tablet 400 mg, 400 mg, Oral, Daily, Tonny Gasca MD, 400 mg at 10/26/24 0847    metoprolol succinate (TOPROL-XL) 24 hr tablet 50 mg, 50 mg, Oral, BID, Tonny Gasca MD, 50 mg at 10/26/24 0847    potassium chloride (Klor-Con M20) CR tablet 20 mEq, 20 mEq, Oral, BID, DEANNE Moreno, 20 mEq at 10/26/24 0847    rivaroxaban (XARELTO) tablet 15 mg, 15 mg, Oral, Daily With Breakfast, Tonny Gasca MD, 15 mg at 10/26/24 0848     Labs & Results:        Results from last 7 days   Lab Units 10/25/24  0531 10/24/24  0513 10/23/24  1107   WBC Thousand/uL 7.68 8.04 8.42   HEMOGLOBIN g/dL 12.8 11.8 13.1   HEMATOCRIT % 42.6 37.3 41.8   PLATELETS Thousands/uL 237 230 246         Results from last 7 days   Lab Units 10/26/24  0442 10/25/24  0531 10/24/24  0513 10/23/24  1107   POTASSIUM mmol/L 3.9 4.1 3.1* 4.2   CHLORIDE mmol/L 98 96 94* 90*   CO2 mmol/L 36* 41* 35* 34*   BUN mg/dL 31* 30* 30* 34*   CREATININE mg/dL 0.93 1.02 0.86 1.04   CALCIUM mg/dL 9.2 9.4 8.9 9.7   ALK PHOS U/L  --   --   --  90   ALT U/L  --   --   --  28   AST U/L  --   --   --  77*         Results from last 7 days   Lab Units  10/26/24  0442 10/25/24  0531 10/23/24  1107   MAGNESIUM mg/dL 1.9 1.8* 1.5*       Echo: personally reviewed -normal LV size and function, dilated RV with reduced function, biatrial enlargement, mild aortic regurgitation, moderate mitral valve regurgitation, mild to moderate tricuspid regurgitation with severe pulmonary hypertension.  Small pericardial effusion posterior to the heart with no evidence of tamponade    EKG personally reviewed by Alexis Tomas MD.

## 2024-10-26 NOTE — ASSESSMENT & PLAN NOTE
Lab Results   Component Value Date    HGBA1C 7.4 (H) 10/23/2024     Management as per primary team  Recent Labs     10/25/24  1149 10/25/24  1626 10/25/24  2131 10/26/24  0810   POCGLU 224* 196* 231* 168*       Blood Sugar Average: Last 72 hrs:  (P) 178.1111820718290661

## 2024-10-26 NOTE — ASSESSMENT & PLAN NOTE
Wt Readings from Last 3 Encounters:   10/26/24 68.6 kg (151 lb 3.8 oz)   10/23/24 73.9 kg (163 lb)   10/15/24 74.4 kg (164 lb)   Presented with 10 pound weight gain despite increase in torsemide over the last 2 weeks.  CXR: Moderate pulmonary vascular congestion. Small right greater than left bilateral pleural effusions.   ECHO 10/23 with EF 65%, There is both systolic and diastolic flattening of the interventricular septum consistent with right ventricle pressure and volume overload. Changes include: Right ventricular dilation is notable, with an increase in mitral regurgitation, and pulmonary arterial pressures.  Dry weight around 154-155 pounds. Admission weight 162 lb; now at 151 lb this morning.  Cardiology following - planning to continue Bumex IV 2 mg twice daily and monitor response.  Potential transition to oral diuretics soon.

## 2024-10-26 NOTE — ASSESSMENT & PLAN NOTE
With chronic hypoxemic respiratory failure, on 2L NC O2 at baseline during the day and 4L overnight  No acute exacerbation

## 2024-10-27 LAB
ANION GAP SERPL CALCULATED.3IONS-SCNC: 3 MMOL/L (ref 4–13)
BUN SERPL-MCNC: 31 MG/DL (ref 5–25)
CALCIUM SERPL-MCNC: 9.3 MG/DL (ref 8.4–10.2)
CHLORIDE SERPL-SCNC: 98 MMOL/L (ref 96–108)
CO2 SERPL-SCNC: 40 MMOL/L (ref 21–32)
CREAT SERPL-MCNC: 0.96 MG/DL (ref 0.6–1.3)
GFR SERPL CREATININE-BSD FRML MDRD: 54 ML/MIN/1.73SQ M
GLUCOSE SERPL-MCNC: 190 MG/DL (ref 65–140)
GLUCOSE SERPL-MCNC: 194 MG/DL (ref 65–140)
GLUCOSE SERPL-MCNC: 196 MG/DL (ref 65–140)
GLUCOSE SERPL-MCNC: 292 MG/DL (ref 65–140)
GLUCOSE SERPL-MCNC: 331 MG/DL (ref 65–140)
POTASSIUM SERPL-SCNC: 4.4 MMOL/L (ref 3.5–5.3)
SODIUM SERPL-SCNC: 141 MMOL/L (ref 135–147)

## 2024-10-27 PROCEDURE — 99232 SBSQ HOSP IP/OBS MODERATE 35: CPT | Performed by: PHYSICIAN ASSISTANT

## 2024-10-27 PROCEDURE — 94760 N-INVAS EAR/PLS OXIMETRY 1: CPT

## 2024-10-27 PROCEDURE — 82948 REAGENT STRIP/BLOOD GLUCOSE: CPT

## 2024-10-27 PROCEDURE — 94640 AIRWAY INHALATION TREATMENT: CPT

## 2024-10-27 PROCEDURE — 99232 SBSQ HOSP IP/OBS MODERATE 35: CPT | Performed by: INTERNAL MEDICINE

## 2024-10-27 PROCEDURE — 80048 BASIC METABOLIC PNL TOTAL CA: CPT | Performed by: PHYSICIAN ASSISTANT

## 2024-10-27 RX ORDER — INSULIN LISPRO 100 [IU]/ML
3 INJECTION, SOLUTION INTRAVENOUS; SUBCUTANEOUS
Status: DISCONTINUED | OUTPATIENT
Start: 2024-10-27 | End: 2024-10-28

## 2024-10-27 RX ADMIN — RIVAROXABAN 15 MG: 15 TABLET, FILM COATED ORAL at 10:03

## 2024-10-27 RX ADMIN — LORATADINE 10 MG: 10 TABLET ORAL at 10:03

## 2024-10-27 RX ADMIN — LATANOPROST 1 DROP: 50 SOLUTION OPHTHALMIC at 21:01

## 2024-10-27 RX ADMIN — ATORVASTATIN CALCIUM 40 MG: 40 TABLET, FILM COATED ORAL at 10:03

## 2024-10-27 RX ADMIN — POTASSIUM CHLORIDE 20 MEQ: 1500 TABLET, EXTENDED RELEASE ORAL at 10:03

## 2024-10-27 RX ADMIN — BUMETANIDE 2 MG: 0.25 INJECTION INTRAMUSCULAR; INTRAVENOUS at 10:03

## 2024-10-27 RX ADMIN — METOPROLOL SUCCINATE 50 MG: 50 TABLET, EXTENDED RELEASE ORAL at 10:03

## 2024-10-27 RX ADMIN — INSULIN LISPRO 3 UNITS: 100 INJECTION, SOLUTION INTRAVENOUS; SUBCUTANEOUS at 13:13

## 2024-10-27 RX ADMIN — METOPROLOL SUCCINATE 50 MG: 50 TABLET, EXTENDED RELEASE ORAL at 18:08

## 2024-10-27 RX ADMIN — INSULIN LISPRO 4 UNITS: 100 INJECTION, SOLUTION INTRAVENOUS; SUBCUTANEOUS at 18:11

## 2024-10-27 RX ADMIN — BUMETANIDE 2 MG: 0.25 INJECTION INTRAMUSCULAR; INTRAVENOUS at 18:08

## 2024-10-27 RX ADMIN — INSULIN LISPRO 3 UNITS: 100 INJECTION, SOLUTION INTRAVENOUS; SUBCUTANEOUS at 13:12

## 2024-10-27 RX ADMIN — DONEPEZIL HYDROCHLORIDE 5 MG: 5 TABLET ORAL at 21:01

## 2024-10-27 RX ADMIN — DILTIAZEM HYDROCHLORIDE 120 MG: 120 CAPSULE, COATED, EXTENDED RELEASE ORAL at 10:03

## 2024-10-27 RX ADMIN — Medication 400 MG: at 10:03

## 2024-10-27 RX ADMIN — BUDESONIDE 0.5 MG: 0.5 INHALANT ORAL at 20:10

## 2024-10-27 RX ADMIN — POTASSIUM CHLORIDE 20 MEQ: 1500 TABLET, EXTENDED RELEASE ORAL at 18:08

## 2024-10-27 RX ADMIN — INSULIN LISPRO 3 UNITS: 100 INJECTION, SOLUTION INTRAVENOUS; SUBCUTANEOUS at 18:11

## 2024-10-27 RX ADMIN — BUDESONIDE 0.5 MG: 0.5 INHALANT ORAL at 07:58

## 2024-10-27 RX ADMIN — INSULIN LISPRO 1 UNITS: 100 INJECTION, SOLUTION INTRAVENOUS; SUBCUTANEOUS at 10:04

## 2024-10-27 NOTE — ASSESSMENT & PLAN NOTE
Lab Results   Component Value Date    HGBA1C 7.4 (H) 10/23/2024     Management as per primary team  Recent Labs     10/26/24  1114 10/26/24  1606 10/26/24  2033 10/27/24  0754   POCGLU 245* 277* 295* 196*       Blood Sugar Average: Last 72 hrs:  (P) 210.0794511557185997

## 2024-10-27 NOTE — PROGRESS NOTES
Cardiology Progress Note - Camryn DURBIN Osbaldo 84 y.o. female MRN: [id number]    Unit/Bed#: S -01 Encounter: [id number]        Assessment & Plan  Acute heart failure with preserved ejection fraction (HCC)  Wt Readings from Last 3 Encounters:   10/27/24 68.6 kg (151 lb 3.8 oz)   10/23/24 73.9 kg (163 lb)   10/15/24 74.4 kg (164 lb)   Patient presented volume overloaded on 10/23 with volume overload on chest x-ray, elevated BNP 1154  Outpatient diuretic torsemide 40mg BID; with multiple recent changes  No dietary indiscretions and her A-fib rates have been well-controlled.  Possible exacerbation is due to ineffective diuretic dosing.  She was started on Bumex IV 2 mg twice daily  I/O: Minimal urine output documented, overall -681 mL  Weight: Down to 151 on standing scale today from 157 on admission  Creatinine: Stable 0.93 within baseline  Remains on 2 L nasal cannula.   Continue current diuretic regimen another day and reevaluate for changes in dosing in a.m.  Strict I's and O's and daily weights    Hyponatremia  Improved, sodium 141 today with diuresis  Essential hypertension  Adequately controlled most recent blood pressure 132/77  Continue current regiment  COPD (chronic obstructive pulmonary disease) (HCC)  Home O2 2 L nasal cannula, currently at baseline requirements  Chronic atrial fibrillation (HCC)  Rates are well-controlled on vital sign check and exam continue diltiazem 120 mg daily, metoprolol succinate 50 mg twice daily and Xarelto 15 mg daily  Type 2 diabetes mellitus, without long-term current use of insulin (HCC)  Lab Results   Component Value Date    HGBA1C 7.4 (H) 10/23/2024     Management as per primary team  Recent Labs     10/26/24  1114 10/26/24  1606 10/26/24  2033 10/27/24  0754   POCGLU 245* 277* 295* 196*       Blood Sugar Average: Last 72 hrs:  (P) 210.5166966714426650    Class 1 obesity due to excess calories with serious comorbidity and body mass index (BMI) of 30.0 to 30.9 in  "adult  [employer] lifestyle measures as an outpatient    Subjective:   Patient seen and examined.  No significant events overnight.  stable dyspnea on exertion, lower extremity edema, ; pertinent negatives - chest pain, irregular heart beat, and palpitations.    Objective:     Vitals: Blood pressure 141/77, pulse 78, temperature 98.3 °F (36.8 °C), resp. rate 17, height 4' 11\" (1.499 m), weight 68.6 kg (151 lb 3.8 oz), SpO2 99%., Body mass index is 30.55 kg/m².,   Orthostatic Blood Pressures      Flowsheet Row Most Recent Value   Blood Pressure 141/77 filed at 10/27/2024 0751   Patient Position - Orthostatic VS Lying filed at 10/24/2024 1522              Intake/Output Summary (Last 24 hours) at 10/27/2024 0932  Last data filed at 10/27/2024 0301  Gross per 24 hour   Intake 560 ml   Output 475 ml   Net 85 ml       TELE: No significant arrhythmias seen.    Physical Exam:    GEN: Camryn Roblero appears well, alert and oriented x 3, pleasant and cooperative   HEENT: pupils equal, round, and reactive to light; extraocular muscles intact  NECK: supple, no carotid bruits   HEART: regular rhythm, normal S1 and S2, +systolic murmur, no clicks, gallops or rubs   LUNGS: clear to auscultation bilaterally; no wheezes, rales, or rhonchi   ABDOMEN: normal bowel sounds, soft, no tenderness, + distention  EXTREMITIES: peripheral pulses normal; no clubbing, cyanosis, + edema  NEURO: no focal findings   SKIN: normal without suspicious lesions on exposed skin    Medications:      Current Facility-Administered Medications:     acetaminophen (TYLENOL) tablet 650 mg, 650 mg, Oral, Q6H PRN, Tonny Gasca MD    atorvastatin (LIPITOR) tablet 40 mg, 40 mg, Oral, Daily, Tonny Gasca MD, 40 mg at 10/26/24 0847    budesonide (PULMICORT) inhalation solution 0.5 mg, 0.5 mg, Nebulization, Q12H, Amanda Navarro MD, 0.5 mg at 10/27/24 0758    bumetanide (BUMEX) injection 2 mg, 2 mg, Intravenous, BID, DEANNE Moreno, 2 mg at 10/26/24 " 1754    diltiazem (CARDIZEM CD) 24 hr capsule 120 mg, 120 mg, Oral, Daily, Tonny Gasca MD, 120 mg at 10/26/24 0847    donepezil (ARICEPT) tablet 5 mg, 5 mg, Oral, HS, Tonny Gasca MD, 5 mg at 10/26/24 2153    insulin lispro (HumALOG/ADMELOG) 100 units/mL subcutaneous injection 1-5 Units, 1-5 Units, Subcutaneous, TID AC, 3 Units at 10/26/24 1753 **AND** Fingerstick Glucose (POCT), , , TID AC, Tonny Gasca MD    ipratropium-albuterol (DUO-NEB) 0.5-2.5 mg/3 mL inhalation solution 3 mL, 3 mL, Nebulization, Q6H PRN, Tonny Gasca MD    latanoprost (XALATAN) 0.005 % ophthalmic solution 1 drop, 1 drop, Both Eyes, HS, Tonny Gasca MD, 1 drop at 10/26/24 2153    loratadine (CLARITIN) tablet 10 mg, 10 mg, Oral, Daily, Tonny Gasca MD, 10 mg at 10/26/24 0847    magnesium Oxide (MAG-OX) tablet 400 mg, 400 mg, Oral, Daily, Tonny Gasca MD, 400 mg at 10/26/24 0847    metoprolol succinate (TOPROL-XL) 24 hr tablet 50 mg, 50 mg, Oral, BID, Tonny Gasca MD, 50 mg at 10/26/24 1753    potassium chloride (Klor-Con M20) CR tablet 20 mEq, 20 mEq, Oral, BID, Juan Manuel David, YONP, 20 mEq at 10/26/24 1754    rivaroxaban (XARELTO) tablet 15 mg, 15 mg, Oral, Daily With Breakfast, Tonny Gasca MD, 15 mg at 10/26/24 0848     Labs & Results:        Results from last 7 days   Lab Units 10/25/24  0531 10/24/24  0513 10/23/24  1107   WBC Thousand/uL 7.68 8.04 8.42   HEMOGLOBIN g/dL 12.8 11.8 13.1   HEMATOCRIT % 42.6 37.3 41.8   PLATELETS Thousands/uL 237 230 246         Results from last 7 days   Lab Units 10/27/24  0608 10/26/24  0442 10/25/24  0531 10/24/24  0513 10/23/24  1107   POTASSIUM mmol/L 4.4 3.9 4.1   < > 4.2   CHLORIDE mmol/L 98 98 96   < > 90*   CO2 mmol/L 40* 36* 41*   < > 34*   BUN mg/dL 31* 31* 30*   < > 34*   CREATININE mg/dL 0.96 0.93 1.02   < > 1.04   CALCIUM mg/dL 9.3 9.2 9.4   < > 9.7   ALK PHOS U/L  --   --   --   --  90   ALT U/L  --   --   --   --  28   AST U/L  --   --   --   --  77*    < > = values in  this interval not displayed.         Results from last 7 days   Lab Units 10/26/24  0442 10/25/24  0531 10/23/24  1107   MAGNESIUM mg/dL 1.9 1.8* 1.5*       Echo: personally reviewed - normal LV size and function, dilated RV with reduced function, biatrial enlargement, mild aortic regurgitation, moderate mitral valve regurgitation, mild to moderate tricuspid regurgitation with severe pulmonary hypertension. Small pericardial effusion posterior to the heart with no evidence of tamponade     EKG personally reviewed by Alexis Tomas MD.

## 2024-10-27 NOTE — PLAN OF CARE
Problem: Potential for Falls  Goal: Patient will remain free of falls  Description: INTERVENTIONS:  - Educate patient/family on patient safety including physical limitations  - Instruct patient to call for assistance with activity   - Consult OT/PT to assist with strengthening/mobility   - Keep Call bell within reach  - Keep bed low and locked with side rails adjusted as appropriate  - Keep care items and personal belongings within reach  - Initiate and maintain comfort rounds  - Make Fall Risk Sign visible to staff  - Offer Toileting every 2 Hours, in advance of need  - Initiate/Maintain bed/chair alarm  - Obtain necessary fall risk management equipment  - Apply yellow socks and bracelet for high fall risk patients  - Consider moving patient to room near nurses station  Outcome: Progressing     Problem: Nutrition/Hydration-ADULT  Goal: Nutrient/Hydration intake appropriate for improving, restoring or maintaining nutritional needs  Description: Monitor and assess patient's nutrition/hydration status for malnutrition. Collaborate with interdisciplinary team and initiate plan and interventions as ordered.  Monitor patient's weight and dietary intake as ordered or per policy. Utilize nutrition screening tool and intervene as necessary. Determine patient's food preferences and provide high-protein, high-caloric foods as appropriate.     INTERVENTIONS:  - Monitor oral intake, urinary output, labs, and treatment plans  - Assess nutrition and hydration status and recommend course of action  - Evaluate amount of meals eaten  - Assist patient with eating if necessary   - Allow adequate time for meals  - Recommend/ encourage appropriate diets, oral nutritional supplements, and vitamin/mineral supplements  - Order, calculate, and assess calorie counts as needed  - Recommend, monitor, and adjust tube feedings and TPN/PPN based on assessed needs  - Assess need for intravenous fluids  - Provide specific nutrition/hydration  education as appropriate  - Include patient/family/caregiver in decisions related to nutrition  Outcome: Progressing

## 2024-10-27 NOTE — ASSESSMENT & PLAN NOTE
Lab Results   Component Value Date    HGBA1C 7.4 (H) 10/23/2024     Recent Labs     10/26/24  1114 10/26/24  1606 10/26/24  2033 10/27/24  0754   POCGLU 245* 277* 295* 196*     Blood Sugar Average: Last 72 hrs:  (P) 210.7977531738214676  Hold home glipizide/Metformin for now  SSI/accuchecks/diabetic diet  Add low dose scheduled mealtime insulin with lunch and dinner

## 2024-10-27 NOTE — ASSESSMENT & PLAN NOTE
Wt Readings from Last 3 Encounters:   10/27/24 68.6 kg (151 lb 3.8 oz)   10/23/24 73.9 kg (163 lb)   10/15/24 74.4 kg (164 lb)   Presented with 10 pound weight gain despite increase in torsemide over the last 2 weeks.  CXR: Moderate pulmonary vascular congestion. Small right greater than left bilateral pleural effusions.   ECHO 10/23 with EF 65%, There is both systolic and diastolic flattening of the interventricular septum consistent with right ventricle pressure and volume overload. Changes include: Right ventricular dilation is notable, with an increase in mitral regurgitation, and pulmonary arterial pressures.  Dry weight around 154-155 pounds. Admission weight 162 lb; now at 151 lb this morning (no change from yesterday AM)  Cardiology following - continue Bumex IV 2 mg twice daily and monitor response. Hoping to transition to PO diuretics soon but has been slow to diurese

## 2024-10-27 NOTE — ASSESSMENT & PLAN NOTE
By physical therapy and recommended for rehab.  Initially was recommended for acute level rehab however patient's daughter and patient do not feel she could tolerate this intensive rehab and therefore opting for subacute rehab.  Likely plan for [facility] - accepted as of 10/28 if medically stable but daughter/patient now want [facility] as first choice option as chapis works there.

## 2024-10-27 NOTE — ASSESSMENT & PLAN NOTE
Wt Readings from Last 3 Encounters:   10/27/24 68.6 kg (151 lb 3.8 oz)   10/23/24 73.9 kg (163 lb)   10/15/24 74.4 kg (164 lb)   Patient presented volume overloaded on 10/23 with volume overload on chest x-ray, elevated BNP 1154  Outpatient diuretic torsemide 40mg BID; with multiple recent changes  No dietary indiscretions and her A-fib rates have been well-controlled.  Possible exacerbation is due to ineffective diuretic dosing.  She was started on Bumex IV 2 mg twice daily  I/O: Minimal urine output documented, overall -681 mL  Weight: Down to 151 on standing scale today from 157 on admission  Creatinine: Stable 0.93 within baseline  Remains on 2 L nasal cannula.   Continue current diuretic regimen another day and reevaluate for changes in dosing in a.m.  Strict I's and O's and daily weights

## 2024-10-27 NOTE — PROGRESS NOTES
Progress Note - Hospitalist   Name: Camryn Roblero 84 y.o. female I MRN: [id number]  Unit/Bed#: S -01 I Date of Admission: 10/23/2024   Date of Service: 10/27/2024 I Hospital Day: 4    Assessment & Plan  Acute heart failure with preserved ejection fraction (HCC)  Wt Readings from Last 3 Encounters:   10/27/24 68.6 kg (151 lb 3.8 oz)   10/23/24 73.9 kg (163 lb)   10/15/24 74.4 kg (164 lb)   Presented with 10 pound weight gain despite increase in torsemide over the last 2 weeks.  CXR: Moderate pulmonary vascular congestion. Small right greater than left bilateral pleural effusions.   ECHO 10/23 with EF 65%, There is both systolic and diastolic flattening of the interventricular septum consistent with right ventricle pressure and volume overload. Changes include: Right ventricular dilation is notable, with an increase in mitral regurgitation, and pulmonary arterial pressures.  Dry weight around 154-155 pounds. Admission weight 162 lb; now at 151 lb this morning (no change from yesterday AM)  Cardiology following - continue Bumex IV 2 mg twice daily and monitor response. Hoping to transition to PO diuretics soon but has been slow to diurese  Chronic atrial fibrillation (HCC)  Rate controlled with Cardizem and metoprolol  Continue Xarelto  Essential hypertension  Cont BB/Cardizem  /77  Type 2 diabetes mellitus, without long-term current use of insulin (HCC)  Lab Results   Component Value Date    HGBA1C 7.4 (H) 10/23/2024     Recent Labs     10/26/24  1114 10/26/24  1606 10/26/24  2033 10/27/24  0754   POCGLU 245* 277* 295* 196*     Blood Sugar Average: Last 72 hrs:  (P) 210.6479007406544247  Hold home glipizide/Metformin for now  SSI/accuchecks/diabetic diet  Add low dose scheduled mealtime insulin with lunch and dinner  Hyponatremia  Chronic, baseline appears to be 131-134  Sodium 141 this morning  COPD (chronic obstructive pulmonary disease) (HCC)  With chronic hypoxemic respiratory failure, on 2L NC O2 at  baseline during the day and 4L overnight  No acute exacerbation  Class 1 obesity due to excess calories with serious comorbidity and body mass index (BMI) of 30.0 to 30.9 in adult  Encourage weight loss, dietary and lifestyle modifications  Body mass index is 30.55 kg/m².  Ambulatory dysfunction  By physical therapy and recommended for rehab.  Initially was recommended for acute level rehab however patient's daughter and patient do not feel she could tolerate this intensive rehab and therefore opting for subacute rehab.  Likely plan for [facility] - accepted as of 10/28 if medically stable but daughter/patient now want [facility] as first choice option as chapis works there.     VTE Pharmacologic Prophylaxis: VTE Score: 5 High Risk (Score >/= 5) - Pharmacological DVT Prophylaxis Ordered: rivaroxaban (Xarelto). Sequential Compression Devices Ordered.    Mobility:   Basic Mobility Inpatient Raw Score: 17  JH-HLM Goal: 5: Stand one or more mins  JH-HLM Achieved: 6: Walk 10 steps or more  JH-HLM Goal achieved. Continue to encourage appropriate mobility.    Patient Centered Rounds: I performed bedside rounds with nursing staff today.   Discussions with Specialists or Other Care Team Provider: nursing    Education and Discussions with Family / Patient: Updated  (daughter) via phone. Naomie 1157    Current Length of Stay: 4 day(s)  Current Patient Status: Inpatient   Certification Statement: The patient will continue to require additional inpatient hospital stay due to IV diuretics  Discharge Plan: Anticipate discharge in 48-72 hrs to rehab facility.    Code Status: Level 1 - Full Code    Subjective   Patient very upset this morning that she did not have the ability to wash out her mouth after using her inhaler.  After washing this out she felt much better.  She feels her breathing is overall somewhat better.  She has no chest pain.  She will go to rehab upon discharge.     Objective  :  Temp:  [97.7 °F (36.5 °C)-98.3 °F (36.8 °C)] 98.3 °F (36.8 °C)  HR:  [71-78] 78  BP: (131-141)/(77-83) 141/77  SpO2:  [92 %-99 %] 99 %  O2 Device: Nasal cannula  Nasal Cannula O2 Flow Rate (L/min):  [3 L/min-4 L/min] 3 L/min    Body mass index is 30.55 kg/m².     Input and Output Summary (last 24 hours):     Intake/Output Summary (Last 24 hours) at 10/27/2024 1104  Last data filed at 10/27/2024 1021  Gross per 24 hour   Intake 680 ml   Output 929 ml   Net -249 ml       Physical Exam  Vitals and nursing note reviewed.   Constitutional:       General: She is not in acute distress.     Appearance: Normal appearance. She is obese. She is not diaphoretic.   HENT:      Head: Normocephalic and atraumatic.   Cardiovascular:      Rate and Rhythm: Normal rate. Rhythm irregular.   Pulmonary:      Effort: Pulmonary effort is normal.      Breath sounds: Normal breath sounds. No stridor. No wheezing, rhonchi or rales.      Comments: 2L NC  Abdominal:      General: Bowel sounds are normal.      Palpations: Abdomen is soft.      Tenderness: There is no abdominal tenderness. There is no guarding.   Musculoskeletal:      Right lower leg: Edema (2+) present.      Left lower leg: Edema (2+) present.      Comments: Edema to knees   Skin:     General: Skin is warm and dry.   Neurological:      Mental Status: She is alert.   Psychiatric:         Mood and Affect: Mood normal.       Lines/Drains:  Lines/Drains/Airways       Active Status       Name Placement date Placement time Site Days    External Urinary Catheter 10/23/24  1900  -- 3                  Telemetry:  Telemetry Orders (From admission, onward)               24 Hour Telemetry Monitoring  Continuous x 24 Hours (Telem)        Expiring   Question:  Reason for 24 Hour Telemetry  Answer:  Decompensated CHF- and any one of the following: continuous diuretic infusion or total diuretic dose >200 mg daily, associated electrolyte derangement (I.e. K < 3.0), ionotropic drip (continuous  infusion), hx of ventricular arrhythmia, or new EF < 35%                     Telemetry Reviewed: Atrial fibrillation. HR averaging 80-90s  Indication for Continued Telemetry Use: Acute CHF on >200 mg lasix/day or equivalent dose or with new reduced EF.                Lab Results: I have reviewed the following results:   Results from last 7 days   Lab Units 10/25/24  0531 10/24/24  0513 10/23/24  1107   WBC Thousand/uL 7.68   < > 8.42   HEMOGLOBIN g/dL 12.8   < > 13.1   HEMATOCRIT % 42.6   < > 41.8   PLATELETS Thousands/uL 237   < > 246   SEGS PCT %  --   --  86*   LYMPHO PCT %  --   --  7*   MONO PCT %  --   --  6   EOS PCT %  --   --  1    < > = values in this interval not displayed.     Results from last 7 days   Lab Units 10/27/24  0608 10/24/24  0513 10/23/24  1107   SODIUM mmol/L 141   < > 132*   POTASSIUM mmol/L 4.4   < > 4.2   CHLORIDE mmol/L 98   < > 90*   CO2 mmol/L 40*   < > 34*   BUN mg/dL 31*   < > 34*   CREATININE mg/dL 0.96   < > 1.04   ANION GAP mmol/L 3*   < > 8   CALCIUM mg/dL 9.3   < > 9.7   ALBUMIN g/dL  --   --  4.0   TOTAL BILIRUBIN mg/dL  --   --  0.98   ALK PHOS U/L  --   --  90   ALT U/L  --   --  28   AST U/L  --   --  77*   GLUCOSE RANDOM mg/dL 190*   < > 189*    < > = values in this interval not displayed.         Results from last 7 days   Lab Units 10/27/24  0754 10/26/24  2033 10/26/24  1606 10/26/24  1114 10/26/24  0810 10/25/24  2131 10/25/24  1626 10/25/24  1149 10/25/24  0735 10/24/24  2052 10/24/24  1738 10/24/24  1159   POC GLUCOSE mg/dl 196* 295* 277* 245* 168* 231* 196* 224* 156* 199* 216* 185*     Results from last 7 days   Lab Units 10/23/24  1700   HEMOGLOBIN A1C % 7.4*           Recent Cultures (last 7 days):         Imaging Results Review: No pertinent imaging studies reviewed.  Other Study Results Review: No additional pertinent studies reviewed.    Last 24 Hours Medication List:     Current Facility-Administered Medications:     acetaminophen (TYLENOL) tablet 650 mg, Q6H  PRN    atorvastatin (LIPITOR) tablet 40 mg, Daily    budesonide (PULMICORT) inhalation solution 0.5 mg, Q12H    bumetanide (BUMEX) injection 2 mg, BID    diltiazem (CARDIZEM CD) 24 hr capsule 120 mg, Daily    donepezil (ARICEPT) tablet 5 mg, HS    insulin lispro (HumALOG/ADMELOG) 100 units/mL subcutaneous injection 1-5 Units, TID AC **AND** Fingerstick Glucose (POCT), TID AC    ipratropium-albuterol (DUO-NEB) 0.5-2.5 mg/3 mL inhalation solution 3 mL, Q6H PRN    latanoprost (XALATAN) 0.005 % ophthalmic solution 1 drop, HS    loratadine (CLARITIN) tablet 10 mg, Daily    magnesium Oxide (MAG-OX) tablet 400 mg, Daily    metoprolol succinate (TOPROL-XL) 24 hr tablet 50 mg, BID    potassium chloride (Klor-Con M20) CR tablet 20 mEq, BID    rivaroxaban (XARELTO) tablet 15 mg, Daily With Breakfast    Administrative Statements   Today, Patient Was Seen By: Mireille Montoya PA-C      **Please Note: This note may have been constructed using a voice recognition system.**

## 2024-10-27 NOTE — PLAN OF CARE
Problem: Prexisting or High Potential for Compromised Skin Integrity  Goal: Skin integrity is maintained or improved  Description: INTERVENTIONS:  - Identify patients at risk for skin breakdown  - Assess and monitor skin integrity  - Assess and monitor nutrition and hydration status  - Monitor labs   - Assess for incontinence   - Turn and reposition patient  - Assist with mobility/ambulation  - Relieve pressure over bony prominences  - Avoid friction and shearing  - Provide appropriate hygiene as needed including keeping skin clean and dry  - Evaluate need for skin moisturizer/barrier cream  - Collaborate with interdisciplinary team   - Patient/family teaching  - Consider wound care consult   Outcome: Progressing     Problem: Potential for Falls  Goal: Patient will remain free of falls  Description: INTERVENTIONS:  - Educate patient/family on patient safety including physical limitations  - Instruct patient to call for assistance with activity   - Consult OT/PT to assist with strengthening/mobility   - Keep Call bell within reach  - Keep bed low and locked with side rails adjusted as appropriate  - Keep care items and personal belongings within reach  - Initiate and maintain comfort rounds  - Make Fall Risk Sign visible to staff  - Offer Toileting every  Hours, in advance of need  - Initiate/Maintain alarm  - Obtain necessary fall risk management equipment  - Apply yellow socks and bracelet for high fall risk patients  - Consider moving patient to room near nurses station  Outcome: Progressing     Problem: Nutrition/Hydration-ADULT  Goal: Nutrient/Hydration intake appropriate for improving, restoring or maintaining nutritional needs  Description: Monitor and assess patient's nutrition/hydration status for malnutrition. Collaborate with interdisciplinary team and initiate plan and interventions as ordered.  Monitor patient's weight and dietary intake as ordered or per policy. Utilize nutrition screening tool and  intervene as necessary. Determine patient's food preferences and provide high-protein, high-caloric foods as appropriate.     INTERVENTIONS:  - Monitor oral intake, urinary output, labs, and treatment plans  - Assess nutrition and hydration status and recommend course of action  - Evaluate amount of meals eaten  - Assist patient with eating if necessary   - Allow adequate time for meals  - Recommend/ encourage appropriate diets, oral nutritional supplements, and vitamin/mineral supplements  - Order, calculate, and assess calorie counts as needed  - Recommend, monitor, and adjust tube feedings and TPN/PPN based on assessed needs  - Assess need for intravenous fluids  - Provide specific nutrition/hydration education as appropriate  - Include patient/family/caregiver in decisions related to nutrition  Outcome: Progressing

## 2024-10-28 LAB
ANION GAP SERPL CALCULATED.3IONS-SCNC: 0 MMOL/L (ref 4–13)
BUN SERPL-MCNC: 33 MG/DL (ref 5–25)
CALCIUM SERPL-MCNC: 9.4 MG/DL (ref 8.4–10.2)
CHLORIDE SERPL-SCNC: 97 MMOL/L (ref 96–108)
CO2 SERPL-SCNC: 43 MMOL/L (ref 21–32)
CREAT SERPL-MCNC: 0.93 MG/DL (ref 0.6–1.3)
GFR SERPL CREATININE-BSD FRML MDRD: 57 ML/MIN/1.73SQ M
GLUCOSE SERPL-MCNC: 155 MG/DL (ref 65–140)
GLUCOSE SERPL-MCNC: 163 MG/DL (ref 65–140)
GLUCOSE SERPL-MCNC: 218 MG/DL (ref 65–140)
GLUCOSE SERPL-MCNC: 248 MG/DL (ref 65–140)
GLUCOSE SERPL-MCNC: 257 MG/DL (ref 65–140)
MAGNESIUM SERPL-MCNC: 1.9 MG/DL (ref 1.9–2.7)
POTASSIUM SERPL-SCNC: 4.4 MMOL/L (ref 3.5–5.3)
SODIUM SERPL-SCNC: 140 MMOL/L (ref 135–147)

## 2024-10-28 PROCEDURE — 99232 SBSQ HOSP IP/OBS MODERATE 35: CPT | Performed by: INTERNAL MEDICINE

## 2024-10-28 PROCEDURE — 99232 SBSQ HOSP IP/OBS MODERATE 35: CPT | Performed by: PHYSICIAN ASSISTANT

## 2024-10-28 PROCEDURE — 80048 BASIC METABOLIC PNL TOTAL CA: CPT | Performed by: PHYSICIAN ASSISTANT

## 2024-10-28 PROCEDURE — 83735 ASSAY OF MAGNESIUM: CPT | Performed by: PHYSICIAN ASSISTANT

## 2024-10-28 PROCEDURE — 94640 AIRWAY INHALATION TREATMENT: CPT

## 2024-10-28 PROCEDURE — 82948 REAGENT STRIP/BLOOD GLUCOSE: CPT

## 2024-10-28 PROCEDURE — 94760 N-INVAS EAR/PLS OXIMETRY 1: CPT

## 2024-10-28 RX ORDER — ECHINACEA PURPUREA EXTRACT 125 MG
1 TABLET ORAL
Status: DISCONTINUED | OUTPATIENT
Start: 2024-10-28 | End: 2024-11-01 | Stop reason: HOSPADM

## 2024-10-28 RX ORDER — FLUTICASONE PROPIONATE 50 MCG
2 SPRAY, SUSPENSION (ML) NASAL DAILY
Status: DISCONTINUED | OUTPATIENT
Start: 2024-10-28 | End: 2024-11-01 | Stop reason: HOSPADM

## 2024-10-28 RX ORDER — INSULIN LISPRO 100 [IU]/ML
3 INJECTION, SOLUTION INTRAVENOUS; SUBCUTANEOUS
Status: DISCONTINUED | OUTPATIENT
Start: 2024-10-28 | End: 2024-10-31

## 2024-10-28 RX ORDER — BUMETANIDE 0.25 MG/ML
3 INJECTION, SOLUTION INTRAMUSCULAR; INTRAVENOUS 2 TIMES DAILY
Status: DISCONTINUED | OUTPATIENT
Start: 2024-10-28 | End: 2024-10-29

## 2024-10-28 RX ORDER — INSULIN GLARGINE 100 [IU]/ML
5 INJECTION, SOLUTION SUBCUTANEOUS
Status: DISCONTINUED | OUTPATIENT
Start: 2024-10-28 | End: 2024-10-31

## 2024-10-28 RX ADMIN — RIVAROXABAN 15 MG: 15 TABLET, FILM COATED ORAL at 09:12

## 2024-10-28 RX ADMIN — POTASSIUM CHLORIDE 20 MEQ: 1500 TABLET, EXTENDED RELEASE ORAL at 18:13

## 2024-10-28 RX ADMIN — ATORVASTATIN CALCIUM 40 MG: 40 TABLET, FILM COATED ORAL at 09:01

## 2024-10-28 RX ADMIN — BUMETANIDE 3 MG: 0.25 INJECTION INTRAMUSCULAR; INTRAVENOUS at 18:13

## 2024-10-28 RX ADMIN — FLUTICASONE PROPIONATE 2 SPRAY: 50 SPRAY, METERED NASAL at 10:33

## 2024-10-28 RX ADMIN — METOPROLOL SUCCINATE 50 MG: 50 TABLET, EXTENDED RELEASE ORAL at 09:02

## 2024-10-28 RX ADMIN — INSULIN LISPRO 1 UNITS: 100 INJECTION, SOLUTION INTRAVENOUS; SUBCUTANEOUS at 09:06

## 2024-10-28 RX ADMIN — INSULIN GLARGINE 5 UNITS: 100 INJECTION, SOLUTION SUBCUTANEOUS at 21:45

## 2024-10-28 RX ADMIN — BUMETANIDE 2 MG: 0.25 INJECTION INTRAMUSCULAR; INTRAVENOUS at 09:02

## 2024-10-28 RX ADMIN — BUDESONIDE 0.5 MG: 0.5 INHALANT ORAL at 19:18

## 2024-10-28 RX ADMIN — LATANOPROST 1 DROP: 50 SOLUTION OPHTHALMIC at 21:45

## 2024-10-28 RX ADMIN — METOPROLOL SUCCINATE 50 MG: 50 TABLET, EXTENDED RELEASE ORAL at 18:13

## 2024-10-28 RX ADMIN — POTASSIUM CHLORIDE 20 MEQ: 1500 TABLET, EXTENDED RELEASE ORAL at 09:01

## 2024-10-28 RX ADMIN — BUDESONIDE 0.5 MG: 0.5 INHALANT ORAL at 08:05

## 2024-10-28 RX ADMIN — INSULIN LISPRO 3 UNITS: 100 INJECTION, SOLUTION INTRAVENOUS; SUBCUTANEOUS at 12:20

## 2024-10-28 RX ADMIN — INSULIN LISPRO 3 UNITS: 100 INJECTION, SOLUTION INTRAVENOUS; SUBCUTANEOUS at 18:13

## 2024-10-28 RX ADMIN — INSULIN LISPRO 2 UNITS: 100 INJECTION, SOLUTION INTRAVENOUS; SUBCUTANEOUS at 18:14

## 2024-10-28 RX ADMIN — Medication 400 MG: at 09:01

## 2024-10-28 RX ADMIN — DILTIAZEM HYDROCHLORIDE 120 MG: 120 CAPSULE, COATED, EXTENDED RELEASE ORAL at 09:02

## 2024-10-28 RX ADMIN — DONEPEZIL HYDROCHLORIDE 5 MG: 5 TABLET ORAL at 21:44

## 2024-10-28 RX ADMIN — LORATADINE 10 MG: 10 TABLET ORAL at 09:02

## 2024-10-28 RX ADMIN — INSULIN LISPRO 2 UNITS: 100 INJECTION, SOLUTION INTRAVENOUS; SUBCUTANEOUS at 12:20

## 2024-10-28 RX ADMIN — SALINE NASAL SPRAY 1 SPRAY: 1.5 SOLUTION NASAL at 10:34

## 2024-10-28 NOTE — PROGRESS NOTES
Progress Note - Cardiology   Name: Camryn Roblero 84 y.o. female I MRN: [id number]  Unit/Bed#: S -01 I Date of Admission: 10/23/2024   Date of Service: 10/28/2024 I Hospital Day: 5     Assessment & Plan  Acute heart failure with preserved ejection fraction (HCC)  Patient presented volume overloaded on 10/23 with pulmonary vascular congestion on chest x-ray, elevated BNP 1154  Possibly related to ineffective diuretic dosing as reported no dietary indiscretions and her A-fib rates have been well-controlled  Echocardiogram 10/23/2024: EF 65% with no WMA, moderately reduced RV function, severe biatrial dilatation, moderate MR, mild-moderate TR.  Outpatient diuretic regimen: Torsemide 40 mg BID  Inpatient diuretic regimen: IV Bumex 2 mg BID  Dry weight: 154-155 lb  Weight on admission: 157 lb  Weight today: 152 lb standing scale, up 1 pound from yesterday  I&O's: Output 24 hours -429 mL.  Net output -1.2 L  Patient continues to appear significantly volume overloaded on exam with abdominal distention and LEWIS  Chronic atrial fibrillation (HCC)  Rates are well-controlled on diltiazem 120 mg daily, Toprol XL 50 mg BID  [id number]: Anticoagulated on Xarelto 15 mg daily  Essential hypertension  Average /83  Continue current regiment  Type 2 diabetes mellitus, without long-term current use of insulin (HCC)  Hemoglobin A1c 7.4 with management per primary team  COPD (chronic obstructive pulmonary disease) (HCC)  On 2 L NC at baseline  Class 1 obesity due to excess calories with serious comorbidity and body mass index (BMI) of 30.0 to 30.9 in adult  Harvest lifestyle measures as an outpatient  Ambulatory dysfunction  Ambulates with walker    Plan/Recommendations:  Decreased diuretic response in the last 24 hours therefore will increase IV Bumex to 3 mg BID  Monitor I&O's, renal function, electrolytes and standing weight  Maintain potassium >4 and magnesium >2  Continue low-sodium, fluid restricted diet  Remaining  "cardiac medications    _____________________________________________________________________    Subjective    Patient seen and examined.  No acute events overnight.    Review of Systems   Constitutional: Positive for malaise/fatigue. Negative for chills.   Cardiovascular:  Negative for chest pain, dyspnea on exertion, leg swelling, near-syncope, orthopnea, palpitations, paroxysmal nocturnal dyspnea and syncope.   Respiratory: Negative.  Negative for cough, shortness of breath and wheezing.    Endocrine: Negative.    Hematologic/Lymphatic: Negative.    Skin: Negative.    Musculoskeletal: Negative.    Gastrointestinal:  Positive for bloating. Negative for diarrhea, nausea and vomiting.   Neurological:  Negative for dizziness, light-headedness and weakness.   Psychiatric/Behavioral: Negative.  Negative for altered mental status.    All other systems reviewed and are negative.      Objective:   Vitals: Blood pressure 165/91, pulse 75, temperature 97.6 °F (36.4 °C), resp. rate 16, height 4' 11\" (1.499 m), weight 69.1 kg (152 lb 5.4 oz), SpO2 96%.,     Wt Readings from Last 3 Encounters:   10/28/24 69.1 kg (152 lb 5.4 oz)   10/23/24 73.9 kg (163 lb)   10/15/24 74.4 kg (164 lb)        Lab Results   Component Value Date    CREATININE 0.93 10/28/2024    CREATININE 0.96 10/27/2024    CREATININE 0.93 10/26/2024         Body mass index is 30.77 kg/m².,     Systolic (24hrs), Av , Min:133 , Max:165     Diastolic (24hrs), Av, Min:74, Max:91          Intake/Output Summary (Last 24 hours) at 10/28/2024 1117  Last data filed at 10/28/2024 0911  Gross per 24 hour   Intake 720 ml   Output 967 ml   Net -247 ml     Weight (last 2 days)       Date/Time Weight    10/28/24 0459 69.1 (152.34)    10/27/24 0600 68.6 (151.24)    10/26/24 0552 68.6 (151.24)              Telemetry Review: Atrial fibrillation with ventricular rates in the 70-80s      Physical Exam  Vitals and nursing note reviewed.   Constitutional:       General: She is " not in acute distress.     Appearance: She is well-developed. She is obese.      Comments: On 2 L NC in NAD   HENT:      Head: Normocephalic and atraumatic.   Neck:      Vascular: JVD present.   Cardiovascular:      Rate and Rhythm: Normal rate and regular rhythm.      Heart sounds: Normal heart sounds. No murmur heard.     No friction rub.   Pulmonary:      Effort: Pulmonary effort is normal. No respiratory distress.      Breath sounds: Rales present. No wheezing.      Comments: Sounds with rales noted on the left side  Abdominal:      General: Bowel sounds are normal. There is distension.      Palpations: Abdomen is soft.      Tenderness: There is no abdominal tenderness.   Musculoskeletal:         General: No tenderness. Normal range of motion.      Cervical back: Normal range of motion and neck supple.      Right lower leg: Edema present.      Left lower leg: Edema present.      Comments: ++ Right lateral lower extremities to the abdomen   Skin:     General: Skin is warm and dry.      Findings: No erythema.   Neurological:      Mental Status: She is alert and oriented to person, place, and time.   Psychiatric:         Behavior: Behavior normal.         Thought Content: Thought content normal.         Judgment: Judgment normal.         LABORATORY RESULTS      CBC with diff:   Results from last 7 days   Lab Units 10/25/24  0531 10/24/24  0513 10/23/24  1107   WBC Thousand/uL 7.68 8.04 8.42   HEMOGLOBIN g/dL 12.8 11.8 13.1   HEMATOCRIT % 42.6 37.3 41.8   MCV fL 90 86 87   PLATELETS Thousands/uL 237 230 246   RBC Million/uL 4.75 4.36 4.83   MCH pg 26.9 27.1 27.1   MCHC g/dL 30.0* 31.6 31.3*   RDW % 16.4* 16.3* 16.3*   MPV fL 10.2 10.3 9.9   NRBC AUTO /100 WBCs  --   --  0       CMP:  Results from last 7 days   Lab Units 10/28/24  0457 10/27/24  0608 10/26/24  0442 10/25/24  0531 10/24/24  0513 10/23/24  1107 10/21/24  1240   POTASSIUM mmol/L 4.4 4.4 3.9 4.1 3.1* 4.2 4.0   CHLORIDE mmol/L 97 98 98 96 94* 90* 93*  "  CO2 mmol/L 43* 40* 36* 41* 35* 34* 34*   BUN mg/dL 33* 31* 31* 30* 30* 34* 32*   CREATININE mg/dL 0.93 0.96 0.93 1.02 0.86 1.04 1.02   CALCIUM mg/dL 9.4 9.3 9.2 9.4 8.9 9.7 9.4   AST U/L  --   --   --   --   --  77*  --    ALT U/L  --   --   --   --   --  28  --    ALK PHOS U/L  --   --   --   --   --  90  --    EGFR ml/min/1.73sq m 57 54 57 50 62 49 50       BMP:  Results from last 7 days   Lab Units 10/28/24  0457 10/27/24  0608 10/26/24  0442 10/25/24  0531 10/24/24  0513 10/23/24  1107 10/21/24  1240   POTASSIUM mmol/L 4.4 4.4 3.9 4.1 3.1* 4.2 4.0   CHLORIDE mmol/L 97 98 98 96 94* 90* 93*   CO2 mmol/L 43* 40* 36* 41* 35* 34* 34*   BUN mg/dL 33* 31* 31* 30* 30* 34* 32*   CREATININE mg/dL 0.93 0.96 0.93 1.02 0.86 1.04 1.02   CALCIUM mg/dL 9.4 9.3 9.2 9.4 8.9 9.7 9.4       No results found for: \"NTBNP\"          Results from last 7 days   Lab Units 10/28/24  0457 10/26/24  0442 10/25/24  0531 10/23/24  1107   MAGNESIUM mg/dL 1.9 1.9 1.8* 1.5*          Results from last 7 days   Lab Units 10/23/24  1700   HEMOGLOBIN A1C % 7.4*                    Lipid Profile:   No results found for: \"CHOL\"  Lab Results   Component Value Date    HDL 34 (L) 05/09/2024    HDL 31 (L) 12/23/2023    HDL 39 (L) 05/16/2023     Lab Results   Component Value Date    LDLCALC 40 05/09/2024    LDLCALC 37 12/23/2023    LDLCALC 86 05/16/2023     Lab Results   Component Value Date    TRIG 59 05/09/2024    TRIG 51 12/23/2023    TRIG 91 05/16/2023         Meds/Allergies   all current active meds have been reviewed, current meds:   Current Facility-Administered Medications:     acetaminophen (TYLENOL) tablet 650 mg, Q6H PRN    atorvastatin (LIPITOR) tablet 40 mg, Daily    budesonide (PULMICORT) inhalation solution 0.5 mg, Q12H    bumetanide (BUMEX) injection 2 mg, BID    diltiazem (CARDIZEM CD) 24 hr capsule 120 mg, Daily    donepezil (ARICEPT) tablet 5 mg, HS    fluticasone (FLONASE) 50 mcg/act nasal spray 2 spray, Daily    insulin glargine " (LANTUS) subcutaneous injection 5 Units 0.05 mL, HS    insulin lispro (HumALOG/ADMELOG) 100 units/mL subcutaneous injection 1-5 Units, TID AC **AND** Fingerstick Glucose (POCT), TID AC    insulin lispro (HumALOG/ADMELOG) 100 units/mL subcutaneous injection 3 Units, TID With Meals    ipratropium-albuterol (DUO-NEB) 0.5-2.5 mg/3 mL inhalation solution 3 mL, Q6H PRN    latanoprost (XALATAN) 0.005 % ophthalmic solution 1 drop, HS    loratadine (CLARITIN) tablet 10 mg, Daily    magnesium Oxide (MAG-OX) tablet 400 mg, Daily    metoprolol succinate (TOPROL-XL) 24 hr tablet 50 mg, BID    potassium chloride (Klor-Con M20) CR tablet 20 mEq, BID    rivaroxaban (XARELTO) tablet 15 mg, Daily With Breakfast    sodium chloride (OCEAN) 0.65 % nasal spray 1 spray, Q1H PRN, and PTA meds:   Prior to Admission Medications   Prescriptions Last Dose Informant Patient Reported? Taking?   AYR SALINE NASAL DROPS NA  Self, Child Yes No   Accu-Chek FastClix Lancets MISC  Self, Child No No   Sig: USE TO CHECK BLOOD GLUCOSE Twice DAILY   Accu-Chek SmartView test strip  Self, Child No No   Sig: Use 1 each daily Use as instructed   acetaminophen (TYLENOL) 500 mg tablet  Self, Child Yes No   Sig: Take 500 mg by mouth every 6 (six) hours as needed for mild pain For pain   atorvastatin (LIPITOR) 40 mg tablet  Self, Child No No   Sig: Take 1 tablet (40 mg total) by mouth daily   budesonide (Pulmicort) 0.5 mg/2 mL nebulizer solution  Self, Child No No   Sig: Take 2 mL (0.5 mg total) by nebulization 2 (two) times a day Rinse mouth after use.   diltiazem (CARDIZEM CD) 120 mg 24 hr capsule  Child, Self No No   Sig: TAKE 1 CAPSULE EVERY DAY   diphenhydrAMINE (BENADRYL) 25 mg tablet  Child, Self No No   Sig: Take 1 hour prior to CT with contrast for contrast allergy.   Patient not taking: Reported on 8/5/2024   donepezil (ARICEPT) 5 mg tablet  Self, Child No No   Sig: Take 1 tablet (5 mg total) by mouth daily at bedtime   glipiZIDE (GLUCOTROL) 10 mg tablet   Child, Self No No   Sig: TAKE 1/2 TABLET EVERY MORNING   ipratropium-albuterol (DUO-NEB) 0.5-2.5 mg/3 mL nebulizer solution  Self, Child No No   Sig: Take 3 mL by nebulization every 6 (six) hours as needed for wheezing or shortness of breath   latanoprost (XALATAN) 0.005 % ophthalmic solution  Self, Child Yes No   loratadine (CLARITIN) 10 mg tablet  Self, Child Yes No   Sig: Take 1 tablet by mouth daily   magnesium (MAGTAB) 84 MG (7MEQ) TBCR  Child, Self Yes No   Sig: Take 84 mg by mouth daily Take 2 tablets- MWF   metFORMIN (GLUCOPHAGE) 500 mg tablet  Self, Child No No   Sig: TAKE 2 TABLETS TWICE DAILY   metoprolol succinate (TOPROL-XL) 50 mg 24 hr tablet  Self, Child No No   Sig: TAKE 1 TABLET TWICE DAILY   rivaroxaban (Xarelto) 15 mg tablet  Self, Child No No   Sig: Take 1 tablet (15 mg total) by mouth daily with breakfast   torsemide (DEMADEX) 10 mg tablet  Self, Child No No   Sig: Take 20 mg daily, take an additional 20 mg on MWF in evening      Facility-Administered Medications: None       Medications Prior to Admission:     Accu-Chek FastClix Lancets MISC    Accu-Chek SmartView test strip    acetaminophen (TYLENOL) 500 mg tablet    atorvastatin (LIPITOR) 40 mg tablet    AYR SALINE NASAL DROPS NA    budesonide (Pulmicort) 0.5 mg/2 mL nebulizer solution    diltiazem (CARDIZEM CD) 120 mg 24 hr capsule    diphenhydrAMINE (BENADRYL) 25 mg tablet    donepezil (ARICEPT) 5 mg tablet    glipiZIDE (GLUCOTROL) 10 mg tablet    ipratropium-albuterol (DUO-NEB) 0.5-2.5 mg/3 mL nebulizer solution    latanoprost (XALATAN) 0.005 % ophthalmic solution    loratadine (CLARITIN) 10 mg tablet    magnesium (MAGTAB) 84 MG (7MEQ) TBCR    metFORMIN (GLUCOPHAGE) 500 mg tablet    metoprolol succinate (TOPROL-XL) 50 mg 24 hr tablet    rivaroxaban (Xarelto) 15 mg tablet    torsemide (DEMADEX) 10 mg tablet         Counseling / Coordination of Care  Total floor / unit time spent today 20 minutes.  Greater than 50% of total time was spent  with the patient and / or family counseling and / or coordination of care.      ** Please Note: Dragon 360 Dictation voice to text software may have been used in the creation of this document. **

## 2024-10-28 NOTE — ASSESSMENT & PLAN NOTE
Patient presented volume overloaded on 10/23 with pulmonary vascular congestion on chest x-ray, elevated BNP 1154  Possibly related to ineffective diuretic dosing as reported no dietary indiscretions and her A-fib rates have been well-controlled  Echocardiogram 10/23/2024: EF 65% with no WMA, moderately reduced RV function, severe biatrial dilatation, moderate MR, mild-moderate TR.  Outpatient diuretic regimen: Torsemide 40 mg BID  Inpatient diuretic regimen: IV Bumex 2 mg BID  Dry weight: 154-155 lb  Weight on admission: 157 lb  Weight today: 152 lb standing scale, up 1 pound from yesterday  I&O's: Output 24 hours -429 mL.  Net output -1.2 L  Patient continues to appear significantly volume overloaded on exam with abdominal distention and LEWIS

## 2024-10-28 NOTE — ASSESSMENT & PLAN NOTE
Per physical therapy, recommended for rehab.  Initially was recommended for acute level rehab however patient's daughter and patient do not feel she could tolerate this intensive rehab and therefore opting for subacute rehab.  Possibly [facility]

## 2024-10-28 NOTE — PROGRESS NOTES
Progress Note - Hospitalist   Name: Camryn Roblero 84 y.o. female I MRN: [id number]  Unit/Bed#: S -01 I Date of Admission: 10/23/2024   Date of Service: 10/28/2024 I Hospital Day: 5    Assessment & Plan  Acute heart failure with preserved ejection fraction (HCC)  Wt Readings from Last 3 Encounters:   10/28/24 69.1 kg (152 lb 5.4 oz)   10/23/24 73.9 kg (163 lb)   10/15/24 74.4 kg (164 lb)   Presented with 10 pound weight gain despite increase in torsemide over the last 2 weeks.  CXR: Moderate pulmonary vascular congestion. Small right greater than left bilateral pleural effusions.   ECHO 10/23 with EF 65%, There is both systolic and diastolic flattening of the interventricular septum consistent with right ventricle pressure and volume overload. Changes include: Right ventricular dilation is notable, with an increase in mitral regurgitation, and pulmonary arterial pressures.  Dry weight around 154-155 pounds. Admission weight 162 lb; weight has remained unchanged 151-152 pounds for several days.  Still appears quite volume overloaded on exam  Cardiology following - continue Bumex IV 2 mg twice daily and monitor response. Hoping to transition to PO diuretics soon but has been slow to diurese  Monitor electrolytes  Chronic atrial fibrillation (HCC)  Rate controlled with Cardizem and metoprolol  Continue Xarelto  Essential hypertension  Cont BB/Cardizem    Type 2 diabetes mellitus, without long-term current use of insulin (HCC)  Lab Results   Component Value Date    HGBA1C 7.4 (H) 10/23/2024     Recent Labs     10/27/24  1127 10/27/24  1550 10/27/24  2117 10/28/24  0735   POCGLU 292* 331* 194* 163*     Blood Sugar Average: Last 72 hrs:  (P) [id number]  Hold home glipizide/Metformin for now  SSI/accuchecks/diabetic diet  A1c reflects reasonable control but blood sugars are not at goal during this hospitalization. Add low dose basal bolus regimen   Hyponatremia (Resolved: 10/28/2024)  Chronic, baseline appears  to be 131-134  Sodium 140 this morning  COPD (chronic obstructive pulmonary disease) (HCC)  With chronic hypoxemic respiratory failure, on 2L NC O2 at baseline during the day and 4L overnight  No acute exacerbation  Continue inhaler regimen  Class 1 obesity due to excess calories with serious comorbidity and body mass index (BMI) of 30.0 to 30.9 in adult  Encourage weight loss, dietary and lifestyle modifications  Body mass index is 30.77 kg/m².  Ambulatory dysfunction  Per physical therapy, recommended for rehab.  Initially was recommended for acute level rehab however patient's daughter and patient do not feel she could tolerate this intensive rehab and therefore opting for subacute rehab.  Possibly [facility]    VTE Pharmacologic Prophylaxis: VTE Score: 5 xarelto    Mobility:   Basic Mobility Inpatient Raw Score: 17  JH-HLM Goal: 5: Stand one or more mins  JH-HLM Achieved: 6: Walk 10 steps or more  JH-HLM Goal achieved. Continue to encourage appropriate mobility.    Patient Centered Rounds: I performed bedside rounds with nursing staff today.   Discussions with Specialists or Other Care Team Provider: Cardiology, case management    Education and Discussions with Family / Patient: Updated  (daughter) via phone.    Current Length of Stay: 5 day(s)  Current Patient Status: Inpatient   Certification Statement: The patient will continue to require additional inpatient hospital stay due to ongoing diuresis  Discharge Plan: Anticipate discharge in 48-72 hrs to rehab facility.    Code Status: Level 1 - Full Code    Subjective   Patient stated that her nose was stuffed up and she felt like she could not get oxygen through    Objective :  Temp:  [97.6 °F (36.4 °C)-98.3 °F (36.8 °C)] 97.6 °F (36.4 °C)  HR:  [75-79] 75  BP: (133-165)/(74-91) 165/91  Resp:  [16-19] 16  SpO2:  [93 %-98 %] 96 %  O2 Device: Nasal cannula  Nasal Cannula O2 Flow Rate (L/min):  [2 L/min-3 L/min] 2 L/min    Body mass index  is 30.77 kg/m².     Input and Output Summary (last 24 hours):     Intake/Output Summary (Last 24 hours) at 10/28/2024 1104  Last data filed at 10/28/2024 0911  Gross per 24 hour   Intake 720 ml   Output 967 ml   Net -247 ml       Physical Exam  Vitals reviewed.   Constitutional:       General: She is not in acute distress.     Appearance: She is obese. She is not ill-appearing, toxic-appearing or diaphoretic.   HENT:      Nose: Congestion present.   Eyes:      General: No scleral icterus.        Right eye: No discharge.         Left eye: No discharge.   Cardiovascular:      Rate and Rhythm: Normal rate. Rhythm irregular.      Heart sounds: No murmur heard.  Pulmonary:      Breath sounds: No stridor. Rales present. No wheezing or rhonchi.      Comments: Patient was visibly quite tachypneic; O2 sat was noted to be stable  Abdominal:      General: There is no distension.      Palpations: Abdomen is soft.      Tenderness: There is no abdominal tenderness. There is no guarding.      Comments: Very large obese abdomen.  Appeared to be doing abdominal breathing upon returning to her chair after being up   Musculoskeletal:      Right lower leg: Edema present.      Left lower leg: Edema present.      Comments: Still with significant right greater than left lower extremity pitting edema   Skin:     General: Skin is warm and dry.      Coloration: Skin is not jaundiced or pale.      Findings: No bruising, erythema, lesion or rash.   Neurological:      General: No focal deficit present.      Mental Status: She is alert. Mental status is at baseline.      Comments: Awake alert interactive pleasant and cooperative.  No evidence of confusion noted   Psychiatric:         Mood and Affect: Mood normal.         Thought Content: Thought content normal.         Lines/Drains:  Lines/Drains/Airways       Active Status       Name Placement date Placement time Site Days    External Urinary Catheter 10/23/24  1900  -- 4                       Telemetry:  Telemetry Orders (From admission, onward)               24 Hour Telemetry Monitoring  Continuous x 24 Hours (Telem)        Question:  Reason for 24 Hour Telemetry  Answer:  Decompensated CHF- and any one of the following: continuous diuretic infusion or total diuretic dose >200 mg daily, associated electrolyte derangement (I.e. K < 3.0), ionotropic drip (continuous infusion), hx of ventricular arrhythmia, or new EF < 35%                     Telemetry Reviewed: afib rate 80s  Indication for Continued Telemetry Use: d/c tele               Lab Results: I have reviewed the following results:   Results from last 7 days   Lab Units 10/25/24  0531 10/24/24  0513 10/23/24  1107   WBC Thousand/uL 7.68   < > 8.42   HEMOGLOBIN g/dL 12.8   < > 13.1   HEMATOCRIT % 42.6   < > 41.8   PLATELETS Thousands/uL 237   < > 246   SEGS PCT %  --   --  86*   LYMPHO PCT %  --   --  7*   MONO PCT %  --   --  6   EOS PCT %  --   --  1    < > = values in this interval not displayed.     Results from last 7 days   Lab Units 10/28/24  0457 10/24/24  0513 10/23/24  1107   SODIUM mmol/L 140   < > 132*   POTASSIUM mmol/L 4.4   < > 4.2   CHLORIDE mmol/L 97   < > 90*   CO2 mmol/L 43*   < > 34*   BUN mg/dL 33*   < > 34*   CREATININE mg/dL 0.93   < > 1.04   ANION GAP mmol/L 0*   < > 8   CALCIUM mg/dL 9.4   < > 9.7   ALBUMIN g/dL  --   --  4.0   TOTAL BILIRUBIN mg/dL  --   --  0.98   ALK PHOS U/L  --   --  90   ALT U/L  --   --  28   AST U/L  --   --  77*   GLUCOSE RANDOM mg/dL 155*   < > 189*    < > = values in this interval not displayed.         Results from last 7 days   Lab Units 10/28/24  0735 10/27/24  2117 10/27/24  1550 10/27/24  1127 10/27/24  0754 10/26/24  2033 10/26/24  1606 10/26/24  1114 10/26/24  0810 10/25/24  2131 10/25/24  1626 10/25/24  1149   POC GLUCOSE mg/dl 163* 194* 331* 292* 196* 295* 277* 245* 168* 231* 196* 224*     Results from last 7 days   Lab Units 10/23/24  1700   HEMOGLOBIN A1C % 7.4*           Recent  Cultures (last 7 days):           Last 24 Hours Medication List:     Current Facility-Administered Medications:     acetaminophen (TYLENOL) tablet 650 mg, Q6H PRN    atorvastatin (LIPITOR) tablet 40 mg, Daily    budesonide (PULMICORT) inhalation solution 0.5 mg, Q12H    bumetanide (BUMEX) injection 2 mg, BID    diltiazem (CARDIZEM CD) 24 hr capsule 120 mg, Daily    donepezil (ARICEPT) tablet 5 mg, HS    fluticasone (FLONASE) 50 mcg/act nasal spray 2 spray, Daily    insulin glargine (LANTUS) subcutaneous injection 5 Units 0.05 mL, HS    insulin lispro (HumALOG/ADMELOG) 100 units/mL subcutaneous injection 1-5 Units, TID AC **AND** Fingerstick Glucose (POCT), TID AC    insulin lispro (HumALOG/ADMELOG) 100 units/mL subcutaneous injection 3 Units, TID With Meals    ipratropium-albuterol (DUO-NEB) 0.5-2.5 mg/3 mL inhalation solution 3 mL, Q6H PRN    latanoprost (XALATAN) 0.005 % ophthalmic solution 1 drop, HS    loratadine (CLARITIN) tablet 10 mg, Daily    magnesium Oxide (MAG-OX) tablet 400 mg, Daily    metoprolol succinate (TOPROL-XL) 24 hr tablet 50 mg, BID    potassium chloride (Klor-Con M20) CR tablet 20 mEq, BID    rivaroxaban (XARELTO) tablet 15 mg, Daily With Breakfast    sodium chloride (OCEAN) 0.65 % nasal spray 1 spray, Q1H PRN    Administrative Statements   Today, Patient Was Seen By: Arabella Samaniego PA-C      **Please Note: This note may have been constructed using a voice recognition system.**

## 2024-10-28 NOTE — ASSESSMENT & PLAN NOTE
Wt Readings from Last 3 Encounters:   10/28/24 69.1 kg (152 lb 5.4 oz)   10/23/24 73.9 kg (163 lb)   10/15/24 74.4 kg (164 lb)   Presented with 10 pound weight gain despite increase in torsemide over the last 2 weeks.  CXR: Moderate pulmonary vascular congestion. Small right greater than left bilateral pleural effusions.   ECHO 10/23 with EF 65%, There is both systolic and diastolic flattening of the interventricular septum consistent with right ventricle pressure and volume overload. Changes include: Right ventricular dilation is notable, with an increase in mitral regurgitation, and pulmonary arterial pressures.  Dry weight around 154-155 pounds. Admission weight 162 lb; weight has remained unchanged 151-152 pounds for several days.  Still appears quite volume overloaded on exam  Cardiology following - continue Bumex IV 2 mg twice daily and monitor response. Hoping to transition to PO diuretics soon but has been slow to diurese  Monitor electrolytes

## 2024-10-28 NOTE — ASSESSMENT & PLAN NOTE
Rates are well-controlled on diltiazem 120 mg daily, Toprol XL 50 mg BID  [id number]: Anticoagulated on Xarelto 15 mg daily

## 2024-10-28 NOTE — ASSESSMENT & PLAN NOTE
With chronic hypoxemic respiratory failure, on 2L NC O2 at baseline during the day and 4L overnight  No acute exacerbation  Continue inhaler regimen

## 2024-10-28 NOTE — CASE MANAGEMENT
Case Management Discharge Planning Note    Patient name Camryn Roblero  Location S /S -01 MRN [id number]  : 1940 Date 10/28/2024       Current Admission Date: 10/23/2024  Current Admission Diagnosis:Acute heart failure with preserved ejection fraction (HCC)   Patient Active Problem List    Diagnosis Date Noted Date Diagnosed    Ambulatory dysfunction 10/26/2024     Dependence on supplemental oxygen 10/23/2024     Chronic atrial fibrillation (HCC) 10/23/2024     Pancreas cyst 2024     History of colon polyps 2024     Herpes zoster without complication 2024     Pulmonary emphysema, unspecified emphysema type (HCC) 2024     Leukocytosis 2024     Mild protein-calorie malnutrition (HCC) 2024     Hyperkalemia 2024     Abnormal CT scan 2024     Impaired memory 2024     Allergic drug rash 2024     Elevated troponin 2023     Tachycardia 2023     Atrial flutter (HCC) 2023     Hyperlipidemia 10/21/2023     Seasonal allergic rhinitis 2023     Osteopenia 2022     Chronic pain of both knees 2021     Cataract of both eyes 2021     Class 1 obesity due to excess calories with serious comorbidity and body mass index (BMI) of 30.0 to 30.9 in adult 2021     Multiple pulmonary nodules 10/05/2020     Colon polyps 10/05/2020     TOM (obstructive sleep apnea) 2020     Acute heart failure with preserved ejection fraction (HCC) 2020     Persistent proteinuria 10/25/2019     Essential hypertension 2016     Type 2 diabetes mellitus, without long-term current use of insulin (HCC) 2016     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     COPD (chronic obstructive pulmonary disease) (HCC) 2016       LOS (days): 5  Geometric Mean LOS (GMLOS) (days): 3.9  Days to GMLOS:-1.1     OBJECTIVE:  Risk of Unplanned Readmission Score: 17.98         Current admission status: Inpatient    Preferred Pharmacy:   [facility] - [place], [place] - [address]  [address] [facility] [address]  Phone: [phone] Fax: [phone]    [facility] [address] [place] [address] [address] [address]  Phone: [phone] Fax: [phone]    Primary Care Provider: Abiel Seals MD    Primary Insurance: MEDICARE  Secondary Insurance: [facility]    DISCHARGE DETAILS:       Additional Comments: Pt and her daughter are now requesting to use [facility] for STR. NPA reserved in [place]. No insurance auth needed.

## 2024-10-28 NOTE — ASSESSMENT & PLAN NOTE
Lab Results   Component Value Date    HGBA1C 7.4 (H) 10/23/2024     Recent Labs     10/27/24  1127 10/27/24  1550 10/27/24  2117 10/28/24  0735   POCGLU 292* 331* 194* 163*     Blood Sugar Average: Last 72 hrs:  (P) [id number]  Hold home glipizide/Metformin for now  SSI/accuchecks/diabetic diet  A1c reflects reasonable control but blood sugars are not at goal during this hospitalization. Add low dose basal bolus regimen

## 2024-10-28 NOTE — PLAN OF CARE
Problem: Prexisting or High Potential for Compromised Skin Integrity  Goal: Skin integrity is maintained or improved  Description: INTERVENTIONS:  - Identify patients at risk for skin breakdown  - Assess and monitor skin integrity  - Assess and monitor nutrition and hydration status  - Monitor labs   - Assess for incontinence   - Turn and reposition patient  - Assist with mobility/ambulation  - Relieve pressure over bony prominences  - Avoid friction and shearing  - Provide appropriate hygiene as needed including keeping skin clean and dry  - Evaluate need for skin moisturizer/barrier cream  - Collaborate with interdisciplinary team   - Patient/family teaching  - Consider wound care consult   10/27/2024 2300 by Shaina Byers RN  Outcome: Progressing  10/27/2024 2259 by Shaina Byers RN  Outcome: Progressing     Problem: Potential for Falls  Goal: Patient will remain free of falls  Description: INTERVENTIONS:  - Educate patient/family on patient safety including physical limitations  - Instruct patient to call for assistance with activity   - Consult OT/PT to assist with strengthening/mobility   - Keep Call bell within reach  - Keep bed low and locked with side rails adjusted as appropriate  - Keep care items and personal belongings within reach  - Initiate and maintain comfort rounds  - Make Fall Risk Sign visible to staff  - Offer Toileting every 2 Hours, in advance of need  - Initiate/Maintain bed/chair alarm  - Obtain necessary fall risk management equipment: call  bell within reach  - Apply yellow socks and bracelet for high fall risk patients  - Consider moving patient to room near nurses station  10/27/2024 2300 by Shaina Byers RN  Outcome: Progressing  10/27/2024 2259 by Shaina Byers RN  Outcome: Progressing     Problem: Nutrition/Hydration-ADULT  Goal: Nutrient/Hydration intake appropriate for improving, restoring or maintaining nutritional needs  Description: Monitor  and assess patient's nutrition/hydration status for malnutrition. Collaborate with interdisciplinary team and initiate plan and interventions as ordered.  Monitor patient's weight and dietary intake as ordered or per policy. Utilize nutrition screening tool and intervene as necessary. Determine patient's food preferences and provide high-protein, high-caloric foods as appropriate.     INTERVENTIONS:  - Monitor oral intake, urinary output, labs, and treatment plans  - Assess nutrition and hydration status and recommend course of action  - Evaluate amount of meals eaten  - Assist patient with eating if necessary   - Allow adequate time for meals  - Recommend/ encourage appropriate diets, oral nutritional supplements, and vitamin/mineral supplements  - Order, calculate, and assess calorie counts as needed  - Recommend, monitor, and adjust tube feedings and TPN/PPN based on assessed needs  - Assess need for intravenous fluids  - Provide specific nutrition/hydration education as appropriate  - Include patient/family/caregiver in decisions related to nutrition  10/27/2024 2300 by Shaina Byers RN  Outcome: Progressing  10/27/2024 2259 by Shaina Byers RN  Outcome: Progressing     Problem: DISCHARGE PLANNING  Goal: Discharge to home or other facility with appropriate resources  Description: INTERVENTIONS:  - Identify barriers to discharge w/patient and caregiver  - Arrange for needed discharge resources and transportation as appropriate  - Identify discharge learning needs (meds, wound care, etc.)  - Arrange for interpretive services to assist at discharge as needed  - Refer to Case Management Department for coordinating discharge planning if the patient needs post-hospital services based on physician/advanced practitioner order or complex needs related to functional status, cognitive ability, or social support system  Outcome: Progressing     Problem: Knowledge Deficit  Goal: Patient/family/caregiver  demonstrates understanding of disease process, treatment plan, medications, and discharge instructions  Description: Complete learning assessment and assess knowledge base.  Interventions:  - Provide teaching at level of understanding  - Provide teaching via preferred learning methods  Outcome: Progressing     Problem: CARDIOVASCULAR - ADULT  Goal: Maintains optimal cardiac output and hemodynamic stability  Description: INTERVENTIONS:  - Monitor I/O, vital signs and rhythm  - Monitor for S/S and trends of decreased cardiac output  - Administer and titrate ordered vasoactive medications to optimize hemodynamic stability  - Assess quality of pulses, skin color and temperature  - Assess for signs of decreased coronary artery perfusion  - Instruct patient to report change in severity of symptoms  Outcome: Progressing

## 2024-10-29 PROBLEM — K59.00 CONSTIPATION: Status: ACTIVE | Noted: 2024-10-29

## 2024-10-29 LAB
ANION GAP SERPL CALCULATED.3IONS-SCNC: 4 MMOL/L (ref 4–13)
BUN SERPL-MCNC: 34 MG/DL (ref 5–25)
CALCIUM SERPL-MCNC: 9.6 MG/DL (ref 8.4–10.2)
CHLORIDE SERPL-SCNC: 97 MMOL/L (ref 96–108)
CO2 SERPL-SCNC: 39 MMOL/L (ref 21–32)
CREAT SERPL-MCNC: 0.94 MG/DL (ref 0.6–1.3)
GFR SERPL CREATININE-BSD FRML MDRD: 56 ML/MIN/1.73SQ M
GLUCOSE SERPL-MCNC: 153 MG/DL (ref 65–140)
GLUCOSE SERPL-MCNC: 154 MG/DL (ref 65–140)
GLUCOSE SERPL-MCNC: 222 MG/DL (ref 65–140)
GLUCOSE SERPL-MCNC: 225 MG/DL (ref 65–140)
GLUCOSE SERPL-MCNC: 251 MG/DL (ref 65–140)
POTASSIUM SERPL-SCNC: 4.3 MMOL/L (ref 3.5–5.3)
SODIUM SERPL-SCNC: 140 MMOL/L (ref 135–147)

## 2024-10-29 PROCEDURE — 99232 SBSQ HOSP IP/OBS MODERATE 35: CPT | Performed by: PHYSICIAN ASSISTANT

## 2024-10-29 PROCEDURE — 94640 AIRWAY INHALATION TREATMENT: CPT

## 2024-10-29 PROCEDURE — 97116 GAIT TRAINING THERAPY: CPT

## 2024-10-29 PROCEDURE — 94760 N-INVAS EAR/PLS OXIMETRY 1: CPT

## 2024-10-29 PROCEDURE — 97110 THERAPEUTIC EXERCISES: CPT

## 2024-10-29 PROCEDURE — 99232 SBSQ HOSP IP/OBS MODERATE 35: CPT | Performed by: INTERNAL MEDICINE

## 2024-10-29 PROCEDURE — 80048 BASIC METABOLIC PNL TOTAL CA: CPT | Performed by: PHYSICIAN ASSISTANT

## 2024-10-29 PROCEDURE — 82948 REAGENT STRIP/BLOOD GLUCOSE: CPT

## 2024-10-29 RX ORDER — POLYETHYLENE GLYCOL 3350 17 G/17G
17 POWDER, FOR SOLUTION ORAL DAILY
Status: DISCONTINUED | OUTPATIENT
Start: 2024-10-29 | End: 2024-10-31

## 2024-10-29 RX ORDER — BISACODYL 10 MG
10 SUPPOSITORY, RECTAL RECTAL DAILY PRN
Status: DISCONTINUED | OUTPATIENT
Start: 2024-10-29 | End: 2024-11-01 | Stop reason: HOSPADM

## 2024-10-29 RX ORDER — METOLAZONE 5 MG/1
2.5 TABLET ORAL ONCE
Status: COMPLETED | OUTPATIENT
Start: 2024-10-29 | End: 2024-10-29

## 2024-10-29 RX ORDER — BUMETANIDE 0.25 MG/ML
1 INJECTION INTRAMUSCULAR; INTRAVENOUS CONTINUOUS
Status: DISCONTINUED | OUTPATIENT
Start: 2024-10-29 | End: 2024-10-31

## 2024-10-29 RX ORDER — AMOXICILLIN 250 MG
2 CAPSULE ORAL 2 TIMES DAILY
Status: DISCONTINUED | OUTPATIENT
Start: 2024-10-29 | End: 2024-11-01 | Stop reason: HOSPADM

## 2024-10-29 RX ADMIN — BUDESONIDE 0.5 MG: 0.5 INHALANT ORAL at 07:34

## 2024-10-29 RX ADMIN — INSULIN GLARGINE 5 UNITS: 100 INJECTION, SOLUTION SUBCUTANEOUS at 22:10

## 2024-10-29 RX ADMIN — INSULIN LISPRO 1 UNITS: 100 INJECTION, SOLUTION INTRAVENOUS; SUBCUTANEOUS at 08:08

## 2024-10-29 RX ADMIN — DONEPEZIL HYDROCHLORIDE 5 MG: 5 TABLET ORAL at 22:02

## 2024-10-29 RX ADMIN — METOPROLOL SUCCINATE 50 MG: 50 TABLET, EXTENDED RELEASE ORAL at 17:35

## 2024-10-29 RX ADMIN — Medication 400 MG: at 08:07

## 2024-10-29 RX ADMIN — BUDESONIDE 0.5 MG: 0.5 INHALANT ORAL at 21:08

## 2024-10-29 RX ADMIN — INSULIN LISPRO 2 UNITS: 100 INJECTION, SOLUTION INTRAVENOUS; SUBCUTANEOUS at 12:29

## 2024-10-29 RX ADMIN — SENNOSIDES AND DOCUSATE SODIUM 2 TABLET: 8.6; 5 TABLET ORAL at 11:24

## 2024-10-29 RX ADMIN — POTASSIUM CHLORIDE 20 MEQ: 1500 TABLET, EXTENDED RELEASE ORAL at 08:07

## 2024-10-29 RX ADMIN — METOLAZONE 2.5 MG: 5 TABLET ORAL at 11:24

## 2024-10-29 RX ADMIN — INSULIN LISPRO 3 UNITS: 100 INJECTION, SOLUTION INTRAVENOUS; SUBCUTANEOUS at 08:09

## 2024-10-29 RX ADMIN — Medication 2 MG/HR: at 11:21

## 2024-10-29 RX ADMIN — BUMETANIDE 3 MG: 0.25 INJECTION INTRAMUSCULAR; INTRAVENOUS at 08:07

## 2024-10-29 RX ADMIN — FLUTICASONE PROPIONATE 2 SPRAY: 50 SPRAY, METERED NASAL at 08:08

## 2024-10-29 RX ADMIN — INSULIN LISPRO 3 UNITS: 100 INJECTION, SOLUTION INTRAVENOUS; SUBCUTANEOUS at 12:29

## 2024-10-29 RX ADMIN — Medication 2 MG/HR: at 16:20

## 2024-10-29 RX ADMIN — SENNOSIDES AND DOCUSATE SODIUM 2 TABLET: 8.6; 5 TABLET ORAL at 17:35

## 2024-10-29 RX ADMIN — POLYETHYLENE GLYCOL 3350 17 G: 17 POWDER, FOR SOLUTION ORAL at 11:24

## 2024-10-29 RX ADMIN — METOPROLOL SUCCINATE 50 MG: 50 TABLET, EXTENDED RELEASE ORAL at 08:07

## 2024-10-29 RX ADMIN — DILTIAZEM HYDROCHLORIDE 120 MG: 120 CAPSULE, COATED, EXTENDED RELEASE ORAL at 08:07

## 2024-10-29 RX ADMIN — INSULIN LISPRO 2 UNITS: 100 INJECTION, SOLUTION INTRAVENOUS; SUBCUTANEOUS at 17:35

## 2024-10-29 RX ADMIN — POTASSIUM CHLORIDE 20 MEQ: 1500 TABLET, EXTENDED RELEASE ORAL at 17:35

## 2024-10-29 RX ADMIN — ATORVASTATIN CALCIUM 40 MG: 40 TABLET, FILM COATED ORAL at 08:07

## 2024-10-29 RX ADMIN — INSULIN LISPRO 3 UNITS: 100 INJECTION, SOLUTION INTRAVENOUS; SUBCUTANEOUS at 17:36

## 2024-10-29 RX ADMIN — LATANOPROST 1 DROP: 50 SOLUTION OPHTHALMIC at 22:02

## 2024-10-29 RX ADMIN — LORATADINE 10 MG: 10 TABLET ORAL at 08:07

## 2024-10-29 RX ADMIN — RIVAROXABAN 15 MG: 15 TABLET, FILM COATED ORAL at 08:07

## 2024-10-29 RX ADMIN — Medication 2 MG/HR: at 22:10

## 2024-10-29 NOTE — ASSESSMENT & PLAN NOTE
Patient presented volume overloaded on 10/23 with pulmonary vascular congestion on chest x-ray, elevated BNP 1154  Possibly related to ineffective diuretic dosing as reported no dietary indiscretions and her A-fib rates have been well-controlled  Echocardiogram 10/23/2024: EF 65% with no WMA, moderately reduced RV function, severe biatrial dilatation, moderate MR, mild-moderate TR.  Outpatient diuretic regimen: Torsemide 40 mg BID  Inpatient diuretic regimen: IV Bumex 3 mg BID (dose uptitrated on 10/28)  Dry weight: Was 154-155 lb, but likely closer to 140 lb  Weight on admission: 157 lb  Weight today: 150 lb standing scale, 2 pounds down from yesterday  I&O's: Output 24 hours -490 mL.  Net output -1.9 L.  Likely inaccurate due to incontinence  Patient continues to appear significantly volume overloaded on exam with abdominal distention and LEWIS

## 2024-10-29 NOTE — CASE MANAGEMENT
Case Management Discharge Planning Note    Patient name Camryn Roblero  Location S /S -01 MRN [id number]  : 1940 Date 10/29/2024       Current Admission Date: 10/23/2024  Current Admission Diagnosis:Acute heart failure with preserved ejection fraction (HCC)   Patient Active Problem List    Diagnosis Date Noted Date Diagnosed    Ambulatory dysfunction 10/26/2024     Dependence on supplemental oxygen 10/23/2024     Chronic atrial fibrillation (HCC) 10/23/2024     Pancreas cyst 2024     History of colon polyps 2024     Herpes zoster without complication 2024     Pulmonary emphysema, unspecified emphysema type (HCC) 2024     Leukocytosis 2024     Mild protein-calorie malnutrition (HCC) 2024     Hyperkalemia 2024     Abnormal CT scan 2024     Impaired memory 2024     Allergic drug rash 2024     Elevated troponin 2023     Tachycardia 2023     Atrial flutter (HCC) 2023     Hyperlipidemia 10/21/2023     Seasonal allergic rhinitis 2023     Osteopenia 2022     Chronic pain of both knees 2021     Cataract of both eyes 2021     Class 1 obesity due to excess calories with serious comorbidity and body mass index (BMI) of 30.0 to 30.9 in adult 2021     Multiple pulmonary nodules 10/05/2020     Colon polyps 10/05/2020     TOM (obstructive sleep apnea) 2020     Acute heart failure with preserved ejection fraction (HCC) 2020     Persistent proteinuria 10/25/2019     Essential hypertension 2016     Type 2 diabetes mellitus, without long-term current use of insulin (HCC) 2016     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     COPD (chronic obstructive pulmonary disease) (HCC) 2016       LOS (days): 6  Geometric Mean LOS (GMLOS) (days): 3.9  Days to GMLOS:-2     OBJECTIVE:  Risk of Unplanned Readmission Score: 18.19         Current admission status: Inpatient    Preferred Pharmacy:   [facility] - [place], [place] - [address]  [address] [address]  Phone: [phone] Fax: [phone]    [facility] [address] [place], [place] [address], [address] [address]  Phone: [phone] Fax: [phone]    Primary Care Provider: Abiel Seals MD    Primary Insurance: MEDICARE  Secondary Insurance: [facility]    DISCHARGE DETAILS:       Additional Comments: Per [facility] provider, Pt is not medically ready for discharge yet. Pt is still receiving IV diuretics.

## 2024-10-29 NOTE — PROGRESS NOTES
Progress Note - Cardiology   Name: Camryn Roblero 84 y.o. female I MRN: [id number]  Unit/Bed#: S -01 I Date of Admission: 10/23/2024   Date of Service: 10/29/2024 I Hospital Day: 6     Assessment & Plan  Acute heart failure with preserved ejection fraction (HCC)  Patient presented volume overloaded on 10/23 with pulmonary vascular congestion on chest x-ray, elevated BNP 1154  Possibly related to ineffective diuretic dosing as reported no dietary indiscretions and her A-fib rates have been well-controlled  Echocardiogram 10/23/2024: EF 65% with no WMA, moderately reduced RV function, severe biatrial dilatation, moderate MR, mild-moderate TR.  Outpatient diuretic regimen: Torsemide 40 mg BID  Inpatient diuretic regimen: IV Bumex 3 mg BID (dose uptitrated on 10/28)  Dry weight: Was 154-155 lb, but likely closer to 140 lb  Weight on admission: 157 lb  Weight today: 150 lb standing scale, 2 pounds down from yesterday  I&O's: Output 24 hours -490 mL.  Net output -1.9 L.  Likely inaccurate due to incontinence  Patient continues to appear significantly volume overloaded on exam with abdominal distention and LEWIS  Chronic atrial fibrillation (HCC)  Rates are well-controlled on diltiazem 120 mg daily, Toprol XL 50 mg BID  [id number]: Anticoagulated on Xarelto 15 mg daily  Essential hypertension  Average /81  Continue current regiment  Type 2 diabetes mellitus, without long-term current use of insulin (HCC)  Hemoglobin A1c 7.4 with management per primary team  COPD (chronic obstructive pulmonary disease) (HCC)  On 2 L NC at baseline  Class 1 obesity due to excess calories with serious comorbidity and body mass index (BMI) of 30.0 to 30.9 in adult  [place] lifestyle measures as an outpatient  Ambulatory dysfunction  Ambulates with walker    Plan/Recommendations:  Patient continues to appear significantly volume overloaded on exam therefore will start Bumex infusion at 2 mg/hr with a dose of metolazone  Monitor I&O's,  "renal function, electrolytes and standing weight  Maintain potassium >4 and magnesium >2  Continue low-sodium, fluid restricted diet  Follow-up BMP  Continue remaining cardiac medications    _____________________________________________________________________    Subjective    Patient seen and examined.  No acute events overnight.    Review of Systems   Constitutional: Positive for malaise/fatigue. Negative for chills.   Cardiovascular:  Positive for leg swelling. Negative for chest pain, dyspnea on exertion, near-syncope, orthopnea, palpitations, paroxysmal nocturnal dyspnea and syncope.   Respiratory:  Positive for shortness of breath. Negative for cough and wheezing.    Endocrine: Negative.    Hematologic/Lymphatic: Negative.    Skin: Negative.    Musculoskeletal: Negative.    Gastrointestinal:  Positive for bloating. Negative for diarrhea, nausea and vomiting.   Neurological:  Negative for dizziness, light-headedness and weakness.   Psychiatric/Behavioral: Negative.  Negative for altered mental status.    All other systems reviewed and are negative.      Objective:   Vitals: Blood pressure 153/84, pulse 74, temperature (!) 97.4 °F (36.3 °C), resp. rate 20, height 4' 11\" (1.499 m), weight 68.3 kg (150 lb 9.2 oz), SpO2 100%.,     Wt Readings from Last 3 Encounters:   10/29/24 68.3 kg (150 lb 9.2 oz)   10/23/24 73.9 kg (163 lb)   10/15/24 74.4 kg (164 lb)        Lab Results   Component Value Date    CREATININE 0.93 10/28/2024    CREATININE 0.96 10/27/2024    CREATININE 0.93 10/26/2024         Body mass index is 30.41 kg/m².,     Systolic (24hrs), Av , Min:124 , Max:153     Diastolic (24hrs), Av, Min:78, Max:84          Intake/Output Summary (Last 24 hours) at 10/29/2024 0906  Last data filed at 10/29/2024 0839  Gross per 24 hour   Intake 360 ml   Output 1316 ml   Net -956 ml     Weight (last 2 days)       Date/Time Weight    10/29/24 0600 68.3 (150.57)    10/28/24 0459 69.1 (152.34)    10/27/24 0600 68.6 " (151.24)              Telemetry Review: A-fib in the 70s      Physical Exam  Vitals and nursing note reviewed.   Constitutional:       General: She is not in acute distress.     Appearance: She is well-developed. She is obese.      Comments: On 2 L NC in NAD   HENT:      Head: Normocephalic and atraumatic.   Neck:      Vascular: JVD present.   Cardiovascular:      Rate and Rhythm: Normal rate and regular rhythm.      Heart sounds: Normal heart sounds. No murmur heard.     No friction rub.   Pulmonary:      Effort: Pulmonary effort is normal. No respiratory distress.      Breath sounds: Rales present. No wheezing.   Abdominal:      General: Bowel sounds are normal. There is distension.      Palpations: Abdomen is soft.      Tenderness: There is no abdominal tenderness.   Musculoskeletal:         General: No tenderness. Normal range of motion.      Cervical back: Normal range of motion and neck supple.      Right lower leg: Edema present.      Left lower leg: Edema present.      Comments: ++ Edema to the abdomen   Skin:     General: Skin is warm and dry.      Findings: No erythema.   Neurological:      Mental Status: She is alert and oriented to person, place, and time.   Psychiatric:         Mood and Affect: Mood normal.         Behavior: Behavior normal.         Thought Content: Thought content normal.         Judgment: Judgment normal.         LABORATORY RESULTS      CBC with diff:   Results from last 7 days   Lab Units 10/25/24  0531 10/24/24  0513 10/23/24  1107   WBC Thousand/uL 7.68 8.04 8.42   HEMOGLOBIN g/dL 12.8 11.8 13.1   HEMATOCRIT % 42.6 37.3 41.8   MCV fL 90 86 87   PLATELETS Thousands/uL 237 230 246   RBC Million/uL 4.75 4.36 4.83   MCH pg 26.9 27.1 27.1   MCHC g/dL 30.0* 31.6 31.3*   RDW % 16.4* 16.3* 16.3*   MPV fL 10.2 10.3 9.9   NRBC AUTO /100 WBCs  --   --  0       CMP:  Results from last 7 days   Lab Units 10/28/24  0457 10/27/24  0608 10/26/24  0442 10/25/24  0531 10/24/24  0513 10/23/24  1107  "  POTASSIUM mmol/L 4.4 4.4 3.9 4.1 3.1* 4.2   CHLORIDE mmol/L 97 98 98 96 94* 90*   CO2 mmol/L 43* 40* 36* 41* 35* 34*   BUN mg/dL 33* 31* 31* 30* 30* 34*   CREATININE mg/dL 0.93 0.96 0.93 1.02 0.86 1.04   CALCIUM mg/dL 9.4 9.3 9.2 9.4 8.9 9.7   AST U/L  --   --   --   --   --  77*   ALT U/L  --   --   --   --   --  28   ALK PHOS U/L  --   --   --   --   --  90   EGFR ml/min/1.73sq m 57 54 57 50 62 49       BMP:  Results from last 7 days   Lab Units 10/28/24  0457 10/27/24  0608 10/26/24  0442 10/25/24  0531 10/24/24  0513 10/23/24  1107   POTASSIUM mmol/L 4.4 4.4 3.9 4.1 3.1* 4.2   CHLORIDE mmol/L 97 98 98 96 94* 90*   CO2 mmol/L 43* 40* 36* 41* 35* 34*   BUN mg/dL 33* 31* 31* 30* 30* 34*   CREATININE mg/dL 0.93 0.96 0.93 1.02 0.86 1.04   CALCIUM mg/dL 9.4 9.3 9.2 9.4 8.9 9.7       No results found for: \"NTBNP\"          Results from last 7 days   Lab Units 10/28/24  0457 10/26/24  0442 10/25/24  0531 10/23/24  1107   MAGNESIUM mg/dL 1.9 1.9 1.8* 1.5*          Results from last 7 days   Lab Units 10/23/24  1700   HEMOGLOBIN A1C % 7.4*                    Lipid Profile:   No results found for: \"CHOL\"  Lab Results   Component Value Date    HDL 34 (L) 05/09/2024    HDL 31 (L) 12/23/2023    HDL 39 (L) 05/16/2023     Lab Results   Component Value Date    LDLCALC 40 05/09/2024    LDLCALC 37 12/23/2023    LDLCALC 86 05/16/2023     Lab Results   Component Value Date    TRIG 59 05/09/2024    TRIG 51 12/23/2023    TRIG 91 05/16/2023         Meds/Allergies   all current active meds have been reviewed, current meds:   Current Facility-Administered Medications:     acetaminophen (TYLENOL) tablet 650 mg, Q6H PRN    atorvastatin (LIPITOR) tablet 40 mg, Daily    bisacodyl (DULCOLAX) rectal suppository 10 mg, Daily PRN    budesonide (PULMICORT) inhalation solution 0.5 mg, Q12H    bumetanide (BUMEX) injection 3 mg, BID    diltiazem (CARDIZEM CD) 24 hr capsule 120 mg, Daily    donepezil (ARICEPT) tablet 5 mg, HS    fluticasone " (FLONASE) 50 mcg/act nasal spray 2 spray, Daily    insulin glargine (LANTUS) subcutaneous injection 5 Units 0.05 mL, HS    insulin lispro (HumALOG/ADMELOG) 100 units/mL subcutaneous injection 1-5 Units, TID AC **AND** Fingerstick Glucose (POCT), TID AC    insulin lispro (HumALOG/ADMELOG) 100 units/mL subcutaneous injection 3 Units, TID With Meals    ipratropium-albuterol (DUO-NEB) 0.5-2.5 mg/3 mL inhalation solution 3 mL, Q6H PRN    latanoprost (XALATAN) 0.005 % ophthalmic solution 1 drop, HS    loratadine (CLARITIN) tablet 10 mg, Daily    magnesium Oxide (MAG-OX) tablet 400 mg, Daily    metoprolol succinate (TOPROL-XL) 24 hr tablet 50 mg, BID    polyethylene glycol (MIRALAX) packet 17 g, Daily    potassium chloride (Klor-Con M20) CR tablet 20 mEq, BID    rivaroxaban (XARELTO) tablet 15 mg, Daily With Breakfast    senna-docusate sodium (SENOKOT S) 8.6-50 mg per tablet 2 tablet, BID    sodium chloride (OCEAN) 0.65 % nasal spray 1 spray, Q1H PRN, and PTA meds:   Prior to Admission Medications   Prescriptions Last Dose Informant Patient Reported? Taking?   AYR SALINE NASAL DROPS NA  Self, Child Yes No   Accu-Chek FastClix Lancets MISC  Self, Child No No   Sig: USE TO CHECK BLOOD GLUCOSE Twice DAILY   Accu-Chek SmartView test strip  Self, Child No No   Sig: Use 1 each daily Use as instructed   acetaminophen (TYLENOL) 500 mg tablet  Self, Child Yes No   Sig: Take 500 mg by mouth every 6 (six) hours as needed for mild pain For pain   atorvastatin (LIPITOR) 40 mg tablet  Self, Child No No   Sig: Take 1 tablet (40 mg total) by mouth daily   budesonide (Pulmicort) 0.5 mg/2 mL nebulizer solution  Self, Child No No   Sig: Take 2 mL (0.5 mg total) by nebulization 2 (two) times a day Rinse mouth after use.   diltiazem (CARDIZEM CD) 120 mg 24 hr capsule  Child, Self No No   Sig: TAKE 1 CAPSULE EVERY DAY   diphenhydrAMINE (BENADRYL) 25 mg tablet  Child, Self No No   Sig: Take 1 hour prior to CT with contrast for contrast allergy.    Patient not taking: Reported on 8/5/2024   donepezil (ARICEPT) 5 mg tablet  Self, Child No No   Sig: Take 1 tablet (5 mg total) by mouth daily at bedtime   glipiZIDE (GLUCOTROL) 10 mg tablet  Child, Self No No   Sig: TAKE 1/2 TABLET EVERY MORNING   ipratropium-albuterol (DUO-NEB) 0.5-2.5 mg/3 mL nebulizer solution  Self, Child No No   Sig: Take 3 mL by nebulization every 6 (six) hours as needed for wheezing or shortness of breath   latanoprost (XALATAN) 0.005 % ophthalmic solution  Self, Child Yes No   loratadine (CLARITIN) 10 mg tablet  Self, Child Yes No   Sig: Take 1 tablet by mouth daily   magnesium (MAGTAB) 84 MG (7MEQ) TBCR  Child, Self Yes No   Sig: Take 84 mg by mouth daily Take 2 tablets- MWF   metFORMIN (GLUCOPHAGE) 500 mg tablet  Self, Child No No   Sig: TAKE 2 TABLETS TWICE DAILY   metoprolol succinate (TOPROL-XL) 50 mg 24 hr tablet  Self, Child No No   Sig: TAKE 1 TABLET TWICE DAILY   rivaroxaban (Xarelto) 15 mg tablet  Self, Child No No   Sig: Take 1 tablet (15 mg total) by mouth daily with breakfast   torsemide (DEMADEX) 10 mg tablet  Self, Child No No   Sig: Take 20 mg daily, take an additional 20 mg on MWF in evening      Facility-Administered Medications: None       Medications Prior to Admission:     Accu-Chek FastClix Lancets MISC    Accu-Chek SmartView test strip    acetaminophen (TYLENOL) 500 mg tablet    atorvastatin (LIPITOR) 40 mg tablet    AYR SALINE NASAL DROPS NA    budesonide (Pulmicort) 0.5 mg/2 mL nebulizer solution    diltiazem (CARDIZEM CD) 120 mg 24 hr capsule    diphenhydrAMINE (BENADRYL) 25 mg tablet    donepezil (ARICEPT) 5 mg tablet    glipiZIDE (GLUCOTROL) 10 mg tablet    ipratropium-albuterol (DUO-NEB) 0.5-2.5 mg/3 mL nebulizer solution    latanoprost (XALATAN) 0.005 % ophthalmic solution    loratadine (CLARITIN) 10 mg tablet    magnesium (MAGTAB) 84 MG (7MEQ) TBCR    metFORMIN (GLUCOPHAGE) 500 mg tablet    metoprolol succinate (TOPROL-XL) 50 mg 24 hr tablet    rivaroxaban  (Xarelto) 15 mg tablet    torsemide (DEMADEX) 10 mg tablet         Counseling / Coordination of Care  Total floor / unit time spent today 20 minutes.  Greater than 50% of total time was spent with the patient and / or family counseling and / or coordination of care.      ** Please Note: Dragon 360 Dictation voice to text software may have been used in the creation of this document. **

## 2024-10-29 NOTE — PLAN OF CARE
Problem: Prexisting or High Potential for Compromised Skin Integrity  Goal: Skin integrity is maintained or improved  Description: INTERVENTIONS:  - Identify patients at risk for skin breakdown  - Assess and monitor skin integrity  - Assess and monitor nutrition and hydration status  - Monitor labs   - Assess for incontinence   - Turn and reposition patient  - Assist with mobility/ambulation  - Relieve pressure over bony prominences  - Avoid friction and shearing  - Provide appropriate hygiene as needed including keeping skin clean and dry  - Evaluate need for skin moisturizer/barrier cream  - Collaborate with interdisciplinary team   - Patient/family teaching  - Consider wound care consult   Outcome: Progressing     Problem: Potential for Falls  Goal: Patient will remain free of falls  Description: INTERVENTIONS:  - Educate patient/family on patient safety including physical limitations  - Instruct patient to call for assistance with activity   - Consult OT/PT to assist with strengthening/mobility   - Keep Call bell within reach  - Keep bed low and locked with side rails adjusted as appropriate  - Keep care items and personal belongings within reach  - Initiate and maintain comfort rounds  - Make Fall Risk Sign visible to staff  - Offer Toileting every 2 Hours, in advance of need  - Initiate/Maintain bed/chair alarm  - Obtain necessary fall risk management equipment  - Apply yellow socks and bracelet for high fall risk patients  - Consider moving patient to room near nurses station  Outcome: Progressing     Problem: Nutrition/Hydration-ADULT  Goal: Nutrient/Hydration intake appropriate for improving, restoring or maintaining nutritional needs  Description: Monitor and assess patient's nutrition/hydration status for malnutrition. Collaborate with interdisciplinary team and initiate plan and interventions as ordered.  Monitor patient's weight and dietary intake as ordered or per policy. Utilize nutrition screening  tool and intervene as necessary. Determine patient's food preferences and provide high-protein, high-caloric foods as appropriate.     INTERVENTIONS:  - Monitor oral intake, urinary output, labs, and treatment plans  - Assess nutrition and hydration status and recommend course of action  - Evaluate amount of meals eaten  - Assist patient with eating if necessary   - Allow adequate time for meals  - Recommend/ encourage appropriate diets, oral nutritional supplements, and vitamin/mineral supplements  - Order, calculate, and assess calorie counts as needed  - Recommend, monitor, and adjust tube feedings and TPN/PPN based on assessed needs  - Assess need for intravenous fluids  - Provide specific nutrition/hydration education as appropriate  - Include patient/family/caregiver in decisions related to nutrition  Outcome: Progressing     Problem: DISCHARGE PLANNING  Goal: Discharge to home or other facility with appropriate resources  Description: INTERVENTIONS:  - Identify barriers to discharge w/patient and caregiver  - Arrange for needed discharge resources and transportation as appropriate  - Identify discharge learning needs (meds, wound care, etc.)  - Arrange for interpretive services to assist at discharge as needed  - Refer to Case Management Department for coordinating discharge planning if the patient needs post-hospital services based on physician/advanced practitioner order or complex needs related to functional status, cognitive ability, or social support system  Outcome: Progressing     Problem: Knowledge Deficit  Goal: Patient/family/caregiver demonstrates understanding of disease process, treatment plan, medications, and discharge instructions  Description: Complete learning assessment and assess knowledge base.  Interventions:  - Provide teaching at level of understanding  - Provide teaching via preferred learning methods  Outcome: Progressing     Problem: CARDIOVASCULAR - ADULT  Goal: Maintains optimal  cardiac output and hemodynamic stability  Description: INTERVENTIONS:  - Monitor I/O, vital signs and rhythm  - Monitor for S/S and trends of decreased cardiac output  - Administer and titrate ordered vasoactive medications to optimize hemodynamic stability  - Assess quality of pulses, skin color and temperature  - Assess for signs of decreased coronary artery perfusion  - Instruct patient to report change in severity of symptoms  Outcome: Progressing

## 2024-10-29 NOTE — ASSESSMENT & PLAN NOTE
Lab Results   Component Value Date    HGBA1C 7.4 (H) 10/23/2024     Recent Labs     10/28/24  1120 10/28/24  1638 10/28/24  2111 10/29/24  0751   POCGLU 257* 248* 218* 154*     Blood Sugar Average: Last 72 hrs:  (P) [id number]  Hold home glipizide/Metformin for now  SSI/accuchecks/diabetic diet  A1c reflects reasonable control but blood sugars are not at goal during this hospitalization. Added low dose basal bolus regimen

## 2024-10-29 NOTE — ASSESSMENT & PLAN NOTE
Per physical therapy, recommended for rehab.  Initially was recommended for acute level rehab however patient's daughter and patient do not feel she could tolerate this intensive rehab and therefore opting for subacute rehab.  Possibly [place] [facility] acute

## 2024-10-29 NOTE — PLAN OF CARE
Problem: PHYSICAL THERAPY ADULT  Goal: Performs mobility at highest level of function for planned discharge setting.  See evaluation for individualized goals.  Description: Treatment/Interventions: Functional transfer training, LE strengthening/ROM, Elevations, Therapeutic exercise, Endurance training, Cognitive reorientation, Patient/family training, Equipment eval/education, Bed mobility, Gait training, Compensatory technique education          See flowsheet documentation for full assessment, interventions and recommendations.  Outcome: Progressing  Note:    Problem List: Decreased strength, Decreased endurance, Impaired balance, Decreased mobility, Decreased safety awareness, Pain  Assessment: Patient agreeable to PT session.  Patient with limited tolerance to transfers and short distance ambulation with a roller walker but improved tolerance to seated lower extremity exercise.  While admitted, patient will continue to benefit from skilled physical therapy services to continue to increase the patient's strength, balance, endurance, safe gait and functional mobility.  When medically stable for discharge patient remains appropriate for Level I Maximum Resource Intensity.  Patient is making progress toward PT goals.        Rehab Resource Intensity Level, PT: I (Maximum Resource Intensity)    See flowsheet documentation for full assessment.

## 2024-10-29 NOTE — ASSESSMENT & PLAN NOTE
Rates are well-controlled on diltiazem 120 mg daily, Toprol XL 50 mg BID  [id number]: Anticoagulated on Xarelto 15 mg daily

## 2024-10-29 NOTE — PHYSICAL THERAPY NOTE
"   PT TREATMENT     10/29/24 1358   PT Last Visit   PT Visit Date 10/29/24   Note Type   Note Type Treatment   Pain Assessment   Pain Assessment Tool 0-10   Pain Score 3   Pain Location/Orientation Orientation: Right;Location: Knee   Pain Onset/Description Frequency: Intermittent   Effect of Pain on Daily Activities Limits comfort and activity tolerance   Hospital Pain Intervention(s) Repositioned;Ambulation/increased activity;Emotional support;Rest   Restrictions/Precautions   Other Precautions Fall Risk;Bed Alarm;Chair Alarm;Pain;O2  (5 L O2 via NC; Parvez)   General   Chart Reviewed Yes   Family/Caregiver Present No   Cognition   Arousal/Participation Cooperative   Attention Attends with cues to redirect   Orientation Level Oriented to person;Oriented to place   Memory Decreased recall of precautions;Decreased recall of recent events;Decreased short term memory   Following Commands Follows one step commands without difficulty   Comments At least 2 patient identifiers including name and date of birth   Subjective   Subjective \"I do not know why but I am a little afraid to walk\"   Bed Mobility   Additional Comments Patient received out of bed in chair   Transfers   Sit to Stand 4  Minimal assistance   Additional items Armrests;Assist x 1;Verbal cues;Increased time required  (Cues for safe hand placement)   Stand to Sit 4  Minimal assistance   Additional items Armrests;Assist x 1;Verbal cues;Increased time required  (Cues for safe hand placement)   Ambulation/Elevation   Gait pattern Short stride;Step through pattern;Narrow PRISCILLA;Foward flexed;Decreased foot clearance   Gait Assistance 4  Minimal assist   Additional items Assist x 1;Verbal cues;Tactile cues   Assistive Device Rolling walker   Distance 15 feet x 2 reps, both with change in direction and with extended rest in between ambulation trials secondary to fatigue/shortness of breath   Balance   Static Sitting Fair +   Static Standing Fair -  (With roller " walker)   Ambulatory   (F- to occasional P+ with roller walker)   Endurance Deficit   Endurance Deficit Yes   Endurance Deficit Description Limited standing, gait and overall activity endurance; fatigues easily; shortness of breath with limited activity   Activity Tolerance   Activity Tolerance Patient limited by fatigue;Patient limited by pain;Treatment limited secondary to medical complications (Comment)  (Weakness and deconditioning; shortness of breath)   Exercises   Hip Flexion 10 reps;Bilateral;Sitting   Hip Abduction 10 reps;Bilateral;Sitting   Hip Adduction 10 reps;Bilateral;Sitting   Knee AROM Long Arc Quad 10 reps;Bilateral;Sitting   Ankle Pumps 10 reps;Bilateral;Sitting   Assessment   Problem List Decreased strength;Decreased endurance;Impaired balance;Decreased mobility;Decreased safety awareness;Pain   Assessment Patient agreeable to PT session.  Patient with limited tolerance to transfers and short distance ambulation with a roller walker but improved tolerance to seated lower extremity exercise.  While admitted, patient will continue to benefit from skilled physical therapy services to continue to increase the patient's strength, balance, endurance, safe gait and functional mobility.  When medically stable for discharge patient remains appropriate for Level I Maximum Resource Intensity.  Patient is making progress toward PT goals.    The patient's -Merged with [facility] Basic Mobility Inpatient Short Form Raw Score is 17. A Raw score of greater than 16 suggests the patient may benefit from discharge to home. Please also refer to the recommendation of the Physical Therapist for safe discharge planning.   Goals   STG Expiration Date 11/08/24   Short Term Goal #1 pt will: Increase bilateral LE strength 1/2 grade to facilitate independent mobility, Perform bed mobility modified independent to increase level of independence, Perform all transfers w/ supervision to improve independence, Ambulate 150 ft. with roller walker  w/ supervision w/o LOB to improve functional independence, Navigate 10 stair(s) w/ minx1 with unilateral handrail to facilitate return to previous living environment, Increase ambulatory balance 1 grade to decrease risk for falls, Tolerate 3 hr OOB to faciliate upright tolerance, Tolerate standing 2 minutes w/ supervision to facilitate functional task performance, Improve Barthel Index score to 65 or greater to facilitate independence, and Complete Timed Up and Go or Comfortable Gait Speed to further assess mobility and monitor progress   Plan   Treatment/Interventions ADL retraining;Functional transfer training;LE strengthening/ROM;Therapeutic exercise;Cognitive reorientation;Patient/family training;Equipment eval/education;Bed mobility;Gait training;Compensatory technique education   PT Frequency 3-5x/wk   Discharge Recommendation   Rehab Resource Intensity Level, PT I (Maximum Resource Intensity)   AM-PAC Basic Mobility Inpatient   Turning in Flat Bed Without Bedrails 3   Lying on Back to Sitting on Edge of Flat Bed Without Bedrails 3   Moving Bed to Chair 3   Standing Up From Chair Using Arms 3   Walk in Room 3   Climb 3-5 Stairs With Railing 2   Basic Mobility Inpatient Raw Score 17   Basic Mobility Standardized Score 39.67   [facility] Highest Level Of Mobility   -HLM Goal 5: Stand one or more mins   -HLM Achieved 6: Walk 10 steps or more   Education   Education Provided Mobility training;Assistive device   Patient Explanation/teachback used;Reinforcement needed   End of Consult   Patient Position at End of Consult Bed/Chair alarm activated;Bedside chair;All needs within reach   Licensure   [place] License Number  Eryn L Francisco Javier, PT     Portions of the documentation may have been created using voice recognition software. Occasional wrong word or sound alike substitutions may have occurred due to the inherent limitation of the voice recognition software. Read the chart carefully and recognize, using  context, where substitutions have occurred.

## 2024-10-29 NOTE — ASSESSMENT & PLAN NOTE
Wt Readings from Last 3 Encounters:   10/29/24 68.3 kg (150 lb 9.2 oz)   10/23/24 73.9 kg (163 lb)   10/15/24 74.4 kg (164 lb)   Presented with 10 pound weight gain despite increase in torsemide over the last 2 weeks.  CXR: Moderate pulmonary vascular congestion. Small right greater than left bilateral pleural effusions.   ECHO 10/23 with EF 65%, There is both systolic and diastolic flattening of the interventricular septum consistent with right ventricle pressure and volume overload. Changes include: Right ventricular dilation is notable, with an increase in mitral regurgitation, and pulmonary arterial pressures.  Dry weight around 154-155 pounds. Admission weight 162 lb; weight 150 pounds--  Still appears quite volume overloaded on exam  Cardiology following - Bumex IV increased to 3 mg twice daily.  Need intermittent Zaroxolyn dosing as well  Monitor electrolytes

## 2024-10-29 NOTE — PROGRESS NOTES
Progress Note - Hospitalist   Name: Camryn Roblero 84 y.o. female I MRN: [id number]  Unit/Bed#: S -01 I Date of Admission: 10/23/2024   Date of Service: 10/29/2024 I Hospital Day: 6    Assessment & Plan  Acute heart failure with preserved ejection fraction (HCC)  Wt Readings from Last 3 Encounters:   10/29/24 68.3 kg (150 lb 9.2 oz)   10/23/24 73.9 kg (163 lb)   10/15/24 74.4 kg (164 lb)   Presented with 10 pound weight gain despite increase in torsemide over the last 2 weeks.  CXR: Moderate pulmonary vascular congestion. Small right greater than left bilateral pleural effusions.   ECHO 10/23 with EF 65%, There is both systolic and diastolic flattening of the interventricular septum consistent with right ventricle pressure and volume overload. Changes include: Right ventricular dilation is notable, with an increase in mitral regurgitation, and pulmonary arterial pressures.  Dry weight around 154-155 pounds. Admission weight 162 lb; weight 150 pounds--  Still appears quite volume overloaded on exam  Cardiology following - Bumex IV increased to 3 mg twice daily.  Need intermittent Zaroxolyn dosing as well  Monitor electrolytes  Chronic atrial fibrillation (HCC)  Rate controlled with Cardizem and metoprolol  Continue Xarelto  Essential hypertension  Cont BB/Cardizem    Type 2 diabetes mellitus, without long-term current use of insulin (HCC)  Lab Results   Component Value Date    HGBA1C 7.4 (H) 10/23/2024     Recent Labs     10/28/24  1120 10/28/24  1638 10/28/24  2111 10/29/24  0751   POCGLU 257* 248* 218* 154*     Blood Sugar Average: Last 72 hrs:  (P) [id number]  Hold home glipizide/Metformin for now  SSI/accuchecks/diabetic diet  A1c reflects reasonable control but blood sugars are not at goal during this hospitalization. Added low dose basal bolus regimen   COPD (chronic obstructive pulmonary disease) (HCC)  With chronic hypoxemic respiratory failure, on 2L NC O2 at baseline during the day and 4L  overnight  No acute exacerbation  Continue inhaler regimen  Class 1 obesity due to excess calories with serious comorbidity and body mass index (BMI) of 30.0 to 30.9 in adult  Encourage weight loss, dietary and lifestyle modifications  Body mass index is 30.41 kg/m².  Ambulatory dysfunction  Per physical therapy, recommended for rehab.  Initially was recommended for acute level rehab however patient's daughter and patient do not feel she could tolerate this intensive rehab and therefore opting for subacute rehab.  Possibly [place] post acute  Constipation  Patient today reporting no bowel movement in the last 5 days.  Will add bowel regimen    VTE Pharmacologic Prophylaxis: VTE Score: 5 xarelto    Mobility:   Basic Mobility Inpatient Raw Score: 17  JH-HLM Goal: 5: Stand one or more mins  JH-HLM Achieved: 6: Walk 10 steps or more  JH-HLM Goal achieved. Continue to encourage appropriate mobility.    Patient Centered Rounds: I performed bedside rounds with nursing staff today.   Discussions with Specialists or Other Care Team Provider:case mgmt    Education and Discussions with Family / Patient: Updated  (daughter) via phone.    Current Length of Stay: 6 day(s)  Current Patient Status: Inpatient   Certification Statement: The patient will continue to require additional inpatient hospital stay due to chf  Discharge Plan: Anticipate discharge in 48-72 hrs to rehab facility.    Code Status: Level 1 - Full Code    Subjective   Patient reports not having a bowel movement since the 24th.  Has had gas and no nausea or vomiting.  Good urine output.  Slowly improving    Objective :  Temp:  [97.4 °F (36.3 °C)-98.1 °F (36.7 °C)] 97.4 °F (36.3 °C)  HR:  [74-78] 74  BP: (124-153)/(78-84) 153/84  Resp:  [20] 20  SpO2:  [95 %-100 %] 100 %  O2 Device: Nasal cannula  Nasal Cannula O2 Flow Rate (L/min):  [2 L/min] 2 L/min    Body mass index is 30.41 kg/m².     Input and Output Summary (last 24 hours):     Intake/Output  Summary (Last 24 hours) at 10/29/2024 0955  Last data filed at 10/29/2024 0900  Gross per 24 hour   Intake 600 ml   Output 1174 ml   Net -574 ml       Physical Exam  Vitals reviewed.   Constitutional:       General: She is not in acute distress.     Appearance: She is obese. She is not ill-appearing, toxic-appearing or diaphoretic.   Eyes:      General: No scleral icterus.        Right eye: No discharge.         Left eye: No discharge.      Conjunctiva/sclera: Conjunctivae normal.   Cardiovascular:      Rate and Rhythm: Normal rate and regular rhythm.      Heart sounds: No murmur heard.  Pulmonary:      Effort: No respiratory distress.      Breath sounds: No stridor. Rales present. No wheezing or rhonchi.      Comments: No dyspnea or tachypnea but still with bibasilar Rales  Abdominal:      General: There is no distension.      Palpations: Abdomen is soft.      Tenderness: There is no abdominal tenderness. There is no guarding.      Comments: Bowel sounds present   Musculoskeletal:      Right lower leg: Edema present.      Left lower leg: Edema present.      Comments: Still with significant bilateral lower extremity pitting edema   Skin:     General: Skin is warm and dry.      Coloration: Skin is not jaundiced or pale.      Findings: No bruising, erythema, lesion or rash.   Neurological:      General: No focal deficit present.      Mental Status: She is alert. Mental status is at baseline.      Comments: Awake alert interactive.  No confusion noted   Psychiatric:         Mood and Affect: Mood normal.         Thought Content: Thought content normal.           Lines/Drains:  Lines/Drains/Airways       Active Status       Name Placement date Placement time Site Days    External Urinary Catheter 10/23/24  1900  -- 5                      Lab Results: I have reviewed the following results:   Results from last 7 days   Lab Units 10/25/24  0531 10/24/24  0513 10/23/24  1107   WBC Thousand/uL 7.68   < > 8.42   HEMOGLOBIN  g/dL 12.8   < > 13.1   HEMATOCRIT % 42.6   < > 41.8   PLATELETS Thousands/uL 237   < > 246   SEGS PCT %  --   --  86*   LYMPHO PCT %  --   --  7*   MONO PCT %  --   --  6   EOS PCT %  --   --  1    < > = values in this interval not displayed.     Results from last 7 days   Lab Units 10/29/24  0913 10/24/24  0513 10/23/24  1107   SODIUM mmol/L 140   < > 132*   POTASSIUM mmol/L 4.3   < > 4.2   CHLORIDE mmol/L 97   < > 90*   CO2 mmol/L 39*   < > 34*   BUN mg/dL 34*   < > 34*   CREATININE mg/dL 0.94   < > 1.04   ANION GAP mmol/L 4   < > 8   CALCIUM mg/dL 9.6   < > 9.7   ALBUMIN g/dL  --   --  4.0   TOTAL BILIRUBIN mg/dL  --   --  0.98   ALK PHOS U/L  --   --  90   ALT U/L  --   --  28   AST U/L  --   --  77*   GLUCOSE RANDOM mg/dL 225*   < > 189*    < > = values in this interval not displayed.         Results from last 7 days   Lab Units 10/29/24  0751 10/28/24  2111 10/28/24  1638 10/28/24  1120 10/28/24  0735 10/27/24  2117 10/27/24  1550 10/27/24  1127 10/27/24  0754 10/26/24  2033 10/26/24  1606 10/26/24  1114   POC GLUCOSE mg/dl 154* 218* 248* 257* 163* 194* 331* 292* 196* 295* 277* 245*     Results from last 7 days   Lab Units 10/23/24  1700   HEMOGLOBIN A1C % 7.4*           Recent Cultures (last 7 days):           Last 24 Hours Medication List:     Current Facility-Administered Medications:     acetaminophen (TYLENOL) tablet 650 mg, Q6H PRN    atorvastatin (LIPITOR) tablet 40 mg, Daily    bisacodyl (DULCOLAX) rectal suppository 10 mg, Daily PRN    budesonide (PULMICORT) inhalation solution 0.5 mg, Q12H    bumetanide (BUMEX) 12.5 mg infusion 50 mL, Continuous    diltiazem (CARDIZEM CD) 24 hr capsule 120 mg, Daily    donepezil (ARICEPT) tablet 5 mg, HS    fluticasone (FLONASE) 50 mcg/act nasal spray 2 spray, Daily    insulin glargine (LANTUS) subcutaneous injection 5 Units 0.05 mL, HS    insulin lispro (HumALOG/ADMELOG) 100 units/mL subcutaneous injection 1-5 Units, TID AC **AND** Fingerstick Glucose (POCT), TID  AC    insulin lispro (HumALOG/ADMELOG) 100 units/mL subcutaneous injection 3 Units, TID With Meals    ipratropium-albuterol (DUO-NEB) 0.5-2.5 mg/3 mL inhalation solution 3 mL, Q6H PRN    latanoprost (XALATAN) 0.005 % ophthalmic solution 1 drop, HS    loratadine (CLARITIN) tablet 10 mg, Daily    magnesium Oxide (MAG-OX) tablet 400 mg, Daily    metoprolol succinate (TOPROL-XL) 24 hr tablet 50 mg, BID    polyethylene glycol (MIRALAX) packet 17 g, Daily    potassium chloride (Klor-Con M20) CR tablet 20 mEq, BID    rivaroxaban (XARELTO) tablet 15 mg, Daily With Breakfast    senna-docusate sodium (SENOKOT S) 8.6-50 mg per tablet 2 tablet, BID    sodium chloride (OCEAN) 0.65 % nasal spray 1 spray, Q1H PRN    Administrative Statements   Today, Patient Was Seen By: Arabella Samaniego PA-C      **Please Note: This note may have been constructed using a voice recognition system.**

## 2024-10-30 LAB
BUN SERPL-MCNC: 36 MG/DL (ref 5–25)
CALCIUM SERPL-MCNC: 10.1 MG/DL (ref 8.4–10.2)
CHLORIDE SERPL-SCNC: 90 MMOL/L (ref 96–108)
CO2 SERPL-SCNC: >45 MMOL/L (ref 21–32)
CREAT SERPL-MCNC: 0.91 MG/DL (ref 0.6–1.3)
GFR SERPL CREATININE-BSD FRML MDRD: 58 ML/MIN/1.73SQ M
GLUCOSE SERPL-MCNC: 107 MG/DL (ref 65–140)
GLUCOSE SERPL-MCNC: 121 MG/DL (ref 65–140)
GLUCOSE SERPL-MCNC: 201 MG/DL (ref 65–140)
GLUCOSE SERPL-MCNC: 244 MG/DL (ref 65–140)
GLUCOSE SERPL-MCNC: 260 MG/DL (ref 65–140)
POTASSIUM SERPL-SCNC: 3.3 MMOL/L (ref 3.5–5.3)
SODIUM SERPL-SCNC: 144 MMOL/L (ref 135–147)

## 2024-10-30 PROCEDURE — 82948 REAGENT STRIP/BLOOD GLUCOSE: CPT

## 2024-10-30 PROCEDURE — 99232 SBSQ HOSP IP/OBS MODERATE 35: CPT | Performed by: PHYSICIAN ASSISTANT

## 2024-10-30 PROCEDURE — 94640 AIRWAY INHALATION TREATMENT: CPT

## 2024-10-30 PROCEDURE — 94760 N-INVAS EAR/PLS OXIMETRY 1: CPT

## 2024-10-30 PROCEDURE — 80048 BASIC METABOLIC PNL TOTAL CA: CPT | Performed by: PHYSICIAN ASSISTANT

## 2024-10-30 PROCEDURE — 99232 SBSQ HOSP IP/OBS MODERATE 35: CPT | Performed by: INTERNAL MEDICINE

## 2024-10-30 RX ORDER — POTASSIUM CHLORIDE 1500 MG/1
40 TABLET, EXTENDED RELEASE ORAL ONCE
Status: COMPLETED | OUTPATIENT
Start: 2024-10-30 | End: 2024-10-30

## 2024-10-30 RX ADMIN — ATORVASTATIN CALCIUM 40 MG: 40 TABLET, FILM COATED ORAL at 09:34

## 2024-10-30 RX ADMIN — BUDESONIDE 0.5 MG: 0.5 INHALANT ORAL at 07:43

## 2024-10-30 RX ADMIN — LORATADINE 10 MG: 10 TABLET ORAL at 09:34

## 2024-10-30 RX ADMIN — Medication 400 MG: at 09:34

## 2024-10-30 RX ADMIN — Medication 1 MG/HR: at 19:34

## 2024-10-30 RX ADMIN — INSULIN LISPRO 3 UNITS: 100 INJECTION, SOLUTION INTRAVENOUS; SUBCUTANEOUS at 12:52

## 2024-10-30 RX ADMIN — SENNOSIDES AND DOCUSATE SODIUM 2 TABLET: 8.6; 5 TABLET ORAL at 09:34

## 2024-10-30 RX ADMIN — INSULIN LISPRO 2 UNITS: 100 INJECTION, SOLUTION INTRAVENOUS; SUBCUTANEOUS at 17:54

## 2024-10-30 RX ADMIN — RIVAROXABAN 15 MG: 15 TABLET, FILM COATED ORAL at 09:34

## 2024-10-30 RX ADMIN — POLYETHYLENE GLYCOL 3350 17 G: 17 POWDER, FOR SOLUTION ORAL at 09:35

## 2024-10-30 RX ADMIN — BUDESONIDE 0.5 MG: 0.5 INHALANT ORAL at 22:19

## 2024-10-30 RX ADMIN — POTASSIUM CHLORIDE 20 MEQ: 1500 TABLET, EXTENDED RELEASE ORAL at 17:55

## 2024-10-30 RX ADMIN — FLUTICASONE PROPIONATE 2 SPRAY: 50 SPRAY, METERED NASAL at 09:35

## 2024-10-30 RX ADMIN — INSULIN LISPRO 1 UNITS: 100 INJECTION, SOLUTION INTRAVENOUS; SUBCUTANEOUS at 12:52

## 2024-10-30 RX ADMIN — INSULIN LISPRO 3 UNITS: 100 INJECTION, SOLUTION INTRAVENOUS; SUBCUTANEOUS at 17:56

## 2024-10-30 RX ADMIN — DONEPEZIL HYDROCHLORIDE 5 MG: 5 TABLET ORAL at 22:19

## 2024-10-30 RX ADMIN — METOPROLOL SUCCINATE 50 MG: 50 TABLET, EXTENDED RELEASE ORAL at 09:34

## 2024-10-30 RX ADMIN — INSULIN GLARGINE 5 UNITS: 100 INJECTION, SOLUTION SUBCUTANEOUS at 22:19

## 2024-10-30 RX ADMIN — POTASSIUM CHLORIDE 20 MEQ: 1500 TABLET, EXTENDED RELEASE ORAL at 09:34

## 2024-10-30 RX ADMIN — SENNOSIDES AND DOCUSATE SODIUM 2 TABLET: 8.6; 5 TABLET ORAL at 17:54

## 2024-10-30 RX ADMIN — INSULIN LISPRO 3 UNITS: 100 INJECTION, SOLUTION INTRAVENOUS; SUBCUTANEOUS at 09:35

## 2024-10-30 RX ADMIN — METOPROLOL SUCCINATE 50 MG: 50 TABLET, EXTENDED RELEASE ORAL at 17:54

## 2024-10-30 RX ADMIN — DILTIAZEM HYDROCHLORIDE 120 MG: 120 CAPSULE, COATED, EXTENDED RELEASE ORAL at 09:34

## 2024-10-30 RX ADMIN — Medication 2 MG/HR: at 04:30

## 2024-10-30 RX ADMIN — POTASSIUM CHLORIDE 40 MEQ: 1500 TABLET, EXTENDED RELEASE ORAL at 12:52

## 2024-10-30 RX ADMIN — LATANOPROST 1 DROP: 50 SOLUTION OPHTHALMIC at 22:19

## 2024-10-30 NOTE — ASSESSMENT & PLAN NOTE
Wt Readings from Last 3 Encounters:   10/30/24 62.6 kg (138 lb 0.1 oz)   10/23/24 73.9 kg (163 lb)   10/15/24 74.4 kg (164 lb)   Presented with 10 pound weight gain despite increase in torsemide over the last 2 weeks.  CXR: Moderate pulmonary vascular congestion. Small right greater than left bilateral pleural effusions.   ECHO 10/23 with EF 65%, There is both systolic and diastolic flattening of the interventricular septum consistent with right ventricle pressure and volume overload. Changes include: Right ventricular dilation is notable, with an increase in mitral regurgitation, and pulmonary arterial pressures.  Dry weight around 154-155 pounds. Admission weight 162 lb; weight down to 138 pounds--  Still volume overloaded on exam  Cardiology following - Bumex IV being uptitrated and ultimately was changed to infusion.  Zaroxolyn dose was given as well.  Bumex drip is being titrated down  Monitor electrolytes. Noted rising CO2

## 2024-10-30 NOTE — CASE MANAGEMENT
Case Management Progress Note    Patient name Camryn Roblero  Location S /S -01 MRN [id number]  : 1940 Date 10/30/2024       LOS (days): 7  Geometric Mean LOS (GMLOS) (days): 3.9  Days to GMLOS:-3.2        OBJECTIVE:        Current admission status: Inpatient  Preferred Pharmacy:   [facility] - [place], [place] - [address]  [address] [facility] [address]  Phone: [phone] Fax: [phone]    [facility] [address] [place] [address]  Phone: [phone] Fax: [phone]    Primary Care Provider: Abiel Seals MD    Primary Insurance: MEDICARE  Secondary Insurance: [employer]    PROGRESS NOTE:    Weekly Care Management Length of Stay Review     Current LOS: 7 Days    Most Recent Labs:     Lab Results   Component Value Date/Time    SODIUM 144 10/30/2024 04:55 AM    K 3.3 (L) 10/30/2024 04:55 AM    CL 90 (L) 10/30/2024 04:55 AM    CO2 >45 (H) 10/30/2024 04:55 AM    BUN 36 (H) 10/30/2024 04:55 AM    CREATININE 0.91 10/30/2024 04:55 AM    GLUC 107 10/30/2024 04:55 AM       Most Recent Vitals:   Vitals:    10/30/24 0755   BP: 143/89   Pulse: 82   Resp: 19   Temp: 97.5 °F (36.4 °C)   SpO2: 94%        Identified Barriers to Discharge/Discharge Goals/Care Management Interventions:  acute heart failure, IV diuretics    Intended Discharge Disposition: Needs rehab-[facility]    Expected Discharge Date:

## 2024-10-30 NOTE — CASE MANAGEMENT
Case Management Discharge Planning Note    Patient name Camryn Roblero  Location S /S -01 MRN [id number]  : 1940 Date 10/30/2024       Current Admission Date: 10/23/2024  Current Admission Diagnosis:Acute heart failure with preserved ejection fraction (HCC)   Patient Active Problem List    Diagnosis Date Noted Date Diagnosed    Constipation 10/29/2024     Ambulatory dysfunction 10/26/2024     Dependence on supplemental oxygen 10/23/2024     Chronic atrial fibrillation (HCC) 10/23/2024     Pancreas cyst 2024     History of colon polyps 2024     Herpes zoster without complication 2024     Pulmonary emphysema, unspecified emphysema type (HCC) 2024     Leukocytosis 2024     Mild protein-calorie malnutrition (HCC) 2024     Hyperkalemia 2024     Abnormal CT scan 2024     Impaired memory 2024     Allergic drug rash 2024     Elevated troponin 2023     Tachycardia 2023     Atrial flutter (HCC) 2023     Hyperlipidemia 10/21/2023     Seasonal allergic rhinitis 2023     Osteopenia 2022     Chronic pain of both knees 2021     Cataract of both eyes 2021     Class 1 obesity due to excess calories with serious comorbidity and body mass index (BMI) of 30.0 to 30.9 in adult 2021     Multiple pulmonary nodules 10/05/2020     Colon polyps 10/05/2020     TOM (obstructive sleep apnea) 2020     Acute heart failure with preserved ejection fraction (HCC) 2020     Persistent proteinuria 10/25/2019     Essential hypertension 2016     Type 2 diabetes mellitus, without long-term current use of insulin (HCC) 2016     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 [place] [id number] [address] [address]  Phone: [phone] Fax: [phone]    Primary Care Provider: Abiel Seals MD    Primary Insurance: MEDICARE  Secondary Insurance: [facility]    DISCHARGE DETAILS:      Additional Comments: CM screened Pt for CHF Weight Management Program. Camryn will discharge to [facility] for SNF once medically ready, therefore she does not qualify to participate in this program.

## 2024-10-30 NOTE — ASSESSMENT & PLAN NOTE
Per physical therapy, recommended for rehab.  Initially was recommended for acute level rehab however patient's daughter and patient do not feel she could tolerate this intensive rehab and therefore opting for subacute rehab.  Possibly [facility]

## 2024-10-30 NOTE — PLAN OF CARE
Problem: Potential for Falls  Goal: Patient will remain free of falls  Description: INTERVENTIONS:  - Educate patient/family on patient safety including physical limitations  - Instruct patient to call for assistance with activity   - Consult OT/PT to assist with strengthening/mobility   - Keep Call bell within reach  - Keep bed low and locked with side rails adjusted as appropriate  - Keep care items and personal belongings within reach  - Initiate and maintain comfort rounds  - Make Fall Risk Sign visible to staff  - Offer Toileting every 2 Hours, in advance of need  - Initiate/Maintain bed/chair alarm  - Obtain necessary fall risk management equipment  - Apply yellow socks and bracelet for high fall risk patients  - Consider moving patient to room near nurses station  Outcome: Progressing     Problem: Nutrition/Hydration-ADULT  Goal: Nutrient/Hydration intake appropriate for improving, restoring or maintaining nutritional needs  Description: Monitor and assess patient's nutrition/hydration status for malnutrition. Collaborate with interdisciplinary team and initiate plan and interventions as ordered.  Monitor patient's weight and dietary intake as ordered or per policy. Utilize nutrition screening tool and intervene as necessary. Determine patient's food preferences and provide high-protein, high-caloric foods as appropriate.     INTERVENTIONS:  - Monitor oral intake, urinary output, labs, and treatment plans  - Assess nutrition and hydration status and recommend course of action  - Evaluate amount of meals eaten  - Assist patient with eating if necessary   - Allow adequate time for meals  - Recommend/ encourage appropriate diets, oral nutritional supplements, and vitamin/mineral supplements  - Order, calculate, and assess calorie counts as needed  - Recommend, monitor, and adjust tube feedings and TPN/PPN based on assessed needs  - Assess need for intravenous fluids  - Provide specific nutrition/hydration  education as appropriate  - Include patient/family/caregiver in decisions related to nutrition  Outcome: Progressing     Problem: DISCHARGE PLANNING  Goal: Discharge to home or other facility with appropriate resources  Description: INTERVENTIONS:  - Identify barriers to discharge w/patient and caregiver  - Arrange for needed discharge resources and transportation as appropriate  - Identify discharge learning needs (meds, wound care, etc.)  - Arrange for interpretive services to assist at discharge as needed  - Refer to Case Management Department for coordinating discharge planning if the patient needs post-hospital services based on physician/advanced practitioner order or complex needs related to functional status, cognitive ability, or social support system  Outcome: Progressing     Problem: CARDIOVASCULAR - ADULT  Goal: Maintains optimal cardiac output and hemodynamic stability  Description: INTERVENTIONS:  - Monitor I/O, vital signs and rhythm  - Monitor for S/S and trends of decreased cardiac output  - Administer and titrate ordered vasoactive medications to optimize hemodynamic stability  - Assess quality of pulses, skin color and temperature  - Assess for signs of decreased coronary artery perfusion  - Instruct patient to report change in severity of symptoms  Outcome: Progressing

## 2024-10-30 NOTE — PROGRESS NOTES
Progress Note - Hospitalist   Name: Camrny Roblero 84 y.o. female I MRN: [id number]  Unit/Bed#: S -01 I Date of Admission: 10/23/2024   Date of Service: 10/30/2024 I Hospital Day: 7    Assessment & Plan  Acute heart failure with preserved ejection fraction (HCC)  Wt Readings from Last 3 Encounters:   10/30/24 62.6 kg (138 lb 0.1 oz)   10/23/24 73.9 kg (163 lb)   10/15/24 74.4 kg (164 lb)   Presented with 10 pound weight gain despite increase in torsemide over the last 2 weeks.  CXR: Moderate pulmonary vascular congestion. Small right greater than left bilateral pleural effusions.   ECHO 10/23 with EF 65%, There is both systolic and diastolic flattening of the interventricular septum consistent with right ventricle pressure and volume overload. Changes include: Right ventricular dilation is notable, with an increase in mitral regurgitation, and pulmonary arterial pressures.  Dry weight around 154-155 pounds. Admission weight 162 lb; weight down to 138 pounds--  Still volume overloaded on exam  Cardiology following - Bumex IV being uptitrated and ultimately was changed to infusion.  Zaroxolyn dose was given as well.  Bumex drip is being titrated down  Monitor electrolytes. Noted rising CO2  Chronic atrial fibrillation (HCC)  Rate controlled with Cardizem and metoprolol  Continue Xarelto  Essential hypertension  Cont BB/Cardizem    Type 2 diabetes mellitus, without long-term current use of insulin (HCC)  Lab Results   Component Value Date    HGBA1C 7.4 (H) 10/23/2024     Recent Labs     10/29/24  1708 10/29/24  2210 10/30/24  0801 10/30/24  1146   POCGLU 222* 153* 121 201*     Blood Sugar Average: Last 72 hrs:  (P) 214.9742447222313404  Hold home glipizide/Metformin for now  SSI/accuchecks/diabetic diet  A1c reflects reasonable control but blood sugars are not at goal during this hospitalization. Added low dose basal bolus regimen   COPD (chronic obstructive pulmonary disease) (HCC)  With chronic hypoxemic  "respiratory failure, on 2L NC O2 at baseline during the day and 4L overnight  No acute exacerbation  Continue inhaler regimen  Class 1 obesity due to excess calories with serious comorbidity and body mass index (BMI) of 30.0 to 30.9 in adult  Encourage weight loss, dietary and lifestyle modifications  Body mass index is 27.87 kg/m².  Ambulatory dysfunction  Per physical therapy, recommended for rehab.  Initially was recommended for acute level rehab however patient's daughter and patient do not feel she could tolerate this intensive rehab and therefore opting for subacute rehab.  Possibly [facility]  Constipation  Patient still reporting no bowel movement in the last 5 days. Continue bowel regimen    VTE Pharmacologic Prophylaxis: VTE Score: 5 xarelto    Mobility:   Basic Mobility Inpatient Raw Score: 17  JH-HLM Goal: 5: Stand one or more mins  JH-HLM Achieved: 6: Walk 10 steps or more  JH-HLM Goal achieved. Continue to encourage appropriate mobility.    Patient Centered Rounds: spoke with RN   Discussions with Specialists or Other Care Team Provider: Case management    Education and Discussions with Family / Patient: Updated  (daughter) via phone.    Current Length of Stay: 7 day(s)  Current Patient Status: Inpatient   Certification Statement: The patient will continue to require additional inpatient hospital stay due to ongoing diuretic infusion  Discharge Plan: Anticipate discharge in 48-72 hrs to rehab facility.    Code Status: Level 1 - Full Code    Subjective   Patient tearful, upset and stating \"I just keep peeing.\"  Upset by the incontinence.  No reports of shortness of breath or other new events overnight.  Passing gas but still no bowel movement    Objective :  Temp:  [97.5 °F (36.4 °C)-98.1 °F (36.7 °C)] 97.5 °F (36.4 °C)  HR:  [82-89] 82  BP: (130-157)/(71-89) 143/89  Resp:  [19] 19  SpO2:  [89 %-95 %] 94 %  O2 Device: Nasal cannula  Nasal Cannula O2 Flow Rate (L/min):  [3 " L/min] 3 L/min    Body mass index is 27.87 kg/m².     Input and Output Summary (last 24 hours):     Intake/Output Summary (Last 24 hours) at 10/30/2024 1227  Last data filed at 10/30/2024 1048  Gross per 24 hour   Intake 1100 ml   Output 4329 ml   Net -3229 ml       Physical Exam  Vitals reviewed.   Constitutional:       General: She is not in acute distress.     Appearance: She is obese. She is not ill-appearing, toxic-appearing or diaphoretic.   Eyes:      General: No scleral icterus.        Right eye: No discharge.         Left eye: No discharge.      Conjunctiva/sclera: Conjunctivae normal.   Cardiovascular:      Rate and Rhythm: Normal rate and regular rhythm.      Heart sounds: No murmur heard.  Pulmonary:      Effort: No respiratory distress.      Breath sounds: No stridor. Rales present. No wheezing or rhonchi.   Abdominal:      General: Bowel sounds are normal. There is no distension.      Palpations: Abdomen is soft.      Tenderness: There is no abdominal tenderness. There is no guarding.      Comments: obese   Musculoskeletal:      Right lower leg: Edema present.      Left lower leg: Edema present.   Skin:     General: Skin is warm and dry.      Coloration: Skin is not jaundiced or pale.      Findings: No bruising, erythema, lesion or rash.   Neurological:      General: No focal deficit present.      Mental Status: She is alert. Mental status is at baseline.      Comments: Awake alert interactive   Psychiatric:      Comments: Tearful           Lines/Drains:  Lines/Drains/Airways       Active Status       Name Placement date Placement time Site Days    External Urinary Catheter 10/23/24  1900  -- 6                      Lab Results: I have reviewed the following results:   Results from last 7 days   Lab Units 10/25/24  0531   WBC Thousand/uL 7.68   HEMOGLOBIN g/dL 12.8   HEMATOCRIT % 42.6   PLATELETS Thousands/uL 237     Results from last 7 days   Lab Units 10/30/24  0455 10/29/24  0913   SODIUM mmol/L 144  140   POTASSIUM mmol/L 3.3* 4.3   CHLORIDE mmol/L 90* 97   CO2 mmol/L >45* 39*   BUN mg/dL 36* 34*   CREATININE mg/dL 0.91 0.94   ANION GAP mmol/L  --  4   CALCIUM mg/dL 10.1 9.6   GLUCOSE RANDOM mg/dL 107 225*         Results from last 7 days   Lab Units 10/30/24  1146 10/30/24  0801 10/29/24  2210 10/29/24  1708 10/29/24  1134 10/29/24  0751 10/28/24  2111 10/28/24  1638 10/28/24  1120 10/28/24  0735 10/27/24  2117 10/27/24  1550   POC GLUCOSE mg/dl 201* 121 153* 222* 251* 154* 218* 248* 257* 163* 194* 331*     Results from last 7 days   Lab Units 10/23/24  1700   HEMOGLOBIN A1C % 7.4*           Recent Cultures (last 7 days):           Last 24 Hours Medication List:     Current Facility-Administered Medications:     acetaminophen (TYLENOL) tablet 650 mg, Q6H PRN    atorvastatin (LIPITOR) tablet 40 mg, Daily    bisacodyl (DULCOLAX) rectal suppository 10 mg, Daily PRN    budesonide (PULMICORT) inhalation solution 0.5 mg, Q12H    bumetanide (BUMEX) 12.5 mg infusion 50 mL, Continuous, Last Rate: 1 mg/hr (10/30/24 1042)    diltiazem (CARDIZEM CD) 24 hr capsule 120 mg, Daily    donepezil (ARICEPT) tablet 5 mg, HS    fluticasone (FLONASE) 50 mcg/act nasal spray 2 spray, Daily    insulin glargine (LANTUS) subcutaneous injection 5 Units 0.05 mL, HS    insulin lispro (HumALOG/ADMELOG) 100 units/mL subcutaneous injection 1-5 Units, TID AC **AND** Fingerstick Glucose (POCT), TID AC    insulin lispro (HumALOG/ADMELOG) 100 units/mL subcutaneous injection 3 Units, TID With Meals    ipratropium-albuterol (DUO-NEB) 0.5-2.5 mg/3 mL inhalation solution 3 mL, Q6H PRN    latanoprost (XALATAN) 0.005 % ophthalmic solution 1 drop, HS    loratadine (CLARITIN) tablet 10 mg, Daily    magnesium Oxide (MAG-OX) tablet 400 mg, Daily    metoprolol succinate (TOPROL-XL) 24 hr tablet 50 mg, BID    polyethylene glycol (MIRALAX) packet 17 g, Daily    potassium chloride (Klor-Con M20) CR tablet 20 mEq, BID    potassium chloride (Klor-Con M20) CR  tablet 40 mEq, Once    rivaroxaban (XARELTO) tablet 15 mg, Daily With Breakfast    senna-docusate sodium (SENOKOT S) 8.6-50 mg per tablet 2 tablet, BID    sodium chloride (OCEAN) 0.65 % nasal spray 1 spray, Q1H PRN    Administrative Statements   Today, Patient Was Seen By: Arabella Samaniego PA-C      **Please Note: This note may have been constructed using a voice recognition system.**

## 2024-10-30 NOTE — ASSESSMENT & PLAN NOTE
Lab Results   Component Value Date    HGBA1C 7.4 (H) 10/23/2024     Recent Labs     10/29/24  1708 10/29/24  2210 10/30/24  0801 10/30/24  1146   POCGLU 222* 153* 121 201*     Blood Sugar Average: Last 72 hrs:  (P) 214.0933293277196603  Hold home glipizide/Metformin for now  SSI/accuchecks/diabetic diet  A1c reflects reasonable control but blood sugars are not at goal during this hospitalization. Added low dose basal bolus regimen

## 2024-10-30 NOTE — PROGRESS NOTES
General Cardiology   Progress Note -  Team One   Camryn Roblero 84 y.o. female MRN: [id number]    Unit/Bed#: S -01 Encounter: [id number]    Assessment  1.  Acute on chronic HFpEF, likely 2/2 to ineffective diuretic dosing; no report of any recent dietary indiscretions. Remains in afib but rates are well controlled.   -Volume overloaded on exam/imaging  -BNP 1154.  -Chest x-ray 10/23; moderate pulmonary vascular congestion, small right > than left bilateral pleural effusions.  -TTE 11/24/2023; LVEF 70%, wall motion normal, grade 2 DD, RV normal size/systolic function, LA severely dilated, RA mildly dilated, mild AI, mild to moderate MR.  -Outpatient diuretic regimen; torsemide 40 mg twice daily  -Inpatient diuretic regimen; IV Bumex GTT at 2 mg/hr + 2.5 mg of metolazone x 1 (on 10/29)  -24-hour I&O balance; -3L overall -4.7 L  -Dry weight around 154-155 pounds.  Office weight on 10/23 was 163 pounds. SS weight 138 lbs today.   2. Chronic atrial fibrillation /atrial flutter  -Occasional palpitations.   -ECG on admission demonstrated rate controlled atrial fibrillation  -Outpatient rate/rhythm control; Cardizem  mg daily and metoprolol succinate 50 mg twice daily  -Inpatient rate/rhythm control; Cardizem  mg daily and metoprolol succinate 50 mg twice daily  -On apixaban 5 mg twice daily.  3. Hypertension  -Average /79 last recorded 143/89, HR 82.  -Outpatient BP regimen; Cardizem CD1 120 mg daily and metoprolol succinate 50 mg twice daily  -Inpatient rate/rhythm control; Cardizem  mg daily and metoprolol succinate 50 mg twice daily  4. CKD stage III  -Baseline creatinine around 0.8-1.1.  -Creatinine 1.04 on admission, currently 0.9  5. DM type II  -HgbA1c 7        Plan  -Pt denies any specific cardiac or respiratory complaints this a.m.  Remains on 3 L of supplemental O2 with stable O2 saturations.  Volume status appears significantly improved from yesterday.  Excellent urinary response/net  negative fluid balance and significant weight loss.  BUN/bicarb level up from yesterday, but creatinine stable.  Still examines volume overloaded predominately in her abdomen/bilateral lower extremities.  Would recommend continuing IV Bumex but decrease to 1 mg/h possibly for just another 24 hours; may be able to transition to oral diuretics by tomorrow.  Will certainly need a higher dose of torsemide on DC -- 60 to 80 mg twice daily +/- PRN metolazone.   -HRs well controlled in afib. Continue Cardizem  mg daily and metoprolol succinate 50 mg twice daily  -Continue apixaban 5 mg BID  -Monitor renal function and electrolytes closely.  Replete to maintain K+ level 4.0 magnesium level 2.0. K+ level 3.1 this a/m (ordered replacement), add mag level.   -Strict I's and O's, daily standing weights, 2 g Na+ diet 1.8 LFR.  -No indication for telemetry.      Subjective  Review of Systems   Constitutional: Positive for malaise/fatigue. Negative for chills and fever.   Eyes:  Negative for visual disturbance.   Cardiovascular:  Positive for leg swelling. Negative for chest pain, dyspnea on exertion, orthopnea and palpitations.   Respiratory:  Negative for cough and shortness of breath.    Gastrointestinal:  Positive for bloating. Negative for abdominal pain.   Neurological:  Negative for dizziness, headaches and light-headedness.       Objective:   Physical Exam  Vitals and nursing note reviewed.   Constitutional:       General: She is not in acute distress.     Appearance: She is obese. She is not diaphoretic.   HENT:      Head: Normocephalic and atraumatic.   Eyes:      General: No scleral icterus.  Cardiovascular:      Rate and Rhythm: Normal rate. Rhythm irregular.      Pulses: Normal pulses.      Heart sounds: Normal heart sounds.   Pulmonary:      Breath sounds: Rales present. No wheezing.   Abdominal:      General: Bowel sounds are normal. There is distension.      Palpations: Abdomen is soft.   Musculoskeletal:     "     General: Swelling present.      Right lower leg: Edema present.      Left lower leg: Edema present.   Skin:     General: Skin is warm and dry.      Capillary Refill: Capillary refill takes less than 2 seconds.      Coloration: Skin is pale.   Neurological:      General: No focal deficit present.      Mental Status: She is alert and oriented to person, place, and time.   Psychiatric:         Mood and Affect: Mood normal.         Vitals: Blood pressure 143/89, pulse 82, temperature 97.5 °F (36.4 °C), resp. rate 19, height 4' 11\" (1.499 m), weight 62.6 kg (138 lb 0.1 oz), SpO2 94%.,     Body mass index is 27.87 kg/m².,   Systolic (24hrs), Av , Min:130 , Max:157     Diastolic (24hrs), Av, Min:71, Max:89      Intake/Output Summary (Last 24 hours) at 10/30/2024 0909  Last data filed at 10/30/2024 0600  Gross per 24 hour   Intake 810 ml   Output 3745 ml   Net -2935 ml     Weight (last 2 days)       Date/Time Weight    10/30/24 0502 62.6 (138.01)    10/29/24 0600 68.3 (150.57)    10/28/24 0459 69.1 (152.34)            LABORATORY RESULTS      CBC with diff:   Results from last 7 days   Lab Units 10/25/24  0531 10/24/24  0513 10/23/24  1107   WBC Thousand/uL 7.68 8.04 8.42   HEMOGLOBIN g/dL 12.8 11.8 13.1   HEMATOCRIT % 42.6 37.3 41.8   MCV fL 90 86 87   PLATELETS Thousands/uL 237 230 246   RBC Million/uL 4.75 4.36 4.83   MCH pg 26.9 27.1 27.1   MCHC g/dL 30.0* 31.6 31.3*   RDW % 16.4* 16.3* 16.3*   MPV fL 10.2 10.3 9.9   NRBC AUTO /100 WBCs  --   --  0       CMP:  Results from last 7 days   Lab Units 10/30/24  0455 10/29/24  0913 10/28/24  0457 10/27/24  0608 10/26/24  0442 10/25/24  0531 10/24/24  0513 10/23/24  1107   POTASSIUM mmol/L 3.3* 4.3 4.4 4.4 3.9 4.1 3.1* 4.2   CHLORIDE mmol/L 90* 97 97 98 98 96 94* 90*   CO2 mmol/L >45* 39* 43* 40* 36* 41* 35* 34*   BUN mg/dL 36* 34* 33* 31* 31* 30* 30* 34*   CREATININE mg/dL 0.91 0.94 0.93 0.96 0.93 1.02 0.86 1.04   CALCIUM mg/dL 10.1 9.6 9.4 9.3 9.2 9.4 8.9 9.7 " "  AST U/L  --   --   --   --   --   --   --  77*   ALT U/L  --   --   --   --   --   --   --  28   ALK PHOS U/L  --   --   --   --   --   --   --  90   EGFR ml/min/1.73sq m 58 56 57 54 57 50 62 49       BMP:  Results from last 7 days   Lab Units 10/30/24  0455 10/29/24  0913 10/28/24  0457 10/27/24  0608 10/26/24  0442 10/25/24  0531 10/24/24  0513   POTASSIUM mmol/L 3.3* 4.3 4.4 4.4 3.9 4.1 3.1*   CHLORIDE mmol/L 90* 97 97 98 98 96 94*   CO2 mmol/L >45* 39* 43* 40* 36* 41* 35*   BUN mg/dL 36* 34* 33* 31* 31* 30* 30*   CREATININE mg/dL 0.91 0.94 0.93 0.96 0.93 1.02 0.86   CALCIUM mg/dL 10.1 9.6 9.4 9.3 9.2 9.4 8.9       No results found for: \"NTBNP\"     Results from last 7 days   Lab Units 10/28/24  0457 10/26/24  0442 10/25/24  0531 10/23/24  1107   MAGNESIUM mg/dL 1.9 1.9 1.8* 1.5*       Results from last 7 days   Lab Units 10/23/24  1700   HEMOGLOBIN A1C % 7.4*                   Lipid Profile:   No results found for: \"CHOL\"  Lab Results   Component Value Date    HDL 34 (L) 2024    HDL 31 (L) 2023    HDL 39 (L) 2023     Lab Results   Component Value Date    LDLCALC 40 2024    LDLCALC 37 2023    LDLCALC 86 2023     Lab Results   Component Value Date    TRIG 59 2024    TRIG 51 2023    TRIG 91 2023       Cardiac testing:   Results for orders placed during the hospital encounter of 10/15/20    Echo complete with contrast if indicated    [place] [id number] [facility]  [place] [id number]  [phone]    Transthoracic Echocardiogram  2D, M-mode, Doppler, and Color Doppler    Study date:  15-Oct-2020    Patient: MARANDA MO  MR number: [id number]  Account number: [id number]  : 1940  Age: 80 years  Gender: Female  Status: Outpatient  Location: [facility]  Height: 60 in  Weight: 191.6 lb  BP: 166/ 78 mmHg    Indications: Chronic diastolic heart failure.    Diagnoses: I50.32 - Chronic diastolic (congestive) heart " failure    Sonographer:  MAURICIO Mckeon  Primary Physician:  Abiel Seals MD  Referring Physician:  Bradley Marquez MD  Group:  [facility]  Interpreting Physician:  Lit Jane MD    SUMMARY    LEFT VENTRICLE:  Systolic function was normal. Ejection fraction was estimated to be 70 %.  There were no regional wall motion abnormalities.  The ratio of systolic to diastolic pulmonary vein flow was reduced (diastolic predominant).  Features were consistent with a pseudonormal left ventricular filling pattern, with concomitant abnormal relaxation and increased filling pressure (grade 2 diastolic dysfunction).    LEFT ATRIUM:  The atrium was markedly dilated.    MITRAL VALVE:  There was mild to moderate regurgitation.    AORTIC VALVE:  There was mild regurgitation.    TRICUSPID VALVE:  There was mild regurgitation.  Pulmonary artery systolic pressure was moderately to markedly increased.  Estimated peak PA pressure was 60 mmHg.    PERICARDIUM:  A small pericardial effusion was identified circumferential to the heart. The fluid exhibited a fibrinous appearance.    HISTORY: PRIOR HISTORY: HTN, HLD, DM, lung cancer, COPD, TOM.    PROCEDURE: The study was performed in the [facility]. This was a routine study. The transthoracic approach was used. The study included complete 2D imaging, M-mode, complete spectral Doppler, and color Doppler. The  heart rate was 62 bpm, at the start of the study. Images were obtained from the parasternal, apical, subcostal, and suprasternal notch acoustic windows. Echocardiographic views were limited due to chest wall deformity, poor acoustic window  availability, decreased penetration, and lung interference. This was a technically difficult study.    LEFT VENTRICLE: Size was normal. Systolic function was normal. Ejection fraction was estimated to be 70 %. There were no regional wall motion abnormalities. Wall thickness was normal. DOPPLER: The  ratio of systolic to diastolic pulmonary  vein flow was reduced (diastolic predominant). Features were consistent with a pseudonormal left ventricular filling pattern, with concomitant abnormal relaxation and increased filling pressure (grade 2 diastolic dysfunction).    RIGHT VENTRICLE: The size was normal. Systolic function was normal. Wall thickness was normal.    LEFT ATRIUM: The atrium was markedly dilated.    RIGHT ATRIUM: Size was normal.    MITRAL VALVE: Valve structure was normal. There was normal leaflet separation. DOPPLER: The transmitral velocity was within the normal range. There was no evidence for stenosis. There was mild to moderate regurgitation.    AORTIC VALVE: The valve was trileaflet. Leaflets exhibited normal thickness and normal cuspal separation. DOPPLER: Transaortic velocity was within the normal range. There was no evidence for stenosis. There was mild regurgitation.    TRICUSPID VALVE: The valve structure was normal. There was normal leaflet separation. DOPPLER: The transtricuspid velocity was within the normal range. There was no evidence for stenosis. There was mild regurgitation. Pulmonary artery  systolic pressure was moderately to markedly increased. Estimated peak PA pressure was 60 mmHg.    PULMONIC VALVE: Leaflets exhibited normal thickness, no calcification, and normal cuspal separation. DOPPLER: The transpulmonic velocity was within the normal range. There was no significant regurgitation.    PERICARDIUM: A small pericardial effusion was identified circumferential to the heart. The fluid exhibited a fibrinous appearance. The pericardium was normal in appearance.    AORTA: The root exhibited normal size.    SYSTEMIC VEINS: IVC: The inferior vena cava was normal in size.    SYSTEM MEASUREMENT TABLES    2D  %FS: 39.72 %  Ao Diam: 3.28 cm  EDV(Teich): 127.28 ml  EF(Teich): 69.95 %  ESV(Teich): 38.25 ml  IVSd: 0.88 cm  LA Diam: 4.8 cm  LAAs A2C: 38.98 cm2  LAAs A4C: 33.86 cm2  LAESV  A-L A2C: 166.64 ml  LAESV A-L A4C: 132.15 ml  LAESV Index (A-L): 83.14 ml/m2  LAESV MOD A2C: 158.66 ml  LAESV MOD A4C: 124.34 ml  LAESV(A-L): 152.15 ml  LAESV(MOD BP): 143.9 ml  LALs A2C: 7.74 cm  LALs A4C: 7.36 cm  LVEDV MOD A4C: 99.9 ml  LVEF MOD A4C: 84.12 %  LVESV MOD A4C: 15.86 ml  LVIDd: 5.16 cm  LVIDs: 3.11 cm  LVLd A4C: 7.53 cm  LVLs A4C: 5.77 cm  LVPWd: 0.91 cm  RVIDd: 4.1 cm  SV MOD A4C: 84.04 ml  SV(Teich): 89.03 ml    CW  AV Env.Ti: 298.76 ms  AV MaxPG: 10.27 mmHg  AV VTI: 28.82 cm  AV Vmax: 1.6 m/s  AV Vmean: 0.96 m/s  AV meanP.41 mmHg  TR MaxP.9 mmHg  TR Vmax: 3.77 m/s    MM  TAPSE: 2.29 cm    PW  E' Sept: 0.05 m/s  E/E' Sept: 23.56  LVOT Env.Ti: 300.67 ms  LVOT VTI: 25.48 cm  LVOT Vmax: 1.23 m/s  LVOT Vmean: 0.85 m/s  LVOT maxP.12 mmHg  LVOT meanPG: 3.26 mmHg  MV A Matteo: 0.99 m/s  MV Dec [place]: 6.89 m/s2  MV DecT: 186.95 ms  MV E Matteo: 1.29 m/s  MV E/A Ratio: 1.3  MV PHT: 54.22 ms  MVA By PHT: 4.06 cm2    [employer] Commission Accredited Echocardiography Laboratory    Prepared and electronically signed by    Lit Jane MD  Signed 15-Oct-2020 17:48:51    No results found for this or any previous visit.    No results found for this or any previous visit.    No valid procedures specified.  No results found for this or any previous visit.      Meds/Allergies   all current active meds have been reviewed and current meds:   Current Facility-Administered Medications:     acetaminophen (TYLENOL) tablet 650 mg, Q6H PRN    atorvastatin (LIPITOR) tablet 40 mg, Daily    bisacodyl (DULCOLAX) rectal suppository 10 mg, Daily PRN    budesonide (PULMICORT) inhalation solution 0.5 mg, Q12H    bumetanide (BUMEX) 12.5 mg infusion 50 mL, Continuous, Last Rate: 2 mg/hr (10/30/24 0430)    diltiazem (CARDIZEM CD) 24 hr capsule 120 mg, Daily    donepezil (ARICEPT) tablet 5 mg, HS    fluticasone (FLONASE) 50 mcg/act nasal spray 2 spray, Daily    insulin glargine (LANTUS) subcutaneous injection 5 Units 0.05 mL,  HS    insulin lispro (HumALOG/ADMELOG) 100 units/mL subcutaneous injection 1-5 Units, TID AC **AND** Fingerstick Glucose (POCT), TID AC    insulin lispro (HumALOG/ADMELOG) 100 units/mL subcutaneous injection 3 Units, TID With Meals    ipratropium-albuterol (DUO-NEB) 0.5-2.5 mg/3 mL inhalation solution 3 mL, Q6H PRN    latanoprost (XALATAN) 0.005 % ophthalmic solution 1 drop, HS    loratadine (CLARITIN) tablet 10 mg, Daily    magnesium Oxide (MAG-OX) tablet 400 mg, Daily    metoprolol succinate (TOPROL-XL) 24 hr tablet 50 mg, BID    polyethylene glycol (MIRALAX) packet 17 g, Daily    potassium chloride (Klor-Con M20) CR tablet 20 mEq, BID    rivaroxaban (XARELTO) tablet 15 mg, Daily With Breakfast    senna-docusate sodium (SENOKOT S) 8.6-50 mg per tablet 2 tablet, BID    sodium chloride (OCEAN) 0.65 % nasal spray 1 spray, Q1H PRN  bumetanide (BUMEX) 12.5 mg infusion 50 mL, 2 mg/hr, Last Rate: 2 mg/hr (10/30/24 0430)          Counseling / Coordination of Care  Total floor / unit time spent today 20 minutes.  Greater than 50% of total time was spent with the patient and / or family counseling and / or coordination of care.      ** Please Note: Dragon 360 Dictation voice to text software may have been used in the creation of this document. **

## 2024-10-31 PROBLEM — N17.9 ACUTE KIDNEY INJURY SUPERIMPOSED ON CHRONIC KIDNEY DISEASE  (HCC): Status: ACTIVE | Noted: 2024-10-31

## 2024-10-31 PROBLEM — N18.9 ACUTE KIDNEY INJURY SUPERIMPOSED ON CHRONIC KIDNEY DISEASE  (HCC): Status: ACTIVE | Noted: 2024-10-31

## 2024-10-31 LAB
BUN SERPL-MCNC: 45 MG/DL (ref 5–25)
BUN SERPL-MCNC: 46 MG/DL (ref 5–25)
CALCIUM SERPL-MCNC: 10 MG/DL (ref 8.4–10.2)
CALCIUM SERPL-MCNC: 10.1 MG/DL (ref 8.4–10.2)
CHLORIDE SERPL-SCNC: 89 MMOL/L (ref 96–108)
CHLORIDE SERPL-SCNC: 89 MMOL/L (ref 96–108)
CO2 SERPL-SCNC: >45 MMOL/L (ref 21–32)
CO2 SERPL-SCNC: >45 MMOL/L (ref 21–32)
CREAT SERPL-MCNC: 1.29 MG/DL (ref 0.6–1.3)
CREAT SERPL-MCNC: 1.34 MG/DL (ref 0.6–1.3)
GFR SERPL CREATININE-BSD FRML MDRD: 36 ML/MIN/1.73SQ M
GFR SERPL CREATININE-BSD FRML MDRD: 38 ML/MIN/1.73SQ M
GLUCOSE SERPL-MCNC: 100 MG/DL (ref 65–140)
GLUCOSE SERPL-MCNC: 145 MG/DL (ref 65–140)
GLUCOSE SERPL-MCNC: 162 MG/DL (ref 65–140)
GLUCOSE SERPL-MCNC: 211 MG/DL (ref 65–140)
GLUCOSE SERPL-MCNC: 222 MG/DL (ref 65–140)
GLUCOSE SERPL-MCNC: 259 MG/DL (ref 65–140)
MAGNESIUM SERPL-MCNC: 1.9 MG/DL (ref 1.9–2.7)
POTASSIUM SERPL-SCNC: 3.6 MMOL/L (ref 3.5–5.3)
POTASSIUM SERPL-SCNC: 3.9 MMOL/L (ref 3.5–5.3)
SODIUM SERPL-SCNC: 144 MMOL/L (ref 135–147)
SODIUM SERPL-SCNC: 145 MMOL/L (ref 135–147)

## 2024-10-31 PROCEDURE — 99232 SBSQ HOSP IP/OBS MODERATE 35: CPT | Performed by: INTERNAL MEDICINE

## 2024-10-31 PROCEDURE — 83735 ASSAY OF MAGNESIUM: CPT | Performed by: NURSE PRACTITIONER

## 2024-10-31 PROCEDURE — 94760 N-INVAS EAR/PLS OXIMETRY 1: CPT

## 2024-10-31 PROCEDURE — 97110 THERAPEUTIC EXERCISES: CPT

## 2024-10-31 PROCEDURE — 80048 BASIC METABOLIC PNL TOTAL CA: CPT | Performed by: PHYSICIAN ASSISTANT

## 2024-10-31 PROCEDURE — 97116 GAIT TRAINING THERAPY: CPT

## 2024-10-31 PROCEDURE — 82948 REAGENT STRIP/BLOOD GLUCOSE: CPT

## 2024-10-31 PROCEDURE — 94640 AIRWAY INHALATION TREATMENT: CPT

## 2024-10-31 PROCEDURE — 99232 SBSQ HOSP IP/OBS MODERATE 35: CPT | Performed by: PHYSICIAN ASSISTANT

## 2024-10-31 PROCEDURE — 80048 BASIC METABOLIC PNL TOTAL CA: CPT | Performed by: NURSE PRACTITIONER

## 2024-10-31 PROCEDURE — 97535 SELF CARE MNGMENT TRAINING: CPT

## 2024-10-31 RX ORDER — BUMETANIDE 1 MG/1
3 TABLET ORAL 2 TIMES DAILY
Status: DISCONTINUED | OUTPATIENT
Start: 2024-10-31 | End: 2024-10-31

## 2024-10-31 RX ORDER — BUMETANIDE 1 MG/1
3 TABLET ORAL
Status: DISCONTINUED | OUTPATIENT
Start: 2024-10-31 | End: 2024-11-01 | Stop reason: HOSPADM

## 2024-10-31 RX ORDER — POLYETHYLENE GLYCOL 3350 17 G/17G
17 POWDER, FOR SOLUTION ORAL 2 TIMES DAILY
Status: DISCONTINUED | OUTPATIENT
Start: 2024-10-31 | End: 2024-11-01 | Stop reason: HOSPADM

## 2024-10-31 RX ORDER — INSULIN GLARGINE 100 [IU]/ML
8 INJECTION, SOLUTION SUBCUTANEOUS
Status: DISCONTINUED | OUTPATIENT
Start: 2024-10-31 | End: 2024-11-01 | Stop reason: HOSPADM

## 2024-10-31 RX ORDER — INSULIN LISPRO 100 [IU]/ML
5 INJECTION, SOLUTION INTRAVENOUS; SUBCUTANEOUS
Status: DISCONTINUED | OUTPATIENT
Start: 2024-10-31 | End: 2024-11-01 | Stop reason: HOSPADM

## 2024-10-31 RX ADMIN — INSULIN LISPRO 1 UNITS: 100 INJECTION, SOLUTION INTRAVENOUS; SUBCUTANEOUS at 08:46

## 2024-10-31 RX ADMIN — METOPROLOL SUCCINATE 50 MG: 50 TABLET, EXTENDED RELEASE ORAL at 08:45

## 2024-10-31 RX ADMIN — Medication 1 MG/HR: at 06:41

## 2024-10-31 RX ADMIN — INSULIN LISPRO 3 UNITS: 100 INJECTION, SOLUTION INTRAVENOUS; SUBCUTANEOUS at 08:46

## 2024-10-31 RX ADMIN — INSULIN LISPRO 5 UNITS: 100 INJECTION, SOLUTION INTRAVENOUS; SUBCUTANEOUS at 12:49

## 2024-10-31 RX ADMIN — LATANOPROST 1 DROP: 50 SOLUTION OPHTHALMIC at 22:18

## 2024-10-31 RX ADMIN — POTASSIUM CHLORIDE 20 MEQ: 1500 TABLET, EXTENDED RELEASE ORAL at 17:38

## 2024-10-31 RX ADMIN — METOPROLOL SUCCINATE 50 MG: 50 TABLET, EXTENDED RELEASE ORAL at 17:38

## 2024-10-31 RX ADMIN — POTASSIUM CHLORIDE 20 MEQ: 1500 TABLET, EXTENDED RELEASE ORAL at 08:45

## 2024-10-31 RX ADMIN — BUDESONIDE 0.5 MG: 0.5 INHALANT ORAL at 19:43

## 2024-10-31 RX ADMIN — BUDESONIDE 0.5 MG: 0.5 INHALANT ORAL at 09:18

## 2024-10-31 RX ADMIN — INSULIN LISPRO 2 UNITS: 100 INJECTION, SOLUTION INTRAVENOUS; SUBCUTANEOUS at 12:49

## 2024-10-31 RX ADMIN — POLYETHYLENE GLYCOL 3350 17 G: 17 POWDER, FOR SOLUTION ORAL at 17:39

## 2024-10-31 RX ADMIN — FLUTICASONE PROPIONATE 2 SPRAY: 50 SPRAY, METERED NASAL at 08:46

## 2024-10-31 RX ADMIN — BUMETANIDE 3 MG: 1 TABLET ORAL at 17:42

## 2024-10-31 RX ADMIN — RIVAROXABAN 15 MG: 15 TABLET, FILM COATED ORAL at 08:48

## 2024-10-31 RX ADMIN — ATORVASTATIN CALCIUM 40 MG: 40 TABLET, FILM COATED ORAL at 08:45

## 2024-10-31 RX ADMIN — DILTIAZEM HYDROCHLORIDE 120 MG: 120 CAPSULE, COATED, EXTENDED RELEASE ORAL at 08:45

## 2024-10-31 RX ADMIN — INSULIN LISPRO 2 UNITS: 100 INJECTION, SOLUTION INTRAVENOUS; SUBCUTANEOUS at 17:39

## 2024-10-31 RX ADMIN — MINERAL OIL 1 ENEMA: 100 ENEMA RECTAL at 17:39

## 2024-10-31 RX ADMIN — Medication 400 MG: at 08:45

## 2024-10-31 RX ADMIN — POLYETHYLENE GLYCOL 3350 17 G: 17 POWDER, FOR SOLUTION ORAL at 08:46

## 2024-10-31 RX ADMIN — SENNOSIDES AND DOCUSATE SODIUM 2 TABLET: 8.6; 5 TABLET ORAL at 17:38

## 2024-10-31 RX ADMIN — SENNOSIDES AND DOCUSATE SODIUM 2 TABLET: 8.6; 5 TABLET ORAL at 08:45

## 2024-10-31 RX ADMIN — DONEPEZIL HYDROCHLORIDE 5 MG: 5 TABLET ORAL at 22:17

## 2024-10-31 RX ADMIN — INSULIN LISPRO 5 UNITS: 100 INJECTION, SOLUTION INTRAVENOUS; SUBCUTANEOUS at 17:39

## 2024-10-31 RX ADMIN — LORATADINE 10 MG: 10 TABLET ORAL at 08:45

## 2024-10-31 NOTE — ASSESSMENT & PLAN NOTE
PLEASE fax printed  Results to Dr Zhang at [facility]   Fax is [phone]   Rate controlled with Cardizem and metoprolol  Continue Xarelto

## 2024-10-31 NOTE — WOUND OSTOMY CARE
Progress Note - Wound   Camryn DURBIN Osbaldo 84 y.o. female MRN: [id number]  Unit/Bed#: S -01 Encounter: [id number]        Assessment:   Patient is seen for wound care follow-up.     Findings:  B/L heels are dry intact and puneet with no skin loss or wounds present. Recommend preventative Hydraguard Cream and proper offloading/ repositioning.      B/L sacro-buttocks is dry, intact, pink in color and blanches. No skin loss or wounds present. Recommend preventative hydragaurd to area.      Right lower leg wound - small irregular shaped are of full thickness skin loss. Wound bed is covered in 100% yellow slough. small amount of serosanguinous drainage. Rachele wound is pink and fragile. Recommend continue silver alginate with dry dressing. Continue wrapping with ACE to manage swelling; wrap to knee.       No induration, fluctuance, odor, warmth/temperature differences, redness, or purulence noted to the above noted wounds and skin areas assessed. New dressings applied per orders listed below. Patient tolerated well- no s/s of non-verbal pain or discomfort observed during the encounter. Bedside nurse aware of plan of care. See flow sheets for more detailed assessment findings.      Orders listed below and wound care will continue to follow, call or Secure Chat with questions.     Skin care plans:  1-Hydraguard to bilateral sacrum, buttock and heels BID and PRN  2-Elevate heels to offload pressure.  3-Ehob cushion in chair when out of bed.  4-Moisturize skin daily with skin nourishing cream.  5-Turn/reposition q2h for pressure re-distribution on skin.  6-Right lower leg: Irrigate with NSS. Pat dry. Apply thin layer of calazime/zinc oxide paste to rachele wound. Cover wound bed with Silver Alginate and ABD. Wrap with Ronit, and ACE wrap to the knee for compression. Change every other day or PRN for soilage/dislodgement.        WOUNDS:    Wound 10/24/24 Pretibial Distal;Right (Active)   Wound Image   10/31/24 1008   Wound  Description Yellow;Dry 10/31/24 1008   Rachele-wound Assessment Fragile;Edinburgh 10/31/24 1008   Wound Length (cm) 1 cm 10/31/24 1008   Wound Width (cm) 1 cm 10/31/24 1008   Wound Depth (cm) 0 cm 10/31/24 1008   Wound Surface Area (cm^2) 1 cm^2 10/31/24 1008   Wound Volume (cm^3) 0 cm^3 10/31/24 1008   Calculated Wound Volume (cm^3) 0 cm^3 10/31/24 1008   Drainage Amount Scant 10/31/24 1008   Drainage Description Serous;Yellow 10/31/24 1008   Non-staged Wound Description Full thickness 10/31/24 1008   Treatments Cleansed;Site care 10/31/24 1008   Dressing Calcium Alginate with Silver;ABD;Dry dressing 10/31/24 1008   Wound packed? No 10/31/24 1008   Packing- # removed 0 10/31/24 1008   Packing- # inserted 0 10/31/24 1008   Dressing Changed New 10/31/24 1008   Patient Tolerance Tolerated well 10/31/24 1008   Dressing Status Clean;Intact;Dry 10/31/24 1008                She Eller RN, BSN, CCRN

## 2024-10-31 NOTE — ASSESSMENT & PLAN NOTE
Wt Readings from Last 3 Encounters:   10/31/24 61.5 kg (135 lb 9.3 oz)   10/23/24 73.9 kg (163 lb)   10/15/24 74.4 kg (164 lb)   Presented with 10 pound weight gain despite increase in torsemide x 2 weeks.  CXR: Moderate pulmonary vascular congestion. Small right greater than left bilateral pleural effusions.   2d ECHO 10/23/24 with EF 65% Changes from prior echo include: Right ventricular dilation is notable, with an increase in mitral regurgitation, and pulmonary arterial pressures.  Dry weight around 154-155 pounds. Admission weight 162 lb; weight down to 135 pounds today and net neg 6.8L since admission. Still volume overloaded on exam but improved  Cardiology management appreciated--diuretic regimen was escalated over the course of this hospitalization ultimately to a Bumex drip with dose of Zaroxolyn as well.  Today she is being transition to an oral Bumex regimen rather than torsemide at discharge and will need an additional 24 hours of monitoring  Monitor electrolytes. Noted rising CO2 but no plans to add Diamox at this time

## 2024-10-31 NOTE — PHYSICAL THERAPY NOTE
PHYSICAL THERAPY TREATMENT NOTE    Patient Name: Camryn Roblero  Today's Date: 10/31/2024     10/31/24 1256   PT Last Visit   PT Visit Date 10/31/24   Pain Assessment   Pain Assessment Tool 0-10   Pain Score No Pain   Restrictions/Precautions   Other Precautions Chair Alarm;Bed Alarm;Fall Risk;O2   General   Chart Reviewed Yes   Additional Pertinent History 2L oxygen via nasal cannula. resting pulse ox 97% and 73 BPM   Family/Caregiver Present No   Cognition   Arousal/Participation Alert;Cooperative   Attention Attends with cues to redirect   Orientation Level Oriented to person;Other (Comment)  (pt was identified w/ full name, birth date)   Following Commands Follows one step commands with increased time or repetition   Subjective   Subjective pt seen sitting out of bed. agreed to PT session. denied pain or dizziness.   Transfers   Sit to Stand 4  Minimal assistance   Additional items Assist x 1;Increased time required;Verbal cues  (for hand placement)   Stand to Sit 4  Minimal assistance   Additional items Assist x 1;Increased time required   Ambulation/Elevation   Gait pattern Forward Flexion;Narrow PRISCILLA;Short stride;Excessively slow   Gait Assistance 4  Minimal assist   Additional items Assist x 1;Verbal cues  (for walker placement, breathing technique)   Assistive Device Rolling walker   Distance 25 feet. 15 feet. 10 feet. seated rest breaks were required  (additional ambulation was not possible due to fatigue, dyspnea)   Stair Management Assistance Not tested  (secondary to limited ambulation tolerance, safety concern)   Balance   Static Sitting Fair +   Static Standing Poor +  (w/ roller walker)   Ambulatory Poor +  (w/ roller walker)   Activity Tolerance   Activity Tolerance Patient limited by fatigue   Nurse Made Aware spoke to Renata SANCHEZ   Equipment Use   Comments ankle pumps 30. short arc quads and seated hip flexion 10 each.    Assessment   Problem List Decreased strength;Decreased endurance;Impaired balance;Decreased mobility;Decreased safety awareness   Assessment pt's mobility status shows improvement from previous session w/ increased ambulation tolerance. pt needed min assist w/ all phase of mobility, including chair follow during ambulation to mobilize safely. pt remains at risk for falling and continued inpatient PT is needed to decrease fall risk factors and maximize levels of mobility and independence.   Goals   Patient Goals I want to go home.   STG Expiration Date 11/08/24   Short Term Goal #1 pt will: Increase bilateral LE strength 1/2 grade to facilitate independent mobility, Perform bed mobility modified independent to increase level of independence, Perform all transfers w/ supervision to improve independence, Ambulate 150 ft. with roller walker w/ supervision w/o LOB to improve functional independence, Navigate 10 stair(s) w/ minx1 with unilateral handrail to facilitate return to previous living environment, Increase ambulatory balance 1 grade to decrease risk for falls, Tolerate 3 hr OOB to faciliate upright tolerance, Tolerate standing 2 minutes w/ supervision to facilitate functional task performance, Improve Barthel Index score to 65 or greater to facilitate independence, and Complete Timed Up and Go or Comfortable Gait Speed to further assess mobility and monitor progress   PT Treatment Day 2   Plan   Treatment/Interventions Functional transfer training;LE strengthening/ROM;Elevations;Therapeutic exercise;Endurance training;Cognitive reorientation;Patient/family training;Equipment eval/education;Bed mobility;Gait training;Compensatory technique education   Progress Progressing toward goals   PT Frequency 3-5x/wk   Discharge Recommendation   Rehab Resource Intensity Level, PT I (Maximum Resource Intensity)   AM-PAC Basic Mobility Inpatient   Turning in Flat Bed Without Bedrails 3   Lying on Back to Sitting on Edge of  Flat Bed Without Bedrails 2   Moving Bed to Chair 3   Standing Up From Chair Using Arms 3   Walk in Room 3   Climb 3-5 Stairs With Railing 1   Basic Mobility Inpatient Raw Score 15   Basic Mobility Standardized Score 36.97   [facility] Highest Level Of Mobility   -[facility] Goal 4: Move to chair/commode   -HL Achieved 7: Walk 25 feet or more   End of Consult   Patient Position at End of Consult Bedside chair;Bed/Chair alarm activated;All needs within reach     The patient's AM-PAC Basic Mobility Inpatient Short Form Raw Score is 15. A Raw score of less than or equal to 16 suggests the patient may benefit from discharge to post-acute rehabilitation services. Please also refer to the recommendation of the Physical Therapist for safe discharge planning.    Skilled inpatient PT recommended while in hospital to progress pt toward treatment goals.    Andrew Ordoñez, PT

## 2024-10-31 NOTE — PROGRESS NOTES
General Cardiology   Progress Note -  Team One   Camryn Roblero 84 y.o. female MRN: [id number]    Unit/Bed#: S -01 Encounter: [id number]    Assessment  1.  Acute on chronic HFpEF, likely 2/2 to ineffective diuretic dosing; no report of any recent dietary indiscretions. Remains in afib but rates are well controlled.   -Volume overloaded on exam/imaging  -BNP 1154.  -Chest x-ray 10/23; moderate pulmonary vascular congestion, small right > than left bilateral pleural effusions.  -TTE 11/24/2023; LVEF 70%, wall motion normal, grade 2 DD, RV normal size/systolic function, LA severely dilated, RA mildly dilated, mild AI, mild to moderate MR.  -Outpatient diuretic regimen; torsemide 40 mg twice daily  -Inpatient diuretic regimen; IV Bumex GTT at 2 mg/hr + 2.5 mg of metolazone x 1 (on 10/29)  -24-hour I&O balance; -1.9L overall -6.9 L  -Dry weight around 154-155 pounds.  Office weight on 10/23 was 163 pounds. SS weight 135 lbs today.   2. Chronic atrial fibrillation /atrial flutter  -Occasional palpitations.   -ECG on admission demonstrated rate controlled atrial fibrillation  -Outpatient rate/rhythm control; Cardizem  mg daily and metoprolol succinate 50 mg twice daily  -Inpatient rate/rhythm control; Cardizem  mg daily and metoprolol succinate 50 mg twice daily  -On apixaban 5 mg twice daily.  3. Hypertension  -Average /75 last recorded 137/81, HR 65.  -Outpatient BP regimen; Cardizem CD1 120 mg daily and metoprolol succinate 50 mg twice daily  -Inpatient rate/rhythm control; Cardizem  mg daily and metoprolol succinate 50 mg twice daily  4. CKD stage III  -Baseline creatinine around 0.8-1.1.  -Creatinine 1.04 on admission, currently 0.9  5. DM type II  -HgbA1c 7        Plan  -Pt denies any specific cardiac or respiratory complaints this a.m.  Remains on 1 L (down from 2L)of supplemental O2 with stable O2 saturations, 98 to 100%. Continue to wean if able.    -Volume status continues to improve.   Appears close to euvolemia with the exception of some mild bilateral lower extremity edema --suspect that there is a certain component of chronicity to this.  Would recommend discontinuation of IV Bumex, start oral Bumex 3 mg twice daily (had previously been on torsemide 40 mg twice daily PTA and failed with this therapy.)   -HRs well controlled in afib. Continue Cardizem  mg daily and metoprolol succinate 50 mg twice daily  -Continue apixaban 5 mg BID  -Monitor renal function and electrolytes closely.  Replete to maintain K+ level 4.0 magnesium level 2.0.  -Strict I's and O's, daily standing weights, 2 g Na+ diet 1.8 LFR.  -No indication for telemetry.     Subjective  Review of Systems   Constitutional: Positive for malaise/fatigue. Negative for chills and fever.   Eyes:  Negative for visual disturbance.   Cardiovascular:  Positive for leg swelling. Negative for chest pain, dyspnea on exertion, orthopnea and palpitations.   Respiratory:  Negative for cough and shortness of breath.    Gastrointestinal:  Negative for abdominal pain.   Neurological:  Negative for dizziness, headaches and light-headedness.       Objective:   Physical Exam  Vitals and nursing note reviewed.   Constitutional:       General: She is not in acute distress.     Appearance: She is not diaphoretic.   HENT:      Head: Normocephalic and atraumatic.   Eyes:      General: No scleral icterus.  Cardiovascular:      Rate and Rhythm: Normal rate. Rhythm irregular.   Pulmonary:      Effort: Pulmonary effort is normal.      Breath sounds: Normal breath sounds. No wheezing or rales.   Abdominal:      General: There is distension.      Palpations: Abdomen is soft.   Skin:     General: Skin is warm and dry.   Neurological:      Mental Status: She is alert and oriented to person, place, and time.   Psychiatric:         Mood and Affect: Mood normal.         Vitals: Blood pressure 137/81, pulse 65, temperature 97.9 °F (36.6 °C), temperature source Oral,  "resp. rate 18, height 4' 11\" (1.499 m), weight 61.5 kg (135 lb 9.3 oz), SpO2 97%.,     Body mass index is 27.38 kg/m².,   Systolic (24hrs), Av , Min:111 , Max:137     Diastolic (24hrs), Av, Min:73, Max:81      Intake/Output Summary (Last 24 hours) at 10/31/2024 09  Last data filed at 10/31/2024 0701  Gross per 24 hour   Intake 340 ml   Output 2520 ml   Net -2180 ml     Weight (last 2 days)       Date/Time Weight    10/31/24 06 61.5 (135.58)    10/30/24 0502 62.6 (138.01)    10/29/24 06 68.3 (150.57)            LABORATORY RESULTS      CBC with diff:   Results from last 7 days   Lab Units 10/25/24  0531   WBC Thousand/uL 7.68   HEMOGLOBIN g/dL 12.8   HEMATOCRIT % 42.6   MCV fL 90   PLATELETS Thousands/uL 237   RBC Million/uL 4.75   MCH pg 26.9   MCHC g/dL 30.0*   RDW % 16.4*   MPV fL 10.2       CMP:  Results from last 7 days   Lab Units 10/30/24  0455 10/29/24  0913 10/28/24  0457 10/27/24  0608 10/26/24  0442 10/25/24  0531   POTASSIUM mmol/L 3.3* 4.3 4.4 4.4 3.9 4.1   CHLORIDE mmol/L 90* 97 97 98 98 96   CO2 mmol/L >45* 39* 43* 40* 36* 41*   BUN mg/dL 36* 34* 33* 31* 31* 30*   CREATININE mg/dL 0.91 0.94 0.93 0.96 0.93 1.02   CALCIUM mg/dL 10.1 9.6 9.4 9.3 9.2 9.4   EGFR ml/min/1.73sq m 58 56 57 54 57 50       BMP:  Results from last 7 days   Lab Units 10/30/24  0455 10/29/24  0913 10/28/24  0457 10/27/24  0608 10/26/24  0442 10/25/24  0531   POTASSIUM mmol/L 3.3* 4.3 4.4 4.4 3.9 4.1   CHLORIDE mmol/L 90* 97 97 98 98 96   CO2 mmol/L >45* 39* 43* 40* 36* 41*   BUN mg/dL 36* 34* 33* 31* 31* 30*   CREATININE mg/dL 0.91 0.94 0.93 0.96 0.93 1.02   CALCIUM mg/dL 10.1 9.6 9.4 9.3 9.2 9.4       No results found for: \"NTBNP\"     Results from last 7 days   Lab Units 10/28/24  0457 10/26/24  0442 10/25/24  0531   MAGNESIUM mg/dL 1.9 1.9 1.8*                         Lipid Profile:   No results found for: \"CHOL\"  Lab Results   Component Value Date    HDL 34 (L) 2024    HDL 31 (L) 2023    HDL 39 (L) " 2023     Lab Results   Component Value Date    LDLCALC 40 2024    LDLCALC 37 2023    LDLCALC 86 2023     Lab Results   Component Value Date    TRIG 59 2024    TRIG 51 2023    TRIG 91 2023       Cardiac testing:   Results for orders placed during the hospital encounter of 10/15/20    Echo complete with contrast if indicated    [facility]  [address], [place] [id number] [phone]    Transthoracic Echocardiogram  2D, M-mode, Doppler, and Color Doppler    Study date:  15-Oct-2020    Patient: MARANDA MO  MR number: [id number]  Account number: [id number]  : 1940  Age: 80 years  Gender: Female  Status: Outpatient  Location: [facility]  Height: 60 in  Weight: 191.6 lb  BP: 166/ 78 mmHg    Indications: Chronic diastolic heart failure.    Diagnoses: I50.32 - Chronic diastolic (congestive) heart failure    Sonographer:  MAURICIO Mckeon  Primary Physician:  Abiel Seals MD  Referring Physician:  Bradley Marquez MD  Group:  [facility]  Interpreting Physician:  Lit Jane MD    SUMMARY    LEFT VENTRICLE:  Systolic function was normal. Ejection fraction was estimated to be 70 %.  There were no regional wall motion abnormalities.  The ratio of systolic to diastolic pulmonary vein flow was reduced (diastolic predominant).  Features were consistent with a pseudonormal left ventricular filling pattern, with concomitant abnormal relaxation and increased filling pressure (grade 2 diastolic dysfunction).    LEFT ATRIUM:  The atrium was markedly dilated.    MITRAL VALVE:  There was mild to moderate regurgitation.    AORTIC VALVE:  There was mild regurgitation.    TRICUSPID VALVE:  There was mild regurgitation.  Pulmonary artery systolic pressure was moderately to markedly increased.  Estimated peak PA pressure was 60 mmHg.    PERICARDIUM:  A small pericardial effusion was identified circumferential to  the heart. The fluid exhibited a fibrinous appearance.    HISTORY: PRIOR HISTORY: HTN, HLD, DM, lung cancer, COPD, TOM.    PROCEDURE: The study was performed in the [facility]. This was a routine study. The transthoracic approach was used. The study included complete 2D imaging, M-mode, complete spectral Doppler, and color Doppler. The  heart rate was 62 bpm, at the start of the study. Images were obtained from the parasternal, apical, subcostal, and suprasternal notch acoustic windows. Echocardiographic views were limited due to chest wall deformity, poor acoustic window  availability, decreased penetration, and lung interference. This was a technically difficult study.    LEFT VENTRICLE: Size was normal. Systolic function was normal. Ejection fraction was estimated to be 70 %. There were no regional wall motion abnormalities. Wall thickness was normal. DOPPLER: The ratio of systolic to diastolic pulmonary  vein flow was reduced (diastolic predominant). Features were consistent with a pseudonormal left ventricular filling pattern, with concomitant abnormal relaxation and increased filling pressure (grade 2 diastolic dysfunction).    RIGHT VENTRICLE: The size was normal. Systolic function was normal. Wall thickness was normal.    LEFT ATRIUM: The atrium was markedly dilated.    RIGHT ATRIUM: Size was normal.    MITRAL VALVE: Valve structure was normal. There was normal leaflet separation. DOPPLER: The transmitral velocity was within the normal range. There was no evidence for stenosis. There was mild to moderate regurgitation.    AORTIC VALVE: The valve was trileaflet. Leaflets exhibited normal thickness and normal cuspal separation. DOPPLER: Transaortic velocity was within the normal range. There was no evidence for stenosis. There was mild regurgitation.    TRICUSPID VALVE: The valve structure was normal. There was normal leaflet separation. DOPPLER: The transtricuspid velocity was within  the normal range. There was no evidence for stenosis. There was mild regurgitation. Pulmonary artery  systolic pressure was moderately to markedly increased. Estimated peak PA pressure was 60 mmHg.    PULMONIC VALVE: Leaflets exhibited normal thickness, no calcification, and normal cuspal separation. DOPPLER: The transpulmonic velocity was within the normal range. There was no significant regurgitation.    PERICARDIUM: A small pericardial effusion was identified circumferential to the heart. The fluid exhibited a fibrinous appearance. The pericardium was normal in appearance.    AORTA: The root exhibited normal size.    SYSTEMIC VEINS: IVC: The inferior vena cava was normal in size.    SYSTEM MEASUREMENT TABLES    2D  %FS: 39.72 %  Ao Diam: 3.28 cm  EDV(Teich): 127.28 ml  EF(Teich): 69.95 %  ESV(Teich): 38.25 ml  IVSd: 0.88 cm  LA Diam: 4.8 cm  LAAs A2C: 38.98 cm2  LAAs A4C: 33.86 cm2  LAESV A-L A2C: 166.64 ml  LAESV A-L A4C: 132.15 ml  LAESV Index (A-L): 83.14 ml/m2  LAESV MOD A2C: 158.66 ml  LAESV MOD A4C: 124.34 ml  LAESV(A-L): 152.15 ml  LAESV(MOD BP): 143.9 ml  LALs A2C: 7.74 cm  LALs A4C: 7.36 cm  LVEDV MOD A4C: 99.9 ml  LVEF MOD A4C: 84.12 %  LVESV MOD A4C: 15.86 ml  LVIDd: 5.16 cm  LVIDs: 3.11 cm  LVLd A4C: 7.53 cm  LVLs A4C: 5.77 cm  LVPWd: 0.91 cm  RVIDd: 4.1 cm  SV MOD A4C: 84.04 ml  SV(Teich): 89.03 ml    CW  AV Env.Ti: 298.76 ms  AV MaxPG: 10.27 mmHg  AV VTI: 28.82 cm  AV Vmax: 1.6 m/s  AV Vmean: 0.96 m/s  AV meanP.41 mmHg  TR MaxP.9 mmHg  TR Vmax: 3.77 m/s    MM  TAPSE: 2.29 cm    PW  E' Sept: 0.05 m/s  E/E' Sept: 23.56  LVOT Env.Ti: 300.67 ms  LVOT VTI: 25.48 cm  LVOT Vmax: 1.23 m/s  LVOT Vmean: 0.85 m/s  LVOT maxP.12 mmHg  LVOT meanPG: 3.26 mmHg  MV A Matteo: 0.99 m/s  MV Dec Caroline: 6.89 m/s2  MV DecT: 186.95 ms  MV E Matteo: 1.29 m/s  MV E/A Ratio: 1.3  MV PHT: 54.22 ms  MVA By PHT: 4.06 cm2    [facility] Accredited Echocardiography Laboratory    Prepared and electronically  signed by    Lit Jane MD  Signed 15-Oct-2020 17:48:51    No results found for this or any previous visit.    No results found for this or any previous visit.    No valid procedures specified.  No results found for this or any previous visit.        Counseling / Coordination of Care  Total floor / unit time spent today 20 minutes.  Greater than 50% of total time was spent with the patient and / or family counseling and / or coordination of care.      ** Please Note: Dragon 360 Dictation voice to text software may have been used in the creation of this document. **

## 2024-10-31 NOTE — PLAN OF CARE
Problem: OCCUPATIONAL THERAPY ADULT  Goal: Performs self-care activities at highest level of function for planned discharge setting.  See evaluation for individualized goals.  Description: Treatment Interventions: ADL retraining, Functional transfer training, Endurance training, Cognitive reorientation, Patient/family training, Equipment evaluation/education, Compensatory technique education, Energy conservation, Activityengagement          See flowsheet documentation for full assessment, interventions and recommendations.   Outcome: Progressing  Note: Limitation: Decreased ADL status, Decreased Safe judgement during ADL, Decreased cognition, Decreased endurance, Decreased self-care trans, Decreased high-level ADLs (impaired balance, fxnl mobility, act pamela, fxnl reach, standing pamela, strength, attention to task, direction following, safety awareness, insight, pacing, problem solving)  Prognosis: Good  Assessment: Pt seen on this date for skilled OT treatment session. At start of session pt supine in bed. Pt agreeable and motivated to participate in session. Pt requiring increased time for bed mobility and functional mobility due to limited endurance and strength. Attempted to complete bathing tasks standing at sink - due to limited endurance pt requesting to sit to complete. Limited standing tolerance trials during LB bathing - seated rest breaks throughout. Pt requiring intermittent cuing for continuation of ADL tasks throughout session.Pt provided with education on energy conservation techniques and pacing of tasks. Pt would continue to benefit from skilled OT treatment sessions in order to address remaining deficits.     Rehab Resource Intensity Level, OT: I (Maximum Resource Intensity)

## 2024-10-31 NOTE — PLAN OF CARE
Problem: Prexisting or High Potential for Compromised Skin Integrity  Goal: Skin integrity is maintained or improved  Description: INTERVENTIONS:  - Identify patients at risk for skin breakdown  - Assess and monitor skin integrity  - Assess and monitor nutrition and hydration status  - Monitor labs   - Assess for incontinence   - Turn and reposition patient  - Assist with mobility/ambulation  - Relieve pressure over bony prominences  - Avoid friction and shearing  - Provide appropriate hygiene as needed including keeping skin clean and dry  - Evaluate need for skin moisturizer/barrier cream  - Collaborate with interdisciplinary team   - Patient/family teaching  - Consider wound care consult   Outcome: Progressing     Problem: Potential for Falls  Goal: Patient will remain free of falls  Description: INTERVENTIONS:  - Educate patient/family on patient safety including physical limitations  - Instruct patient to call for assistance with activity   - Consult OT/PT to assist with strengthening/mobility   - Keep Call bell within reach  - Keep bed low and locked with side rails adjusted as appropriate  - Keep care items and personal belongings within reach  - Initiate and maintain comfort rounds  - Make Fall Risk Sign visible to staff  - Offer Toileting every 2 Hours, in advance of need  - Initiate/Maintain alarm  - Obtain necessary fall risk management equipment:   - Apply yellow socks and bracelet for high fall risk patients  - Consider moving patient to room near nurses station  Outcome: Progressing     Problem: Nutrition/Hydration-ADULT  Goal: Nutrient/Hydration intake appropriate for improving, restoring or maintaining nutritional needs  Description: Monitor and assess patient's nutrition/hydration status for malnutrition. Collaborate with interdisciplinary team and initiate plan and interventions as ordered.  Monitor patient's weight and dietary intake as ordered or per policy. Utilize nutrition screening tool and  intervene as necessary. Determine patient's food preferences and provide high-protein, high-caloric foods as appropriate.     INTERVENTIONS:  - Monitor oral intake, urinary output, labs, and treatment plans  - Assess nutrition and hydration status and recommend course of action  - Evaluate amount of meals eaten  - Assist patient with eating if necessary   - Allow adequate time for meals  - Recommend/ encourage appropriate diets, oral nutritional supplements, and vitamin/mineral supplements  - Order, calculate, and assess calorie counts as needed  - Recommend, monitor, and adjust tube feedings and TPN/PPN based on assessed needs  - Assess need for intravenous fluids  - Provide specific nutrition/hydration education as appropriate  - Include patient/family/caregiver in decisions related to nutrition  Outcome: Progressing     Problem: DISCHARGE PLANNING  Goal: Discharge to home or other facility with appropriate resources  Description: INTERVENTIONS:  - Identify barriers to discharge w/patient and caregiver  - Arrange for needed discharge resources and transportation as appropriate  - Identify discharge learning needs (meds, wound care, etc.)  - Arrange for interpretive services to assist at discharge as needed  - Refer to Case Management Department for coordinating discharge planning if the patient needs post-hospital services based on physician/advanced practitioner order or complex needs related to functional status, cognitive ability, or social support system  Outcome: Progressing     Problem: Knowledge Deficit  Goal: Patient/family/caregiver demonstrates understanding of disease process, treatment plan, medications, and discharge instructions  Description: Complete learning assessment and assess knowledge base.  Interventions:  - Provide teaching at level of understanding  - Provide teaching via preferred learning methods  Outcome: Progressing     Problem: CARDIOVASCULAR - ADULT  Goal: Maintains optimal cardiac  output and hemodynamic stability  Description: INTERVENTIONS:  - Monitor I/O, vital signs and rhythm  - Monitor for S/S and trends of decreased cardiac output  - Administer and titrate ordered vasoactive medications to optimize hemodynamic stability  - Assess quality of pulses, skin color and temperature  - Assess for signs of decreased coronary artery perfusion  - Instruct patient to report change in severity of symptoms  Outcome: Progressing

## 2024-10-31 NOTE — OCCUPATIONAL THERAPY NOTE
Occupational Therapy Progress Note     Patient Name: Camryn Roblero  Today's Date: 10/31/2024  Problem List  Principal Problem:    Acute heart failure with preserved ejection fraction ([facility])  Active Problems:    Essential hypertension    Type 2 diabetes mellitus, without long-term current use of insulin ([facility])    COPD (chronic obstructive pulmonary disease) ([facility])    Class 1 obesity due to excess calories with serious comorbidity and body mass index (BMI) of 30.0 to 30.9 in adult    Chronic atrial fibrillation (HCC)    Ambulatory dysfunction    Constipation              10/31/24 0832   OT Last Visit   OT Visit Date 10/31/24   Note Type   Note Type Treatment   Pain Assessment   Pain Assessment Tool 0-10   Pain Score No Pain   Restrictions/Precautions   Weight Bearing Precautions Per Order No   Other Precautions Cognitive;Chair Alarm;Bed Alarm;Multiple lines;Fall Risk;Pain;O2  (4L O2, IV pole)   Lifestyle   Autonomy PTA pt living alone in 1SH, pt (I) with ADLs and IADLs, (-)falls, (-)drives, use of rollator at baseline   Reciprocal Relationships supportive daughter stops in several times/wk   Service to Others retired   Intrinsic Gratification enjoys watching tv and reading   ADL   Eating Assistance 5  Supervision/Setup   Eating Deficit Setup   Eating Comments A for opening containers   Grooming Assistance 5  Supervision/Setup   Grooming Deficit Increased time to complete;Supervision/safety;Verbal cueing   Grooming Comments brushing teeth, combing hair and washing face, completed sitting at sink   UB Bathing Assistance 4  Minimal Assistance   UB Bathing Deficit Increased time to complete;Supervision/safety;Verbal cueing;Chest;Right arm;Left arm;Abdomen   UB Bathing Comments sitting at sink to complete   LB Bathing Assistance 4  Minimal Assistance   LB Bathing Deficit Increased time to complete;Supervision/safety;Verbal cueing;Buttocks   LB Bathing Comments A with buttocks   UB Dressing Assistance 4  Minimal Assistance   UB  "Dressing Deficit Verbal cueing;Supervision/safety;Increased time to complete;Thread RUE;Thread LUE;Pull around back   UB Dressing Comments doff/donning gown   Functional Standing Tolerance   Time 1 min x3 trials   Activity LB bathing tasks   Comments requiring multiple seated rest breaks between trials due to fatigue   Bed Mobility   Supine to Sit 4  Minimal assistance   Additional items Assist x 1;Increased time required;Verbal cues;LE management   Transfers   Sit to Stand 4  Minimal assistance   Additional items Assist x 1;Increased time required;Verbal cues   Stand to Sit 4  Minimal assistance   Additional items Assist x 1;Increased time required;Verbal cues   Additional Comments use of RW   Functional Mobility   Functional Mobility 4  Minimal assistance   Additional Comments Ax1, bed > bathroom > recliner. A with line management   Additional items Rolling walker   Subjective   Subjective \"Oh this feels so good to get clean\"   Cognition   Overall Cognitive Status Impaired   Arousal/Participation Alert;Cooperative   Attention Attends with cues to redirect   Orientation Level Oriented X4   Memory Decreased short term memory;Decreased recall of recent events;Decreased recall of precautions   Following Commands Follows one step commands with increased time or repetition   Comments pleasant and cooperative, frequent cuing for attention to tasks   Activity Tolerance   Activity Tolerance Patient tolerated treatment well   Medical Staff Made Aware PATIENCE Yanez   Assessment   Assessment Pt seen on this date for skilled OT treatment session. At start of session pt supine in bed. Pt agreeable and motivated to participate in session. Pt requiring increased time for bed mobility and functional mobility due to limited endurance and strength. Attempted to complete bathing tasks standing at sink - due to limited endurance pt requesting to sit to complete. Limited standing tolerance trials during LB bathing - seated rest breaks " throughout. Pt requiring intermittent cuing for continuation of ADL tasks throughout session.Pt provided with education on energy conservation techniques and pacing of tasks. Pt would continue to benefit from skilled OT treatment sessions in order to address remaining deficits.   Plan   Goal Expiration Date 11/03/24   OT Treatment Day 2   OT Frequency 3-5x/wk   Discharge Recommendation   Rehab Resource Intensity Level, OT I (Maximum Resource Intensity)   AM-PAC Daily Activity Inpatient   Lower Body Dressing 3   Bathing 3   Toileting 3   Upper Body Dressing 3   Grooming 3   Eating 4   Daily Activity Raw Score 19   Daily Activity Standardized Score (Calc for Raw Score >=11) 40.22   AM-PAC Applied Cognition Inpatient   Following a Speech/Presentation 3   Understanding Ordinary Conversation 4   Taking Medications 3   Remembering Where Things Are Placed or Put Away 2   Remembering List of 4-5 Errands 2   Taking Care of Complicated Tasks 2   Applied Cognition Raw Score 16   Applied Cognition Standardized Score 35.03   End of Consult   Patient Position at End of Consult Bedside chair;Bed/Chair alarm activated;All needs within reach       GOALS:      -Patient will perform grooming tasks standing at sink with overall Mod I in order to increase overall independence PROGRESSING     -Patient will be Mod I with UB dressing using AE and AD as needed in order to increase (I) with ADLs PROGRESSING     -Patient will be Mod I with UB bathing using AE and AD as needed in order to increase (I) with ADLsPROGRESSING     -Patient will be Mod I with LB dressing with use of AE and AD as needed in order to increase (I) with ADLs PROGRESSING     -Patient will be Mod I with LB bathing with use of AE and AD as needed in order to increase (I) with ADLs PROGRESSING     -Patient will complete toileting w/ Mod I w/ G hygiene/thoroughness in order to reduce caregiver burden PROGRESSING     -Patient will demonstrate Mod I with bed mobility for  ability to manage own comfort and initiate OOB tasks. PROGRESSING     -Patient will perform functional transfers with Mod I to/from all surfaces using DME as needed in order to increase (I) with functional tasks PROGRESSING     -Patient will be Mod I with functional mobility to/from bathroom for increased independence with toileting tasks PROGRESSING     -Patient will tolerate therapeutic activities for greater than 30 min, in order to increase tolerance for functional activities.  PROGRESSING     -Patient will engage in ongoing cognitive assessment in order to assist with safe discharge planning/recommendations.     -Patient will demonstrate standing for 8 min in order to increase active participation in functional activities PROGRESSING        The patient's raw score on the -PAC Daily Activity Inpatient Short Form is 19. A raw score of greater than or equal to 19 suggests the patient may benefit from discharge to home. HOWEVER please refer to the recommendation of the Occupational Therapist for safe discharge planning.      Daphne Morton MS, OTR/L

## 2024-10-31 NOTE — ASSESSMENT & PLAN NOTE
Lab Results   Component Value Date    HGBA1C 7.4 (H) 10/23/2024     Recent Labs     10/30/24  1146 10/30/24  1541 10/30/24  2047 10/31/24  0755   POCGLU 201* 260* 244* 162*   Blood Sugar Average: Last 72 hrs:  (P) 204.4572914498526145  Hold home glipizide/Metformin for now  SSI/accuchecks/diabetic diet  A1c reflects reasonable control but blood sugars are not at goal during this hospitalization.  Continue to uptitrate basal bolus regimen

## 2024-10-31 NOTE — PLAN OF CARE
Problem: PHYSICAL THERAPY ADULT  Goal: Performs mobility at highest level of function for planned discharge setting.  See evaluation for individualized goals.  Description: Treatment/Interventions: Functional transfer training, LE strengthening/ROM, Elevations, Therapeutic exercise, Endurance training, Cognitive reorientation, Patient/family training, Equipment eval/education, Bed mobility, Gait training, Compensatory technique education          See flowsheet documentation for full assessment, interventions and recommendations.  Outcome: Progressing  Note:    Problem List: Decreased strength, Decreased endurance, Impaired balance, Decreased mobility, Decreased safety awareness  Assessment: pt's mobility status shows improvement from previous session w/ increased ambulation tolerance. pt needed min assist w/ all phase of mobility, including chair follow during ambulation to mobilize safely. pt remains at risk for falling and continued inpatient PT is needed to decrease fall risk factors and maximize levels of mobility and independence.        Rehab Resource Intensity Level, PT: I (Maximum Resource Intensity)    See flowsheet documentation for full assessment.

## 2024-10-31 NOTE — ASSESSMENT & PLAN NOTE
Per physical therapy, recommended for rehab.  Initially was recommended for acute level rehab however patient's daughter and patient do not feel she could tolerate this intensive rehab and therefore opting for subacute rehab.  Possibly [place] post acute

## 2024-10-31 NOTE — TELEPHONE ENCOUNTER
Spoke with the Patient's daughter about the 6  minute W test. She mentioned that the patient in [facility]. Once she discharged, she will call us.

## 2024-10-31 NOTE — PROGRESS NOTES
Progress Note - Hospitalist   Name: Camryn Roblero 84 y.o. female I MRN: [id number]  Unit/Bed#: S -01 I Date of Admission: 10/23/2024   Date of Service: 10/31/2024 I Hospital Day: 8    Assessment & Plan  Acute heart failure with preserved ejection fraction (HCC)  Wt Readings from Last 3 Encounters:   10/31/24 61.5 kg (135 lb 9.3 oz)   10/23/24 73.9 kg (163 lb)   10/15/24 74.4 kg (164 lb)   Presented with 10 pound weight gain despite increase in torsemide x 2 weeks.  CXR: Moderate pulmonary vascular congestion. Small right greater than left bilateral pleural effusions.   2d ECHO 10/23/24 with EF 65% Changes from prior echo include: Right ventricular dilation is notable, with an increase in mitral regurgitation, and pulmonary arterial pressures.  Dry weight around 154-155 pounds. Admission weight 162 lb; weight down to 135 pounds today and net neg 6.8L since admission. Still volume overloaded on exam but improved  Cardiology management appreciated--diuretic regimen was escalated over the course of this hospitalization ultimately to a Bumex drip with dose of Zaroxolyn as well.  Today she is being transition to an oral Bumex regimen rather than torsemide at discharge and will need an additional 24 hours of monitoring  Monitor electrolytes. Noted rising CO2 but no plans to add Diamox at this time  Constipation  Patient still reporting no bowel movement.  Will escalate bowel regimen and add enema today  Type 2 diabetes mellitus, without long-term current use of insulin ([facility])  Lab Results   Component Value Date    HGBA1C 7.4 (H) 10/23/2024     Recent Labs     10/30/24  1146 10/30/24  1541 10/30/24  2047 10/31/24  0755   POCGLU 201* 260* 244* 162*   Blood Sugar Average: Last 72 hrs:  (P) 204.7643400950204140  Hold home glipizide/Metformin for now  SSI/accuchecks/diabetic diet  A1c reflects reasonable control but blood sugars are not at goal during this hospitalization.  Continue to uptitrate basal bolus regimen  Chronic  atrial fibrillation (HCC)  Rate controlled with Cardizem and metoprolol  Continue Xarelto  COPD (chronic obstructive pulmonary disease) ([facility])  With chronic hypoxemic respiratory failure, on 2L NC O2 at baseline during the day and 4L overnight typically but during the course of this hospitalization has been able to wean down significantly on oxygen needs  No acute exacerbation  Follows with Dr. Garland outpatient  Continue inhaler regimen  Class 1 obesity due to excess calories with serious comorbidity and body mass index (BMI) of 30.0 to 30.9 in adult  Encourage weight loss, dietary and lifestyle modifications  Body mass index is 27.38 kg/m².  Essential hypertension  BP stable. Cont BB/Cardizem    Ambulatory dysfunction  Per physical therapy, recommended for rehab.  Initially was recommended for acute level rehab however patient's daughter and patient do not feel she could tolerate this intensive rehab and therefore opting for subacute rehab.  Possibly [place] post acute  Acute kidney injury superimposed on chronic kidney disease  ([facility])  Lab Results   Component Value Date    EGFR 36 10/31/2024    EGFR 38 10/31/2024    EGFR 58 10/30/2024    CREATININE 1.34 (H) 10/31/2024    CREATININE 1.29 10/31/2024    CREATININE 0.91 10/30/2024   Baseline creatinine 0.9.  Trended up today to 1.34 due to diuresis; IV Bumex now being converted to oral  Continue to trend.  May have to accept higher creatinine to remain euvolemic  Avoid hypotension/nephrotoxic agents    VTE Pharmacologic Prophylaxis: VTE Score: 5 xarelto    Mobility:   Basic Mobility Inpatient Raw Score: 17  JH-HLM Goal: 5: Stand one or more mins  JH-HLM Achieved: 6: Walk 10 steps or more  JH-HLM Goal achieved. Continue to encourage appropriate mobility.    Patient Centered Rounds:  spoke with RN    Discussions with Specialists or Other Care Team Provider:  Spoke with cardiology and case management    Education and Discussions with Family / Patient: Attempted to  update  (daughter) via phone. Left voicemail.     Current Length of Stay: 8 day(s)  Current Patient Status: Inpatient   Certification Statement: The patient will continue to require additional inpatient hospital stay due to conversion  Discharge Plan: Anticipate discharge tomorrow to rehab facility.    Code Status: Level 1 - Full Code    Subjective   Still no BM but passing gas. No shortness of breath.     Objective :  Temp:  [97.9 °F (36.6 °C)-98.4 °F (36.9 °C)] 97.9 °F (36.6 °C)  HR:  [65-83] 65  BP: (111-137)/(73-81) 137/81  Resp:  [18] 18  SpO2:  [94 %-98 %] 97 %  O2 Device: Nasal cannula  Nasal Cannula O2 Flow Rate (L/min):  [2 L/min-3 L/min] 2 L/min    Body mass index is 27.38 kg/m².     Input and Output Summary (last 24 hours):     Intake/Output Summary (Last 24 hours) at 10/31/2024 1058  Last data filed at 10/31/2024 0919  Gross per 24 hour   Intake 460 ml   Output 2050 ml   Net -1590 ml       Physical Exam  Vitals reviewed.   Constitutional:       General: She is not in acute distress.     Appearance: She is obese. She is not ill-appearing, toxic-appearing or diaphoretic.   Eyes:      General: No scleral icterus.        Right eye: No discharge.         Left eye: No discharge.      Conjunctiva/sclera: Conjunctivae normal.   Cardiovascular:      Rate and Rhythm: Normal rate. Rhythm irregular.      Heart sounds: No murmur heard.  Pulmonary:      Effort: No respiratory distress.      Breath sounds: Normal breath sounds. No stridor. No wheezing, rhonchi or rales.      Comments: Rales have resoled. Lungs are clear. O2 sat high 90s on 1L O2, O2 removed.  Abdominal:      General: Bowel sounds are normal. There is no distension.      Palpations: Abdomen is soft.      Tenderness: There is no abdominal tenderness. There is no guarding.      Comments: Large abdomen   Musculoskeletal:      Right lower leg: Edema present.      Left lower leg: Edema present.   Skin:     General: Skin is warm and dry.       Coloration: Skin is not jaundiced or pale.      Findings: No bruising, erythema, lesion or rash.   Neurological:      General: No focal deficit present.      Mental Status: She is alert. Mental status is at baseline.      Comments: Awake alert interactive no confusion   Psychiatric:         Mood and Affect: Mood normal.         Thought Content: Thought content normal.         Lines/Drains:  Lines/Drains/Airways       Active Status       Name Placement date Placement time Site Days    External Urinary Catheter 10/23/24  1900  -- 7                            Lab Results: I have reviewed the following results:   Results from last 7 days   Lab Units 10/25/24  0531   WBC Thousand/uL 7.68   HEMOGLOBIN g/dL 12.8   HEMATOCRIT % 42.6   PLATELETS Thousands/uL 237     Results from last 7 days   Lab Units 10/31/24  0951 10/30/24  0455 10/29/24  0913   SODIUM mmol/L 144   < > 140   POTASSIUM mmol/L 3.9   < > 4.3   CHLORIDE mmol/L 89*   < > 97   CO2 mmol/L >45*   < > 39*   BUN mg/dL 46*   < > 34*   CREATININE mg/dL 1.34*   < > 0.94   ANION GAP mmol/L  --   --  4   CALCIUM mg/dL 10.0   < > 9.6   GLUCOSE RANDOM mg/dL 259*   < > 225*    < > = values in this interval not displayed.         Results from last 7 days   Lab Units 10/31/24  0755 10/30/24  2047 10/30/24  1541 10/30/24  1146 10/30/24  0801 10/29/24  2210 10/29/24  1708 10/29/24  1134 10/29/24  0751 10/28/24  2111 10/28/24  1638 10/28/24  1120   POC GLUCOSE mg/dl 162* 244* 260* 201* 121 153* 222* 251* 154* 218* 248* 257*               Recent Cultures (last 7 days):           Last 24 Hours Medication List:     Current Facility-Administered Medications:     acetaminophen (TYLENOL) tablet 650 mg, Q6H PRN    atorvastatin (LIPITOR) tablet 40 mg, Daily    bisacodyl (DULCOLAX) rectal suppository 10 mg, Daily PRN    budesonide (PULMICORT) inhalation solution 0.5 mg, Q12H    bumetanide (BUMEX) tablet 3 mg, BID    diltiazem (CARDIZEM CD) 24 hr capsule 120 mg, Daily    donepezil  (ARICEPT) tablet 5 mg, HS    fluticasone (FLONASE) 50 mcg/act nasal spray 2 spray, Daily    insulin glargine (LANTUS) subcutaneous injection 8 Units 0.08 mL, HS    insulin lispro (HumALOG/ADMELOG) 100 units/mL subcutaneous injection 1-5 Units, TID AC **AND** Fingerstick Glucose (POCT), TID AC    insulin lispro (HumALOG/ADMELOG) 100 units/mL subcutaneous injection 5 Units, TID With Meals    ipratropium-albuterol (DUO-NEB) 0.5-2.5 mg/3 mL inhalation solution 3 mL, Q6H PRN    latanoprost (XALATAN) 0.005 % ophthalmic solution 1 drop, HS    loratadine (CLARITIN) tablet 10 mg, Daily    magnesium Oxide (MAG-OX) tablet 400 mg, Daily    metoprolol succinate (TOPROL-XL) 24 hr tablet 50 mg, BID    mineral oil enema 1 enema, Once    polyethylene glycol (MIRALAX) packet 17 g, BID    potassium chloride (Klor-Con M20) CR tablet 20 mEq, BID    rivaroxaban (XARELTO) tablet 15 mg, Daily With Breakfast    senna-docusate sodium (SENOKOT S) 8.6-50 mg per tablet 2 tablet, BID    sodium chloride (OCEAN) 0.65 % nasal spray 1 spray, Q1H PRN    Administrative Statements   Today, Patient Was Seen By: Arabella Samaniego PA-C      **Please Note: This note may have been constructed using a voice recognition system.**

## 2024-10-31 NOTE — ASSESSMENT & PLAN NOTE
With chronic hypoxemic respiratory failure, on 2L NC O2 at baseline during the day and 4L overnight typically but during the course of this hospitalization has been able to wean down significantly on oxygen needs  No acute exacerbation  Follows with Dr. Garland outpatient  Continue inhaler regimen

## 2024-11-01 VITALS
OXYGEN SATURATION: 96 % | RESPIRATION RATE: 18 BRPM | HEART RATE: 81 BPM | SYSTOLIC BLOOD PRESSURE: 112 MMHG | BODY MASS INDEX: 27.24 KG/M2 | DIASTOLIC BLOOD PRESSURE: 61 MMHG | TEMPERATURE: 97.4 F | HEIGHT: 59 IN | WEIGHT: 135.14 LBS

## 2024-11-01 PROBLEM — E66.09 CLASS 1 OBESITY DUE TO EXCESS CALORIES WITH SERIOUS COMORBIDITY AND BODY MASS INDEX (BMI) OF 30.0 TO 30.9 IN ADULT: Status: RESOLVED | Noted: 2021-05-13 | Resolved: 2024-11-01

## 2024-11-01 PROBLEM — E66.811 CLASS 1 OBESITY DUE TO EXCESS CALORIES WITH SERIOUS COMORBIDITY AND BODY MASS INDEX (BMI) OF 30.0 TO 30.9 IN ADULT: Status: RESOLVED | Noted: 2021-05-13 | Resolved: 2024-11-01

## 2024-11-01 LAB
ANION GAP SERPL CALCULATED.3IONS-SCNC: 9 MMOL/L (ref 4–13)
BUN SERPL-MCNC: 53 MG/DL (ref 5–25)
CALCIUM SERPL-MCNC: 9.8 MG/DL (ref 8.4–10.2)
CHLORIDE SERPL-SCNC: 91 MMOL/L (ref 96–108)
CO2 SERPL-SCNC: 45 MMOL/L (ref 21–32)
CREAT SERPL-MCNC: 1.37 MG/DL (ref 0.6–1.3)
GFR SERPL CREATININE-BSD FRML MDRD: 35 ML/MIN/1.73SQ M
GLUCOSE SERPL-MCNC: 140 MG/DL (ref 65–140)
GLUCOSE SERPL-MCNC: 144 MG/DL (ref 65–140)
GLUCOSE SERPL-MCNC: 285 MG/DL (ref 65–140)
POTASSIUM SERPL-SCNC: 4.4 MMOL/L (ref 3.5–5.3)
SODIUM SERPL-SCNC: 145 MMOL/L (ref 135–147)

## 2024-11-01 PROCEDURE — 99232 SBSQ HOSP IP/OBS MODERATE 35: CPT | Performed by: NURSE PRACTITIONER

## 2024-11-01 PROCEDURE — 94640 AIRWAY INHALATION TREATMENT: CPT

## 2024-11-01 PROCEDURE — 99239 HOSP IP/OBS DSCHRG MGMT >30: CPT | Performed by: PHYSICIAN ASSISTANT

## 2024-11-01 PROCEDURE — 94760 N-INVAS EAR/PLS OXIMETRY 1: CPT

## 2024-11-01 PROCEDURE — 80048 BASIC METABOLIC PNL TOTAL CA: CPT | Performed by: PHYSICIAN ASSISTANT

## 2024-11-01 PROCEDURE — 82948 REAGENT STRIP/BLOOD GLUCOSE: CPT

## 2024-11-01 RX ORDER — AMOXICILLIN 250 MG
2 CAPSULE ORAL 2 TIMES DAILY
Start: 2024-11-01

## 2024-11-01 RX ORDER — LACTULOSE 10 G/15ML
30 SOLUTION ORAL ONCE
Status: COMPLETED | OUTPATIENT
Start: 2024-11-01 | End: 2024-11-01

## 2024-11-01 RX ORDER — BUMETANIDE 1 MG/1
3 TABLET ORAL 2 TIMES DAILY
Start: 2024-11-01

## 2024-11-01 RX ORDER — FLUTICASONE PROPIONATE 50 MCG
2 SPRAY, SUSPENSION (ML) NASAL DAILY
Start: 2024-11-02

## 2024-11-01 RX ORDER — POLYETHYLENE GLYCOL 3350 17 G/17G
17 POWDER, FOR SOLUTION ORAL DAILY
Start: 2024-11-01

## 2024-11-01 RX ORDER — BISACODYL 10 MG
10 SUPPOSITORY, RECTAL RECTAL DAILY PRN
Start: 2024-11-01

## 2024-11-01 RX ORDER — POTASSIUM CHLORIDE 1500 MG/1
20 TABLET, EXTENDED RELEASE ORAL 2 TIMES DAILY
Start: 2024-11-01

## 2024-11-01 RX ADMIN — FLUTICASONE PROPIONATE 2 SPRAY: 50 SPRAY, METERED NASAL at 09:10

## 2024-11-01 RX ADMIN — METOPROLOL SUCCINATE 50 MG: 50 TABLET, EXTENDED RELEASE ORAL at 09:07

## 2024-11-01 RX ADMIN — LORATADINE 10 MG: 10 TABLET ORAL at 09:07

## 2024-11-01 RX ADMIN — Medication 400 MG: at 09:07

## 2024-11-01 RX ADMIN — LACTULOSE 30 G: 20 SOLUTION ORAL at 10:02

## 2024-11-01 RX ADMIN — INSULIN LISPRO 3 UNITS: 100 INJECTION, SOLUTION INTRAVENOUS; SUBCUTANEOUS at 12:17

## 2024-11-01 RX ADMIN — RIVAROXABAN 15 MG: 15 TABLET, FILM COATED ORAL at 09:06

## 2024-11-01 RX ADMIN — SENNOSIDES AND DOCUSATE SODIUM 2 TABLET: 8.6; 5 TABLET ORAL at 09:06

## 2024-11-01 RX ADMIN — POTASSIUM CHLORIDE 20 MEQ: 1500 TABLET, EXTENDED RELEASE ORAL at 09:06

## 2024-11-01 RX ADMIN — BUDESONIDE 0.5 MG: 0.5 INHALANT ORAL at 07:17

## 2024-11-01 RX ADMIN — DILTIAZEM HYDROCHLORIDE 120 MG: 120 CAPSULE, COATED, EXTENDED RELEASE ORAL at 09:06

## 2024-11-01 RX ADMIN — BUMETANIDE 3 MG: 1 TABLET ORAL at 09:06

## 2024-11-01 RX ADMIN — ATORVASTATIN CALCIUM 40 MG: 40 TABLET, FILM COATED ORAL at 09:07

## 2024-11-01 RX ADMIN — POLYETHYLENE GLYCOL 3350 17 G: 17 POWDER, FOR SOLUTION ORAL at 09:05

## 2024-11-01 RX ADMIN — BISACODYL 10 MG: 10 SUPPOSITORY RECTAL at 12:18

## 2024-11-01 RX ADMIN — INSULIN LISPRO 5 UNITS: 100 INJECTION, SOLUTION INTRAVENOUS; SUBCUTANEOUS at 09:10

## 2024-11-01 RX ADMIN — INSULIN LISPRO 5 UNITS: 100 INJECTION, SOLUTION INTRAVENOUS; SUBCUTANEOUS at 12:17

## 2024-11-01 NOTE — PLAN OF CARE
Problem: Prexisting or High Potential for Compromised Skin Integrity  Goal: Skin integrity is maintained or improved  Description: INTERVENTIONS:  - Identify patients at risk for skin breakdown  - Assess and monitor skin integrity  - Assess and monitor nutrition and hydration status  - Monitor labs   - Assess for incontinence   - Turn and reposition patient  - Assist with mobility/ambulation  - Relieve pressure over bony prominences  - Avoid friction and shearing  - Provide appropriate hygiene as needed including keeping skin clean and dry  - Evaluate need for skin moisturizer/barrier cream  - Collaborate with interdisciplinary team   - Patient/family teaching  - Consider wound care consult   Outcome: Progressing     Problem: Potential for Falls  Goal: Patient will remain free of falls  Description: INTERVENTIONS:  - Educate patient/family on patient safety including physical limitations  - Instruct patient to call for assistance with activity   - Consult OT/PT to assist with strengthening/mobility   - Keep Call bell within reach  - Keep bed low and locked with side rails adjusted as appropriate  - Keep care items and personal belongings within reach  - Initiate and maintain comfort rounds  - Make Fall Risk Sign visible to staff  - Offer Toileting every 2 Hours, in advance of need  - Initiate/Maintain alarm  - Obtain necessary fall risk management equipment  - Apply yellow socks and bracelet for high fall risk patients  - Consider moving patient to room near nurses station  Outcome: Progressing     Problem: Nutrition/Hydration-ADULT  Goal: Nutrient/Hydration intake appropriate for improving, restoring or maintaining nutritional needs  Description: Monitor and assess patient's nutrition/hydration status for malnutrition. Collaborate with interdisciplinary team and initiate plan and interventions as ordered.  Monitor patient's weight and dietary intake as ordered or per policy. Utilize nutrition screening tool and  intervene as necessary. Determine patient's food preferences and provide high-protein, high-caloric foods as appropriate.     INTERVENTIONS:  - Monitor oral intake, urinary output, labs, and treatment plans  - Assess nutrition and hydration status and recommend course of action  - Evaluate amount of meals eaten  - Assist patient with eating if necessary   - Allow adequate time for meals  - Recommend/ encourage appropriate diets, oral nutritional supplements, and vitamin/mineral supplements  - Order, calculate, and assess calorie counts as needed  - Recommend, monitor, and adjust tube feedings and TPN/PPN based on assessed needs  - Assess need for intravenous fluids  - Provide specific nutrition/hydration education as appropriate  - Include patient/family/caregiver in decisions related to nutrition  Outcome: Progressing     Problem: DISCHARGE PLANNING  Goal: Discharge to home or other facility with appropriate resources  Description: INTERVENTIONS:  - Identify barriers to discharge w/patient and caregiver  - Arrange for needed discharge resources and transportation as appropriate  - Identify discharge learning needs (meds, wound care, etc.)  - Arrange for interpretive services to assist at discharge as needed  - Refer to Case Management Department for coordinating discharge planning if the patient needs post-hospital services based on physician/advanced practitioner order or complex needs related to functional status, cognitive ability, or social support system  Outcome: Progressing     Problem: Knowledge Deficit  Goal: Patient/family/caregiver demonstrates understanding of disease process, treatment plan, medications, and discharge instructions  Description: Complete learning assessment and assess knowledge base.  Interventions:  - Provide teaching at level of understanding  - Provide teaching via preferred learning methods  Outcome: Progressing     Problem: CARDIOVASCULAR - ADULT  Goal: Maintains optimal cardiac  output and hemodynamic stability  Description: INTERVENTIONS:  - Monitor I/O, vital signs and rhythm  - Monitor for S/S and trends of decreased cardiac output  - Administer and titrate ordered vasoactive medications to optimize hemodynamic stability  - Assess quality of pulses, skin color and temperature  - Assess for signs of decreased coronary artery perfusion  - Instruct patient to report change in severity of symptoms  Outcome: Progressing

## 2024-11-01 NOTE — ASSESSMENT & PLAN NOTE
Lab Results   Component Value Date    EGFR 35 11/01/2024    EGFR 36 10/31/2024    EGFR 38 10/31/2024    CREATININE 1.37 (H) 11/01/2024    CREATININE 1.34 (H) 10/31/2024    CREATININE 1.29 10/31/2024   Baseline creatinine 0.9.  Trended up today to 1.34 due to diuresis; IV Bumex converted to oral. Repeat today stable creatinine  Continue to trend.  May have to accept higher creatinine to remain euvolemic  Avoid hypotension/nephrotoxic agents

## 2024-11-01 NOTE — ASSESSMENT & PLAN NOTE
Per physical therapy, recommended for rehab.  Initially was recommended for acute level rehab however patient's daughter and patient do not feel she could tolerate this intensive rehab and therefore opting for subacute rehab.  Dc today

## 2024-11-01 NOTE — ASSESSMENT & PLAN NOTE
Patient still reporting no bowel movement.  Will escalate bowel regimen and add lactulose and tap water enema today

## 2024-11-01 NOTE — PLAN OF CARE
Problem: Prexisting or High Potential for Compromised Skin Integrity  Goal: Skin integrity is maintained or improved  Description: INTERVENTIONS:  - Identify patients at risk for skin breakdown  - Assess and monitor skin integrity  - Assess and monitor nutrition and hydration status  - Monitor labs   - Assess for incontinence   - Turn and reposition patient  - Assist with mobility/ambulation  - Relieve pressure over bony prominences  - Avoid friction and shearing  - Provide appropriate hygiene as needed including keeping skin clean and dry  - Evaluate need for skin moisturizer/barrier cream  - Collaborate with interdisciplinary team   - Patient/family teaching  - Consider wound care consult   Outcome: Progressing     Problem: Potential for Falls  Goal: Patient will remain free of falls  Description: INTERVENTIONS:  - Educate patient/family on patient safety including physical limitations  - Instruct patient to call for assistance with activity   - Consult OT/PT to assist with strengthening/mobility   - Keep Call bell within reach  - Keep bed low and locked with side rails adjusted as appropriate  - Keep care items and personal belongings within reach  - Initiate and maintain comfort rounds  - Make Fall Risk Sign visible to staff  - Apply yellow socks and bracelet for high fall risk patients  - Consider moving patient to room near nurses station  Outcome: Progressing     Problem: Nutrition/Hydration-ADULT  Goal: Nutrient/Hydration intake appropriate for improving, restoring or maintaining nutritional needs  Description: Monitor and assess patient's nutrition/hydration status for malnutrition. Collaborate with interdisciplinary team and initiate plan and interventions as ordered.  Monitor patient's weight and dietary intake as ordered or per policy. Utilize nutrition screening tool and intervene as necessary. Determine patient's food preferences and provide high-protein, high-caloric foods as appropriate.      INTERVENTIONS:  - Monitor oral intake, urinary output, labs, and treatment plans  - Assess nutrition and hydration status and recommend course of action  - Evaluate amount of meals eaten  - Assist patient with eating if necessary   - Allow adequate time for meals  - Recommend/ encourage appropriate diets, oral nutritional supplements, and vitamin/mineral supplements  - Order, calculate, and assess calorie counts as needed  - Recommend, monitor, and adjust tube feedings and TPN/PPN based on assessed needs  - Assess need for intravenous fluids  - Provide specific nutrition/hydration education as appropriate  - Include patient/family/caregiver in decisions related to nutrition  Outcome: Progressing     Problem: DISCHARGE PLANNING  Goal: Discharge to home or other facility with appropriate resources  Description: INTERVENTIONS:  - Identify barriers to discharge w/patient and caregiver  - Arrange for needed discharge resources and transportation as appropriate  - Identify discharge learning needs (meds, wound care, etc.)  - Arrange for interpretive services to assist at discharge as needed  - Refer to Case Management Department for coordinating discharge planning if the patient needs post-hospital services based on physician/advanced practitioner order or complex needs related to functional status, cognitive ability, or social support system  Outcome: Progressing     Problem: Knowledge Deficit  Goal: Patient/family/caregiver demonstrates understanding of disease process, treatment plan, medications, and discharge instructions  Description: Complete learning assessment and assess knowledge base.  Interventions:  - Provide teaching at level of understanding  - Provide teaching via preferred learning methods  Outcome: Progressing     Problem: CARDIOVASCULAR - ADULT  Goal: Maintains optimal cardiac output and hemodynamic stability  Description: INTERVENTIONS:  - Monitor I/O, vital signs and rhythm  - Monitor for S/S and  trends of decreased cardiac output  - Administer and titrate ordered vasoactive medications to optimize hemodynamic stability  - Assess quality of pulses, skin color and temperature  - Assess for signs of decreased coronary artery perfusion  - Instruct patient to report change in severity of symptoms  Outcome: Progressing

## 2024-11-01 NOTE — ASSESSMENT & PLAN NOTE
Lab Results   Component Value Date    HGBA1C 7.4 (H) 10/23/2024     Recent Labs     10/31/24  1139 10/31/24  1515 10/31/24  2155 11/01/24  0753   POCGLU 222* 211* 100 140   Blood Sugar Average: Last 72 hrs:  (P) [id number]  resume home glipizide/Metformin at [facility]  SSI/accuchecks/diabetic diet  A1c reflects reasonable control but blood sugars were elevated during stay prompting us to utilize basal bolus regimen

## 2024-11-01 NOTE — PROGRESS NOTES
General Cardiology   Progress Note -  Team One   Camryn Roblero 84 y.o. female MRN: [id number]    Unit/Bed#: S -01 Encounter: [id number]    Assessment  1.  Acute on chronic HFpEF, likely 2/2 to ineffective diuretic dosing; no report of any recent dietary indiscretions. Remains in afib but rates are well controlled.   -Volume overloaded on exam/imaging  -BNP 1154.  -Chest x-ray 10/23; moderate pulmonary vascular congestion, small right > than left bilateral pleural effusions.  -TTE 11/24/2023; LVEF 70%, wall motion normal, grade 2 DD, RV normal size/systolic function, LA severely dilated, RA mildly dilated, mild AI, mild to moderate MR.  -Outpatient diuretic regimen; torsemide 40 mg twice daily  -Inpatient diuretic regimen; s/p IV Bumex GTT., discontinued as of 10/31, transition to oral Bumex 3 mg twice deisi as of 10/31.    -24-hour I&O balance; -1.2L overall -7.8L  -Dry weight around 154-155 pounds.  Office weight on 10/23 was 163 pounds. SS weight 135 lbs today.   2. Chronic atrial fibrillation /atrial flutter  -Occasional palpitations.   -ECG on admission demonstrated rate controlled atrial fibrillation  -Outpatient rate/rhythm control; Cardizem  mg daily and metoprolol succinate 50 mg twice daily  -Inpatient rate/rhythm control; Cardizem  mg daily and metoprolol succinate 50 mg twice daily  -On apixaban 5 mg twice daily.  3. Hypertension  -Average /79 last recorded 118/70, HR 67.  -Outpatient BP regimen; Cardizem  mg daily and metoprolol succinate 50 mg twice daily  -Inpatient rate/rhythm control; Cardizem  mg daily and metoprolol succinate 50 mg twice daily  4. CKD stage III  -Baseline creatinine around 0.8-1.1.  -Creatinine 1.04 on admission, currently 1.37, up slightly from 1.34 yesterday.  5. DM type II  -HgbA1c 7        Plan  -Pt denies any specific cardiac or respiratory complaints this a.m.  Remains on 2 L of supplemental O2 (this is her baseline) with stable O2  saturations.   -Volume status continues to improve.  Appears close to euvolemia with the exception of some mild bilateral lower extremity edema --suspect that there is a certain component of chronicity to this.   -IV diuresis discontinued and transitioned to oral Bumex 3 mg twice daily as of 10/31.  Appears to have had a good urinary response with this, and though creatinine level slightly above baseline past 2 readings have been stable.  Would continue oral Bumex 3 mg twice daily + kdur 20 meq BID  -HRs well controlled in afib. Continue Cardizem  mg daily and metoprolol succinate 50 mg twice daily  -Continue apixaban 5 mg BID  -Monitor renal function and electrolytes closely.  Replete to maintain K+ level 4.0 magnesium level 2.0.  -Strict I's and O's, daily standing weights, 2 g Na+ diet 1.8 LFR.  -No indication for telemetry.     Subjective  Review of Systems   Constitutional: Positive for malaise/fatigue. Negative for chills and fever.   Eyes:  Negative for visual disturbance.   Cardiovascular:  Positive for leg swelling. Negative for chest pain, dyspnea on exertion, orthopnea and palpitations.   Respiratory:  Negative for cough and shortness of breath.    Gastrointestinal:  Positive for bloating and constipation. Negative for abdominal pain.   Neurological:  Negative for dizziness, headaches and light-headedness.       Objective:   Physical Exam  Vitals and nursing note reviewed.   Constitutional:       General: She is not in acute distress.     Appearance: She is obese. She is not diaphoretic.   HENT:      Head: Normocephalic and atraumatic.   Eyes:      General: No scleral icterus.  Cardiovascular:      Rate and Rhythm: Normal rate. Rhythm irregular.      Pulses: Normal pulses.      Heart sounds: Normal heart sounds.   Pulmonary:      Effort: Pulmonary effort is normal.      Breath sounds: No wheezing or rales.   Abdominal:      Palpations: Abdomen is soft.   Musculoskeletal:      Right lower leg: Edema  "present.      Left lower leg: Edema present.      Comments: Mild LE edema.    Skin:     General: Skin is warm and dry.      Capillary Refill: Capillary refill takes less than 2 seconds.   Neurological:      General: No focal deficit present.      Mental Status: She is alert and oriented to person, place, and time.   Psychiatric:         Mood and Affect: Mood normal.         Vitals: Blood pressure 118/70, pulse 67, temperature (!) 97.4 °F (36.3 °C), resp. rate 18, height 4' 11\" (1.499 m), weight 61.3 kg (135 lb 2.3 oz), SpO2 94%.,     Body mass index is 27.3 kg/m².,   Systolic (24hrs), Av , Min:118 , Max:132     Diastolic (24hrs), Av, Min:70, Max:88      Intake/Output Summary (Last 24 hours) at 2024 0908  Last data filed at 2024 0501  Gross per 24 hour   Intake 920 ml   Output 1750 ml   Net -830 ml     Weight (last 2 days)       Date/Time Weight    24 0500 61.3 (135.14)    10/31/24 0600 61.5 (135.58)    10/30/24 0502 62.6 (138.01)            LABORATORY RESULTS      CBC with diff:       Invalid input(s): \"TOTALCELLSCOUNTED\", \"SEGS%\", \"GRANS%\", \"LYMPHS%\", \"EOS%\", \"BASO%\", \"ABNEUT\", \"ABGRANS\", \"ABLYMPHS\", \"ABMOMOS\", \"ABEOS\", \"ABBASO\"    CMP:  Results from last 7 days   Lab Units 24  0513 10/31/24  0951 10/31/24  0834 10/30/24  0455 10/29/24  0913 10/28/24  0457 10/27/24  0608   POTASSIUM mmol/L 4.4 3.9 3.6 3.3* 4.3 4.4 4.4   CHLORIDE mmol/L 91* 89* 89* 90* 97 97 98   CO2 mmol/L 45* >45* >45* >45* 39* 43* 40*   BUN mg/dL 53* 46* 45* 36* 34* 33* 31*   CREATININE mg/dL 1.37* 1.34* 1.29 0.91 0.94 0.93 0.96   CALCIUM mg/dL 9.8 10.0 10.1 10.1 9.6 9.4 9.3   EGFR ml/min/1.73sq m 35 36 38 58 56 57 54       BMP:  Results from last 7 days   Lab Units 24  0513 10/31/24  0951 10/31/24  0834 10/30/24  0455 10/29/24  0913 10/28/24  0457 10/27/24  0608   POTASSIUM mmol/L 4.4 3.9 3.6 3.3* 4.3 4.4 4.4   CHLORIDE mmol/L 91* 89* 89* 90* 97 97 98   CO2 mmol/L 45* >45* >45* >45* 39* 43* 40*   BUN mg/dL " "53* 46* 45* 36* 34* 33* 31*   CREATININE mg/dL 1.37* 1.34* 1.29 0.91 0.94 0.93 0.96   CALCIUM mg/dL 9.8 10.0 10.1 10.1 9.6 9.4 9.3       No results found for: \"NTBNP\"     Results from last 7 days   Lab Units 10/31/24  0951 10/28/24  0457 10/26/24  0442   MAGNESIUM mg/dL 1.9 1.9 1.9                         Lipid Profile:   No results found for: \"CHOL\"  Lab Results   Component Value Date    HDL 34 (L) 2024    HDL 31 (L) 2023    HDL 39 (L) 2023     Lab Results   Component Value Date    LDLCALC 40 2024    LDLCALC 37 2023    LDLCALC 86 2023     Lab Results   Component Value Date    TRIG 59 2024    TRIG 51 2023    TRIG 91 2023       Cardiac testing:   Results for orders placed during the hospital encounter of 10/15/20    Echo complete with contrast if indicated    [address] [id number] [phone]    Transthoracic Echocardiogram  2D, M-mode, Doppler, and Color Doppler    Study date:  15-Oct-2020    Patient: MARANDA MO  MR number: [id number]  Account number: [id number]  : 1940  Age: 80 years  Gender: Female  Status: Outpatient  Location: [facility]  Height: 60 in  Weight: 191.6 lb  BP: 166/ 78 mmHg    Indications: Chronic diastolic heart failure.    Diagnoses: I50.32 - Chronic diastolic (congestive) heart failure    Sonographer:  MAURICIO Mckeon  Primary Physician:  Abiel Seals MD  Referring Physician:  Bradley Marquez MD  Group:  [facility]  Interpreting Physician:  Lit Jane MD    SUMMARY    LEFT VENTRICLE:  Systolic function was normal. Ejection fraction was estimated to be 70 %.  There were no regional wall motion abnormalities.  The ratio of systolic to diastolic pulmonary vein flow was reduced (diastolic predominant).  Features were consistent with a pseudonormal left ventricular filling pattern, with concomitant abnormal relaxation and increased " filling pressure (grade 2 diastolic dysfunction).    LEFT ATRIUM:  The atrium was markedly dilated.    MITRAL VALVE:  There was mild to moderate regurgitation.    AORTIC VALVE:  There was mild regurgitation.    TRICUSPID VALVE:  There was mild regurgitation.  Pulmonary artery systolic pressure was moderately to markedly increased.  Estimated peak PA pressure was 60 mmHg.    PERICARDIUM:  A small pericardial effusion was identified circumferential to the heart. The fluid exhibited a fibrinous appearance.    HISTORY: PRIOR HISTORY: HTN, HLD, DM, lung cancer, COPD, TOM.    PROCEDURE: The study was performed in the [facility]. This was a routine study. The transthoracic approach was used. The study included complete 2D imaging, M-mode, complete spectral Doppler, and color Doppler. The  heart rate was 62 bpm, at the start of the study. Images were obtained from the parasternal, apical, subcostal, and suprasternal notch acoustic windows. Echocardiographic views were limited due to chest wall deformity, poor acoustic window  availability, decreased penetration, and lung interference. This was a technically difficult study.    LEFT VENTRICLE: Size was normal. Systolic function was normal. Ejection fraction was estimated to be 70 %. There were no regional wall motion abnormalities. Wall thickness was normal. DOPPLER: The ratio of systolic to diastolic pulmonary  vein flow was reduced (diastolic predominant). Features were consistent with a pseudonormal left ventricular filling pattern, with concomitant abnormal relaxation and increased filling pressure (grade 2 diastolic dysfunction).    RIGHT VENTRICLE: The size was normal. Systolic function was normal. Wall thickness was normal.    LEFT ATRIUM: The atrium was markedly dilated.    RIGHT ATRIUM: Size was normal.    MITRAL VALVE: Valve structure was normal. There was normal leaflet separation. DOPPLER: The transmitral velocity was within the normal  range. There was no evidence for stenosis. There was mild to moderate regurgitation.    AORTIC VALVE: The valve was trileaflet. Leaflets exhibited normal thickness and normal cuspal separation. DOPPLER: Transaortic velocity was within the normal range. There was no evidence for stenosis. There was mild regurgitation.    TRICUSPID VALVE: The valve structure was normal. There was normal leaflet separation. DOPPLER: The transtricuspid velocity was within the normal range. There was no evidence for stenosis. There was mild regurgitation. Pulmonary artery  systolic pressure was moderately to markedly increased. Estimated peak PA pressure was 60 mmHg.    PULMONIC VALVE: Leaflets exhibited normal thickness, no calcification, and normal cuspal separation. DOPPLER: The transpulmonic velocity was within the normal range. There was no significant regurgitation.    PERICARDIUM: A small pericardial effusion was identified circumferential to the heart. The fluid exhibited a fibrinous appearance. The pericardium was normal in appearance.    AORTA: The root exhibited normal size.    SYSTEMIC VEINS: IVC: The inferior vena cava was normal in size.    SYSTEM MEASUREMENT TABLES    2D  %FS: 39.72 %  Ao Diam: 3.28 cm  EDV(Teich): 127.28 ml  EF(Teich): 69.95 %  ESV(Teich): 38.25 ml  IVSd: 0.88 cm  LA Diam: 4.8 cm  LAAs A2C: 38.98 cm2  LAAs A4C: 33.86 cm2  LAESV A-L A2C: 166.64 ml  LAESV A-L A4C: 132.15 ml  LAESV Index (A-L): 83.14 ml/m2  LAESV MOD A2C: 158.66 ml  LAESV MOD A4C: 124.34 ml  LAESV(A-L): 152.15 ml  LAESV(MOD BP): 143.9 ml  LALs A2C: 7.74 cm  LALs A4C: 7.36 cm  LVEDV MOD A4C: 99.9 ml  LVEF MOD A4C: 84.12 %  LVESV MOD A4C: 15.86 ml  LVIDd: 5.16 cm  LVIDs: 3.11 cm  LVLd A4C: 7.53 cm  LVLs A4C: 5.77 cm  LVPWd: 0.91 cm  RVIDd: 4.1 cm  SV MOD A4C: 84.04 ml  SV(Teich): 89.03 ml    CW  AV Env.Ti: 298.76 ms  AV MaxPG: 10.27 mmHg  AV VTI: 28.82 cm  AV Vmax: 1.6 m/s  AV Vmean: 0.96 m/s  AV meanP.41 mmHg  TR MaxP.9 mmHg  TR  Vmax: 3.77 m/s    MM  TAPSE: 2.29 cm    PW  E' Sept: 0.05 m/s  E/E' Sept: 23.56  LVOT Env.Ti: 300.67 ms  LVOT VTI: 25.48 cm  LVOT Vmax: 1.23 m/s  LVOT Vmean: 0.85 m/s  LVOT maxP.12 mmHg  LVOT meanPG: 3.26 mmHg  MV A Matteo: 0.99 m/s  MV Dec [place]: 6.89 m/s2  MV DecT: 186.95 ms  MV E Matteo: 1.29 m/s  MV E/A Ratio: 1.3  MV PHT: 54.22 ms  MVA By PHT: 4.06 cm2    [facility] Commission Accredited Echocardiography Laboratory    Prepared and electronically signed by    Lit Jane MD  Signed 15-Oct-2020 17:48:51    No results found for this or any previous visit.    No results found for this or any previous visit.    No valid procedures specified.  No results found for this or any previous visit.      Meds/Allergies   all current active meds have been reviewed and current meds:   Current Facility-Administered Medications:     acetaminophen (TYLENOL) tablet 650 mg, Q6H PRN    atorvastatin (LIPITOR) tablet 40 mg, Daily    bisacodyl (DULCOLAX) rectal suppository 10 mg, Daily PRN    budesonide (PULMICORT) inhalation solution 0.5 mg, Q12H    bumetanide (BUMEX) tablet 3 mg, BID (diuretic)    diltiazem (CARDIZEM CD) 24 hr capsule 120 mg, Daily    donepezil (ARICEPT) tablet 5 mg, HS    fluticasone (FLONASE) 50 mcg/act nasal spray 2 spray, Daily    insulin glargine (LANTUS) subcutaneous injection 8 Units 0.08 mL, HS    insulin lispro (HumALOG/ADMELOG) 100 units/mL subcutaneous injection 1-5 Units, TID AC **AND** Fingerstick Glucose (POCT), TID AC    insulin lispro (HumALOG/ADMELOG) 100 units/mL subcutaneous injection 5 Units, TID With Meals    ipratropium-albuterol (DUO-NEB) 0.5-2.5 mg/3 mL inhalation solution 3 mL, Q6H PRN    latanoprost (XALATAN) 0.005 % ophthalmic solution 1 drop, HS    loratadine (CLARITIN) tablet 10 mg, Daily    magnesium Oxide (MAG-OX) tablet 400 mg, Daily    metoprolol succinate (TOPROL-XL) 24 hr tablet 50 mg, BID    polyethylene glycol (MIRALAX) packet 17 g, BID    potassium chloride (Klor-Con M20) CR  tablet 20 mEq, BID    rivaroxaban (XARELTO) tablet 15 mg, Daily With Breakfast    senna-docusate sodium (SENOKOT S) 8.6-50 mg per tablet 2 tablet, BID    sodium chloride (OCEAN) 0.65 % nasal spray 1 spray, Q1H PRN         Counseling / Coordination of Care  Total floor / unit time spent today 20 minutes.  Greater than 50% of total time was spent with the patient and / or family counseling and / or coordination of care.      ** Please Note: Dragon 360 Dictation voice to text software may have been used in the creation of this document. **

## 2024-11-01 NOTE — ASSESSMENT & PLAN NOTE
Wt Readings from Last 3 Encounters:   11/01/24 61.3 kg (135 lb 2.3 oz)   10/23/24 73.9 kg (163 lb)   10/15/24 74.4 kg (164 lb)   Presented with 10 pound weight gain despite increase in torsemide x 2 weeks.  CXR: Moderate pulmonary vascular congestion. Small right greater than left bilateral pleural effusions.   2d ECHO 10/23/24 with EF 65% Changes from prior echo include: Right ventricular dilation is notable, with an increase in mitral regurgitation, and pulmonary arterial pressures.  Dry weight around 154-155 pounds. Admission weight 162 lb; weight down to 135 pounds today and net neg 7.7L since admission.  Good urine output even on oral diuretics  Cardiology management appreciated--diuretic regimen was escalated over the course of this hospitalization ultimately to a Bumex drip with dose of Zaroxolyn as well.  Yesterday she was transition to oral Bumex regimen 3 mg twice daily which she will continue rather than torsemide at discharge  Monitor electrolytes. Noted rising CO2 but no plans to add Diamox at this time

## 2024-11-01 NOTE — DISCHARGE SUMMARY
Discharge Summary - Hospitalist   Name: Camryn Roblero 84 y.o. female I MRN: [id number]  Unit/Bed#: S -01 I Date of Admission: 10/23/2024   Date of Service: 11/1/2024 I Hospital Day: 9     Assessment & Plan  Acute heart failure with preserved ejection fraction (HCC)  Wt Readings from Last 3 Encounters:   11/01/24 61.3 kg (135 lb 2.3 oz)   10/23/24 73.9 kg (163 lb)   10/15/24 74.4 kg (164 lb)   Presented with 10 pound weight gain despite increase in torsemide x 2 weeks.  CXR: Moderate pulmonary vascular congestion. Small right greater than left bilateral pleural effusions.   2d ECHO 10/23/24 with EF 65% Changes from prior echo include: Right ventricular dilation is notable, with an increase in mitral regurgitation, and pulmonary arterial pressures.  Dry weight around 154-155 pounds. Admission weight 162 lb; weight down to 135 pounds today and net neg 7.7L since admission.  Good urine output even on oral diuretics  Cardiology management appreciated--diuretic regimen was escalated over the course of this hospitalization ultimately to a Bumex drip with dose of Zaroxolyn as well.  Yesterday she was transition to oral Bumex regimen 3 mg twice daily which she will continue rather than torsemide at discharge  Monitor electrolytes. Noted rising CO2 but no plans to add Diamox at this time  Type 2 diabetes mellitus, without long-term current use of insulin ([facility])  Lab Results   Component Value Date    HGBA1C 7.4 (H) 10/23/2024     Recent Labs     10/31/24  1139 10/31/24  1515 10/31/24  2155 11/01/24  0753   POCGLU 222* 211* 100 140   Blood Sugar Average: Last 72 hrs:  (P) [id number]  resume home glipizide/Metformin at [place]  SSI/accuchecks/diabetic diet  A1c reflects reasonable control but blood sugars were elevated during stay prompting us to utilize basal bolus regimen  Constipation  Patient still reporting no bowel movement.  Will escalate bowel regimen and add lactulose and tap water enema today  Acute kidney  injury superimposed on chronic kidney disease  (HCC)  Lab Results   Component Value Date    EGFR 35 11/01/2024    EGFR 36 10/31/2024    EGFR 38 10/31/2024    CREATININE 1.37 (H) 11/01/2024    CREATININE 1.34 (H) 10/31/2024    CREATININE 1.29 10/31/2024   Baseline creatinine 0.9.  Trended up today to 1.34 due to diuresis; IV Bumex converted to oral. Repeat today stable creatinine  Continue to trend.  May have to accept higher creatinine to remain euvolemic  Avoid hypotension/nephrotoxic agents  Chronic atrial fibrillation (HCC)  Rate controlled with Cardizem and metoprolol  Continue Xarelto  COPD (chronic obstructive pulmonary disease) (HCC)  With chronic hypoxemic respiratory failure, on 2L NC O2 at baseline during the day and 4L overnight typically but during the course of this hospitalization has been able to wean down significantly on oxygen needs  No acute exacerbation  Follows with Dr. Garland outpatient  Continue inhaler regimen  Ambulatory dysfunction  Per physical therapy, recommended for rehab.  Initially was recommended for acute level rehab however patient's daughter and patient do not feel she could tolerate this intensive rehab and therefore opting for subacute rehab.  Dc today  Class 1 obesity due to excess calories with serious comorbidity and body mass index (BMI) of 30.0 to 30.9 in adult (Resolved: 11/1/2024)  Encourage weight loss, dietary and lifestyle modifications  Body mass index is 27.3 kg/m².  Essential hypertension  BP stable. Cont BB/Cardizem       Medical Problems       Resolved Problems  Date Reviewed: 11/1/2024            Resolved    Hyponatremia 10/28/2024     Resolved by  Arabella Samaniego PA-C    Class 1 obesity due to excess calories with serious comorbidity and body mass index (BMI) of 30.0 to 30.9 in adult 11/1/2024     Resolved by  Arabella Samaniego PA-C    Hypomagnesemia 10/26/2024     Resolved by  Mireille Montoya PA-C        Discharging Physician / Practitioner: Arabella Samaniego  PA-SARAH  PCP: Abiel Seals MD  Admission Date:   Admission Orders (From admission, onward)       Ordered        10/23/24 1207  INPATIENT ADMISSION  Once                          Discharge Date: 11/01/24    Consultations During Hospital Stay:  cardiology    Procedures Performed:   2d echo as above  cxr    Significant Findings / Test Results:   As above    Incidental Findings:       Test Results Pending at Discharge (will require follow up):   none     Outpatient Tests Requested:  bmp    Complications:  none    Reason for Admission: shortness of breath    Hospital Course:   Camryn Roblero is a 84 y.o. female patient with history of diabetes, COPD, chronic A-fib who originally presented to the hospital on 10/23/2024 due to shortness of breath leg swelling and weight gain.  Patient was diagnosed with acute on chronic diastolic heart failure and initially placed on IV Lasix.  She remains significantly volume overloaded.  She was seen in consultation by cardiology.  She was changed to IV Bumex.  She was still very slow to improve and therefore was changed to a Bumex infusion with an additional dose of Zaroxolyn given as well.  Patient had notable weight loss and improvement in her symptoms with this regimen.  She did have a slight rise in her creatinine from 0.9 up to 1.3 and remained stable at 1.3 upon recheck the following day.  At discharge she was recommended to go on Bumex 3 mg p.o. twice daily rather than the torsemide she was on prior to this hospitalization.  Patient was seen by PT and OT and was recommended for discharge to rehab.      Please see above list of diagnoses and related plan for additional information.     Condition at Discharge: stable    Discharge Day Visit / Exam:   Subjective: Passing gas but still no bowel movement as of this morning.  No nausea or vomiting and no abdominal pain.  Overall feeling better.  Less short of breath.  Improved leg swelling.  Vitals: Blood Pressure: 112/61 (11/01/24  "0906)  Pulse: 81 (11/01/24 0906)  Temperature: (!) 97.4 °F (36.3 °C) (11/01/24 0750)  Temp Source: Oral (10/31/24 0751)  Respirations: 18 (10/31/24 0751)  Height: 4' 11\" (149.9 cm) (10/23/24 1445)  Weight - Scale: 61.3 kg (135 lb 2.3 oz) (11/01/24 0500)  SpO2: 96 % (11/01/24 0906)  Physical Exam  Vitals reviewed.   Constitutional:       General: She is not in acute distress.     Appearance: She is not ill-appearing, toxic-appearing or diaphoretic.   HENT:      Nose: No congestion or rhinorrhea.   Eyes:      General: No scleral icterus.        Right eye: No discharge.         Left eye: No discharge.      Conjunctiva/sclera: Conjunctivae normal.   Cardiovascular:      Rate and Rhythm: Normal rate and regular rhythm.      Heart sounds: No murmur heard.  Pulmonary:      Effort: No respiratory distress.      Breath sounds: No stridor. No wheezing or rhonchi.      Comments: No cough, dyspnea, tachypnea, wheezing.  Improved basilar Rales.  O2 sats stable on 2 L  Abdominal:      General: Bowel sounds are normal. There is no distension.      Palpations: Abdomen is soft.      Tenderness: There is no abdominal tenderness. There is no guarding.      Comments: Abdomen is obese but soft and nontender with normal bowel sounds.   Musculoskeletal:      Right lower leg: Edema present.      Left lower leg: Edema present.      Comments: Improved bilateral lower extremity edema   Skin:     General: Skin is warm and dry.      Coloration: Skin is not jaundiced or pale.      Findings: No bruising, erythema, lesion or rash.   Neurological:      General: No focal deficit present.      Mental Status: She is alert. Mental status is at baseline.      Comments: Awake alert interactive.  No confusion noted   Psychiatric:         Mood and Affect: Mood normal.          Discussion with Family: Attempted to update  (daughter) via phone. Left voicemail.     Discharge instructions/Information to patient and family:   See after visit " summary for information provided to patient and family.      Provisions for Follow-Up Care:  See after visit summary for information related to follow-up care and any pertinent home health orders.      Mobility at time of Discharge:   Basic Mobility Inpatient Raw Score: 15  JH-HLM Goal: 4: Move to chair/commode  JH-HLM Achieved: 7: Walk 25 feet or more  HLM Goal achieved. Continue to encourage appropriate mobility.     Disposition:   STR    Planned Readmission: none    Discharge Medications:  See after visit summary for reconciled discharge medications provided to patient and/or family.      Administrative Statements   Discharge Statement:  I have spent a total time of 40 minutes in caring for this patient on the day of the visit/encounter. .    **Please Note: This note may have been constructed using a voice recognition system**

## 2024-11-04 ENCOUNTER — PATIENT OUTREACH (OUTPATIENT)
Dept: CASE MANAGEMENT | Facility: OTHER | Age: 84
End: 2024-11-04

## 2024-11-04 ENCOUNTER — NURSING HOME VISIT (OUTPATIENT)
Dept: GERIATRICS | Facility: OTHER | Age: 84
End: 2024-11-04
Payer: MEDICARE

## 2024-11-04 DIAGNOSIS — N18.9 ACUTE KIDNEY INJURY SUPERIMPOSED ON CHRONIC KIDNEY DISEASE  (HCC): ICD-10-CM

## 2024-11-04 DIAGNOSIS — I50.31 ACUTE HEART FAILURE WITH PRESERVED EJECTION FRACTION (HCC): Primary | ICD-10-CM

## 2024-11-04 DIAGNOSIS — N17.9 ACUTE KIDNEY INJURY SUPERIMPOSED ON CHRONIC KIDNEY DISEASE  (HCC): ICD-10-CM

## 2024-11-04 DIAGNOSIS — I10 ESSENTIAL HYPERTENSION: ICD-10-CM

## 2024-11-04 DIAGNOSIS — E11.9 TYPE 2 DIABETES MELLITUS WITHOUT COMPLICATION, WITHOUT LONG-TERM CURRENT USE OF INSULIN (HCC): ICD-10-CM

## 2024-11-04 DIAGNOSIS — R26.2 AMBULATORY DYSFUNCTION: ICD-10-CM

## 2024-11-04 DIAGNOSIS — G47.33 OSA (OBSTRUCTIVE SLEEP APNEA): ICD-10-CM

## 2024-11-04 DIAGNOSIS — I48.20 CHRONIC ATRIAL FIBRILLATION (HCC): ICD-10-CM

## 2024-11-04 PROCEDURE — 99306 1ST NF CARE HIGH MDM 50: CPT | Performed by: FAMILY MEDICINE

## 2024-11-04 NOTE — PROGRESS NOTES
Outpatient Care Management FEDE/SNF Pathway. Discharged 11/1/24 to [facility]. Email sent to facility to inform them the patient is on the FEDE Pathway and I will be following them during their skilled stay.  This Admin Coordinator will continue to monitor via chart review.
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
## 2024-11-05 ENCOUNTER — NURSING HOME VISIT (OUTPATIENT)
Dept: PULMONOLOGY | Facility: CLINIC | Age: 84
End: 2024-11-05
Payer: MEDICARE

## 2024-11-05 DIAGNOSIS — G47.33 OSA (OBSTRUCTIVE SLEEP APNEA): ICD-10-CM

## 2024-11-05 DIAGNOSIS — I50.32 CHRONIC HEART FAILURE WITH PRESERVED EJECTION FRACTION (HCC): ICD-10-CM

## 2024-11-05 DIAGNOSIS — D3A.090 BENIGN CARCINOID TUMOR OF THE BRONCHUS AND LUNG: ICD-10-CM

## 2024-11-05 DIAGNOSIS — R91.8 MULTIPLE PULMONARY NODULES: ICD-10-CM

## 2024-11-05 DIAGNOSIS — J44.9 COPD, SEVERE (HCC): Primary | ICD-10-CM

## 2024-11-05 PROBLEM — J43.9 PULMONARY EMPHYSEMA, UNSPECIFIED EMPHYSEMA TYPE (HCC): Status: RESOLVED | Noted: 2024-05-30 | Resolved: 2024-11-05

## 2024-11-05 PROCEDURE — 99305 1ST NF CARE MODERATE MDM 35: CPT | Performed by: INTERNAL MEDICINE

## 2024-11-05 PROCEDURE — G2211 COMPLEX E/M VISIT ADD ON: HCPCS | Performed by: INTERNAL MEDICINE
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
## 2024-11-05 NOTE — ASSESSMENT & PLAN NOTE
Wt Readings from Last 3 Encounters:   11/01/24 61.3 kg (135 lb 2.3 oz)   10/23/24 73.9 kg (163 lb)   10/15/24 74.4 kg (164 lb)       Maintained on Bumex TID.  Limit fluid/salt.  Trend weight.

## 2024-11-05 NOTE — ASSESSMENT & PLAN NOTE
- Cont. CPAP 9 cm H2O with sleep  - I asked RT to have nursing start using it on the patient as prescribed.

## 2024-11-06 ENCOUNTER — OFFICE VISIT (OUTPATIENT)
Dept: CARDIOLOGY CLINIC | Facility: CLINIC | Age: 84
End: 2024-11-06
Payer: MEDICARE

## 2024-11-06 ENCOUNTER — NURSING HOME VISIT (OUTPATIENT)
Dept: GERIATRICS | Facility: OTHER | Age: 84
End: 2024-11-06
Payer: MEDICARE

## 2024-11-06 VITALS
BODY MASS INDEX: 27.47 KG/M2 | WEIGHT: 136 LBS | SYSTOLIC BLOOD PRESSURE: 126 MMHG | OXYGEN SATURATION: 97 % | RESPIRATION RATE: 18 BRPM | HEART RATE: 68 BPM | DIASTOLIC BLOOD PRESSURE: 63 MMHG | TEMPERATURE: 96.3 F

## 2024-11-06 VITALS
DIASTOLIC BLOOD PRESSURE: 62 MMHG | WEIGHT: 139.6 LBS | BODY MASS INDEX: 28.2 KG/M2 | HEART RATE: 58 BPM | OXYGEN SATURATION: 99 % | SYSTOLIC BLOOD PRESSURE: 124 MMHG

## 2024-11-06 DIAGNOSIS — E11.9 TYPE 2 DIABETES MELLITUS WITHOUT COMPLICATION, WITHOUT LONG-TERM CURRENT USE OF INSULIN (HCC): ICD-10-CM

## 2024-11-06 DIAGNOSIS — N18.31 STAGE 3A CHRONIC KIDNEY DISEASE (HCC): ICD-10-CM

## 2024-11-06 DIAGNOSIS — I27.20 PULMONARY HYPERTENSION (HCC): ICD-10-CM

## 2024-11-06 DIAGNOSIS — I50.32 CHRONIC HEART FAILURE WITH PRESERVED EJECTION FRACTION (HCC): Primary | ICD-10-CM

## 2024-11-06 DIAGNOSIS — J44.9 COPD, SEVERE (HCC): ICD-10-CM

## 2024-11-06 DIAGNOSIS — I48.20 CHRONIC ATRIAL FIBRILLATION (HCC): ICD-10-CM

## 2024-11-06 DIAGNOSIS — G47.33 OSA (OBSTRUCTIVE SLEEP APNEA): ICD-10-CM

## 2024-11-06 DIAGNOSIS — I10 ESSENTIAL HYPERTENSION: ICD-10-CM

## 2024-11-06 DIAGNOSIS — I50.32 CHRONIC HEART FAILURE WITH PRESERVED EJECTION FRACTION (HFPEF) (HCC): Primary | ICD-10-CM

## 2024-11-06 DIAGNOSIS — I48.91 ATRIAL FIBRILLATION, UNSPECIFIED TYPE (HCC): ICD-10-CM

## 2024-11-06 DIAGNOSIS — R26.2 AMBULATORY DYSFUNCTION: ICD-10-CM

## 2024-11-06 PROCEDURE — G2211 COMPLEX E/M VISIT ADD ON: HCPCS | Performed by: INTERNAL MEDICINE

## 2024-11-06 PROCEDURE — 99309 SBSQ NF CARE MODERATE MDM 30: CPT

## 2024-11-06 PROCEDURE — 99214 OFFICE O/P EST MOD 30 MIN: CPT | Performed by: INTERNAL MEDICINE

## 2024-11-06 NOTE — ASSESSMENT & PLAN NOTE
BP stable, 126/63  Continue to monitor BP  Continue Toprol XL 50 mg every 12 hours  Continue Bumex 2 mg twice daily

## 2024-11-06 NOTE — ASSESSMENT & PLAN NOTE
Heart rate controlled, 68 continue metoprolol XL 50 mg every 12   continue Cardizem 120 mg daily  Continue Xarelto grams daily

## 2024-11-06 NOTE — ASSESSMENT & PLAN NOTE
Multifactorial in the setting of acute/chronic medical conditions  Continue PT/OT  Fall Precautions  Ensure adequate nutrition/hydration   Monitor CBC/BMP    following for d/c planning

## 2024-11-06 NOTE — ASSESSMENT & PLAN NOTE
Lab Results   Component Value Date    HGBA1C 7.4 (H) 10/23/2024   Morning blood sugar 132  Continue Accu-Cheks  Continue metformin at 1000 mg twice daily  Continue glipizide 10 mg daily   encourage diabetic diet  Avoid hypoglycemia

## 2024-11-06 NOTE — ASSESSMENT & PLAN NOTE
"Wt Readings from Last 3 Encounters:   11/06/24 61.7 kg (136 lb)   11/06/24 63.3 kg (139 lb 9.6 oz)   11/01/24 61.3 kg (135 lb 2.3 oz)   Echo done 10/23/2020: \"  Left ventricular cavity size is normal. Wall thickness is normal. The left ventricular ejection fraction is 65%. Systolic function is normal. Wall motion is normal. Unable to assess diastolic function due to atrial fibrillation   Weight stable  Continue daily weights  Continue 1500 fluid restriction  Continue Bumex 3 mg twice daily  Continue metoprolol XL 50 mg every 12 hours            "
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
## 2024-11-06 NOTE — PATIENT INSTRUCTIONS
Obtain BMP  Continue current diuretic dose  2g sodium diet  1500mL fluid diet  Daily weights, call office for weight gain of 3 lbs in a day or 5 lbs in 5-7 days.  Follow up with Dr. ELENA Marquez as scheduled

## 2024-11-11 ENCOUNTER — TELEPHONE (OUTPATIENT)
Age: 84
End: 2024-11-11

## 2024-11-11 ENCOUNTER — PATIENT OUTREACH (OUTPATIENT)
Dept: CASE MANAGEMENT | Facility: OTHER | Age: 84
End: 2024-11-11

## 2024-11-11 NOTE — TELEPHONE ENCOUNTER
Pt under care of: Savi     Last Seen: 6/18/24     Patients daughter calling in stating patient is in rehab facility at this time. She is wondering if apts 11/26/24 for CT and 12/10/24 for 6 month follow up with . She is wondering if these visits are urgent or if they could be pushed back. She does not want to push if there are high concerns for her mothers care. Please review with physician and call daughter back.     Pt can be reached at: [phone] Naomie nguyễn

## 2024-11-12 DIAGNOSIS — Z91.041 CONTRAST MEDIA ALLERGY: Primary | ICD-10-CM

## 2024-11-12 RX ORDER — METHYLPREDNISOLONE 32 MG/1
TABLET ORAL
Qty: 2 TABLET | Refills: 0 | Status: SHIPPED | OUTPATIENT
Start: 2024-11-12

## 2024-11-12 RX ORDER — METHYLPREDNISOLONE 32 MG/1
TABLET ORAL
Qty: 2 TABLET | Refills: 0 | Status: SHIPPED | OUTPATIENT
Start: 2024-11-12 | End: 2024-11-12

## 2024-11-12 RX ORDER — DIPHENHYDRAMINE HCL 25 MG
TABLET ORAL
Qty: 2 TABLET | Refills: 0 | Status: SHIPPED | OUTPATIENT
Start: 2024-11-12 | End: 2024-11-12

## 2024-11-12 RX ORDER — DIPHENHYDRAMINE HCL 25 MG
TABLET ORAL
Qty: 2 TABLET | Refills: 0 | Status: SHIPPED | OUTPATIENT
Start: 2024-11-12
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
## 2024-11-12 NOTE — TELEPHONE ENCOUNTER
Call came in from Radiology requesting that the following medications be called in for the patient prior to her CT renal protocol with contrast scheduled on 11/26 because of her allergy to contrast. Patient needs;     Medrol 32 mg one tablet 12 hours and one tablet 2 hours before test  Benadryl 50 mg to be taken 1 hour prior to test

## 2024-11-13 ENCOUNTER — NURSING HOME VISIT (OUTPATIENT)
Dept: GERIATRICS | Facility: OTHER | Age: 84
End: 2024-11-13
Payer: MEDICARE

## 2024-11-13 VITALS
HEART RATE: 77 BPM | OXYGEN SATURATION: 99 % | DIASTOLIC BLOOD PRESSURE: 76 MMHG | TEMPERATURE: 97.1 F | SYSTOLIC BLOOD PRESSURE: 110 MMHG | RESPIRATION RATE: 19 BRPM

## 2024-11-13 DIAGNOSIS — I10 ESSENTIAL HYPERTENSION: ICD-10-CM

## 2024-11-13 DIAGNOSIS — E11.9 TYPE 2 DIABETES MELLITUS WITHOUT COMPLICATION, WITHOUT LONG-TERM CURRENT USE OF INSULIN (HCC): ICD-10-CM

## 2024-11-13 DIAGNOSIS — I48.20 CHRONIC ATRIAL FIBRILLATION (HCC): Primary | ICD-10-CM

## 2024-11-13 DIAGNOSIS — R26.2 AMBULATORY DYSFUNCTION: ICD-10-CM

## 2024-11-13 DIAGNOSIS — I50.32 CHRONIC HEART FAILURE WITH PRESERVED EJECTION FRACTION (HCC): ICD-10-CM

## 2024-11-13 DIAGNOSIS — F41.1 GAD (GENERALIZED ANXIETY DISORDER): ICD-10-CM

## 2024-11-13 PROCEDURE — 99309 SBSQ NF CARE MODERATE MDM 30: CPT

## 2024-11-13 RX ORDER — ESCITALOPRAM OXALATE 5 MG/1
5 TABLET ORAL DAILY
Start: 2024-11-13
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
## 2024-11-13 NOTE — ASSESSMENT & PLAN NOTE
Lab Results   Component Value Date    HGBA1C 7.4 (H) 10/23/2024   Morning blood sugar 119  Continue Accu-Cheks  Continue metformin at 1000 mg twice daily  Continue glipizide 10 mg daily   encourage diabetic diet  Avoid hypoglycemia  
"Wt Readings from Last 3 Encounters:   11/06/24 61.7 kg (136 lb)   11/06/24 63.3 kg (139 lb 9.6 oz)   11/01/24 61.3 kg (135 lb 2.3 oz)   Echo done 10/23/2020: \"  Left ventricular cavity size is normal. Wall thickness is normal. The left ventricular ejection fraction is 65%. Systolic function is normal. Wall motion is normal. Unable to assess diastolic function due to atrial fibrillation   Weight stable  Continue daily weights  Continue 1500 fluid restriction  Continue Bumex 3 mg twice daily  Continue metoprolol XL 50 mg every 12 hours            "
BP stable, 110/76  Continue to monitor BP  Continue Toprol XL 50 mg every 12 hours  Continue Bumex 2 mg twice daily  
Heart rate controlled, 77   continue metoprolol XL 50 mg every 12   continue Cardizem 120 mg daily  Continue Xarelto 15 mg daily  
Multifactorial in the setting of acute/chronic medical conditions  Continue PT/OT  Fall Precautions  Ensure adequate nutrition/hydration   Monitor CBC/BMP    following for d/c planning    
Patient reports anxiety following the passing of her  a few months ago  Started on Lexapro 5 mg daily, consider increasing in 2-3 weeks  Followed by [facility]  
Statement Selected

## 2024-11-18 ENCOUNTER — PATIENT OUTREACH (OUTPATIENT)
Dept: CASE MANAGEMENT | Facility: OTHER | Age: 84
End: 2024-11-18

## 2024-11-18 ENCOUNTER — NURSING HOME VISIT (OUTPATIENT)
Dept: GERIATRICS | Facility: OTHER | Age: 84
End: 2024-11-18
Payer: MEDICARE

## 2024-11-18 VITALS
RESPIRATION RATE: 16 BRPM | HEART RATE: 64 BPM | SYSTOLIC BLOOD PRESSURE: 126 MMHG | OXYGEN SATURATION: 96 % | DIASTOLIC BLOOD PRESSURE: 66 MMHG | TEMPERATURE: 98 F

## 2024-11-18 DIAGNOSIS — G47.33 OSA (OBSTRUCTIVE SLEEP APNEA): ICD-10-CM

## 2024-11-18 DIAGNOSIS — I50.32 CHRONIC HEART FAILURE WITH PRESERVED EJECTION FRACTION (HCC): ICD-10-CM

## 2024-11-18 DIAGNOSIS — F41.1 GAD (GENERALIZED ANXIETY DISORDER): ICD-10-CM

## 2024-11-18 DIAGNOSIS — I10 ESSENTIAL HYPERTENSION: ICD-10-CM

## 2024-11-18 DIAGNOSIS — E11.9 TYPE 2 DIABETES MELLITUS WITHOUT COMPLICATION, WITHOUT LONG-TERM CURRENT USE OF INSULIN (HCC): ICD-10-CM

## 2024-11-18 DIAGNOSIS — R26.2 AMBULATORY DYSFUNCTION: ICD-10-CM

## 2024-11-18 DIAGNOSIS — I48.20 CHRONIC ATRIAL FIBRILLATION (HCC): Primary | ICD-10-CM

## 2024-11-18 PROCEDURE — 99309 SBSQ NF CARE MODERATE MDM 30: CPT

## 2024-11-18 NOTE — ASSESSMENT & PLAN NOTE
Lab Results   Component Value Date    HGBA1C 7.4 (H) 10/23/2024   Morning blood sugar 133  Continue Accu-Cheks  Continue metformin at 1000 mg twice daily  Continue glipizide 10 mg daily   encourage diabetic diet  Avoid hypoglycemia

## 2024-11-18 NOTE — ASSESSMENT & PLAN NOTE
Patient reports anxiety following the passing of her  a few months ago  Started on Lexapro 5 mg daily, consider increasing in 2-3 weeks  Followed by [facility]

## 2024-11-18 NOTE — TELEPHONE ENCOUNTER
Spoke with daughter and she is aware of medication and how patient should be taking it. No further questions
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
## 2024-11-18 NOTE — ASSESSMENT & PLAN NOTE
Heart rate controlled, 64  continue metoprolol XL 50 mg every 12   continue Cardizem 120 mg daily  Continue Xarelto 15 mg daily

## 2024-11-18 NOTE — ASSESSMENT & PLAN NOTE
BP stable, 126/66  Continue to monitor BP  Continue Toprol XL 50 mg every 12 hours  Continue Bumex 2 mg twice daily

## 2024-11-18 NOTE — ASSESSMENT & PLAN NOTE
"Wt Readings from Last 3 Encounters:   11/06/24 61.7 kg (136 lb)   11/06/24 63.3 kg (139 lb 9.6 oz)   11/01/24 61.3 kg (135 lb 2.3 oz)   Echo done 10/23/2020:   \"Left ventricular cavity size is normal. Wall thickness is normal. The left ventricular ejection fraction is 65%. Systolic function is normal. Wall motion is normal. Unable to assess diastolic function due to atrial fibrillation\"   Weight stable  Continue daily weights  Continue 1500 fluid restriction  Continue Bumex 3 mg twice daily  Continue metoprolol XL 50 mg every 12 hours            "

## 2024-11-20 ENCOUNTER — TELEPHONE (OUTPATIENT)
Age: 84
End: 2024-11-20

## 2024-11-20 ENCOUNTER — OFFICE VISIT (OUTPATIENT)
Dept: CARDIOLOGY CLINIC | Facility: CLINIC | Age: 84
End: 2024-11-20
Payer: MEDICARE

## 2024-11-20 VITALS
HEIGHT: 59 IN | DIASTOLIC BLOOD PRESSURE: 64 MMHG | SYSTOLIC BLOOD PRESSURE: 122 MMHG | HEART RATE: 67 BPM | WEIGHT: 139 LBS | BODY MASS INDEX: 28.02 KG/M2 | OXYGEN SATURATION: 97 %

## 2024-11-20 DIAGNOSIS — I50.32 CHRONIC HEART FAILURE WITH PRESERVED EJECTION FRACTION (HCC): ICD-10-CM

## 2024-11-20 DIAGNOSIS — I10 ESSENTIAL HYPERTENSION: ICD-10-CM

## 2024-11-20 DIAGNOSIS — I48.20 CHRONIC ATRIAL FIBRILLATION (HCC): ICD-10-CM

## 2024-11-20 DIAGNOSIS — I48.92 ATRIAL FLUTTER, UNSPECIFIED TYPE (HCC): Primary | ICD-10-CM

## 2024-11-20 PROCEDURE — 99214 OFFICE O/P EST MOD 30 MIN: CPT | Performed by: INTERNAL MEDICINE
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
## 2024-11-20 NOTE — TELEPHONE ENCOUNTER
Lidia from [facility] called in stating patient was recently in ED and went to rehab and is now being discharged. Lidia asked if Dr. Seals would give the okay for at home PT and OT. Lidia asked if we could return her call at [phone] with approval.    Please advise, thank you

## 2024-11-20 NOTE — PATIENT INSTRUCTIONS
"Patient Education     Low-sodium diet   The Basics   Written by the doctors and editors at [facility]   What is sodium? -- This is the main ingredient in table salt. It is also found in lots of foods. The body needs a very small amount of sodium to work normally, but most people eat much more sodium than their body needs.  Who should eat less sodium? -- Nearly everyone eats too much sodium. The average American takes in 3400 milligrams of sodium each day. Experts say that most people should have no more than 2300 milligrams a day.  Some people with certain health conditions should follow a low-sodium diet. Ask your doctor how much sodium you should have.  Why should I eat less sodium? -- Reducing the amount of sodium you eat can have lots of health benefits:   It can lower your blood pressure. This means that it can help lower your risk of stroke, heart attack, kidney damage, and lots of other health problems.   It can reduce the amount of fluid in your body, which means that your heart doesn't have to work as hard.   It can keep the kidneys from having to work too hard. This is especially important in people who have kidney disease.   It can reduce swelling in the ankles and belly, which can be uncomfortable and make it hard to move.   It can lower the chances of forming kidney stones.   It can help keep your bones strong.  Which foods have the most sodium? -- Processed foods have the most sodium. These foods usually come in cans, boxes, jars, and bags. They tend to have a lot of sodium even if they don't taste salty. In fact, many sweet foods have a lot of sodium in them. The only way to know for sure how much sodium is in a food is to check the label (figure 1).  Here are some examples of foods that often have too much sodium:   Canned soups   Rice and noodle mixes   Sauces, dressings, and condiments (such as ketchup and mustard)   Pre-made frozen meals (also called \"TV dinners\")   Deli meats, hot dogs, and " "cheeses   Smoked, cured, or pickled foods   Salted snack foods and nuts   Restaurant meals  What should I do to reduce the amount of sodium in my diet? -- Many people think that eating a low-sodium diet just means not adding salt to their food. But this is not true. Not adding salt at the table or when cooking will help a little. But almost all of the sodium you eat is already in the food you buy at the grocery store or at restaurants (figure 2).  Here are some tips to help you eat less sodium:   Avoid processed foods when possible. This is the most important thing you can do to eat less sodium. Processed foods include most foods that are sold in cans, boxes, jars, and bags.   Instead of buying pre-made, processed foods, buy fresh or fresh-frozen fruits and vegetables. (\"Fresh-frozen\" means that the food is frozen without anything added to it.)   Buy meats, fish, chicken, and turkey that are fresh instead of canned or sold at the deli counter. (Meats sold at the deli counter are high in sodium.)   Try to eat at restaurants less often.   When possible, try to make meals from scratch at home using fresh ingredients.   If you do buy canned or packaged foods, choose ones that are labeled \"sodium free\" or \"very low sodium\" (table 1). Or choose foods that have less than 400 milligrams of sodium in each serving. The amount of sodium in each serving appears on the nutrition label (figure 1).  The table has some examples of foods to avoid and foods to choose instead (table 2).  Whatever changes you make, make them slowly. Choose 1 thing to do differently, and do that for a while. If you can keep doing that change easily, add another change. For instance, if you usually eat green beans from a can, try buying fresh or fresh-frozen green beans and cooking them at home without adding salt. If that works for you, keep doing it. Then, choose another thing to change.  If you try making a change and it doesn't work right away, don't " "give up. See if you can reduce sodium in other ways. The important thing is to take small steps and to keep doing the changes that work for you.  Can I still eat out at restaurants sometimes? -- One of best ways to limit your sodium is to only eat out at restaurants every once in a while. When you do eat out, try to choose places that offer healthier choices and fresh ingredients.  No matter where you eat, when choosing your food:   Ask your  if your meal can be made without salt.   Avoid foods that come with sauces or dips.   Choose plain grilled meats or fish and steamed vegetables.   Ask for oil and vinegar for your salad, rather than dressing.   If a meal you really want has more sodium than you should have, consider saving half of it to eat another day.  What if food just does not taste as good without sodium? -- Starting a low-sodium diet can be hard. The good news is that your taste buds can get used to having less sodium. But you have to give them some time to adjust.  It can also help to try other ways to add flavor to your foods. Try things like herbs, spices, lemon juice, and vinegar.  What about salt substitutes? -- Flavoring your food with a salt substitute is a good way to reduce how much salt you eat. But check with your doctor or nurse before trying this. Some salt substitutes can be dangerous if you have certain health problems or take certain medicines.  Do medicines have sodium? -- Yes, some medicines contain sodium. If you are buying medicines you can get without a prescription, look to see how much sodium they have. Avoid products that have \"sodium carbonate\" or \"sodium bicarbonate\" unless your doctor prescribes them. (Sodium bicarbonate is baking soda.)  All topics are updated as new evidence becomes available and our peer review process is complete.  This topic retrieved from [employer] on: Mar 22, 2024.  Topic 78162 Version 14.0  Release: 32.2.4 - C32.80  © 2024 UpToDate, Inc. and/or its " "affiliates. All rights reserved.  figure 1: Food labels can be tricky     To figure out how much sodium you are eating, check the label to find out how much sodium is in 1 serving. If you are having more than 1 serving, multiply that amount by the number of servings you plan to eat. For instance, if you are going to eat this whole can of soup, you should multiply 850 by 2. That means that you will be having 1700 milligrams of sodium. That's more sodium than many people are supposed to have in 1 day.  Graphic 61527 Version 8.0  figure 2: Sources of sodium in your diet     Graphic 29501 Version 2.0  table 1: A guide to common nutrient claims and what they mean  Salt/sodium-free  Less than 5 mg of sodium per serving   Very low sodium  35 mg or less of sodium per serving   Low sodium  140 mg or less of sodium per serving   Reduced sodium  At least 25% less sodium than the regular product   Light or lite in sodium  At least 50% less sodium than the regular product   No salt added or unsalted  No salt is added during processing, but these products may not be salt/sodium-free unless stated   mg: milligram; %: percent.  Graphic 00373 Version 7.0  table 2: Ways to cut down on salt (sodium)  Avoid these foods  Try these foods instead    Cured and smoked foods like sandoval, sausage, smoked fish and meats, hot dogs, ham, lunch meats, corned beef, and pickles Fresh turkey, chicken, and lean beef   Canned fish (tuna, sardines) Unsalted tuna or sardines   Canned meats Fresh unprocessed meats, vegetable protein, and fish  Frozen and canned meats, vegetable protein, and fish that are labeled \"low-sodium\"   Salted pretzels, crackers, potato chips, tortilla chips, and nuts Low-sodium and unsalted versions of these foods   Most cheeses Low-sodium cheeses (check label for actual sodium content)   Sauces (tomato and cream, etc), tomato juices Low-sodium versions of these foods, such as low-sodium tomato juice   Processed, instant, and " "convenience foods like frozen dinners, packaged meals, canned soups, and boxed pasta blends Cook and freeze your own low-sodium meals, soups, and broths    If you do need to use convenience or processed foods, read the labels. Choose items with 140 to 200 mg of sodium per serving.    For an entire convenience meal (frozen dinner), try to find options with less than 500 to 600 mg of sodium.    If you used canned foods, look for those labeled \"sodium-free.\" Or you can rinse the canned food under water to lower the sodium content.   Fast foods and foods prepared at restaurants (unless without cheese, sauces, or added salt) Fresh foods and foods with sauces on the side  Request that food be prepared without cheese or added salt   Graphic 45026 Version 10.0  Consumer Information Use and Disclaimer   Disclaimer: This generalized information is a limited summary of diagnosis, treatment, and/or medication information. It is not meant to be comprehensive and should be used as a tool to help the user understand and/or assess potential diagnostic and treatment options. It does NOT include all information about conditions, treatments, medications, side effects, or risks that may apply to a specific patient. It is not intended to be medical advice or a substitute for the medical advice, diagnosis, or treatment of a health care provider based on the health care provider's examination and assessment of a patient's specific and unique circumstances. Patients must speak with a health care provider for complete information about their health, medical questions, and treatment options, including any risks or benefits regarding use of medications. This information does not endorse any treatments or medications as safe, effective, or approved for treating a specific patient. UpToDate, Inc. and its affiliates disclaim any warranty or liability relating to this information or the use thereof.The use of this information is governed by the " Terms of Use, available at https://www.[employer] Partnersuwer.com/en/know/clinical-effectiveness-terms. 2024© [employer] and its affiliates and/or licensors. All rights reserved.  Copyright   © 2024 [employer] and/or its affiliates. All rights reserved.

## 2024-11-26 ENCOUNTER — TELEPHONE (OUTPATIENT)
Age: 84
End: 2024-11-26

## 2024-11-26 ENCOUNTER — NURSING HOME VISIT (OUTPATIENT)
Dept: GERIATRICS | Facility: OTHER | Age: 84
End: 2024-11-26
Payer: MEDICARE

## 2024-11-26 ENCOUNTER — HOSPITAL ENCOUNTER (OUTPATIENT)
Dept: CT IMAGING | Facility: HOSPITAL | Age: 84
Discharge: HOME | End: 2024-11-26
Payer: MEDICARE

## 2024-11-26 ENCOUNTER — PATIENT OUTREACH (OUTPATIENT)
Dept: CASE MANAGEMENT | Facility: OTHER | Age: 84
End: 2024-11-26

## 2024-11-26 VITALS
SYSTOLIC BLOOD PRESSURE: 131 MMHG | BODY MASS INDEX: 27.29 KG/M2 | OXYGEN SATURATION: 97 % | TEMPERATURE: 97.6 F | WEIGHT: 135.1 LBS | HEART RATE: 70 BPM | DIASTOLIC BLOOD PRESSURE: 72 MMHG | RESPIRATION RATE: 18 BRPM

## 2024-11-26 DIAGNOSIS — N28.9 RENAL LESION: ICD-10-CM

## 2024-11-26 DIAGNOSIS — I10 ESSENTIAL HYPERTENSION: Primary | ICD-10-CM

## 2024-11-26 DIAGNOSIS — R26.2 AMBULATORY DYSFUNCTION: ICD-10-CM

## 2024-11-26 DIAGNOSIS — I50.32 CHRONIC HEART FAILURE WITH PRESERVED EJECTION FRACTION (HCC): ICD-10-CM

## 2024-11-26 DIAGNOSIS — E11.9 TYPE 2 DIABETES MELLITUS WITHOUT COMPLICATION, WITHOUT LONG-TERM CURRENT USE OF INSULIN (HCC): ICD-10-CM

## 2024-11-26 DIAGNOSIS — I48.20 CHRONIC ATRIAL FIBRILLATION (HCC): ICD-10-CM

## 2024-11-26 DIAGNOSIS — F41.1 GAD (GENERALIZED ANXIETY DISORDER): ICD-10-CM

## 2024-11-26 PROCEDURE — 99316 NF DSCHRG MGMT 30 MIN+: CPT

## 2024-11-26 PROCEDURE — 74178 CT ABD&PLV WO CNTR FLWD CNTR: CPT

## 2024-11-26 RX ADMIN — IOHEXOL 75 ML: 350 INJECTION, SOLUTION INTRAVENOUS at 16:01
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
## 2024-11-26 NOTE — ASSESSMENT & PLAN NOTE
BP stable, 131/72  Continue to monitor BP  Continue Toprol XL 50 mg every 12 hours  Continue Bumex 2 mg twice daily

## 2024-11-26 NOTE — ASSESSMENT & PLAN NOTE
"Wt Readings from Last 3 Encounters:   11/26/24 63 kg (139 lb)   11/26/24 61.3 kg (135 lb 1.6 oz)   11/20/24 63 kg (139 lb)   Echo done 10/23/2020:   \"Left ventricular cavity size is normal. Wall thickness is normal. The left ventricular ejection fraction is 65%. Systolic function is normal. Wall motion is normal. Unable to assess diastolic function due to atrial fibrillation\"   Weight stable  Continue daily weights  Continue 1500 fluid restriction  Continue Bumex 3 mg twice daily  Continue metoprolol XL 50 mg every 12 hours  Outpatient follow up with cardiology            "

## 2024-11-26 NOTE — TELEPHONE ENCOUNTER
Lidia from [facility] is calling for a verbal that they can restart patient on services.  Patient being discharged from rehab on Thursday and home care will resume if oked by Dr. Seals.  [facility] not allowed to give verbals.  Called bother clinical and clerical lines at office and not available.      Please call Lidia back and let her know  [phone]

## 2024-11-26 NOTE — ASSESSMENT & PLAN NOTE
Heart rate controlled, 70  continue metoprolol XL 50 mg every 12   continue Cardizem 120 mg daily  Continue Xarelto 15 mg daily

## 2024-11-26 NOTE — ASSESSMENT & PLAN NOTE
Lab Results   Component Value Date    HGBA1C 7.4 (H) 10/23/2024   Morning blood sugar 164  Continue Accu-Cheks  Continue metformin at 1000 mg twice daily  Continue glipizide 10 mg daily   encourage diabetic diet  Avoid hypoglycemia

## 2024-11-27 RX ORDER — ESCITALOPRAM OXALATE 5 MG/1
5 TABLET ORAL DAILY
Qty: 30 TABLET | Refills: 0 | Status: SHIPPED | OUTPATIENT
Start: 2024-11-27

## 2024-11-27 RX ORDER — POTASSIUM CHLORIDE 1500 MG/1
20 TABLET, EXTENDED RELEASE ORAL 2 TIMES DAILY
Qty: 60 TABLET | Refills: 0 | Status: SHIPPED | OUTPATIENT
Start: 2024-11-27

## 2024-11-27 RX ORDER — BUMETANIDE 1 MG/1
3 TABLET ORAL 2 TIMES DAILY
Qty: 180 TABLET | Refills: 0 | Status: SHIPPED | OUTPATIENT
Start: 2024-11-27

## 2024-11-27 NOTE — TELEPHONE ENCOUNTER
Lidia from [facility] called to check on this and I read the doctor's response to her.

## 2024-12-02 ENCOUNTER — TELEPHONE (OUTPATIENT)
Age: 84
End: 2024-12-02

## 2024-12-02 ENCOUNTER — TELEPHONE (OUTPATIENT)
Dept: UROLOGY | Facility: CLINIC | Age: 84
End: 2024-12-02

## 2024-12-02 ENCOUNTER — PATIENT OUTREACH (OUTPATIENT)
Dept: FAMILY MEDICINE CLINIC | Facility: CLINIC | Age: 84
End: 2024-12-02

## 2024-12-02 NOTE — PROGRESS NOTES
PCC email alert received indicating the patient discharged 11/28/24 to home with [facility]. I have removed myself off of the care team and sent an inbasket to the appropriate care  to notifying them of the SNF discharge and [facility]. Ambulatory referral placed for complex care management.

## 2024-12-02 NOTE — TELEPHONE ENCOUNTER
TED Murillo calling post patient hospitalization on 10/28-11/1 with DC to [facility] from 11-1-11-28. Was at acute for CHF, emphysema and COPD diagnoses. TED will be providing dietary instructions  and 1500 cc fluid, standard fall and safety education. Also has protocols for potential constipation and UTI as well. They are currently scheduled twice a week for next 2 weeks (starting 12/4) and then 1 time a week for three weeks.    VS parameters:   Temp <95 >101  HR<40 >110  R <10>26  SBP <90 >170  DBP <40 >96  Weight <130 lb >140 lb or more than 2 lbs  in 1 week  O2 - wears 2 L NC PRN, will call <90    PT/OT scheduled separately for patient.     Lidia needs verbal from PCP to follow these orders or if PCP wishes to addend. Please follow up with provider response

## 2024-12-03 ENCOUNTER — RA CDI HCC (OUTPATIENT)
Dept: OTHER | Facility: HOSPITAL | Age: 84
End: 2024-12-03

## 2024-12-04 ENCOUNTER — OFFICE VISIT (OUTPATIENT)
Dept: PULMONOLOGY | Facility: CLINIC | Age: 84
End: 2024-12-04
Payer: MEDICARE

## 2024-12-04 VITALS
HEART RATE: 60 BPM | OXYGEN SATURATION: 98 % | BODY MASS INDEX: 28.07 KG/M2 | HEIGHT: 59 IN | TEMPERATURE: 98.3 F | SYSTOLIC BLOOD PRESSURE: 140 MMHG | DIASTOLIC BLOOD PRESSURE: 70 MMHG

## 2024-12-04 DIAGNOSIS — R91.8 MULTIPLE PULMONARY NODULES: ICD-10-CM

## 2024-12-04 DIAGNOSIS — D3A.090 BENIGN CARCINOID TUMOR OF THE BRONCHUS AND LUNG: ICD-10-CM

## 2024-12-04 DIAGNOSIS — G47.33 OSA (OBSTRUCTIVE SLEEP APNEA): Primary | ICD-10-CM

## 2024-12-04 DIAGNOSIS — J44.9 COPD, SEVERE (HCC): ICD-10-CM

## 2024-12-04 PROCEDURE — 99214 OFFICE O/P EST MOD 30 MIN: CPT | Performed by: INTERNAL MEDICINE

## 2024-12-04 NOTE — PROGRESS NOTES
Name: Camryn Roblero      : 1940      MRN: [id number]  Encounter Provider: Ban Garland MD  Encounter Date: 2024   Encounter department: [facility]  :  Assessment & Plan  TOM (obstructive sleep apnea)  She had a long history of snoring and daytime sleepiness.She was found to have mild TOM with oxygen desaturation on her overnight sleep study. Her CPAP titration study was suboptimal. She was started on CPAP therapy at 9 cm of water and has been using it. Her CPAP compliance has been good and her residual AHI was low. She is getting benefit from CPAP therapy I have advised her to use the CPAP as before.  I have advised her to change the mask regularly.  She uses oxygen 1 L/min.        COPD, severe (HCC)  Mrs.Cleo Roblero has history of COPD and was on Advair Bid before. She also had  asthma as a child. She has occasional cough and no significant phlegm. She is a never smoker, however she has history of secondhand smoke exposure.Her PFT done on 2024 showed severe airflow obstruction with severe diffusion defect, hyperinflation and air trapping diagnostic of emphysema.  Her baseline oxygen saturation at rest was 94% 6-minute walk test.  She could not complete 6-minute walk test and stopped after 2 minutes.  She did not desaturate during this short.  She has severe exercise limitation due to SOB. She has been on nebulized budesonide which she has not been using regularly.  I have advised her to use the nebulizer when necessary for her shortness of breath. I have advised her to continue with oxygen supplementation as needed.  I had a long discussion with her and her daughter who accompanied her.  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 She had also mediastinal lymphadenopathy.  She was also noted to have a nodule in the thyroid gland and a nodular hepatic lesion. She had a CT guided biopsy of LLL lung lesion which was consistent with neuroendocrine tumor, carcinoid. She follows with Dr. Shruthi Benitez from Oncology        Multiple pulmonary nodules  She has multiple lung nodules which were stable on the last CT scan from May 2022.  A repeat CT scan July 2023 showed stability of the lung nodules recent CT scan showed enlarging lung nodule on the left different from the previous benign carcinoid nodule.  She denied any anorexia or weight loss.  Her CT PET scan from 6/19/2024 showed only nonspecific intake.  Continued surveillance of the lung nodules is required.  Chest x-ray showed pulmonary vascular congestion and small effusions.           History of Present Illness     HPI  Camryn Roblero is a 84 y.o. female with past medical history of COPD, multiple lung nodules obstructive sleep apnea and benign carcinoid of lung who has come for follow-up.  She was recently admitted to [facility] with worsening shortness of breath and was found to be in congestive heart failure and received diuretic therapy with bumetanide.  Currently she is feeling much better.  She was discharged home on oxygen which she has been using 2 L/min.  She has been using it 24 hours.  She stated that her leg swelling has improved remarkably she does not have any significant phlegm or wheeze.  No chest pain no palpitations.  She has ambulatory dysfunction and uses a cane or walker.  She has shortness of breath on exertion.  Had a history of emphysema and has been on treatment with budesonide regularly and DuoNeb as needed.  She has history of benign carcinoid of the lung.  No history of hemoptysis or significant weight loss.  She has had multiple lung nodules.  She has obstructive sleep apnea and has been using CPAP.  She missed few days when she was in the hospital.  Her overall compliance is satisfactory.  Her  "residual AHI was low.  She lives alone.  She denied any fever or chills.  No chest pain no palpitations.      Review of Systems   Constitutional:  Negative for appetite change, chills, fatigue and fever.   HENT:  Positive for rhinorrhea. Negative for hearing loss, sneezing and trouble swallowing.    Respiratory:  Positive for cough and shortness of breath. Negative for chest tightness and wheezing.    Cardiovascular:  Positive for leg swelling. Negative for chest pain and palpitations.   Gastrointestinal:  Positive for diarrhea. Negative for abdominal pain, constipation, nausea and vomiting.   Genitourinary:  Positive for urgency. Negative for dysuria and frequency.   Musculoskeletal:  Positive for gait problem. Negative for arthralgias and back pain.   Skin:  Negative for rash.   Allergic/Immunologic: Positive for environmental allergies.   Neurological:  Negative for dizziness, syncope, light-headedness and headaches.   Psychiatric/Behavioral:  Negative for agitation, confusion and sleep disturbance. The patient is nervous/anxious.           Objective   /70 (BP Location: Left arm, Patient Position: Sitting, Cuff Size: Standard)   Pulse 60   Temp 98.3 °F (36.8 °C) (Tympanic)   Ht 4' 11\" (1.499 m)   SpO2 98%   BMI 28.07 kg/m²      Physical Exam  Vitals reviewed.   Constitutional:       General: She is not in acute distress.     Appearance: She is not ill-appearing, toxic-appearing or diaphoretic.   HENT:      Head: Normocephalic.      Mouth/Throat:      Mouth: Mucous membranes are moist.   Eyes:      General: No scleral icterus.     Conjunctiva/sclera: Conjunctivae normal.   Cardiovascular:      Rate and Rhythm: Normal rate and regular rhythm.      Heart sounds: Normal heart sounds. No murmur heard.  Pulmonary:      Effort: Pulmonary effort is normal. No respiratory distress.      Breath sounds: Normal breath sounds. No wheezing, rhonchi or rales.   Chest:      Chest wall: No tenderness.   Abdominal:      " General: Bowel sounds are normal.      Palpations: Abdomen is soft.      Tenderness: There is no abdominal tenderness. There is no guarding.   Musculoskeletal:      Cervical back: Neck supple. No rigidity.      Right lower leg: No edema.      Left lower leg: No edema.   Lymphadenopathy:      Cervical: No cervical adenopathy.   Skin:     Coloration: Skin is not jaundiced or pale.      Findings: No rash.   Neurological:      Mental Status: She is alert and oriented to person, place, and time.      Gait: Gait normal.   Psychiatric:         Mood and Affect: Mood normal.         Behavior: Behavior normal.         Thought Content: Thought content normal.         Judgment: Judgment normal.     I spent 30 minutes of time take care of this patient with complex medical issues.  The majority of this time was spent directly with the patient counseling as well as coordinating care.

## 2024-12-04 NOTE — ASSESSMENT & PLAN NOTE
Mrs.Cleo Roblero has history of COPD and was on Advair Bid before. She also had  asthma as a child. She has occasional cough and no significant phlegm. She is a never smoker, however she has history of secondhand smoke exposure.Her PFT done on 6/4/2024 showed severe airflow obstruction with severe diffusion defect, hyperinflation and air trapping diagnostic of emphysema.  Her baseline oxygen saturation at rest was 94% 6-minute walk test.  She could not complete 6-minute walk test and stopped after 2 minutes.  She did not desaturate during this short.  She has severe exercise limitation due to SOB. She has been on nebulized budesonide which she has not been using regularly.  I have advised her to use the nebulizer when necessary for her shortness of breath. I have advised her to continue with oxygen supplementation as needed.  I had a long discussion with her and her daughter who accompanied her.  I answered all their questions   Orders:    Home Oxygen with Portability

## 2024-12-04 NOTE — ASSESSMENT & PLAN NOTE
She has multiple lung nodules which were stable on the last CT scan from May 2022.  A repeat CT scan July 2023 showed stability of the lung nodules recent CT scan showed enlarging lung nodule on the left different from the previous benign carcinoid nodule.  She denied any anorexia or weight loss.  Her CT PET scan from 6/19/2024 showed only nonspecific intake.  Continued surveillance of the lung nodules is required.  Chest x-ray showed pulmonary vascular congestion and small effusions.

## 2024-12-06 ENCOUNTER — TELEPHONE (OUTPATIENT)
Age: 84
End: 2024-12-06

## 2024-12-06 NOTE — TELEPHONE ENCOUNTER
Patient's occupational therapist is calling asking if we can please send over a script to [facility] for POC please advise the patient thank you.

## 2024-12-10 ENCOUNTER — TRANSITIONAL CARE MANAGEMENT (OUTPATIENT)
Dept: FAMILY MEDICINE CLINIC | Facility: CLINIC | Age: 84
End: 2024-12-10

## 2024-12-10 ENCOUNTER — TELEPHONE (OUTPATIENT)
Dept: LAB | Facility: HOSPITAL | Age: 84
End: 2024-12-10

## 2024-12-10 ENCOUNTER — OFFICE VISIT (OUTPATIENT)
Dept: FAMILY MEDICINE CLINIC | Facility: CLINIC | Age: 84
End: 2024-12-10
Payer: MEDICARE

## 2024-12-10 VITALS
RESPIRATION RATE: 20 BRPM | BODY MASS INDEX: 25.8 KG/M2 | HEIGHT: 59 IN | HEART RATE: 57 BPM | DIASTOLIC BLOOD PRESSURE: 54 MMHG | WEIGHT: 128 LBS | SYSTOLIC BLOOD PRESSURE: 130 MMHG | OXYGEN SATURATION: 99 %

## 2024-12-10 DIAGNOSIS — E78.5 HYPERLIPIDEMIA, UNSPECIFIED HYPERLIPIDEMIA TYPE: ICD-10-CM

## 2024-12-10 DIAGNOSIS — I10 ESSENTIAL HYPERTENSION: ICD-10-CM

## 2024-12-10 DIAGNOSIS — I50.32 CHRONIC HEART FAILURE WITH PRESERVED EJECTION FRACTION (HCC): Primary | ICD-10-CM

## 2024-12-10 DIAGNOSIS — E11.9 TYPE 2 DIABETES MELLITUS WITHOUT COMPLICATION, WITHOUT LONG-TERM CURRENT USE OF INSULIN (HCC): ICD-10-CM

## 2024-12-10 DIAGNOSIS — J44.9 COPD, SEVERE (HCC): ICD-10-CM

## 2024-12-10 DIAGNOSIS — G47.33 OSA (OBSTRUCTIVE SLEEP APNEA): ICD-10-CM

## 2024-12-10 DIAGNOSIS — F41.1 GAD (GENERALIZED ANXIETY DISORDER): ICD-10-CM

## 2024-12-10 DIAGNOSIS — I48.20 CHRONIC ATRIAL FIBRILLATION (HCC): ICD-10-CM

## 2024-12-10 DIAGNOSIS — D3A.090 BENIGN CARCINOID TUMOR OF THE BRONCHUS AND LUNG: ICD-10-CM

## 2024-12-10 PROCEDURE — 99495 TRANSJ CARE MGMT MOD F2F 14D: CPT | Performed by: FAMILY MEDICINE

## 2024-12-10 NOTE — ASSESSMENT & PLAN NOTE
Blood pressure ok. Continue current medications and low Na diet.   Orders:  •  Basic metabolic panel; Future

## 2024-12-10 NOTE — ASSESSMENT & PLAN NOTE
Stable. Saw Pulmonary Dr Garland for follow-up in December 2024. Pt continues to use O2 supplementation as needed.

## 2024-12-10 NOTE — ASSESSMENT & PLAN NOTE
Patient has had increased anxiety since her  passed away. Was started on escitalopram 5 mg daily while in skilled nursing facility.

## 2024-12-10 NOTE — PROGRESS NOTES
Name: Camryn Roblero      : 1940      MRN: [id number]  Encounter Provider: Slade Prather MD  Encounter Date: 2024   Encounter department: [facility]OLOG[facility]  :  Assessment & Plan  Renal lesion  Independent interpretation of imaging  Small left renal lesion  No enhancement  Recommend no follow up with us or future imaging given these findings and significant competing comorbidities               History of Present Illness   Camryn Roblero is a 84 y.o. female who presents a left renal lesion  Reassuring findings on imaging  No flank pain    The following portions of the patient's history were reviewed and updated as appropriate: allergies, current medications, past family history, past medical history, past social history, past surgical history and problem list.      Review of Systems   Constitutional: Negative.    HENT: Negative.     Eyes: Negative.    Respiratory:  Positive for shortness of breath (baseline).    Cardiovascular: Negative.    Gastrointestinal: Negative.    Endocrine: Negative.    Genitourinary: Negative.    Musculoskeletal: Negative.    Skin: Negative.    Allergic/Immunologic: Negative.    Neurological:  Positive for weakness.   Hematological: Negative.    Psychiatric/Behavioral: Negative.            Objective   There were no vitals taken for this visit.    Physical Exam  Vitals and nursing note reviewed.   Constitutional:       General: She is not in acute distress.     Appearance: Normal appearance. She is well-developed. She is ill-appearing. She is not toxic-appearing or diaphoretic.      Comments: On O2, in a wheelchair   HENT:      Head: Normocephalic and atraumatic.   Eyes:      General: No scleral icterus.  Pulmonary:      Effort: Pulmonary effort is normal. No respiratory distress.   Abdominal:      General: There is no distension.      Tenderness: There is no abdominal tenderness.   Musculoskeletal:         General: No deformity.      Cervical back: Neck supple.  "  Skin:     Coloration: Skin is not jaundiced.      Findings: No lesion.   Neurological:      General: No focal deficit present.      Mental Status: She is alert and oriented to person, place, and time. Mental status is at baseline.      Cranial Nerves: No cranial nerve deficit.   Psychiatric:         Mood and Affect: Mood normal.         Behavior: Behavior normal.         Thought Content: Thought content normal.         Judgment: Judgment normal.          Results  No results found for: \"PSA\"  Lab Results   Component Value Date    CALCIUM 9.8 11/01/2024    K 4.4 11/01/2024    CO2 45 (H) 11/01/2024    CL 91 (L) 11/01/2024    BUN 53 (H) 11/01/2024    CREATININE 1.37 (H) 11/01/2024     Lab Results   Component Value Date    WBC 7.68 10/25/2024    HGB 12.8 10/25/2024    HCT 42.6 10/25/2024    MCV 90 10/25/2024     10/25/2024       Office Urine Dip  No results found for this or any previous visit (from the past hour).]      "

## 2024-12-10 NOTE — PROGRESS NOTES
Transition of Care Visit  Name: Camryn Roblero      : 1940      MRN: [id number]  Encounter Provider: Abiel Seals MD  Encounter Date: 12/10/2024   Encounter department: [facility]    Assessment & Plan  Chronic heart failure with preserved ejection fraction (HCC)  Wt Readings from Last 3 Encounters:   12/10/24 58.1 kg (128 lb)   24 63 kg (139 lb)   24 61.3 kg (135 lb 1.6 oz)     Patient was admitted to skilled nursing facility from  to  following hospital admission for Congestive Heart Failure. Doing better now and saw Cardiology on 24. Continue current medications and next appointment is in 2025. Patient getting home physical therapy and OT.           Orders:  •  Basic metabolic panel; Future    Chronic atrial fibrillation (HCC)  Stable on current medications. Next appointment with Cardiology is in 2025.       Essential hypertension  Blood pressure ok. Continue current medications and low Na diet.   Orders:  •  Basic metabolic panel; Future    Hyperlipidemia, unspecified hyperlipidemia type  LDL 40 and LFTs ok in May 2024. Continue atorvastatin 40 mg daily at bedtime.        Type 2 diabetes mellitus without complication, without long-term current use of insulin (HCC)  A1C 7.4 in 2024. Continue metformin 1000 mg bid and glipizide 5 mg qd. Continue low carb diet. Eye exam done in 2023. Foot exam done in 2024.     Lab Results   Component Value Date    HGBA1C 7.4 (H) 10/23/2024          COPD, severe (HCC)  Stable. Saw Pulmonary Dr Garland for follow-up in 2024. Pt continues to use O2 supplementation as needed.        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 Continue follow-up and management with Oncology Dr Benitez.        HEATHER (generalized anxiety disorder)  Patient has had increased anxiety since her  passed away. Was started on escitalopram 5 mg daily while in skilled nursing facility.         TOM (obstructive sleep apnea)  Patient using CPAP every night and sleeps well.             History of Present Illness     Transitional Care Management Review:   Camryn Roblero is a 84 y.o. female here for TCM follow up.     During the TCM phone call patient stated:  TCM Call     Date and time call was made  12/10/2024  1:56 PM    Hospital care reviewed  Discussed with Inpatient Physician    Patient was hospitialized at  [facility]    Date of Admission  10/23/24    Date of discharge  11/27/24    Diagnosis  CHF    Disposition  Home    Were the patients medications reviewed and updated  Yes    Current Symptoms  None      TCM Call     Post hospital issues  None    Should patient be enrolled in anticoag monitoring?  Yes    Scheduled for follow up?  Yes    Patients specialists  Cardiologist; Pulmonlolgist; Nephrologist; Urologist    Did you obtain your prescribed medications  Yes    Do you need help managing your prescriptions or medications  No    Is transportation to your appointment needed  No    I have advised the patient to call PCP with any new or worsening symptoms  nuno villalobos cma        Patient here for follow-up stay at skilled nursing facility. Patient was admitted after stay in hospital for Congestive Heart Failure. Patient had physical therapy and OT 5 days per week. Patient doing better at home so far. Can get around better. Saw Cardiology on 11/20 and Pulmonary on 12/4. Patient was changed from torsemide to bumex. Patient also started on lexapro 5 mg daily for anxiety and helping. Uses CPAP at night. Has VNA and home physical therapy and OT. No pain. Breathing better. No chest pain. Weight and sugars stable. Uses walker around house.       Review of Systems   Constitutional:  Negative for fatigue.   Eyes:  Negative for visual disturbance.   Respiratory:  Positive for shortness of breath. Negative for cough.    Cardiovascular:  Negative for chest pain.   Gastrointestinal:  Negative for abdominal  "pain, constipation, diarrhea, nausea and vomiting.   Endocrine: Negative for polydipsia, polyphagia and polyuria.   Genitourinary:  Negative for difficulty urinating.   Musculoskeletal:  Positive for arthralgias and gait problem.   Skin:  Negative for rash and wound.   Neurological:  Negative for dizziness, light-headedness, numbness and headaches.     Objective   /54 (BP Location: Left arm, Patient Position: Sitting, Cuff Size: Large)   Pulse 57   Resp 20   Ht 4' 11\" (1.499 m)   Wt 58.1 kg (128 lb)   SpO2 99%   BMI 25.85 kg/m²     Physical Exam  Vitals and nursing note reviewed.   Constitutional:       General: She is not in acute distress.     Appearance: Normal appearance. She is well-developed. She is obese.      Comments: Patient in wheelchair today but uses walker at home.    Neck:      Vascular: No carotid bruit.   Cardiovascular:      Rate and Rhythm: Normal rate and regular rhythm.      Heart sounds: No murmur heard.  Pulmonary:      Effort: Pulmonary effort is normal. No respiratory distress.      Breath sounds: Normal breath sounds.   Musculoskeletal:      Cervical back: Normal range of motion and neck supple.      Right lower leg: Edema present.      Left lower leg: Edema present.   Lymphadenopathy:      Cervical: No cervical adenopathy.   Neurological:      Mental Status: She is alert.   Psychiatric:         Mood and Affect: Mood normal.         Behavior: Behavior normal.         Thought Content: Thought content normal.         Judgment: Judgment normal.       Medications have been reviewed by provider in current encounter      "

## 2024-12-10 NOTE — ASSESSMENT & PLAN NOTE
Wt Readings from Last 3 Encounters:   12/10/24 58.1 kg (128 lb)   11/26/24 63 kg (139 lb)   11/26/24 61.3 kg (135 lb 1.6 oz)     Patient was admitted to skilled nursing facility from 11/1 to 11/28 following hospital admission for Congestive Heart Failure. Doing better now and saw Cardiology on 11/20/24. Continue current medications and next appointment is in February 2025. Patient getting home physical therapy and OT.           Orders:  •  Basic metabolic panel; Future

## 2024-12-10 NOTE — ASSESSMENT & PLAN NOTE
A1C 7.4 in October 2024. Continue metformin 1000 mg bid and glipizide 5 mg qd. Continue low carb diet. Eye exam done in October 2023. Foot exam done in June 2024.     Lab Results   Component Value Date    HGBA1C 7.4 (H) 10/23/2024

## 2024-12-11 ENCOUNTER — TELEPHONE (OUTPATIENT)
Dept: LAB | Facility: HOSPITAL | Age: 84
End: 2024-12-11

## 2024-12-11 ENCOUNTER — OFFICE VISIT (OUTPATIENT)
Dept: UROLOGY | Facility: CLINIC | Age: 84
End: 2024-12-11
Payer: MEDICARE

## 2024-12-11 ENCOUNTER — APPOINTMENT (OUTPATIENT)
Dept: LAB | Facility: AMBULARY SURGERY CENTER | Age: 84
End: 2024-12-11
Payer: MEDICARE

## 2024-12-11 VITALS — DIASTOLIC BLOOD PRESSURE: 60 MMHG | OXYGEN SATURATION: 96 % | HEART RATE: 55 BPM | SYSTOLIC BLOOD PRESSURE: 110 MMHG

## 2024-12-11 DIAGNOSIS — N28.9 RENAL LESION: Primary | ICD-10-CM

## 2024-12-11 DIAGNOSIS — I50.32 CHRONIC HEART FAILURE WITH PRESERVED EJECTION FRACTION (HFPEF) (HCC): ICD-10-CM

## 2024-12-11 LAB
DME PARACHUTE DELIVERY DATE ACTUAL: NORMAL
DME PARACHUTE DELIVERY DATE REQUESTED: NORMAL
DME PARACHUTE ITEM DESCRIPTION: NORMAL
DME PARACHUTE ORDER STATUS: NORMAL
DME PARACHUTE SUPPLIER NAME: NORMAL
DME PARACHUTE SUPPLIER PHONE: NORMAL

## 2024-12-11 PROCEDURE — 99214 OFFICE O/P EST MOD 30 MIN: CPT | Performed by: UROLOGY

## 2024-12-11 PROCEDURE — 80048 BASIC METABOLIC PNL TOTAL CA: CPT

## 2024-12-11 PROCEDURE — 36415 COLL VENOUS BLD VENIPUNCTURE: CPT

## 2024-12-11 NOTE — ASSESSMENT & PLAN NOTE
Independent interpretation of imaging  Small left renal lesion  No enhancement  Recommend no follow up with us or future imaging given these findings and significant competing comorbidities

## 2024-12-12 ENCOUNTER — RESULTS FOLLOW-UP (OUTPATIENT)
Dept: CARDIOLOGY CLINIC | Facility: CLINIC | Age: 84
End: 2024-12-12

## 2024-12-12 LAB
ANION GAP SERPL CALCULATED.3IONS-SCNC: 11 MMOL/L (ref 4–13)
BUN SERPL-MCNC: 35 MG/DL (ref 5–25)
CALCIUM SERPL-MCNC: 9.7 MG/DL (ref 8.4–10.2)
CHLORIDE SERPL-SCNC: 97 MMOL/L (ref 96–108)
CO2 SERPL-SCNC: 31 MMOL/L (ref 21–32)
CREAT SERPL-MCNC: 1.08 MG/DL (ref 0.6–1.3)
GFR SERPL CREATININE-BSD FRML MDRD: 47 ML/MIN/1.73SQ M
GLUCOSE SERPL-MCNC: 131 MG/DL (ref 65–140)
POTASSIUM SERPL-SCNC: 4.4 MMOL/L (ref 3.5–5.3)
SODIUM SERPL-SCNC: 139 MMOL/L (ref 135–147)

## 2024-12-26 DIAGNOSIS — I50.32 CHRONIC HEART FAILURE WITH PRESERVED EJECTION FRACTION (HCC): ICD-10-CM

## 2024-12-26 DIAGNOSIS — F41.1 GAD (GENERALIZED ANXIETY DISORDER): ICD-10-CM

## 2024-12-27 LAB

## 2024-12-27 RX ORDER — ESCITALOPRAM OXALATE 5 MG/1
5 TABLET ORAL DAILY
Qty: 30 TABLET | Refills: 0 | Status: SHIPPED | OUTPATIENT
Start: 2024-12-27

## 2024-12-27 RX ORDER — BUMETANIDE 1 MG/1
3 TABLET ORAL 2 TIMES DAILY
Qty: 180 TABLET | Refills: 0 | Status: SHIPPED | OUTPATIENT
Start: 2024-12-27

## 2024-12-27 RX ORDER — POTASSIUM CHLORIDE 1500 MG/1
20 TABLET, EXTENDED RELEASE ORAL 2 TIMES DAILY
Qty: 60 TABLET | Refills: 0 | Status: SHIPPED | OUTPATIENT
Start: 2024-12-27

## 2024-12-29 DIAGNOSIS — E11.9 DIABETES MELLITUS WITHOUT COMPLICATION (HCC): ICD-10-CM

## 2025-01-01 ENCOUNTER — RESULTS FOLLOW-UP (OUTPATIENT)
Dept: FAMILY MEDICINE CLINIC | Facility: CLINIC | Age: 85
End: 2025-01-01

## 2025-01-01 ENCOUNTER — TELEPHONE (OUTPATIENT)
Dept: OTHER | Facility: OTHER | Age: 85
End: 2025-01-01

## 2025-01-01 ENCOUNTER — APPOINTMENT (INPATIENT)
Dept: RADIOLOGY | Facility: HOSPITAL | Age: 85
End: 2025-01-01
Payer: MEDICARE

## 2025-01-01 ENCOUNTER — HOSPITAL ENCOUNTER (INPATIENT)
Facility: HOSPITAL | Age: 85
LOS: 1 days | End: 2025-07-12
Attending: EMERGENCY MEDICINE | Admitting: INTERNAL MEDICINE
Payer: MEDICARE

## 2025-01-01 ENCOUNTER — NURSING HOME VISIT (OUTPATIENT)
Dept: GERIATRICS | Facility: OTHER | Age: 85
End: 2025-01-01
Payer: MEDICARE

## 2025-01-01 ENCOUNTER — HOME CARE VISIT (OUTPATIENT)
Dept: HOME HEALTH SERVICES | Facility: HOME HEALTHCARE | Age: 85
End: 2025-01-01

## 2025-01-01 ENCOUNTER — RESULTS FOLLOW-UP (OUTPATIENT)
Age: 85
End: 2025-01-01

## 2025-01-01 ENCOUNTER — OFFICE VISIT (OUTPATIENT)
Dept: CARDIOLOGY CLINIC | Facility: CLINIC | Age: 85
End: 2025-01-01
Payer: MEDICARE

## 2025-01-01 ENCOUNTER — APPOINTMENT (EMERGENCY)
Dept: CT IMAGING | Facility: HOSPITAL | Age: 85
End: 2025-01-01
Payer: MEDICARE

## 2025-01-01 VITALS
OXYGEN SATURATION: 94 % | TEMPERATURE: 97.5 F | SYSTOLIC BLOOD PRESSURE: 82 MMHG | HEART RATE: 64 BPM | DIASTOLIC BLOOD PRESSURE: 47 MMHG | RESPIRATION RATE: 13 BRPM

## 2025-01-01 VITALS
HEART RATE: 51 BPM | OXYGEN SATURATION: 97 % | HEIGHT: 59 IN | BODY MASS INDEX: 25.84 KG/M2 | SYSTOLIC BLOOD PRESSURE: 112 MMHG | WEIGHT: 128.2 LBS | TEMPERATURE: 97.4 F | DIASTOLIC BLOOD PRESSURE: 60 MMHG

## 2025-01-01 DIAGNOSIS — K92.2 UPPER GI BLEED: ICD-10-CM

## 2025-01-01 DIAGNOSIS — J96.11 CHRONIC HYPOXIC RESPIRATORY FAILURE, ON HOME OXYGEN THERAPY  (HCC): ICD-10-CM

## 2025-01-01 DIAGNOSIS — R26.2 AMBULATORY DYSFUNCTION: ICD-10-CM

## 2025-01-01 DIAGNOSIS — N17.9 AKI (ACUTE KIDNEY INJURY) (HCC): ICD-10-CM

## 2025-01-01 DIAGNOSIS — I50.33 ACUTE ON CHRONIC HEART FAILURE WITH PRESERVED EJECTION FRACTION (HCC): Primary | ICD-10-CM

## 2025-01-01 DIAGNOSIS — I50.33 ACUTE ON CHRONIC HEART FAILURE WITH PRESERVED EJECTION FRACTION (HCC): ICD-10-CM

## 2025-01-01 DIAGNOSIS — K56.609 SMALL BOWEL OBSTRUCTION (HCC): Primary | ICD-10-CM

## 2025-01-01 DIAGNOSIS — I31.39 PERICARDIAL EFFUSION: ICD-10-CM

## 2025-01-01 DIAGNOSIS — G47.33 OSA (OBSTRUCTIVE SLEEP APNEA): ICD-10-CM

## 2025-01-01 DIAGNOSIS — I10 ESSENTIAL HYPERTENSION: ICD-10-CM

## 2025-01-01 DIAGNOSIS — E87.5 HYPERKALEMIA: ICD-10-CM

## 2025-01-01 DIAGNOSIS — D50.0 IRON DEFICIENCY ANEMIA DUE TO CHRONIC BLOOD LOSS: ICD-10-CM

## 2025-01-01 DIAGNOSIS — N18.32 STAGE 3B CHRONIC KIDNEY DISEASE (HCC): ICD-10-CM

## 2025-01-01 DIAGNOSIS — K66.8 PNEUMOPERITONEUM: ICD-10-CM

## 2025-01-01 DIAGNOSIS — I48.92 ATRIAL FLUTTER, UNSPECIFIED TYPE (HCC): ICD-10-CM

## 2025-01-01 DIAGNOSIS — K44.9 HIATAL HERNIA: ICD-10-CM

## 2025-01-01 DIAGNOSIS — I5A NONISCHEMIC NONTRAUMATIC MYOCARDIAL INJURY: ICD-10-CM

## 2025-01-01 DIAGNOSIS — I48.0 PAROXYSMAL ATRIAL FIBRILLATION (HCC): ICD-10-CM

## 2025-01-01 DIAGNOSIS — R10.31 RIGHT LOWER QUADRANT ABDOMINAL PAIN: ICD-10-CM

## 2025-01-01 DIAGNOSIS — R10.9 ABDOMINAL PAIN: ICD-10-CM

## 2025-01-01 DIAGNOSIS — I50.32 CHRONIC HEART FAILURE WITH PRESERVED EJECTION FRACTION (HCC): ICD-10-CM

## 2025-01-01 DIAGNOSIS — I27.20 PULMONARY HYPERTENSION (HCC): ICD-10-CM

## 2025-01-01 DIAGNOSIS — Z99.81 CHRONIC HYPOXIC RESPIRATORY FAILURE, ON HOME OXYGEN THERAPY  (HCC): ICD-10-CM

## 2025-01-01 LAB
ALBUMIN SERPL BCG-MCNC: 4 G/DL (ref 3.5–5)
ALP SERPL-CCNC: 99 U/L (ref 34–104)
ALT SERPL W P-5'-P-CCNC: 20 U/L (ref 7–52)
ANION GAP SERPL CALCULATED.3IONS-SCNC: 14 MMOL/L (ref 4–13)
ANION GAP SERPL CALCULATED.3IONS-SCNC: 19 MMOL/L (ref 4–13)
ANISOCYTOSIS BLD QL SMEAR: PRESENT
ANISOCYTOSIS BLD QL SMEAR: PRESENT
APTT PPP: 25 SECONDS (ref 23–34)
AST SERPL W P-5'-P-CCNC: 76 U/L (ref 13–39)
ATRIAL RATE: 64 BPM
BASOPHILS # BLD MANUAL: 0 THOUSAND/UL (ref 0–0.1)
BASOPHILS # BLD MANUAL: 0 THOUSAND/UL (ref 0–0.1)
BASOPHILS NFR MAR MANUAL: 0 % (ref 0–1)
BASOPHILS NFR MAR MANUAL: 0 % (ref 0–1)
BILIRUB SERPL-MCNC: 1.86 MG/DL (ref 0.2–1)
BNP SERPL-MCNC: >4700 PG/ML (ref 0–100)
BUN SERPL-MCNC: 54 MG/DL (ref 5–25)
BUN SERPL-MCNC: 63 MG/DL (ref 5–25)
BURR CELLS BLD QL SMEAR: PRESENT
CALCIUM SERPL-MCNC: 7.1 MG/DL (ref 8.4–10.2)
CALCIUM SERPL-MCNC: 9.7 MG/DL (ref 8.4–10.2)
CHLORIDE SERPL-SCNC: 100 MMOL/L (ref 96–108)
CHLORIDE SERPL-SCNC: 92 MMOL/L (ref 96–108)
CO2 SERPL-SCNC: 19 MMOL/L (ref 21–32)
CO2 SERPL-SCNC: 28 MMOL/L (ref 21–32)
CREAT SERPL-MCNC: 1.3 MG/DL (ref 0.6–1.3)
CREAT SERPL-MCNC: 1.62 MG/DL (ref 0.6–1.3)
EOSINOPHIL # BLD MANUAL: 0 THOUSAND/UL (ref 0–0.4)
EOSINOPHIL # BLD MANUAL: 0 THOUSAND/UL (ref 0–0.4)
EOSINOPHIL NFR BLD MANUAL: 0 % (ref 0–6)
EOSINOPHIL NFR BLD MANUAL: 0 % (ref 0–6)
ERYTHROCYTE [DISTWIDTH] IN BLOOD BY AUTOMATED COUNT: 15.9 % (ref 11.6–15.1)
ERYTHROCYTE [DISTWIDTH] IN BLOOD BY AUTOMATED COUNT: 15.9 % (ref 11.6–15.1)
GFR SERPL CREATININE-BSD FRML MDRD: 29 ML/MIN/1.73SQ M
GFR SERPL CREATININE-BSD FRML MDRD: 38 ML/MIN/1.73SQ M
GLUCOSE SERPL-MCNC: 186 MG/DL (ref 65–140)
GLUCOSE SERPL-MCNC: 200 MG/DL (ref 65–140)
GLUCOSE SERPL-MCNC: 236 MG/DL (ref 65–140)
GLUCOSE SERPL-MCNC: 70 MG/DL (ref 65–140)
HCT VFR BLD AUTO: 30.2 % (ref 34.8–46.1)
HCT VFR BLD AUTO: 37.7 % (ref 34.8–46.1)
HGB BLD-MCNC: 11.6 G/DL (ref 11.5–15.4)
HGB BLD-MCNC: 9.1 G/DL (ref 11.5–15.4)
INR PPP: 1.26 (ref 0.85–1.19)
LACTATE SERPL-SCNC: 6 MMOL/L (ref 0.5–2)
LACTATE SERPL-SCNC: 6.6 MMOL/L (ref 0.5–2)
LACTATE SERPL-SCNC: 7.5 MMOL/L (ref 0.5–2)
LIPASE SERPL-CCNC: 148 U/L (ref 11–82)
LYMPHOCYTES # BLD AUTO: 0.48 THOUSAND/UL (ref 0.6–4.47)
LYMPHOCYTES # BLD AUTO: 0.54 THOUSAND/UL (ref 0.6–4.47)
LYMPHOCYTES # BLD AUTO: 4 % (ref 14–44)
LYMPHOCYTES # BLD AUTO: 5 % (ref 14–44)
MCH RBC QN AUTO: 30.3 PG (ref 26.8–34.3)
MCH RBC QN AUTO: 30.4 PG (ref 26.8–34.3)
MCHC RBC AUTO-ENTMCNC: 30.1 G/DL (ref 31.4–37.4)
MCHC RBC AUTO-ENTMCNC: 30.8 G/DL (ref 31.4–37.4)
MCV RBC AUTO: 101 FL (ref 82–98)
MCV RBC AUTO: 98 FL (ref 82–98)
METAMYELOCYTE ABSOLUTE CT: 0.12 THOUSAND/UL (ref 0–0.1)
METAMYELOCYTE ABSOLUTE CT: 0.54 THOUSAND/UL (ref 0–0.1)
METAMYELOCYTES NFR BLD MANUAL: 1 % (ref 0–1)
METAMYELOCYTES NFR BLD MANUAL: 5 % (ref 0–1)
MONOCYTES # BLD AUTO: 0.54 THOUSAND/UL (ref 0–1.22)
MONOCYTES # BLD AUTO: 0.72 THOUSAND/UL (ref 0–1.22)
MONOCYTES NFR BLD: 5 % (ref 4–12)
MONOCYTES NFR BLD: 6 % (ref 4–12)
MYELOCYTE ABSOLUTE CT: 0.48 THOUSAND/UL (ref 0–0.1)
MYELOCYTES NFR BLD MANUAL: 4 % (ref 0–1)
NEUTROPHILS # BLD MANUAL: 10.21 THOUSAND/UL (ref 1.85–7.62)
NEUTROPHILS # BLD MANUAL: 9.15 THOUSAND/UL (ref 1.85–7.62)
NEUTS BAND NFR BLD MANUAL: 16 % (ref 0–8)
NEUTS BAND NFR BLD MANUAL: 20 % (ref 0–8)
NEUTS SEG NFR BLD AUTO: 65 % (ref 43–75)
NEUTS SEG NFR BLD AUTO: 69 % (ref 43–75)
P AXIS: 68 DEGREES
PLATELET # BLD AUTO: 276 THOUSANDS/UL (ref 149–390)
PLATELET # BLD AUTO: 344 THOUSANDS/UL (ref 149–390)
PLATELET BLD QL SMEAR: ABNORMAL
PLATELET BLD QL SMEAR: ABNORMAL
PMV BLD AUTO: 10.3 FL (ref 8.9–12.7)
PMV BLD AUTO: 10.5 FL (ref 8.9–12.7)
POIKILOCYTOSIS BLD QL SMEAR: PRESENT
POIKILOCYTOSIS BLD QL SMEAR: PRESENT
POLYCHROMASIA BLD QL SMEAR: PRESENT
POLYCHROMASIA BLD QL SMEAR: PRESENT
POTASSIUM SERPL-SCNC: 3.9 MMOL/L (ref 3.5–5.3)
POTASSIUM SERPL-SCNC: 4.3 MMOL/L (ref 3.5–5.3)
POTASSIUM SERPL-SCNC: 6.3 MMOL/L (ref 3.5–5.3)
PR INTERVAL: 154 MS
PROT SERPL-MCNC: 8.1 G/DL (ref 6.4–8.4)
PROTHROMBIN TIME: 16.5 SECONDS (ref 12.3–15)
QRS AXIS: 78 DEGREES
QRSD INTERVAL: 106 MS
QT INTERVAL: 458 MS
QTC INTERVAL: 472 MS
RBC # BLD AUTO: 2.99 MILLION/UL (ref 3.81–5.12)
RBC # BLD AUTO: 3.83 MILLION/UL (ref 3.81–5.12)
RBC MORPH BLD: PRESENT
RBC MORPH BLD: PRESENT
SODIUM SERPL-SCNC: 134 MMOL/L (ref 135–147)
SODIUM SERPL-SCNC: 138 MMOL/L (ref 135–147)
T WAVE AXIS: -60 DEGREES
VENTRICULAR RATE: 64 BPM
WBC # BLD AUTO: 10.77 THOUSAND/UL (ref 4.31–10.16)
WBC # BLD AUTO: 12.01 THOUSAND/UL (ref 4.31–10.16)

## 2025-01-01 PROCEDURE — 96366 THER/PROPH/DIAG IV INF ADDON: CPT

## 2025-01-01 PROCEDURE — 85027 COMPLETE CBC AUTOMATED: CPT

## 2025-01-01 PROCEDURE — 96375 TX/PRO/DX INJ NEW DRUG ADDON: CPT

## 2025-01-01 PROCEDURE — 80053 COMPREHEN METABOLIC PANEL: CPT

## 2025-01-01 PROCEDURE — 85730 THROMBOPLASTIN TIME PARTIAL: CPT

## 2025-01-01 PROCEDURE — 82948 REAGENT STRIP/BLOOD GLUCOSE: CPT

## 2025-01-01 PROCEDURE — 74176 CT ABD & PELVIS W/O CONTRAST: CPT

## 2025-01-01 PROCEDURE — 96367 TX/PROPH/DG ADDL SEQ IV INF: CPT

## 2025-01-01 PROCEDURE — 99309 SBSQ NF CARE MODERATE MDM 30: CPT

## 2025-01-01 PROCEDURE — 83605 ASSAY OF LACTIC ACID: CPT

## 2025-01-01 PROCEDURE — 83690 ASSAY OF LIPASE: CPT

## 2025-01-01 PROCEDURE — 96365 THER/PROPH/DIAG IV INF INIT: CPT

## 2025-01-01 PROCEDURE — 85007 BL SMEAR W/DIFF WBC COUNT: CPT

## 2025-01-01 PROCEDURE — 93000 ELECTROCARDIOGRAM COMPLETE: CPT

## 2025-01-01 PROCEDURE — 85610 PROTHROMBIN TIME: CPT

## 2025-01-01 PROCEDURE — NC001 PR NO CHARGE: Performed by: SURGERY

## 2025-01-01 PROCEDURE — RECHECK: Performed by: INTERNAL MEDICINE

## 2025-01-01 PROCEDURE — 93005 ELECTROCARDIOGRAM TRACING: CPT

## 2025-01-01 PROCEDURE — 99223 1ST HOSP IP/OBS HIGH 75: CPT | Performed by: INTERNAL MEDICINE

## 2025-01-01 PROCEDURE — 80048 BASIC METABOLIC PNL TOTAL CA: CPT

## 2025-01-01 PROCEDURE — 96374 THER/PROPH/DIAG INJ IV PUSH: CPT

## 2025-01-01 PROCEDURE — 99291 CRITICAL CARE FIRST HOUR: CPT | Performed by: EMERGENCY MEDICINE

## 2025-01-01 PROCEDURE — 99214 OFFICE O/P EST MOD 30 MIN: CPT

## 2025-01-01 PROCEDURE — 94644 CONT INHLJ TX 1ST HOUR: CPT

## 2025-01-01 PROCEDURE — 83880 ASSAY OF NATRIURETIC PEPTIDE: CPT

## 2025-01-01 PROCEDURE — 71045 X-RAY EXAM CHEST 1 VIEW: CPT

## 2025-01-01 PROCEDURE — 36415 COLL VENOUS BLD VENIPUNCTURE: CPT

## 2025-01-01 PROCEDURE — 84132 ASSAY OF SERUM POTASSIUM: CPT

## 2025-01-01 PROCEDURE — 99285 EMERGENCY DEPT VISIT HI MDM: CPT

## 2025-01-01 PROCEDURE — 87040 BLOOD CULTURE FOR BACTERIA: CPT

## 2025-01-01 PROCEDURE — 99310 SBSQ NF CARE HIGH MDM 45: CPT | Performed by: NURSE PRACTITIONER

## 2025-01-01 RX ORDER — HYDROMORPHONE HCL IN WATER/PF 6 MG/30 ML
0.2 PATIENT CONTROLLED ANALGESIA SYRINGE INTRAVENOUS EVERY 4 HOURS PRN
Status: DISCONTINUED | OUTPATIENT
Start: 2025-01-01 | End: 2025-01-01

## 2025-01-01 RX ORDER — BUMETANIDE 1 MG/1
3 TABLET ORAL 2 TIMES DAILY
Start: 2025-01-01 | End: 2025-07-13

## 2025-01-01 RX ORDER — SODIUM CHLORIDE, SODIUM GLUCONATE, SODIUM ACETATE, POTASSIUM CHLORIDE, MAGNESIUM CHLORIDE, SODIUM PHOSPHATE, DIBASIC, AND POTASSIUM PHOSPHATE .53; .5; .37; .037; .03; .012; .00082 G/100ML; G/100ML; G/100ML; G/100ML; G/100ML; G/100ML; G/100ML
1000 INJECTION, SOLUTION INTRAVENOUS ONCE
Status: COMPLETED | OUTPATIENT
Start: 2025-01-01 | End: 2025-01-01

## 2025-01-01 RX ORDER — ACETAMINOPHEN 10 MG/ML
1000 INJECTION, SOLUTION INTRAVENOUS ONCE
Status: COMPLETED | OUTPATIENT
Start: 2025-01-01 | End: 2025-01-01

## 2025-01-01 RX ORDER — FUROSEMIDE 10 MG/ML
40 INJECTION INTRAMUSCULAR; INTRAVENOUS ONCE
Status: DISCONTINUED | OUTPATIENT
Start: 2025-01-01 | End: 2025-01-01

## 2025-01-01 RX ORDER — DEXTROSE MONOHYDRATE 25 G/50ML
25 INJECTION, SOLUTION INTRAVENOUS ONCE
Status: COMPLETED | OUTPATIENT
Start: 2025-01-01 | End: 2025-01-01

## 2025-01-01 RX ORDER — SODIUM CHLORIDE, SODIUM GLUCONATE, SODIUM ACETATE, POTASSIUM CHLORIDE, MAGNESIUM CHLORIDE, SODIUM PHOSPHATE, DIBASIC, AND POTASSIUM PHOSPHATE .53; .5; .37; .037; .03; .012; .00082 G/100ML; G/100ML; G/100ML; G/100ML; G/100ML; G/100ML; G/100ML
100 INJECTION, SOLUTION INTRAVENOUS CONTINUOUS
Status: DISCONTINUED | OUTPATIENT
Start: 2025-01-01 | End: 2025-01-01

## 2025-01-01 RX ORDER — METOPROLOL TARTRATE 1 MG/ML
5 INJECTION, SOLUTION INTRAVENOUS EVERY 6 HOURS PRN
Status: DISCONTINUED | OUTPATIENT
Start: 2025-01-01 | End: 2025-01-01 | Stop reason: HOSPADM

## 2025-01-01 RX ORDER — METRONIDAZOLE 500 MG/100ML
500 INJECTION, SOLUTION INTRAVENOUS EVERY 12 HOURS
Status: DISCONTINUED | OUTPATIENT
Start: 2025-01-01 | End: 2025-01-01 | Stop reason: HOSPADM

## 2025-01-01 RX ORDER — IPRATROPIUM BROMIDE AND ALBUTEROL SULFATE 2.5; .5 MG/3ML; MG/3ML
3 SOLUTION RESPIRATORY (INHALATION) EVERY 6 HOURS PRN
Status: DISCONTINUED | OUTPATIENT
Start: 2025-01-01 | End: 2025-01-01 | Stop reason: HOSPADM

## 2025-01-01 RX ORDER — HYDROMORPHONE HCL IN WATER/PF 6 MG/30 ML
0.2 PATIENT CONTROLLED ANALGESIA SYRINGE INTRAVENOUS EVERY 2 HOUR PRN
Status: DISCONTINUED | OUTPATIENT
Start: 2025-01-01 | End: 2025-01-01 | Stop reason: HOSPADM

## 2025-01-01 RX ORDER — LIDOCAINE HYDROCHLORIDE 20 MG/ML
1 JELLY TOPICAL ONCE
Status: COMPLETED | OUTPATIENT
Start: 2025-01-01 | End: 2025-01-01

## 2025-01-01 RX ORDER — ALBUTEROL SULFATE 5 MG/ML
10 SOLUTION RESPIRATORY (INHALATION) ONCE
Status: COMPLETED | OUTPATIENT
Start: 2025-01-01 | End: 2025-01-01

## 2025-01-01 RX ORDER — BISACODYL 10 MG
10 SUPPOSITORY, RECTAL RECTAL DAILY PRN
Status: DISCONTINUED | OUTPATIENT
Start: 2025-01-01 | End: 2025-01-01 | Stop reason: HOSPADM

## 2025-01-01 RX ORDER — SODIUM CHLORIDE, SODIUM GLUCONATE, SODIUM ACETATE, POTASSIUM CHLORIDE, MAGNESIUM CHLORIDE, SODIUM PHOSPHATE, DIBASIC, AND POTASSIUM PHOSPHATE .53; .5; .37; .037; .03; .012; .00082 G/100ML; G/100ML; G/100ML; G/100ML; G/100ML; G/100ML; G/100ML
75 INJECTION, SOLUTION INTRAVENOUS CONTINUOUS
Status: DISCONTINUED | OUTPATIENT
Start: 2025-01-01 | End: 2025-01-01

## 2025-01-01 RX ORDER — FLUTICASONE PROPIONATE 50 MCG
2 SPRAY, SUSPENSION (ML) NASAL DAILY
Status: DISCONTINUED | OUTPATIENT
Start: 2025-01-01 | End: 2025-01-01 | Stop reason: HOSPADM

## 2025-01-01 RX ORDER — METRONIDAZOLE 500 MG/100ML
500 INJECTION, SOLUTION INTRAVENOUS ONCE
Status: COMPLETED | OUTPATIENT
Start: 2025-01-01 | End: 2025-01-01

## 2025-01-01 RX ORDER — HYDROMORPHONE HCL/PF 1 MG/ML
0.5 SYRINGE (ML) INJECTION ONCE
Refills: 0 | Status: COMPLETED | OUTPATIENT
Start: 2025-01-01 | End: 2025-01-01

## 2025-01-01 RX ORDER — GLYCOPYRROLATE 0.2 MG/ML
0.1 INJECTION INTRAMUSCULAR; INTRAVENOUS EVERY 4 HOURS PRN
Status: DISCONTINUED | OUTPATIENT
Start: 2025-01-01 | End: 2025-01-01 | Stop reason: HOSPADM

## 2025-01-01 RX ORDER — ONDANSETRON 2 MG/ML
4 INJECTION INTRAMUSCULAR; INTRAVENOUS ONCE
Status: COMPLETED | OUTPATIENT
Start: 2025-01-01 | End: 2025-01-01

## 2025-01-01 RX ORDER — IPRATROPIUM BROMIDE 42 UG/1
2 SPRAY, METERED NASAL 4 TIMES DAILY PRN
Status: DISCONTINUED | OUTPATIENT
Start: 2025-01-01 | End: 2025-01-01

## 2025-01-01 RX ORDER — ACETAMINOPHEN 10 MG/ML
1000 INJECTION, SOLUTION INTRAVENOUS EVERY 8 HOURS PRN
Status: DISCONTINUED | OUTPATIENT
Start: 2025-01-01 | End: 2025-01-01 | Stop reason: HOSPADM

## 2025-01-01 RX ORDER — SODIUM CHLORIDE FOR INHALATION 0.9 %
3 VIAL, NEBULIZER (ML) INHALATION ONCE
Status: COMPLETED | OUTPATIENT
Start: 2025-01-01 | End: 2025-01-01

## 2025-01-01 RX ORDER — ALBUMIN (HUMAN) 12.5 G/50ML
25 SOLUTION INTRAVENOUS ONCE
Status: COMPLETED | OUTPATIENT
Start: 2025-01-01 | End: 2025-01-01

## 2025-01-01 RX ORDER — HEPARIN SODIUM 5000 [USP'U]/ML
5000 INJECTION, SOLUTION INTRAVENOUS; SUBCUTANEOUS EVERY 8 HOURS SCHEDULED
Status: DISCONTINUED | OUTPATIENT
Start: 2025-01-01 | End: 2025-01-01

## 2025-01-01 RX ADMIN — SODIUM CHLORIDE, SODIUM GLUCONATE, SODIUM ACETATE, POTASSIUM CHLORIDE, MAGNESIUM CHLORIDE, SODIUM PHOSPHATE, DIBASIC, AND POTASSIUM PHOSPHATE 1000 ML: .53; .5; .37; .037; .03; .012; .00082 INJECTION, SOLUTION INTRAVENOUS at 22:07

## 2025-01-01 RX ADMIN — HYDROMORPHONE HYDROCHLORIDE 0.2 MG: 0.2 INJECTION, SOLUTION INTRAMUSCULAR; INTRAVENOUS; SUBCUTANEOUS at 13:56

## 2025-01-01 RX ADMIN — HYDROMORPHONE HYDROCHLORIDE 0.2 MG: 0.2 INJECTION, SOLUTION INTRAMUSCULAR; INTRAVENOUS; SUBCUTANEOUS at 11:18

## 2025-01-01 RX ADMIN — ONDANSETRON 4 MG: 2 INJECTION, SOLUTION INTRAMUSCULAR; INTRAVENOUS at 20:26

## 2025-01-01 RX ADMIN — ALBUTEROL SULFATE 10 MG: 2.5 SOLUTION RESPIRATORY (INHALATION) at 21:04

## 2025-01-01 RX ADMIN — SODIUM CHLORIDE, SODIUM GLUCONATE, SODIUM ACETATE, POTASSIUM CHLORIDE, MAGNESIUM CHLORIDE, SODIUM PHOSPHATE, DIBASIC, AND POTASSIUM PHOSPHATE 75 ML/HR: .53; .5; .37; .037; .03; .012; .00082 INJECTION, SOLUTION INTRAVENOUS at 02:51

## 2025-01-01 RX ADMIN — DEXTROSE MONOHYDRATE 25 ML: 25 INJECTION, SOLUTION INTRAVENOUS at 23:15

## 2025-01-01 RX ADMIN — CEFTRIAXONE 1000 MG: 10 INJECTION, POWDER, FOR SOLUTION INTRAVENOUS at 21:05

## 2025-01-01 RX ADMIN — SODIUM CHLORIDE, SODIUM GLUCONATE, SODIUM ACETATE, POTASSIUM CHLORIDE, MAGNESIUM CHLORIDE, SODIUM PHOSPHATE, DIBASIC, AND POTASSIUM PHOSPHATE 1000 ML: .53; .5; .37; .037; .03; .012; .00082 INJECTION, SOLUTION INTRAVENOUS at 20:50

## 2025-01-01 RX ADMIN — ACETAMINOPHEN 1000 MG: 10 INJECTION INTRAVENOUS at 20:29

## 2025-01-01 RX ADMIN — HYDROMORPHONE HYDROCHLORIDE 0.5 MG: 1 INJECTION, SOLUTION INTRAMUSCULAR; INTRAVENOUS; SUBCUTANEOUS at 00:03

## 2025-01-01 RX ADMIN — HYDROMORPHONE HYDROCHLORIDE 0.2 MG: 0.2 INJECTION, SOLUTION INTRAMUSCULAR; INTRAVENOUS; SUBCUTANEOUS at 09:07

## 2025-01-01 RX ADMIN — ALBUMIN (HUMAN) 25 G: 0.25 INJECTION, SOLUTION INTRAVENOUS at 04:25

## 2025-01-01 RX ADMIN — ISODIUM CHLORIDE 3 ML: 0.03 SOLUTION RESPIRATORY (INHALATION) at 21:04

## 2025-01-01 RX ADMIN — INSULIN HUMAN 10 UNITS: 100 INJECTION, SOLUTION PARENTERAL at 23:15

## 2025-01-01 RX ADMIN — SODIUM CHLORIDE, SODIUM GLUCONATE, SODIUM ACETATE, POTASSIUM CHLORIDE, MAGNESIUM CHLORIDE, SODIUM PHOSPHATE, DIBASIC, AND POTASSIUM PHOSPHATE 100 ML/HR: .53; .5; .37; .037; .03; .012; .00082 INJECTION, SOLUTION INTRAVENOUS at 01:52

## 2025-01-01 RX ADMIN — METRONIDAZOLE 500 MG: 500 INJECTION, SOLUTION INTRAVENOUS at 21:35

## 2025-01-01 RX ADMIN — LIDOCAINE HYDROCHLORIDE 1 APPLICATION: 20 JELLY TOPICAL at 22:07

## 2025-01-09 DIAGNOSIS — I48.92 ATRIAL FLUTTER WITH RAPID VENTRICULAR RESPONSE (HCC): ICD-10-CM

## 2025-01-09 RX ORDER — METOPROLOL SUCCINATE 50 MG/1
50 TABLET, EXTENDED RELEASE ORAL 2 TIMES DAILY
Qty: 180 TABLET | Refills: 1 | Status: SHIPPED | OUTPATIENT
Start: 2025-01-09

## 2025-01-10 DIAGNOSIS — I48.92 ATRIAL FLUTTER WITH RAPID VENTRICULAR RESPONSE (HCC): ICD-10-CM

## 2025-01-10 RX ORDER — DILTIAZEM HYDROCHLORIDE 120 MG/1
120 CAPSULE, COATED, EXTENDED RELEASE ORAL DAILY
Qty: 90 CAPSULE | Refills: 1 | Status: SHIPPED | OUTPATIENT
Start: 2025-01-10

## 2025-01-18 DIAGNOSIS — I50.32 CHRONIC HEART FAILURE WITH PRESERVED EJECTION FRACTION (HCC): ICD-10-CM

## 2025-01-18 DIAGNOSIS — F41.1 GAD (GENERALIZED ANXIETY DISORDER): ICD-10-CM

## 2025-01-18 DIAGNOSIS — J43.9 PULMONARY EMPHYSEMA, UNSPECIFIED EMPHYSEMA TYPE (HCC): ICD-10-CM

## 2025-01-20 RX ORDER — BUMETANIDE 1 MG/1
3 TABLET ORAL 2 TIMES DAILY
Qty: 180 TABLET | Refills: 5 | Status: SHIPPED | OUTPATIENT
Start: 2025-01-20

## 2025-01-20 RX ORDER — POTASSIUM CHLORIDE 1500 MG/1
20 TABLET, EXTENDED RELEASE ORAL 2 TIMES DAILY
Qty: 60 TABLET | Refills: 5 | Status: SHIPPED | OUTPATIENT
Start: 2025-01-20

## 2025-01-20 RX ORDER — ESCITALOPRAM OXALATE 5 MG/1
5 TABLET ORAL DAILY
Qty: 30 TABLET | Refills: 5 | Status: SHIPPED | OUTPATIENT
Start: 2025-01-20

## 2025-01-20 RX ORDER — BUDESONIDE 0.5 MG/2ML
0.5 INHALANT ORAL 2 TIMES DAILY
Qty: 120 ML | Refills: 3 | Status: SHIPPED | OUTPATIENT
Start: 2025-01-20

## 2025-01-21 ENCOUNTER — NURSE TRIAGE (OUTPATIENT)
Age: 85
End: 2025-01-21

## 2025-01-21 ENCOUNTER — APPOINTMENT (EMERGENCY)
Dept: VASCULAR ULTRASOUND | Facility: HOSPITAL | Age: 85
End: 2025-01-21
Payer: MEDICARE

## 2025-01-21 ENCOUNTER — HOSPITAL ENCOUNTER (EMERGENCY)
Facility: HOSPITAL | Age: 85
Discharge: HOME | End: 2025-01-21
Attending: EMERGENCY MEDICINE | Admitting: EMERGENCY MEDICINE
Payer: MEDICARE

## 2025-01-21 VITALS
HEART RATE: 57 BPM | TEMPERATURE: 98 F | DIASTOLIC BLOOD PRESSURE: 72 MMHG | SYSTOLIC BLOOD PRESSURE: 165 MMHG | OXYGEN SATURATION: 95 % | RESPIRATION RATE: 18 BRPM

## 2025-01-21 DIAGNOSIS — M79.89 PAIN AND SWELLING OF RIGHT LOWER EXTREMITY: Primary | ICD-10-CM

## 2025-01-21 DIAGNOSIS — M71.20 BAKER'S CYST: ICD-10-CM

## 2025-01-21 DIAGNOSIS — M79.604 PAIN AND SWELLING OF RIGHT LOWER EXTREMITY: Primary | ICD-10-CM

## 2025-01-21 LAB
ANION GAP SERPL CALCULATED.3IONS-SCNC: 9 MMOL/L (ref 4–13)
BASOPHILS # BLD AUTO: 0.03 THOUSANDS/ΜL (ref 0–0.1)
BASOPHILS NFR BLD AUTO: 1 % (ref 0–1)
BUN SERPL-MCNC: 44 MG/DL (ref 5–25)
CALCIUM SERPL-MCNC: 9.5 MG/DL (ref 8.4–10.2)
CHLORIDE SERPL-SCNC: 93 MMOL/L (ref 96–108)
CO2 SERPL-SCNC: 33 MMOL/L (ref 21–32)
CREAT SERPL-MCNC: 1.18 MG/DL (ref 0.6–1.3)
EOSINOPHIL # BLD AUTO: 0.08 THOUSAND/ΜL (ref 0–0.61)
EOSINOPHIL NFR BLD AUTO: 1 % (ref 0–6)
ERYTHROCYTE [DISTWIDTH] IN BLOOD BY AUTOMATED COUNT: 16.2 % (ref 11.6–15.1)
GFR SERPL CREATININE-BSD FRML MDRD: 42 ML/MIN/1.73SQ M
GLUCOSE SERPL-MCNC: 222 MG/DL (ref 65–140)
HCT VFR BLD AUTO: 34.3 % (ref 34.8–46.1)
HGB BLD-MCNC: 10.7 G/DL (ref 11.5–15.4)
HOLD SPECIMEN: NORMAL
IMM GRANULOCYTES # BLD AUTO: 0.03 THOUSAND/UL (ref 0–0.2)
IMM GRANULOCYTES NFR BLD AUTO: 1 % (ref 0–2)
INR PPP: 1.79 (ref 0.85–1.19)
LYMPHOCYTES # BLD AUTO: 0.78 THOUSANDS/ΜL (ref 0.6–4.47)
LYMPHOCYTES NFR BLD AUTO: 12 % (ref 14–44)
MCH RBC QN AUTO: 29.2 PG (ref 26.8–34.3)
MCHC RBC AUTO-ENTMCNC: 31.2 G/DL (ref 31.4–37.4)
MCV RBC AUTO: 94 FL (ref 82–98)
MONOCYTES # BLD AUTO: 0.48 THOUSAND/ΜL (ref 0.17–1.22)
MONOCYTES NFR BLD AUTO: 8 % (ref 4–12)
NEUTROPHILS # BLD AUTO: 4.98 THOUSANDS/ΜL (ref 1.85–7.62)
NEUTS SEG NFR BLD AUTO: 77 % (ref 43–75)
NRBC BLD AUTO-RTO: 0 /100 WBCS
PLATELET # BLD AUTO: 245 THOUSANDS/UL (ref 149–390)
PMV BLD AUTO: 9.9 FL (ref 8.9–12.7)
POTASSIUM SERPL-SCNC: 4.2 MMOL/L (ref 3.5–5.3)
PROTHROMBIN TIME: 21.5 SECONDS (ref 12.3–15)
RBC # BLD AUTO: 3.66 MILLION/UL (ref 3.81–5.12)
SODIUM SERPL-SCNC: 135 MMOL/L (ref 135–147)
WBC # BLD AUTO: 6.38 THOUSAND/UL (ref 4.31–10.16)

## 2025-01-21 PROCEDURE — 85610 PROTHROMBIN TIME: CPT

## 2025-01-21 PROCEDURE — 80048 BASIC METABOLIC PNL TOTAL CA: CPT

## 2025-01-21 PROCEDURE — 85025 COMPLETE CBC W/AUTO DIFF WBC: CPT

## 2025-01-21 PROCEDURE — 93971 EXTREMITY STUDY: CPT

## 2025-01-21 PROCEDURE — 36415 COLL VENOUS BLD VENIPUNCTURE: CPT

## 2025-01-21 PROCEDURE — 93971 EXTREMITY STUDY: CPT | Performed by: SURGERY

## 2025-01-21 PROCEDURE — 99284 EMERGENCY DEPT VISIT MOD MDM: CPT
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
## 2025-01-21 NOTE — ED NOTES
PIV placed to facilitate patient care. Patient informed to alert staff of desire to leave so PIV can be removed prior to departure. Patient agreeable and verbalized understanding of same.      Arabella Millard, RN  01/21/25 1293

## 2025-01-21 NOTE — TELEPHONE ENCOUNTER
"See photo, s/w daughter. C/o right calf tenderness x one week. Bruising and swelling .  On blood thinners. Advised ED for evaluation to rule out DVT. Understands advisement.   Reason for Disposition   Thigh or calf pain in only one leg and present > 1 hour    Answer Assessment - Initial Assessment Questions  1. ONSET: \"When did the pain start?\"       One week  2. LOCATION: \"Where is the pain located?\"       Right calf  3. PAIN: \"How bad is the pain?\"    (Scale 1-10; or mild, moderate, severe)      Tender to touch   4. WORK OR EXERCISE: \"Has there been any recent work or exercise that involved this part of the body?\"       Rubbing on the wood steps when going down them   5. CAUSE: \"What do you think is causing the leg pain?\"      Unknown. injury  6. OTHER SYMPTOMS: \"Do you have any other symptoms?\" (e.g., chest pain, back pain, breathing difficulty, swelling, rash, fever, numbness, weakness)      Swollen   7. PREGNANCY: \"Is there any chance you are pregnant?\" \"When was your last menstrual period?\"      N/a    Protocols used: Leg Pain-Adult-[place]    "
Regarding: Bruise on calf (see picture in myc msg)  ----- Message from Charity SMITH sent at 1/21/2025  9:14 AM EST -----  Good morning,   We have recently noticed bruising on the back of my moms right calf. She was wearing compression stockinette but it was rolling down and had a lump near knee crease, so she stopped wearing about a week ago. I am not sure if she bumped it on something? We did practice going down steps where she sits and bumps down. Maybe she bumped it then? It is a little swollen and a little tender to touch but doesn't hurt with movement. Should she be seen? Thank you,  Naomie Javed   [id number]    
402

## 2025-01-21 NOTE — DISCHARGE INSTRUCTIONS
Resume using compression sock to help decrease swelling in your legs. May follow-up with orthopedics for further evaluation if the cyst continues to bother you. Otherwise, cyst will self-resolve. Follow-up with PCP and return to ED for any worsening symptoms.

## 2025-01-22 ENCOUNTER — VBI (OUTPATIENT)
Dept: FAMILY MEDICINE CLINIC | Facility: CLINIC | Age: 85
End: 2025-01-22

## 2025-01-22 NOTE — TELEPHONE ENCOUNTER
01/22/25 12:25 PM    Patient contacted post ED visit, [facility] department spoke with patient/caregiver and outreach was successful.    Thank you.  Brionna Hwang MA  [facility]

## 2025-01-27 ENCOUNTER — TELEPHONE (OUTPATIENT)
Age: 85
End: 2025-01-27

## 2025-01-27 DIAGNOSIS — L08.9 SKIN INFECTION: Primary | ICD-10-CM

## 2025-01-27 RX ORDER — CLINDAMYCIN HYDROCHLORIDE 300 MG/1
300 CAPSULE ORAL 4 TIMES DAILY
Qty: 28 CAPSULE | Refills: 0 | Status: SHIPPED | OUTPATIENT
Start: 2025-01-27 | End: 2025-02-03

## 2025-01-27 NOTE — TELEPHONE ENCOUNTER
Naomie called states patient was seen in ER 1- and has no blood clot. Patient has a red,irritated toe (on the side of toe) and has some pus but is not open. Left foot middle toe. Has appointment with Podiatry on Thursday. Has been putting Neosporin on it. Would like to know if there is other recommendations. Will be sending a picture through my chart.

## 2025-01-30 ENCOUNTER — OFFICE VISIT (OUTPATIENT)
Dept: PODIATRY | Facility: CLINIC | Age: 85
End: 2025-01-30
Payer: MEDICARE

## 2025-01-30 VITALS — HEART RATE: 60 BPM | HEIGHT: 59 IN | BODY MASS INDEX: 25.85 KG/M2 | OXYGEN SATURATION: 95 %

## 2025-01-30 DIAGNOSIS — L03.032 PARONYCHIA OF SECOND TOE, LEFT: ICD-10-CM

## 2025-01-30 DIAGNOSIS — E11.42 DIABETIC PERIPHERAL NEUROPATHY (HCC): ICD-10-CM

## 2025-01-30 DIAGNOSIS — B35.1 ONYCHOMYCOSIS: Primary | ICD-10-CM

## 2025-01-30 PROCEDURE — 11721 DEBRIDE NAIL 6 OR MORE: CPT | Performed by: PODIATRIST

## 2025-01-30 PROCEDURE — 99203 OFFICE O/P NEW LOW 30 MIN: CPT | Performed by: PODIATRIST

## 2025-01-30 NOTE — PROGRESS NOTES
Assessment/Plan:     The patient's clinical examination today significant for a resolving paronychia along the medial nail fold of the left second toenail.  There is no significant tenderness to palpation today.  There is no active drainage no purulence.  There is a very mild localized, resolving erythema.  The pedal nail plates are thickened and dystrophic with discoloration and subungual debris consistent onychomycosis x 10.  There are pincer type deformities to multiple nails.  There are no open lesions.  Pedal pulses are palpable but diminished.  Epicritic sensation is diminished at the forefoot bilaterally.    The pedal nail plates were sharply debrided with a sterile nail clipper x 10 without complication.  The nails were then mechanically reduced in thickness and girth denies a rotary bur without incident.  Her most recent hemoglobin A1c from October 2024 was 7.4, prior to that was 7.1 in August 2024.    She will complete her course of clindamycin as prescribed.  I see no indication for any further antibiosis.  Commend follow-up in 2 to 3 months for continued at risk diabetic footcare.       Diagnoses and all orders for this visit:    Onychomycosis    Diabetic peripheral neuropathy (HCC)    Paronychia of second toe, left          Subjective:     Patient ID: Camryn Roblero is a 84 y.o. female.    The patient presents today for her initial consultation with [facility] for a diabetic foot check and nail care.  The patient previously followed with Dr. Ro who recently relocated.  She did note a ingrown nail to her left second toenail which has improved significantly since being on antibiotic therapy.  She is currently on clindamycin.        Review of Systems   Constitutional: Negative.    HENT: Negative.     Eyes: Negative.    Respiratory: Negative.     Cardiovascular: Negative.    Endocrine: Negative.    Musculoskeletal: Negative.    Neurological: Negative.    Hematological: Negative.     Psychiatric/Behavioral: Negative.           Objective:     Physical Exam  Constitutional:       Appearance: Normal appearance.   HENT:      Head: Normocephalic and atraumatic.      Nose: Nose normal.   Cardiovascular:      Pulses: Pulses are weak.           Dorsalis pedis pulses are 1+ on the right side and 1+ on the left side.        Posterior tibial pulses are 0 on the right side and 0 on the left side.   Pulmonary:      Effort: Pulmonary effort is normal.   Feet:      Right foot:      Skin integrity: Dry skin present. No ulcer, skin breakdown, erythema, warmth or callus.      Toenail Condition: Right toenails are abnormally thick and long. Fungal disease present.     Left foot:      Skin integrity: Dry skin present. No ulcer, skin breakdown, erythema, warmth or callus.      Toenail Condition: Left toenails are abnormally thick and long. Fungal disease present.     Comments: The patient's clinical examination today significant for a resolving paronychia along the medial nail fold of the left second toenail.  There is no significant tenderness to palpation today.  There is no active drainage no purulence.  There is a very mild localized, resolving erythema.  The pedal nail plates are thickened and dystrophic with discoloration and subungual debris consistent onychomycosis x 10.  There are pincer type deformities to multiple nails.  There are no open lesions.  Pedal pulses are palpable but diminished.  Epicritic sensation is diminished at the forefoot bilaterally.  Skin:     General: Skin is warm.      Capillary Refill: Capillary refill takes 2 to 3 seconds.   Neurological:      General: No focal deficit present.      Mental Status: She is alert and oriented to person, place, and time.   Psychiatric:         Mood and Affect: Mood normal.         Behavior: Behavior normal.         Thought Content: Thought content normal.         Diabetic Foot Exam    Patient's shoes and socks removed.    Right Foot/Ankle   Right  Foot Inspection  Skin Exam: skin normal, skin intact and dry skin. No warmth, no callus, no erythema, no maceration, no abnormal color, no pre-ulcer, no ulcer and no callus.     Toe Exam: ROM and strength within normal limits.     Sensory   Vibration: diminished  Monofilament testing: diminished    Vascular  Capillary refills: < 3 seconds  The right DP pulse is 1+. The right PT pulse is 0.     Left Foot/Ankle  Left Foot Inspection  Skin Exam: skin normal, skin intact and dry skin. No warmth, no erythema, no maceration, normal color, no pre-ulcer, no ulcer and no callus.     Toe Exam: ROM and strength within normal limits.     Sensory   Vibration: diminished  Monofilament testing: diminished    Vascular  Capillary refills: < 3 seconds  The left DP pulse is 1+. The left PT pulse is 0.     Assign Risk Category  No deformity present  Loss of protective sensation  Weak pulses  Risk: 2

## 2025-02-01 ENCOUNTER — PATIENT MESSAGE (OUTPATIENT)
Dept: CARDIOLOGY CLINIC | Facility: CLINIC | Age: 85
End: 2025-02-01

## 2025-02-03 NOTE — PATIENT COMMUNICATION
Received call from pt's daughter following up on [employer] message sent in over the weekend.  Pt's daughter states over the month of January the pt's weight has gone up 10lbs.  The pt denies any SOB or increased fatigue.  Pt report that her stomach feels robbins.  Pt has b/l le swelling at baseline but pt's daughter did not think that they looked worse then usual.  Pt has been compliant with Bumex and is reporting adequate urine output.  Please review and advise, thank you.

## 2025-02-04 ENCOUNTER — OFFICE VISIT (OUTPATIENT)
Dept: PULMONOLOGY | Facility: CLINIC | Age: 85
End: 2025-02-04
Payer: MEDICARE

## 2025-02-04 VITALS
OXYGEN SATURATION: 95 % | HEIGHT: 59 IN | SYSTOLIC BLOOD PRESSURE: 130 MMHG | TEMPERATURE: 96.6 F | WEIGHT: 141 LBS | BODY MASS INDEX: 28.43 KG/M2 | HEART RATE: 50 BPM | DIASTOLIC BLOOD PRESSURE: 60 MMHG

## 2025-02-04 DIAGNOSIS — G47.33 OSA (OBSTRUCTIVE SLEEP APNEA): Primary | ICD-10-CM

## 2025-02-04 DIAGNOSIS — R91.8 MULTIPLE PULMONARY NODULES: ICD-10-CM

## 2025-02-04 DIAGNOSIS — D3A.090 BENIGN CARCINOID TUMOR OF THE BRONCHUS AND LUNG: ICD-10-CM

## 2025-02-04 DIAGNOSIS — I50.32 CHRONIC HEART FAILURE WITH PRESERVED EJECTION FRACTION (HCC): ICD-10-CM

## 2025-02-04 DIAGNOSIS — J44.9 COPD, SEVERE (HCC): ICD-10-CM

## 2025-02-04 PROCEDURE — 99214 OFFICE O/P EST MOD 30 MIN: CPT | Performed by: INTERNAL MEDICINE

## 2025-02-04 NOTE — PATIENT COMMUNICATION
Spoke with daughter. Went over the instructions. She will monitor her BP,s/s of dehydration, and wt.  I will call her on 2/6/25 to get f/u info.  Daughter verbally understood.

## 2025-02-04 NOTE — PROGRESS NOTES
Name: Camryn Roblero      : 1940      MRN: [id number]  Encounter Provider: Ban Garland MD  Encounter Date: 2025   Encounter department: [facility] PULMONARY & [facility] [place] [facility]  :  Assessment & Plan  TOM (obstructive sleep apnea)  She had a long history of snoring and daytime sleepiness.She was found to have mild TOM with oxygen desaturation on her overnight sleep study. Her CPAP titration study was suboptimal. She was started on CPAP therapy at 9 cm of water and has been using it. Her CPAP compliance has been good and her residual AHI was low. She is getting benefit from CPAP therapy I have advised her to use the CPAP as before.  I have advised her to change the mask regularly.  She uses oxygen 1 L/min.           Multiple pulmonary nodules  She has multiple lung nodules which were stable on the last CT scan from May 2022.  A repeat CT scan 2023 showed stability of the lung nodules recent CT scan showed enlarging lung nodule on the left different from the previous benign carcinoid nodule.  She denied any anorexia or weight loss.  Her CT PET scan from 2024 showed only nonspecific intake.  Continued surveillance of the lung nodules is required.  Chest x-ray showed pulmonary vascular congestion and small effusions.  Ordered a follow-up CT scan.    Orders:    CT chest without contrast; Future    COPD, severe (HCC)  Mrs.Cleo Roblero has history of COPD and was on Advair Bid before. She also had  asthma as a child. She has occasional cough and no significant phlegm. She is a never smoker, however she has history of secondhand smoke exposure.Her PFT done on 2024 showed severe airflow obstruction with severe diffusion defect, hyperinflation and air trapping diagnostic of emphysema.  Her baseline oxygen saturation at rest was 94% 6-minute walk test.  She could not complete 6-minute walk test and stopped after 2 minutes.  She did not desaturate during this short.  She has severe exercise  limitation due to SOB. She has been on nebulized budesonide twice daily and she has been using this regularly.  I have advised her to use the nebulizer when necessary for her shortness of breath. I have advised her to continue with oxygen supplementation as needed.  I had a long discussion with her and her daughter who accompanied her.  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 She had also mediastinal lymphadenopathy. She was also noted to have a nodule in the thyroid gland and a nodular hepatic lesion. She had a CT guided biopsy of LLL lung lesion which was consistent with neuroendocrine tumor, carcinoid. She follows with Dr. Shruthi Benitez from Oncology        Chronic heart failure with preserved ejection fraction (HCC)  Wt Readings from Last 3 Encounters:   02/04/25 64 kg (141 lb)   12/10/24 58.1 kg (128 lb)   11/26/24 63 kg (139 lb)   She has chronic congestive heart failure with preserved ejection fraction and has been on diuresis with bumetanide.           History of Present Illness   HPI  Camryn Roblero is a 84 y.o. female previous history of COPD, obstructive sleep apnea, atrial fibrillation, multiple lung nodules and chronic respiratory failure on oxygen supplementation at 2 L/min who has come for follow-up.  She reportedly has been using the CPAP regularly at home and has been comfortable with the mask and pressure.  She denied any significant daytime sleepiness or morning headache.  She lives alone.  I reviewed her CPAP compliance records and they are suboptimal.  I have highlighted to her the need for using the CPAP regularly and adequately.  Her residual AHI was low.  She has been on treatment with budesonide regularly and DuoNeb as needed for her COPD.  She gargles throat after using steroid nebulizer.  She has ambulatory dysfunction and uses a  "wheelchair.  Her exercise tolerance is limited.  She denied any significant cough or phlegm or hemoptysis or chest pain.  She has chronic bilateral leg edema.  Her recent venous Doppler was negative for DVT.  She denied any fever or chills.  Her appetite has been good.  She has voluntarily lost weight.  He has had multiple lung nodules and she had underwent a CT PET scan in June 2024.  Needs a follow-up CT scan and this has been ordered.  He previously had lung nodule which was found to be carcinoid.  Currently she is asymptomatic with regard to the carcinoid.  He is tolerating systemic anticoagulation well.  Has chronic heart failure with preserved ejection fraction.  Her pulmonary artery pressure was found to be 98 on her last echocardiogram.  She had left atrial and right atrial dilatation.  She also had mitral regurgitation.  He is on diuretic therapy.      Review of Systems   Constitutional:  Negative for appetite change, chills, fatigue and fever.   HENT:  Negative for hearing loss, rhinorrhea, sneezing, sore throat and trouble swallowing.    Respiratory:  Positive for shortness of breath. Negative for cough, chest tightness and wheezing.    Cardiovascular:  Positive for leg swelling. Negative for chest pain and palpitations.   Gastrointestinal:  Negative for abdominal pain, constipation, diarrhea, nausea and vomiting.   Genitourinary:  Negative for dysuria, frequency and urgency.   Musculoskeletal:  Positive for gait problem. Negative for arthralgias and back pain.   Skin:  Negative for rash.   Allergic/Immunologic: Negative for environmental allergies.   Neurological:  Negative for dizziness, syncope and headaches.   Psychiatric/Behavioral:  Negative for agitation, confusion and sleep disturbance. The patient is nervous/anxious.           Objective   /60 (BP Location: Left arm, Patient Position: Sitting, Cuff Size: Standard)   Pulse (!) 50   Temp (!) 96.6 °F (35.9 °C) (Tympanic)   Ht 4' 11\" (1.499 " m)   Wt 64 kg (141 lb) Comment: per patient In Wheelchair  SpO2 95%   BMI 28.48 kg/m²      Physical Exam  Vitals reviewed.   Constitutional:       General: She is not in acute distress.     Appearance: She is not ill-appearing, toxic-appearing or diaphoretic.      Interventions: She is not intubated.  HENT:      Head: Normocephalic.      Mouth/Throat:      Mouth: Mucous membranes are moist.   Neck:      Vascular: No JVD.   Cardiovascular:      Rate and Rhythm: Normal rate and regular rhythm.      Heart sounds: Normal heart sounds. No murmur heard.  Pulmonary:      Effort: Pulmonary effort is normal. No tachypnea, accessory muscle usage or respiratory distress. She is not intubated.      Breath sounds: No decreased breath sounds, wheezing, rhonchi or rales.   Chest:      Chest wall: No tenderness.   Abdominal:      General: Bowel sounds are normal.      Tenderness: There is no abdominal tenderness. There is no guarding.   Musculoskeletal:      Cervical back: Neck supple.      Right lower leg: Edema present.      Left lower leg: Edema present.   Lymphadenopathy:      Cervical: No cervical adenopathy.   Skin:     Coloration: Skin is not cyanotic or pale.      Findings: No rash.      Nails: There is no clubbing.   Neurological:      Mental Status: She is alert and oriented to person, place, and time.   Psychiatric:         Mood and Affect: Mood is anxious.         Behavior: Behavior normal. Behavior is not agitated.     Spent 30 minutes of time take care of this patient with complex medical issues.  The majority of this time was spent directly with the patient counseling as well as coordinating care.

## 2025-02-04 NOTE — ASSESSMENT & PLAN NOTE
Wt Readings from Last 3 Encounters:   02/04/25 64 kg (141 lb)   12/10/24 58.1 kg (128 lb)   11/26/24 63 kg (139 lb)   She has chronic congestive heart failure with preserved ejection fraction and has been on diuresis with bumetanide.

## 2025-02-04 NOTE — ASSESSMENT & PLAN NOTE
Mrs.Cleo Roblero has history of COPD and was on Advair Bid before. She also had  asthma as a child. She has occasional cough and no significant phlegm. She is a never smoker, however she has history of secondhand smoke exposure.Her PFT done on 6/4/2024 showed severe airflow obstruction with severe diffusion defect, hyperinflation and air trapping diagnostic of emphysema.  Her baseline oxygen saturation at rest was 94% 6-minute walk test.  She could not complete 6-minute walk test and stopped after 2 minutes.  She did not desaturate during this short.  She has severe exercise limitation due to SOB. She has been on nebulized budesonide twice daily and she has been using this regularly.  I have advised her to use the nebulizer when necessary for her shortness of breath. I have advised her to continue with oxygen supplementation as needed.  I had a long discussion with her and her daughter who accompanied her.  I answered all their questions

## 2025-02-04 NOTE — ASSESSMENT & PLAN NOTE
She has multiple lung nodules which were stable on the last CT scan from May 2022.  A repeat CT scan July 2023 showed stability of the lung nodules recent CT scan showed enlarging lung nodule on the left different from the previous benign carcinoid nodule.  She denied any anorexia or weight loss.  Her CT PET scan from 6/19/2024 showed only nonspecific intake.  Continued surveillance of the lung nodules is required.  Chest x-ray showed pulmonary vascular congestion and small effusions.  Ordered a follow-up CT scan.    Orders:    CT chest without contrast; Future

## 2025-02-13 PROBLEM — E87.5 HYPERKALEMIA: Status: RESOLVED | Noted: 2024-05-13 | Resolved: 2025-02-13

## 2025-02-13 PROBLEM — N17.9 ACUTE KIDNEY INJURY SUPERIMPOSED ON CHRONIC KIDNEY DISEASE  (HCC): Status: RESOLVED | Noted: 2024-10-31 | Resolved: 2025-02-13

## 2025-02-13 PROBLEM — N18.9 ACUTE KIDNEY INJURY SUPERIMPOSED ON CHRONIC KIDNEY DISEASE  (HCC): Status: RESOLVED | Noted: 2024-10-31 | Resolved: 2025-02-13

## 2025-02-13 NOTE — ASSESSMENT & PLAN NOTE
Followed by Pulmonary        Reason for visit:Dizziness and lighthead, right leg swollen. Burning sensation upper thigh.      Called back patient and she states she is at  [facility] ER now   (  per chart review ) and they are taking care of   her now . Advised patient to  stay there and she agreed.            Reason for Disposition  • Patient already left for the hospital/clinic    Protocols used: No Contact or Duplicate Contact Call-A-[place]

## 2025-02-13 NOTE — ASSESSMENT & PLAN NOTE
Hypoosmolar  Etiology: Suspect multifactorial due to volume fluctuations, COPD and pulmonary disease with carcinoid tumor.  recent serum sodium 135, corrected 137  Baseline sodium appears to be in the low to mid 130s meq  History of bilateral pulmonary nodules and pulmonary carcinoid tumor.  Prior workup: serum osmolality 268 true, urine osmolality 286, urine sodium 33  Prior SPEP and UPEP unrevealing.  Continue fluid restriction of 1.5L/day   Previously on tablets but discontinued due to volume overload and edema.  Continue to monitor off salt tablets  Encourage solute intake and nutritional supplements.  Baseline creatinine 0.7-1.1.  Most recent creatinine 1.1, stable at baseline  continue to monitor   follow-up in office in 6 months with Dr. Perkins with repeat labs.  Orders:    Albumin / creatinine urine ratio; Future    Protein / creatinine ratio, urine; Future    Basic metabolic panel; Future    Magnesium; Future

## 2025-02-13 NOTE — ASSESSMENT & PLAN NOTE
BP acceptable for age  volume status hypervolemic.  Patient has been experiencing weight gain since hospitalization 1 month ago.  She required additional doses of Bumex for 2 days earlier this week with subsequent drop in weight.  Weight now increasing with chronic BLE edema without dyspnea or increasing O2 demans.  Current medications: Toprol XL 50 mg twice daily, Bumex 3 mg twice daily  Changes: Take an additional Bumex 3 mg tablet for the next 2 days with additional potassium as previously recommended by Cardiology.  Call cardiology on Monday for further recommendations regarding diuretic management..  Dr. Marquez has been managing volume status.  Avoid hypotension or large fluctuations in blood pressure.  Avoid NSAIDs  low sodium (2 gm) diet  continue to monitor BP at home  Orders:    Albumin / creatinine urine ratio; Future    Protein / creatinine ratio, urine; Future    Basic metabolic panel; Future

## 2025-02-13 NOTE — ASSESSMENT & PLAN NOTE
Wt Readings from Last 3 Encounters:   02/04/25 64 kg (141 lb)   12/10/24 58.1 kg (128 lb)   11/26/24 63 kg (139 lb)   Progressive 10# weight gain with BLE edema over past month requiring extra Bumex doses  echo 10/23/2024:  EF 65%  home diuretics: Bumex 3 mg twice daily, recently 3 mg TID x 2 days with improvement in weight.  Take additional Bumex 3 mg tablet x 2 days and then resume home dose but call Cardiology on Monday morning.    Recommend evaluation in ER if worsening symptoms  fluid restriction, 2 gm low sodium diet, daily weights  management per Cardiology

## 2025-02-13 NOTE — ASSESSMENT & PLAN NOTE
UACr 100 mg/g and UPCr 0.26 g in May 2024  No recent urine studies for review  Prior SPEP and UPEP unrevealing  No longer on RAAS blockade  Repeat urine studies prior to next appointment  Orders:    Albumin / creatinine urine ratio; Future    Protein / creatinine ratio, urine; Future    Basic metabolic panel; Future

## 2025-02-13 NOTE — ASSESSMENT & PLAN NOTE
2.0 cm LLL pulmonary carcinoid tumor with multiple stable pulmonary nodules  Biopsy-proven neuroendocrine tumor  Followed by pulmonary and Oncology (Dr Benitez)  Orders:    Basic metabolic panel; Future

## 2025-02-13 NOTE — ASSESSMENT & PLAN NOTE
Lab Results   Component Value Date    HGBA1C 7.4 (H) 10/23/2024   Stable  On metformin  continue to optimize glycemic control  management per primary team  Orders:    Albumin / creatinine urine ratio; Future    Protein / creatinine ratio, urine; Future    Basic metabolic panel; Future
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
## 2025-02-13 NOTE — ASSESSMENT & PLAN NOTE
Followed previously by Urology  Most recent CT renal protocol with contrast demonstrates stable 1.1 x 1.2 cm left renal nodule without enhancement and benign renal cyst.  No further follow-up or surveillance recommended per urology

## 2025-02-13 NOTE — PATIENT INSTRUCTIONS
Serum sodium stable and at baseline  Renal function stable and at your normal baseline  Recommend taking an extra bumex 3 mg tablet on Saturday and Sunday with additional potassium pill.  Call your cardiologist on Monday to discuss diuretic dose going forward  Recommend continuing use of Tubigrip.  Recommend 2 layers or decreasing size the smaller letter size  Elevate your legs when sitting to help with swelling relief.  Continue to avoid all NSAIDs to include ibuprofen, Motrin, Aleve, Advil, Naproxen, Celebrex, Indomethacin, Toradol.  Follow fluid restriction of 1.5 L (50 ounces) of fluid per day  We continue to avoid salt tablets due to prior history of leg swelling and fluid overload with salt tablets.  Continue all prescribed medications.  Call if blood pressure consistently more than 140/90 or less than 110/50s.  High and low blood pressures may affect your kidney function.  Please let us know if you are scheduled for any studies with IV contrast (ex: CT scan, arteriogram or cardiac catheterization)   Follow up in 6 months with Dr. Perkins with repeat labs prior to appointment.  Please contact the office with new symptoms or concerns.

## 2025-02-14 ENCOUNTER — OFFICE VISIT (OUTPATIENT)
Dept: NEPHROLOGY | Facility: CLINIC | Age: 85
End: 2025-02-14
Payer: MEDICARE

## 2025-02-14 VITALS — HEART RATE: 50 BPM | OXYGEN SATURATION: 93 % | DIASTOLIC BLOOD PRESSURE: 64 MMHG | SYSTOLIC BLOOD PRESSURE: 142 MMHG

## 2025-02-14 DIAGNOSIS — I10 ESSENTIAL HYPERTENSION: ICD-10-CM

## 2025-02-14 DIAGNOSIS — E87.1 HYPONATREMIA: Primary | ICD-10-CM

## 2025-02-14 DIAGNOSIS — I50.32 CHRONIC HEART FAILURE WITH PRESERVED EJECTION FRACTION (HCC): ICD-10-CM

## 2025-02-14 DIAGNOSIS — I48.20 CHRONIC ATRIAL FIBRILLATION (HCC): ICD-10-CM

## 2025-02-14 DIAGNOSIS — D3A.090 BENIGN CARCINOID TUMOR OF THE BRONCHUS AND LUNG: ICD-10-CM

## 2025-02-14 DIAGNOSIS — R80.1 PERSISTENT PROTEINURIA: ICD-10-CM

## 2025-02-14 DIAGNOSIS — J44.9 COPD, SEVERE (HCC): ICD-10-CM

## 2025-02-14 DIAGNOSIS — E11.9 TYPE 2 DIABETES MELLITUS WITHOUT COMPLICATION, WITHOUT LONG-TERM CURRENT USE OF INSULIN (HCC): ICD-10-CM

## 2025-02-14 DIAGNOSIS — N28.9 RENAL LESION: ICD-10-CM

## 2025-02-14 PROCEDURE — 99214 OFFICE O/P EST MOD 30 MIN: CPT | Performed by: PHYSICIAN ASSISTANT

## 2025-02-17 ENCOUNTER — TELEPHONE (OUTPATIENT)
Dept: CARDIOLOGY CLINIC | Facility: CLINIC | Age: 85
End: 2025-02-17

## 2025-02-17 DIAGNOSIS — I50.32 CHRONIC HEART FAILURE WITH PRESERVED EJECTION FRACTION (HCC): Primary | ICD-10-CM

## 2025-02-17 RX ORDER — METOLAZONE 2.5 MG/1
TABLET ORAL
Qty: 12 TABLET | Refills: 0 | Status: SHIPPED | OUTPATIENT
Start: 2025-02-17

## 2025-02-17 NOTE — TELEPHONE ENCOUNTER
Called pt/ daughter per Dr. Rader's request.  No answer, LMOM about the metolazone at the pharmacy and how to take that medication.    I asked Naomie to call me back if she had any questions.  I will call pt again tomorrow.  Not sure she will get the metolazone in time to take it today.

## 2025-02-24 ENCOUNTER — TELEPHONE (OUTPATIENT)
Dept: LAB | Facility: HOSPITAL | Age: 85
End: 2025-02-24

## 2025-02-24 ENCOUNTER — OFFICE VISIT (OUTPATIENT)
Dept: CARDIOLOGY CLINIC | Facility: CLINIC | Age: 85
End: 2025-02-24
Payer: MEDICARE

## 2025-02-24 ENCOUNTER — HOSPITAL ENCOUNTER (OUTPATIENT)
Dept: CT IMAGING | Facility: HOSPITAL | Age: 85
Discharge: HOME | End: 2025-02-24
Payer: MEDICARE

## 2025-02-24 VITALS
SYSTOLIC BLOOD PRESSURE: 118 MMHG | BODY MASS INDEX: 29 KG/M2 | HEART RATE: 51 BPM | WEIGHT: 143.6 LBS | OXYGEN SATURATION: 89 % | DIASTOLIC BLOOD PRESSURE: 58 MMHG

## 2025-02-24 DIAGNOSIS — I10 ESSENTIAL HYPERTENSION: ICD-10-CM

## 2025-02-24 DIAGNOSIS — I48.0 PAROXYSMAL ATRIAL FIBRILLATION (HCC): ICD-10-CM

## 2025-02-24 DIAGNOSIS — I50.32 CHRONIC HEART FAILURE WITH PRESERVED EJECTION FRACTION (HCC): Primary | ICD-10-CM

## 2025-02-24 DIAGNOSIS — I48.92 ATRIAL FLUTTER, UNSPECIFIED TYPE (HCC): ICD-10-CM

## 2025-02-24 DIAGNOSIS — R91.8 MULTIPLE PULMONARY NODULES: ICD-10-CM

## 2025-02-24 PROCEDURE — 71250 CT THORAX DX C-: CPT

## 2025-02-24 PROCEDURE — 93000 ELECTROCARDIOGRAM COMPLETE: CPT | Performed by: INTERNAL MEDICINE

## 2025-02-24 PROCEDURE — 99214 OFFICE O/P EST MOD 30 MIN: CPT | Performed by: INTERNAL MEDICINE
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
## 2025-02-24 NOTE — PATIENT INSTRUCTIONS
"Patient Education     Low-sodium diet   The Basics   Written by the doctors and editors at [facility]   What is sodium? -- This is the main ingredient in table salt. It is also found in lots of foods. The body needs a very small amount of sodium to work normally, but most people eat much more sodium than their body needs.  Who should eat less sodium? -- Nearly everyone eats too much sodium. The average American takes in 3400 milligrams of sodium each day. Experts say that most people should have no more than 2300 milligrams a day.  Some people with certain health conditions should follow a low-sodium diet. Ask your doctor how much sodium you should have.  Why should I eat less sodium? -- Reducing the amount of sodium you eat can have lots of health benefits:   It can lower your blood pressure. This means that it can help lower your risk of stroke, heart attack, kidney damage, and lots of other health problems.   It can reduce the amount of fluid in your body, which means that your heart doesn't have to work as hard.   It can keep the kidneys from having to work too hard. This is especially important in people who have kidney disease.   It can reduce swelling in the ankles and belly, which can be uncomfortable and make it hard to move.   It can lower the chances of forming kidney stones.   It can help keep your bones strong.  Which foods have the most sodium? -- Processed foods have the most sodium. These foods usually come in cans, boxes, jars, and bags. They tend to have a lot of sodium even if they don't taste salty. In fact, many sweet foods have a lot of sodium in them. The only way to know for sure how much sodium is in a food is to check the label (figure 1).  Here are some examples of foods that often have too much sodium:   Canned soups   Rice and noodle mixes   Sauces, dressings, and condiments (such as ketchup and mustard)   Pre-made frozen meals (also called \"TV dinners\")   Deli meats, hot dogs, and " "cheeses   Smoked, cured, or pickled foods   Salted snack foods and nuts   Restaurant meals  What should I do to reduce the amount of sodium in my diet? -- Many people think that eating a low-sodium diet just means not adding salt to their food. But this is not true. Not adding salt at the table or when cooking will help a little. But almost all of the sodium you eat is already in the food you buy at the grocery store or at restaurants (figure 2).  Here are some tips to help you eat less sodium:   Avoid processed foods when possible. This is the most important thing you can do to eat less sodium. Processed foods include most foods that are sold in cans, boxes, jars, and bags.   Instead of buying pre-made, processed foods, buy fresh or fresh-frozen fruits and vegetables. (\"Fresh-frozen\" means that the food is frozen without anything added to it.)   Buy meats, fish, chicken, and turkey that are fresh instead of canned or sold at the deli counter. (Meats sold at the deli counter are high in sodium.)   Try to eat at restaurants less often.   When possible, try to make meals from scratch at home using fresh ingredients.   If you do buy canned or packaged foods, choose ones that are labeled \"sodium free\" or \"very low sodium\" (table 1). Or choose foods that have less than 400 milligrams of sodium in each serving. The amount of sodium in each serving appears on the nutrition label (figure 1).  The table has some examples of foods to avoid and foods to choose instead (table 2).  Whatever changes you make, make them slowly. Choose 1 thing to do differently, and do that for a while. If you can keep doing that change easily, add another change. For instance, if you usually eat green beans from a can, try buying fresh or fresh-frozen green beans and cooking them at home without adding salt. If that works for you, keep doing it. Then, choose another thing to change.  If you try making a change and it doesn't work right away, don't " "give up. See if you can reduce sodium in other ways. The important thing is to take small steps and to keep doing the changes that work for you.  Can I still eat out at restaurants sometimes? -- One of best ways to limit your sodium is to only eat out at restaurants every once in a while. When you do eat out, try to choose places that offer healthier choices and fresh ingredients.  No matter where you eat, when choosing your food:   Ask your  if your meal can be made without salt.   Avoid foods that come with sauces or dips.   Choose plain grilled meats or fish and steamed vegetables.   Ask for oil and vinegar for your salad, rather than dressing.   If a meal you really want has more sodium than you should have, consider saving half of it to eat another day.  What if food just does not taste as good without sodium? -- Starting a low-sodium diet can be hard. The good news is that your taste buds can get used to having less sodium. But you have to give them some time to adjust.  It can also help to try other ways to add flavor to your foods. Try things like herbs, spices, lemon juice, and vinegar.  What about salt substitutes? -- Flavoring your food with a salt substitute is a good way to reduce how much salt you eat. But check with your doctor or nurse before trying this. Some salt substitutes can be dangerous if you have certain health problems or take certain medicines.  Do medicines have sodium? -- Yes, some medicines contain sodium. If you are buying medicines you can get without a prescription, look to see how much sodium they have. Avoid products that have \"sodium carbonate\" or \"sodium bicarbonate\" unless your doctor prescribes them. (Sodium bicarbonate is baking soda.)  All topics are updated as new evidence becomes available and our peer review process is complete.  This topic retrieved from [employer] on: Mar 22, 2024.  Topic 03252 Version 14.0  Release: 32.2.4 - C32.80  © 2024 Up[employer], Inc. and/or its " "affiliates. All rights reserved.  figure 1: Food labels can be tricky     To figure out how much sodium you are eating, check the label to find out how much sodium is in 1 serving. If you are having more than 1 serving, multiply that amount by the number of servings you plan to eat. For instance, if you are going to eat this whole can of soup, you should multiply 850 by 2. That means that you will be having 1700 milligrams of sodium. That's more sodium than many people are supposed to have in 1 day.  Graphic 46180 Version 8.0  figure 2: Sources of sodium in your diet     Graphic 86991 Version 2.0  table 1: A guide to common nutrient claims and what they mean  Salt/sodium-free  Less than 5 mg of sodium per serving   Very low sodium  35 mg or less of sodium per serving   Low sodium  140 mg or less of sodium per serving   Reduced sodium  At least 25% less sodium than the regular product   Light or lite in sodium  At least 50% less sodium than the regular product   No salt added or unsalted  No salt is added during processing, but these products may not be salt/sodium-free unless stated   mg: milligram; %: percent.  Graphic 83094 Version 7.0  table 2: Ways to cut down on salt (sodium)  Avoid these foods  Try these foods instead    Cured and smoked foods like sandoval, sausage, smoked fish and meats, hot dogs, ham, lunch meats, corned beef, and pickles Fresh turkey, chicken, and lean beef   Canned fish (tuna, sardines) Unsalted tuna or sardines   Canned meats Fresh unprocessed meats, vegetable protein, and fish  Frozen and canned meats, vegetable protein, and fish that are labeled \"low-sodium\"   Salted pretzels, crackers, potato chips, tortilla chips, and nuts Low-sodium and unsalted versions of these foods   Most cheeses Low-sodium cheeses (check label for actual sodium content)   Sauces (tomato and cream, etc), tomato juices Low-sodium versions of these foods, such as low-sodium tomato juice   Processed, instant, and " "convenience foods like frozen dinners, packaged meals, canned soups, and boxed pasta blends Cook and freeze your own low-sodium meals, soups, and broths    If you do need to use convenience or processed foods, read the labels. Choose items with 140 to 200 mg of sodium per serving.    For an entire convenience meal (frozen dinner), try to find options with less than 500 to 600 mg of sodium.    If you used canned foods, look for those labeled \"sodium-free.\" Or you can rinse the canned food under water to lower the sodium content.   Fast foods and foods prepared at restaurants (unless without cheese, sauces, or added salt) Fresh foods and foods with sauces on the side  Request that food be prepared without cheese or added salt   Graphic 49725 Version 10.0  Consumer Information Use and Disclaimer   Disclaimer: This generalized information is a limited summary of diagnosis, treatment, and/or medication information. It is not meant to be comprehensive and should be used as a tool to help the user understand and/or assess potential diagnostic and treatment options. It does NOT include all information about conditions, treatments, medications, side effects, or risks that may apply to a specific patient. It is not intended to be medical advice or a substitute for the medical advice, diagnosis, or treatment of a health care provider based on the health care provider's examination and assessment of a patient's specific and unique circumstances. Patients must speak with a health care provider for complete information about their health, medical questions, and treatment options, including any risks or benefits regarding use of medications. This information does not endorse any treatments or medications as safe, effective, or approved for treating a specific patient. UpToDate, Inc. and its affiliates disclaim any warranty or liability relating to this information or the use thereof.The use of this information is governed by the " Terms of Use, available at https://www.[employer].com/en/know/clinical-effectiveness-terms. 2024© [employer] and its affiliates and/or licensors. All rights reserved.  Copyright   © 2024 [employer] and/or its affiliates. All rights reserved.

## 2025-03-03 ENCOUNTER — RESULTS FOLLOW-UP (OUTPATIENT)
Dept: PULMONOLOGY | Facility: CLINIC | Age: 85
End: 2025-03-03

## 2025-03-16 DIAGNOSIS — E78.5 DYSLIPIDEMIA: ICD-10-CM

## 2025-03-17 ENCOUNTER — APPOINTMENT (OUTPATIENT)
Dept: LAB | Facility: HOSPITAL | Age: 85
End: 2025-03-17
Payer: MEDICARE

## 2025-03-17 DIAGNOSIS — I50.32 CHRONIC HEART FAILURE WITH PRESERVED EJECTION FRACTION (HCC): ICD-10-CM

## 2025-03-17 LAB
ANION GAP SERPL CALCULATED.3IONS-SCNC: 8 MMOL/L (ref 4–13)
BUN SERPL-MCNC: 58 MG/DL (ref 5–25)
CALCIUM SERPL-MCNC: 9.7 MG/DL (ref 8.4–10.2)
CHLORIDE SERPL-SCNC: 95 MMOL/L (ref 96–108)
CO2 SERPL-SCNC: 34 MMOL/L (ref 21–32)
CREAT SERPL-MCNC: 1.52 MG/DL (ref 0.6–1.3)
GFR SERPL CREATININE-BSD FRML MDRD: 31 ML/MIN/1.73SQ M
GLUCOSE P FAST SERPL-MCNC: 128 MG/DL (ref 65–99)
POTASSIUM SERPL-SCNC: 5.7 MMOL/L (ref 3.5–5.3)
SODIUM SERPL-SCNC: 137 MMOL/L (ref 135–147)

## 2025-03-17 PROCEDURE — 36415 COLL VENOUS BLD VENIPUNCTURE: CPT

## 2025-03-17 PROCEDURE — 80048 BASIC METABOLIC PNL TOTAL CA: CPT

## 2025-03-17 RX ORDER — ATORVASTATIN CALCIUM 40 MG/1
40 TABLET, FILM COATED ORAL DAILY
Qty: 90 TABLET | Refills: 1 | Status: SHIPPED | OUTPATIENT
Start: 2025-03-17

## 2025-03-31 DIAGNOSIS — E11.9 DIABETES MELLITUS WITHOUT COMPLICATION (HCC): ICD-10-CM

## 2025-04-01 RX ORDER — GLIPIZIDE 10 MG/1
TABLET ORAL
Qty: 45 TABLET | Refills: 3 | Status: SHIPPED | OUTPATIENT
Start: 2025-04-01

## 2025-04-01 NOTE — TELEPHONE ENCOUNTER
Refill must be reviewed and completed by the office or provider. The refill is unable to be approved or denied by the medication management team.    Failed last egfr

## 2025-04-07 ENCOUNTER — RA CDI HCC (OUTPATIENT)
Dept: OTHER | Facility: HOSPITAL | Age: 85
End: 2025-04-07

## 2025-04-10 ENCOUNTER — OFFICE VISIT (OUTPATIENT)
Dept: PODIATRY | Facility: CLINIC | Age: 85
End: 2025-04-10
Payer: MEDICARE

## 2025-04-10 VITALS — HEART RATE: 55 BPM | OXYGEN SATURATION: 90 %

## 2025-04-10 DIAGNOSIS — E11.42 DIABETIC PERIPHERAL NEUROPATHY (HCC): ICD-10-CM

## 2025-04-10 DIAGNOSIS — E11.9 TYPE 2 DIABETES MELLITUS WITHOUT COMPLICATION, WITHOUT LONG-TERM CURRENT USE OF INSULIN (HCC): ICD-10-CM

## 2025-04-10 DIAGNOSIS — B35.1 ONYCHOMYCOSIS: Primary | ICD-10-CM

## 2025-04-10 PROCEDURE — RECHECK: Performed by: PODIATRIST

## 2025-04-10 PROCEDURE — 11721 DEBRIDE NAIL 6 OR MORE: CPT | Performed by: PODIATRIST

## 2025-04-10 NOTE — PROGRESS NOTES
:  Assessment & Plan  Onychomycosis         Diabetic peripheral neuropathy (HCC)    Lab Results   Component Value Date    HGBA1C 7.4 (H) 10/23/2024            Type 2 diabetes mellitus without complication, without long-term current use of insulin (HCC)    Lab Results   Component Value Date    HGBA1C 7.4 (H) 10/23/2024              The patient's clinical examination today significant for brittle, thickened and dystrophic with discoloration and subungual debris consistent onychomycosis x 10.  There are pincer type deformities to multiple nails.  There are no open lesions.  Pedal pulses are palpable but diminished.  Skin is thin with decreased turgor.  There are superficial varicosities noted to the bilateral extremities.  There is absence of hair growth to the bilateral extremities.  Epicritic sensation is diminished at the forefoot bilaterally.     The pedal nail plates were sharply debrided with a sterile nail clipper x 10 without complication.  The nails were then mechanically reduced in thickness and girth denies a rotary bur without incident.  Her most recent hemoglobin A1c from October 2024 was 7.4, prior to that was 7.1 in August 2024.     Recommend follow-up in 3 to 4 months for at risk diabetic footcare.    History of Present Illness     Camryn Roblero is a 84 y.o. female   The patient presents today for at risk diabetic footcare with class findings.  She has been doing well since her last visit and notes no acute pedal issues today.      Review of Systems   Constitutional: Negative.    Skin: Negative.    Psychiatric/Behavioral: Negative.       Objective   Pulse 55   SpO2 90%      Physical Exam  Vitals and nursing note reviewed.   Constitutional:       General: She is not in acute distress.     Appearance: She is well-developed.   HENT:      Head: Normocephalic and atraumatic.   Cardiovascular:      Pulses:           Dorsalis pedis pulses are 1+ on the right side and 1+ on the left side.        Posterior tibial  pulses are 1+ on the right side and 1+ on the left side.   Pulmonary:      Effort: Pulmonary effort is normal.   Feet:      Right foot:      Skin integrity: Dry skin present.      Toenail Condition: Right toenails are abnormally thick and long. Fungal disease present.     Left foot:      Skin integrity: Dry skin present.      Toenail Condition: Left toenails are abnormally thick and long. Fungal disease present.     Comments: The patient's clinical examination today significant for brittle, thickened and dystrophic with discoloration and subungual debris consistent onychomycosis x 10.  There are pincer type deformities to multiple nails.  There are no open lesions.  Pedal pulses are palpable but diminished.  Skin is thin with decreased turgor.  There are superficial varicosities noted to the bilateral extremities.  There is absence of hair growth to the bilateral extremities.  Epicritic sensation is diminished at the forefoot bilaterally.     Skin:     General: Skin is warm and dry.      Capillary Refill: Capillary refill takes 2 to 3 seconds.   Neurological:      General: No focal deficit present.      Mental Status: She is alert and oriented to person, place, and time.   Psychiatric:         Mood and Affect: Mood normal.

## 2025-04-15 ENCOUNTER — OFFICE VISIT (OUTPATIENT)
Dept: FAMILY MEDICINE CLINIC | Facility: CLINIC | Age: 85
End: 2025-04-15
Payer: MEDICARE

## 2025-04-15 VITALS
WEIGHT: 132 LBS | BODY MASS INDEX: 26.66 KG/M2 | SYSTOLIC BLOOD PRESSURE: 116 MMHG | HEART RATE: 55 BPM | OXYGEN SATURATION: 90 % | RESPIRATION RATE: 20 BRPM | DIASTOLIC BLOOD PRESSURE: 42 MMHG

## 2025-04-15 DIAGNOSIS — G47.33 OSA (OBSTRUCTIVE SLEEP APNEA): ICD-10-CM

## 2025-04-15 DIAGNOSIS — E11.9 TYPE 2 DIABETES MELLITUS WITHOUT COMPLICATION, WITHOUT LONG-TERM CURRENT USE OF INSULIN (HCC): ICD-10-CM

## 2025-04-15 DIAGNOSIS — I48.0 PAROXYSMAL ATRIAL FIBRILLATION (HCC): ICD-10-CM

## 2025-04-15 DIAGNOSIS — D3A.090 BENIGN CARCINOID TUMOR OF THE BRONCHUS AND LUNG: ICD-10-CM

## 2025-04-15 DIAGNOSIS — I50.32 CHRONIC HEART FAILURE WITH PRESERVED EJECTION FRACTION (HCC): ICD-10-CM

## 2025-04-15 DIAGNOSIS — I10 ESSENTIAL HYPERTENSION: Primary | ICD-10-CM

## 2025-04-15 DIAGNOSIS — F41.1 GAD (GENERALIZED ANXIETY DISORDER): ICD-10-CM

## 2025-04-15 DIAGNOSIS — E78.5 HYPERLIPIDEMIA, UNSPECIFIED HYPERLIPIDEMIA TYPE: ICD-10-CM

## 2025-04-15 DIAGNOSIS — J44.9 COPD, SEVERE (HCC): ICD-10-CM

## 2025-04-15 LAB — SL AMB POCT HEMOGLOBIN AIC: 6.5 (ref ?–6.5)

## 2025-04-15 PROCEDURE — 99214 OFFICE O/P EST MOD 30 MIN: CPT | Performed by: FAMILY MEDICINE

## 2025-04-15 PROCEDURE — 83036 HEMOGLOBIN GLYCOSYLATED A1C: CPT | Performed by: FAMILY MEDICINE

## 2025-04-15 PROCEDURE — G2211 COMPLEX E/M VISIT ADD ON: HCPCS | Performed by: FAMILY MEDICINE

## 2025-04-15 NOTE — PROGRESS NOTES
Name: Camryn Roblero      : 1940      MRN: [id number]  Encounter Provider: Abiel Seals MD  Encounter Date: 4/15/2025   Encounter department: [facility] MEDICINE  :  Assessment & Plan  Essential hypertension  Blood pressure has been ok. Continue current medications and low Na diet.   Orders:  •  Lipid panel; Future  •  Comprehensive metabolic panel; Future  •  CBC and differential; Future    Hyperlipidemia, unspecified hyperlipidemia type  Recheck lipids and LFTs. Continue atorvastatin 40 mg daily at bedtime.   Orders:  •  Lipid panel; Future  •  Comprehensive metabolic panel; Future    Chronic heart failure with preserved ejection fraction (HCC)  Wt Readings from Last 3 Encounters:   04/15/25 59.9 kg (132 lb)   25 65.1 kg (143 lb 9.6 oz)   25 64 kg (141 lb)     Stable. Continue medications per Cardiology. Last echocardiogram was in 2024 and EF 65%.         Orders:  •  Comprehensive metabolic panel; Future    Paroxysmal atrial fibrillation (HCC)  Stable on current medications. Next appointment with Cardiology is in May 2025.  Orders:  •  Comprehensive metabolic panel; Future  •  CBC and differential; Future    TOM (obstructive sleep apnea)  Patient advised to start using CPAP again.        COPD, severe (HCC)  Breathing has been ok. Sees Pulmonary Dr Garland for follow-up in May 2025. Pt continues to use O2 supplementation all the time now.         Type 2 diabetes mellitus without complication, without long-term current use of insulin (HCC)  A1C done today and was 6.5. Continue metformin 1000 mg bid and glipizide 5 mg qd. Continue low carb diet. Eye exam done in 2023. Foot exam done today.    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 Continue follow-up and management with Oncology   Emmanuel.        HEATHER (generalized anxiety disorder)  Stress level ok. Continue escitalopram 5 mg daily.               Depression Screening and Follow-up Plan: Patient was screened for depression during today's encounter. They screened negative with a PHQ-2 score of 0.        History of Present Illness   Patient here forf/u Hypertension, Hyperlipidemia, Congestive Heart Failure, Paroxysmal Atrial Fibrillation, Obstructive Sleep Apnea, COPD, DM, Generalized Anxiety Disorder. Doing ok. No chest pain. Feels tired and has been using O2 at all times. Sees Pulmonary for follow-up in May 2025. Blood pressure and sugars ok. Stopped using her CPAP. Stress level has been ok. Sees Cardiology and Pulmonary in May 2025.       Review of Systems   Constitutional:  Negative for fatigue.   Eyes:  Negative for visual disturbance.   Respiratory:  Positive for shortness of breath. Negative for cough.    Cardiovascular:  Negative for chest pain.   Gastrointestinal:  Negative for abdominal pain, constipation, diarrhea, nausea and vomiting.   Endocrine: Negative for polydipsia, polyphagia and polyuria.   Genitourinary:  Negative for difficulty urinating.   Musculoskeletal:  Positive for arthralgias and gait problem.   Skin:  Negative for rash and wound.   Neurological:  Negative for dizziness, light-headedness, numbness and headaches.       Objective   BP (!) 116/42 (BP Location: Left arm, Patient Position: Sitting, Cuff Size: Standard)   Pulse 55   Resp 20   Wt 59.9 kg (132 lb) Comment: weight done at home due to being wheelchair bound  SpO2 90%   BMI 26.66 kg/m²      Physical Exam  Vitals and nursing note reviewed.   Constitutional:       General: She is not in acute distress.     Appearance: Normal appearance. She is well-developed. She is obese.      Comments: Patient in wheelchair today but uses walker at home.    Neck:      Vascular: No carotid bruit.   Cardiovascular:      Rate and Rhythm: Normal rate and regular rhythm.       Heart sounds: No murmur heard.  Pulmonary:      Effort: Pulmonary effort is normal. No respiratory distress.      Breath sounds: Normal breath sounds.   Musculoskeletal:      Cervical back: Normal range of motion and neck supple.      Right lower leg: Edema present.      Left lower leg: Edema present.   Lymphadenopathy:      Cervical: No cervical adenopathy.   Neurological:      Mental Status: She is alert.   Psychiatric:         Mood and Affect: Mood normal.         Behavior: Behavior normal.         Thought Content: Thought content normal.         Judgment: Judgment normal.

## 2025-04-15 NOTE — ASSESSMENT & PLAN NOTE
A1C done today and was 6.5. Continue metformin 1000 mg bid and glipizide 5 mg qd. Continue low carb diet. Eye exam done in October 2023. Foot exam done today.    Lab Results   Component Value Date    HGBA1C 6.5 04/15/2025     Orders:  •  POCT hemoglobin A1c  •  Lipid panel; Future  •  Comprehensive metabolic panel; Future  
Blood pressure has been ok. Continue current medications and low Na diet.   Orders:  •  Lipid panel; Future  •  Comprehensive metabolic panel; Future  •  CBC and differential; Future  
Breathing has been ok. Sees Pulmonary Dr Garland for follow-up in May 2025. Pt continues to use O2 supplementation all the time now.       
Patient advised to start using CPAP again.      
Recheck lipids and LFTs. Continue atorvastatin 40 mg daily at bedtime.   Orders:  •  Lipid panel; Future  •  Comprehensive metabolic panel; Future  
Stable on current medications. Next appointment with Cardiology is in May 2025.  Orders:  •  Comprehensive metabolic panel; Future  •  CBC and differential; Future  
Stable. Continue follow-up and management with Oncology Dr Benitez.      
Stress level ok. Continue escitalopram 5 mg daily.       
Wt Readings from Last 3 Encounters:   04/15/25 59.9 kg (132 lb)   02/24/25 65.1 kg (143 lb 9.6 oz)   02/04/25 64 kg (141 lb)     Stable. Continue medications per Cardiology. Last echocardiogram was in October 2024 and EF 65%.         Orders:  •  Comprehensive metabolic panel; Future  
165.1

## 2025-04-24 ENCOUNTER — APPOINTMENT (OUTPATIENT)
Dept: LAB | Facility: HOSPITAL | Age: 85
End: 2025-04-24
Attending: FAMILY MEDICINE
Payer: MEDICARE

## 2025-04-24 ENCOUNTER — RESULTS FOLLOW-UP (OUTPATIENT)
Dept: FAMILY MEDICINE CLINIC | Facility: CLINIC | Age: 85
End: 2025-04-24

## 2025-04-24 DIAGNOSIS — E78.5 HYPERLIPIDEMIA, UNSPECIFIED HYPERLIPIDEMIA TYPE: ICD-10-CM

## 2025-04-24 DIAGNOSIS — I10 ESSENTIAL HYPERTENSION: ICD-10-CM

## 2025-04-24 DIAGNOSIS — I48.0 PAROXYSMAL ATRIAL FIBRILLATION (HCC): ICD-10-CM

## 2025-04-24 DIAGNOSIS — E11.9 TYPE 2 DIABETES MELLITUS WITHOUT COMPLICATION, WITHOUT LONG-TERM CURRENT USE OF INSULIN (HCC): ICD-10-CM

## 2025-04-24 DIAGNOSIS — I50.32 CHRONIC HEART FAILURE WITH PRESERVED EJECTION FRACTION (HCC): ICD-10-CM

## 2025-04-24 LAB
ALBUMIN SERPL BCG-MCNC: 3.6 G/DL (ref 3.5–5)
ALP SERPL-CCNC: 53 U/L (ref 34–104)
ALT SERPL W P-5'-P-CCNC: 24 U/L (ref 7–52)
ANION GAP SERPL CALCULATED.3IONS-SCNC: 6 MMOL/L (ref 4–13)
AST SERPL W P-5'-P-CCNC: 69 U/L (ref 13–39)
BASOPHILS # BLD AUTO: 0.03 THOUSANDS/ÂΜL (ref 0–0.1)
BASOPHILS NFR BLD AUTO: 1 % (ref 0–1)
BILIRUB SERPL-MCNC: 0.73 MG/DL (ref 0.2–1)
BUN SERPL-MCNC: 46 MG/DL (ref 5–25)
CALCIUM SERPL-MCNC: 9.2 MG/DL (ref 8.4–10.2)
CHLORIDE SERPL-SCNC: 93 MMOL/L (ref 96–108)
CHOLEST SERPL-MCNC: 80 MG/DL (ref ?–200)
CO2 SERPL-SCNC: 37 MMOL/L (ref 21–32)
CREAT SERPL-MCNC: 1.24 MG/DL (ref 0.6–1.3)
EOSINOPHIL # BLD AUTO: 0.1 THOUSAND/ÂΜL (ref 0–0.61)
EOSINOPHIL NFR BLD AUTO: 2 % (ref 0–6)
ERYTHROCYTE [DISTWIDTH] IN BLOOD BY AUTOMATED COUNT: 15.6 % (ref 11.6–15.1)
GFR SERPL CREATININE-BSD FRML MDRD: 40 ML/MIN/1.73SQ M
GLUCOSE P FAST SERPL-MCNC: 98 MG/DL (ref 65–99)
HCT VFR BLD AUTO: 32.9 % (ref 34.8–46.1)
HDLC SERPL-MCNC: 35 MG/DL
HGB BLD-MCNC: 10.4 G/DL (ref 11.5–15.4)
IMM GRANULOCYTES # BLD AUTO: 0.01 THOUSAND/UL (ref 0–0.2)
IMM GRANULOCYTES NFR BLD AUTO: 0 % (ref 0–2)
LDLC SERPL CALC-MCNC: 34 MG/DL (ref 0–100)
LYMPHOCYTES # BLD AUTO: 0.9 THOUSANDS/ÂΜL (ref 0.6–4.47)
LYMPHOCYTES NFR BLD AUTO: 17 % (ref 14–44)
MCH RBC QN AUTO: 28.7 PG (ref 26.8–34.3)
MCHC RBC AUTO-ENTMCNC: 31.6 G/DL (ref 31.4–37.4)
MCV RBC AUTO: 91 FL (ref 82–98)
MONOCYTES # BLD AUTO: 0.47 THOUSAND/ÂΜL (ref 0.17–1.22)
MONOCYTES NFR BLD AUTO: 9 % (ref 4–12)
NEUTROPHILS # BLD AUTO: 3.66 THOUSANDS/ÂΜL (ref 1.85–7.62)
NEUTS SEG NFR BLD AUTO: 71 % (ref 43–75)
NONHDLC SERPL-MCNC: 45 MG/DL
NRBC BLD AUTO-RTO: 0 /100 WBCS
PLATELET # BLD AUTO: 196 THOUSANDS/UL (ref 149–390)
PMV BLD AUTO: 11.2 FL (ref 8.9–12.7)
POTASSIUM SERPL-SCNC: 4 MMOL/L (ref 3.5–5.3)
PROT SERPL-MCNC: 6.7 G/DL (ref 6.4–8.4)
RBC # BLD AUTO: 3.62 MILLION/UL (ref 3.81–5.12)
SODIUM SERPL-SCNC: 136 MMOL/L (ref 135–147)
TRIGL SERPL-MCNC: 57 MG/DL (ref ?–150)
WBC # BLD AUTO: 5.17 THOUSAND/UL (ref 4.31–10.16)

## 2025-04-24 PROCEDURE — 80053 COMPREHEN METABOLIC PANEL: CPT

## 2025-04-24 PROCEDURE — 36415 COLL VENOUS BLD VENIPUNCTURE: CPT

## 2025-04-24 PROCEDURE — 80061 LIPID PANEL: CPT

## 2025-04-24 PROCEDURE — 85025 COMPLETE CBC W/AUTO DIFF WBC: CPT

## 2025-04-28 DIAGNOSIS — I48.92 ATRIAL FLUTTER WITH RAPID VENTRICULAR RESPONSE (HCC): ICD-10-CM

## 2025-04-28 DIAGNOSIS — D64.9 LOW HEMOGLOBIN: Primary | ICD-10-CM

## 2025-04-28 DIAGNOSIS — R74.01 ELEVATED ALANINE AMINOTRANSFERASE (ALT) LEVEL: ICD-10-CM

## 2025-04-29 RX ORDER — RIVAROXABAN 15 MG/1
15 TABLET, FILM COATED ORAL
Qty: 30 TABLET | Refills: 0 | Status: SHIPPED | OUTPATIENT
Start: 2025-04-29

## 2025-04-30 ENCOUNTER — TELEPHONE (OUTPATIENT)
Dept: LAB | Facility: HOSPITAL | Age: 85
End: 2025-04-30

## 2025-05-02 ENCOUNTER — APPOINTMENT (OUTPATIENT)
Dept: LAB | Facility: CLINIC | Age: 85
End: 2025-05-02
Attending: FAMILY MEDICINE

## 2025-05-02 DIAGNOSIS — D64.9 LOW HEMOGLOBIN: ICD-10-CM

## 2025-05-05 ENCOUNTER — APPOINTMENT (OUTPATIENT)
Dept: LAB | Facility: CLINIC | Age: 85
End: 2025-05-05
Attending: FAMILY MEDICINE
Payer: MEDICARE

## 2025-05-05 LAB — HEMOCCULT STL QL IA: NEGATIVE

## 2025-05-05 PROCEDURE — G0328 FECAL BLOOD SCRN IMMUNOASSAY: HCPCS

## 2025-05-13 ENCOUNTER — OFFICE VISIT (OUTPATIENT)
Dept: FAMILY MEDICINE CLINIC | Facility: CLINIC | Age: 85
End: 2025-05-13
Payer: MEDICARE

## 2025-05-13 ENCOUNTER — OFFICE VISIT (OUTPATIENT)
Dept: CARDIOLOGY CLINIC | Facility: CLINIC | Age: 85
End: 2025-05-13
Payer: MEDICARE

## 2025-05-13 VITALS
OXYGEN SATURATION: 89 % | SYSTOLIC BLOOD PRESSURE: 114 MMHG | BODY MASS INDEX: 27.88 KG/M2 | WEIGHT: 138.3 LBS | HEIGHT: 59 IN | HEART RATE: 55 BPM | DIASTOLIC BLOOD PRESSURE: 62 MMHG

## 2025-05-13 VITALS
BODY MASS INDEX: 28.02 KG/M2 | TEMPERATURE: 97.3 F | HEIGHT: 59 IN | WEIGHT: 139 LBS | SYSTOLIC BLOOD PRESSURE: 126 MMHG | OXYGEN SATURATION: 90 % | DIASTOLIC BLOOD PRESSURE: 50 MMHG | RESPIRATION RATE: 18 BRPM | HEART RATE: 55 BPM

## 2025-05-13 DIAGNOSIS — R19.4 CHANGE IN BOWEL HABITS: Primary | ICD-10-CM

## 2025-05-13 DIAGNOSIS — I50.32 CHRONIC HEART FAILURE WITH PRESERVED EJECTION FRACTION (HCC): Primary | ICD-10-CM

## 2025-05-13 DIAGNOSIS — I10 ESSENTIAL HYPERTENSION: ICD-10-CM

## 2025-05-13 DIAGNOSIS — I48.3 TYPICAL ATRIAL FLUTTER (HCC): ICD-10-CM

## 2025-05-13 DIAGNOSIS — I48.0 PAROXYSMAL ATRIAL FIBRILLATION (HCC): ICD-10-CM

## 2025-05-13 PROCEDURE — G2211 COMPLEX E/M VISIT ADD ON: HCPCS | Performed by: FAMILY MEDICINE

## 2025-05-13 PROCEDURE — 99213 OFFICE O/P EST LOW 20 MIN: CPT | Performed by: FAMILY MEDICINE

## 2025-05-13 PROCEDURE — 99214 OFFICE O/P EST MOD 30 MIN: CPT | Performed by: INTERNAL MEDICINE

## 2025-05-13 RX ORDER — METOLAZONE 2.5 MG/1
TABLET ORAL
Qty: 12 TABLET | Refills: 3 | Status: SHIPPED | OUTPATIENT
Start: 2025-05-13

## 2025-05-13 NOTE — ASSESSMENT & PLAN NOTE
Patient has had loose stools off and on for past 2 weeks. No fever, nausea or vomiting, or abdominal pain. Patient has been eating less because afraid of having diarrhea. Exam benign. Will cut back on Magnesium pills to 1 every day. Patient encouraged to not skip meals or pills. Call if no better.

## 2025-05-13 NOTE — PATIENT INSTRUCTIONS
"Patient Education     Low-sodium diet   The Basics   Written by the doctors and editors at [facility]   What is sodium? -- This is the main ingredient in table salt. It is also found in lots of foods. The body needs a very small amount of sodium to work normally, but most people eat much more sodium than their body needs.  Who should eat less sodium? -- Nearly everyone eats too much sodium. The average American takes in 3400 milligrams of sodium each day. Experts say that most people should have no more than 2300 milligrams a day.  Some people with certain health conditions should follow a low-sodium diet. Ask your doctor how much sodium you should have.  Why should I eat less sodium? -- Reducing the amount of sodium you eat can have lots of health benefits:   It can lower your blood pressure. This means that it can help lower your risk of stroke, heart attack, kidney damage, and lots of other health problems.   It can reduce the amount of fluid in your body, which means that your heart doesn't have to work as hard.   It can keep the kidneys from having to work too hard. This is especially important in people who have kidney disease.   It can reduce swelling in the ankles and belly, which can be uncomfortable and make it hard to move.   It can lower the chances of forming kidney stones.   It can help keep your bones strong.  Which foods have the most sodium? -- Processed foods have the most sodium. These foods usually come in cans, boxes, jars, and bags. They tend to have a lot of sodium even if they don't taste salty. In fact, many sweet foods have a lot of sodium in them. The only way to know for sure how much sodium is in a food is to check the label (figure 1).  Here are some examples of foods that often have too much sodium:   Canned soups   Rice and noodle mixes   Sauces, dressings, and condiments (such as ketchup and mustard)   Pre-made frozen meals (also called \"TV dinners\")   Deli meats, hot dogs, and " "cheeses   Smoked, cured, or pickled foods   Salted snack foods and nuts   Restaurant meals  What should I do to reduce the amount of sodium in my diet? -- Many people think that eating a low-sodium diet just means not adding salt to their food. But this is not true. Not adding salt at the table or when cooking will help a little. But almost all of the sodium you eat is already in the food you buy at the grocery store or at restaurants (figure 2).  Here are some tips to help you eat less sodium:   Avoid processed foods when possible. This is the most important thing you can do to eat less sodium. Processed foods include most foods that are sold in cans, boxes, jars, and bags.   Instead of buying pre-made, processed foods, buy fresh or fresh-frozen fruits and vegetables. (\"Fresh-frozen\" means that the food is frozen without anything added to it.)   Buy meats, fish, chicken, and turkey that are fresh instead of canned or sold at the deli counter. (Meats sold at the deli counter are high in sodium.)   Try to eat at restaurants less often.   When possible, try to make meals from scratch at home using fresh ingredients.   If you do buy canned or packaged foods, choose ones that are labeled \"sodium free\" or \"very low sodium\" (table 1). Or choose foods that have less than 400 milligrams of sodium in each serving. The amount of sodium in each serving appears on the nutrition label (figure 1).  The table has some examples of foods to avoid and foods to choose instead (table 2).  Whatever changes you make, make them slowly. Choose 1 thing to do differently, and do that for a while. If you can keep doing that change easily, add another change. For instance, if you usually eat green beans from a can, try buying fresh or fresh-frozen green beans and cooking them at home without adding salt. If that works for you, keep doing it. Then, choose another thing to change.  If you try making a change and it doesn't work right away, don't " "give up. See if you can reduce sodium in other ways. The important thing is to take small steps and to keep doing the changes that work for you.  Can I still eat out at restaurants sometimes? -- One of best ways to limit your sodium is to only eat out at restaurants every once in a while. When you do eat out, try to choose places that offer healthier choices and fresh ingredients.  No matter where you eat, when choosing your food:   Ask your  if your meal can be made without salt.   Avoid foods that come with sauces or dips.   Choose plain grilled meats or fish and steamed vegetables.   Ask for oil and vinegar for your salad, rather than dressing.   If a meal you really want has more sodium than you should have, consider saving half of it to eat another day.  What if food just does not taste as good without sodium? -- Starting a low-sodium diet can be hard. The good news is that your taste buds can get used to having less sodium. But you have to give them some time to adjust.  It can also help to try other ways to add flavor to your foods. Try things like herbs, spices, lemon juice, and vinegar.  What about salt substitutes? -- Flavoring your food with a salt substitute is a good way to reduce how much salt you eat. But check with your doctor or nurse before trying this. Some salt substitutes can be dangerous if you have certain health problems or take certain medicines.  Do medicines have sodium? -- Yes, some medicines contain sodium. If you are buying medicines you can get without a prescription, look to see how much sodium they have. Avoid products that have \"sodium carbonate\" or \"sodium bicarbonate\" unless your doctor prescribes them. (Sodium bicarbonate is baking soda.)  All topics are updated as new evidence becomes available and our peer review process is complete.  This topic retrieved from [employer] on: Mar 22, 2024.  Topic 34615 Version 14.0  Release: 32.2.4 - C32.80  © 2024 [employer] and/or its " "affiliates. All rights reserved.  figure 1: Food labels can be tricky     To figure out how much sodium you are eating, check the label to find out how much sodium is in 1 serving. If you are having more than 1 serving, multiply that amount by the number of servings you plan to eat. For instance, if you are going to eat this whole can of soup, you should multiply 850 by 2. That means that you will be having 1700 milligrams of sodium. That's more sodium than many people are supposed to have in 1 day.  Graphic 59961 Version 8.0  figure 2: Sources of sodium in your diet     Graphic 83643 Version 2.0  table 1: A guide to common nutrient claims and what they mean  Salt/sodium-free  Less than 5 mg of sodium per serving   Very low sodium  35 mg or less of sodium per serving   Low sodium  140 mg or less of sodium per serving   Reduced sodium  At least 25% less sodium than the regular product   Light or lite in sodium  At least 50% less sodium than the regular product   No salt added or unsalted  No salt is added during processing, but these products may not be salt/sodium-free unless stated   mg: milligram; %: percent.  Graphic 69401 Version 7.0  table 2: Ways to cut down on salt (sodium)  Avoid these foods  Try these foods instead    Cured and smoked foods like sandoval, sausage, smoked fish and meats, hot dogs, ham, lunch meats, corned beef, and pickles Fresh turkey, chicken, and lean beef   Canned fish (tuna, sardines) Unsalted tuna or sardines   Canned meats Fresh unprocessed meats, vegetable protein, and fish  Frozen and canned meats, vegetable protein, and fish that are labeled \"low-sodium\"   Salted pretzels, crackers, potato chips, tortilla chips, and nuts Low-sodium and unsalted versions of these foods   Most cheeses Low-sodium cheeses (check label for actual sodium content)   Sauces (tomato and cream, etc), tomato juices Low-sodium versions of these foods, such as low-sodium tomato juice   Processed, instant, and " "convenience foods like frozen dinners, packaged meals, canned soups, and boxed pasta blends Cook and freeze your own low-sodium meals, soups, and broths    If you do need to use convenience or processed foods, read the labels. Choose items with 140 to 200 mg of sodium per serving.    For an entire convenience meal (frozen dinner), try to find options with less than 500 to 600 mg of sodium.    If you used canned foods, look for those labeled \"sodium-free.\" Or you can rinse the canned food under water to lower the sodium content.   Fast foods and foods prepared at restaurants (unless without cheese, sauces, or added salt) Fresh foods and foods with sauces on the side  Request that food be prepared without cheese or added salt   Graphic 49795 Version 10.0  Consumer Information Use and Disclaimer   Disclaimer: This generalized information is a limited summary of diagnosis, treatment, and/or medication information. It is not meant to be comprehensive and should be used as a tool to help the user understand and/or assess potential diagnostic and treatment options. It does NOT include all information about conditions, treatments, medications, side effects, or risks that may apply to a specific patient. It is not intended to be medical advice or a substitute for the medical advice, diagnosis, or treatment of a health care provider based on the health care provider's examination and assessment of a patient's specific and unique circumstances. Patients must speak with a health care provider for complete information about their health, medical questions, and treatment options, including any risks or benefits regarding use of medications. This information does not endorse any treatments or medications as safe, effective, or approved for treating a specific patient. [employer] and its affiliates disclaim any warranty or liability relating to this information or the use thereof.The use of this information is governed by the " Terms of Use, available at https://www.[employer].com/en/know/clinical-effectiveness-terms. 2024© [employer] and its affiliates and/or licensors. All rights reserved.  Copyright   © 2024 [employer] and/or its affiliates. All rights reserved.

## 2025-05-13 NOTE — PROGRESS NOTES
"Name: Camryn Roblero      : 1940      MRN: [id number]  Encounter Provider: Abiel Seals MD  Encounter Date: 2025   Encounter department: [facility]  :  Assessment & Plan  Change in bowel habits  Patient has had loose stools off and on for past 2 weeks. No fever, nausea or vomiting, or abdominal pain. Patient has been eating less because afraid of having diarrhea. Exam benign. Will cut back on Magnesium pills to 1 every day. Patient encouraged to not skip meals or pills. Call if no better.        Essential hypertension  Blood pressure has been ok. Continue current medications and low Na diet.             Depression Screening and Follow-up Plan: Patient was screened for depression during today's encounter. They screened negative with a PHQ-2 score of 0.        History of Present Illness   Patient here for loose stools for past 2 weeks. Patient not eating as well and not taking pills at times. Takes imodium as needed. No fever. No nausea or vomiting. No abdominal pain. No blood in stool. No new medications or foods. Last episode was 2 days ago.     Diarrhea   Pertinent negatives include no abdominal pain, arthralgias, coughing, headaches or vomiting.     Review of Systems   Constitutional:  Negative for fatigue and unexpected weight change.   Respiratory:  Negative for cough, chest tightness and shortness of breath.    Cardiovascular:  Negative for chest pain and palpitations.   Gastrointestinal:  Positive for diarrhea. Negative for abdominal pain, constipation, nausea and vomiting.   Genitourinary:  Negative for difficulty urinating.   Musculoskeletal:  Negative for arthralgias.   Skin:  Negative for rash.   Neurological:  Negative for dizziness and headaches.       Objective   /50 (BP Location: Left arm, Patient Position: Sitting, Cuff Size: Adult)   Pulse 55   Temp (!) 97.3 °F (36.3 °C) (Tympanic)   Resp 18   Ht 4' 11\" (1.499 m)   Wt 63 kg (139 lb)   SpO2 90% " Comment: on 2 liters of O2  BMI 28.07 kg/m²      Physical Exam  Vitals and nursing note reviewed.   Constitutional:       General: She is not in acute distress.     Appearance: Normal appearance. She is well-developed. She is obese.      Comments: Patient in wheelchair today but uses walker at home.    Neck:      Vascular: No carotid bruit.   Cardiovascular:      Rate and Rhythm: Normal rate and regular rhythm.      Heart sounds: No murmur heard.  Pulmonary:      Effort: Pulmonary effort is normal. No respiratory distress.      Breath sounds: Normal breath sounds.   Musculoskeletal:      Cervical back: Normal range of motion and neck supple.      Right lower leg: Edema present.      Left lower leg: Edema present.   Lymphadenopathy:      Cervical: No cervical adenopathy.   Neurological:      Mental Status: She is alert.   Psychiatric:         Mood and Affect: Mood normal.         Behavior: Behavior normal.         Thought Content: Thought content normal.         Judgment: Judgment normal.
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
## 2025-05-14 DIAGNOSIS — J43.9 PULMONARY EMPHYSEMA, UNSPECIFIED EMPHYSEMA TYPE (HCC): ICD-10-CM

## 2025-05-14 RX ORDER — BUDESONIDE 0.5 MG/2ML
INHALANT ORAL
Qty: 120 ML | Refills: 5 | Status: SHIPPED | OUTPATIENT
Start: 2025-05-14

## 2025-05-26 DIAGNOSIS — E11.9 DIABETES MELLITUS WITHOUT COMPLICATION (HCC): ICD-10-CM

## 2025-05-27 ENCOUNTER — HOSPITAL ENCOUNTER (INPATIENT)
Facility: HOSPITAL | Age: 85
LOS: 6 days | Discharge: NURSING, REHAB OR LONG-TERM CARE | DRG: 291 | End: 2025-06-02
Attending: EMERGENCY MEDICINE | Admitting: INTERNAL MEDICINE
Payer: MEDICARE

## 2025-05-27 ENCOUNTER — APPOINTMENT (OUTPATIENT)
Dept: LAB | Facility: HOSPITAL | Age: 85
End: 2025-05-27
Attending: FAMILY MEDICINE
Payer: MEDICARE

## 2025-05-27 ENCOUNTER — HOSPITAL ENCOUNTER (OUTPATIENT)
Dept: VASCULAR ULTRASOUND | Facility: HOSPITAL | Age: 85
Discharge: HOME | End: 2025-05-27
Attending: FAMILY MEDICINE
Payer: MEDICARE

## 2025-05-27 ENCOUNTER — OFFICE VISIT (OUTPATIENT)
Dept: FAMILY MEDICINE CLINIC | Facility: CLINIC | Age: 85
End: 2025-05-27
Payer: MEDICARE

## 2025-05-27 ENCOUNTER — HOSPITAL ENCOUNTER (OUTPATIENT)
Dept: RADIOLOGY | Facility: HOSPITAL | Age: 85
Discharge: HOME | End: 2025-05-27
Attending: FAMILY MEDICINE
Payer: MEDICARE

## 2025-05-27 VITALS
RESPIRATION RATE: 20 BRPM | SYSTOLIC BLOOD PRESSURE: 106 MMHG | DIASTOLIC BLOOD PRESSURE: 40 MMHG | HEART RATE: 51 BPM | TEMPERATURE: 97.1 F | OXYGEN SATURATION: 94 %

## 2025-05-27 DIAGNOSIS — I99.8 ACUTE LOWER LIMB ISCHEMIA: ICD-10-CM

## 2025-05-27 DIAGNOSIS — M25.571 ACUTE RIGHT ANKLE PAIN: ICD-10-CM

## 2025-05-27 DIAGNOSIS — I50.32 CHRONIC HEART FAILURE WITH PRESERVED EJECTION FRACTION (HCC): ICD-10-CM

## 2025-05-27 DIAGNOSIS — R74.01 ELEVATED ALANINE AMINOTRANSFERASE (ALT) LEVEL: ICD-10-CM

## 2025-05-27 DIAGNOSIS — D64.9 ANEMIA: ICD-10-CM

## 2025-05-27 DIAGNOSIS — M79.89 PAIN AND SWELLING OF RIGHT LOWER LEG: ICD-10-CM

## 2025-05-27 DIAGNOSIS — N17.9 AKI (ACUTE KIDNEY INJURY) (HCC): ICD-10-CM

## 2025-05-27 DIAGNOSIS — R60.0 BILATERAL LEG EDEMA: ICD-10-CM

## 2025-05-27 DIAGNOSIS — M25.561 ACUTE PAIN OF RIGHT KNEE: ICD-10-CM

## 2025-05-27 DIAGNOSIS — M79.661 PAIN AND SWELLING OF RIGHT LOWER LEG: ICD-10-CM

## 2025-05-27 DIAGNOSIS — I48.92 ATRIAL FLUTTER WITH RAPID VENTRICULAR RESPONSE (HCC): ICD-10-CM

## 2025-05-27 DIAGNOSIS — R60.0 BILATERAL LEG EDEMA: Primary | ICD-10-CM

## 2025-05-27 DIAGNOSIS — M71.21 BAKER'S CYST OF KNEE, RIGHT: ICD-10-CM

## 2025-05-27 DIAGNOSIS — E87.1 HYPONATREMIA: ICD-10-CM

## 2025-05-27 DIAGNOSIS — D50.9 IRON DEFICIENCY ANEMIA, UNSPECIFIED IRON DEFICIENCY ANEMIA TYPE: ICD-10-CM

## 2025-05-27 DIAGNOSIS — M25.572 ACUTE LEFT ANKLE PAIN: ICD-10-CM

## 2025-05-27 DIAGNOSIS — R74.01 TRANSAMINITIS: ICD-10-CM

## 2025-05-27 DIAGNOSIS — D64.9 LOW HEMOGLOBIN: ICD-10-CM

## 2025-05-27 DIAGNOSIS — T78.40XA ALLERGIES: ICD-10-CM

## 2025-05-27 DIAGNOSIS — R26.2 AMBULATORY DYSFUNCTION: Primary | ICD-10-CM

## 2025-05-27 LAB
ALBUMIN SERPL BCG-MCNC: 3.9 G/DL (ref 3.5–5)
ALP SERPL-CCNC: 55 U/L (ref 34–104)
ALT SERPL W P-5'-P-CCNC: 25 U/L (ref 7–52)
ANION GAP SERPL CALCULATED.3IONS-SCNC: 7 MMOL/L (ref 4–13)
APTT PPP: 31 SECONDS (ref 23–34)
AST SERPL W P-5'-P-CCNC: 80 U/L (ref 13–39)
BASOPHILS # BLD AUTO: 0.02 THOUSANDS/ÂΜL (ref 0–0.1)
BASOPHILS NFR BLD AUTO: 0 % (ref 0–1)
BILIRUB DIRECT SERPL-MCNC: 0.24 MG/DL (ref 0–0.2)
BILIRUB SERPL-MCNC: 0.92 MG/DL (ref 0.2–1)
BUN SERPL-MCNC: 58 MG/DL (ref 5–25)
CALCIUM SERPL-MCNC: 9.6 MG/DL (ref 8.4–10.2)
CHLORIDE SERPL-SCNC: 85 MMOL/L (ref 96–108)
CO2 SERPL-SCNC: 39 MMOL/L (ref 21–32)
CREAT SERPL-MCNC: 1.61 MG/DL (ref 0.6–1.3)
EOSINOPHIL # BLD AUTO: 0.05 THOUSAND/ÂΜL (ref 0–0.61)
EOSINOPHIL NFR BLD AUTO: 1 % (ref 0–6)
ERYTHROCYTE [DISTWIDTH] IN BLOOD BY AUTOMATED COUNT: 16.1 % (ref 11.6–15.1)
GFR SERPL CREATININE-BSD FRML MDRD: 29 ML/MIN/1.73SQ M
GLUCOSE P FAST SERPL-MCNC: 146 MG/DL (ref 65–99)
HCT VFR BLD AUTO: 27.5 % (ref 34.8–46.1)
HGB BLD-MCNC: 8.8 G/DL (ref 11.5–15.4)
IMM GRANULOCYTES # BLD AUTO: 0.02 THOUSAND/UL (ref 0–0.2)
IMM GRANULOCYTES NFR BLD AUTO: 0 % (ref 0–2)
INR PPP: 1.75 (ref 0.85–1.19)
LYMPHOCYTES # BLD AUTO: 0.79 THOUSANDS/ÂΜL (ref 0.6–4.47)
LYMPHOCYTES NFR BLD AUTO: 14 % (ref 14–44)
MCH RBC QN AUTO: 29.4 PG (ref 26.8–34.3)
MCHC RBC AUTO-ENTMCNC: 32 G/DL (ref 31.4–37.4)
MCV RBC AUTO: 92 FL (ref 82–98)
MONOCYTES # BLD AUTO: 0.57 THOUSAND/ÂΜL (ref 0.17–1.22)
MONOCYTES NFR BLD AUTO: 10 % (ref 4–12)
NEUTROPHILS # BLD AUTO: 4.29 THOUSANDS/ÂΜL (ref 1.85–7.62)
NEUTS SEG NFR BLD AUTO: 75 % (ref 43–75)
NRBC BLD AUTO-RTO: 0 /100 WBCS
PLATELET # BLD AUTO: 196 THOUSANDS/UL (ref 149–390)
PMV BLD AUTO: 11.3 FL (ref 8.9–12.7)
POTASSIUM SERPL-SCNC: 4.2 MMOL/L (ref 3.5–5.3)
PROT SERPL-MCNC: 7.4 G/DL (ref 6.4–8.4)
PROTHROMBIN TIME: 21.1 SECONDS (ref 12.3–15)
RBC # BLD AUTO: 2.99 MILLION/UL (ref 3.81–5.12)
SODIUM SERPL-SCNC: 131 MMOL/L (ref 135–147)
WBC # BLD AUTO: 5.74 THOUSAND/UL (ref 4.31–10.16)

## 2025-05-27 PROCEDURE — G2211 COMPLEX E/M VISIT ADD ON: HCPCS | Performed by: FAMILY MEDICINE

## 2025-05-27 PROCEDURE — 85610 PROTHROMBIN TIME: CPT | Performed by: STUDENT IN AN ORGANIZED HEALTH CARE EDUCATION/TRAINING PROGRAM

## 2025-05-27 PROCEDURE — 93970 EXTREMITY STUDY: CPT

## 2025-05-27 PROCEDURE — 93005 ELECTROCARDIOGRAM TRACING: CPT

## 2025-05-27 PROCEDURE — 80076 HEPATIC FUNCTION PANEL: CPT

## 2025-05-27 PROCEDURE — 73610 X-RAY EXAM OF ANKLE: CPT

## 2025-05-27 PROCEDURE — 73562 X-RAY EXAM OF KNEE 3: CPT

## 2025-05-27 PROCEDURE — 99283 EMERGENCY DEPT VISIT LOW MDM: CPT

## 2025-05-27 PROCEDURE — 99215 OFFICE O/P EST HI 40 MIN: CPT | Performed by: FAMILY MEDICINE

## 2025-05-27 PROCEDURE — 80048 BASIC METABOLIC PNL TOTAL CA: CPT

## 2025-05-27 PROCEDURE — 93970 EXTREMITY STUDY: CPT | Performed by: SURGERY

## 2025-05-27 PROCEDURE — 36415 COLL VENOUS BLD VENIPUNCTURE: CPT

## 2025-05-27 PROCEDURE — 85730 THROMBOPLASTIN TIME PARTIAL: CPT | Performed by: STUDENT IN AN ORGANIZED HEALTH CARE EDUCATION/TRAINING PROGRAM

## 2025-05-27 PROCEDURE — 85025 COMPLETE CBC W/AUTO DIFF WBC: CPT

## 2025-05-28 ENCOUNTER — APPOINTMENT (INPATIENT)
Dept: RADIOLOGY | Facility: HOSPITAL | Age: 85
DRG: 291 | End: 2025-05-28
Payer: MEDICARE

## 2025-05-28 PROBLEM — M66.0 RUPTURED BAKERS CYST: Status: ACTIVE | Noted: 2025-05-28

## 2025-05-28 LAB
ALBUMIN SERPL BCG-MCNC: 3.3 G/DL (ref 3.5–5)
ALP SERPL-CCNC: 47 U/L (ref 34–104)
ALT SERPL W P-5'-P-CCNC: 22 U/L (ref 7–52)
ANION GAP SERPL CALCULATED.3IONS-SCNC: 7 MMOL/L (ref 4–13)
AST SERPL W P-5'-P-CCNC: 67 U/L (ref 13–39)
BILIRUB SERPL-MCNC: 0.83 MG/DL (ref 0.2–1)
BNP SERPL-MCNC: 3248 PG/ML (ref 0–100)
BUN SERPL-MCNC: 60 MG/DL (ref 5–25)
CALCIUM ALBUM COR SERPL-MCNC: 9.6 MG/DL (ref 8.3–10.1)
CALCIUM SERPL-MCNC: 9 MG/DL (ref 8.4–10.2)
CHLORIDE SERPL-SCNC: 87 MMOL/L (ref 96–108)
CO2 SERPL-SCNC: 38 MMOL/L (ref 21–32)
CREAT SERPL-MCNC: 1.42 MG/DL (ref 0.6–1.3)
ERYTHROCYTE [DISTWIDTH] IN BLOOD BY AUTOMATED COUNT: 15.9 % (ref 11.6–15.1)
FERRITIN SERPL-MCNC: 112 NG/ML (ref 30–307)
FOLATE SERPL-MCNC: 13.5 NG/ML
GFR SERPL CREATININE-BSD FRML MDRD: 34 ML/MIN/1.73SQ M
GLUCOSE SERPL-MCNC: 129 MG/DL (ref 65–140)
GLUCOSE SERPL-MCNC: 136 MG/DL (ref 65–140)
GLUCOSE SERPL-MCNC: 139 MG/DL (ref 65–140)
GLUCOSE SERPL-MCNC: 199 MG/DL (ref 65–140)
GLUCOSE SERPL-MCNC: 264 MG/DL (ref 65–140)
GLUCOSE SERPL-MCNC: 294 MG/DL (ref 65–140)
HCT VFR BLD AUTO: 25.2 % (ref 34.8–46.1)
HGB BLD-MCNC: 8.1 G/DL (ref 11.5–15.4)
IRON SATN MFR SERPL: 10 % (ref 15–50)
IRON SERPL-MCNC: 30 UG/DL (ref 50–212)
MAGNESIUM SERPL-MCNC: 1.8 MG/DL (ref 1.9–2.7)
MCH RBC QN AUTO: 29.1 PG (ref 26.8–34.3)
MCHC RBC AUTO-ENTMCNC: 32.1 G/DL (ref 31.4–37.4)
MCV RBC AUTO: 91 FL (ref 82–98)
PHOSPHATE SERPL-MCNC: 3 MG/DL (ref 2.3–4.1)
PLATELET # BLD AUTO: 179 THOUSANDS/UL (ref 149–390)
PMV BLD AUTO: 10 FL (ref 8.9–12.7)
POTASSIUM SERPL-SCNC: 3.4 MMOL/L (ref 3.5–5.3)
PROT SERPL-MCNC: 6.5 G/DL (ref 6.4–8.4)
RBC # BLD AUTO: 2.78 MILLION/UL (ref 3.81–5.12)
SODIUM SERPL-SCNC: 132 MMOL/L (ref 135–147)
TIBC SERPL-MCNC: 292.6 UG/DL (ref 250–450)
TRANSFERRIN SERPL-MCNC: 209 MG/DL (ref 203–362)
UIBC SERPL-MCNC: 263 UG/DL (ref 155–355)
VIT B12 SERPL-MCNC: 255 PG/ML (ref 180–914)
WBC # BLD AUTO: 5.52 THOUSAND/UL (ref 4.31–10.16)

## 2025-05-28 PROCEDURE — 85027 COMPLETE CBC AUTOMATED: CPT

## 2025-05-28 PROCEDURE — 83550 IRON BINDING TEST: CPT

## 2025-05-28 PROCEDURE — 82607 VITAMIN B-12: CPT

## 2025-05-28 PROCEDURE — 94664 DEMO&/EVAL PT USE INHALER: CPT

## 2025-05-28 PROCEDURE — 99285 EMERGENCY DEPT VISIT HI MDM: CPT | Performed by: EMERGENCY MEDICINE

## 2025-05-28 PROCEDURE — 97163 PT EVAL HIGH COMPLEX 45 MIN: CPT

## 2025-05-28 PROCEDURE — 83880 ASSAY OF NATRIURETIC PEPTIDE: CPT

## 2025-05-28 PROCEDURE — 82728 ASSAY OF FERRITIN: CPT

## 2025-05-28 PROCEDURE — 94760 N-INVAS EAR/PLS OXIMETRY 1: CPT

## 2025-05-28 PROCEDURE — 94640 AIRWAY INHALATION TREATMENT: CPT

## 2025-05-28 PROCEDURE — 71045 X-RAY EXAM CHEST 1 VIEW: CPT

## 2025-05-28 PROCEDURE — 84100 ASSAY OF PHOSPHORUS: CPT

## 2025-05-28 PROCEDURE — 82948 REAGENT STRIP/BLOOD GLUCOSE: CPT

## 2025-05-28 PROCEDURE — 83735 ASSAY OF MAGNESIUM: CPT

## 2025-05-28 PROCEDURE — 82746 ASSAY OF FOLIC ACID SERUM: CPT

## 2025-05-28 PROCEDURE — 83540 ASSAY OF IRON: CPT

## 2025-05-28 PROCEDURE — 99223 1ST HOSP IP/OBS HIGH 75: CPT | Performed by: INTERNAL MEDICINE

## 2025-05-28 PROCEDURE — 36415 COLL VENOUS BLD VENIPUNCTURE: CPT

## 2025-05-28 PROCEDURE — 80053 COMPREHEN METABOLIC PANEL: CPT

## 2025-05-28 PROCEDURE — 97167 OT EVAL HIGH COMPLEX 60 MIN: CPT

## 2025-05-28 RX ORDER — INSULIN LISPRO 100 [IU]/ML
1-5 INJECTION, SOLUTION INTRAVENOUS; SUBCUTANEOUS
Status: DISCONTINUED | OUTPATIENT
Start: 2025-05-28 | End: 2025-05-29

## 2025-05-28 RX ORDER — BUMETANIDE 0.25 MG/ML
4 INJECTION, SOLUTION INTRAMUSCULAR; INTRAVENOUS 2 TIMES DAILY
Status: DISCONTINUED | OUTPATIENT
Start: 2025-05-28 | End: 2025-06-01

## 2025-05-28 RX ORDER — ALBUTEROL SULFATE 0.83 MG/ML
2.5 SOLUTION RESPIRATORY (INHALATION) EVERY 4 HOURS PRN
Status: DISCONTINUED | OUTPATIENT
Start: 2025-05-28 | End: 2025-06-02 | Stop reason: HOSPADM

## 2025-05-28 RX ORDER — BUDESONIDE 0.5 MG/2ML
0.5 INHALANT ORAL
Status: DISCONTINUED | OUTPATIENT
Start: 2025-05-28 | End: 2025-06-02 | Stop reason: HOSPADM

## 2025-05-28 RX ORDER — MAGNESIUM SULFATE HEPTAHYDRATE 40 MG/ML
2 INJECTION, SOLUTION INTRAVENOUS ONCE
Status: COMPLETED | OUTPATIENT
Start: 2025-05-28 | End: 2025-05-29

## 2025-05-28 RX ORDER — BUMETANIDE 1 MG/1
3 TABLET ORAL 2 TIMES DAILY
Status: DISCONTINUED | OUTPATIENT
Start: 2025-05-28 | End: 2025-06-02 | Stop reason: HOSPADM

## 2025-05-28 RX ORDER — POTASSIUM CHLORIDE 1500 MG/1
20 TABLET, EXTENDED RELEASE ORAL 2 TIMES DAILY
Status: DISCONTINUED | OUTPATIENT
Start: 2025-05-28 | End: 2025-06-02 | Stop reason: HOSPADM

## 2025-05-28 RX ORDER — LEVALBUTEROL INHALATION SOLUTION 1.25 MG/3ML
1.25 SOLUTION RESPIRATORY (INHALATION)
Status: DISCONTINUED | OUTPATIENT
Start: 2025-05-28 | End: 2025-05-28

## 2025-05-28 RX ORDER — ACETAMINOPHEN 325 MG/1
650 TABLET ORAL EVERY 6 HOURS PRN
Status: DISCONTINUED | OUTPATIENT
Start: 2025-05-28 | End: 2025-05-29

## 2025-05-28 RX ORDER — METOPROLOL SUCCINATE 50 MG/1
50 TABLET, EXTENDED RELEASE ORAL 2 TIMES DAILY
Status: DISCONTINUED | OUTPATIENT
Start: 2025-05-28 | End: 2025-06-02 | Stop reason: HOSPADM

## 2025-05-28 RX ORDER — RIVAROXABAN 15 MG/1
15 TABLET, FILM COATED ORAL
Qty: 30 TABLET | Refills: 0 | Status: SHIPPED | OUTPATIENT
Start: 2025-05-28

## 2025-05-28 RX ORDER — ESCITALOPRAM OXALATE 10 MG/1
5 TABLET ORAL DAILY
Status: DISCONTINUED | OUTPATIENT
Start: 2025-05-28 | End: 2025-06-02 | Stop reason: HOSPADM

## 2025-05-28 RX ORDER — LEVALBUTEROL INHALATION SOLUTION 1.25 MG/3ML
1.25 SOLUTION RESPIRATORY (INHALATION) 3 TIMES DAILY PRN
Status: DISCONTINUED | OUTPATIENT
Start: 2025-05-28 | End: 2025-05-28

## 2025-05-28 RX ORDER — DONEPEZIL HYDROCHLORIDE 5 MG/1
5 TABLET, FILM COATED ORAL
Status: DISCONTINUED | OUTPATIENT
Start: 2025-05-28 | End: 2025-06-02 | Stop reason: HOSPADM

## 2025-05-28 RX ORDER — ATORVASTATIN CALCIUM 40 MG/1
40 TABLET, FILM COATED ORAL DAILY
Status: DISCONTINUED | OUTPATIENT
Start: 2025-05-28 | End: 2025-06-02 | Stop reason: HOSPADM

## 2025-05-28 RX ORDER — DILTIAZEM HYDROCHLORIDE 120 MG/1
120 CAPSULE, COATED, EXTENDED RELEASE ORAL DAILY
Status: DISCONTINUED | OUTPATIENT
Start: 2025-05-28 | End: 2025-06-02 | Stop reason: HOSPADM

## 2025-05-28 RX ADMIN — POTASSIUM CHLORIDE 20 MEQ: 1500 TABLET, EXTENDED RELEASE ORAL at 09:11

## 2025-05-28 RX ADMIN — INSULIN LISPRO 2 UNITS: 100 INJECTION, SOLUTION INTRAVENOUS; SUBCUTANEOUS at 11:35

## 2025-05-28 RX ADMIN — METOPROLOL SUCCINATE 50 MG: 50 TABLET, EXTENDED RELEASE ORAL at 09:11

## 2025-05-28 RX ADMIN — ESCITALOPRAM OXALATE 5 MG: 10 TABLET ORAL at 09:11

## 2025-05-28 RX ADMIN — DILTIAZEM HYDROCHLORIDE 120 MG: 120 CAPSULE, COATED, EXTENDED RELEASE ORAL at 09:11

## 2025-05-28 RX ADMIN — BUMETANIDE 4 MG: 0.25 INJECTION INTRAMUSCULAR; INTRAVENOUS at 09:22

## 2025-05-28 RX ADMIN — BUMETANIDE 4 MG: 0.25 INJECTION INTRAMUSCULAR; INTRAVENOUS at 02:11

## 2025-05-28 RX ADMIN — INSULIN LISPRO 3 UNITS: 100 INJECTION, SOLUTION INTRAVENOUS; SUBCUTANEOUS at 17:00

## 2025-05-28 RX ADMIN — DONEPEZIL HYDROCHLORIDE 5 MG: 5 TABLET ORAL at 21:21

## 2025-05-28 RX ADMIN — POTASSIUM CHLORIDE 20 MEQ: 1500 TABLET, EXTENDED RELEASE ORAL at 17:44

## 2025-05-28 RX ADMIN — IPRATROPIUM BROMIDE 0.5 MG: 0.5 SOLUTION RESPIRATORY (INHALATION) at 09:25

## 2025-05-28 RX ADMIN — MAGNESIUM SULFATE HEPTAHYDRATE 2 G: 40 INJECTION, SOLUTION INTRAVENOUS at 05:10

## 2025-05-28 RX ADMIN — BUDESONIDE 0.5 MG: 0.5 INHALANT RESPIRATORY (INHALATION) at 20:25

## 2025-05-28 RX ADMIN — LEVALBUTEROL HYDROCHLORIDE 1.25 MG: 1.25 SOLUTION RESPIRATORY (INHALATION) at 09:25

## 2025-05-28 RX ADMIN — BUMETANIDE 4 MG: 0.25 INJECTION INTRAMUSCULAR; INTRAVENOUS at 17:44

## 2025-05-28 RX ADMIN — METOPROLOL SUCCINATE 50 MG: 50 TABLET, EXTENDED RELEASE ORAL at 17:44

## 2025-05-28 RX ADMIN — ATORVASTATIN CALCIUM 40 MG: 40 TABLET, FILM COATED ORAL at 09:10

## 2025-05-28 RX ADMIN — INSULIN LISPRO 1 UNITS: 100 INJECTION, SOLUTION INTRAVENOUS; SUBCUTANEOUS at 21:22

## 2025-05-28 RX ADMIN — BUDESONIDE 0.5 MG: 0.5 INHALANT RESPIRATORY (INHALATION) at 09:25

## 2025-05-28 RX ADMIN — RIVAROXABAN 15 MG: 15 TABLET, FILM COATED ORAL at 06:57
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
## 2025-05-28 NOTE — ASSESSMENT & PLAN NOTE
Sodium dropped from 136->131, she is on Bumex as well as metolazone last dose of metolazone was 2 days ago

## 2025-05-28 NOTE — ASSESSMENT & PLAN NOTE
Patient with history of impaired memory.  Lives independently and manages own medications.  Question of medication noncompliance due to forgetfulness.  Takes Aricept at home.  On Aricpet.  Can consider case management consult for possible SNF/assisted living for assistance with medications.

## 2025-05-28 NOTE — ASSESSMENT & PLAN NOTE
Recent Labs     05/27/25  0834   HGB 8.8*   Patient with hemoglobin 8.8 on admission, last hemoglobin 10.4 in 04/2025.  Patient with ruptured popliteal cyst, no other clinical evidence of bleeding.  Continue to monitor hemoglobin  Ordered iron studies, B12.  Transfuse if <7

## 2025-05-28 NOTE — ED ATTENDING ATTESTATION
5/27/2025  I, Bradley Trujillo MD, saw and evaluated the patient. I have discussed the patient with the resident/non-physician practitioner and agree with the resident's/non-physician practitioner's findings, Plan of Care, and MDM as documented in the resident's/non-physician practitioner's note, except where noted. All available labs and Radiology studies were reviewed.  I was present for key portions of any procedure(s) performed by the resident/non-physician practitioner and I was immediately available to provide assistance.       At this point I agree with the current assessment done in the Emergency Department.  I have conducted an independent evaluation of this patient a history and physical is as follows: Right leg pain and swelling likely secondary to Bakers cyst.  More concerning the patient and family's inability to ambulate and care for self at home in her current living situation.  They had been attempted to get into a higher level of care for some time but have been unable to do so.  Laboratory studies with slight FEDE, slight hyponatremia, slight drop in her hemoglobin from baseline.  She is hemodynamically stable.  She will be admitted to hospitalist service.    Results Reviewed       Procedure Component Value Units Date/Time    Magnesium [[id number]]  (Abnormal) Collected: 05/28/25 0112    Lab Status: Final result Specimen: Blood from Arm, Left Updated: 05/28/25 0140     Magnesium 1.8 mg/dL     Fingerstick Glucose (POCT) [[id number]]  (Normal) Collected: 05/28/25 0115    Lab Status: Final result Specimen: Blood Updated: 05/28/25 0116     POC Glucose 136 mg/dl     Protime-INR [[id number]]  (Abnormal) Collected: 05/27/25 2307    Lab Status: Final result Specimen: Blood from Arm, Right Updated: 05/27/25 2329     Protime 21.1 seconds      INR 1.75    Narrative:      INR Therapeutic Range    Indication                                             INR Range      Atrial Fibrillation                                                2.0-3.0  Hypercoagulable State                                    2.0.2.3  Left Ventricular Asist Device                            2.0-3.0  Mechanical Heart Valve                                  -    Aortic(with afib, MI, embolism, HF, LA enlargement,    and/or coagulopathy)                                     2.0-3.0 (2.5-3.5)     Mitral                                                             2.5-3.5  Prosthetic/Bioprosthetic Heart Valve               2.0-3.0  Venous thromboembolism (VTE: VT, PE        2.0-3.0    APTT [[id number]]  (Normal) Collected: 05/27/25 2274    Lab Status: Final result Specimen: Blood from Arm, Right Updated: 05/27/25 6805     PTT 31 seconds           XR chest portable    (Results Pending)         ED Course         Critical Care Time  Procedures

## 2025-05-28 NOTE — PROGRESS NOTES
Name: Camryn Roblero      : 1940     MRN: [id number]  Encounter Provider: Romana Krishnan MD  Encounter Date: 2025  Encounter department: [facility]    Assessment & Plan  Bilateral leg edema  We will rule out DVT same-day venous duplex of bilateral lower extremity was done and negative, it is possible that she has acute limb ischemia  Orders:     VAS VENOUS DUPLEX - LOWER LIMB BILATERAL; Future    Pain and swelling of right lower leg    Orders:     VAS VENOUS DUPLEX - LOWER LIMB BILATERAL; Future    Acute right ankle pain  Same-day x-ray done and reviewed, degenerative changes present in the x-ray  Orders:    XR ankle 3+ vw right; Future    Acute pain of right knee  Same day x-ray of the right knee done and reviewed, x-ray was negative for any acute fractures however she does have tricompartmental arthritis  Orders:    XR knee 3 vw right non injury; Future    Hyponatremia  Sodium dropped from 136->131, she is on Bumex as well as metolazone last dose of metolazone was 2 days ago       Iron deficiency anemia, unspecified iron deficiency anemia type  Hgb dropped from 10.4 to 8.8, possible chronic occult blood loss versus nutritional given she has had poor appetite           FEDE (acute kidney injury) (HCC)  Cr worsened from 1.24 to 1.61, she has previously meen in 1.18-1.52 range, suspected pre-renal cause       Acute lower limb ischemia  Have to do this in the DVT study which was negative for any acute deep vein thrombosis, given spontaneous ecchymosis and pain out of proportion to the clinical findings suspect critical limb ischemia discussed with the daughter and sent her to the emergency room today she might need rehab and placement given she is not able to do ADLs or IADLs independently number.                      Read package inserts for all medications before starting a new medications, call me if you have any questions.    Patient was given opportunity to ask questions and  all questions were answered.    Subjective:     Camryn Roblero is a 84 y.o. female.    Presents with daughter with chief complaint of right lower extremity pain and swelling.  She states that her swelling has gotten worse.  She states that her ankle is black and blue and also has black and blue around her knee.  She denies any trauma or falls.  Daughter is present and states that she has declined physically and is not able to do anything independently.  Her appetite has decreased and also her weight has decreased however her water weight may have increased.        Past Medical History[1]    Family History[2]    Past Surgical History[3]     reports that she has never smoked. She has never used smokeless tobacco. She reports that she does not drink alcohol and does not use drugs.    Current Medications[4]    The following portions of the patient's history were reviewed and updated as appropriate: allergies, current medications, past family history, past medical history, past social history, past surgical history and problem list.    Review of Systems   Constitutional:  Negative for fatigue and fever.   HENT:  Negative for congestion, facial swelling, mouth sores, rhinorrhea, sore throat and trouble swallowing.    Eyes:  Negative for pain and redness.   Respiratory:  Negative for cough, shortness of breath and wheezing.    Cardiovascular:  Positive for leg swelling. Negative for chest pain and palpitations.   Gastrointestinal:  Negative for abdominal pain, blood in stool, constipation, diarrhea and nausea.   Genitourinary:  Negative for dysuria, hematuria and urgency.   Musculoskeletal:  Positive for arthralgias, gait problem and joint swelling. Negative for back pain and myalgias.   Skin:  Positive for color change. Negative for rash and wound.   Neurological:  Negative for seizures, syncope and headaches.   Hematological:  Negative for adenopathy.   Psychiatric/Behavioral:  Negative for agitation and behavioral problems.                 Objective:    BP (!) 106/40 (BP Location: Left arm, Patient Position: Sitting, Cuff Size: Adult)   Pulse (!) 51   Temp (!) 97.1 °F (36.2 °C) (Tympanic)   Resp 20   SpO2 94% Comment: on 3L of O2     Physical Exam  Vitals and nursing note reviewed.   Constitutional:       Appearance: Normal appearance. She is well-developed. She is not ill-appearing.   HENT:      Head: Normocephalic and atraumatic.      Right Ear: External ear normal.      Left Ear: External ear normal.      Nose: Nose normal.      Mouth/Throat:      Mouth: Mucous membranes are moist.      Pharynx: No oropharyngeal exudate or posterior oropharyngeal erythema.     Eyes:      General: No scleral icterus.        Right eye: No discharge.         Left eye: No discharge.      Conjunctiva/sclera: Conjunctivae normal.       Cardiovascular:      Rate and Rhythm: Normal rate.      Heart sounds: No murmur heard.     No gallop.   Pulmonary:      Effort: Pulmonary effort is normal. No respiratory distress.      Breath sounds: Normal breath sounds. No stridor. No wheezing, rhonchi or rales.   Abdominal:      Palpations: Abdomen is soft.      Tenderness: There is no abdominal tenderness.     Musculoskeletal:         General: Swelling and tenderness present. No deformity.      Right lower leg: Edema (2+ pitting) present.      Left lower leg: Edema (2+ pitting) present.      Comments: Ecchymosis over the right ankle and right knee  Severe tenderness upon touching right ankle     Skin:     Findings: No erythema or rash.     Neurological:      Mental Status: She is alert. Mental status is at baseline.     Psychiatric:         Behavior: Behavior normal.         Judgment: Judgment normal.           Recent Results (from the past 52 weeks)   CPAP Package    Collection Time: 05/31/24  8:38 AM   Result Value Ref Range    Supplier Name [employer]     Supplier Phone Number [phone]     Order Status Completed     Delivery Note      Delivery Request Date  05/31/2024     Date Delivered  12/27/2024     Item Description CPAP Machine, Resmed     Item Description       PAP Mask, Full Face, Resmed, Fit Upon Setup, 1 per 3 months    Item Description       PAP Mask Interface Cushion, Full Face, 1 per 1 month    Item Description PAP Headgear, 1 per 6 months     Item Description PAP Humidifier, Heated     Item Description Disposable PAP Filter, 2 per 1 month     Item Description PAP Machine Tubing, Non-Heated, 1 per 3 months     Item Description PAP Monitoring Modem     Item Description Humidifier Water Chamber, 1 per 6 months     Item Description Non-Disposable PAP Filter, 1 per 6 months    Basic metabolic panel    Collection Time: 06/05/24  3:15 PM   Result Value Ref Range    Sodium 136 135 - 147 mmol/L    Potassium 4.1 3.5 - 5.3 mmol/L    Chloride 97 96 - 108 mmol/L    CO2 31 21 - 32 mmol/L    ANION GAP 8 4 - 13 mmol/L    BUN 29 (H) 5 - 25 mg/dL    Creatinine 1.02 0.60 - 1.30 mg/dL    Glucose 172 (H) 65 - 140 mg/dL    Calcium 9.4 8.4 - 10.2 mg/dL    eGFR 51 ml/min/1.73sq m   Basic metabolic panel    Collection Time: 06/07/24  6:43 AM   Result Value Ref Range    Sodium 140 135 - 147 mmol/L    Potassium 3.5 3.5 - 5.3 mmol/L    Chloride 96 96 - 108 mmol/L    CO2 32 21 - 32 mmol/L    ANION GAP 12 4 - 13 mmol/L    BUN 28 (H) 5 - 25 mg/dL    Creatinine 1.06 0.60 - 1.30 mg/dL    Glucose 243 (H) 65 - 140 mg/dL    Calcium 9.4 8.4 - 10.2 mg/dL    eGFR 49 ml/min/1.73sq m   Fingerstick Glucose (POCT)    Collection Time: 06/19/24 11:09 AM   Result Value Ref Range    POC Glucose 114 65 - 140 mg/dl   Basic metabolic panel    Collection Time: 06/20/24 11:00 AM   Result Value Ref Range    Sodium 134 (L) 135 - 147 mmol/L    Potassium 4.0 3.5 - 5.3 mmol/L    Chloride 92 (L) 96 - 108 mmol/L    CO2 33 (H) 21 - 32 mmol/L    ANION GAP 9 4 - 13 mmol/L    BUN 26 (H) 5 - 25 mg/dL    Creatinine 0.95 0.60 - 1.30 mg/dL    Glucose 204 (H) 65 - 140 mg/dL    Calcium 9.3 8.4 - 10.2 mg/dL    eGFR 55  ml/min/1.73sq m   Basic metabolic panel    Collection Time: 07/03/24  8:30 AM   Result Value Ref Range    Sodium 132 (L) 135 - 147 mmol/L    Potassium 4.1 3.5 - 5.3 mmol/L    Chloride 92 (L) 96 - 108 mmol/L    CO2 30 21 - 32 mmol/L    ANION GAP 10 4 - 13 mmol/L    BUN 22 5 - 25 mg/dL    Creatinine 0.77 0.60 - 1.30 mg/dL    Glucose, Fasting 159 (H) 65 - 99 mg/dL    Calcium 9.3 8.4 - 10.2 mg/dL    eGFR 72 ml/min/1.73sq m   Basic metabolic panel    Collection Time: 07/26/24  9:06 AM   Result Value Ref Range    Sodium 131 (L) 135 - 147 mmol/L    Potassium 3.8 3.5 - 5.3 mmol/L    Chloride 93 (L) 96 - 108 mmol/L    CO2 32 21 - 32 mmol/L    ANION GAP 6 4 - 13 mmol/L    BUN 21 5 - 25 mg/dL    Creatinine 0.74 0.60 - 1.30 mg/dL    Glucose 146 (H) 65 - 140 mg/dL    Calcium 9.3 8.4 - 10.2 mg/dL    eGFR 75 ml/min/1.73sq m   Magnesium    Collection Time: 07/26/24  9:06 AM   Result Value Ref Range    Magnesium 1.6 (L) 1.9 - 2.7 mg/dL   Phosphorus    Collection Time: 07/26/24  9:06 AM   Result Value Ref Range    Phosphorus 3.3 2.3 - 4.1 mg/dL   Basic metabolic panel    Collection Time: 08/15/24  8:47 AM   Result Value Ref Range    Sodium 134 (L) 135 - 147 mmol/L    Potassium 3.7 3.5 - 5.3 mmol/L    Chloride 95 (L) 96 - 108 mmol/L    CO2 30 21 - 32 mmol/L    ANION GAP 9 4 - 13 mmol/L    BUN 22 5 - 25 mg/dL    Creatinine 0.81 0.60 - 1.30 mg/dL    Glucose, Fasting 130 (H) 65 - 99 mg/dL    Calcium 9.4 8.4 - 10.2 mg/dL    eGFR 67 ml/min/1.73sq m   POCT hemoglobin A1c    Collection Time: 08/23/24  3:07 PM   Result Value Ref Range    Hemoglobin A1C 7.1 (A) <=6.5   Basic metabolic panel    Collection Time: 09/10/24  8:00 AM   Result Value Ref Range    Sodium 140 135 - 147 mmol/L    Potassium 4.0 3.5 - 5.3 mmol/L    Chloride 97 96 - 108 mmol/L    CO2 37 (H) 21 - 32 mmol/L    ANION GAP 6 4 - 13 mmol/L    BUN 28 (H) 5 - 25 mg/dL    Creatinine 1.00 0.60 - 1.30 mg/dL    Glucose, Fasting 121 (H) 65 - 99 mg/dL    Calcium 9.5 8.4 - 10.2 mg/dL     eGFR 52 ml/min/1.73sq m   Basic metabolic panel    Collection Time: 10/11/24  9:56 AM   Result Value Ref Range    Sodium 131 (L) 135 - 147 mmol/L    Potassium 4.4 3.5 - 5.3 mmol/L    Chloride 94 (L) 96 - 108 mmol/L    CO2 30 21 - 32 mmol/L    ANION GAP 7 4 - 13 mmol/L    BUN 35 (H) 5 - 25 mg/dL    Creatinine 1.06 0.60 - 1.30 mg/dL    Glucose 278 (H) 65 - 140 mg/dL    Calcium 9.6 8.4 - 10.2 mg/dL    eGFR 48 ml/min/1.73sq m   Magnesium    Collection Time: 10/11/24  9:56 AM   Result Value Ref Range    Magnesium 1.7 (L) 1.9 - 2.7 mg/dL   Phosphorus    Collection Time: 10/11/24  9:56 AM   Result Value Ref Range    Phosphorus 3.7 2.3 - 4.1 mg/dL   Basic metabolic panel    Collection Time: 10/21/24 12:40 PM   Result Value Ref Range    Sodium 133 (L) 135 - 147 mmol/L    Potassium 4.0 3.5 - 5.3 mmol/L    Chloride 93 (L) 96 - 108 mmol/L    CO2 34 (H) 21 - 32 mmol/L    ANION GAP 6 4 - 13 mmol/L    BUN 32 (H) 5 - 25 mg/dL    Creatinine 1.02 0.60 - 1.30 mg/dL    Glucose 144 (H) 65 - 140 mg/dL    Calcium 9.4 8.4 - 10.2 mg/dL    eGFR 50 ml/min/1.73sq m   CBC and differential    Collection Time: 10/23/24 11:07 AM   Result Value Ref Range    WBC 8.42 4.31 - 10.16 Thousand/uL    RBC 4.83 3.81 - 5.12 Million/uL    Hemoglobin 13.1 11.5 - 15.4 g/dL    Hematocrit 41.8 34.8 - 46.1 %    MCV 87 82 - 98 fL    MCH 27.1 26.8 - 34.3 pg    MCHC 31.3 (L) 31.4 - 37.4 g/dL    RDW 16.3 (H) 11.6 - 15.1 %    MPV 9.9 8.9 - 12.7 fL    Platelets 246 149 - 390 Thousands/uL    nRBC 0 /100 WBCs    Segmented % 86 (H) 43 - 75 %    Immature Grans % 0 0 - 2 %    Lymphocytes % 7 (L) 14 - 44 %    Monocytes % 6 4 - 12 %    Eosinophils Relative 1 0 - 6 %    Basophils Relative 0 0 - 1 %    Absolute Neutrophils 7.21 1.85 - 7.62 Thousands/µL    Absolute Immature Grans 0.03 0.00 - 0.20 Thousand/uL    Absolute Lymphocytes 0.60 0.60 - 4.47 Thousands/µL    Absolute Monocytes 0.49 0.17 - 1.22 Thousand/µL    Eosinophils Absolute 0.07 0.00 - 0.61 Thousand/µL    Basophils  "Absolute 0.02 0.00 - 0.10 Thousands/µL   Comprehensive metabolic panel    Collection Time: 10/23/24 11:07 AM   Result Value Ref Range    Sodium 132 (L) 135 - 147 mmol/L    Potassium 4.2 3.5 - 5.3 mmol/L    Chloride 90 (L) 96 - 108 mmol/L    CO2 34 (H) 21 - 32 mmol/L    ANION GAP 8 4 - 13 mmol/L    BUN 34 (H) 5 - 25 mg/dL    Creatinine 1.04 0.60 - 1.30 mg/dL    Glucose 189 (H) 65 - 140 mg/dL    Calcium 9.7 8.4 - 10.2 mg/dL    AST 77 (H) 13 - 39 U/L    ALT 28 7 - 52 U/L    Alkaline Phosphatase 90 34 - 104 U/L    Total Protein 8.2 6.4 - 8.4 g/dL    Albumin 4.0 3.5 - 5.0 g/dL    Total Bilirubin 0.98 0.20 - 1.00 mg/dL    eGFR 49 ml/min/1.73sq m   HS Troponin 0hr (reflex protocol)    Collection Time: 10/23/24 11:07 AM   Result Value Ref Range    hs TnI 0hr 34 \"Refer to ACS Flowchart\"- see link ng/L   B-Type Natriuretic Peptide(BNP)    Collection Time: 10/23/24 11:07 AM   Result Value Ref Range    BNP 1,154 (H) 0 - 100 pg/mL   Magnesium    Collection Time: 10/23/24 11:07 AM   Result Value Ref Range    Magnesium 1.5 (L) 1.9 - 2.7 mg/dL   ECG 12 lead    Collection Time: 10/23/24 11:17 AM   Result Value Ref Range    Ventricular Rate 82 BPM    Atrial Rate 178 BPM    OH Interval  ms    QRSD Interval 110 ms    QT Interval 390 ms    QTC Interval 455 ms    P Axis  degrees    QRS Axis 62 degrees    T Wave Axis 238 degrees   FLU/COVID Rapid Antigen (30 min. TAT) - Preferred screening test in ED    Collection Time: 10/23/24 11:33 AM    Specimen: Nose; Nares   Result Value Ref Range    SARS COV Rapid Antigen Negative Negative    Influenza A Rapid Antigen Negative Negative    Influenza B Rapid Antigen Negative Negative   HS Troponin I 2hr    Collection Time: 10/23/24  1:13 PM   Result Value Ref Range    hs TnI 2hr 29 \"Refer to ACS Flowchart\"- see link ng/L    Delta 2hr hsTnI -5 <20 ng/L   Echo complete w/ contrast if indicated    Collection Time: 10/23/24  3:15 PM   Result Value Ref Range    A4C EF 75 %    LVIDd 4.20 cm    LVIDS 2.80 " cm    IVSd 0.90 cm    LVPWd 1.40 cm    LVOT diameter 2.0 cm    FS 33 28 - 44    LA Volume Index (BP) 58.6 mL/m2    E/A ratio 2.35     E wave deceleration time 139 ms    MV Peak E Matteo 108 cm/s    MV Peak A Matteo 0.46 m/s    RVID d 4.5 cm    Tricuspid annular plane systolic excursion 1.20 cm    LA size 6.1 cm    LA length (A2C) 7.30 cm    LA volume (BP) 99 mL    RA 2D Volume 64.0 mL    RAA A4C 23.5 cm2    AV Deceleration Time 1,943 ms    AV regurgitation pressure 1/2 time 563 ms    MV stenosis pressure 1/2 time 40 ms    MV valve area p 1/2 method 5.50     TR Peak Matteo 4.6 m/s    Triscuspid Valve Regurgitation Peak Gradient 83.0 mmHg    Ao root 2.90 cm    STJ 2.9 cm    Asc Ao 3.5 cm    AV peak gradient 93 mmHg    Sinus of Valsalva, 2D 2.2 cm    Tricuspid valve peak regurgitation velocity 4.56 m/s    Left ventricular stroke volume (2D) 49.00 mL    Ao STJ 2.90 cm    IVS 0.9 cm    LEFT VENTRICLE SYSTOLIC VOLUME (MOD BIPLANE) 2D 30 mL    LV DIASTOLIC VOLUME (MOD BIPLANE) 2D 79 mL    Left Atrium Area-systolic Four Chamber 29.6 cm2    Left Atrium Area-systolic Apical Two Chamber 30 cm2    LVSV, 2D 49 mL    LVOT area 3.14 cm2    BSA 1.69 m2    LV EF 65     Est. RA pres 15.0 mmHg    Right Ventricular Peak Systolic Pressure 98.00 mmHg   Hemoglobin A1c w/EAG Estimation (Prechecked if no A1C within 90 days)    Collection Time: 10/23/24  5:00 PM   Result Value Ref Range    Hemoglobin A1C 7.4 (H) Normal 4.0-5.6%; PreDiabetic 5.7-6.4%; Diabetic >=6.5%; Glycemic control for adults with diabetes <7.0% %     mg/dl   Fingerstick Glucose (POCT)    Collection Time: 10/23/24  6:35 PM   Result Value Ref Range    POC Glucose 109 65 - 140 mg/dl   Fingerstick Glucose (POCT)    Collection Time: 10/23/24  9:27 PM   Result Value Ref Range    POC Glucose 136 65 - 140 mg/dl   CBC (With Platelets)    Collection Time: 10/24/24  5:13 AM   Result Value Ref Range    WBC 8.04 4.31 - 10.16 Thousand/uL    RBC 4.36 3.81 - 5.12 Million/uL    Hemoglobin  11.8 11.5 - 15.4 g/dL    Hematocrit 37.3 34.8 - 46.1 %    MCV 86 82 - 98 fL    MCH 27.1 26.8 - 34.3 pg    MCHC 31.6 31.4 - 37.4 g/dL    RDW 16.3 (H) 11.6 - 15.1 %    Platelets 230 149 - 390 Thousands/uL    MPV 10.3 8.9 - 12.7 fL   Basic metabolic panel    Collection Time: 10/24/24  5:13 AM   Result Value Ref Range    Sodium 136 135 - 147 mmol/L    Potassium 3.1 (L) 3.5 - 5.3 mmol/L    Chloride 94 (L) 96 - 108 mmol/L    CO2 35 (H) 21 - 32 mmol/L    ANION GAP 7 4 - 13 mmol/L    BUN 30 (H) 5 - 25 mg/dL    Creatinine 0.86 0.60 - 1.30 mg/dL    Glucose 134 65 - 140 mg/dL    Calcium 8.9 8.4 - 10.2 mg/dL    eGFR 62 ml/min/1.73sq m   Fingerstick Glucose (POCT)    Collection Time: 10/24/24  7:35 AM   Result Value Ref Range    POC Glucose 146 (H) 65 - 140 mg/dl   Fingerstick Glucose (POCT)    Collection Time: 10/24/24 11:59 AM   Result Value Ref Range    POC Glucose 185 (H) 65 - 140 mg/dl   Fingerstick Glucose (POCT)    Collection Time: 10/24/24  5:38 PM   Result Value Ref Range    POC Glucose 216 (H) 65 - 140 mg/dl   Fingerstick Glucose (POCT)    Collection Time: 10/24/24  8:52 PM   Result Value Ref Range    POC Glucose 199 (H) 65 - 140 mg/dl   CBC    Collection Time: 10/25/24  5:31 AM   Result Value Ref Range    WBC 7.68 4.31 - 10.16 Thousand/uL    RBC 4.75 3.81 - 5.12 Million/uL    Hemoglobin 12.8 11.5 - 15.4 g/dL    Hematocrit 42.6 34.8 - 46.1 %    MCV 90 82 - 98 fL    MCH 26.9 26.8 - 34.3 pg    MCHC 30.0 (L) 31.4 - 37.4 g/dL    RDW 16.4 (H) 11.6 - 15.1 %    Platelets 237 149 - 390 Thousands/uL    MPV 10.2 8.9 - 12.7 fL   Basic metabolic panel    Collection Time: 10/25/24  5:31 AM   Result Value Ref Range    Sodium 139 135 - 147 mmol/L    Potassium 4.1 3.5 - 5.3 mmol/L    Chloride 96 96 - 108 mmol/L    CO2 41 (H) 21 - 32 mmol/L    ANION GAP 2 (L) 4 - 13 mmol/L    BUN 30 (H) 5 - 25 mg/dL    Creatinine 1.02 0.60 - 1.30 mg/dL    Glucose 160 (H) 65 - 140 mg/dL    Calcium 9.4 8.4 - 10.2 mg/dL    eGFR 50 ml/min/1.73sq m    Magnesium    Collection Time: 10/25/24  5:31 AM   Result Value Ref Range    Magnesium 1.8 (L) 1.9 - 2.7 mg/dL   Fingerstick Glucose (POCT)    Collection Time: 10/25/24  7:35 AM   Result Value Ref Range    POC Glucose 156 (H) 65 - 140 mg/dl   Fingerstick Glucose (POCT)    Collection Time: 10/25/24 11:49 AM   Result Value Ref Range    POC Glucose 224 (H) 65 - 140 mg/dl   Fingerstick Glucose (POCT)    Collection Time: 10/25/24  4:26 PM   Result Value Ref Range    POC Glucose 196 (H) 65 - 140 mg/dl   Fingerstick Glucose (POCT)    Collection Time: 10/25/24  9:31 PM   Result Value Ref Range    POC Glucose 231 (H) 65 - 140 mg/dl   Basic metabolic panel    Collection Time: 10/26/24  4:42 AM   Result Value Ref Range    Sodium 141 135 - 147 mmol/L    Potassium 3.9 3.5 - 5.3 mmol/L    Chloride 98 96 - 108 mmol/L    CO2 36 (H) 21 - 32 mmol/L    ANION GAP 7 4 - 13 mmol/L    BUN 31 (H) 5 - 25 mg/dL    Creatinine 0.93 0.60 - 1.30 mg/dL    Glucose 179 (H) 65 - 140 mg/dL    Calcium 9.2 8.4 - 10.2 mg/dL    eGFR 57 ml/min/1.73sq m   Magnesium    Collection Time: 10/26/24  4:42 AM   Result Value Ref Range    Magnesium 1.9 1.9 - 2.7 mg/dL   Fingerstick Glucose (POCT)    Collection Time: 10/26/24  8:10 AM   Result Value Ref Range    POC Glucose 168 (H) 65 - 140 mg/dl   Fingerstick Glucose (POCT)    Collection Time: 10/26/24 11:14 AM   Result Value Ref Range    POC Glucose 245 (H) 65 - 140 mg/dl   Fingerstick Glucose (POCT)    Collection Time: 10/26/24  4:06 PM   Result Value Ref Range    POC Glucose 277 (H) 65 - 140 mg/dl   Fingerstick Glucose (POCT)    Collection Time: 10/26/24  8:33 PM   Result Value Ref Range    POC Glucose 295 (H) 65 - 140 mg/dl   Basic metabolic panel    Collection Time: 10/27/24  6:08 AM   Result Value Ref Range    Sodium 141 135 - 147 mmol/L    Potassium 4.4 3.5 - 5.3 mmol/L    Chloride 98 96 - 108 mmol/L    CO2 40 (H) 21 - 32 mmol/L    ANION GAP 3 (L) 4 - 13 mmol/L    BUN 31 (H) 5 - 25 mg/dL    Creatinine 0.96  0.60 - 1.30 mg/dL    Glucose 190 (H) 65 - 140 mg/dL    Calcium 9.3 8.4 - 10.2 mg/dL    eGFR 54 ml/min/1.73sq m   Fingerstick Glucose (POCT)    Collection Time: 10/27/24  7:54 AM   Result Value Ref Range    POC Glucose 196 (H) 65 - 140 mg/dl   Fingerstick Glucose (POCT)    Collection Time: 10/27/24 11:27 AM   Result Value Ref Range    POC Glucose 292 (H) 65 - 140 mg/dl   Fingerstick Glucose (POCT)    Collection Time: 10/27/24  3:50 PM   Result Value Ref Range    POC Glucose 331 (H) 65 - 140 mg/dl   Fingerstick Glucose (POCT)    Collection Time: 10/27/24  9:17 PM   Result Value Ref Range    POC Glucose 194 (H) 65 - 140 mg/dl   Magnesium    Collection Time: 10/28/24  4:57 AM   Result Value Ref Range    Magnesium 1.9 1.9 - 2.7 mg/dL   Basic metabolic panel    Collection Time: 10/28/24  4:57 AM   Result Value Ref Range    Sodium 140 135 - 147 mmol/L    Potassium 4.4 3.5 - 5.3 mmol/L    Chloride 97 96 - 108 mmol/L    CO2 43 (H) 21 - 32 mmol/L    ANION GAP 0 (L) 4 - 13 mmol/L    BUN 33 (H) 5 - 25 mg/dL    Creatinine 0.93 0.60 - 1.30 mg/dL    Glucose 155 (H) 65 - 140 mg/dL    Calcium 9.4 8.4 - 10.2 mg/dL    eGFR 57 ml/min/1.73sq m   Fingerstick Glucose (POCT)    Collection Time: 10/28/24  7:35 AM   Result Value Ref Range    POC Glucose 163 (H) 65 - 140 mg/dl   Fingerstick Glucose (POCT)    Collection Time: 10/28/24 11:20 AM   Result Value Ref Range    POC Glucose 257 (H) 65 - 140 mg/dl   Fingerstick Glucose (POCT)    Collection Time: 10/28/24  4:38 PM   Result Value Ref Range    POC Glucose 248 (H) 65 - 140 mg/dl   Fingerstick Glucose (POCT)    Collection Time: 10/28/24  9:11 PM   Result Value Ref Range    POC Glucose 218 (H) 65 - 140 mg/dl   Fingerstick Glucose (POCT)    Collection Time: 10/29/24  7:51 AM   Result Value Ref Range    POC Glucose 154 (H) 65 - 140 mg/dl   Basic metabolic panel    Collection Time: 10/29/24  9:13 AM   Result Value Ref Range    Sodium 140 135 - 147 mmol/L    Potassium 4.3 3.5 - 5.3 mmol/L     Chloride 97 96 - 108 mmol/L    CO2 39 (H) 21 - 32 mmol/L    ANION GAP 4 4 - 13 mmol/L    BUN 34 (H) 5 - 25 mg/dL    Creatinine 0.94 0.60 - 1.30 mg/dL    Glucose 225 (H) 65 - 140 mg/dL    Calcium 9.6 8.4 - 10.2 mg/dL    eGFR 56 ml/min/1.73sq m   Fingerstick Glucose (POCT)    Collection Time: 10/29/24 11:34 AM   Result Value Ref Range    POC Glucose 251 (H) 65 - 140 mg/dl   Fingerstick Glucose (POCT)    Collection Time: 10/29/24  5:08 PM   Result Value Ref Range    POC Glucose 222 (H) 65 - 140 mg/dl   Fingerstick Glucose (POCT)    Collection Time: 10/29/24 10:10 PM   Result Value Ref Range    POC Glucose 153 (H) 65 - 140 mg/dl   Basic metabolic panel    Collection Time: 10/30/24  4:55 AM   Result Value Ref Range    Sodium 144 135 - 147 mmol/L    Potassium 3.3 (L) 3.5 - 5.3 mmol/L    Chloride 90 (L) 96 - 108 mmol/L    CO2 >45 (H) 21 - 32 mmol/L    ANION GAP      BUN 36 (H) 5 - 25 mg/dL    Creatinine 0.91 0.60 - 1.30 mg/dL    Glucose 107 65 - 140 mg/dL    Calcium 10.1 8.4 - 10.2 mg/dL    eGFR 58 ml/min/1.73sq m   Fingerstick Glucose (POCT)    Collection Time: 10/30/24  8:01 AM   Result Value Ref Range    POC Glucose 121 65 - 140 mg/dl   Fingerstick Glucose (POCT)    Collection Time: 10/30/24 11:46 AM   Result Value Ref Range    POC Glucose 201 (H) 65 - 140 mg/dl   Fingerstick Glucose (POCT)    Collection Time: 10/30/24  3:41 PM   Result Value Ref Range    POC Glucose 260 (H) 65 - 140 mg/dl   Fingerstick Glucose (POCT)    Collection Time: 10/30/24  8:47 PM   Result Value Ref Range    POC Glucose 244 (H) 65 - 140 mg/dl   Fingerstick Glucose (POCT)    Collection Time: 10/31/24  7:55 AM   Result Value Ref Range    POC Glucose 162 (H) 65 - 140 mg/dl   Basic metabolic panel    Collection Time: 10/31/24  8:34 AM   Result Value Ref Range    Sodium 145 135 - 147 mmol/L    Potassium 3.6 3.5 - 5.3 mmol/L    Chloride 89 (L) 96 - 108 mmol/L    CO2 >45 (H) 21 - 32 mmol/L    ANION GAP      BUN 45 (H) 5 - 25 mg/dL    Creatinine 1.29  0.60 - 1.30 mg/dL    Glucose 145 (H) 65 - 140 mg/dL    Calcium 10.1 8.4 - 10.2 mg/dL    eGFR 38 ml/min/1.73sq m   Basic metabolic panel    Collection Time: 10/31/24  9:51 AM   Result Value Ref Range    Sodium 144 135 - 147 mmol/L    Potassium 3.9 3.5 - 5.3 mmol/L    Chloride 89 (L) 96 - 108 mmol/L    CO2 >45 (H) 21 - 32 mmol/L    ANION GAP      BUN 46 (H) 5 - 25 mg/dL    Creatinine 1.34 (H) 0.60 - 1.30 mg/dL    Glucose 259 (H) 65 - 140 mg/dL    Calcium 10.0 8.4 - 10.2 mg/dL    eGFR 36 ml/min/1.73sq m   Magnesium    Collection Time: 10/31/24  9:51 AM   Result Value Ref Range    Magnesium 1.9 1.9 - 2.7 mg/dL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

## 2025-05-28 NOTE — ASSESSMENT & PLAN NOTE
Cr worsened from 1.24 to 1.61, she has previously meen in 1.18-1.52 range, suspected pre-renal cause

## 2025-05-28 NOTE — ASSESSMENT & PLAN NOTE
Patient with a history of atrial fibrillation on Xarelto and metoprolol.  Patient appears to have chronic bradycardia, also documented in last cardiologist note.  Still continuing with metoprolol and Cardizem.  Continue home Xarelto and metoprolol medications.  Telemetry monitoring

## 2025-05-28 NOTE — PLAN OF CARE
Problem: PHYSICAL THERAPY ADULT  Goal: Performs mobility at highest level of function for planned discharge setting.  See evaluation for individualized goals.  Description: Treatment/Interventions: Functional transfer training, LE strengthening/ROM, Elevations, Therapeutic exercise, Patient/family training, Cognitive reorientation, Endurance training, Equipment eval/education, Bed mobility, Gait training, Spoke to nursing, Spoke to case management, OT          See flowsheet documentation for full assessment, interventions and recommendations.  Note: Prognosis: Fair  Problem List: Decreased strength, Decreased endurance, Decreased mobility, Impaired balance, Decreased cognition, Obesity, Orthopedic restrictions, Pain  Assessment: Pt is a 84 y.o. female seen for PT evaluation s/p admit to [facility] on 5/27/2025. Pt was admitted with a primary dx of: chronic heart failure with preserved ejection fraction, hyponatremia, anemia, leg swelling, transaminitis, BL leg edema, FEDE, baker's cyst of knee, ambulatory dysfunction.  PT now consulted for assessment of mobility and d/c needs. Pt with OOB to chair orders.  Pts current comorbidities and personal factors effecting treatment include: HTN, COPD, O2 dependent, DM, osteopenia. Pts current clinical presentation is Unstable/Unpredictable (high complexity) due to Ongoing medical management for primary dx, Decreased activity tolerance compared to baseline, Fall risk, Increased assistance needed from caregiver at current time, Ongoing telemetry monitoring. Prior to admission, pt was independent with use of RW. Upon evaluation, pt currently is requiring [place] for bed mobility; [place] for transfers and [place] for ambulation 20 ft w/ RW. Pt presents at PT eval functioning below baseline and currently w/ overall mobility deficits 2* to: BLE weakness, impaired balance, gait deviations, pain, decreased activity tolerance compared to baseline, decreased functional mobility  tolerance compared to baseline, decreased safety awareness, decreased skin integrity. Pt currently at a fall risk 2* to impairments listed above.  Pt will continue to benefit from skilled acute PT interventions to address stated impairments; to maximize functional mobility; for ongoing pt/ family training; and DME needs. At conclusion of PT session all needs in reach, RN notified of session findings/recommendations, pt returned back in recliner chair, and pt left with OT at conclusion of session with phone and call bell within reach. Pt denies any further questions at this time. Recommend Level II (Moderate Resource Intensity)  upon hospital D/C.        Rehab Resource Intensity Level, PT: II (Moderate Resource Intensity)    See flowsheet documentation for full assessment.
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
## 2025-05-28 NOTE — ASSESSMENT & PLAN NOTE
Recent Labs     05/27/25  0834   CREATININE 1.61*   EGFR 29     Estimated Creatinine Clearance: 22.8 mL/min (A) (by C-G formula based on SCr of 1.61 mg/dL (H)).    Baseline creatinine 1-1.3  FEDE likely secondary to fluid overload, with etiology of likely medication noncompliance.  Avoid nephrotoxic agents, held donepezil.  Continue to trend  IV Bumex 4 mg twice daily.
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
## 2025-05-28 NOTE — ASSESSMENT & PLAN NOTE
Wt Readings from Last 3 Encounters:   05/13/25 63 kg (139 lb)   05/13/25 62.7 kg (138 lb 4.8 oz)   04/15/25 59.9 kg (132 lb)   Patient with history of heart failure with preserved ejection fraction.  Appears to be in acute exacerbation.  Follows with cardiology outpatient, On Bumex 3 mg twice daily.  Documented history of patient missing medication dosages/forgetting to take them.  Uses 2 L of oxygen on baseline secondary to COPD.    Last echo 10/2024: LVEF 65%, systolic function normal.  Increased right ventricular dilation compared to previous.  Patient appears fluid overloaded given JVD, 2+ pitting edema bilaterally.  Lungs CTA.  Patient using baseline 2 L of oxygen, but endorsing clinical shortness of breath.  Hold home Bumex for now.  Pending chest x-ray.  Started patient on IV Bumex 4 mg twice daily  Monitor I/O  Daily weights.  Can consider case management consult given patient's impaired memory and medication noncompliance.

## 2025-05-28 NOTE — ASSESSMENT & PLAN NOTE
Patient follows with cardiologist outpatient.  On Toprol 50 mg twice daily and Cardizem 120 mg daily.  Patient appears to have chronic bradycardia, also documented in last cardiologist note.  Still continuing with metoprolol and Cardizem.  Continue PTA Cardizem and Toprol.  Telemetry ordered.

## 2025-05-28 NOTE — ASSESSMENT & PLAN NOTE
Recent Labs     05/27/25  0834   SODIUM 131*     Patient follow with nephology.   Upon chart review per nephrology baseline sodium appears to be in the low to mid 130s meq, Etiology is suspected multifactorial due to volume fluctuations, COPD and pulmonary disease with carcinoid tumor.  On fluid restriction of 1.5L/day   Previously on tablets but discontinued due to volume overload and edema.  Volume overload might be contributing to hyponatremia, monitor if there is improvement with IV Bumex.  Continue fluid restriction 1.5L/day.  Monitor.
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
## 2025-05-28 NOTE — PLAN OF CARE
Problem: OCCUPATIONAL THERAPY ADULT  Goal: Performs self-care activities at highest level of function for planned discharge setting.  See evaluation for individualized goals.  Description: Treatment Interventions: ADL retraining, Functional transfer training, Endurance training, Patient/family training, Equipment evaluation/education, Compensatory technique education, Activityengagement, Continued evaluation, Cognitive reorientation          See flowsheet documentation for full assessment, interventions and recommendations.   Note: Limitation: Decreased ADL status, Decreased UE strength, Decreased Safe judgement during ADL, Decreased cognition, Decreased endurance, Decreased self-care trans, Decreased high-level ADLs (insight, attention, memory)  Prognosis: Good  Assessment: Patient is a 84 y.o. female seen for OT evaluation at [facility] following admission on 5/27/2025  s/p Chronic heart failure with preserved ejection fraction (HCC). Please see above for comprehensive list of comorbidities and significant PMHx impacting functional performance.  Upon initial evaluation, pt appears to be performing below baseline functional status.   Occupational performance is affected by the following deficits: endurance ,  decreased muscular strength , decreased standing tolerance for self care tasks , decreased dynamic balance impacting functional reach, decreased trunk control , and decreased activity tolerance . Personal/Environmental factors impacting D/C include: Lives alone.  Supporting factors include: attitude towards recovery Patient would benefit from OT services within the acute care setting to maximize level of functional independence in the following areas self-care transfers, functional mobility, and ADLs.  From OT standpoint, recommendation at time of D/C would be Level 2: moderate resource intensity .     Rehab Resource Intensity Level, OT: II (Moderate Resource Intensity)        Miryam  Navjot, OT

## 2025-05-28 NOTE — CASE MANAGEMENT
Case Management Assessment    Patient name Camryn Roblero  Location S /S -01 MRN [id number]  : 1940 Date 2025       Current Admission Date: 2025  Current Admission Diagnosis:Chronic heart failure with preserved ejection fraction (HCC)   Patient Active Problem List    Diagnosis Date Noted    Ruptured Bakers cyst 2025    Anemia 2025    Change in bowel habits 2025    Renal lesion 2024    HEATHER (generalized anxiety disorder) 2024    FEDE (acute kidney injury) ([facility]) 10/31/2024    Constipation 10/29/2024    Ambulatory dysfunction 10/26/2024    Dependence on supplemental oxygen 10/23/2024    Paroxysmal atrial fibrillation ([facility]) 10/23/2024    Pancreas cyst 2024    History of colon polyps 2024    Herpes zoster without complication 2024    Leukocytosis 2024    Mild protein-calorie malnutrition ([facility]) 2024    Abnormal CT scan 2024    Impaired memory 2024    Allergic drug rash 2024    Elevated troponin 2023    Tachycardia 2023    Atrial flutter ([facility]) 2023    Hyperlipidemia 10/21/2023    Seasonal allergic rhinitis 2023    Osteopenia 2022    Chronic pain of both knees 2021    Cataract of both eyes 2021    Multiple pulmonary nodules 10/05/2020    Colon polyps 10/05/2020    TOM (obstructive sleep apnea) 2020    Chronic heart failure with preserved ejection fraction (HCC) 2020    Persistent proteinuria 10/25/2019    Hyponatremia 2016    Essential hypertension 2016    Type 2 diabetes mellitus, without long-term current use of insulin ([facility]) 2016    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    COPD, severe (HCC) 2016      LOS (days): 1  Geometric Mean LOS (GMLOS) (days): 3.9  Days to GMLOS:3.4     OBJECTIVE:    Risk of Unplanned Readmission Score: 23.09         Current admission status: Inpatient       Preferred Pharmacy:   [facility] - [place], [place] - [address]  [address] [address]  Phone: [phone] Fax: [phone]    [facility] [address] [place] [address]  Phone: [phone] Fax: [phone]    Primary Care Provider: Abiel Seals MD    Primary Insurance: MEDICARE  Secondary Insurance: AARP    ASSESSMENT:  Active Health Care Proxies    There are no active Health Care Proxies on file.                      Patient Information  Admitted from:: Home  Mental Status: Alert  During Assessment patient was accompanied by: Not accompanied during assessment  Assessment information provided by:: Patient  Primary Caregiver: Self  Support Systems: Family members  County of Residence: [place]  What city do you live in?: [place]  Home entry access options. Select all that apply.: Stairs  Number of steps to enter home.: 2  Type of Current Residence: [facility]  Living Arrangements: Lives Alone    Activities of Daily Living Prior to Admission  Functional Status: Independent  Completes ADLs independently?: Yes  Ambulates independently?: Yes  Does patient use assisted devices?: Yes  Assisted Devices (DME) used: Bedside Commode, Rollator, Shower Chair, Walker, Wheelchair, Home Oxygen concentrator, Portable Oxygen tanks  DME Company Name (respiratory supplies): [employer]  O2 Rate(s): 1.5-2L  Does patient currently own DME?: Yes  What DME does the patient currently own?: Bedside Commode, Rollator, Shower Chair, Walker, Wheelchair, Home Oxygen concentrator, Portable Oxygen tanks  Does patient have a history of Outpatient Therapy (PT/OT)?: No  Does the patient have a history of Short-Term Rehab?: Yes ([facility])  Does patient have a history of HHC?: Yes  Does patient currently have HHC?: No         Patient Information Continued  Does patient have prescription coverage?: Yes  Can the patient afford their medications and any related  supplies (such as glucometers or test strips)?: Yes  Does patient receive dialysis treatments?: No         Means of Transportation  Means of Transport to Appts:: Family transport    CM met w/ pt at bedside re: assessment. Patient relayed she lives alone in ranch home, 2 FORREST through back entry and one flight FORREST thru garage entry in lower level of home. Pt relayed her family assists her w/ transport and family assists her w/ ambulating steps into back entry of home. Pt relayed she utilizes her WC in community and rollator or RW in home. Pt utilizes 1.5-2L O2 chronic provided through [facility]. Pt relayed she is independent with ADLS but recently her dtrs have assisted her w/ bathing - primarily monitor for safety. Pt does have shower chair she utilizes while bathing. Pt reported herself and daughters are looking into selling her home and pt moving into [facility] or LTC facility due to concern for continued safety at home. Family does live nearby and are greatly supportive of pt. Care patrol info provided to pt. Pt reported hx of HHC and STR. Pt confirmed PCP [facility] and preferred pharmacy for immediate med needs [facility]. CM to continue to follow for pt CM dcp needs.     Social Determinants of Health (SDOH)      Flowsheet Row Most Recent Value   Housing Stability    In the last 12 months, was there a time when you were not able to pay the mortgage or rent on time? N   At any time in the past 12 months, were you homeless or living in a shelter (including now)? N   Transportation Needs    In the past 12 months, has lack of transportation kept you from medical appointments or from getting medications? no   In the past 12 months, has lack of transportation kept you from meetings, work, or from getting things needed for daily living? No   Food Insecurity    Within the past 12 months, you worried that your food would run out before you got the money to buy more. Never true   Within the past 12 months, the food you bought just didn't  last and you didn't have money to get more. Never true   Utilities    In the past 12 months has the electric, gas, oil, or water company threatened to shut off services in your home? No

## 2025-05-28 NOTE — ASSESSMENT & PLAN NOTE
"Lab Results   Component Value Date    HGBA1C 6.5 04/15/2025       No results for input(s): \"POCGLU\" in the last 72 hours.  No results for input(s): \"POCGLU\", \"GLUC\" in the last 72 hours.    Hold home regimen while inpatient and resume on discharge: Metformin 500 mg twice daily, glipizide 10 mg daily.  Diabetic diet  Insulin regimen  SSI while inpatient  Goal -180 while admitted, adjusting insulin regimen as appropriate  Monitor for hypoglycemia and treat per protocol     "

## 2025-05-28 NOTE — ASSESSMENT & PLAN NOTE
Patient presented with right lower extremity swelling and bruising x 1 week.  X-ray of right knee and right ankle showed chronic degenerative changes.  Duplex ultrasound bilateral showed right lower extremity Baker's cyst, no DVT bilaterally found.  Patient with positive crescent sign on physical exam.  Likely ruptured Baker's cyst.  Conservative measures at the moment.  Rest, limb elevation, compression and analgesia as needed.  PT/OT.

## 2025-05-28 NOTE — MALNUTRITION/BMI
This medical record reflects one or more clinical indicators suggestive of malnutrition and/or morbid obesity.    Malnutrition Findings:   Adult Malnutrition type: Chronic illness  Adult Degree of Malnutrition: Malnutrition of moderate degree  Malnutrition Characteristics: Inadequate energy, Weight loss, Muscle loss              360 Statement: Protein calorie malnutrition r/t inadequate intake as evidenced by <75% energy intake compared to estimated energy needs >1 month, muscle wasting present to clavicle/temples, and unintentional weight loss >20% over the last year; currently treated with oral diet, pt declines oral nutrition supplements at this time.    BMI Findings:           Body mass index is 24 kg/m².     See Nutrition note dated 5/28/2025  for additional details.  Completed nutrition assessment is viewable in the nutrition documentation.

## 2025-05-28 NOTE — ASSESSMENT & PLAN NOTE
Hgb dropped from 10.4 to 8.8, possible chronic occult blood loss versus nutritional given she has had poor appetite

## 2025-05-29 PROBLEM — M79.604 RIGHT LEG PAIN: Status: ACTIVE | Noted: 2025-05-28

## 2025-05-29 PROBLEM — E44.0 MODERATE PROTEIN-CALORIE MALNUTRITION (HCC): Status: ACTIVE | Noted: 2025-05-29

## 2025-05-29 LAB
ANION GAP SERPL CALCULATED.3IONS-SCNC: 7 MMOL/L (ref 4–13)
BUN SERPL-MCNC: 58 MG/DL (ref 5–25)
CALCIUM SERPL-MCNC: 9.1 MG/DL (ref 8.4–10.2)
CHLORIDE SERPL-SCNC: 89 MMOL/L (ref 96–108)
CO2 SERPL-SCNC: 40 MMOL/L (ref 21–32)
CREAT SERPL-MCNC: 1.43 MG/DL (ref 0.6–1.3)
ERYTHROCYTE [DISTWIDTH] IN BLOOD BY AUTOMATED COUNT: 16.1 % (ref 11.6–15.1)
GFR SERPL CREATININE-BSD FRML MDRD: 33 ML/MIN/1.73SQ M
GLUCOSE SERPL-MCNC: 170 MG/DL (ref 65–140)
GLUCOSE SERPL-MCNC: 177 MG/DL (ref 65–140)
GLUCOSE SERPL-MCNC: 205 MG/DL (ref 65–140)
GLUCOSE SERPL-MCNC: 241 MG/DL (ref 65–140)
GLUCOSE SERPL-MCNC: 297 MG/DL (ref 65–140)
HCT VFR BLD AUTO: 24.6 % (ref 34.8–46.1)
HGB BLD-MCNC: 8 G/DL (ref 11.5–15.4)
MAGNESIUM SERPL-MCNC: 1.9 MG/DL (ref 1.9–2.7)
MCH RBC QN AUTO: 29.4 PG (ref 26.8–34.3)
MCHC RBC AUTO-ENTMCNC: 32.5 G/DL (ref 31.4–37.4)
MCV RBC AUTO: 90 FL (ref 82–98)
PLATELET # BLD AUTO: 199 THOUSANDS/UL (ref 149–390)
PMV BLD AUTO: 10.2 FL (ref 8.9–12.7)
POTASSIUM SERPL-SCNC: 3.6 MMOL/L (ref 3.5–5.3)
RBC # BLD AUTO: 2.72 MILLION/UL (ref 3.81–5.12)
SODIUM SERPL-SCNC: 136 MMOL/L (ref 135–147)
WBC # BLD AUTO: 6.24 THOUSAND/UL (ref 4.31–10.16)

## 2025-05-29 PROCEDURE — 83735 ASSAY OF MAGNESIUM: CPT

## 2025-05-29 PROCEDURE — 94760 N-INVAS EAR/PLS OXIMETRY 1: CPT

## 2025-05-29 PROCEDURE — 99233 SBSQ HOSP IP/OBS HIGH 50: CPT | Performed by: INTERNAL MEDICINE

## 2025-05-29 PROCEDURE — 94640 AIRWAY INHALATION TREATMENT: CPT

## 2025-05-29 PROCEDURE — 80048 BASIC METABOLIC PNL TOTAL CA: CPT

## 2025-05-29 PROCEDURE — 82948 REAGENT STRIP/BLOOD GLUCOSE: CPT

## 2025-05-29 PROCEDURE — 85027 COMPLETE CBC AUTOMATED: CPT

## 2025-05-29 RX ORDER — INSULIN LISPRO 100 [IU]/ML
1-6 INJECTION, SOLUTION INTRAVENOUS; SUBCUTANEOUS
Status: DISCONTINUED | OUTPATIENT
Start: 2025-05-29 | End: 2025-06-02 | Stop reason: HOSPADM

## 2025-05-29 RX ORDER — ACETAMINOPHEN 325 MG/1
650 TABLET ORAL EVERY 6 HOURS
Status: DISCONTINUED | OUTPATIENT
Start: 2025-05-29 | End: 2025-06-02 | Stop reason: HOSPADM

## 2025-05-29 RX ADMIN — POTASSIUM CHLORIDE 20 MEQ: 1500 TABLET, EXTENDED RELEASE ORAL at 08:35

## 2025-05-29 RX ADMIN — METOPROLOL SUCCINATE 50 MG: 50 TABLET, EXTENDED RELEASE ORAL at 17:25

## 2025-05-29 RX ADMIN — DILTIAZEM HYDROCHLORIDE 120 MG: 120 CAPSULE, COATED, EXTENDED RELEASE ORAL at 08:33

## 2025-05-29 RX ADMIN — ACETAMINOPHEN 650 MG: 325 TABLET ORAL at 08:33

## 2025-05-29 RX ADMIN — BUDESONIDE 0.5 MG: 0.5 INHALANT RESPIRATORY (INHALATION) at 07:22

## 2025-05-29 RX ADMIN — RIVAROXABAN 15 MG: 15 TABLET, FILM COATED ORAL at 08:42

## 2025-05-29 RX ADMIN — BUDESONIDE 0.5 MG: 0.5 INHALANT RESPIRATORY (INHALATION) at 19:44

## 2025-05-29 RX ADMIN — INSULIN LISPRO 3 UNITS: 100 INJECTION, SOLUTION INTRAVENOUS; SUBCUTANEOUS at 12:41

## 2025-05-29 RX ADMIN — INSULIN LISPRO 1 UNITS: 100 INJECTION, SOLUTION INTRAVENOUS; SUBCUTANEOUS at 08:35

## 2025-05-29 RX ADMIN — ACETAMINOPHEN 650 MG: 325 TABLET ORAL at 21:26

## 2025-05-29 RX ADMIN — DONEPEZIL HYDROCHLORIDE 5 MG: 5 TABLET ORAL at 21:26

## 2025-05-29 RX ADMIN — POTASSIUM CHLORIDE 20 MEQ: 1500 TABLET, EXTENDED RELEASE ORAL at 17:25

## 2025-05-29 RX ADMIN — BUMETANIDE 4 MG: 0.25 INJECTION INTRAMUSCULAR; INTRAVENOUS at 17:25

## 2025-05-29 RX ADMIN — BUMETANIDE 4 MG: 0.25 INJECTION INTRAMUSCULAR; INTRAVENOUS at 08:35

## 2025-05-29 RX ADMIN — IRON SUCROSE 300 MG: 20 INJECTION, SOLUTION INTRAVENOUS at 16:47

## 2025-05-29 RX ADMIN — ACETAMINOPHEN 650 MG: 325 TABLET ORAL at 14:11

## 2025-05-29 RX ADMIN — ESCITALOPRAM OXALATE 5 MG: 10 TABLET ORAL at 08:33

## 2025-05-29 RX ADMIN — ATORVASTATIN CALCIUM 40 MG: 40 TABLET, FILM COATED ORAL at 08:33

## 2025-05-29 RX ADMIN — INSULIN LISPRO 3 UNITS: 100 INJECTION, SOLUTION INTRAVENOUS; SUBCUTANEOUS at 16:49

## 2025-05-29 RX ADMIN — METOPROLOL SUCCINATE 50 MG: 50 TABLET, EXTENDED RELEASE ORAL at 08:35

## 2025-05-29 NOTE — ASSESSMENT & PLAN NOTE
Patient follows with cardiologist outpatient.  On Toprol 50 mg twice daily and Cardizem 120 mg daily.  Patient appears to have chronic bradycardia, also documented in last cardiologist note.  Still continuing with metoprolol and Cardizem.  Continue PTA Cardizem and Toprol.

## 2025-05-29 NOTE — ASSESSMENT & PLAN NOTE
Patient presented with right lower extremity swelling and bruising x 1 week.  X-ray of right knee and right ankle showed chronic degenerative changes.  Duplex ultrasound bilateral showed right lower extremity Baker's cyst, no DVT bilaterally found.  Patient with positive crescent sign on physical exam.  Likely ruptured Baker's cyst.  PT OT level 2 will likely discharge to [facility]    Conservative measures at the moment.  Rest, limb elevation, compression and analgesia as needed.

## 2025-05-29 NOTE — CASE MANAGEMENT
Case Management Discharge Planning Note    Patient name Camryn Roblero  Location S /S -01 MRN [id number]  : 1940 Date 2025       Current Admission Date: 2025  Current Admission Diagnosis:Chronic heart failure with preserved ejection fraction (HCC)   Patient Active Problem List    Diagnosis Date Noted    Moderate protein-calorie malnutrition (HCC) 2025    Ruptured Bakers cyst 2025    Anemia 2025    Change in bowel habits 2025    Renal lesion 2024    HEATHER (generalized anxiety disorder) 2024    FEDE (acute kidney injury) (HCC) 10/31/2024    Constipation 10/29/2024    Ambulatory dysfunction 10/26/2024    Dependence on supplemental oxygen 10/23/2024    Paroxysmal atrial fibrillation (HCC) 10/23/2024    Pancreas cyst 2024    History of colon polyps 2024    Herpes zoster without complication 2024    Leukocytosis 2024    Mild protein-calorie malnutrition (HCC) 2024    Abnormal CT scan 2024    Impaired memory 2024    Allergic drug rash 2024    Elevated troponin 2023    Tachycardia 2023    Atrial flutter (HCC) 2023    Hyperlipidemia 10/21/2023    Seasonal allergic rhinitis 2023    Osteopenia 2022    Chronic pain of both knees 2021    Cataract of both eyes 2021    Multiple pulmonary nodules 10/05/2020    Colon polyps 10/05/2020    TOM (obstructive sleep apnea) 2020    Chronic heart failure with preserved ejection fraction (HCC) 2020    Persistent proteinuria 10/25/2019    Hyponatremia 2016    Essential hypertension 2016    Type 2 diabetes mellitus, without long-term current use of insulin (HCC) 2016    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    COPD, severe (HCC) 2016      LOS (days): 2  Geometric Mean LOS (GMLOS) (days): 3.9  Days to GMLOS:2.4     OBJECTIVE:  Risk of Unplanned Readmission Score: 23.85         Current  admission status: Inpatient   Preferred Pharmacy:   [facility] - [place], [place] - [address]  [address] [address]  Phone: [phone] Fax: [phone]    [facility] [place] [facility] [address]  Phone: [phone] Fax: [phone]    Primary Care Provider: Abiel Seals MD    Primary Insurance: MEDICARE  Secondary Insurance: AARP    DISCHARGE DETAILS:    Discharge planning discussed with:: patient  Freedom of Choice: Yes  Comments - Freedom of Choice: CM f/u w/ pt re: PT/OT rcmd for STR. Patient relayed preference for [facility] as her dtr works there but is open to blanket referral. STR blanket referral sent via aidin, awaiting responses.                               Other Referral/Resources/Interventions Provided:  Interventions: Short Term Rehab  Referral Comments: PT/OT rcmd STR - blanket referral sent via aidin, awaiting responses.         Treatment Team Recommendation: Short Term Rehab  Discharge Destination Plan:: Short Term Rehab

## 2025-05-29 NOTE — ASSESSMENT & PLAN NOTE
Recent Labs     05/27/25  0834 05/28/25  0505 05/29/25  0447   HGB 8.8* 8.1* 8.0*     Lab Results   Component Value Date    IRON 30 (L) 05/28/2025    FERRITIN 112 05/28/2025    TIBC 292.6 05/28/2025    CONCFE 10 (L) 05/28/2025     Patient with hemoglobin 8.8 on admission, last hemoglobin 10.4 in 04/2025.  Patient with ruptured popliteal cyst, no other clinical evidence of bleeding.  Folate and B12 normal  Iron studies as above    Continue to monitor hemoglobin  IV Venofer 300 mg x 1  Transfuse if <7

## 2025-05-29 NOTE — ASSESSMENT & PLAN NOTE
Recent Labs     05/27/25  0834 05/28/25  0505 05/29/25  0447   CREATININE 1.61* 1.42* 1.43*   EGFR 29 34 33     Estimated Creatinine Clearance: 25 mL/min (A) (by C-G formula based on SCr of 1.43 mg/dL (H)).    Baseline creatinine 1-1.3  FEDE likely secondary to fluid overload, with etiology of likely medication noncompliance.  Avoid nephrotoxic agents, held donepezil.  Continue to trend  IV Bumex 4 mg twice daily.

## 2025-05-29 NOTE — ASSESSMENT & PLAN NOTE
Wt Readings from Last 3 Encounters:   05/29/25 59.6 kg (131 lb 6.4 oz)   05/13/25 63 kg (139 lb)   05/13/25 62.7 kg (138 lb 4.8 oz)     Patient with history of heart failure with preserved ejection fraction.  Appears to be in acute exacerbation.  Follows with cardiology outpatient, On Bumex 3 mg twice daily.  Documented history of patient missing medication dosages/forgetting to take them.  Uses 2 L of oxygen on baseline secondary to COPD.    Last echo 10/2024: LVEF 65%, systolic function normal.  Increased right ventricular dilation compared to previous.  CXR small bilateral pleural effusions, cardiac megaly and hilar fullness    Hold home Bumex for now.  Started patient on IV Bumex 4 mg twice daily  Monitor I/O  Daily weights.

## 2025-05-29 NOTE — CASE MANAGEMENT
Case Management Discharge Planning Note    Patient name Camryn Roblero  Location S /S -01 MRN [id number]  : 1940 Date 2025       Current Admission Date: 2025  Current Admission Diagnosis:Chronic heart failure with preserved ejection fraction (HCC)   Patient Active Problem List    Diagnosis Date Noted    Moderate protein-calorie malnutrition (HCC) 2025    Ruptured Bakers cyst 2025    Anemia 2025    Change in bowel habits 2025    Renal lesion 2024    HEATHER (generalized anxiety disorder) 2024    FEDE (acute kidney injury) (HCC) 10/31/2024    Constipation 10/29/2024    Ambulatory dysfunction 10/26/2024    Dependence on supplemental oxygen 10/23/2024    Paroxysmal atrial fibrillation (HCC) 10/23/2024    Pancreas cyst 2024    History of colon polyps 2024    Herpes zoster without complication 2024    Leukocytosis 2024    Mild protein-calorie malnutrition (HCC) 2024    Abnormal CT scan 2024    Impaired memory 2024    Allergic drug rash 2024    Elevated troponin 2023    Tachycardia 2023    Atrial flutter (HCC) 2023    Hyperlipidemia 10/21/2023    Seasonal allergic rhinitis 2023    Osteopenia 2022    Chronic pain of both knees 2021    Cataract of both eyes 2021    Multiple pulmonary nodules 10/05/2020    Colon polyps 10/05/2020    TOM (obstructive sleep apnea) 2020    Chronic heart failure with preserved ejection fraction (HCC) 2020    Persistent proteinuria 10/25/2019    Hyponatremia 2016    Essential hypertension 2016    Type 2 diabetes mellitus, without long-term current use of insulin (HCC) 2016    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 - [place], [place] - [address]  [address] [facility] [address]  Phone: [phone] Fax: [phone]    Primary Care Provider: Abiel Seals MD    Primary Insurance: MEDICARE  Secondary Insurance: AARP    DISCHARGE DETAILS:    Discharge planning discussed with:: patient and pt's dtr Naomie at bedside  Freedom of Choice: Yes  Comments - Freedom of Choice: CM f/u with pt and pt's dtr Naomie re: current available STR optoins as response received from [facility] (pt's first choice facility) confirming bed availability for pt. Pt and dtr confirmed choice remains for [facility] - blanket referral closed in aidin and [facility] reserved.  CM contacted family/caregiver?: Yes  Were Treatment Team discharge recommendations reviewed with patient/caregiver?: Yes  Did patient/caregiver verbalize understanding of patient care needs?: N/A- going to facility  Were patient/caregiver advised of the risks associated with not following Treatment Team discharge recommendations?: Yes    Contacts  Patient Contacts: delma Akbar  Relationship to Patient:: Family  Contact Method: In Person  Reason/Outcome: Continuity of Care, Discharge Planning, Referral, Emergency Contact    Requested Home Health Care         Is the patient interested in HHC at discharge?: No    DME Referral Provided  Referral made for DME?: No    Other Referral/Resources/Interventions Provided:  Interventions: Short Term Rehab  Referral Comments: [facility] reserved in aidin.         Treatment Team Recommendation: Short Term Rehab  Discharge Destination Plan:: Short Term Rehab  Transport at Discharge : [facility] Ambulance

## 2025-05-29 NOTE — ASSESSMENT & PLAN NOTE
Recent Labs     05/27/25  0834 05/28/25  0505 05/29/25  0447   SODIUM 131* 132* 136     Patient follow with nephology.   Upon chart review per nephrology baseline sodium appears to be in the low to mid 130s meq, Etiology is suspected multifactorial due to volume fluctuations, COPD and pulmonary disease with carcinoid tumor.  On fluid restriction of 1.5L/day   Previously on tablets but discontinued due to volume overload and edema.  Volume overload might be contributing to hyponatremia, monitor if there is improvement with IV Bumex.  Continue fluid restriction 1.5L/day.  Monitor.

## 2025-05-29 NOTE — ASSESSMENT & PLAN NOTE
Malnutrition Findings:   Adult Malnutrition type: Chronic illness  Adult Degree of Malnutrition: Malnutrition of moderate degree  Malnutrition Characteristics: Inadequate energy, Weight loss, Muscle loss                360 Statement: Protein calorie malnutrition r/t inadequate intake as evidenced by <75% energy intake compared to estimated energy needs >1 month, muscle wasting present to clavicle/temples, and unintentional weight loss >20% over the last year; currently treated with oral diet, pt declines oral nutrition supplements at this time.    BMI Findings:           Body mass index is 26.54 kg/m².

## 2025-05-29 NOTE — HOSPITAL COURSE
4 y.o. female with a PMH of CHF, HTN COPD 2 L O2 on baseline, DM, A-fib on Xarelto who presents with 1 month of progressive dyspnea on exertion.  Follows with cardiology as an outpatient concern for medication compliance with diuretic given impaired memory.    Patient also endorses ambulatory dysfunction with RLE swelling and bruising x 1 week.  Saw PCP yesterday who obtained an x-ray ankle/knee that noted degenerative changes.  Ultrasound done bilaterally to rule out DVT but notable for RLE Mora's cyst.    Labs notable for hyponatremia 131 FEDE, creatinine 1.61 (BL: 1.0-1.3). and hemoglobin 8.8 ( 10 in April and jan 11-13).    CXR megaly and vascular congestion

## 2025-05-29 NOTE — PLAN OF CARE
Problem: PAIN - ADULT  Goal: Verbalizes/displays adequate comfort level or baseline comfort level  Description: Interventions:  - Encourage patient to monitor pain and request assistance  - Assess pain using appropriate pain scale  - Administer analgesics as ordered based on type and severity of pain and evaluate response  - Implement non-pharmacological measures as appropriate and evaluate response  - Consider cultural and social influences on pain and pain management  - Notify physician/advanced practitioner if interventions unsuccessful or patient reports new pain  - Educate patient/family on pain management process including their role and importance of  reporting pain   - Provide non-pharmacologic/complimentary pain relief interventions  Outcome: Progressing     Problem: INFECTION - ADULT  Goal: Absence or prevention of progression during hospitalization  Description: INTERVENTIONS:  - Assess and monitor for signs and symptoms of infection  - Monitor lab/diagnostic results  - Monitor all insertion sites, i.e. indwelling lines, tubes, and drains  - Monitor endotracheal if appropriate and nasal secretions for changes in amount and color  - [place] appropriate cooling/warming therapies per order  - Administer medications as ordered  - Instruct and encourage patient and family to use good hand hygiene technique  - Identify and instruct in appropriate isolation precautions for identified infection/condition  Outcome: Progressing  Goal: Absence of fever/infection during neutropenic period  Description: INTERVENTIONS:  - Monitor WBC  - Perform strict hand hygiene  - Limit to healthy visitors only  - No plants, dried, fresh or silk flowers with freitas in patient room  Outcome: Progressing     Problem: SAFETY ADULT  Goal: Patient will remain free of falls  Description: INTERVENTIONS:  - Educate patient/family on patient safety including physical limitations  - Instruct patient to call for assistance with activity   -  Consider consulting OT/PT to assist with strengthening/mobility based on AM PAC & JH-HLM score  - Consult OT/PT to assist with strengthening/mobility   - Keep Call bell within reach  - Keep bed low and locked with side rails adjusted as appropriate  - Keep care items and personal belongings within reach  - Initiate and maintain comfort rounds  - Make Fall Risk Sign visible to staff  - Apply yellow socks and bracelet for high fall risk patients  - Consider moving patient to room near nurses station  Outcome: Progressing  Goal: Maintain or return to baseline ADL function  Description: INTERVENTIONS:  -  Assess patient's ability to carry out ADLs; assess patient's baseline for ADL function and identify physical deficits which impact ability to perform ADLs (bathing, care of mouth/teeth, toileting, grooming, dressing, etc.)  - Assess/evaluate cause of self-care deficits   - Assess range of motion  - Assess patient's mobility; develop plan if impaired  - Assess patient's need for assistive devices and provide as appropriate  - Encourage maximum independence but intervene and supervise when necessary  - Involve family in performance of ADLs  - Assess for home care needs following discharge   - Consider OT consult to assist with ADL evaluation and planning for discharge  - Provide patient education as appropriate  - Monitor functional capacity and physical performance, use of AM PAC & JH-HLM   - Monitor gait, balance and fatigue with ambulation    Outcome: Progressing  Goal: Maintains/Returns to pre admission functional level  Description: INTERVENTIONS:  - Perform AM-PAC 6 Click Basic Mobility/ Daily Activity assessment daily.  - Set and communicate daily mobility goal to care team and patient/family/caregiver.   - Collaborate with rehabilitation services on mobility goals if consulted  - Perform Range of Motion   - Out of bed for toileting  - Record patient progress and toleration of activity level   Outcome:  Progressing     Problem: DISCHARGE PLANNING  Goal: Discharge to home or other facility with appropriate resources  Description: INTERVENTIONS:  - Identify barriers to discharge w/patient and caregiver  - Arrange for needed discharge resources and transportation as appropriate  - Identify discharge learning needs (meds, wound care, etc.)  - Arrange for interpretive services to assist at discharge as needed  - Refer to Case Management Department for coordinating discharge planning if the patient needs post-hospital services based on physician/advanced practitioner order or complex needs related to functional status, cognitive ability, or social support system  Outcome: Progressing     Problem: Knowledge Deficit  Goal: Patient/family/caregiver demonstrates understanding of disease process, treatment plan, medications, and discharge instructions  Description: Complete learning assessment and assess knowledge base.  Interventions:  - Provide teaching at level of understanding  - Provide teaching via preferred learning methods  Outcome: Progressing     Problem: Nutrition/Hydration-ADULT  Goal: Nutrient/Hydration intake appropriate for improving, restoring or maintaining nutritional needs  Description: Monitor and assess patient's nutrition/hydration status for malnutrition. Collaborate with interdisciplinary team and initiate plan and interventions as ordered.  Monitor patient's weight and dietary intake as ordered or per policy. Utilize nutrition screening tool and intervene as necessary. Determine patient's food preferences and provide high-protein, high-caloric foods as appropriate.     INTERVENTIONS:  - Monitor oral intake, urinary output, labs, and treatment plans  - Assess nutrition and hydration status and recommend course of action  - Evaluate amount of meals eaten  - Assist patient with eating if necessary   - Allow adequate time for meals  - Recommend/ encourage appropriate diets, oral nutritional supplements,  and vitamin/mineral supplements  - Order, calculate, and assess calorie counts as needed  - Recommend, monitor, and adjust tube feedings and TPN/PPN based on assessed needs  - Assess need for intravenous fluids  - Provide specific nutrition/hydration education as appropriate  - Include patient/family/caregiver in decisions related to nutrition  Outcome: Progressing     Problem: Prexisting or High Potential for Compromised Skin Integrity  Goal: Skin integrity is maintained or improved  Description: INTERVENTIONS:  - Identify patients at risk for skin breakdown  - Assess and monitor skin integrity including under and around medical devices   - Assess and monitor nutrition and hydration status  - Monitor labs  - Assess for incontinence   - Turn and reposition patient  - Assist with mobility/ambulation  - Relieve pressure over jessica prominences   - Avoid friction and shearing  - Provide appropriate hygiene as needed including keeping skin clean and dry  - Evaluate need for skin moisturizer/barrier cream  - Collaborate with interdisciplinary team  - Patient/family teaching  - Consider wound care consult    Assess:  - Review Wu scale daily  - Clean and moisturize skin   - Inspect skin when repositioning, toileting, and assisting with ADLS  - Assess under medical devices   - Assess extremities for adequate circulation and sensation     Bed Management:  - Offer bedside commode  - Assess for incontinence   - Use incontinent care products after each incontinent episode    Activity:  - Mobilize patient  - Encourage activity and walks on unit  - Encourage or provide ROM exercises   - Turn and reposition patient   - Use appropriate equipment to lift or move patient in bed  - Instruct/ Assist with weight shifting   - Consider limitation of chair time    Skin Care:  - Avoid use of baby powder, tape, friction and shearing, hot water or constrictive clothing  - Relieve pressure over bony prominences   - Do not massage red bony  areas    Next Steps:  - Teach patient strategies to minimize risks  - Consider consults to  interdisciplinary teams  Outcome: Progressing

## 2025-05-29 NOTE — ASSESSMENT & PLAN NOTE
Patient with a history of atrial fibrillation on Xarelto and metoprolol.  Patient appears to have chronic bradycardia, also documented in last cardiologist note.  Still continuing with metoprolol and Cardizem.  Continue home Xarelto and metoprolol medications.  D/c Telemetry monitoring

## 2025-05-29 NOTE — PLAN OF CARE
Problem: PAIN - ADULT  Goal: Verbalizes/displays adequate comfort level or baseline comfort level  Description: Interventions:  - Encourage patient to monitor pain and request assistance  - Assess pain using appropriate pain scale  - Administer analgesics as ordered based on type and severity of pain and evaluate response  - Implement non-pharmacological measures as appropriate and evaluate response  - Consider cultural and social influences on pain and pain management  - Notify physician/advanced practitioner if interventions unsuccessful or patient reports new pain  - Educate patient/family on pain management process including their role and importance of  reporting pain   - Provide non-pharmacologic/complimentary pain relief interventions  Outcome: Progressing     Problem: INFECTION - ADULT  Goal: Absence or prevention of progression during hospitalization  Description: INTERVENTIONS:  - Assess and monitor for signs and symptoms of infection  - Monitor lab/diagnostic results  - Monitor all insertion sites, i.e. indwelling lines, tubes, and drains  - Monitor endotracheal if appropriate and nasal secretions for changes in amount and color  - [place] appropriate cooling/warming therapies per order  - Administer medications as ordered  - Instruct and encourage patient and family to use good hand hygiene technique  - Identify and instruct in appropriate isolation precautions for identified infection/condition  Outcome: Progressing  Goal: Absence of fever/infection during neutropenic period  Description: INTERVENTIONS:  - Monitor WBC  - Perform strict hand hygiene  - Limit to healthy visitors only  - No plants, dried, fresh or silk flowers with freitas in patient room  Outcome: Progressing     Problem: SAFETY ADULT  Goal: Patient will remain free of falls  Description: INTERVENTIONS:  - Educate patient/family on patient safety including physical limitations  - Instruct patient to call for assistance with activity   -  Consider consulting OT/PT to assist with strengthening/mobility based on AM PAC & JH-HLM score  - Consult OT/PT to assist with strengthening/mobility   - Keep Call bell within reach  - Keep bed low and locked with side rails adjusted as appropriate  - Keep care items and personal belongings within reach  - Initiate and maintain comfort rounds  - Make Fall Risk Sign visible to staff  - Offer Toileting every 2 Hours, in advance of need  - Initiate/Maintain bed alarm  - Obtain necessary fall risk management equipment  - Apply yellow socks and bracelet for high fall risk patients  - Consider moving patient to room near nurses station  Outcome: Progressing  Goal: Maintain or return to baseline ADL function  Description: INTERVENTIONS:  -  Assess patient's ability to carry out ADLs; assess patient's baseline for ADL function and identify physical deficits which impact ability to perform ADLs (bathing, care of mouth/teeth, toileting, grooming, dressing, etc.)  - Assess/evaluate cause of self-care deficits   - Assess range of motion  - Assess patient's mobility; develop plan if impaired  - Assess patient's need for assistive devices and provide as appropriate  - Encourage maximum independence but intervene and supervise when necessary  - Involve family in performance of ADLs  - Assess for home care needs following discharge   - Consider OT consult to assist with ADL evaluation and planning for discharge  - Provide patient education as appropriate  - Monitor functional capacity and physical performance, use of AM PAC & JH-HLM   - Monitor gait, balance and fatigue with ambulation    Outcome: Progressing  Goal: Maintains/Returns to pre admission functional level  Description: INTERVENTIONS:  - Perform AM-PAC 6 Click Basic Mobility/ Daily Activity assessment daily.  - Set and communicate daily mobility goal to care team and patient/family/caregiver.   - Collaborate with rehabilitation services on mobility goals if consulted  -  Reposition patient every 2 hours.  - Out of bed for meals 3 times a day  - Out of bed for toileting  - Record patient progress and toleration of activity level   Outcome: Progressing     Problem: DISCHARGE PLANNING  Goal: Discharge to home or other facility with appropriate resources  Description: INTERVENTIONS:  - Identify barriers to discharge w/patient and caregiver  - Arrange for needed discharge resources and transportation as appropriate  - Identify discharge learning needs (meds, wound care, etc.)  - Arrange for interpretive services to assist at discharge as needed  - Refer to Case Management Department for coordinating discharge planning if the patient needs post-hospital services based on physician/advanced practitioner order or complex needs related to functional status, cognitive ability, or social support system  Outcome: Progressing     Problem: Knowledge Deficit  Goal: Patient/family/caregiver demonstrates understanding of disease process, treatment plan, medications, and discharge instructions  Description: Complete learning assessment and assess knowledge base.  Interventions:  - Provide teaching at level of understanding  - Provide teaching via preferred learning methods  Outcome: Progressing     Problem: Nutrition/Hydration-ADULT  Goal: Nutrient/Hydration intake appropriate for improving, restoring or maintaining nutritional needs  Description: Monitor and assess patient's nutrition/hydration status for malnutrition. Collaborate with interdisciplinary team and initiate plan and interventions as ordered.  Monitor patient's weight and dietary intake as ordered or per policy. Utilize nutrition screening tool and intervene as necessary. Determine patient's food preferences and provide high-protein, high-caloric foods as appropriate.     INTERVENTIONS:  - Monitor oral intake, urinary output, labs, and treatment plans  - Assess nutrition and hydration status and recommend course of action  - Evaluate  amount of meals eaten  - Assist patient with eating if necessary   - Allow adequate time for meals  - Recommend/ encourage appropriate diets, oral nutritional supplements, and vitamin/mineral supplements  - Order, calculate, and assess calorie counts as needed  - Recommend, monitor, and adjust tube feedings and TPN/PPN based on assessed needs  - Assess need for intravenous fluids  - Provide specific nutrition/hydration education as appropriate  - Include patient/family/caregiver in decisions related to nutrition  Outcome: Progressing     Problem: Prexisting or High Potential for Compromised Skin Integrity  Goal: Skin integrity is maintained or improved  Description: INTERVENTIONS:  - Identify patients at risk for skin breakdown  - Assess and monitor skin integrity including under and around medical devices   - Assess and monitor nutrition and hydration status  - Monitor labs  - Assess for incontinence   - Turn and reposition patient  - Assist with mobility/ambulation  - Relieve pressure over jessica prominences   - Avoid friction and shearing  - Provide appropriate hygiene as needed including keeping skin clean and dry  - Evaluate need for skin moisturizer/barrier cream  - Collaborate with interdisciplinary team  - Patient/family teaching  - Consider wound care consult    Assess:  - Review Wu scale daily  - Clean and moisturize skin every day  - Inspect skin when repositioning, toileting, and assisting with ADLs  - Assess under medical devices   - Assess extremities for adequate circulation and sensation     Bed Management:  - Have minimal linens on bed & keep smooth, unwrinkled  - Change linens as needed when moist or perspiring  - Avoid sitting or lying in one position for more than 2 hours while in bed?Keep HOB at 30 degrees   - Toileting:  - Offer bedside commode  - Assess for incontinence every hour  - Use incontinent care products after each incontinent episode     Activity:  - Mobilize patient 2 times a  day  - Encourage activity and walks on unit  - Encourage or provide ROM exercises   - Turn and reposition patient every 2 Hours  - Use appropriate equipment to lift or move patient in bed  - Instruct/ Assist with weight shifting every hour when out of bed in chair  - Consider limitation of chair time 2 hour intervals    Skin Care:  - Avoid use of baby powder, tape, friction and shearing, hot water or constrictive clothing  - Relieve pressure over bony prominences using Mepilex  - Do not massage red bony areas    Next Steps:  - Teach patient strategies to minimize risks   - Consider consults to  interdisciplinary teams   Outcome: Progressing

## 2025-05-29 NOTE — PROGRESS NOTES
Progress Note - Hospitalist   Name: Camryn Roblero 84 y.o. female I MRN: [id number]  Unit/Bed#: S -01 I Date of Admission: 5/27/2025   Date of Service: 5/29/2025 I Hospital Day: 2    Assessment & Plan  Chronic heart failure with preserved ejection fraction (HCC)  Wt Readings from Last 3 Encounters:   05/29/25 59.6 kg (131 lb 6.4 oz)   05/13/25 63 kg (139 lb)   05/13/25 62.7 kg (138 lb 4.8 oz)     Patient with history of heart failure with preserved ejection fraction.  Appears to be in acute exacerbation.  Follows with cardiology outpatient, On Bumex 3 mg twice daily.  Documented history of patient missing medication dosages/forgetting to take them.  Uses 2 L of oxygen on baseline secondary to COPD.    Last echo 10/2024: LVEF 65%, systolic function normal.  Increased right ventricular dilation compared to previous.  CXR small bilateral pleural effusions, cardiac megaly and hilar fullness    Hold home Bumex for now.  Started patient on IV Bumex 4 mg twice daily  Monitor I/O  Daily weights.  Right leg pain  Patient presented with right lower extremity swelling and bruising x 1 week.  X-ray of right knee and right ankle showed chronic degenerative changes.  Duplex ultrasound bilateral showed right lower extremity Baker's cyst, no DVT bilaterally found.  Patient with positive crescent sign on physical exam.  Likely ruptured Baker's cyst.  PT OT level 2 will likely discharge to [facility]    Conservative measures at the moment.  Rest, limb elevation, compression and analgesia as needed.    FEDE (acute kidney injury) (HCC)  Recent Labs     05/27/25  0834 05/28/25  0505 05/29/25  0447   CREATININE 1.61* 1.42* 1.43*   EGFR 29 34 33     Estimated Creatinine Clearance: 25 mL/min (A) (by C-G formula based on SCr of 1.43 mg/dL (H)).    Baseline creatinine 1-1.3  FEDE likely secondary to fluid overload, with etiology of likely medication noncompliance.  Avoid nephrotoxic agents, held donepezil.  Continue to  trend  IV Bumex 4 mg twice daily.  Hyponatremia  Recent Labs     05/27/25  0834 05/28/25  0505 05/29/25  0447   SODIUM 131* 132* 136     Patient follow with nephology.   Upon chart review per nephrology baseline sodium appears to be in the low to mid 130s meq, Etiology is suspected multifactorial due to volume fluctuations, COPD and pulmonary disease with carcinoid tumor.  On fluid restriction of 1.5L/day   Previously on tablets but discontinued due to volume overload and edema.  Volume overload might be contributing to hyponatremia, monitor if there is improvement with IV Bumex.  Continue fluid restriction 1.5L/day.  Monitor.  Anemia  Recent Labs     05/27/25  0834 05/28/25  0505 05/29/25  0447   HGB 8.8* 8.1* 8.0*     Lab Results   Component Value Date    IRON 30 (L) 05/28/2025    FERRITIN 112 05/28/2025    TIBC 292.6 05/28/2025    CONCFE 10 (L) 05/28/2025     Patient with hemoglobin 8.8 on admission, last hemoglobin 10.4 in 04/2025.  Patient with ruptured popliteal cyst, no other clinical evidence of bleeding.  Folate and B12 normal  Iron studies as above    Continue to monitor hemoglobin  IV Venofer 300 mg x 1  Transfuse if <7  Paroxysmal atrial fibrillation (HCC)  Patient with a history of atrial fibrillation on Xarelto and metoprolol.  Patient appears to have chronic bradycardia, also documented in last cardiologist note.  Still continuing with metoprolol and Cardizem.  Continue home Xarelto and metoprolol medications.  D/c Telemetry monitoring  Hyperlipidemia  Patient on Lipitor 40 mg daily  Continue prior to admission medications  Type 2 diabetes mellitus, without long-term current use of insulin (HCC)  Lab Results   Component Value Date    HGBA1C 6.5 04/15/2025       Recent Labs     05/28/25  1529 05/28/25 2058 05/29/25  0715 05/29/25  1127   POCGLU 294* 199* 177* 297*     Recent Labs     05/28/25 2058 05/29/25  0447 05/29/25  0715 05/29/25  1127   POCGLU 199*  --  177* 297*   GLUC  --  170*  --   --       Hold home regimen while inpatient and resume on discharge: Metformin 500 mg twice daily, glipizide 10 mg daily.  Diabetic diet  Insulin regimen  SSI while inpatient  Goal -180 while admitted, adjusting insulin regimen as appropriate  Monitor for hypoglycemia and treat per protocol     Essential hypertension  Patient follows with cardiologist outpatient.  On Toprol 50 mg twice daily and Cardizem 120 mg daily.  Patient appears to have chronic bradycardia, also documented in last cardiologist note.  Still continuing with metoprolol and Cardizem.  Continue PTA Cardizem and Toprol.    Impaired memory  Patient with history of impaired memory.  Lives independently and manages own medications.  Question of medication noncompliance due to forgetfulness.  Takes Aricept at home.  On Aricpet.    Moderate protein-calorie malnutrition (HCC)  Malnutrition Findings:   Adult Malnutrition type: Chronic illness  Adult Degree of Malnutrition: Malnutrition of moderate degree  Malnutrition Characteristics: Inadequate energy, Weight loss, Muscle loss                360 Statement: Protein calorie malnutrition r/t inadequate intake as evidenced by <75% energy intake compared to estimated energy needs >1 month, muscle wasting present to clavicle/temples, and unintentional weight loss >20% over the last year; currently treated with oral diet, pt declines oral nutrition supplements at this time.    BMI Findings:           Body mass index is 26.54 kg/m².       VTE Pharmacologic Prophylaxis: VTE Score: 10 High Risk (Score >/= 5) - Pharmacological DVT Prophylaxis Ordered: rivaroxaban (Xarelto). Sequential Compression Devices Ordered.    Mobility:   Basic Mobility Inpatient Raw Score: 14  JH-HLM Goal: 4: Move to chair/commode  JH-HLM Achieved: 2: Bed activities/Dependent transfer  JH-HLM Goal achieved. Continue to encourage appropriate mobility.    Patient Centered Rounds: I performed bedside rounds with nursing staff today.    Discussions with Specialists or Other Care Team Provider: None    Education and Discussions with Family / Patient: Updated  (daughter) via phone.    Current Length of Stay: 2 day(s)  Current Patient Status: Inpatient   Certification Statement: The patient will continue to require additional inpatient hospital stay due to IV diuresis  Discharge Plan: Anticipate discharge in 24-48 hrs to rehab facility.  [facility]    Code Status: Level 1 - Full Code    Subjective   Patient seen and examined at bedside. No overnight events.  Patient continues to endorse right lower extremity pain but feels it is improving.  Patient was uncomfortable with elevation leg. elevation.  Denies any change in shortness of breath.    Objective :  Temp:  [97.4 °F (36.3 °C)-98.2 °F (36.8 °C)] 98.1 °F (36.7 °C)  HR:  [50-55] 50  BP: (105-125)/(48-63) 105/48  SpO2:  [91 %-99 %] 99 %  O2 Device: Nasal cannula  Nasal Cannula O2 Flow Rate (L/min):  [2 L/min] 2 L/min    Body mass index is 26.54 kg/m².     Input and Output Summary (last 24 hours):     Intake/Output Summary (Last 24 hours) at 5/29/2025 1524  Last data filed at 5/29/2025 1411  Gross per 24 hour   Intake 670 ml   Output 1570 ml   Net -900 ml       Physical Exam  Vitals and nursing note reviewed.   Constitutional:       Appearance: Normal appearance.   HENT:      Head: Normocephalic and atraumatic.      Mouth/Throat:      Mouth: Mucous membranes are moist.     Eyes:      Extraocular Movements: Extraocular movements intact.      Conjunctiva/sclera: Conjunctivae normal.      Pupils: Pupils are equal, round, and reactive to light.       Cardiovascular:      Rate and Rhythm: Normal rate and regular rhythm.   Pulmonary:      Effort: Pulmonary effort is normal. No respiratory distress.      Breath sounds: Rales (L>R) present.   Abdominal:      General: Bowel sounds are normal. There is no distension.      Palpations: Abdomen is soft.      Tenderness: There is no  abdominal tenderness. There is no guarding.     Musculoskeletal:      Right lower leg: Edema present.      Left lower leg: Edema present.      Comments: Significant bilateral extremity pitting edema, improved from yesterday now with skin wrinkling  Bruising to posterior right thigh/popliteal area as well as posterior calf and ankle noted     Skin:     General: Skin is warm and dry.     Neurological:      General: No focal deficit present.      Mental Status: She is alert. Mental status is at baseline.     Psychiatric:         Mood and Affect: Mood normal.       Lines/Drains:        Telemetry:  Telemetry Orders (From admission, onward)               24 Hour Telemetry Monitoring  Continuous x 24 Hours (Telem)        Expiring   Question:  Reason for 24 Hour Telemetry  Answer:  Arrhythmias requiring acute medical intervention / PPM or ICD malfunction                     Telemetry Reviewed: Normal Sinus Rhythm and Sinus Bradycardia  Indication for Continued Telemetry Use: No indication for continued use. Will discontinue.                Lab Results: I have reviewed the following results:   Results from last 7 days   Lab Units 05/29/25 0447 05/28/25  0505 05/27/25  0834   WBC Thousand/uL 6.24   < > 5.74   HEMOGLOBIN g/dL 8.0*   < > 8.8*   HEMATOCRIT % 24.6*   < > 27.5*   PLATELETS Thousands/uL 199   < > 196   SEGS PCT %  --   --  75   LYMPHO PCT %  --   --  14   MONO PCT %  --   --  10   EOS PCT %  --   --  1    < > = values in this interval not displayed.     Results from last 7 days   Lab Units 05/29/25 0447 05/28/25  0505   SODIUM mmol/L 136 132*   POTASSIUM mmol/L 3.6 3.4*   CHLORIDE mmol/L 89* 87*   CO2 mmol/L 40* 38*   BUN mg/dL 58* 60*   CREATININE mg/dL 1.43* 1.42*   ANION GAP mmol/L 7 7   CALCIUM mg/dL 9.1 9.0   ALBUMIN g/dL  --  3.3*   TOTAL BILIRUBIN mg/dL  --  0.83   ALK PHOS U/L  --  47   ALT U/L  --  22   AST U/L  --  67*   GLUCOSE RANDOM mg/dL 170* 129     Results from last 7 days   Lab Units  05/27/25  2307   INR  1.75*     Results from last 7 days   Lab Units 05/29/25  1127 05/29/25  0715 05/28/25  2058 05/28/25  1529 05/28/25  1115 05/28/25  0731 05/28/25  0115   POC GLUCOSE mg/dl 297* 177* 199* 294* 264* 139 136               Recent Cultures (last 7 days):               Last 24 Hours Medication List:     Current Facility-Administered Medications:     acetaminophen (TYLENOL) tablet 650 mg, Q6H    albuterol inhalation solution 2.5 mg, Q4H PRN    atorvastatin (LIPITOR) tablet 40 mg, Daily    budesonide (PULMICORT) inhalation solution 0.5 mg, Q12H    bumetanide (BUMEX) injection 4 mg, BID    [Held by provider] bumetanide (BUMEX) tablet 3 mg, BID    [Held by provider] diltiazem (CARDIZEM CD) 24 hr capsule 120 mg, Daily    donepezil (ARICEPT) tablet 5 mg, HS    escitalopram (LEXAPRO) tablet 5 mg, Daily    insulin lispro (HumALOG/ADMELOG) 100 units/mL subcutaneous injection 1-5 Units, 4x Daily (AC & HS) **AND** Fingerstick Glucose (POCT), 4x Daily AC and at bedtime    metoprolol succinate (TOPROL-XL) 24 hr tablet 50 mg, BID    potassium chloride (Klor-Con M20) CR tablet 20 mEq, BID    rivaroxaban (XARELTO) tablet 15 mg, Daily With Breakfast    Administrative Statements   Today, Patient Was Seen By: Piedad Mcmanus MD      **Please Note: This note may have been constructed using a voice recognition system.**

## 2025-05-29 NOTE — ASSESSMENT & PLAN NOTE
Patient with history of impaired memory.  Lives independently and manages own medications.  Question of medication noncompliance due to forgetfulness.  Takes Aricept at home.  On Aricpet.

## 2025-05-29 NOTE — ASSESSMENT & PLAN NOTE
Lab Results   Component Value Date    HGBA1C 6.5 04/15/2025       Recent Labs     05/28/25  1529 05/28/25 2058 05/29/25  0715 05/29/25  1127   POCGLU 294* 199* 177* 297*     Recent Labs     05/28/25 2058 05/29/25  0447 05/29/25  0715 05/29/25  1127   POCGLU 199*  --  177* 297*   GLUC  --  170*  --   --      Hold home regimen while inpatient and resume on discharge: Metformin 500 mg twice daily, glipizide 10 mg daily.  Diabetic diet  Insulin regimen  SSI while inpatient  Goal -180 while admitted, adjusting insulin regimen as appropriate  Monitor for hypoglycemia and treat per protocol

## 2025-05-30 LAB
ANION GAP SERPL CALCULATED.3IONS-SCNC: 6 MMOL/L (ref 4–13)
BUN SERPL-MCNC: 63 MG/DL (ref 5–25)
CALCIUM SERPL-MCNC: 9 MG/DL (ref 8.4–10.2)
CHLORIDE SERPL-SCNC: 91 MMOL/L (ref 96–108)
CO2 SERPL-SCNC: 40 MMOL/L (ref 21–32)
CREAT SERPL-MCNC: 1.42 MG/DL (ref 0.6–1.3)
GFR SERPL CREATININE-BSD FRML MDRD: 34 ML/MIN/1.73SQ M
GLUCOSE SERPL-MCNC: 174 MG/DL (ref 65–140)
GLUCOSE SERPL-MCNC: 182 MG/DL (ref 65–140)
GLUCOSE SERPL-MCNC: 240 MG/DL (ref 65–140)
GLUCOSE SERPL-MCNC: 245 MG/DL (ref 65–140)
GLUCOSE SERPL-MCNC: 301 MG/DL (ref 65–140)
POTASSIUM SERPL-SCNC: 3.1 MMOL/L (ref 3.5–5.3)
POTASSIUM SERPL-SCNC: 3.8 MMOL/L (ref 3.5–5.3)
SODIUM SERPL-SCNC: 137 MMOL/L (ref 135–147)

## 2025-05-30 PROCEDURE — 94760 N-INVAS EAR/PLS OXIMETRY 1: CPT

## 2025-05-30 PROCEDURE — 99233 SBSQ HOSP IP/OBS HIGH 50: CPT | Performed by: INTERNAL MEDICINE

## 2025-05-30 PROCEDURE — 80048 BASIC METABOLIC PNL TOTAL CA: CPT

## 2025-05-30 PROCEDURE — 84132 ASSAY OF SERUM POTASSIUM: CPT

## 2025-05-30 PROCEDURE — 82948 REAGENT STRIP/BLOOD GLUCOSE: CPT

## 2025-05-30 PROCEDURE — 94640 AIRWAY INHALATION TREATMENT: CPT

## 2025-05-30 RX ORDER — POTASSIUM CHLORIDE 29.8 MG/ML
40 INJECTION INTRAVENOUS ONCE
Status: DISCONTINUED | OUTPATIENT
Start: 2025-05-30 | End: 2025-05-30

## 2025-05-30 RX ORDER — POTASSIUM CHLORIDE 14.9 MG/ML
20 INJECTION INTRAVENOUS
Status: COMPLETED | OUTPATIENT
Start: 2025-05-30 | End: 2025-05-30

## 2025-05-30 RX ADMIN — POTASSIUM CHLORIDE 20 MEQ: 14.9 INJECTION, SOLUTION INTRAVENOUS at 07:12

## 2025-05-30 RX ADMIN — BUDESONIDE 0.5 MG: 0.5 INHALANT RESPIRATORY (INHALATION) at 07:33

## 2025-05-30 RX ADMIN — BUMETANIDE 4 MG: 0.25 INJECTION INTRAMUSCULAR; INTRAVENOUS at 08:29

## 2025-05-30 RX ADMIN — INSULIN LISPRO 3 UNITS: 100 INJECTION, SOLUTION INTRAVENOUS; SUBCUTANEOUS at 11:58

## 2025-05-30 RX ADMIN — METOPROLOL SUCCINATE 50 MG: 50 TABLET, EXTENDED RELEASE ORAL at 08:29

## 2025-05-30 RX ADMIN — METOPROLOL SUCCINATE 50 MG: 50 TABLET, EXTENDED RELEASE ORAL at 17:02

## 2025-05-30 RX ADMIN — ATORVASTATIN CALCIUM 40 MG: 40 TABLET, FILM COATED ORAL at 08:30

## 2025-05-30 RX ADMIN — POTASSIUM CHLORIDE 20 MEQ: 1500 TABLET, EXTENDED RELEASE ORAL at 17:02

## 2025-05-30 RX ADMIN — INSULIN LISPRO 1 UNITS: 100 INJECTION, SOLUTION INTRAVENOUS; SUBCUTANEOUS at 08:30

## 2025-05-30 RX ADMIN — RIVAROXABAN 15 MG: 15 TABLET, FILM COATED ORAL at 07:12

## 2025-05-30 RX ADMIN — ESCITALOPRAM OXALATE 5 MG: 10 TABLET ORAL at 08:30

## 2025-05-30 RX ADMIN — POTASSIUM CHLORIDE 20 MEQ: 1500 TABLET, EXTENDED RELEASE ORAL at 08:29

## 2025-05-30 RX ADMIN — DONEPEZIL HYDROCHLORIDE 5 MG: 5 TABLET ORAL at 21:24

## 2025-05-30 RX ADMIN — ACETAMINOPHEN 650 MG: 325 TABLET ORAL at 21:24

## 2025-05-30 RX ADMIN — BUDESONIDE 0.5 MG: 0.5 INHALANT RESPIRATORY (INHALATION) at 20:16

## 2025-05-30 RX ADMIN — ACETAMINOPHEN 650 MG: 325 TABLET ORAL at 07:12

## 2025-05-30 RX ADMIN — INSULIN LISPRO 3 UNITS: 100 INJECTION, SOLUTION INTRAVENOUS; SUBCUTANEOUS at 17:02

## 2025-05-30 RX ADMIN — POTASSIUM CHLORIDE 20 MEQ: 14.9 INJECTION, SOLUTION INTRAVENOUS at 09:22

## 2025-05-30 RX ADMIN — BUMETANIDE 4 MG: 0.25 INJECTION INTRAMUSCULAR; INTRAVENOUS at 17:02

## 2025-05-30 NOTE — ASSESSMENT & PLAN NOTE
Recent Labs     05/28/25  0505 05/29/25  0447 05/30/25  0510   CREATININE 1.42* 1.43* 1.42*   EGFR 34 33 34     Estimated Creatinine Clearance: 25.5 mL/min (A) (by C-G formula based on SCr of 1.42 mg/dL (H)).    Baseline creatinine 1-1.3  FEDE likely secondary to fluid overload, with etiology of likely medication noncompliance.  Avoid nephrotoxic agents, held donepezil.  Continue to trend  IV Bumex 4 mg twice daily.

## 2025-05-30 NOTE — ASSESSMENT & PLAN NOTE
Malnutrition Findings:   Adult Malnutrition type: Chronic illness  Adult Degree of Malnutrition: Malnutrition of moderate degree  Malnutrition Characteristics: Inadequate energy, Weight loss, Muscle loss                360 Statement: Protein calorie malnutrition r/t inadequate intake as evidenced by <75% energy intake compared to estimated energy needs >1 month, muscle wasting present to clavicle/temples, and unintentional weight loss >20% over the last year; currently treated with oral diet, pt declines oral nutrition supplements at this time.    BMI Findings:           Body mass index is 27.25 kg/m².

## 2025-05-30 NOTE — ASSESSMENT & PLAN NOTE
Patient presented with right lower extremity swelling and bruising x 1 week.  X-ray of right knee and right ankle showed chronic degenerative changes.  Duplex ultrasound bilateral showed right lower extremity Baker's cyst, no DVT bilaterally found.  Patient with positive crescent sign on physical exam.  Likely ruptured Baker's cyst.  PT OT level 2 will likely discharge to [facility]    Conservative measures at the moment.  Rest, limb elevation, compression and analgesia as needed.

## 2025-05-30 NOTE — ASSESSMENT & PLAN NOTE
Recent Labs     05/28/25  0505 05/29/25  0447   HGB 8.1* 8.0*     Lab Results   Component Value Date    IRON 30 (L) 05/28/2025    FERRITIN 112 05/28/2025    TIBC 292.6 05/28/2025    CONCFE 10 (L) 05/28/2025     Patient with hemoglobin 8.8 on admission, last hemoglobin 10.4 in 04/2025.  Patient with ruptured popliteal cyst, no other clinical evidence of bleeding.  Folate and B12 normal  Iron studies as above  S/p IV Venofer 300 mg x 1    Continue to monitor hemoglobin  Transfuse if <7

## 2025-05-30 NOTE — PROGRESS NOTES
Progress Note - Hospitalist   Name: Camryn Roblero 84 y.o. female I MRN: [id number]  Unit/Bed#: S -01 I Date of Admission: 5/27/2025   Date of Service: 5/30/2025 I Hospital Day: 3    Assessment & Plan  Chronic heart failure with preserved ejection fraction (HCC)  Wt Readings from Last 3 Encounters:   05/30/25 61.2 kg (134 lb 14.7 oz)   05/13/25 63 kg (139 lb)   05/13/25 62.7 kg (138 lb 4.8 oz)     Patient with history of heart failure with preserved ejection fraction.  Appears to be in acute exacerbation.  Follows with cardiology outpatient, On Bumex 3 mg twice daily.  Documented history of patient missing medication dosages/forgetting to take them.  Uses 2 L of oxygen on baseline secondary to COPD.    Last echo 10/2024: LVEF 65%, systolic function normal.  Increased right ventricular dilation compared to previous.  CXR small bilateral pleural effusions, cardiac megaly and hilar fullness    Hold home Bumex for now.  IV Bumex 4 mg twice daily  Monitor I/O  Daily weights.  Right leg pain  Patient presented with right lower extremity swelling and bruising x 1 week.  X-ray of right knee and right ankle showed chronic degenerative changes.  Duplex ultrasound bilateral showed right lower extremity Baker's cyst, no DVT bilaterally found.  Patient with positive crescent sign on physical exam.  Likely ruptured Baker's cyst.  PT OT level 2 will likely discharge to [facility]    Conservative measures at the moment.  Rest, limb elevation, compression and analgesia as needed.    FEDE (acute kidney injury) (HCC)  Recent Labs     05/28/25  0505 05/29/25  0447 05/30/25  0510   CREATININE 1.42* 1.43* 1.42*   EGFR 34 33 34     Estimated Creatinine Clearance: 25.5 mL/min (A) (by C-G formula based on SCr of 1.42 mg/dL (H)).    Baseline creatinine 1-1.3  FEDE likely secondary to fluid overload, with etiology of likely medication noncompliance.  Avoid nephrotoxic agents, held donepezil.  Continue to trend  IV Bumex 4 mg  twice daily.  Hyponatremia (Resolved: 5/30/2025)  Recent Labs     05/28/25  0505 05/29/25  0447 05/30/25  0510   SODIUM 132* 136 137     Patient follow with nephology.   Upon chart review per nephrology baseline sodium appears to be in the low to mid 130s meq, Etiology is suspected multifactorial due to volume fluctuations, COPD and pulmonary disease with carcinoid tumor.  On fluid restriction of 1.5L/day   Previously on tablets but discontinued due to volume overload and edema.  Volume overload might be contributing to hyponatremia, monitor if there is improvement with IV Bumex.    Continue fluid restriction 1.5L/day.  Monitor  Anemia  Recent Labs     05/28/25  0505 05/29/25  0447   HGB 8.1* 8.0*     Lab Results   Component Value Date    IRON 30 (L) 05/28/2025    FERRITIN 112 05/28/2025    TIBC 292.6 05/28/2025    CONCFE 10 (L) 05/28/2025     Patient with hemoglobin 8.8 on admission, last hemoglobin 10.4 in 04/2025.  Patient with ruptured popliteal cyst, no other clinical evidence of bleeding.  Folate and B12 normal  Iron studies as above  S/p IV Venofer 300 mg x 1    Continue to monitor hemoglobin  Transfuse if <7  Paroxysmal atrial fibrillation (HCC)  Patient with a history of atrial fibrillation on Xarelto and metoprolol.  Patient appears to have chronic bradycardia, also documented in last cardiologist note.  Still continuing with metoprolol and Cardizem.  Continue home Xarelto and metoprolol medications.  Hyperlipidemia  Patient on Lipitor 40 mg daily  Continue prior to admission medications  Type 2 diabetes mellitus, without long-term current use of insulin (HCC)  Lab Results   Component Value Date    HGBA1C 6.5 04/15/2025       Recent Labs     05/29/25  1127 05/29/25  1642 05/29/25  2148 05/30/25  0704   POCGLU 297* 241* 205* 174*     Recent Labs     05/29/25  1642 05/29/25  2148 05/30/25  0510 05/30/25  0704   POCGLU 241* 205*  --  174*   GLUC  --   --  182*  --      Hold home regimen while inpatient and  resume on discharge: Metformin 500 mg twice daily, glipizide 10 mg daily.  Diabetic diet  Insulin regimen  SSI while inpatient  Goal -180 while admitted, adjusting insulin regimen as appropriate  Monitor for hypoglycemia and treat per protocol     Essential hypertension  Patient follows with cardiologist outpatient.  On Toprol 50 mg twice daily and Cardizem 120 mg daily.  Patient appears to have chronic bradycardia, also documented in last cardiologist note.  Still continuing with metoprolol and Cardizem.  Continue PTA Cardizem and Toprol.  Impaired memory  Patient with history of impaired memory.  Lives independently and manages own medications.  Question of medication noncompliance due to forgetfulness.  Takes Aricept at home.  On Aricpet.    Moderate protein-calorie malnutrition (HCC)  Malnutrition Findings:   Adult Malnutrition type: Chronic illness  Adult Degree of Malnutrition: Malnutrition of moderate degree  Malnutrition Characteristics: Inadequate energy, Weight loss, Muscle loss                360 Statement: Protein calorie malnutrition r/t inadequate intake as evidenced by <75% energy intake compared to estimated energy needs >1 month, muscle wasting present to clavicle/temples, and unintentional weight loss >20% over the last year; currently treated with oral diet, pt declines oral nutrition supplements at this time.    BMI Findings:           Body mass index is 27.25 kg/m².       VTE Pharmacologic Prophylaxis: VTE Score: 10 High Risk (Score >/= 5) - Pharmacological DVT Prophylaxis Ordered: rivaroxaban (Xarelto). Sequential Compression Devices Ordered.    Mobility:   Basic Mobility Inpatient Raw Score: 14  JH-HLM Goal: 4: Move to chair/commode  JH-HLM Achieved: 2: Bed activities/Dependent transfer  JH-HLM Goal achieved. Continue to encourage appropriate mobility.    Patient Centered Rounds: I performed bedside rounds with nursing staff today.   Discussions with Specialists or Other Care Team  Provider: None    Education and Discussions with Family / Patient: Updated  (daughter) via phone.    Current Length of Stay: 3 day(s)  Current Patient Status: Inpatient   Certification Statement: The patient will continue to require additional inpatient hospital stay due to IV diuresis  Discharge Plan: Anticipate discharge tomorrow to rehab facility.  [facility]    Code Status: Level 1 - Full Code    Subjective   Patient seen and examined at bedside. No overnight events.  Patient endorses her legs being achy this morning.  Denies any chest pain shortness of breath.    Objective :  Temp:  [97.5 °F (36.4 °C)-98.1 °F (36.7 °C)] 97.9 °F (36.6 °C)  HR:  [47-50] 49  BP: (105-124)/(48-55) 124/52  Resp:  [18] 18  SpO2:  [95 %-99 %] 97 %  O2 Device: Nasal cannula  Nasal Cannula O2 Flow Rate (L/min):  [2 L/min] 2 L/min    Body mass index is 27.25 kg/m².     Input and Output Summary (last 24 hours):     Intake/Output Summary (Last 24 hours) at 5/30/2025 0853  Last data filed at 5/29/2025 1411  Gross per 24 hour   Intake 340 ml   Output 850 ml   Net -510 ml       Physical Exam  Vitals and nursing note reviewed.   Constitutional:       Appearance: Normal appearance.   HENT:      Head: Normocephalic and atraumatic.      Mouth/Throat:      Mouth: Mucous membranes are moist.     Eyes:      Extraocular Movements: Extraocular movements intact.      Conjunctiva/sclera: Conjunctivae normal.      Pupils: Pupils are equal, round, and reactive to light.       Cardiovascular:      Rate and Rhythm: Normal rate and regular rhythm.   Pulmonary:      Effort: Pulmonary effort is normal. No respiratory distress.      Breath sounds: Rales (bases, improving) present.   Abdominal:      General: Bowel sounds are normal. There is no distension.      Palpations: Abdomen is soft.      Tenderness: There is no abdominal tenderness. There is no guarding.     Musculoskeletal:      Right lower leg: Edema present.      Left lower  leg: Edema present.      Comments: Significant bilateral extremity pitting edema. Mildly TTP  Bruising to posterior right thigh/popliteal area as well as posterior calf and ankle noted     Skin:     General: Skin is warm and dry.     Neurological:      General: No focal deficit present.      Mental Status: She is alert. Mental status is at baseline.     Psychiatric:         Mood and Affect: Mood normal.       Lines/Drains:        Telemetry:  Telemetry Orders (From admission, onward)               24 Hour Telemetry Monitoring  Continuous x 24 Hours (Telem)        Expiring   Question:  Reason for 24 Hour Telemetry  Answer:  Arrhythmias requiring acute medical intervention / PPM or ICD malfunction                     Telemetry Reviewed: Normal Sinus Rhythm and Sinus Bradycardia  Indication for Continued Telemetry Use: No indication for continued use. Will discontinue.                Lab Results: I have reviewed the following results:   Results from last 7 days   Lab Units 05/29/25  0447 05/28/25  0505 05/27/25  0834   WBC Thousand/uL 6.24   < > 5.74   HEMOGLOBIN g/dL 8.0*   < > 8.8*   HEMATOCRIT % 24.6*   < > 27.5*   PLATELETS Thousands/uL 199   < > 196   SEGS PCT %  --   --  75   LYMPHO PCT %  --   --  14   MONO PCT %  --   --  10   EOS PCT %  --   --  1    < > = values in this interval not displayed.     Results from last 7 days   Lab Units 05/30/25  0510 05/29/25 0447 05/28/25  0505   SODIUM mmol/L 137   < > 132*   POTASSIUM mmol/L 3.1*   < > 3.4*   CHLORIDE mmol/L 91*   < > 87*   CO2 mmol/L 40*   < > 38*   BUN mg/dL 63*   < > 60*   CREATININE mg/dL 1.42*   < > 1.42*   ANION GAP mmol/L 6   < > 7   CALCIUM mg/dL 9.0   < > 9.0   ALBUMIN g/dL  --   --  3.3*   TOTAL BILIRUBIN mg/dL  --   --  0.83   ALK PHOS U/L  --   --  47   ALT U/L  --   --  22   AST U/L  --   --  67*   GLUCOSE RANDOM mg/dL 182*   < > 129    < > = values in this interval not displayed.     Results from last 7 days   Lab Units 05/27/25  2307   INR   1.75*     Results from last 7 days   Lab Units 05/30/25  0704 05/29/25  2148 05/29/25  1642 05/29/25  1127 05/29/25  0715 05/28/25  2058 05/28/25  1529 05/28/25  1115 05/28/25  0731 05/28/25  0115   POC GLUCOSE mg/dl 174* 205* 241* 297* 177* 199* 294* 264* 139 136               Recent Cultures (last 7 days):               Last 24 Hours Medication List:     Current Facility-Administered Medications:     acetaminophen (TYLENOL) tablet 650 mg, Q6H    albuterol inhalation solution 2.5 mg, Q4H PRN    atorvastatin (LIPITOR) tablet 40 mg, Daily    budesonide (PULMICORT) inhalation solution 0.5 mg, Q12H    bumetanide (BUMEX) injection 4 mg, BID    [Held by provider] bumetanide (BUMEX) tablet 3 mg, BID    [Held by provider] diltiazem (CARDIZEM CD) 24 hr capsule 120 mg, Daily    donepezil (ARICEPT) tablet 5 mg, HS    escitalopram (LEXAPRO) tablet 5 mg, Daily    insulin lispro (HumALOG/ADMELOG) 100 units/mL subcutaneous injection 1-6 Units, TID AC **AND** Fingerstick Glucose (POCT), 4x Daily AC and at bedtime    metoprolol succinate (TOPROL-XL) 24 hr tablet 50 mg, BID    potassium chloride (Klor-Con M20) CR tablet 20 mEq, BID    potassium chloride 20 mEq IVPB (premix), Q2H, Last Rate: 20 mEq (05/30/25 0712)    rivaroxaban (XARELTO) tablet 15 mg, Daily With Breakfast    Administrative Statements   Today, Patient Was Seen By: Piedad Mcmansu MD      **Please Note: This note may have been constructed using a voice recognition system.**

## 2025-05-30 NOTE — ASSESSMENT & PLAN NOTE
Recent Labs     05/28/25  0505 05/29/25  0447 05/30/25  0510   SODIUM 132* 136 137     Patient follow with nephology.   Upon chart review per nephrology baseline sodium appears to be in the low to mid 130s meq, Etiology is suspected multifactorial due to volume fluctuations, COPD and pulmonary disease with carcinoid tumor.  On fluid restriction of 1.5L/day   Previously on tablets but discontinued due to volume overload and edema.  Volume overload might be contributing to hyponatremia, monitor if there is improvement with IV Bumex.    Continue fluid restriction 1.5L/day.  Monitor

## 2025-05-30 NOTE — ASSESSMENT & PLAN NOTE
Wt Readings from Last 3 Encounters:   05/30/25 61.2 kg (134 lb 14.7 oz)   05/13/25 63 kg (139 lb)   05/13/25 62.7 kg (138 lb 4.8 oz)     Patient with history of heart failure with preserved ejection fraction.  Appears to be in acute exacerbation.  Follows with cardiology outpatient, On Bumex 3 mg twice daily.  Documented history of patient missing medication dosages/forgetting to take them.  Uses 2 L of oxygen on baseline secondary to COPD.    Last echo 10/2024: LVEF 65%, systolic function normal.  Increased right ventricular dilation compared to previous.  CXR small bilateral pleural effusions, cardiac megaly and hilar fullness    Hold home Bumex for now.  IV Bumex 4 mg twice daily  Monitor I/O  Daily weights.

## 2025-05-30 NOTE — ASSESSMENT & PLAN NOTE
Lab Results   Component Value Date    HGBA1C 6.5 04/15/2025       Recent Labs     05/29/25  1127 05/29/25  1642 05/29/25  2148 05/30/25  0704   POCGLU 297* 241* 205* 174*     Recent Labs     05/29/25  1642 05/29/25 2148 05/30/25  0510 05/30/25  0704   POCGLU 241* 205*  --  174*   GLUC  --   --  182*  --      Hold home regimen while inpatient and resume on discharge: Metformin 500 mg twice daily, glipizide 10 mg daily.  Diabetic diet  Insulin regimen  SSI while inpatient  Goal -180 while admitted, adjusting insulin regimen as appropriate  Monitor for hypoglycemia and treat per protocol

## 2025-05-30 NOTE — ASSESSMENT & PLAN NOTE
Patient with a history of atrial fibrillation on Xarelto and metoprolol.  Patient appears to have chronic bradycardia, also documented in last cardiologist note.  Still continuing with metoprolol and Cardizem.  Continue home Xarelto and metoprolol medications.

## 2025-05-31 LAB
ANION GAP SERPL CALCULATED.3IONS-SCNC: 5 MMOL/L (ref 4–13)
BUN SERPL-MCNC: 63 MG/DL (ref 5–25)
CALCIUM SERPL-MCNC: 8.9 MG/DL (ref 8.4–10.2)
CHLORIDE SERPL-SCNC: 94 MMOL/L (ref 96–108)
CO2 SERPL-SCNC: 41 MMOL/L (ref 21–32)
CREAT SERPL-MCNC: 1.42 MG/DL (ref 0.6–1.3)
ERYTHROCYTE [DISTWIDTH] IN BLOOD BY AUTOMATED COUNT: 16.3 % (ref 11.6–15.1)
GFR SERPL CREATININE-BSD FRML MDRD: 34 ML/MIN/1.73SQ M
GLUCOSE SERPL-MCNC: 204 MG/DL (ref 65–140)
GLUCOSE SERPL-MCNC: 209 MG/DL (ref 65–140)
GLUCOSE SERPL-MCNC: 259 MG/DL (ref 65–140)
GLUCOSE SERPL-MCNC: 273 MG/DL (ref 65–140)
GLUCOSE SERPL-MCNC: 373 MG/DL (ref 65–140)
HCT VFR BLD AUTO: 26.1 % (ref 34.8–46.1)
HGB BLD-MCNC: 7.9 G/DL (ref 11.5–15.4)
MCH RBC QN AUTO: 29.2 PG (ref 26.8–34.3)
MCHC RBC AUTO-ENTMCNC: 30.3 G/DL (ref 31.4–37.4)
MCV RBC AUTO: 96 FL (ref 82–98)
PLATELET # BLD AUTO: 205 THOUSANDS/UL (ref 149–390)
PMV BLD AUTO: 10.4 FL (ref 8.9–12.7)
POTASSIUM SERPL-SCNC: 3.4 MMOL/L (ref 3.5–5.3)
RBC # BLD AUTO: 2.71 MILLION/UL (ref 3.81–5.12)
SODIUM SERPL-SCNC: 140 MMOL/L (ref 135–147)
WBC # BLD AUTO: 5.7 THOUSAND/UL (ref 4.31–10.16)

## 2025-05-31 PROCEDURE — 99233 SBSQ HOSP IP/OBS HIGH 50: CPT | Performed by: INTERNAL MEDICINE

## 2025-05-31 PROCEDURE — 94640 AIRWAY INHALATION TREATMENT: CPT

## 2025-05-31 PROCEDURE — 94760 N-INVAS EAR/PLS OXIMETRY 1: CPT

## 2025-05-31 PROCEDURE — 82948 REAGENT STRIP/BLOOD GLUCOSE: CPT

## 2025-05-31 PROCEDURE — 80048 BASIC METABOLIC PNL TOTAL CA: CPT

## 2025-05-31 PROCEDURE — 85027 COMPLETE CBC AUTOMATED: CPT

## 2025-05-31 RX ORDER — POTASSIUM CHLORIDE 14.9 MG/ML
20 INJECTION INTRAVENOUS
Status: COMPLETED | OUTPATIENT
Start: 2025-05-31 | End: 2025-05-31

## 2025-05-31 RX ADMIN — INSULIN LISPRO 4 UNITS: 100 INJECTION, SOLUTION INTRAVENOUS; SUBCUTANEOUS at 18:02

## 2025-05-31 RX ADMIN — BUMETANIDE 4 MG: 0.25 INJECTION INTRAMUSCULAR; INTRAVENOUS at 18:02

## 2025-05-31 RX ADMIN — ACETAMINOPHEN 650 MG: 325 TABLET ORAL at 09:06

## 2025-05-31 RX ADMIN — INSULIN LISPRO 2 UNITS: 100 INJECTION, SOLUTION INTRAVENOUS; SUBCUTANEOUS at 09:05

## 2025-05-31 RX ADMIN — BUMETANIDE 4 MG: 0.25 INJECTION INTRAMUSCULAR; INTRAVENOUS at 09:05

## 2025-05-31 RX ADMIN — ATORVASTATIN CALCIUM 40 MG: 40 TABLET, FILM COATED ORAL at 09:06

## 2025-05-31 RX ADMIN — RIVAROXABAN 15 MG: 15 TABLET, FILM COATED ORAL at 09:06

## 2025-05-31 RX ADMIN — DONEPEZIL HYDROCHLORIDE 5 MG: 5 TABLET ORAL at 20:56

## 2025-05-31 RX ADMIN — BUDESONIDE 0.5 MG: 0.5 INHALANT RESPIRATORY (INHALATION) at 07:13

## 2025-05-31 RX ADMIN — POTASSIUM CHLORIDE 20 MEQ: 14.9 INJECTION, SOLUTION INTRAVENOUS at 10:01

## 2025-05-31 RX ADMIN — BUDESONIDE 0.5 MG: 0.5 INHALANT RESPIRATORY (INHALATION) at 20:04

## 2025-05-31 RX ADMIN — METOPROLOL SUCCINATE 50 MG: 50 TABLET, EXTENDED RELEASE ORAL at 09:06

## 2025-05-31 RX ADMIN — ESCITALOPRAM OXALATE 5 MG: 10 TABLET ORAL at 09:06

## 2025-05-31 RX ADMIN — POTASSIUM CHLORIDE 20 MEQ: 14.9 INJECTION, SOLUTION INTRAVENOUS at 12:16

## 2025-05-31 RX ADMIN — ACETAMINOPHEN 650 MG: 325 TABLET ORAL at 20:56

## 2025-05-31 RX ADMIN — ACETAMINOPHEN 650 MG: 325 TABLET ORAL at 18:01

## 2025-05-31 RX ADMIN — POTASSIUM CHLORIDE 20 MEQ: 1500 TABLET, EXTENDED RELEASE ORAL at 18:01

## 2025-05-31 RX ADMIN — POTASSIUM CHLORIDE 20 MEQ: 1500 TABLET, EXTENDED RELEASE ORAL at 09:06

## 2025-05-31 RX ADMIN — INSULIN LISPRO 6 UNITS: 100 INJECTION, SOLUTION INTRAVENOUS; SUBCUTANEOUS at 11:30

## 2025-05-31 RX ADMIN — METOPROLOL SUCCINATE 50 MG: 50 TABLET, EXTENDED RELEASE ORAL at 18:01

## 2025-05-31 NOTE — ASSESSMENT & PLAN NOTE
Malnutrition Findings:   Adult Malnutrition type: Chronic illness  Adult Degree of Malnutrition: Malnutrition of moderate degree  Malnutrition Characteristics: Inadequate energy, Weight loss, Muscle loss                360 Statement: Protein calorie malnutrition r/t inadequate intake as evidenced by <75% energy intake compared to estimated energy needs >1 month, muscle wasting present to clavicle/temples, and unintentional weight loss >20% over the last year; currently treated with oral diet, pt declines oral nutrition supplements at this time.    BMI Findings:           Body mass index is 20.79 kg/m².

## 2025-05-31 NOTE — ASSESSMENT & PLAN NOTE
Wt Readings from Last 3 Encounters:   05/31/25 46.7 kg (102 lb 15.3 oz)   05/13/25 63 kg (139 lb)   05/13/25 62.7 kg (138 lb 4.8 oz)     Patient with history of heart failure with preserved ejection fraction.  Appears to be in acute exacerbation.  Follows with cardiology outpatient, On Bumex 3 mg twice daily.  Documented history of patient missing medication dosages/forgetting to take them.  Uses 2 L of oxygen on baseline secondary to COPD.    Last echo 10/2024: LVEF 65%, systolic function normal.  Increased right ventricular dilation compared to previous.  CXR small bilateral pleural effusions, cardiac megaly and hilar fullness    Continue IV Bumex 4 mg twice daily  Monitor I/O  BMP in the a.m.  Daily weights  Potential discharge in next 24 to 48 hours-DC to [facility]

## 2025-05-31 NOTE — PROGRESS NOTES
Progress Note - Hospitalist   Name: Camryn Roblero 84 y.o. female I MRN: [id number]  Unit/Bed#: S -01 I Date of Admission: 5/27/2025   Date of Service: 5/31/2025 I Hospital Day: 4     Assessment & Plan  Chronic heart failure with preserved ejection fraction (HCC)  Wt Readings from Last 3 Encounters:   05/31/25 46.7 kg (102 lb 15.3 oz)   05/13/25 63 kg (139 lb)   05/13/25 62.7 kg (138 lb 4.8 oz)     Patient with history of heart failure with preserved ejection fraction.  Appears to be in acute exacerbation.  Follows with cardiology outpatient, On Bumex 3 mg twice daily.  Documented history of patient missing medication dosages/forgetting to take them.  Uses 2 L of oxygen on baseline secondary to COPD.    Last echo 10/2024: LVEF 65%, systolic function normal.  Increased right ventricular dilation compared to previous.  CXR small bilateral pleural effusions, cardiac megaly and hilar fullness    Continue IV Bumex 4 mg twice daily  Monitor I/O  BMP in the a.m.  Daily weights  Potential discharge in next 24 to 48 hours-DC to [facility] re[facility]  Right leg pain  Patient presented with right lower extremity swelling and bruising x 1 week.  X-ray of right knee and right ankle showed chronic degenerative changes.  Duplex ultrasound bilateral showed right lower extremity Baker's cyst, no DVT bilaterally found.  Patient with positive crescent sign on physical exam.  Likely ruptured Baker's cyst.  PT OT level 2 will likely discharge to [facility]    Conservative measures at the moment.  Rest, limb elevation, compression and analgesia as needed.    FEDE (acute kidney injury) (HCC)  Recent Labs     05/29/25  0447 05/30/25  0510 05/31/25  0456   CREATININE 1.43* 1.42* 1.42*   EGFR 33 34 34     Estimated Creatinine Clearance: 22.1 mL/min (A) (by C-G formula based on SCr of 1.42 mg/dL (H)).    Baseline creatinine 1-1.3  FEDE likely secondary to fluid overload, with etiology of likely medication noncompliance.  Avoid  nephrotoxic agents, held donepezil.  Continue to trend  IV Bumex 4 mg twice daily.  Anemia  Recent Labs     05/29/25  0447 05/31/25  0456   HGB 8.0* 7.9*     Lab Results   Component Value Date    IRON 30 (L) 05/28/2025    FERRITIN 112 05/28/2025    TIBC 292.6 05/28/2025    CONCFE 10 (L) 05/28/2025     Patient with hemoglobin 8.8 on admission, last hemoglobin 10.4 in 04/2025.  Patient with ruptured popliteal cyst, no other clinical evidence of bleeding.  Folate and B12 normal  Iron studies as above  S/p IV Venofer 300 mg x 1    Continue to monitor hemoglobin  Transfuse if <7  Paroxysmal atrial fibrillation (HCC)  Patient with a history of atrial fibrillation on Xarelto and metoprolol.  Patient appears to have chronic bradycardia, also documented in last cardiologist note.  Still continuing with metoprolol and Cardizem.  Continue home Xarelto and metoprolol medications.  Hyperlipidemia  Patient on Lipitor 40 mg daily  Continue prior to admission medications  Type 2 diabetes mellitus, without long-term current use of insulin (HCC)  Lab Results   Component Value Date    HGBA1C 6.5 04/15/2025       Recent Labs     05/30/25  1556 05/30/25  2149 05/31/25  0757 05/31/25  1120   POCGLU 240* 301* 204* 373*     Recent Labs     05/30/25  2149 05/31/25  0456 05/31/25  0757 05/31/25  1120   POCGLU 301*  --  204* 373*   GLUC  --  209*  --   --      Hold home regimen while inpatient and resume on discharge: Metformin 500 mg twice daily, glipizide 10 mg daily.  Diabetic diet  Insulin regimen  SSI while inpatient  Goal -180 while admitted, adjusting insulin regimen as appropriate  Monitor for hypoglycemia and treat per protocol     Essential hypertension  Patient follows with cardiologist outpatient.  On Toprol 50 mg twice daily and Cardizem 120 mg daily.  Patient appears to have chronic bradycardia, also documented in last cardiologist note.  Still continuing with metoprolol and Cardizem.  Continue PTA Cardizem and  Toprol.  Impaired memory  Patient with history of impaired memory.  Lives independently and manages own medications.  Question of medication noncompliance due to forgetfulness.  Takes Aricept at home.  On Aricpet.    Moderate protein-calorie malnutrition (HCC)  Malnutrition Findings:   Adult Malnutrition type: Chronic illness  Adult Degree of Malnutrition: Malnutrition of moderate degree  Malnutrition Characteristics: Inadequate energy, Weight loss, Muscle loss                360 Statement: Protein calorie malnutrition r/t inadequate intake as evidenced by <75% energy intake compared to estimated energy needs >1 month, muscle wasting present to clavicle/temples, and unintentional weight loss >20% over the last year; currently treated with oral diet, pt declines oral nutrition supplements at this time.    BMI Findings:           Body mass index is 20.79 kg/m².       VTE Pharmacologic Prophylaxis: VTE Score: 10 High Risk (Score >/= 5) - Pharmacological DVT Prophylaxis Ordered: rivaroxaban (Xarelto). Sequential Compression Devices Ordered.    Mobility:   Basic Mobility Inpatient Raw Score: 14  -[facility] Goal: 4: Move to chair/commode  -[facility] Achieved: 4: Move to chair/commode      Patient Centered Rounds:    Discussions with Specialists or Other Care Team Provider:     Education and Discussions with Family / Patient: Will update daughter.     Current Length of Stay: 4 day(s)  Current Patient Status: Inpatient   Certification Statement:   Discharge Plan: Anticipate discharge in 24-48 hrs to rehab facility.    Code Status: Level 1 - Full Code    Subjective   Patient seen and examined at bedside this morning.  Patient comfortably laying in bed not in any acute distress patient still endorses some pain in her right leg but overall reports feeling better.  All questions concerns answered at bedside this morning    Objective :  Temp:  [97.8 °F (36.6 °C)-98 °F (36.7 °C)] 97.8 °F (36.6 °C)  HR:  [53-63] 63  BP: (110-129)/(57-90)  110/90  Resp:  [17-18] 18  SpO2:  [95 %-100 %] 98 %  O2 Device: Nasal cannula  Nasal Cannula O2 Flow Rate (L/min):  [2 L/min] 2 L/min    Body mass index is 20.79 kg/m².     Input and Output Summary (last 24 hours):     Intake/Output Summary (Last 24 hours) at 5/31/2025 1415  Last data filed at 5/31/2025 0500  Gross per 24 hour   Intake 240 ml   Output 1359 ml   Net -1119 ml       Physical Exam  Vitals and nursing note reviewed.   Constitutional:       Appearance: Normal appearance.   HENT:      Head: Normocephalic and atraumatic.      Mouth/Throat:      Mouth: Mucous membranes are moist.     Eyes:      Extraocular Movements: Extraocular movements intact.      Conjunctiva/sclera: Conjunctivae normal.      Pupils: Pupils are equal, round, and reactive to light.       Cardiovascular:      Rate and Rhythm: Normal rate and regular rhythm.   Pulmonary:      Effort: Pulmonary effort is normal. No respiratory distress.      Breath sounds: Rales (bases, improving) present.   Abdominal:      General: Bowel sounds are normal. There is no distension.      Palpations: Abdomen is soft.      Tenderness: There is no abdominal tenderness. There is no guarding.     Musculoskeletal:      Right lower leg: Edema present.      Left lower leg: Edema present.      Comments: Edema improving. Mildly TTP  Bruising to posterior right thigh/popliteal area as well as posterior calf and ankle noted     Skin:     General: Skin is warm and dry.     Neurological:      General: No focal deficit present.      Mental Status: She is alert. Mental status is at baseline.     Psychiatric:         Mood and Affect: Mood normal.           Lines/Drains:        Telemetry:  Telemetry Orders (From admission, onward)               24 Hour Telemetry Monitoring  Continuous x 24 Hours (Telem)        Expiring   Question:  Reason for 24 Hour Telemetry  Answer:  Arrhythmias requiring acute medical intervention / PPM or ICD malfunction                     Telemetry  Reviewed:   Indication for Continued Telemetry Use:                Lab Results: I have reviewed the following results:   Results from last 7 days   Lab Units 05/31/25  0456 05/28/25  0505 05/27/25  0834   WBC Thousand/uL 5.70   < > 5.74   HEMOGLOBIN g/dL 7.9*   < > 8.8*   HEMATOCRIT % 26.1*   < > 27.5*   PLATELETS Thousands/uL 205   < > 196   SEGS PCT %  --   --  75   LYMPHO PCT %  --   --  14   MONO PCT %  --   --  10   EOS PCT %  --   --  1    < > = values in this interval not displayed.     Results from last 7 days   Lab Units 05/31/25  0456 05/29/25  0447 05/28/25  0505   SODIUM mmol/L 140   < > 132*   POTASSIUM mmol/L 3.4*   < > 3.4*   CHLORIDE mmol/L 94*   < > 87*   CO2 mmol/L 41*   < > 38*   BUN mg/dL 63*   < > 60*   CREATININE mg/dL 1.42*   < > 1.42*   ANION GAP mmol/L 5   < > 7   CALCIUM mg/dL 8.9   < > 9.0   ALBUMIN g/dL  --   --  3.3*   TOTAL BILIRUBIN mg/dL  --   --  0.83   ALK PHOS U/L  --   --  47   ALT U/L  --   --  22   AST U/L  --   --  67*   GLUCOSE RANDOM mg/dL 209*   < > 129    < > = values in this interval not displayed.     Results from last 7 days   Lab Units 05/27/25  2307   INR  1.75*     Results from last 7 days   Lab Units 05/31/25  1120 05/31/25  0757 05/30/25  2149 05/30/25  1556 05/30/25  1058 05/30/25  0704 05/29/25  2148 05/29/25  1642 05/29/25  1127 05/29/25  0715 05/28/25 2058 05/28/25  1529   POC GLUCOSE mg/dl 373* 204* 301* 240* 245* 174* 205* 241* 297* 177* 199* 294*               Recent Cultures (last 7 days):               Last 24 Hours Medication List:     Current Facility-Administered Medications:     acetaminophen (TYLENOL) tablet 650 mg, Q6H    albuterol inhalation solution 2.5 mg, Q4H PRN    atorvastatin (LIPITOR) tablet 40 mg, Daily    budesonide (PULMICORT) inhalation solution 0.5 mg, Q12H    bumetanide (BUMEX) injection 4 mg, BID    [Held by provider] bumetanide (BUMEX) tablet 3 mg, BID    [Held by provider] diltiazem (CARDIZEM CD) 24 hr capsule 120 mg, Daily     donepezil (ARICEPT) tablet 5 mg, HS    escitalopram (LEXAPRO) tablet 5 mg, Daily    insulin lispro (HumALOG/ADMELOG) 100 units/mL subcutaneous injection 1-6 Units, TID AC **AND** Fingerstick Glucose (POCT), 4x Daily AC and at bedtime    metoprolol succinate (TOPROL-XL) 24 hr tablet 50 mg, BID    potassium chloride (Klor-Con M20) CR tablet 20 mEq, BID    potassium chloride 20 mEq IVPB (premix), Q2H, Last Rate: 20 mEq (05/31/25 1216)    rivaroxaban (XARELTO) tablet 15 mg, Daily With Breakfast    Administrative Statements   Today, Patient Was Seen By: Maurizio Patel MD      **Please Note: This note may have been constructed using a voice recognition system.**

## 2025-05-31 NOTE — ASSESSMENT & PLAN NOTE
Recent Labs     05/29/25  0447 05/30/25  0510 05/31/25  0456   CREATININE 1.43* 1.42* 1.42*   EGFR 33 34 34     Estimated Creatinine Clearance: 22.1 mL/min (A) (by C-G formula based on SCr of 1.42 mg/dL (H)).    Baseline creatinine 1-1.3  FEDE likely secondary to fluid overload, with etiology of likely medication noncompliance.  Avoid nephrotoxic agents, held donepezil.  Continue to trend  IV Bumex 4 mg twice daily.

## 2025-05-31 NOTE — ASSESSMENT & PLAN NOTE
Recent Labs     05/29/25  0447 05/31/25  0456   HGB 8.0* 7.9*     Lab Results   Component Value Date    IRON 30 (L) 05/28/2025    FERRITIN 112 05/28/2025    TIBC 292.6 05/28/2025    CONCFE 10 (L) 05/28/2025     Patient with hemoglobin 8.8 on admission, last hemoglobin 10.4 in 04/2025.  Patient with ruptured popliteal cyst, no other clinical evidence of bleeding.  Folate and B12 normal  Iron studies as above  S/p IV Venofer 300 mg x 1    Continue to monitor hemoglobin  Transfuse if <7

## 2025-05-31 NOTE — ASSESSMENT & PLAN NOTE
Lab Results   Component Value Date    HGBA1C 6.5 04/15/2025       Recent Labs     05/30/25  1556 05/30/25  2149 05/31/25  0757 05/31/25  1120   POCGLU 240* 301* 204* 373*     Recent Labs     05/30/25  2149 05/31/25  0456 05/31/25  0757 05/31/25  1120   POCGLU 301*  --  204* 373*   GLUC  --  209*  --   --      Hold home regimen while inpatient and resume on discharge: Metformin 500 mg twice daily, glipizide 10 mg daily.  Diabetic diet  Insulin regimen  SSI while inpatient  Goal -180 while admitted, adjusting insulin regimen as appropriate  Monitor for hypoglycemia and treat per protocol

## 2025-05-31 NOTE — ASSESSMENT & PLAN NOTE
Patient presented with right lower extremity swelling and bruising x 1 week.  X-ray of right knee and right ankle showed chronic degenerative changes.  Duplex ultrasound bilateral showed right lower extremity Baker's cyst, no DVT bilaterally found.  Patient with positive crescent sign on physical exam.  Likely ruptured Baker's cyst.  PT OT level 2 will likely discharge to [facility]    Conservative measures at the moment.  Rest, limb elevation, compression and analgesia as needed.

## 2025-06-01 LAB
ANION GAP SERPL CALCULATED.3IONS-SCNC: 6 MMOL/L (ref 4–13)
BASOPHILS # BLD AUTO: 0.02 THOUSANDS/ÂΜL (ref 0–0.1)
BASOPHILS NFR BLD AUTO: 0 % (ref 0–1)
BUN SERPL-MCNC: 70 MG/DL (ref 5–25)
CALCIUM SERPL-MCNC: 9.1 MG/DL (ref 8.4–10.2)
CHLORIDE SERPL-SCNC: 96 MMOL/L (ref 96–108)
CO2 SERPL-SCNC: 40 MMOL/L (ref 21–32)
CREAT SERPL-MCNC: 1.64 MG/DL (ref 0.6–1.3)
EOSINOPHIL # BLD AUTO: 0.07 THOUSAND/ÂΜL (ref 0–0.61)
EOSINOPHIL NFR BLD AUTO: 1 % (ref 0–6)
ERYTHROCYTE [DISTWIDTH] IN BLOOD BY AUTOMATED COUNT: 16.6 % (ref 11.6–15.1)
GFR SERPL CREATININE-BSD FRML MDRD: 28 ML/MIN/1.73SQ M
GLUCOSE SERPL-MCNC: 143 MG/DL (ref 65–140)
GLUCOSE SERPL-MCNC: 215 MG/DL (ref 65–140)
GLUCOSE SERPL-MCNC: 242 MG/DL (ref 65–140)
GLUCOSE SERPL-MCNC: 261 MG/DL (ref 65–140)
GLUCOSE SERPL-MCNC: 397 MG/DL (ref 65–140)
HCT VFR BLD AUTO: 26.8 % (ref 34.8–46.1)
HGB BLD-MCNC: 8.2 G/DL (ref 11.5–15.4)
IMM GRANULOCYTES # BLD AUTO: 0.02 THOUSAND/UL (ref 0–0.2)
IMM GRANULOCYTES NFR BLD AUTO: 0 % (ref 0–2)
LYMPHOCYTES # BLD AUTO: 0.85 THOUSANDS/ÂΜL (ref 0.6–4.47)
LYMPHOCYTES NFR BLD AUTO: 14 % (ref 14–44)
MCH RBC QN AUTO: 29 PG (ref 26.8–34.3)
MCHC RBC AUTO-ENTMCNC: 30.6 G/DL (ref 31.4–37.4)
MCV RBC AUTO: 95 FL (ref 82–98)
MONOCYTES # BLD AUTO: 0.6 THOUSAND/ÂΜL (ref 0.17–1.22)
MONOCYTES NFR BLD AUTO: 10 % (ref 4–12)
NEUTROPHILS # BLD AUTO: 4.53 THOUSANDS/ÂΜL (ref 1.85–7.62)
NEUTS SEG NFR BLD AUTO: 75 % (ref 43–75)
NRBC BLD AUTO-RTO: 0 /100 WBCS
PLATELET # BLD AUTO: 241 THOUSANDS/UL (ref 149–390)
PMV BLD AUTO: 10.4 FL (ref 8.9–12.7)
POTASSIUM SERPL-SCNC: 4.3 MMOL/L (ref 3.5–5.3)
RBC # BLD AUTO: 2.83 MILLION/UL (ref 3.81–5.12)
SODIUM SERPL-SCNC: 142 MMOL/L (ref 135–147)
WBC # BLD AUTO: 6.09 THOUSAND/UL (ref 4.31–10.16)

## 2025-06-01 PROCEDURE — 99232 SBSQ HOSP IP/OBS MODERATE 35: CPT | Performed by: INTERNAL MEDICINE

## 2025-06-01 PROCEDURE — 94760 N-INVAS EAR/PLS OXIMETRY 1: CPT

## 2025-06-01 PROCEDURE — 82948 REAGENT STRIP/BLOOD GLUCOSE: CPT

## 2025-06-01 PROCEDURE — 94640 AIRWAY INHALATION TREATMENT: CPT

## 2025-06-01 PROCEDURE — 85025 COMPLETE CBC W/AUTO DIFF WBC: CPT

## 2025-06-01 PROCEDURE — 80048 BASIC METABOLIC PNL TOTAL CA: CPT

## 2025-06-01 RX ORDER — POLYETHYLENE GLYCOL 3350 17 G/17G
17 POWDER, FOR SOLUTION ORAL DAILY
Status: DISCONTINUED | OUTPATIENT
Start: 2025-06-01 | End: 2025-06-02 | Stop reason: HOSPADM

## 2025-06-01 RX ORDER — AMOXICILLIN 250 MG
2 CAPSULE ORAL 2 TIMES DAILY
Status: DISCONTINUED | OUTPATIENT
Start: 2025-06-01 | End: 2025-06-02 | Stop reason: HOSPADM

## 2025-06-01 RX ORDER — GLIPIZIDE 5 MG/1
5 TABLET ORAL
Status: DISCONTINUED | OUTPATIENT
Start: 2025-06-02 | End: 2025-06-02 | Stop reason: HOSPADM

## 2025-06-01 RX ADMIN — RIVAROXABAN 15 MG: 15 TABLET, FILM COATED ORAL at 08:30

## 2025-06-01 RX ADMIN — POLYETHYLENE GLYCOL 3350 17 G: 17 POWDER, FOR SOLUTION ORAL at 14:08

## 2025-06-01 RX ADMIN — INSULIN LISPRO 3 UNITS: 100 INJECTION, SOLUTION INTRAVENOUS; SUBCUTANEOUS at 17:54

## 2025-06-01 RX ADMIN — POTASSIUM CHLORIDE 20 MEQ: 1500 TABLET, EXTENDED RELEASE ORAL at 17:52

## 2025-06-01 RX ADMIN — ACETAMINOPHEN 650 MG: 325 TABLET ORAL at 08:30

## 2025-06-01 RX ADMIN — INSULIN LISPRO 3 UNITS: 100 INJECTION, SOLUTION INTRAVENOUS; SUBCUTANEOUS at 08:31

## 2025-06-01 RX ADMIN — SENNOSIDES AND DOCUSATE SODIUM 2 TABLET: 50; 8.6 TABLET ORAL at 14:08

## 2025-06-01 RX ADMIN — METOPROLOL SUCCINATE 50 MG: 50 TABLET, EXTENDED RELEASE ORAL at 08:30

## 2025-06-01 RX ADMIN — INSULIN LISPRO 6 UNITS: 100 INJECTION, SOLUTION INTRAVENOUS; SUBCUTANEOUS at 12:16

## 2025-06-01 RX ADMIN — BUMETANIDE 4 MG: 0.25 INJECTION INTRAMUSCULAR; INTRAVENOUS at 08:30

## 2025-06-01 RX ADMIN — BUDESONIDE 0.5 MG: 0.5 INHALANT RESPIRATORY (INHALATION) at 19:51

## 2025-06-01 RX ADMIN — METOPROLOL SUCCINATE 50 MG: 50 TABLET, EXTENDED RELEASE ORAL at 17:54

## 2025-06-01 RX ADMIN — ACETAMINOPHEN 650 MG: 325 TABLET ORAL at 20:57

## 2025-06-01 RX ADMIN — ACETAMINOPHEN 650 MG: 325 TABLET ORAL at 14:08

## 2025-06-01 RX ADMIN — DONEPEZIL HYDROCHLORIDE 5 MG: 5 TABLET ORAL at 20:57

## 2025-06-01 RX ADMIN — POTASSIUM CHLORIDE 20 MEQ: 1500 TABLET, EXTENDED RELEASE ORAL at 08:31

## 2025-06-01 RX ADMIN — ESCITALOPRAM OXALATE 5 MG: 10 TABLET ORAL at 08:30

## 2025-06-01 RX ADMIN — BUDESONIDE 0.5 MG: 0.5 INHALANT RESPIRATORY (INHALATION) at 07:14

## 2025-06-01 RX ADMIN — ATORVASTATIN CALCIUM 40 MG: 40 TABLET, FILM COATED ORAL at 08:31

## 2025-06-01 NOTE — ASSESSMENT & PLAN NOTE
Wt Readings from Last 3 Encounters:   06/01/25 57.7 kg (127 lb 3.3 oz)   05/13/25 63 kg (139 lb)   05/13/25 62.7 kg (138 lb 4.8 oz)     Patient with history of heart failure with preserved ejection fraction.  Appears to be in acute exacerbation.  Follows with cardiology outpatient, On Bumex 3 mg twice daily.  Documented history of patient missing medication dosages/forgetting to take them.  Uses 2 L of oxygen on baseline secondary to COPD.    Last echo 10/2024: LVEF 65%, systolic function normal.  Increased right ventricular dilation compared to previous.  CXR small bilateral pleural effusions, cardiac megaly and hilar fullness    D/c IV Bumex 4 mg twice daily  Resume PO bumex 3 mg BID tomorrow  Monitor I/O  BMP in the a.m.  Daily weights  Potential discharge tomorrow to [facility]

## 2025-06-01 NOTE — ASSESSMENT & PLAN NOTE
Recent Labs     05/31/25  0456 06/01/25  0513   HGB 7.9* 8.2*     Lab Results   Component Value Date    IRON 30 (L) 05/28/2025    FERRITIN 112 05/28/2025    TIBC 292.6 05/28/2025    CONCFE 10 (L) 05/28/2025     Patient with hemoglobin 8.8 on admission, last hemoglobin 10.4 in 04/2025.  Patient with ruptured popliteal cyst, no other clinical evidence of bleeding.  Folate and B12 normal  Iron studies as above  S/p IV Venofer 300 mg x 1    Continue to monitor hemoglobin  Transfuse if <7

## 2025-06-01 NOTE — CASE MANAGEMENT
Case Management Progress Note    Patient name Camryn Roblero  Location S /S -01 MRN [id number]  : 1940 Date 2025       LOS (days): 5  Geometric Mean LOS (GMLOS) (days): 3.9  Days to GMLOS:-0.7        OBJECTIVE:        Current admission status: Inpatient  Preferred Pharmacy:   [facility] [address]  [address] [address]  Phone: [phone] Fax: [phone]    [facility] [address] [place] [id number]  Phone: [phone] Fax: [phone]    Primary Care Provider: Abiel Seals MD    Primary Insurance: MEDICARE  Secondary Insurance: A[employer]    PROGRESS NOTE:    Cm advised patient anticipated to be clear for dc tomorrow to [facility]. Cm confirmed bed availability tomorrow at this time for patient. Provider updated.

## 2025-06-01 NOTE — PROGRESS NOTES
Progress Note - Hospitalist   Name: Camryn Roblero 84 y.o. female I MRN: [id number]  Unit/Bed#: S -01 I Date of Admission: 5/27/2025   Date of Service: 6/1/2025 I Hospital Day: 5     Assessment & Plan  Chronic heart failure with preserved ejection fraction (HCC)  Wt Readings from Last 3 Encounters:   06/01/25 57.7 kg (127 lb 3.3 oz)   05/13/25 63 kg (139 lb)   05/13/25 62.7 kg (138 lb 4.8 oz)     Patient with history of heart failure with preserved ejection fraction.  Appears to be in acute exacerbation.  Follows with cardiology outpatient, On Bumex 3 mg twice daily.  Documented history of patient missing medication dosages/forgetting to take them.  Uses 2 L of oxygen on baseline secondary to COPD.    Last echo 10/2024: LVEF 65%, systolic function normal.  Increased right ventricular dilation compared to previous.  CXR small bilateral pleural effusions, cardiac megaly and hilar fullness    D/c IV Bumex 4 mg twice daily  Resume PO bumex 3 mg BID tomorrow  Monitor I/O  BMP in the a.m.  Daily weights  Potential discharge tomorrow to [facility]  Right leg pain  Patient presented with right lower extremity swelling and bruising x 1 week.  X-ray of right knee and right ankle showed chronic degenerative changes.  Duplex ultrasound bilateral showed right lower extremity Baker's cyst, no DVT bilaterally found.  Patient with positive crescent sign on physical exam.  Likely ruptured Baker's cyst.  PT OT level 2 will likely discharge to [facility]    Conservative measures at the moment.  Rest, limb elevation, compression and analgesia as needed.    FEDE (acute kidney injury) (HCC)  Recent Labs     05/30/25  0510 05/31/25  0456 06/01/25  0513   CREATININE 1.42* 1.42* 1.64*   EGFR 34 34 28     Estimated Creatinine Clearance: 20 mL/min (A) (by C-G formula based on SCr of 1.64 mg/dL (H)).    Baseline creatinine 1-1.3  FEDE likely secondary to fluid overload, with etiology of likely medication  noncompliance.  Avoid nephrotoxic agents, held donepezil.  Continue to trend  D/c Bumex 4 mg twice daily.  Resume home bumex as above  Anemia  Recent Labs     05/31/25  0456 06/01/25  0513   HGB 7.9* 8.2*     Lab Results   Component Value Date    IRON 30 (L) 05/28/2025    FERRITIN 112 05/28/2025    TIBC 292.6 05/28/2025    CONCFE 10 (L) 05/28/2025     Patient with hemoglobin 8.8 on admission, last hemoglobin 10.4 in 04/2025.  Patient with ruptured popliteal cyst, no other clinical evidence of bleeding.  Folate and B12 normal  Iron studies as above  S/p IV Venofer 300 mg x 1    Continue to monitor hemoglobin  Transfuse if <7  Paroxysmal atrial fibrillation (HCC)  Patient with a history of atrial fibrillation on Xarelto and metoprolol.  Patient appears to have chronic bradycardia, also documented in last cardiologist note.  Still continuing with metoprolol and Cardizem.  Continue home Xarelto and metoprolol medications.  Hyperlipidemia  Patient on Lipitor 40 mg daily  Continue prior to admission medications  Type 2 diabetes mellitus, without long-term current use of insulin (HCC)  Lab Results   Component Value Date    HGBA1C 6.5 04/15/2025       Recent Labs     05/31/25  1513 05/31/25  2115 06/01/25  0743 06/01/25  1127   POCGLU 273* 259* 242* 397*     Recent Labs     05/31/25  2115 06/01/25  0513 06/01/25  0743 06/01/25  1127   POCGLU 259*  --  242* 397*   GLUC  --  215*  --   --      Hold home regimen while inpatient and resume on discharge: Metformin 500 mg twice daily, glipizide 5 mg daily.  Diabetic diet  Resume home glipizide  Insulin regimen  SSI while inpatient  Goal -180 while admitted, adjusting insulin regimen as appropriate  Monitor for hypoglycemia and treat per protocol     Essential hypertension  Patient follows with cardiologist outpatient.  On Toprol 50 mg twice daily and Cardizem 120 mg daily.  Patient appears to have chronic bradycardia, also documented in last cardiologist note.  Still  continuing with metoprolol and Cardizem.  Continue PTA Cardizem and Toprol.  Impaired memory  Patient with history of impaired memory.  Lives independently and manages own medications.  Question of medication noncompliance due to forgetfulness.  Takes Aricept at home.  On Aricpet    Moderate protein-calorie malnutrition (HCC)  Malnutrition Findings:   Adult Malnutrition type: Chronic illness  Adult Degree of Malnutrition: Malnutrition of moderate degree  Malnutrition Characteristics: Inadequate energy, Weight loss, Muscle loss                360 Statement: Protein calorie malnutrition r/t inadequate intake as evidenced by <75% energy intake compared to estimated energy needs >1 month, muscle wasting present to clavicle/temples, and unintentional weight loss >20% over the last year; currently treated with oral diet, pt declines oral nutrition supplements at this time.    BMI Findings:           Body mass index is 25.69 kg/m².       VTE Pharmacologic Prophylaxis: VTE Score: 10 High Risk (Score >/= 5) - Pharmacological DVT Prophylaxis Ordered: rivaroxaban (Xarelto). Sequential Compression Devices Ordered.    Mobility:   Basic Mobility Inpatient Raw Score: 14  -[facility] Goal: 4: Move to chair/commode  -[facility] Achieved: 4: Move to chair/commode      Patient Centered Rounds:    Discussions with Specialists or Other Care Team Provider:     Education and Discussions with Family / Patient: Updated  (daughter) via phone.    Current Length of Stay: 5 day(s)  Current Patient Status: Inpatient   Certification Statement:   Discharge Plan: Anticipate discharge tomorrow to rehab facility.    Code Status: Level 1 - Full Code    Subjective   Patient seen and examined at bedside this morning.  Denies any shortness of breath at rest reports some shortness of breath when transferring or moving herself within the bed.  Patient is eating well and tolerating p.o.  States right leg pain is much better today      Objective :  Temp:   [97.3 °F (36.3 °C)-98.1 °F (36.7 °C)] 98.1 °F (36.7 °C)  HR:  [59-69] 69  BP: (125-152)/(59-70) 128/59  Resp:  [20] 20  SpO2:  [90 %-97 %] 96 %  O2 Device: Nasal cannula  Nasal Cannula O2 Flow Rate (L/min):  [2 L/min] 2 L/min    Body mass index is 25.69 kg/m².     Input and Output Summary (last 24 hours):     Intake/Output Summary (Last 24 hours) at 6/1/2025 1255  Last data filed at 5/31/2025 2101  Gross per 24 hour   Intake --   Output 289 ml   Net -289 ml       Physical Exam  Vitals and nursing note reviewed.   Constitutional:       Appearance: Normal appearance.   HENT:      Head: Normocephalic and atraumatic.      Mouth/Throat:      Mouth: Mucous membranes are moist.     Eyes:      Extraocular Movements: Extraocular movements intact.      Conjunctiva/sclera: Conjunctivae normal.      Pupils: Pupils are equal, round, and reactive to light.       Cardiovascular:      Rate and Rhythm: Normal rate and regular rhythm.   Pulmonary:      Effort: Pulmonary effort is normal. No respiratory distress.      Breath sounds: No wheezing or rales.   Abdominal:      General: Bowel sounds are normal. There is no distension.      Palpations: Abdomen is soft.      Tenderness: There is no abdominal tenderness. There is no guarding.     Musculoskeletal:      Right lower leg: Edema present.      Left lower leg: Edema present.      Comments: Edema improving. Mildly TTP  Skin wrinkling noted  Bruising to posterior right thigh/popliteal area as well as posterior calf and ankle noted     Skin:     General: Skin is warm and dry.     Neurological:      General: No focal deficit present.      Mental Status: She is alert. Mental status is at baseline.     Psychiatric:         Mood and Affect: Mood normal.           Lines/Drains:    Telemetry:  Telemetry Orders (From admission, onward)               24 Hour Telemetry Monitoring  Continuous x 24 Hours (Telem)        Expiring   Question:  Reason for 24 Hour Telemetry  Answer:  Arrhythmias  requiring acute medical intervention / PPM or ICD malfunction                     Telemetry Reviewed:   Indication for Continued Telemetry Use:                Lab Results: I have reviewed the following results:   Results from last 7 days   Lab Units 06/01/25  0513   WBC Thousand/uL 6.09   HEMOGLOBIN g/dL 8.2*   HEMATOCRIT % 26.8*   PLATELETS Thousands/uL 241   SEGS PCT % 75   LYMPHO PCT % 14   MONO PCT % 10   EOS PCT % 1     Results from last 7 days   Lab Units 06/01/25  0513 05/29/25  0447 05/28/25  0505   SODIUM mmol/L 142   < > 132*   POTASSIUM mmol/L 4.3   < > 3.4*   CHLORIDE mmol/L 96   < > 87*   CO2 mmol/L 40*   < > 38*   BUN mg/dL 70*   < > 60*   CREATININE mg/dL 1.64*   < > 1.42*   ANION GAP mmol/L 6   < > 7   CALCIUM mg/dL 9.1   < > 9.0   ALBUMIN g/dL  --   --  3.3*   TOTAL BILIRUBIN mg/dL  --   --  0.83   ALK PHOS U/L  --   --  47   ALT U/L  --   --  22   AST U/L  --   --  67*   GLUCOSE RANDOM mg/dL 215*   < > 129    < > = values in this interval not displayed.     Results from last 7 days   Lab Units 05/27/25  2307   INR  1.75*     Results from last 7 days   Lab Units 06/01/25  1127 06/01/25  0743 05/31/25  2115 05/31/25  1513 05/31/25  1120 05/31/25  0757 05/30/25  2149 05/30/25  1556 05/30/25  1058 05/30/25  0704 05/29/25  2148 05/29/25  1642   POC GLUCOSE mg/dl 397* 242* 259* 273* 373* 204* 301* 240* 245* 174* 205* 241*               Recent Cultures (last 7 days):               Last 24 Hours Medication List:     Current Facility-Administered Medications:     acetaminophen (TYLENOL) tablet 650 mg, Q6H    albuterol inhalation solution 2.5 mg, Q4H PRN    atorvastatin (LIPITOR) tablet 40 mg, Daily    budesonide (PULMICORT) inhalation solution 0.5 mg, Q12H    bumetanide (BUMEX) tablet 3 mg, BID    [Held by provider] diltiazem (CARDIZEM CD) 24 hr capsule 120 mg, Daily    donepezil (ARICEPT) tablet 5 mg, HS    escitalopram (LEXAPRO) tablet 5 mg, Daily    [START ON 6/2/2025] glipiZIDE (GLUCOTROL) tablet 5 mg,  Daily Before Breakfast    insulin lispro (HumALOG/ADMELOG) 100 units/mL subcutaneous injection 1-6 Units, TID AC **AND** Fingerstick Glucose (POCT), 4x Daily AC and at bedtime    metoprolol succinate (TOPROL-XL) 24 hr tablet 50 mg, BID    potassium chloride (Klor-Con M20) CR tablet 20 mEq, BID    rivaroxaban (XARELTO) tablet 15 mg, Daily With Breakfast    Administrative Statements   Today, Patient Was Seen By: Piedad Mcmanus MD      **Please Note: This note may have been constructed using a voice recognition system.**

## 2025-06-01 NOTE — ASSESSMENT & PLAN NOTE
Patient with history of impaired memory.  Lives independently and manages own medications.  Question of medication noncompliance due to forgetfulness.  Takes Aricept at home.  On Aricpet

## 2025-06-01 NOTE — ASSESSMENT & PLAN NOTE
Patient presented with right lower extremity swelling and bruising x 1 week.  X-ray of right knee and right ankle showed chronic degenerative changes.  Duplex ultrasound bilateral showed right lower extremity Baker's cyst, no DVT bilaterally found.  Patient with positive crescent sign on physical exam.  Likely ruptured Baker's cyst.  PT OT level 2 will likely discharge to [facility]    Conservative measures at the moment.  Rest, limb elevation, compression and analgesia as needed.

## 2025-06-01 NOTE — ASSESSMENT & PLAN NOTE
Recent Labs     05/30/25  0510 05/31/25  0456 06/01/25  0513   CREATININE 1.42* 1.42* 1.64*   EGFR 34 34 28     Estimated Creatinine Clearance: 20 mL/min (A) (by C-G formula based on SCr of 1.64 mg/dL (H)).    Baseline creatinine 1-1.3  FEDE likely secondary to fluid overload, with etiology of likely medication noncompliance.  Avoid nephrotoxic agents, held donepezil.  Continue to trend  D/c Bumex 4 mg twice daily.  Resume home bumex as above

## 2025-06-01 NOTE — ASSESSMENT & PLAN NOTE
Lab Results   Component Value Date    HGBA1C 6.5 04/15/2025       Recent Labs     05/31/25  1513 05/31/25  2115 06/01/25  0743 06/01/25  1127   POCGLU 273* 259* 242* 397*     Recent Labs     05/31/25 2115 06/01/25  0513 06/01/25  0743 06/01/25  1127   POCGLU 259*  --  242* 397*   GLUC  --  215*  --   --      Hold home regimen while inpatient and resume on discharge: Metformin 500 mg twice daily, glipizide 5 mg daily.  Diabetic diet  Resume home glipizide  Insulin regimen  SSI while inpatient  Goal -180 while admitted, adjusting insulin regimen as appropriate  Monitor for hypoglycemia and treat per protocol

## 2025-06-01 NOTE — ASSESSMENT & PLAN NOTE
Malnutrition Findings:   Adult Malnutrition type: Chronic illness  Adult Degree of Malnutrition: Malnutrition of moderate degree  Malnutrition Characteristics: Inadequate energy, Weight loss, Muscle loss                360 Statement: Protein calorie malnutrition r/t inadequate intake as evidenced by <75% energy intake compared to estimated energy needs >1 month, muscle wasting present to clavicle/temples, and unintentional weight loss >20% over the last year; currently treated with oral diet, pt declines oral nutrition supplements at this time.    BMI Findings:           Body mass index is 25.69 kg/m².

## 2025-06-02 VITALS
WEIGHT: 130.2 LBS | SYSTOLIC BLOOD PRESSURE: 150 MMHG | BODY MASS INDEX: 26.3 KG/M2 | RESPIRATION RATE: 16 BRPM | OXYGEN SATURATION: 100 % | TEMPERATURE: 98.1 F | DIASTOLIC BLOOD PRESSURE: 67 MMHG | HEART RATE: 52 BPM

## 2025-06-02 PROBLEM — I50.33 ACUTE ON CHRONIC HEART FAILURE WITH PRESERVED EJECTION FRACTION (HCC): Status: ACTIVE | Noted: 2020-07-21

## 2025-06-02 LAB
ANION GAP SERPL CALCULATED.3IONS-SCNC: 6 MMOL/L (ref 4–13)
ATRIAL RATE: 51 BPM
ATRIAL RATE: 52 BPM
BUN SERPL-MCNC: 79 MG/DL (ref 5–25)
CALCIUM SERPL-MCNC: 9.2 MG/DL (ref 8.4–10.2)
CHLORIDE SERPL-SCNC: 97 MMOL/L (ref 96–108)
CO2 SERPL-SCNC: 37 MMOL/L (ref 21–32)
CREAT SERPL-MCNC: 1.68 MG/DL (ref 0.6–1.3)
ERYTHROCYTE [DISTWIDTH] IN BLOOD BY AUTOMATED COUNT: 17 % (ref 11.6–15.1)
GFR SERPL CREATININE-BSD FRML MDRD: 27 ML/MIN/1.73SQ M
GLUCOSE SERPL-MCNC: 197 MG/DL (ref 65–140)
GLUCOSE SERPL-MCNC: 199 MG/DL (ref 65–140)
GLUCOSE SERPL-MCNC: 280 MG/DL (ref 65–140)
HCT VFR BLD AUTO: 27.3 % (ref 34.8–46.1)
HGB BLD-MCNC: 8.6 G/DL (ref 11.5–15.4)
MCH RBC QN AUTO: 29.9 PG (ref 26.8–34.3)
MCHC RBC AUTO-ENTMCNC: 31.5 G/DL (ref 31.4–37.4)
MCV RBC AUTO: 95 FL (ref 82–98)
P AXIS: 55 DEGREES
P AXIS: 57 DEGREES
PLATELET # BLD AUTO: 274 THOUSANDS/UL (ref 149–390)
PMV BLD AUTO: 10.1 FL (ref 8.9–12.7)
POTASSIUM SERPL-SCNC: 5.3 MMOL/L (ref 3.5–5.3)
PR INTERVAL: 174 MS
PR INTERVAL: 178 MS
QRS AXIS: 70 DEGREES
QRS AXIS: 73 DEGREES
QRSD INTERVAL: 126 MS
QRSD INTERVAL: 130 MS
QT INTERVAL: 478 MS
QT INTERVAL: 482 MS
QTC INTERVAL: 444 MS
QTC INTERVAL: 444 MS
RBC # BLD AUTO: 2.88 MILLION/UL (ref 3.81–5.12)
SODIUM SERPL-SCNC: 140 MMOL/L (ref 135–147)
T WAVE AXIS: -84 DEGREES
T WAVE AXIS: 254 DEGREES
VENTRICULAR RATE: 51 BPM
VENTRICULAR RATE: 52 BPM
WBC # BLD AUTO: 6.96 THOUSAND/UL (ref 4.31–10.16)

## 2025-06-02 PROCEDURE — 94640 AIRWAY INHALATION TREATMENT: CPT

## 2025-06-02 PROCEDURE — 93010 ELECTROCARDIOGRAM REPORT: CPT | Performed by: INTERNAL MEDICINE

## 2025-06-02 PROCEDURE — 99239 HOSP IP/OBS DSCHRG MGMT >30: CPT | Performed by: INTERNAL MEDICINE

## 2025-06-02 PROCEDURE — 82948 REAGENT STRIP/BLOOD GLUCOSE: CPT

## 2025-06-02 PROCEDURE — 80048 BASIC METABOLIC PNL TOTAL CA: CPT

## 2025-06-02 PROCEDURE — 85027 COMPLETE CBC AUTOMATED: CPT

## 2025-06-02 PROCEDURE — 94760 N-INVAS EAR/PLS OXIMETRY 1: CPT

## 2025-06-02 RX ORDER — LORATADINE 10 MG/1
10 TABLET ORAL DAILY PRN
Status: ON HOLD
Start: 2025-06-02

## 2025-06-02 RX ADMIN — INSULIN LISPRO 4 UNITS: 100 INJECTION, SOLUTION INTRAVENOUS; SUBCUTANEOUS at 12:18

## 2025-06-02 RX ADMIN — BUDESONIDE 0.5 MG: 0.5 INHALANT RESPIRATORY (INHALATION) at 07:14

## 2025-06-02 RX ADMIN — POTASSIUM CHLORIDE 20 MEQ: 1500 TABLET, EXTENDED RELEASE ORAL at 09:35

## 2025-06-02 RX ADMIN — ESCITALOPRAM OXALATE 5 MG: 10 TABLET ORAL at 09:40

## 2025-06-02 RX ADMIN — ACETAMINOPHEN 650 MG: 325 TABLET ORAL at 09:41

## 2025-06-02 RX ADMIN — RIVAROXABAN 15 MG: 15 TABLET, FILM COATED ORAL at 09:35

## 2025-06-02 RX ADMIN — INSULIN LISPRO 2 UNITS: 100 INJECTION, SOLUTION INTRAVENOUS; SUBCUTANEOUS at 09:35

## 2025-06-02 RX ADMIN — ACETAMINOPHEN 650 MG: 325 TABLET ORAL at 13:20

## 2025-06-02 RX ADMIN — METOPROLOL SUCCINATE 50 MG: 50 TABLET, EXTENDED RELEASE ORAL at 09:35

## 2025-06-02 RX ADMIN — ATORVASTATIN CALCIUM 40 MG: 40 TABLET, FILM COATED ORAL at 09:35

## 2025-06-02 RX ADMIN — GLIPIZIDE 5 MG: 5 TABLET ORAL at 06:01

## 2025-06-02 NOTE — ASSESSMENT & PLAN NOTE
Recent Labs     05/31/25  0456 06/01/25  0513 06/02/25  0514   HGB 7.9* 8.2* 8.6*     Lab Results   Component Value Date    IRON 30 (L) 05/28/2025    FERRITIN 112 05/28/2025    TIBC 292.6 05/28/2025    CONCFE 10 (L) 05/28/2025     Patient with hemoglobin 8.8 on admission, last hemoglobin 10.4 in 04/2025.  Patient with ruptured popliteal cyst, no other clinical evidence of bleeding.  Folate and B12 normal  Iron studies as above  S/p IV Venofer 300 mg x 1

## 2025-06-02 NOTE — ASSESSMENT & PLAN NOTE
Recent Labs     05/31/25  0456 06/01/25  0513 06/02/25  0514   CREATININE 1.42* 1.64* 1.68*   EGFR 34 28 27     Estimated Creatinine Clearance: 21.2 mL/min (A) (by C-G formula based on SCr of 1.68 mg/dL (H)).    Baseline creatinine 1-1.3  FEDE likely secondary to fluid overload, with etiology of likely medication noncompliance.  Avoid nephrotoxic agents, held donepezil.  Continue to trend  Discontinue diuretics

## 2025-06-02 NOTE — ASSESSMENT & PLAN NOTE
Patient with history of impaired memory.  Lives independently and manages own medications.  Question of medication noncompliance due to forgetfulness.  Takes Aricept at home.  Continue Aricpet

## 2025-06-02 NOTE — DISCHARGE INSTR - AVS FIRST PAGE
Dear Camryn Roblero,     It was our pleasure to care for you here at [facility].  It is our hope that we were always able to exceed the expected standards for your care during your stay.  You were hospitalized due to congestive heart failure exacerbation.  You were cared for on the third floor by Piedad Mcmanus MD under the service of Micki Elliott MD with the [facility] who covers for your primary care physician (PCP), Abiel Seals MD, while you were hospitalized.  If you have any questions or concerns related to this hospitalization, you may contact us at .  For follow up as well as any medication refills, we recommend that you follow up with your primary care physician.  A registered nurse will reach out to you by phone within a few days after your discharge to answer any additional questions that you may have after going home.  However, at this time we provide for you here, the most important instructions / recommendations at discharge:     Notable Medication Adjustments -   Hold Bumex 3 mg twice daily until tomorrow 6/3/2025 at which time you are to resume the same dose.  Hold metformin until your creatinine is below 1.5  While metformin is on hold you should use an insulin sliding scale to control your blood glucose levels  Hold diltiazem/Cardizem until otherwise instructed by your provider as your blood pressure and heart rate have been controlled with metoprolol succinate.  Only use Claritin as needed for allergies  No other medication changes were made  Testing Required after Discharge -   Please have a basic metabolic panel repeated in 2-3 days to reevaluate your kidney function  Important follow up information -   Please follow-up with your primary care provider within 1 week of discharge  Other Instructions -   Please weigh yourself daily.  If you gain 3 pounds in 1 day or 5 pounds in 5-7 days take 1 tablet of metolazone 2.5  mg.  Please return to the emergency department or contact your primary care provider if you experience shortness of breath, chest pain, unexpected weight gain.  Please review this entire after visit summary as additional general instructions including medication list, appointments, activity, diet, any pertinent wound care, and other additional recommendations from your care team that may be provided for you.    Sincerely,     Piedad Mcmanus MD

## 2025-06-02 NOTE — ASSESSMENT & PLAN NOTE
Malnutrition Findings:   Adult Malnutrition type: Chronic illness  Adult Degree of Malnutrition: Malnutrition of moderate degree  Malnutrition Characteristics: Inadequate energy, Weight loss, Muscle loss                360 Statement: Protein calorie malnutrition r/t inadequate intake as evidenced by <75% energy intake compared to estimated energy needs >1 month, muscle wasting present to clavicle/temples, and unintentional weight loss >20% over the last year; currently treated with oral diet, pt declines oral nutrition supplements at this time.    BMI Findings:           Body mass index is 26.3 kg/m².

## 2025-06-02 NOTE — ASSESSMENT & PLAN NOTE
Patient presented with right lower extremity swelling and bruising x 1 week.  X-ray of right knee and right ankle showed chronic degenerative changes.  Duplex ultrasound bilateral showed right lower extremity Baker's cyst, no DVT bilaterally found.  Patient with positive crescent sign on physical exam.  Likely ruptured Baker's cyst.  PT OT level 2 will likely discharge to [facility]    Conservative measures at the moment.  Rest, limb elevation, compression and analgesia as needed.

## 2025-06-02 NOTE — ASSESSMENT & PLAN NOTE
Patient follows with cardiologist outpatient.  On Toprol 50 mg twice daily and Cardizem 120 mg daily.  Patient appears to have chronic bradycardia, also documented in last cardiologist note.  Still continuing with metoprolol and Cardizem.  Hold Cardizem  Continue PTA Toprol.

## 2025-06-02 NOTE — ASSESSMENT & PLAN NOTE
Lab Results   Component Value Date    HGBA1C 6.5 04/15/2025       Recent Labs     06/01/25  1620 06/01/25  2105 06/02/25  0728 06/02/25  1104   POCGLU 261* 143* 197* 280*     Recent Labs     06/01/25  2105 06/02/25  0514 06/02/25  0728 06/02/25  1104   POCGLU 143*  --  197* 280*   GLUC  --  199*  --   --      Hold home regimen while inpatient and resume on discharge: Metformin 500 mg twice daily, glipizide 5 mg daily.  Spoke with daughter who states patient glipizide was recently discontinued  Hold metformin until creatinine improves  Patient can use an SSI in the meantime

## 2025-06-02 NOTE — CASE MANAGEMENT
Case Management Discharge Planning Note    Patient name Camryn Roblero  Location S /S -01 MRN [id number]  : 1940 Date 2025       Current Admission Date: 2025  Current Admission Diagnosis:Chronic heart failure with preserved ejection fraction (HCC)   Patient Active Problem List    Diagnosis Date Noted    Moderate protein-calorie malnutrition (HCC) 2025    Right leg pain 2025    Anemia 2025    Change in bowel habits 2025    Renal lesion 2024    HEATHER (generalized anxiety disorder) 2024    FEDE (acute kidney injury) (HCC) 10/31/2024    Constipation 10/29/2024    Ambulatory dysfunction 10/26/2024    Dependence on supplemental oxygen 10/23/2024    Paroxysmal atrial fibrillation (HCC) 10/23/2024    Pancreas cyst 2024    History of colon polyps 2024    Herpes zoster without complication 2024    Leukocytosis 2024    Mild protein-calorie malnutrition (HCC) 2024    Abnormal CT scan 2024    Impaired memory 2024    Allergic drug rash 2024    Elevated troponin 2023    Tachycardia 2023    Atrial flutter (HCC) 2023    Hyperlipidemia 10/21/2023    Seasonal allergic rhinitis 2023    Osteopenia 2022    Chronic pain of both knees 2021    Cataract of both eyes 2021    Multiple pulmonary nodules 10/05/2020    Colon polyps 10/05/2020    TOM (obstructive sleep apnea) 2020    Chronic heart failure with preserved ejection fraction (HCC) 2020    Persistent proteinuria 10/25/2019    Essential hypertension 2016    Type 2 diabetes mellitus, without long-term current use of insulin (HCC) 2016    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    COPD, severe (HCC) 2016      LOS (days): 6  Geometric Mean LOS (GMLOS) (days): 3.9  Days to GMLOS:-1.6     OBJECTIVE:  Risk of Unplanned Readmission Score: 20.94         Current admission status: Inpatient    Preferred Pharmacy:   [facility] - [place], [place] - [address]  [address] [address]  Phone: [phone] Fax: [phone]    [facility]  [address] [address]  Phone: [phone] Fax: [phone]    Primary Care Provider: Abiel Seals MD    Primary Insurance: MEDICARE  Secondary Insurance: AARP    DISCHARGE DETAILS:             CM contacted family/caregiver?: Yes             Contacts  Patient Contacts: delma Akbar  Relationship to Patient:: Family  Contact Method: Phone  Phone Number: [phone]  Reason/Outcome: Continuity of Care, Discharge Planning, Referral, Emergency Contact              Other Referral/Resources/Interventions Provided:  Interventions: Transportation  Referral Comments: Per [facility] - pt medically stable for d/c today. CM f/u w/ [facility] re: same - confirmed. Transport referral placed to [facility], confirmed for 1430 p/u time via [facility] to [facility] today. All parties notified of same.         Treatment Team Recommendation: Short Term Rehab  Discharge Destination Plan:: Short Term Rehab  Transport at Discharge : [facility]        Transported by (Company and Unit #): [facility]  ETA of Transport (Date): 06/02/25  ETA of Transport (Time): 1430                IMM reviewed with patient, patient agrees with discharge determination.  IMM Given (Date):: 06/02/25  IMM Given to:: Patient          Accepting Facility Name, City & State : [facility]  Receiving Facility/Agency Phone Number: [phone]  Facility/Agency Fax Number: [phone]

## 2025-06-02 NOTE — DISCHARGE SUMMARY
Discharge Summary - Hospitalist   Name: Camryn Roblero 84 y.o. female I MRN: [id number]  Unit/Bed#: S -01 I Date of Admission: 5/27/2025   Date of Service: 6/2/2025 I Hospital Day: 6     Assessment & Plan  Acute on chronic heart failure with preserved ejection fraction (HCC)  Wt Readings from Last 3 Encounters:   06/02/25 59.1 kg (130 lb 3.2 oz)   05/13/25 63 kg (139 lb)   05/13/25 62.7 kg (138 lb 4.8 oz)     Patient with history of heart failure with preserved ejection fraction.  Appears to be in acute exacerbation.  Follows with cardiology outpatient, On Bumex 3 mg twice daily.  Documented history of patient missing medication dosages/forgetting to take them.  Uses 2 L of oxygen on baseline secondary to COPD.    Last echo 10/2024: LVEF 65%, systolic function normal.  Increased right ventricular dilation compared to previous.  CXR small bilateral pleural effusions, cardiac megaly and hilar fullness  Patient is to be discharge to [facility]    Continue to hold diuretics  Resume PO bumex 3 mg BID tomorrow  Repeat BMP in 2 days  Monitor I/O  Daily weights  Right leg pain  Patient presented with right lower extremity swelling and bruising x 1 week.  X-ray of right knee and right ankle showed chronic degenerative changes.  Duplex ultrasound bilateral showed right lower extremity Baker's cyst, no DVT bilaterally found.  Patient with positive crescent sign on physical exam.  Likely ruptured Baker's cyst.  PT OT level 2 will likely discharge to [facility]    Conservative measures at the moment.  Rest, limb elevation, compression and analgesia as needed.    FEDE (acute kidney injury) (HCC)  Recent Labs     05/31/25  0456 06/01/25  0513 06/02/25  0514   CREATININE 1.42* 1.64* 1.68*   EGFR 34 28 27     Estimated Creatinine Clearance: 21.2 mL/min (A) (by C-G formula based on SCr of 1.68 mg/dL (H)).    Baseline creatinine 1-1.3  FEDE likely secondary to fluid overload, with etiology of likely  medication noncompliance.  Avoid nephrotoxic agents, held donepezil.  Continue to trend  Discontinue diuretics  Anemia  Recent Labs     05/31/25  0456 06/01/25  0513 06/02/25  0514   HGB 7.9* 8.2* 8.6*     Lab Results   Component Value Date    IRON 30 (L) 05/28/2025    FERRITIN 112 05/28/2025    TIBC 292.6 05/28/2025    CONCFE 10 (L) 05/28/2025     Patient with hemoglobin 8.8 on admission, last hemoglobin 10.4 in 04/2025.  Patient with ruptured popliteal cyst, no other clinical evidence of bleeding.  Folate and B12 normal  Iron studies as above  S/p IV Venofer 300 mg x 1    Paroxysmal atrial fibrillation (HCC)  Patient with a history of atrial fibrillation on Xarelto and metoprolol.  Patient appears to have chronic bradycardia, also documented in last cardiologist note.  Still continuing with metoprolol and Cardizem.  Continue home Xarelto and metoprolol medications.  Hyperlipidemia  Patient on Lipitor 40 mg daily  Continue prior to admission medications  Type 2 diabetes mellitus, without long-term current use of insulin (HCC)  Lab Results   Component Value Date    HGBA1C 6.5 04/15/2025       Recent Labs     06/01/25  1620 06/01/25  2105 06/02/25  0728 06/02/25  1104   POCGLU 261* 143* 197* 280*     Recent Labs     06/01/25  2105 06/02/25  0514 06/02/25  0728 06/02/25  1104   POCGLU 143*  --  197* 280*   GLUC  --  199*  --   --      Hold home regimen while inpatient and resume on discharge: Metformin 500 mg twice daily, glipizide 5 mg daily.  Spoke with daughter who states patient glipizide was recently discontinued  Hold metformin until creatinine improves  Patient can use an SSI in the meantime    Essential hypertension  Patient follows with cardiologist outpatient.  On Toprol 50 mg twice daily and Cardizem 120 mg daily.  Patient appears to have chronic bradycardia, also documented in last cardiologist note.  Still continuing with metoprolol and Cardizem.  Hold Cardizem  Continue PTA Toprol.  Impaired  memory  Patient with history of impaired memory.  Lives independently and manages own medications.  Question of medication noncompliance due to forgetfulness.  Takes Aricept at home.  Continue Aricpet    Moderate protein-calorie malnutrition (HCC)  Malnutrition Findings:   Adult Malnutrition type: Chronic illness  Adult Degree of Malnutrition: Malnutrition of moderate degree  Malnutrition Characteristics: Inadequate energy, Weight loss, Muscle loss                360 Statement: Protein calorie malnutrition r/t inadequate intake as evidenced by <75% energy intake compared to estimated energy needs >1 month, muscle wasting present to clavicle/temples, and unintentional weight loss >20% over the last year; currently treated with oral diet, pt declines oral nutrition supplements at this time.    BMI Findings:           Body mass index is 26.3 kg/m².        Medical Problems       Resolved Problems  Date Reviewed: 5/27/2025          Resolved    Hyponatremia 5/30/2025     Resolved by  Piedad Mcmanus MD        Discharging Physician / Practitioner: Piedad Mcmanus MD  PCP: Abiel Seals MD  Admission Date:   Admission Orders (From admission, onward)       Ordered        05/27/25 2325  INPATIENT ADMISSION  Once                          Discharge Date: 06/02/25    Next Steps for Physician/AP Assuming Care:  Repeat BMP in 2-3 days  Rest of plan see below    Test Results Pending at Discharge (will require follow up):  None    Medication Changes for Discharge & Rationale:   See below  See after visit summary for reconciled discharge medications provided to patient and/or family.     Consultations During Hospital Stay:  None    Procedures Performed:   None    Significant Findings / Test Results:   XR chest portable   Final Result by Fernanda Maldonado MD (05/28 1912)   No acute pulmonary pathology. Small bilateral pleural effusions, cardiomegaly and hilar fullness, as above.            Workstation performed: [id number]              Incidental Findings:   None     Hospital Course:   Camryn Roblero is a 84 y.o. female w/ PMHx  CHF, HTN COPD 2 L O2 on baseline, DM, A-fib on Xarelto who originally presented to the hospital on 5/27/2025 due to 1 month of progressive dyspnea on exertion.  Follows with cardiology as an outpatient documented concerns medication noncompliance due to forgetfulness.    Patient also endorses ambulatory dysfunction with RLE swelling and bruising >1 month.  Saw PCP PTA  who obtained an x-ray ankle/knee that noted degenerative changes.  Ultrasound done bilaterally to rule out DVT but notable for RLE Mora's cyst.      Upon arrival to the ED patient found to have FEDE with creatinine 1.61, BNP 3200, cardiomegaly and vascular congestion on CXR and downtrending hemoglobin 8. patient was admitted for CHF exacerbation and diuresed with Bumex 4 mg twice daily IV.  With initial improvement of creatinine 1.4. 6/1 Bumex discontinued due to elevation in creatinine 1.64.  It was recommended that Bumex be held today as well and resumed tomorrow 6/3.  Patient is to have a repeat BMP in 2 days to reevaluate kidney function.  Patient is to hold metformin until creatinine improves/is below 1.5.  Patient can use an insulin sliding scale for blood glucose controls until metformin can be resumed.    Patient's diltiazem was also held as patient's blood pressure has been 120s-130s systolic with heart rates in the 50s-60s on metoprolol succinate 50 mg BID.  Diltiazem can be resumed at outpatient providers discretion if patient becomes hypertensive or needs further rate control.     Patient's right lower extremity pain and bruising was likely due to a ruptured Baker's cyst and was treated supportively with Rest, limb elevation, compression and Tylenol.    Please see above list of diagnoses and related plan for additional information.     Discharge Day Visit / Exam:   Subjective: Patient seen and examined at bedside.  No overnight events.  Patient  tolerating p.o.  Patient had a bowel movement this morning reports relief.    Vitals: Blood Pressure: 150/67 (06/02/25 1125)  Pulse: (!) 52 (06/02/25 1125)  Temperature: 98.1 °F (36.7 °C) (06/02/25 1125)  Temp Source: Oral (06/02/25 1125)  Respirations: 16 (06/02/25 1125)  Weight - Scale: 59.1 kg (130 lb 3.2 oz) (06/02/25 0607)  SpO2: 100 % (06/02/25 1125)    Physical Exam  Vitals and nursing note reviewed.   Constitutional:       Appearance: Normal appearance.   HENT:      Head: Normocephalic and atraumatic.      Mouth/Throat:      Mouth: Mucous membranes are moist.     Eyes:      Extraocular Movements: Extraocular movements intact.      Conjunctiva/sclera: Conjunctivae normal.      Pupils: Pupils are equal, round, and reactive to light.       Cardiovascular:      Rate and Rhythm: Normal rate and regular rhythm.   Pulmonary:      Effort: Pulmonary effort is normal. No respiratory distress.      Breath sounds: No wheezing.   Abdominal:      General: Bowel sounds are normal. There is no distension.      Palpations: Abdomen is soft.      Tenderness: There is no abdominal tenderness. There is no guarding.     Musculoskeletal:      Right lower leg: Edema present.      Left lower leg: Edema present.      Comments: Edema improving. Mildly TTP  Skin wrinkling noted  Bruising to posterior right thigh/popliteal area as well as posterior calf and ankle noted     Skin:     General: Skin is warm and dry.     Neurological:      General: No focal deficit present.      Mental Status: She is alert. Mental status is at baseline.     Psychiatric:         Mood and Affect: Mood normal.          Discussion with Family: Attempted to update  (daughter) via phone. Left voicemail.     Discharge instructions/Information to patient and family:   See after visit summary for information provided to patient and family.      Provisions for Follow-Up Care:  See after visit summary for information related to follow-up care and any  pertinent home health orders.      Mobility at time of Discharge:   Basic Mobility Inpatient Raw Score: 14  JH-HLM Goal: 4: Move to chair/commode  JH-HLM Achieved: 1: Laying in bed  HLM Goal NOT achieved. Continue to encourage mobility in post discharge setting.     Disposition:   Other: [facility]    Planned Readmission: No    Administrative Statements   Discharge Statement:  I have spent a total time of 30 minutes in caring for this patient on the day of the visit/encounter. .    **Please Note: This note may have been constructed using a voice recognition system**

## 2025-06-02 NOTE — ASSESSMENT & PLAN NOTE
Wt Readings from Last 3 Encounters:   06/02/25 59.1 kg (130 lb 3.2 oz)   05/13/25 63 kg (139 lb)   05/13/25 62.7 kg (138 lb 4.8 oz)     Patient with history of heart failure with preserved ejection fraction.  Appears to be in acute exacerbation.  Follows with cardiology outpatient, On Bumex 3 mg twice daily.  Documented history of patient missing medication dosages/forgetting to take them.  Uses 2 L of oxygen on baseline secondary to COPD.    Last echo 10/2024: LVEF 65%, systolic function normal.  Increased right ventricular dilation compared to previous.  CXR small bilateral pleural effusions, cardiac megaly and hilar fullness  Patient is to be discharge to [facility]    Continue to hold diuretics  Resume PO bumex 3 mg BID tomorrow  Repeat BMP in 2 days  Monitor I/O  Daily weights

## 2025-06-03 ENCOUNTER — TELEPHONE (OUTPATIENT)
Dept: CARDIOLOGY CLINIC | Facility: CLINIC | Age: 85
End: 2025-06-03

## 2025-06-03 ENCOUNTER — NURSING HOME VISIT (OUTPATIENT)
Dept: GERIATRICS | Facility: OTHER | Age: 85
End: 2025-06-03
Payer: MEDICARE

## 2025-06-03 ENCOUNTER — PATIENT OUTREACH (OUTPATIENT)
Dept: CASE MANAGEMENT | Facility: OTHER | Age: 85
End: 2025-06-03

## 2025-06-03 DIAGNOSIS — J44.9 COPD, SEVERE (HCC): ICD-10-CM

## 2025-06-03 DIAGNOSIS — R41.89 COGNITIVE IMPAIRMENT: ICD-10-CM

## 2025-06-03 DIAGNOSIS — E11.9 TYPE 2 DIABETES MELLITUS WITHOUT COMPLICATION, WITHOUT LONG-TERM CURRENT USE OF INSULIN (HCC): ICD-10-CM

## 2025-06-03 DIAGNOSIS — I48.0 PAROXYSMAL ATRIAL FIBRILLATION (HCC): ICD-10-CM

## 2025-06-03 DIAGNOSIS — I50.33 ACUTE ON CHRONIC HEART FAILURE WITH PRESERVED EJECTION FRACTION (HCC): Primary | ICD-10-CM

## 2025-06-03 DIAGNOSIS — I10 ESSENTIAL HYPERTENSION: ICD-10-CM

## 2025-06-03 PROCEDURE — 99306 1ST NF CARE HIGH MDM 50: CPT | Performed by: FAMILY MEDICINE

## 2025-06-03 NOTE — PROGRESS NOTES
Email sent to [facility] to add the patient to their facility Point click care. This Admin Coordinator will continue to monitor via chart review.

## 2025-06-03 NOTE — PROGRESS NOTES
Outpatient Care Management FEDE/SNF Pathway. Discharged 6/2/25 to [facility]. Email sent to facility to inform them I will be following the patient during their skilled stay.  This Admin Coordinator will continue to monitor via chart review.
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
## 2025-06-05 DIAGNOSIS — I48.92 ATRIAL FLUTTER WITH RAPID VENTRICULAR RESPONSE (HCC): ICD-10-CM

## 2025-06-05 RX ORDER — METOPROLOL SUCCINATE 50 MG/1
50 TABLET, EXTENDED RELEASE ORAL 2 TIMES DAILY
Qty: 180 TABLET | Refills: 3 | Status: ON HOLD | OUTPATIENT
Start: 2025-06-05

## 2025-06-05 NOTE — TELEPHONE ENCOUNTER
Left [facility] to schedule  hospital follow up appointment for patient (d/c date 6/2/25).

## 2025-06-06 ENCOUNTER — NURSING HOME VISIT (OUTPATIENT)
Dept: GERIATRICS | Facility: OTHER | Age: 85
End: 2025-06-06

## 2025-06-06 VITALS
TEMPERATURE: 97.6 F | HEART RATE: 68 BPM | RESPIRATION RATE: 18 BRPM | WEIGHT: 130.2 LBS | OXYGEN SATURATION: 98 % | BODY MASS INDEX: 26.3 KG/M2 | DIASTOLIC BLOOD PRESSURE: 56 MMHG | SYSTOLIC BLOOD PRESSURE: 120 MMHG

## 2025-06-06 DIAGNOSIS — M79.604 RIGHT LEG PAIN: ICD-10-CM

## 2025-06-06 DIAGNOSIS — I10 ESSENTIAL HYPERTENSION: Primary | ICD-10-CM

## 2025-06-06 DIAGNOSIS — I50.33 ACUTE ON CHRONIC HEART FAILURE WITH PRESERVED EJECTION FRACTION (HCC): ICD-10-CM

## 2025-06-06 DIAGNOSIS — E11.9 TYPE 2 DIABETES MELLITUS WITHOUT COMPLICATION, WITHOUT LONG-TERM CURRENT USE OF INSULIN (HCC): ICD-10-CM

## 2025-06-06 DIAGNOSIS — R26.2 AMBULATORY DYSFUNCTION: ICD-10-CM

## 2025-06-06 NOTE — ASSESSMENT & PLAN NOTE
"Wt Readings from Last 3 Encounters:   06/06/25 59.1 kg (130 lb 3.2 oz)   06/02/25 59.1 kg (130 lb 3.2 oz)   05/13/25 63 kg (139 lb)   Echo done 10/2024  \"Left Ventricle: Left ventricular cavity size is normal. Wall thickness is normal. The left ventricular ejection fraction is 65%. Systolic function is normal. Wall motion is normal. \"   Weight stable  Continue daily weights  Continue Bumex 3 mg twice daily  Continue metoprolol XL 50 mg every 12 hours  Outpatient follow up with cardiology    "
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
## 2025-06-06 NOTE — ASSESSMENT & PLAN NOTE
RLE swelling, pain  Duplex negative for DVT, did show Bakers Cyst  Likely ruptured  Elevation and compression  Pain management

## 2025-06-06 NOTE — ASSESSMENT & PLAN NOTE
Lab Results   Component Value Date    HGBA1C 6.5 04/15/2025   Morning blood sugar 212  Continue Accu-Cheks  Continue metformin at 1000 mg twice daily  Continue glipizide 10 mg daily   encourage diabetic diet  Avoid hypoglycemia

## 2025-06-06 NOTE — ASSESSMENT & PLAN NOTE
BP stable, 120/56  Continue to monitor BP  Continue Toprol XL 50 mg every 12 hours  Continue Bumex 2 mg twice daily

## 2025-06-09 ENCOUNTER — PATIENT OUTREACH (OUTPATIENT)
Dept: CASE MANAGEMENT | Facility: OTHER | Age: 85
End: 2025-06-09

## 2025-06-09 ENCOUNTER — NURSING HOME VISIT (OUTPATIENT)
Dept: GERIATRICS | Facility: OTHER | Age: 85
End: 2025-06-09
Payer: MEDICARE

## 2025-06-09 VITALS
BODY MASS INDEX: 25.61 KG/M2 | WEIGHT: 126.8 LBS | RESPIRATION RATE: 20 BRPM | DIASTOLIC BLOOD PRESSURE: 67 MMHG | OXYGEN SATURATION: 98 % | TEMPERATURE: 97.4 F | SYSTOLIC BLOOD PRESSURE: 149 MMHG | HEART RATE: 74 BPM

## 2025-06-09 DIAGNOSIS — R26.2 AMBULATORY DYSFUNCTION: ICD-10-CM

## 2025-06-09 DIAGNOSIS — E11.9 TYPE 2 DIABETES MELLITUS WITHOUT COMPLICATION, WITHOUT LONG-TERM CURRENT USE OF INSULIN (HCC): ICD-10-CM

## 2025-06-09 DIAGNOSIS — I50.33 ACUTE ON CHRONIC HEART FAILURE WITH PRESERVED EJECTION FRACTION (HCC): ICD-10-CM

## 2025-06-09 DIAGNOSIS — I10 ESSENTIAL HYPERTENSION: Primary | ICD-10-CM

## 2025-06-09 PROCEDURE — 99309 SBSQ NF CARE MODERATE MDM 30: CPT
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
## 2025-06-09 NOTE — ASSESSMENT & PLAN NOTE
BP stable, 149/67  Continue to monitor BP  Continue Toprol XL 50 mg every 12 hours  Continue Bumex 2 mg twice daily

## 2025-06-09 NOTE — ASSESSMENT & PLAN NOTE
"Wt Readings from Last 3 Encounters:   06/09/25 57.5 kg (126 lb 12.8 oz)   06/06/25 59.1 kg (130 lb 3.2 oz)   06/02/25 59.1 kg (130 lb 3.2 oz)   Echo done 10/2024  \"Left Ventricle: Left ventricular cavity size is normal. Wall thickness is normal. The left ventricular ejection fraction is 65%. Systolic function is normal. Wall motion is normal. \"   Weight stable  Continue daily weights  Continue Bumex 3 mg twice daily  Continue metoprolol XL 50 mg every 12 hours  Outpatient follow up with cardiology    "

## 2025-06-09 NOTE — ASSESSMENT & PLAN NOTE
Lab Results   Component Value Date    HGBA1C 6.5 04/15/2025   Morning blood sugar 136  Continue Accu-Cheks  Continue metformin at 1000 mg twice daily  Continue glipizide 10 mg daily   encourage diabetic diet  Avoid hypoglycemia

## 2025-06-12 ENCOUNTER — NURSING HOME VISIT (OUTPATIENT)
Dept: GERIATRICS | Facility: OTHER | Age: 85
End: 2025-06-12
Payer: MEDICARE

## 2025-06-12 DIAGNOSIS — I48.0 PAROXYSMAL ATRIAL FIBRILLATION (HCC): ICD-10-CM

## 2025-06-12 DIAGNOSIS — I50.33 ACUTE ON CHRONIC HEART FAILURE WITH PRESERVED EJECTION FRACTION (HCC): Primary | ICD-10-CM

## 2025-06-12 DIAGNOSIS — I10 ESSENTIAL HYPERTENSION: ICD-10-CM

## 2025-06-12 DIAGNOSIS — R26.2 AMBULATORY DYSFUNCTION: ICD-10-CM

## 2025-06-12 PROCEDURE — 99309 SBSQ NF CARE MODERATE MDM 30: CPT

## 2025-06-13 ENCOUNTER — APPOINTMENT (EMERGENCY)
Dept: RADIOLOGY | Facility: HOSPITAL | Age: 85
DRG: 377 | End: 2025-06-13
Payer: MEDICARE

## 2025-06-13 ENCOUNTER — NURSING HOME VISIT (OUTPATIENT)
Dept: GERIATRICS | Facility: OTHER | Age: 85
End: 2025-06-13
Payer: MEDICARE

## 2025-06-13 ENCOUNTER — HOSPITAL ENCOUNTER (INPATIENT)
Facility: HOSPITAL | Age: 85
LOS: 11 days | Discharge: NURSING, REHAB OR LONG-TERM CARE | DRG: 377 | End: 2025-06-24
Attending: EMERGENCY MEDICINE | Admitting: INTERNAL MEDICINE
Payer: MEDICARE

## 2025-06-13 ENCOUNTER — TELEPHONE (OUTPATIENT)
Age: 85
End: 2025-06-13

## 2025-06-13 VITALS
OXYGEN SATURATION: 97 % | DIASTOLIC BLOOD PRESSURE: 68 MMHG | SYSTOLIC BLOOD PRESSURE: 116 MMHG | RESPIRATION RATE: 18 BRPM | HEART RATE: 82 BPM | TEMPERATURE: 97.8 F

## 2025-06-13 VITALS
SYSTOLIC BLOOD PRESSURE: 120 MMHG | HEART RATE: 72 BPM | OXYGEN SATURATION: 93 % | WEIGHT: 132.2 LBS | RESPIRATION RATE: 18 BRPM | BODY MASS INDEX: 26.7 KG/M2 | DIASTOLIC BLOOD PRESSURE: 70 MMHG | TEMPERATURE: 97.5 F

## 2025-06-13 DIAGNOSIS — J44.1 COPD EXACERBATION (HCC): ICD-10-CM

## 2025-06-13 DIAGNOSIS — I50.33 ACUTE ON CHRONIC HEART FAILURE WITH PRESERVED EJECTION FRACTION (HCC): Primary | ICD-10-CM

## 2025-06-13 DIAGNOSIS — R19.5 DARK STOOLS: ICD-10-CM

## 2025-06-13 DIAGNOSIS — D64.9 SEVERE ANEMIA: ICD-10-CM

## 2025-06-13 DIAGNOSIS — I48.0 PAROXYSMAL ATRIAL FIBRILLATION (HCC): ICD-10-CM

## 2025-06-13 DIAGNOSIS — E11.9 TYPE 2 DIABETES MELLITUS WITHOUT COMPLICATION, WITHOUT LONG-TERM CURRENT USE OF INSULIN (HCC): ICD-10-CM

## 2025-06-13 DIAGNOSIS — K92.2 UGIB (UPPER GASTROINTESTINAL BLEED): Primary | ICD-10-CM

## 2025-06-13 DIAGNOSIS — N18.4 STAGE 4 CHRONIC KIDNEY DISEASE (HCC): ICD-10-CM

## 2025-06-13 DIAGNOSIS — Z86.79 HISTORY OF CHF (CONGESTIVE HEART FAILURE): ICD-10-CM

## 2025-06-13 DIAGNOSIS — I21.4 NSTEMI (NON-ST ELEVATED MYOCARDIAL INFARCTION) (HCC): ICD-10-CM

## 2025-06-13 DIAGNOSIS — E87.70 FLUID OVERLOAD: ICD-10-CM

## 2025-06-13 DIAGNOSIS — R79.89 ELEVATED LACTIC ACID LEVEL: ICD-10-CM

## 2025-06-13 DIAGNOSIS — D62 ACUTE BLOOD LOSS ANEMIA: ICD-10-CM

## 2025-06-13 DIAGNOSIS — I10 ESSENTIAL HYPERTENSION: ICD-10-CM

## 2025-06-13 DIAGNOSIS — R26.2 AMBULATORY DYSFUNCTION: ICD-10-CM

## 2025-06-13 DIAGNOSIS — I50.32 CHRONIC HEART FAILURE WITH PRESERVED EJECTION FRACTION (HCC): ICD-10-CM

## 2025-06-13 DIAGNOSIS — I50.33 ACUTE ON CHRONIC HEART FAILURE WITH PRESERVED EJECTION FRACTION (HCC): ICD-10-CM

## 2025-06-13 LAB
ALBUMIN SERPL BCG-MCNC: 3.1 G/DL (ref 3.5–5)
ALP SERPL-CCNC: 68 U/L (ref 34–104)
ALT SERPL W P-5'-P-CCNC: 27 U/L (ref 7–52)
ANION GAP SERPL CALCULATED.3IONS-SCNC: 7 MMOL/L (ref 4–13)
AST SERPL W P-5'-P-CCNC: 69 U/L (ref 13–39)
BASOPHILS # BLD AUTO: 0.01 THOUSANDS/ÂΜL (ref 0–0.1)
BASOPHILS NFR BLD AUTO: 0 % (ref 0–1)
BILIRUB SERPL-MCNC: 0.6 MG/DL (ref 0.2–1)
BUN SERPL-MCNC: 104 MG/DL (ref 5–25)
CALCIUM ALBUM COR SERPL-MCNC: 9.6 MG/DL (ref 8.3–10.1)
CALCIUM SERPL-MCNC: 8.9 MG/DL (ref 8.4–10.2)
CARDIAC TROPONIN I PNL SERPL HS: 192 NG/L (ref ?–50)
CHLORIDE SERPL-SCNC: 95 MMOL/L (ref 96–108)
CO2 SERPL-SCNC: 36 MMOL/L (ref 21–32)
CREAT SERPL-MCNC: 1.66 MG/DL (ref 0.6–1.3)
EOSINOPHIL # BLD AUTO: 0.01 THOUSAND/ÂΜL (ref 0–0.61)
EOSINOPHIL NFR BLD AUTO: 0 % (ref 0–6)
ERYTHROCYTE [DISTWIDTH] IN BLOOD BY AUTOMATED COUNT: 18 % (ref 11.6–15.1)
GFR SERPL CREATININE-BSD FRML MDRD: 28 ML/MIN/1.73SQ M
GLUCOSE SERPL-MCNC: 382 MG/DL (ref 65–140)
HCT VFR BLD AUTO: 16.8 % (ref 34.8–46.1)
HEMOCCULT STL QL IA: POSITIVE
HGB BLD-MCNC: 5.2 G/DL (ref 11.5–15.4)
IMM GRANULOCYTES # BLD AUTO: 0.02 THOUSAND/UL (ref 0–0.2)
IMM GRANULOCYTES NFR BLD AUTO: 0 % (ref 0–2)
LACTATE SERPL-SCNC: 2.6 MMOL/L (ref 0.5–2)
LYMPHOCYTES # BLD AUTO: 0.67 THOUSANDS/ÂΜL (ref 0.6–4.47)
LYMPHOCYTES NFR BLD AUTO: 13 % (ref 14–44)
MCH RBC QN AUTO: 30.2 PG (ref 26.8–34.3)
MCHC RBC AUTO-ENTMCNC: 31 G/DL (ref 31.4–37.4)
MCV RBC AUTO: 98 FL (ref 82–98)
MONOCYTES # BLD AUTO: 0.49 THOUSAND/ÂΜL (ref 0.17–1.22)
MONOCYTES NFR BLD AUTO: 9 % (ref 4–12)
NEUTROPHILS # BLD AUTO: 4.02 THOUSANDS/ÂΜL (ref 1.85–7.62)
NEUTS SEG NFR BLD AUTO: 78 % (ref 43–75)
NRBC BLD AUTO-RTO: 0 /100 WBCS
PLATELET # BLD AUTO: 216 THOUSANDS/UL (ref 149–390)
PMV BLD AUTO: 11 FL (ref 8.9–12.7)
POTASSIUM SERPL-SCNC: 3.6 MMOL/L (ref 3.5–5.3)
PROT SERPL-MCNC: 6.2 G/DL (ref 6.4–8.4)
RBC # BLD AUTO: 1.72 MILLION/UL (ref 3.81–5.12)
SODIUM SERPL-SCNC: 138 MMOL/L (ref 135–147)
WBC # BLD AUTO: 5.22 THOUSAND/UL (ref 4.31–10.16)

## 2025-06-13 PROCEDURE — 71045 X-RAY EXAM CHEST 1 VIEW: CPT

## 2025-06-13 PROCEDURE — 86901 BLOOD TYPING SEROLOGIC RH(D): CPT | Performed by: EMERGENCY MEDICINE

## 2025-06-13 PROCEDURE — 36430 TRANSFUSION BLD/BLD COMPNT: CPT

## 2025-06-13 PROCEDURE — 30233N1 TRANSFUSION OF NONAUTOLOGOUS RED BLOOD CELLS INTO PERIPHERAL VEIN, PERCUTANEOUS APPROACH: ICD-10-PCS | Performed by: INTERNAL MEDICINE

## 2025-06-13 PROCEDURE — 85025 COMPLETE CBC W/AUTO DIFF WBC: CPT | Performed by: EMERGENCY MEDICINE

## 2025-06-13 PROCEDURE — 80053 COMPREHEN METABOLIC PANEL: CPT | Performed by: EMERGENCY MEDICINE

## 2025-06-13 PROCEDURE — 93005 ELECTROCARDIOGRAM TRACING: CPT

## 2025-06-13 PROCEDURE — 84484 ASSAY OF TROPONIN QUANT: CPT | Performed by: EMERGENCY MEDICINE

## 2025-06-13 PROCEDURE — 86900 BLOOD TYPING SEROLOGIC ABO: CPT | Performed by: EMERGENCY MEDICINE

## 2025-06-13 PROCEDURE — 86850 RBC ANTIBODY SCREEN: CPT | Performed by: EMERGENCY MEDICINE

## 2025-06-13 PROCEDURE — G0328 FECAL BLOOD SCRN IMMUNOASSAY: HCPCS | Performed by: EMERGENCY MEDICINE

## 2025-06-13 PROCEDURE — 83605 ASSAY OF LACTIC ACID: CPT | Performed by: EMERGENCY MEDICINE

## 2025-06-13 PROCEDURE — 96374 THER/PROPH/DIAG INJ IV PUSH: CPT

## 2025-06-13 PROCEDURE — 94640 AIRWAY INHALATION TREATMENT: CPT

## 2025-06-13 PROCEDURE — 99285 EMERGENCY DEPT VISIT HI MDM: CPT

## 2025-06-13 PROCEDURE — 83880 ASSAY OF NATRIURETIC PEPTIDE: CPT | Performed by: EMERGENCY MEDICINE

## 2025-06-13 PROCEDURE — 36415 COLL VENOUS BLD VENIPUNCTURE: CPT | Performed by: EMERGENCY MEDICINE

## 2025-06-13 PROCEDURE — 99309 SBSQ NF CARE MODERATE MDM 30: CPT

## 2025-06-13 PROCEDURE — 99291 CRITICAL CARE FIRST HOUR: CPT | Performed by: EMERGENCY MEDICINE

## 2025-06-13 PROCEDURE — 86923 COMPATIBILITY TEST ELECTRIC: CPT

## 2025-06-13 RX ORDER — BUMETANIDE 0.25 MG/ML
1 INJECTION, SOLUTION INTRAMUSCULAR; INTRAVENOUS ONCE
Status: COMPLETED | OUTPATIENT
Start: 2025-06-13 | End: 2025-06-13

## 2025-06-13 RX ORDER — ALBUTEROL SULFATE 0.83 MG/ML
5 SOLUTION RESPIRATORY (INHALATION) ONCE
Status: COMPLETED | OUTPATIENT
Start: 2025-06-13 | End: 2025-06-13

## 2025-06-13 RX ADMIN — PANTOPRAZOLE SODIUM 80 MG: 40 INJECTION, POWDER, LYOPHILIZED, FOR SOLUTION INTRAVENOUS at 23:45

## 2025-06-13 RX ADMIN — SODIUM CHLORIDE 8 MG/HR: 9 INJECTION, SOLUTION INTRAVENOUS at 23:59

## 2025-06-13 RX ADMIN — IPRATROPIUM BROMIDE 0.5 MG: 0.5 SOLUTION RESPIRATORY (INHALATION) at 22:38

## 2025-06-13 RX ADMIN — BUMETANIDE 1 MG: 0.25 INJECTION INTRAMUSCULAR; INTRAVENOUS at 22:41

## 2025-06-13 RX ADMIN — ALBUTEROL SULFATE 5 MG: 2.5 SOLUTION RESPIRATORY (INHALATION) at 22:38

## 2025-06-13 NOTE — ASSESSMENT & PLAN NOTE
BP stable, 120/70  Continue to monitor BP  Continue Toprol XL 50 mg every 12 hours  Continue Bumex 3 mg twice daily

## 2025-06-13 NOTE — ASSESSMENT & PLAN NOTE
BP stable, 144/62  Continue to monitor BP  Continue Toprol XL 50 mg every 12 hours  Continue Bumex 2 mg twice daily

## 2025-06-13 NOTE — ASSESSMENT & PLAN NOTE
Heart rate controlled, 69  continue metoprolol XL 50 mg every 12   continue Cardizem 120 mg daily  Continue Xarelto 15 mg daily

## 2025-06-13 NOTE — ASSESSMENT & PLAN NOTE
Lab Results   Component Value Date    HGBA1C 6.5 04/15/2025   Morning blood sugar 287  Continue Accu-Cheks  Continue metformin at 1000 mg twice daily  Continue glipizide 10 mg daily   encourage diabetic diet  Avoid hypoglycemia

## 2025-06-13 NOTE — ASSESSMENT & PLAN NOTE
Heart rate controlled, 72  continue metoprolol XL 50 mg every 12   continue Cardizem 120 mg daily  Continue Xarelto 15 mg daily
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
## 2025-06-13 NOTE — ASSESSMENT & PLAN NOTE
"Wt Readings from Last 3 Encounters:   06/13/25 60 kg (132 lb 3.2 oz)   06/09/25 57.5 kg (126 lb 12.8 oz)   06/06/25 59.1 kg (130 lb 3.2 oz)   Echo done 10/2024  \"Left Ventricle: Left ventricular cavity size is normal. Wall thickness is normal. The left ventricular ejection fraction is 65%. Systolic function is normal. Wall motion is normal. \"   Weight stable  Continue daily weights  Continue PRN metolazone for 3 lbs in 1 day or 5 lbs in a week  Dose was given 6/13/25  Continue Bumex 3 mg twice daily  Continue metoprolol XL 50 mg every 12 hours  Outpatient follow up with cardiology    "

## 2025-06-13 NOTE — ASSESSMENT & PLAN NOTE
"Wt Readings from Last 3 Encounters:   06/09/25 57.5 kg (126 lb 12.8 oz)   06/06/25 59.1 kg (130 lb 3.2 oz)   06/02/25 59.1 kg (130 lb 3.2 oz)   Echo done 10/2024  \"Left Ventricle: Left ventricular cavity size is normal. Wall thickness is normal. The left ventricular ejection fraction is 65%. Systolic function is normal. Wall motion is normal. \"   Weight stable  Continue daily weights  Continue PRN metolazone for 3 lbs in 1 day or 5 lbs in a week  Continue Bumex 3 mg twice daily  Continue metoprolol XL 50 mg every 12 hours  Outpatient follow up with cardiology    "
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
## 2025-06-14 ENCOUNTER — APPOINTMENT (INPATIENT)
Dept: NON INVASIVE DIAGNOSTICS | Facility: HOSPITAL | Age: 85
DRG: 377 | End: 2025-06-14
Payer: MEDICARE

## 2025-06-14 PROBLEM — J96.11 CHRONIC HYPOXIC RESPIRATORY FAILURE, ON HOME OXYGEN THERAPY  (HCC): Status: ACTIVE | Noted: 2025-06-14

## 2025-06-14 PROBLEM — D62 ACUTE BLOOD LOSS ANEMIA: Status: ACTIVE | Noted: 2025-06-14

## 2025-06-14 PROBLEM — Z99.81 CHRONIC HYPOXIC RESPIRATORY FAILURE, ON HOME OXYGEN THERAPY  (HCC): Status: ACTIVE | Noted: 2025-06-14

## 2025-06-14 PROBLEM — E87.20 LACTIC ACIDOSIS: Status: ACTIVE | Noted: 2025-06-14

## 2025-06-14 PROBLEM — K92.2 UPPER GI BLEED: Status: ACTIVE | Noted: 2025-06-14

## 2025-06-14 LAB
2HR DELTA HS TROPONIN: -8 NG/L
ABO GROUP BLD BPU: NORMAL
ABO GROUP BLD BPU: NORMAL
ABO GROUP BLD: NORMAL
ABO GROUP BLD: NORMAL
ALBUMIN SERPL BCG-MCNC: 3 G/DL (ref 3.5–5)
ALP SERPL-CCNC: 58 U/L (ref 34–104)
ALT SERPL W P-5'-P-CCNC: 24 U/L (ref 7–52)
ANION GAP SERPL CALCULATED.3IONS-SCNC: 6 MMOL/L (ref 4–13)
ANION GAP SERPL CALCULATED.3IONS-SCNC: 8 MMOL/L (ref 4–13)
AORTIC ROOT: 2.3 CM
ASCENDING AORTA: 3.7 CM
AST SERPL W P-5'-P-CCNC: 62 U/L (ref 13–39)
AV REGURGITATION PRESSURE HALF TIME: 648 MS
BILIRUB SERPL-MCNC: 0.63 MG/DL (ref 0.2–1)
BLD GP AB SCN SERPL QL: NEGATIVE
BNP SERPL-MCNC: 3288 PG/ML (ref 0–100)
BPU ID: NORMAL
BPU ID: NORMAL
BSA FOR ECHO PROCEDURE: 1.47 M2
BUN SERPL-MCNC: 82 MG/DL (ref 5–25)
BUN SERPL-MCNC: 98 MG/DL (ref 5–25)
CALCIUM ALBUM COR SERPL-MCNC: 9.5 MG/DL (ref 8.3–10.1)
CALCIUM SERPL-MCNC: 8.7 MG/DL (ref 8.4–10.2)
CALCIUM SERPL-MCNC: 8.8 MG/DL (ref 8.4–10.2)
CARDIAC TROPONIN I PNL SERPL HS: 184 NG/L (ref ?–50)
CHLORIDE SERPL-SCNC: 97 MMOL/L (ref 96–108)
CHLORIDE SERPL-SCNC: 97 MMOL/L (ref 96–108)
CO2 SERPL-SCNC: 37 MMOL/L (ref 21–32)
CO2 SERPL-SCNC: 39 MMOL/L (ref 21–32)
CREAT SERPL-MCNC: 1.56 MG/DL (ref 0.6–1.3)
CREAT SERPL-MCNC: 1.57 MG/DL (ref 0.6–1.3)
CROSSMATCH: NORMAL
CROSSMATCH: NORMAL
DOP CALC LVOT AREA: 2.83 CM2
DOP CALC LVOT DIAMETER: 1.9 CM
E WAVE DECELERATION TIME: 288 MS
E/A RATIO: 2.6
ERYTHROCYTE [DISTWIDTH] IN BLOOD BY AUTOMATED COUNT: 17 % (ref 11.6–15.1)
FERRITIN SERPL-MCNC: 218 NG/ML (ref 30–307)
FRACTIONAL SHORTENING: 38 (ref 28–44)
GFR SERPL CREATININE-BSD FRML MDRD: 30 ML/MIN/1.73SQ M
GFR SERPL CREATININE-BSD FRML MDRD: 30 ML/MIN/1.73SQ M
GLUCOSE SERPL-MCNC: 160 MG/DL (ref 65–140)
GLUCOSE SERPL-MCNC: 223 MG/DL (ref 65–140)
GLUCOSE SERPL-MCNC: 241 MG/DL (ref 65–140)
GLUCOSE SERPL-MCNC: 244 MG/DL (ref 65–140)
GLUCOSE SERPL-MCNC: 262 MG/DL (ref 65–140)
GLUCOSE SERPL-MCNC: 276 MG/DL (ref 65–140)
HCT VFR BLD AUTO: 22.3 % (ref 34.8–46.1)
HCT VFR BLD AUTO: 27.1 % (ref 34.8–46.1)
HGB BLD-MCNC: 7.3 G/DL (ref 11.5–15.4)
HGB BLD-MCNC: 8.6 G/DL (ref 11.5–15.4)
INTERVENTRICULAR SEPTUM IN DIASTOLE (PARASTERNAL SHORT AXIS VIEW): 0.8 CM
INTERVENTRICULAR SEPTUM: 0.8 CM (ref 0.6–1.1)
IRON SATN MFR SERPL: 26 % (ref 15–50)
IRON SERPL-MCNC: 65 UG/DL (ref 50–212)
LAAS-AP2: 38.2 CM2
LAAS-AP4: 41.8 CM2
LACTATE SERPL-SCNC: 2 MMOL/L (ref 0.5–2)
LACTATE SERPL-SCNC: 2.5 MMOL/L (ref 0.5–2)
LACTATE SERPL-SCNC: 2.8 MMOL/L (ref 0.5–2)
LEFT ATRIUM SIZE: 5.7 CM
LEFT ATRIUM VOLUME (MOD BIPLANE): 155 ML
LEFT ATRIUM VOLUME INDEX (MOD BIPLANE): 105.4 ML/M2
LEFT INTERNAL DIMENSION IN SYSTOLE: 2.9 CM (ref 2.1–4)
LEFT VENTRICLE DIASTOLIC VOLUME (MOD BIPLANE): 70 ML
LEFT VENTRICLE DIASTOLIC VOLUME INDEX (MOD BIPLANE): 47.6 ML/M2
LEFT VENTRICLE SYSTOLIC VOLUME (MOD BIPLANE): 25 ML
LEFT VENTRICLE SYSTOLIC VOLUME INDEX (MOD BIPLANE): 17 ML/M2
LEFT VENTRICULAR INTERNAL DIMENSION IN DIASTOLE: 4.7 CM (ref 3.5–6)
LEFT VENTRICULAR POSTERIOR WALL IN END DIASTOLE: 1.2 CM
LEFT VENTRICULAR STROKE VOLUME: 70 ML
LV EF BIPLANE MOD: 65 %
LV EF US.2D.A4C+ESTIMATED: 69 %
LVSV (TEICH): 70 ML
MAGNESIUM SERPL-MCNC: 2.1 MG/DL (ref 1.9–2.7)
MCH RBC QN AUTO: 30.9 PG (ref 26.8–34.3)
MCHC RBC AUTO-ENTMCNC: 32.7 G/DL (ref 31.4–37.4)
MCV RBC AUTO: 95 FL (ref 82–98)
MV PEAK A VEL: 0.4 M/S
MV PEAK E VEL: 104 CM/S
MV STENOSIS PRESSURE HALF TIME: 83 MS
MV VALVE AREA P 1/2 METHOD: 2.65
PLATELET # BLD AUTO: 181 THOUSANDS/UL (ref 149–390)
PMV BLD AUTO: 10.6 FL (ref 8.9–12.7)
POTASSIUM SERPL-SCNC: 3 MMOL/L (ref 3.5–5.3)
POTASSIUM SERPL-SCNC: 3.8 MMOL/L (ref 3.5–5.3)
PROT SERPL-MCNC: 5.8 G/DL (ref 6.4–8.4)
RBC # BLD AUTO: 2.36 MILLION/UL (ref 3.81–5.12)
RH BLD: POSITIVE
RH BLD: POSITIVE
RIGHT ATRIUM AREA SYSTOLE A4C: 28.1 CM2
RIGHT VENTRICLE ID DIMENSION: 4.6 CM
SL CV AV DECELERATION TIME RETROGRADE: 2234 MS
SL CV AV PEAK GRADIENT RETROGRADE: 70 MMHG
SL CV LEFT ATRIUM LENGTH A2C: 8.6 CM
SL CV LV EF: 65
SL CV PED ECHO LEFT VENTRICLE DIASTOLIC VOLUME (MOD BIPLANE) 2D: 101 ML
SL CV PED ECHO LEFT VENTRICLE SYSTOLIC VOLUME (MOD BIPLANE) 2D: 32 ML
SODIUM SERPL-SCNC: 142 MMOL/L (ref 135–147)
SODIUM SERPL-SCNC: 142 MMOL/L (ref 135–147)
SPECIMEN EXPIRATION DATE: NORMAL
TIBC SERPL-MCNC: 249.2 UG/DL (ref 250–450)
TRANSFERRIN SERPL-MCNC: 178 MG/DL (ref 203–362)
TRICUSPID ANNULAR PLANE SYSTOLIC EXCURSION: 1.9 CM
UIBC SERPL-MCNC: 184 UG/DL (ref 155–355)
UNIT DISPENSE STATUS: NORMAL
UNIT DISPENSE STATUS: NORMAL
UNIT PRODUCT CODE: NORMAL
UNIT PRODUCT CODE: NORMAL
UNIT PRODUCT VOLUME: 350 ML
UNIT PRODUCT VOLUME: 350 ML
UNIT RH: NORMAL
UNIT RH: NORMAL
WBC # BLD AUTO: 5.6 THOUSAND/UL (ref 4.31–10.16)

## 2025-06-14 PROCEDURE — 85027 COMPLETE CBC AUTOMATED: CPT | Performed by: INTERNAL MEDICINE

## 2025-06-14 PROCEDURE — 82948 REAGENT STRIP/BLOOD GLUCOSE: CPT

## 2025-06-14 PROCEDURE — 83540 ASSAY OF IRON: CPT | Performed by: INTERNAL MEDICINE

## 2025-06-14 PROCEDURE — 83605 ASSAY OF LACTIC ACID: CPT | Performed by: EMERGENCY MEDICINE

## 2025-06-14 PROCEDURE — 99223 1ST HOSP IP/OBS HIGH 75: CPT | Performed by: INTERNAL MEDICINE

## 2025-06-14 PROCEDURE — 80048 BASIC METABOLIC PNL TOTAL CA: CPT

## 2025-06-14 PROCEDURE — P9016 RBC LEUKOCYTES REDUCED: HCPCS

## 2025-06-14 PROCEDURE — 84484 ASSAY OF TROPONIN QUANT: CPT | Performed by: EMERGENCY MEDICINE

## 2025-06-14 PROCEDURE — 93306 TTE W/DOPPLER COMPLETE: CPT

## 2025-06-14 PROCEDURE — 94660 CPAP INITIATION&MGMT: CPT

## 2025-06-14 PROCEDURE — 83550 IRON BINDING TEST: CPT | Performed by: INTERNAL MEDICINE

## 2025-06-14 PROCEDURE — 94760 N-INVAS EAR/PLS OXIMETRY 1: CPT

## 2025-06-14 PROCEDURE — 83735 ASSAY OF MAGNESIUM: CPT | Performed by: INTERNAL MEDICINE

## 2025-06-14 PROCEDURE — 94640 AIRWAY INHALATION TREATMENT: CPT

## 2025-06-14 PROCEDURE — 80053 COMPREHEN METABOLIC PANEL: CPT | Performed by: INTERNAL MEDICINE

## 2025-06-14 PROCEDURE — 36415 COLL VENOUS BLD VENIPUNCTURE: CPT | Performed by: EMERGENCY MEDICINE

## 2025-06-14 PROCEDURE — 93306 TTE W/DOPPLER COMPLETE: CPT | Performed by: INTERNAL MEDICINE

## 2025-06-14 PROCEDURE — 85014 HEMATOCRIT: CPT | Performed by: INTERNAL MEDICINE

## 2025-06-14 PROCEDURE — 82728 ASSAY OF FERRITIN: CPT | Performed by: INTERNAL MEDICINE

## 2025-06-14 PROCEDURE — 83605 ASSAY OF LACTIC ACID: CPT | Performed by: INTERNAL MEDICINE

## 2025-06-14 PROCEDURE — 85018 HEMOGLOBIN: CPT | Performed by: INTERNAL MEDICINE

## 2025-06-14 RX ORDER — BUDESONIDE 0.5 MG/2ML
0.5 INHALANT ORAL
Status: DISCONTINUED | OUTPATIENT
Start: 2025-06-14 | End: 2025-06-24 | Stop reason: HOSPADM

## 2025-06-14 RX ORDER — ATORVASTATIN CALCIUM 40 MG/1
40 TABLET, FILM COATED ORAL DAILY
Status: DISCONTINUED | OUTPATIENT
Start: 2025-06-14 | End: 2025-06-24 | Stop reason: HOSPADM

## 2025-06-14 RX ORDER — ESCITALOPRAM OXALATE 10 MG/1
5 TABLET ORAL DAILY
Status: DISCONTINUED | OUTPATIENT
Start: 2025-06-14 | End: 2025-06-24 | Stop reason: HOSPADM

## 2025-06-14 RX ORDER — FLUTICASONE PROPIONATE 50 MCG
1 SPRAY, SUSPENSION (ML) NASAL DAILY
Status: DISCONTINUED | OUTPATIENT
Start: 2025-06-14 | End: 2025-06-24 | Stop reason: HOSPADM

## 2025-06-14 RX ORDER — IPRATROPIUM BROMIDE AND ALBUTEROL SULFATE 2.5; .5 MG/3ML; MG/3ML
3 SOLUTION RESPIRATORY (INHALATION) EVERY 6 HOURS PRN
Status: DISCONTINUED | OUTPATIENT
Start: 2025-06-14 | End: 2025-06-16

## 2025-06-14 RX ORDER — POTASSIUM CHLORIDE 14.9 MG/ML
20 INJECTION INTRAVENOUS
Status: COMPLETED | OUTPATIENT
Start: 2025-06-14 | End: 2025-06-14

## 2025-06-14 RX ORDER — DONEPEZIL HYDROCHLORIDE 5 MG/1
5 TABLET, FILM COATED ORAL
Status: DISCONTINUED | OUTPATIENT
Start: 2025-06-14 | End: 2025-06-24 | Stop reason: HOSPADM

## 2025-06-14 RX ORDER — INSULIN LISPRO 100 [IU]/ML
1-6 INJECTION, SOLUTION INTRAVENOUS; SUBCUTANEOUS EVERY 6 HOURS SCHEDULED
Status: DISCONTINUED | OUTPATIENT
Start: 2025-06-14 | End: 2025-06-15

## 2025-06-14 RX ORDER — IPRATROPIUM BROMIDE 42 UG/1
2 SPRAY, METERED NASAL 4 TIMES DAILY PRN
Status: DISCONTINUED | OUTPATIENT
Start: 2025-06-14 | End: 2025-06-14

## 2025-06-14 RX ORDER — ACETAMINOPHEN 325 MG/1
650 TABLET ORAL EVERY 6 HOURS PRN
Status: DISCONTINUED | OUTPATIENT
Start: 2025-06-14 | End: 2025-06-21

## 2025-06-14 RX ORDER — BUMETANIDE 0.25 MG/ML
3 INJECTION, SOLUTION INTRAMUSCULAR; INTRAVENOUS 2 TIMES DAILY
Status: DISCONTINUED | OUTPATIENT
Start: 2025-06-14 | End: 2025-06-16

## 2025-06-14 RX ORDER — METOPROLOL SUCCINATE 50 MG/1
50 TABLET, EXTENDED RELEASE ORAL 2 TIMES DAILY
Status: DISCONTINUED | OUTPATIENT
Start: 2025-06-14 | End: 2025-06-15

## 2025-06-14 RX ORDER — POTASSIUM CHLORIDE 1500 MG/1
20 TABLET, EXTENDED RELEASE ORAL 2 TIMES DAILY
Status: DISCONTINUED | OUTPATIENT
Start: 2025-06-14 | End: 2025-06-16

## 2025-06-14 RX ORDER — POTASSIUM CHLORIDE 1500 MG/1
40 TABLET, EXTENDED RELEASE ORAL 2 TIMES DAILY
Status: DISCONTINUED | OUTPATIENT
Start: 2025-06-14 | End: 2025-06-14

## 2025-06-14 RX ADMIN — INSULIN LISPRO 2 UNITS: 100 INJECTION, SOLUTION INTRAVENOUS; SUBCUTANEOUS at 06:23

## 2025-06-14 RX ADMIN — POTASSIUM CHLORIDE 20 MEQ: 14.9 INJECTION, SOLUTION INTRAVENOUS at 11:01

## 2025-06-14 RX ADMIN — METOPROLOL SUCCINATE 50 MG: 50 TABLET, EXTENDED RELEASE ORAL at 17:59

## 2025-06-14 RX ADMIN — POTASSIUM CHLORIDE 20 MEQ: 14.9 INJECTION, SOLUTION INTRAVENOUS at 16:13

## 2025-06-14 RX ADMIN — DONEPEZIL HYDROCHLORIDE 5 MG: 5 TABLET ORAL at 21:25

## 2025-06-14 RX ADMIN — SODIUM CHLORIDE 8 MG/HR: 9 INJECTION, SOLUTION INTRAVENOUS at 18:21

## 2025-06-14 RX ADMIN — POTASSIUM CHLORIDE 20 MEQ: 14.9 INJECTION, SOLUTION INTRAVENOUS at 12:27

## 2025-06-14 RX ADMIN — BUDESONIDE 0.5 MG: 0.5 INHALANT RESPIRATORY (INHALATION) at 20:32

## 2025-06-14 RX ADMIN — SODIUM CHLORIDE 8 MG/HR: 9 INJECTION, SOLUTION INTRAVENOUS at 11:00

## 2025-06-14 RX ADMIN — BUDESONIDE 0.5 MG: 0.5 INHALANT RESPIRATORY (INHALATION) at 07:18

## 2025-06-14 RX ADMIN — INSULIN LISPRO 2 UNITS: 100 INJECTION, SOLUTION INTRAVENOUS; SUBCUTANEOUS at 02:46

## 2025-06-14 RX ADMIN — BUMETANIDE 3 MG: 0.25 INJECTION INTRAMUSCULAR; INTRAVENOUS at 17:58

## 2025-06-14 RX ADMIN — BUMETANIDE 3 MG: 0.25 INJECTION INTRAMUSCULAR; INTRAVENOUS at 08:37

## 2025-06-14 RX ADMIN — POTASSIUM CHLORIDE 20 MEQ: 14.9 INJECTION, SOLUTION INTRAVENOUS at 18:01

## 2025-06-14 RX ADMIN — INSULIN LISPRO 1 UNITS: 100 INJECTION, SOLUTION INTRAVENOUS; SUBCUTANEOUS at 18:01

## 2025-06-14 NOTE — ASSESSMENT & PLAN NOTE
POA: Worsening fatigue and weakness.  Hemoglobin 5.2.  Fecal occult blood positive.  BUN elevated  IV Protonix bolus/drip  N.p.o.  GI consulted  Anant held  Status post transfusion 2 units packed red blood cells  Repeat Hgb 7.3 and hematocrit 22.3

## 2025-06-14 NOTE — SEPSIS NOTE
Sepsis Note   Camryn Roblero 84 y.o. female MRN: [id number]  Unit/Bed#: ED-32 Encounter: [id number]       Initial Sepsis Screening       Row Name 06/13/25 2319                Is the patient's history suggestive of a new or worsening infection? No  -HA                  User Key  (r) = Recorded By, (t) = Taken By, (c) = Cosigned By      Initials Name Provider Type    CRYSTAL Bustos DO Physician                   Initial Sepsis Screening       Row Name 06/13/25 2319                   Initial Sepsis Assessment    Is the patient's history suggestive of a new or worsening infection? No  -HA                  User Key  (r) = Recorded By, (t) = Taken By, (c) = Cosigned By      Initials Name Provider Type    CRYSTAL Bustos DO Physician                         There is no height or weight on file to calculate BMI.  Wt Readings from Last 1 Encounters:   06/13/25 60 kg (132 lb 3.2 oz)        Ideal body weight: 48.8 kg (107 lb 8.4 oz)  Adjusted ideal body weight: 53.2 kg (117 lb 6.3 oz)

## 2025-06-14 NOTE — TELEPHONE ENCOUNTER
Nursing home or Independent living name:  [facility]    Who is calling:  Eugenia MORIN    Callback number:   [phone]    Symptoms:   Hemoglobin 5.6    Did you page oncall or place in triage hub:  yes

## 2025-06-14 NOTE — ED ATTENDING ATTESTATION
6/13/2025  I, Tucker Sharpe DO, saw and evaluated the patient. I have discussed the patient with the resident/non-physician practitioner and agree with the resident's/non-physician practitioner's findings, Plan of Care, and MDM as documented in the resident's/non-physician practitioner's note, except where noted. All available labs and Radiology studies were reviewed.  I was present for key portions of any procedure(s) performed by the resident/non-physician practitioner and I was immediately available to provide assistance.       At this point I agree with the current assessment done in the Emergency Department.  I have conducted an independent evaluation of this patient a history and physical is as follows:            1. UGIB (upper gastrointestinal bleed)    2. Severe anemia    3. NSTEMI (non-ST elevated myocardial infarction) (HCC)    4. Stage 4 chronic kidney disease (HCC)    5. Elevated lactic acid level    6. COPD exacerbation (HCC)    7. Fluid overload    8. History of CHF (congestive heart failure)    9. Type 2 diabetes mellitus without complication, without long-term current use of insulin (HCC)    10. Paroxysmal atrial fibrillation (HCC)            MDM  Number of Diagnoses or Management Options  COPD exacerbation (HCC)  Elevated lactic acid level  Fluid overload  History of CHF (congestive heart failure)  NSTEMI (non-ST elevated myocardial infarction) (HCC)  Paroxysmal atrial fibrillation (HCC)  Severe anemia  Stage 4 chronic kidney disease (HCC)  Type 2 diabetes mellitus without complication, without long-term current use of insulin (HCC)  UGIB (upper gastrointestinal bleed)  Diagnosis management comments:       Initial ED assessment:     84-year-old female, generalized weakness, low hemoglobin level, increasing lower extremity edema, rales on exam    Pathology at risk for includes but is not limited to:    Low hemoglobin, consider GI in origin.,  Consider chronic disease, likely a degree of fluid  overload with increasing lower extremity edema and rales on exam    Initial ED plan:    Diuresis, Bumex, patient will require blood transfusion due to critically low hemoglobin level.  Admission        Final ED summary/disposition:   After evaluation and workup in the emergency department, ultimately found to have melanotic stools on digital rectal exam.,  Transfused, admitted to internal medicine service.               Time reflects when diagnosis was documented in both MDM as applicable and the Disposition within this note       Time User Action Codes Description Comment    6/13/2025 11:32 PM AhmedSedrick Add [K92.2] UGIB (upper gastrointestinal bleed)     6/13/2025 11:32 PM Ahmed, Sedrick Add [D64.9] Severe anemia     6/13/2025 11:32 PM Ahmed, Sedrick Add [I21.4] NSTEMI (non-ST elevated myocardial infarction) ([facility])     6/13/2025 11:33 PM Ahmed, Sedrick Add [N18.4] Stage 4 chronic kidney disease ([facility])     6/13/2025 11:33 PM AhmedKalyanza Add [R79.89] Elevated lactic acid level     6/13/2025 11:33 PM Ahmed Sedrick Add [J44.1] COPD exacerbation ([facility])     6/13/2025 11:33 PM Ahmed Sedrick Add [E87.70] Fluid overload     6/13/2025 11:33 PM Ahmed Sedrick Add [Z86.79] History of CHF (congestive heart failure)     6/13/2025 11:34 PM Ahmed Sedrick Add [E11.9] Type 2 diabetes mellitus without complication, without long-term current use of insulin ([facility])     6/13/2025 11:34 PM Kalyan Bustosza Add [I48.0] Paroxysmal atrial fibrillation ([facility])           ED Disposition       ED Disposition   Admit    Condition   Stable    Date/Time   Fri Jun 13, 2025 11:37 PM    Comment   Case was discussed with KIYA and the patient's admission status was agreed to be Admission Status: inpatient status to the service of   .               Follow-up Information    None                       Chief Complaint   Patient presents with    Abnormal Lab     Pt from [facility]. Facility reports pt had a Hgb of 8.8 on 6/6 and today it was 5.6. Pt wears 2L  NC chronically, GCS 14 at baseline. Pt reports weakness and a cough for a few days              84-year-old female, increased work of breathing, feeling tired.  Currently at a rehab facility, was brought over due to outpatient low hemoglobin levels, outpatient hemoglobin was 6.6.  She has been having difficulty participating with physical therapy due to weakness.  She has a history of COPD as well as congestive heart failure.  She has been having increasing lower extremity edema and a wet but nonproductive cough.  No falls no injury no trauma.  No headache no neck pain.  No abdominal pain.  Patient is here with her daughter.      History provided by:  Patient and relative                Visit Vitals  /56 (BP Location: Right arm)   Pulse 67   Temp 98.1 °F (36.7 °C) (Oral)   Resp 20   SpO2 93%   OB Status Postmenopausal   Smoking Status Never        Physical Exam  Vitals reviewed.   Constitutional:       Appearance: She is well-developed. She is not diaphoretic.   HENT:      Head: Normocephalic and atraumatic.      Right Ear: External ear normal.      Left Ear: External ear normal.      Nose: Nose normal.     Eyes:      General:         Right eye: No discharge.         Left eye: No discharge.      Pupils: Pupils are equal, round, and reactive to light.     Neck:      Trachea: No tracheal deviation.     Cardiovascular:      Rate and Rhythm: Normal rate and regular rhythm.      Heart sounds: Normal heart sounds. No murmur heard.  Pulmonary:      Effort: Pulmonary effort is normal. No respiratory distress.      Breath sounds: No stridor. Rales present.   Abdominal:      General: There is no distension.      Palpations: Abdomen is soft.      Tenderness: There is no abdominal tenderness. There is no guarding or rebound.     Musculoskeletal:         General: Normal range of motion.      Cervical back: Normal range of motion and neck supple.     Skin:     General: Skin is warm and dry.      Coloration: Skin is pale.       Findings: No erythema.     Neurological:      General: No focal deficit present.      Mental Status: She is alert and oriented to person, place, and time.                       Medications   pantoprazole (PROTONIX) 80 mg in sodium chloride 0.9 % 100 mL IVPB (0 mg Intravenous Stopped 6/13/25 2358)     Followed by   pantoprazole (PROTONIX) 80 mg in sodium chloride 0.9 % 100 mL infusion (8 mg/hr Intravenous New Bag 6/13/25 2359)   insulin lispro (HumALOG/ADMELOG) 100 units/mL subcutaneous injection 1-6 Units (has no administration in time range)   bumetanide (BUMEX) injection 1 mg (1 mg Intravenous Given 6/13/25 2241)   albuterol inhalation solution 5 mg (5 mg Nebulization Given 6/13/25 2238)   ipratropium (ATROVENT) 0.02 % inhalation solution 0.5 mg (0.5 mg Nebulization Given 6/13/25 2238)             Labs Reviewed   IFOBT (FIT) - Abnormal       Result Value Ref Range Status    OCCULT BLD, FECAL IMMUNOLOGICAL Positive (*) Negative Final    Narrative:       Performed by Fecal Immunochemical Test.   CBC AND DIFFERENTIAL - Abnormal    WBC 5.22  4.31 - 10.16 Thousand/uL Final    RBC 1.72 (*) 3.81 - 5.12 Million/uL Final    Hemoglobin 5.2 (*) 11.5 - 15.4 g/dL Final    Comment: Results verified by repeat    Hematocrit 16.8 (*) 34.8 - 46.1 % Final    MCV 98  82 - 98 fL Final    MCH 30.2  26.8 - 34.3 pg Final    MCHC 31.0 (*) 31.4 - 37.4 g/dL Final    RDW 18.0 (*) 11.6 - 15.1 % Final    MPV 11.0  8.9 - 12.7 fL Final    Platelets 216  149 - 390 Thousands/uL Final    nRBC 0  /100 WBCs Final    Segmented % 78 (*) 43 - 75 % Final    Immature Grans % 0  0 - 2 % Final    Lymphocytes % 13 (*) 14 - 44 % Final    Monocytes % 9  4 - 12 % Final    Eosinophils Relative 0  0 - 6 % Final    Basophils Relative 0  0 - 1 % Final    Absolute Neutrophils 4.02  1.85 - 7.62 Thousands/µL Final    Absolute Immature Grans 0.02  0.00 - 0.20 Thousand/uL Final    Absolute Lymphocytes 0.67  0.60 - 4.47 Thousands/µL Final    Absolute Monocytes 0.49   0.17 - 1.22 Thousand/µL Final    Eosinophils Absolute 0.01  0.00 - 0.61 Thousand/µL Final    Basophils Absolute 0.01  0.00 - 0.10 Thousands/µL Final    Narrative:     This is an appended report.  These results have been appended to a previously verified report.   COMPREHENSIVE METABOLIC PANEL - Abnormal    Sodium 138  135 - 147 mmol/L Final    Potassium 3.6  3.5 - 5.3 mmol/L Final    Chloride 95 (*) 96 - 108 mmol/L Final    CO2 36 (*) 21 - 32 mmol/L Final    ANION GAP 7  4 - 13 mmol/L Final     (*) 5 - 25 mg/dL Final    Creatinine 1.66 (*) 0.60 - 1.30 mg/dL Final    Comment: Standardized to IDMS reference method    Glucose 382 (*) 65 - 140 mg/dL Final    Comment: If the patient is fasting, the ADA then defines impaired fasting glucose as > 100 mg/dL and diabetes as > or equal to 123 mg/dL.    Calcium 8.9  8.4 - 10.2 mg/dL Final    Corrected Calcium 9.6  8.3 - 10.1 mg/dL Final    AST 69 (*) 13 - 39 U/L Final    ALT 27  7 - 52 U/L Final    Comment: Specimen collection should occur prior to Sulfasalazine administration due to the potential for falsely depressed results.     Alkaline Phosphatase 68  34 - 104 U/L Final    Total Protein 6.2 (*) 6.4 - 8.4 g/dL Final    Albumin 3.1 (*) 3.5 - 5.0 g/dL Final    Total Bilirubin 0.60  0.20 - 1.00 mg/dL Final    Comment: Use of this assay is not recommended for patients undergoing treatment with eltrombopag due to the potential for falsely elevated results.  N-acetyl-p-benzoquinone imine (metabolite of Acetaminophen) will generate erroneously low results in samples for patients that have taken an overdose of Acetaminophen.    eGFR 28  ml/min/1.73sq m Final    Narrative:     National Kidney Disease Foundation guidelines for Chronic Kidney Disease (CKD):     Stage 1 with normal or high GFR (GFR > 90 mL/min/1.73 square meters)    Stage 2 Mild CKD (GFR = 60-89 mL/min/1.73 square meters)    Stage 3A Moderate CKD (GFR = 45-59 mL/min/1.73 square meters)    Stage 3B Moderate CKD  "(GFR = 30-44 mL/min/1.73 square meters)    Stage 4 Severe CKD (GFR = 15-29 mL/min/1.73 square meters)    Stage 5 End Stage CKD (GFR <15 mL/min/1.73 square meters)  Note: GFR calculation is accurate only with a steady state creatinine   HS TROPONIN I 0HR - Abnormal    hs TnI 0hr 192 (*) \"Refer to ACS Flowchart\"- see link ng/L Final    Comment:                                              Initial (time 0) result  If >=50 ng/L, Myocardial injury suggested ;  Type of myocardial injury and treatment strategy  to be determined.  If 5-49 ng/L, a delta result at 2 hours will be needed to further evaluate.  If <4 ng/L, and chest pain has been >3 hours since onset, patient may qualify for discharge based on the HEART score in the ED.  If <5 ng/L and <3hours since onset of chest pain, a delta result at 2 hours will be needed to further evaluate.    HS Troponin 99th Percentile URL of a Health Population=12 ng/L with a 95% Confidence Interval of 8-18 ng/L.    Second Troponin (time 2 hours)  If calculated delta >= 20 ng/L,  Myocardial injury suggested ; Type of myocardial injury and treatment strategy to be determined.  If 5-49 ng/L and the calculated delta is 5-19 ng/L, consult medical service for evaluation.  Continue evaluation for ischemia on ecg and other possible etiology and repeat hs troponin at 4 hours.  If delta is <5 ng/L at 2 hours, consider discharge based on risk stratification via the HEART score (if in ED), or DILSHAD risk score in IP/Observation.    HS Troponin 99th Percentile URL of a Health Population=12 ng/L with a 95% Confidence Interval of 8-18 ng/L.   B-TYPE NATRIURETIC PEPTIDE (BNP) - Abnormal    BNP 3,288 (*) 0 - 100 pg/mL Final   LACTIC ACID, PLASMA (W/REFLEX IF RESULT > 2.0) - Abnormal    LACTIC ACID 2.6 (*) 0.5 - 2.0 mmol/L Final    Narrative:     Result may be elevated if tourniquet was used during collection.   HS TROPONIN I 2HR - Abnormal    hs TnI 2hr 184 (*) \"Refer to ACS Flowchart\"- see link ng/L " Final    Comment:                                              Initial (time 0) result  If >=50 ng/L, Myocardial injury suggested ;  Type of myocardial injury and treatment strategy  to be determined.  If 5-49 ng/L, a delta result at 2 hours will be needed to further evaluate.  If <4 ng/L, and chest pain has been >3 hours since onset, patient may qualify for discharge based on the HEART score in the ED.  If <5 ng/L and <3hours since onset of chest pain, a delta result at 2 hours will be needed to further evaluate.    HS Troponin 99th Percentile URL of a Health Population=12 ng/L with a 95% Confidence Interval of 8-18 ng/L.    Second Troponin (time 2 hours)  If calculated delta >= 20 ng/L,  Myocardial injury suggested ; Type of myocardial injury and treatment strategy to be determined.  If 5-49 ng/L and the calculated delta is 5-19 ng/L, consult medical service for evaluation.  Continue evaluation for ischemia on ecg and other possible etiology and repeat hs troponin at 4 hours.  If delta is <5 ng/L at 2 hours, consider discharge based on risk stratification via the HEART score (if in ED), or DILSHAD risk score in IP/Observation.    HS Troponin 99th Percentile URL of a Health Population=12 ng/L with a 95% Confidence Interval of 8-18 ng/L.    Delta 2hr hsTnI -8  <20 ng/L Final   LACTIC ACID 2 HOUR   HS TROPONIN I 4HR   TYPE AND SCREEN    ABO Grouping B   Final    Rh Factor Positive   Final    Antibody Screen Negative   Final    Specimen Expiration Date 20250616   Final   PREPARE LEUKOREDUCED RBC    Unit Product Code P2867D38   Final    Unit Number [id number]   Final    Unit ABO B   Final    Unit RH POS   Final    Crossmatch Compatible   Final    Unit Dispense Status Crossmatched   Final    Unit Product Volume 350  ml Final    Unit Product Code X3945N38       Unit Number [id number]       Unit ABO B       Unit RH POS       Crossmatch Compatible   Final    Unit Dispense Status Issued       Unit Product Volume 350   ml    ABORH RECHECK    ABO Grouping B   Final    Rh Factor Positive   Final         XR chest 1 view portable   ED Interpretation   Small bilateral pleural effusions.  No focal consolidation pneumothorax.  Appears similar to prior chest x-ray done on May 28, 2025.                     Procedures         Critical Care Time Statement: Upon my evaluation, this patient had a high probability of imminent or life-threatening deterioration due to symptomatic anemia, which required my direct attention, intervention, and personal management.  I spent a total of 35 minutes directly providing critical care services, including complex medical decision making (to support/prevent further life-threatening deterioration). This time is exclusive of procedures, teaching, treating other patients, family meetings, and any prior time recorded by providers other than myself.

## 2025-06-14 NOTE — ASSESSMENT & PLAN NOTE
Patient presents from living facility with complaints of low hemoglobin. In ER, results to 5.2.   Fecal occult obtained in ER +. BUN elevated   Will admit for UGIB   Continue to monitor hemoglobin closely   IV protonix   NPO   Hold xarelto  GI consult for possible EGD

## 2025-06-14 NOTE — QUICK NOTE
Interval progress note  For full medical evaluation please refer to Genesis Keith MD H&P completed at 12:54 AM today    Patient seen evaluated bedside.  In no acute distress.  She is AOA x 3.  Although she is confused as to why she is here.  Explanation provided to patient.  She has a history of cognitive disturbance at baseline.  She is able to move all extremity's and follow commands.  She notes improvement in her symptoms since receiving 2 units of packed RBCs.  Hemoglobin this morning of 7.3.  Patient is still n.p.o. awaiting GI evaluation for likely need for endoscopy.  Continue to hold Xarelto.  Repeat hemoglobin and lactic acid at 1130 today.    Patient noted to be hypokalemic this morning with a potassium of 3.0.  80 mill equivalents of IV potassium ordered.    Cardiology consulted in ed and continue to follow. appreciate recs for chronic diastolic hf and elevated troponins.  No acute EKG changes noted.  Troponins peaked.  This is likely due to demand ischemia in the setting of hemoglobin that was 5.3 on arrival.  Patient denies any chest pains today.    Respiratory status is at baseline.  Patient has history of COPD with significant severity.  She uses 2 L via nasal cannula at baseline which what she is resting comfortably on in the room.  Lungs clear to auscultation with diminished air movement noted bilaterally.    Daughter Naomie Hartmann provided medical update by telephone.  All questions and concerns answered.    Ramana Vasquez, DO  PGY-2 Family Medicine

## 2025-06-14 NOTE — ASSESSMENT & PLAN NOTE
Renal function has been poor in the last few weeks, thought to be due renal hypoperfusion iso HF  Creatinine remains elevated and not at baseline despite diuresis   Will check renal U/S to r/o obstructive etiology  Monitor intake and output  Avoid nephrotoxins as able

## 2025-06-14 NOTE — PLAN OF CARE
Problem: Prexisting or High Potential for Compromised Skin Integrity  Goal: Skin integrity is maintained or improved  Description: INTERVENTIONS:  - Identify patients at risk for skin breakdown  - Assess and monitor skin integrity including under and around medical devices   - Assess and monitor nutrition and hydration status  - Monitor labs  - Assess for incontinence   - Turn and reposition patient  - Assist with mobility/ambulation  - Relieve pressure over jessica prominences   - Avoid friction and shearing  - Provide appropriate hygiene as needed including keeping skin clean and dry  - Evaluate need for skin moisturizer/barrier cream  - Collaborate with interdisciplinary team  - Patient/family teaching  - Consider wound care consult    Assess:  - Review Wu scale daily  - Clean and moisturize skin every day  - Inspect skin when repositioning, toileting, and assisting with ADLS  - Assess under medical devices   - Assess extremities for adequate circulation and sensation     Bed Management:  - Have minimal linens on bed & keep smooth, unwrinkled  - Change linens as needed when moist or perspiring  - Avoid sitting or lying in one position for more than 2 hours while in bed?Keep HOB at 30 degrees   - Toileting:  - Offer bedside commode  - Assess for incontinence every hour  - Use incontinent care products after each incontinent episode     Activity:  - Turn and reposition patient every 2 Hours  - Use appropriate equipment to lift or move patient in bed    Skin Care:  - Avoid use of baby powder, tape, friction and shearing, hot water or constrictive clothing  - Relieve pressure over bony prominences using Mepilex  - Do not massage red bony areas    Next Steps:  - Teach patient strategies to minimize risks   - Consider consults to  interdisciplinary teams   Outcome: Progressing     Problem: CARDIOVASCULAR - ADULT  Goal: Maintains optimal cardiac output and hemodynamic stability  Description: INTERVENTIONS:  -  Monitor I/O, vital signs and rhythm  - Monitor for S/S and trends of decreased cardiac output  - Administer and titrate ordered vasoactive medications to optimize hemodynamic stability  - Assess quality of pulses, skin color and temperature  - Assess for signs of decreased coronary artery perfusion  - Instruct patient to report change in severity of symptoms  Outcome: Progressing  Goal: Absence of cardiac dysrhythmias or at baseline rhythm  Description: INTERVENTIONS:  - Continuous cardiac monitoring, vital signs, obtain 12 lead EKG if ordered  - Administer antiarrhythmic and heart rate control medications as ordered  - Monitor electrolytes and administer replacement therapy as ordered  Outcome: Progressing     Problem: GASTROINTESTINAL - ADULT  Goal: Minimal or absence of nausea and/or vomiting  Description: INTERVENTIONS:  - Administer IV fluids if ordered to ensure adequate hydration  - Maintain NPO status until nausea and vomiting are resolved  - Nasogastric tube if ordered  - Administer ordered antiemetic medications as needed  - Provide nonpharmacologic comfort measures as appropriate  - Advance diet as tolerated, if ordered  - Consider nutrition services referral to assist patient with adequate nutrition and appropriate food choices  Outcome: Progressing  Goal: Maintains or returns to baseline bowel function  Description: INTERVENTIONS:  - Assess bowel function  - Encourage oral fluids to ensure adequate hydration  - Administer IV fluids if ordered to ensure adequate hydration  - Administer ordered medications as needed  - Encourage mobilization and activity  - Consider nutritional services referral to assist patient with adequate nutrition and appropriate food choices  Outcome: Progressing  Goal: Maintains adequate nutritional intake  Description: INTERVENTIONS:  - Monitor percentage of each meal consumed  - Identify factors contributing to decreased intake, treat as appropriate  - Assist with meals as  needed  - Monitor I&O, weight, and lab values if indicated  - Obtain nutrition services referral as needed  Outcome: Progressing  Goal: Establish and maintain optimal ostomy function  Description: INTERVENTIONS:  - Assess bowel function  - Encourage oral fluids to ensure adequate hydration  - Administer IV fluids if ordered to ensure adequate hydration   - Administer ordered medications as needed  - Encourage mobilization and activity  - Nutrition services referral to assist patient with appropriate food choices  - Assess stoma site  - Consider wound care consult   Outcome: Progressing  Goal: Oral mucous membranes remain intact  Description: INTERVENTIONS  - Assess oral mucosa and hygiene practices  - Implement preventative oral hygiene regimen  - Implement oral medicated treatments as ordered  - Initiate Nutrition services referral as needed  Outcome: Progressing     Problem: METABOLIC, FLUID AND ELECTROLYTES - ADULT  Goal: Electrolytes maintained within normal limits  Description: INTERVENTIONS:  - Monitor labs and assess patient for signs and symptoms of electrolyte imbalances  - Administer electrolyte replacement as ordered  - Monitor response to electrolyte replacements, including repeat lab results as appropriate  - Instruct patient on fluid and nutrition as appropriate  Outcome: Progressing  Goal: Glucose maintained within target range  Description: INTERVENTIONS:  - Monitor Blood Glucose as ordered  - Assess for signs and symptoms of hyperglycemia and hypoglycemia  - Administer ordered medications to maintain glucose within target range  - Assess nutritional intake and initiate nutrition service referral as needed  Outcome: Progressing     Problem: HEMATOLOGIC - ADULT  Goal: Maintains hematologic stability  Description: INTERVENTIONS  - Assess for signs and symptoms of bleeding or hemorrhage  - Monitor labs  - Administer supportive blood products/factors as ordered and appropriate  Outcome: Progressing
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
## 2025-06-14 NOTE — ASSESSMENT & PLAN NOTE
Lab Results   Component Value Date    HGBA1C 6.5 04/15/2025       Recent Labs     06/14/25  0220 06/14/25  0617   POCGLU 276* 223*       Blood Sugar Average: Last 72 hrs:  (P) 249.5  Hemoglobin A1c well-controlled  Managed by primary team

## 2025-06-14 NOTE — ASSESSMENT & PLAN NOTE
Patient initially noted to be wheezing, since resolved after duoneb in ER  Lower suspicion for acute exacerbation at this time, will defer steroids  Continue with nebulizers as needed   Pulmicort BID

## 2025-06-14 NOTE — ASSESSMENT & PLAN NOTE
"Lab Results   Component Value Date    HGBA1C 6.5 04/15/2025       No results for input(s): \"POCGLU\" in the last 72 hours.    Blood Sugar Average: Last 72 hrs:  BG elevated to 382  Place on accuchecks, every 6 hrs while NPO  ISS as needed    "
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
## 2025-06-14 NOTE — ASSESSMENT & PLAN NOTE
Wt Readings from Last 3 Encounters:   06/13/25 60 kg (132 lb 3.2 oz)   06/09/25 57.5 kg (126 lb 12.8 oz)   06/06/25 59.1 kg (130 lb 3.2 oz)     Patient noted to have increased weight gain since discharge earlier this month, received extra metolazone outpatient due to weight gain. Second HF admission within the month.  Will place on HF protocol   Telemetry monitoring   Received IV bumex once  Will continue with bumex BID dosing  Low sodium diet. Fluid restriction to 2L/day   Monitor intake and output   Prior echo 10/24 reviewed, had worsening valvular disease  Repeat echo  Consult to cardiology

## 2025-06-14 NOTE — ASSESSMENT & PLAN NOTE
Wt Readings from Last 3 Encounters:   06/14/25 53.6 kg (118 lb 2.7 oz)   06/13/25 60 kg (132 lb 3.2 oz)   06/09/25 57.5 kg (126 lb 12.8 oz)   Home Rx: Bumex 3 mg twice daily and metolazone 2.5 mg daily for weight gain of 3 pounds in a day or 5 pounds in 5 to 7 days.  Chest x-ray: Small bilateral pleural effusions, vascular congestion.   BNP on admission 3288  Weight on admission reported 60 kg.  Weight today by bed scale 53.6 kg.  Intake/output inaccurate  On examination:  Non-pitting edema over b/l LE.   Lungs: Rhonchi, course cough. No rales.    On 2 L NC (baseline)     Currently diuresing with IV Bumex 3 mg twice daily   Echocardiogram pending
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
## 2025-06-14 NOTE — ASSESSMENT & PLAN NOTE
Severe symptomatic anemia in the setting of anticoagulation with Xarelto presumably taken yesterday.  Heme-positive stool is nonspecific particularly in the setting of anticoagulation, but given drop in hemoglobin GI bleeding should be excluded nonetheless.    Appears hemodynamically stable currently and showing no clinical signs of active upper or lower GI bleeding currently.  Hemoglobin does appear significantly decreased since 11 days ago (5.2 from 8.6), improved with transfusions (7.3 after 2 units PRBCs).      - Would likely benefit from bidirectional endoscopic examination after patient can be adequately prepped and after Xarelto can be held for 48 hours    - May have diet for now, clear liquid diet tomorrow, can tentatively plan to prep tomorrow for colonoscopy/EGD Monday    - Xarelto confirmed to be on hold, discussed with nursing    - Can consider EGD more urgently in the interim if patient does develop signs of hemodynamically significant upper GI bleeding    - Continue IV Protonix, though could discontinue drip in favor of IV dosing twice daily

## 2025-06-14 NOTE — ASSESSMENT & PLAN NOTE
Patient denies chest pain, EKG non ischemic   Due to HF, demand ischemia  Continue to trend, monitor on telemetry

## 2025-06-14 NOTE — ASSESSMENT & PLAN NOTE
2/2 UGIB   S/p 2u prbc transfusion in ER  Repeat cbc in 4 hrs post transfusion ([place] 11:30am)  Continue to monitor hemoglobin closely   Hold antiplatelet/AC
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
## 2025-06-14 NOTE — ASSESSMENT & PLAN NOTE
Troponins elevated with flattening 192> 182  EKG showing no acute ischemic changes.  She denies chest pain.   Troponin elevation most likely multifactorial in the setting of acute anemia and acute heart failure

## 2025-06-15 ENCOUNTER — APPOINTMENT (INPATIENT)
Dept: RADIOLOGY | Facility: HOSPITAL | Age: 85
DRG: 377 | End: 2025-06-15
Payer: MEDICARE

## 2025-06-15 LAB
ALBUMIN SERPL BCG-MCNC: 3.1 G/DL (ref 3.5–5)
ALP SERPL-CCNC: 61 U/L (ref 34–104)
ALT SERPL W P-5'-P-CCNC: 22 U/L (ref 7–52)
ANION GAP SERPL CALCULATED.3IONS-SCNC: 5 MMOL/L (ref 4–13)
AST SERPL W P-5'-P-CCNC: 62 U/L (ref 13–39)
ATRIAL RATE: 111 BPM
ATRIAL RATE: 65 BPM
BASE EX.OXY STD BLDV CALC-SCNC: 95.1 % (ref 60–80)
BASE EXCESS BLDV CALC-SCNC: 7.3 MMOL/L
BASOPHILS # BLD AUTO: 0.03 THOUSANDS/ÂΜL (ref 0–0.1)
BASOPHILS NFR BLD AUTO: 1 % (ref 0–1)
BILIRUB SERPL-MCNC: 0.98 MG/DL (ref 0.2–1)
BUN SERPL-MCNC: 74 MG/DL (ref 5–25)
CALCIUM ALBUM COR SERPL-MCNC: 9.5 MG/DL (ref 8.3–10.1)
CALCIUM SERPL-MCNC: 8.8 MG/DL (ref 8.4–10.2)
CARDIAC TROPONIN I PNL SERPL HS: 157 NG/L (ref ?–50)
CHLORIDE SERPL-SCNC: 97 MMOL/L (ref 96–108)
CO2 SERPL-SCNC: 41 MMOL/L (ref 21–32)
CREAT SERPL-MCNC: 1.44 MG/DL (ref 0.6–1.3)
EOSINOPHIL # BLD AUTO: 0.07 THOUSAND/ÂΜL (ref 0–0.61)
EOSINOPHIL NFR BLD AUTO: 1 % (ref 0–6)
ERYTHROCYTE [DISTWIDTH] IN BLOOD BY AUTOMATED COUNT: 17 % (ref 11.6–15.1)
GFR SERPL CREATININE-BSD FRML MDRD: 33 ML/MIN/1.73SQ M
GLUCOSE SERPL-MCNC: 196 MG/DL (ref 65–140)
GLUCOSE SERPL-MCNC: 201 MG/DL (ref 65–140)
GLUCOSE SERPL-MCNC: 210 MG/DL (ref 65–140)
GLUCOSE SERPL-MCNC: 221 MG/DL (ref 65–140)
GLUCOSE SERPL-MCNC: 267 MG/DL (ref 65–140)
GLUCOSE SERPL-MCNC: 272 MG/DL (ref 65–140)
HCO3 BLDV-SCNC: 31.4 MMOL/L (ref 24–30)
HCT VFR BLD AUTO: 27.2 % (ref 34.8–46.1)
HGB BLD-MCNC: 8.9 G/DL (ref 11.5–15.4)
IMM GRANULOCYTES # BLD AUTO: 0.02 THOUSAND/UL (ref 0–0.2)
IMM GRANULOCYTES NFR BLD AUTO: 0 % (ref 0–2)
INR PPP: 1.04 (ref 0.85–1.19)
LYMPHOCYTES # BLD AUTO: 0.86 THOUSANDS/ÂΜL (ref 0.6–4.47)
LYMPHOCYTES NFR BLD AUTO: 14 % (ref 14–44)
MAGNESIUM SERPL-MCNC: 2 MG/DL (ref 1.9–2.7)
MCH RBC QN AUTO: 31.3 PG (ref 26.8–34.3)
MCHC RBC AUTO-ENTMCNC: 32.7 G/DL (ref 31.4–37.4)
MCV RBC AUTO: 96 FL (ref 82–98)
MONOCYTES # BLD AUTO: 0.6 THOUSAND/ÂΜL (ref 0.17–1.22)
MONOCYTES NFR BLD AUTO: 10 % (ref 4–12)
NEUTROPHILS # BLD AUTO: 4.64 THOUSANDS/ÂΜL (ref 1.85–7.62)
NEUTS SEG NFR BLD AUTO: 74 % (ref 43–75)
NRBC BLD AUTO-RTO: 0 /100 WBCS
O2 CT BLDV-SCNC: 13.6 ML/DL
P AXIS: 4 DEGREES
P AXIS: 53 DEGREES
PCO2 BLDV: 43 MM HG (ref 42–50)
PH BLDV: 7.48 [PH] (ref 7.3–7.4)
PLATELET # BLD AUTO: 201 THOUSANDS/UL (ref 149–390)
PMV BLD AUTO: 10.6 FL (ref 8.9–12.7)
PO2 BLDV: 163.1 MM HG (ref 35–45)
POTASSIUM SERPL-SCNC: 3 MMOL/L (ref 3.5–5.3)
PR INTERVAL: 162 MS
PR INTERVAL: 172 MS
PROT SERPL-MCNC: 6.1 G/DL (ref 6.4–8.4)
PROTHROMBIN TIME: 14.3 SECONDS (ref 12.3–15)
QRS AXIS: 18 DEGREES
QRS AXIS: 74 DEGREES
QRSD INTERVAL: 108 MS
QRSD INTERVAL: 114 MS
QT INTERVAL: 388 MS
QT INTERVAL: 460 MS
QTC INTERVAL: 478 MS
QTC INTERVAL: 528 MS
RBC # BLD AUTO: 2.84 MILLION/UL (ref 3.81–5.12)
SODIUM SERPL-SCNC: 143 MMOL/L (ref 135–147)
T WAVE AXIS: -87 DEGREES
T WAVE AXIS: 175 DEGREES
VENTRICULAR RATE: 111 BPM
VENTRICULAR RATE: 65 BPM
WBC # BLD AUTO: 6.22 THOUSAND/UL (ref 4.31–10.16)

## 2025-06-15 PROCEDURE — 99233 SBSQ HOSP IP/OBS HIGH 50: CPT | Performed by: INTERNAL MEDICINE

## 2025-06-15 PROCEDURE — 94640 AIRWAY INHALATION TREATMENT: CPT

## 2025-06-15 PROCEDURE — 84484 ASSAY OF TROPONIN QUANT: CPT | Performed by: FAMILY MEDICINE

## 2025-06-15 PROCEDURE — 93005 ELECTROCARDIOGRAM TRACING: CPT

## 2025-06-15 PROCEDURE — 94668 MNPJ CHEST WALL SBSQ: CPT

## 2025-06-15 PROCEDURE — 82948 REAGENT STRIP/BLOOD GLUCOSE: CPT

## 2025-06-15 PROCEDURE — 71045 X-RAY EXAM CHEST 1 VIEW: CPT

## 2025-06-15 PROCEDURE — 80053 COMPREHEN METABOLIC PANEL: CPT

## 2025-06-15 PROCEDURE — 94760 N-INVAS EAR/PLS OXIMETRY 1: CPT

## 2025-06-15 PROCEDURE — 99232 SBSQ HOSP IP/OBS MODERATE 35: CPT | Performed by: INTERNAL MEDICINE

## 2025-06-15 PROCEDURE — 93010 ELECTROCARDIOGRAM REPORT: CPT | Performed by: INTERNAL MEDICINE

## 2025-06-15 PROCEDURE — 83735 ASSAY OF MAGNESIUM: CPT

## 2025-06-15 PROCEDURE — 85025 COMPLETE CBC W/AUTO DIFF WBC: CPT | Performed by: PHYSICIAN ASSISTANT

## 2025-06-15 PROCEDURE — 99232 SBSQ HOSP IP/OBS MODERATE 35: CPT | Performed by: FAMILY MEDICINE

## 2025-06-15 PROCEDURE — 94664 DEMO&/EVAL PT USE INHALER: CPT

## 2025-06-15 PROCEDURE — 82805 BLOOD GASES W/O2 SATURATION: CPT

## 2025-06-15 PROCEDURE — 85610 PROTHROMBIN TIME: CPT | Performed by: PHYSICIAN ASSISTANT

## 2025-06-15 RX ORDER — DILTIAZEM HYDROCHLORIDE 120 MG/1
120 CAPSULE, COATED, EXTENDED RELEASE ORAL DAILY
Status: DISCONTINUED | OUTPATIENT
Start: 2025-06-15 | End: 2025-06-15

## 2025-06-15 RX ORDER — BUMETANIDE 0.25 MG/ML
1 INJECTION, SOLUTION INTRAMUSCULAR; INTRAVENOUS ONCE
Status: COMPLETED | OUTPATIENT
Start: 2025-06-15 | End: 2025-06-15

## 2025-06-15 RX ORDER — LANOLIN ALCOHOL/MO/W.PET/CERES
400 CREAM (GRAM) TOPICAL DAILY
Status: DISCONTINUED | OUTPATIENT
Start: 2025-06-16 | End: 2025-06-24 | Stop reason: HOSPADM

## 2025-06-15 RX ORDER — POTASSIUM CHLORIDE 1500 MG/1
40 TABLET, EXTENDED RELEASE ORAL 2 TIMES DAILY
Status: COMPLETED | OUTPATIENT
Start: 2025-06-15 | End: 2025-06-15

## 2025-06-15 RX ORDER — INSULIN LISPRO 100 [IU]/ML
1-6 INJECTION, SOLUTION INTRAVENOUS; SUBCUTANEOUS
Status: DISCONTINUED | OUTPATIENT
Start: 2025-06-15 | End: 2025-06-18

## 2025-06-15 RX ORDER — PANTOPRAZOLE SODIUM 40 MG/10ML
40 INJECTION, POWDER, LYOPHILIZED, FOR SOLUTION INTRAVENOUS EVERY 12 HOURS SCHEDULED
Status: DISCONTINUED | OUTPATIENT
Start: 2025-06-15 | End: 2025-06-19

## 2025-06-15 RX ORDER — GUAIFENESIN 600 MG/1
1200 TABLET, EXTENDED RELEASE ORAL EVERY 12 HOURS SCHEDULED
Status: DISCONTINUED | OUTPATIENT
Start: 2025-06-15 | End: 2025-06-18

## 2025-06-15 RX ORDER — BISACODYL 5 MG/1
10 TABLET, DELAYED RELEASE ORAL ONCE
Status: COMPLETED | OUTPATIENT
Start: 2025-06-15 | End: 2025-06-15

## 2025-06-15 RX ORDER — METOPROLOL TARTRATE 1 MG/ML
2.5 INJECTION, SOLUTION INTRAVENOUS EVERY 6 HOURS PRN
Status: DISCONTINUED | OUTPATIENT
Start: 2025-06-15 | End: 2025-06-18

## 2025-06-15 RX ADMIN — INSULIN LISPRO 2 UNITS: 100 INJECTION, SOLUTION INTRAVENOUS; SUBCUTANEOUS at 06:29

## 2025-06-15 RX ADMIN — BISACODYL 10 MG: 5 TABLET, COATED ORAL at 14:04

## 2025-06-15 RX ADMIN — BUDESONIDE 0.5 MG: 0.5 INHALANT RESPIRATORY (INHALATION) at 20:42

## 2025-06-15 RX ADMIN — BUMETANIDE 1 MG: 0.25 INJECTION INTRAMUSCULAR; INTRAVENOUS at 12:13

## 2025-06-15 RX ADMIN — INSULIN LISPRO 3 UNITS: 100 INJECTION, SOLUTION INTRAVENOUS; SUBCUTANEOUS at 12:21

## 2025-06-15 RX ADMIN — METOPROLOL TARTRATE 2.5 MG: 1 INJECTION, SOLUTION INTRAVENOUS at 14:05

## 2025-06-15 RX ADMIN — SODIUM CHLORIDE 8 MG/HR: 9 INJECTION, SOLUTION INTRAVENOUS at 04:48

## 2025-06-15 RX ADMIN — INSULIN LISPRO 2 UNITS: 100 INJECTION, SOLUTION INTRAVENOUS; SUBCUTANEOUS at 22:03

## 2025-06-15 RX ADMIN — ESCITALOPRAM OXALATE 5 MG: 10 TABLET ORAL at 09:42

## 2025-06-15 RX ADMIN — INSULIN LISPRO 4 UNITS: 100 INJECTION, SOLUTION INTRAVENOUS; SUBCUTANEOUS at 18:00

## 2025-06-15 RX ADMIN — POTASSIUM CHLORIDE 40 MEQ: 1500 TABLET, EXTENDED RELEASE ORAL at 09:42

## 2025-06-15 RX ADMIN — FLUTICASONE PROPIONATE 1 SPRAY: 50 SPRAY, METERED NASAL at 09:51

## 2025-06-15 RX ADMIN — PANTOPRAZOLE SODIUM 40 MG: 40 INJECTION, POWDER, LYOPHILIZED, FOR SOLUTION INTRAVENOUS at 12:18

## 2025-06-15 RX ADMIN — ATORVASTATIN CALCIUM 40 MG: 40 TABLET, FILM COATED ORAL at 09:42

## 2025-06-15 RX ADMIN — POTASSIUM CHLORIDE 40 MEQ: 1500 TABLET, EXTENDED RELEASE ORAL at 18:20

## 2025-06-15 RX ADMIN — GUAIFENESIN 1200 MG: 600 TABLET, MULTILAYER, EXTENDED RELEASE ORAL at 14:04

## 2025-06-15 RX ADMIN — METOPROLOL SUCCINATE 50 MG: 50 TABLET, EXTENDED RELEASE ORAL at 09:42

## 2025-06-15 RX ADMIN — BUMETANIDE 3 MG: 0.25 INJECTION INTRAMUSCULAR; INTRAVENOUS at 09:44

## 2025-06-15 RX ADMIN — BUDESONIDE 0.5 MG: 0.5 INHALANT RESPIRATORY (INHALATION) at 07:11

## 2025-06-15 RX ADMIN — GUAIFENESIN 1200 MG: 600 TABLET, MULTILAYER, EXTENDED RELEASE ORAL at 22:07

## 2025-06-15 RX ADMIN — PANTOPRAZOLE SODIUM 40 MG: 40 INJECTION, POWDER, LYOPHILIZED, FOR SOLUTION INTRAVENOUS at 22:06

## 2025-06-15 RX ADMIN — DONEPEZIL HYDROCHLORIDE 5 MG: 5 TABLET ORAL at 22:03

## 2025-06-15 NOTE — PROGRESS NOTES
Progress Note - Gastroenterology   Name: Camryn Roblero 84 y.o. female I MRN: [id number]  Unit/Bed#: S -01 I Date of Admission: 6/13/2025   Date of Service: 6/15/2025 I Hospital Day: 2    Assessment & Plan  Normocytic anemia  Severe symptomatic anemia in the setting of anticoagulation with Xarelto presumably taken yesterday.  Heme-positive stool is nonspecific particularly in the setting of anticoagulation, but given drop in hemoglobin GI bleeding should be excluded nonetheless.    Appears hemodynamically stable currently and showing no clinical signs of active upper or lower GI bleeding currently.  Hemoglobin does appear significantly decreased since 11 days ago (5.2 from 8.6), improved with transfusions (7.3 after 2 units PRBCs).  Stable since transfusions.    - Would likely benefit from bidirectional endoscopic examination after patient can be adequately prepped and after Xarelto can be held for 48 hours; tentatively planning for EGD and colonoscopy tomorrow  (ADDENDUM -I spoke with internal medicine service, as patient has been noted with tachycardia and some concern for fluid overload since later this morning, will hold off on administering bowel prep today and can reevaluate patient Monday, cancel EGD/colonoscopy for Monday.  As long as patient is not showing any signs of active GI bleeding, endoscopy can be deferred until she is optimized from a cardiopulmonary standpoint.)    - Clear liquid diet for today, bowel prep this afternoon, n.p.o. after midnight    - Xarelto confirmed to be on hold, discussed with nursing    - Can consider EGD more urgently in the interim if patient does develop signs of hemodynamically significant upper GI bleeding    - May discontinue Protonix drip as she does not appear to be having acute upper GI bleed, can continue as twice daily dosing for now        Subjective   Patient denies any rectal bleeding or melena, any worsening shortness of breath.    Objective :  Temp:  [97.2 °F  (36.2 °C)-97.9 °F (36.6 °C)] 97.2 °F (36.2 °C)  HR:  [54-71] 54  BP: (112-139)/(51-72) 139/62  Resp:  [18] 18  SpO2:  [92 %-100 %] 100 %  O2 Device: Nasal cannula  Nasal Cannula O2 Flow Rate (L/min):  [2 L/min-3 L/min] 3 L/min    Physical Exam  Constitutional:       General: She is not in acute distress.     Appearance: She is well-developed. She is not diaphoretic.   HENT:      Head: Normocephalic and atraumatic.     Eyes:      Conjunctiva/sclera: Conjunctivae normal.      Pupils: Pupils are equal, round, and reactive to light.       Cardiovascular:      Rate and Rhythm: Normal rate and regular rhythm.      Heart sounds: Normal heart sounds. No murmur heard.     No friction rub. No gallop.   Pulmonary:      Effort: Pulmonary effort is normal. No respiratory distress.      Breath sounds: Normal breath sounds. No stridor. No wheezing or rales.   Abdominal:      General: Bowel sounds are normal. There is no distension.      Palpations: Abdomen is soft. There is no mass.      Tenderness: There is no abdominal tenderness. There is no guarding or rebound.     Musculoskeletal:         General: No tenderness. Normal range of motion.      Cervical back: Normal range of motion and neck supple.     Skin:     General: Skin is warm and dry.      Coloration: Skin is not pale.      Findings: No erythema or rash.     Neurological:      Mental Status: She is alert and oriented to person, place, and time.     Psychiatric:         Behavior: Behavior normal.           Lab Results: I have reviewed the following results:

## 2025-06-15 NOTE — PLAN OF CARE
Problem: Prexisting or High Potential for Compromised Skin Integrity  Goal: Skin integrity is maintained or improved  Description: INTERVENTIONS:  - Identify patients at risk for skin breakdown  - Assess and monitor skin integrity including under and around medical devices   - Assess and monitor nutrition and hydration status  - Monitor labs  - Assess for incontinence   - Turn and reposition patient  - Assist with mobility/ambulation  - Relieve pressure over jessica prominences   - Avoid friction and shearing  - Provide appropriate hygiene as needed including keeping skin clean and dry  - Evaluate need for skin moisturizer/barrier cream  - Collaborate with interdisciplinary team  - Patient/family teaching  - Consider wound care consult    Assess:  - Review Wu scale daily  - Clean and moisturize skin every   - Inspect skin when repositioning, toileting, and assisting with ADLS  - Assess under medical devices such as  every   - Assess extremities for adequate circulation and sensation     Bed Management:  - Have minimal linens on bed & keep smooth, unwrinkled  - Change linens as needed when moist or perspiring  - Avoid sitting or lying in one position for more than  hours while in bed?Keep HOB at degrees   - Toileting:  - Offer bedside commode  - Assess for incontinence every  - Use incontinent care products after each incontinent episode such as     Activity:  - Mobilize patient  times a day  - Encourage activity and walks on unit  - Encourage or provide ROM exercises   - Turn and reposition patient every  Hours  - Use appropriate equipment to lift or move patient in bed  - Instruct/ Assist with weight shifting every when out of bed in chair  - Consider limitation of chair time  hour intervals    Skin Care:  - Avoid use of baby powder, tape, friction and shearing, hot water or constrictive clothing  - Relieve pressure over bony prominences using   - Do not massage red bony areas    Next Steps:  - Teach patient  strategies to minimize risks such as   - Consider consults to  interdisciplinary teams such as  Outcome: Progressing     Problem: CARDIOVASCULAR - ADULT  Goal: Maintains optimal cardiac output and hemodynamic stability  Description: INTERVENTIONS:  - Monitor I/O, vital signs and rhythm  - Monitor for S/S and trends of decreased cardiac output  - Administer and titrate ordered vasoactive medications to optimize hemodynamic stability  - Assess quality of pulses, skin color and temperature  - Assess for signs of decreased coronary artery perfusion  - Instruct patient to report change in severity of symptoms  Outcome: Progressing  Goal: Absence of cardiac dysrhythmias or at baseline rhythm  Description: INTERVENTIONS:  - Continuous cardiac monitoring, vital signs, obtain 12 lead EKG if ordered  - Administer antiarrhythmic and heart rate control medications as ordered  - Monitor electrolytes and administer replacement therapy as ordered  Outcome: Progressing     Problem: GASTROINTESTINAL - ADULT  Goal: Minimal or absence of nausea and/or vomiting  Description: INTERVENTIONS:  - Administer IV fluids if ordered to ensure adequate hydration  - Maintain NPO status until nausea and vomiting are resolved  - Nasogastric tube if ordered  - Administer ordered antiemetic medications as needed  - Provide nonpharmacologic comfort measures as appropriate  - Advance diet as tolerated, if ordered  - Consider nutrition services referral to assist patient with adequate nutrition and appropriate food choices  Outcome: Progressing  Goal: Maintains or returns to baseline bowel function  Description: INTERVENTIONS:  - Assess bowel function  - Encourage oral fluids to ensure adequate hydration  - Administer IV fluids if ordered to ensure adequate hydration  - Administer ordered medications as needed  - Encourage mobilization and activity  - Consider nutritional services referral to assist patient with adequate nutrition and appropriate food  choices  Outcome: Progressing  Goal: Maintains adequate nutritional intake  Description: INTERVENTIONS:  - Monitor percentage of each meal consumed  - Identify factors contributing to decreased intake, treat as appropriate  - Assist with meals as needed  - Monitor I&O, weight, and lab values if indicated  - Obtain nutrition services referral as needed  Outcome: Progressing  Goal: Establish and maintain optimal ostomy function  Description: INTERVENTIONS:  - Assess bowel function  - Encourage oral fluids to ensure adequate hydration  - Administer IV fluids if ordered to ensure adequate hydration   - Administer ordered medications as needed  - Encourage mobilization and activity  - Nutrition services referral to assist patient with appropriate food choices  - Assess stoma site  - Consider wound care consult   Outcome: Progressing  Goal: Oral mucous membranes remain intact  Description: INTERVENTIONS  - Assess oral mucosa and hygiene practices  - Implement preventative oral hygiene regimen  - Implement oral medicated treatments as ordered  - Initiate Nutrition services referral as needed  Outcome: Progressing     Problem: METABOLIC, FLUID AND ELECTROLYTES - ADULT  Goal: Electrolytes maintained within normal limits  Description: INTERVENTIONS:  - Monitor labs and assess patient for signs and symptoms of electrolyte imbalances  - Administer electrolyte replacement as ordered  - Monitor response to electrolyte replacements, including repeat lab results as appropriate  - Instruct patient on fluid and nutrition as appropriate  Outcome: Progressing  Goal: Glucose maintained within target range  Description: INTERVENTIONS:  - Monitor Blood Glucose as ordered  - Assess for signs and symptoms of hyperglycemia and hypoglycemia  - Administer ordered medications to maintain glucose within target range  - Assess nutritional intake and initiate nutrition service referral as needed  Outcome: Progressing     Problem: HEMATOLOGIC -  ADULT  Goal: Maintains hematologic stability  Description: INTERVENTIONS  - Assess for signs and symptoms of bleeding or hemorrhage  - Monitor labs  - Administer supportive blood products/factors as ordered and appropriate  Outcome: Progressing

## 2025-06-15 NOTE — ASSESSMENT & PLAN NOTE
Wt Readings from Last 3 Encounters:   06/15/25 52.9 kg (116 lb 10 oz)   06/13/25 60 kg (132 lb 3.2 oz)   06/09/25 57.5 kg (126 lb 12.8 oz)     Patient noted to have increased weight gain since discharge earlier this month, received extra metolazone outpatient due to weight gain. Second HF admission within the month.  Will place on HF protocol   Telemetry monitoring   Received IV bumex once  Will continue with bumex BID dosing  Low sodium diet. Fluid restriction to 2L/day   Monitor intake and output   Prior echo 10/24 reviewed, had worsening valvular disease  Repeat echo  Consult to cardiology

## 2025-06-15 NOTE — ASSESSMENT & PLAN NOTE
Severe symptomatic anemia in the setting of anticoagulation with Xarelto presumably taken yesterday.  Heme-positive stool is nonspecific particularly in the setting of anticoagulation, but given drop in hemoglobin GI bleeding should be excluded nonetheless.    Appears hemodynamically stable currently and showing no clinical signs of active upper or lower GI bleeding currently.  Hemoglobin does appear significantly decreased since 11 days ago (5.2 from 8.6), improved with transfusions (7.3 after 2 units PRBCs).  Stable since transfusions.    - Would likely benefit from bidirectional endoscopic examination after patient can be adequately prepped and after Xarelto can be held for 48 hours; tentatively planning for EGD and colonoscopy tomorrow  (ADDENDUM -I spoke with internal medicine service, as patient has been noted with tachycardia and some concern for fluid overload since later this morning, will hold off on administering bowel prep today and can reevaluate patient Monday, cancel EGD/colonoscopy for Monday.  As long as patient is not showing any signs of active GI bleeding, endoscopy can be deferred until she is optimized from a cardiopulmonary standpoint.)    - Clear liquid diet for today, bowel prep this afternoon, n.p.o. after midnight    - Xarelto confirmed to be on hold, discussed with nursing    - Can consider EGD more urgently in the interim if patient does develop signs of hemodynamically significant upper GI bleeding    - May discontinue Protonix drip as she does not appear to be having acute upper GI bleed, can continue as twice daily dosing for now

## 2025-06-15 NOTE — PLAN OF CARE
Problem: HEMATOLOGIC - ADULT  Goal: Maintains hematologic stability  Description: INTERVENTIONS  - Assess for signs and symptoms of bleeding or hemorrhage  - Monitor labs  - Administer supportive blood products/factors as ordered and appropriate  Outcome: Progressing     Problem: CARDIOVASCULAR - ADULT  Goal: Maintains optimal cardiac output and hemodynamic stability  Description: INTERVENTIONS:  - Monitor I/O, vital signs and rhythm  - Monitor for S/S and trends of decreased cardiac output  - Administer and titrate ordered vasoactive medications to optimize hemodynamic stability  - Assess quality of pulses, skin color and temperature  - Assess for signs of decreased coronary artery perfusion  - Instruct patient to report change in severity of symptoms  Outcome: Progressing     Problem: CARDIOVASCULAR - ADULT  Goal: Absence of cardiac dysrhythmias or at baseline rhythm  Description: INTERVENTIONS:  - Continuous cardiac monitoring, vital signs, obtain 12 lead EKG if ordered  - Administer antiarrhythmic and heart rate control medications as ordered  - Monitor electrolytes and administer replacement therapy as ordered  Outcome: Progressing     Problem: GASTROINTESTINAL - ADULT  Goal: Minimal or absence of nausea and/or vomiting  Description: INTERVENTIONS:  - Administer IV fluids if ordered to ensure adequate hydration  - Maintain NPO status until nausea and vomiting are resolved  - Nasogastric tube if ordered  - Administer ordered antiemetic medications as needed  - Provide nonpharmacologic comfort measures as appropriate  - Advance diet as tolerated, if ordered  - Consider nutrition services referral to assist patient with adequate nutrition and appropriate food choices  Outcome: Progressing

## 2025-06-15 NOTE — ASSESSMENT & PLAN NOTE
Patient presents from living facility with complaints of low hemoglobin. In ER, results to 5.2.   Fecal occult obtained in ER +. BUN elevated   Will admit for UGIB   Continue to monitor hemoglobin closely   IV protonix   NPO   Hold xarelto  GI consult for possible EGD, in consideration for tomorrow pending hemodynamic stability

## 2025-06-15 NOTE — ASSESSMENT & PLAN NOTE
Recent Labs     06/14/25  0621 06/14/25  2029 06/15/25  0556   CREATININE 1.56* 1.57* 1.44*   EGFR 30 30 33     Estimated Creatinine Clearance: 22.8 mL/min (A) (by C-G formula based on SCr of 1.44 mg/dL (H)).    Renal function has been poor in the last few weeks, thought to be due renal hypoperfusion iso HF  Creatinine remains elevated and not at baseline despite diuresis   Will check renal U/S to r/o obstructive etiology  Monitor intake and output  Avoid nephrotoxins as able   Kidney function improving

## 2025-06-15 NOTE — ASSESSMENT & PLAN NOTE
Wt Readings from Last 3 Encounters:   06/15/25 52.9 kg (116 lb 10 oz)   06/13/25 60 kg (132 lb 3.2 oz)   06/09/25 57.5 kg (126 lb 12.8 oz)   Home Rx: Bumex 3 mg twice daily and metolazone 2.5 mg daily for weight gain of 3 pounds in a day or 5 pounds in 5 to 7 days.  Chest x-ray: Small bilateral pleural effusions, vascular congestion.   BNP on admission 3288  Weight on admission reported 60 kg.  Weight today by bed scale 52.9 kg (trending down)   Intake/output inaccurate  On examination:  Non-pitting edema over b/l LE.   Lungs: Rhonchi, course cough. No rales.    On 2 L NC (baseline)     Currently diuresing with IV Bumex 3 mg twice daily   Echocardiogram (6/14):  LVEF 65%.   Wall motion normal.   Systolic and diastolic flattening of intraventricular septum consistent with right ventricular pressure and overload . Mild AI.   Moderate MR.  Small pericardial effusion without evidence of tamponade.  Mild pulmonic regurgitation

## 2025-06-15 NOTE — ASSESSMENT & PLAN NOTE
Lab Results   Component Value Date    HGBA1C 6.5 04/15/2025       Recent Labs     06/14/25  2105 06/15/25  0111 06/15/25  0623 06/15/25  1209   POCGLU 244* 196* 221* 267*       Blood Sugar Average: Last 72 hrs:  (P) [id number] elevated to 382  Place on accuchecks, every 6 hrs while NPO  ISS as needed

## 2025-06-15 NOTE — ASSESSMENT & PLAN NOTE
Hold xarelto  Continue with BB  Interval worsening on a.m. of 6/15 due to uncertainty etiology, patient did refuse CPAP at bedtime which may have led to pulmonary infiltrate worsening A-fib  Will give one-time Lopressor 2.5 mg IV and assess for effect in addition to extra diuresis and encourage CPAP use as needed throughout day for episodes of tachycardia

## 2025-06-15 NOTE — PROGRESS NOTES
Progress Note - Cardiology   Name: Camryn Roblero 84 y.o. female I MRN: [id number]  Unit/Bed#: S -01 I Date of Admission: 6/13/2025   Date of Service: 6/15/2025 I Hospital Day: 2     Assessment & Plan  Upper GI bleed  POA: Worsening fatigue and weakness.  Hemoglobin 5.2.  Fecal occult blood positive.  BUN elevated  IV Protonix bolus/drip  N.p.o.  GI consulted  Xarelto held  Transfused 2 units packed red blood cells on 6/13   Stable hgb 8.9 and hct 27.2    EGD and colonoscopy 6/16    Acute on chronic heart failure with preserved ejection fraction (HCC)  Wt Readings from Last 3 Encounters:   06/15/25 52.9 kg (116 lb 10 oz)   06/13/25 60 kg (132 lb 3.2 oz)   06/09/25 57.5 kg (126 lb 12.8 oz)   Home Rx: Bumex 3 mg twice daily and metolazone 2.5 mg daily for weight gain of 3 pounds in a day or 5 pounds in 5 to 7 days.  Chest x-ray: Small bilateral pleural effusions, vascular congestion.   BNP on admission 3288  Weight on admission reported 60 kg.  Weight today by bed scale 52.9 kg (trending down)   Intake/output inaccurate  On examination:  Non-pitting edema over b/l LE.   Lungs: Rhonchi, course cough. No rales.    On 2 L NC (baseline)     Currently diuresing with IV Bumex 3 mg twice daily   Echocardiogram (6/14):  LVEF 65%.   Wall motion normal.   Systolic and diastolic flattening of intraventricular septum consistent with right ventricular pressure and overload . Mild AI.   Moderate MR.  Small pericardial effusion without evidence of tamponade.  Mild pulmonic regurgitation         Essential hypertension  Blood pressure controlled, -139/60's   Type 2 diabetes mellitus, without long-term current use of insulin ([facility])  Lab Results   Component Value Date    HGBA1C 6.5 04/15/2025       Recent Labs     06/14/25  1230 06/14/25  2105 06/15/25  0111 06/15/25  0623   POCGLU 160* 244* 196* 221*       Blood Sugar Average: Last 72 hrs:  (P) 220  Hemoglobin A1c well-controlled  Managed by primary team  Elevated  troponin  Troponins elevated with flattening 192> 182  EKG showing no acute ischemic changes.  She denies chest pain.   Troponin elevation most likely multifactorial in the setting of acute anemia and acute heart failure    COPD, severe (HCC)  On 2 L nasal cannula at baseline  TOM (obstructive sleep apnea)  CPAP at night   Paroxysmal atrial fibrillation ([facility])  Home Rx: Cardizem 120 mg daily and Xarelto 15 mg daily  FEDE (acute kidney injury) ([facility])  Creatinine on admission 1.66   Baseline creatinine 1.2-1.4  Creatinine today 1.44 (improving)  Acute blood loss anemia  Transfused 2 units of packed red blood cells  GI consulted  See plan under upper GI bleed  Chronic hypoxic respiratory failure, on home oxygen therapy  (HCC)  On 2 L nasal cannula at baseline  Continue Nebulizer as needed   Lactic acidosis  Lactic acid on admission 2.8>improved to 2.5     Plan:    Monitor electrolytes and replete as needed  Continue Metoprolol succinate 50 mg twice daily   Bumex 3 mg IV twice daily for now   Resume telemetry     Subjective   Chief Complaint:  Patient seen and examined in bed. She denies complaints of chest pain or shortness of breath.  She noted the swelling in LE has improved.   Weight today is 52.9 kg (bed scale).             Objective :  Temp:  [97.2 °F (36.2 °C)-97.9 °F (36.6 °C)] 97.2 °F (36.2 °C)  HR:  [54-71] 59  BP: (112-139)/(50-72) 127/50  Resp:  [18] 18  SpO2:  [92 %-100 %] 100 %  O2 Device: Nasal cannula  Nasal Cannula O2 Flow Rate (L/min):  [2 L/min-3 L/min] 3 L/min  Orthostatic Blood Pressures      Flowsheet Row Most Recent Value   Blood Pressure 127/50 filed at 06/15/2025 0942   Patient Position - Orthostatic VS Lying filed at 06/14/2025 0614          First Weight: Weight - Scale: 53.6 kg (118 lb 2.7 oz) (06/14/25 0223)  Vitals:    06/14/25 0949 06/15/25 0600   Weight: 53.5 kg (118 lb) 52.9 kg (116 lb 10 oz)     Physical Exam  Constitutional:       Appearance: Normal appearance. She is cachectic.   HENT:     "  Head: Normocephalic.      Mouth/Throat:      Mouth: Mucous membranes are moist.     Eyes:      Conjunctiva/sclera: Conjunctivae normal.       Cardiovascular:      Rate and Rhythm: Regular rhythm. Bradycardia present.      Pulses: Normal pulses.      Heart sounds: Murmur heard.      No friction rub. No gallop.   Pulmonary:      Effort: Pulmonary effort is normal.      Breath sounds: Rhonchi and rales present.   Abdominal:      General: There is no distension.      Palpations: Abdomen is soft.     Musculoskeletal:      Right lower leg: No edema.      Left lower leg: No edema.     Skin:     General: Skin is warm.      Capillary Refill: Capillary refill takes less than 2 seconds.     Neurological:      Mental Status: She is alert. Mental status is at baseline.     Psychiatric:         Mood and Affect: Mood normal.         Behavior: Behavior normal.           Lab Results: I have reviewed the following results:CBC/BMP:   .     06/15/25  0556   WBC 6.22   HGB 8.9*   HCT 27.2*      SODIUM 143   K 3.0*   CL 97   CO2 41*   BUN 74*   CREATININE 1.44*   GLUC 210*   MG 2.0      Results from last 7 days   Lab Units 06/15/25  0556 06/14/25  1207 06/14/25  0621 06/13/25  2230   WBC Thousand/uL 6.22  --  5.60 5.22   HEMOGLOBIN g/dL 8.9* 8.6* 7.3* 5.2*   HEMATOCRIT % 27.2* 27.1* 22.3* 16.8*   PLATELETS Thousands/uL 201  --  181 216     Results from last 7 days   Lab Units 06/15/25  0556 06/14/25 2029 06/14/25  0621   POTASSIUM mmol/L 3.0* 3.8 3.0*   CHLORIDE mmol/L 97 97 97   CO2 mmol/L 41* 39* 37*   BUN mg/dL 74* 82* 98*   CREATININE mg/dL 1.44* 1.57* 1.56*   CALCIUM mg/dL 8.8 8.8 8.7     Results from last 7 days   Lab Units 06/15/25  0556   INR  1.04     Lab Results   Component Value Date    HGBA1C 6.5 04/15/2025     No results found for: \"CKTOTAL\", \"CKMB\", \"CKMBINDEX\", \"TROPONINI\"          VTE Pharmacologic Prophylaxis: Sequential compression device (Venodyne)   VTE Mechanical Prophylaxis: sequential compression " device

## 2025-06-15 NOTE — ASSESSMENT & PLAN NOTE
Lactic acid on admission 2.8>improved to 2.5     Plan:    Monitor electrolytes and replete as needed  Continue Metoprolol succinate 50 mg twice daily   Bumex 3 mg IV twice daily for now   Resume telemetry

## 2025-06-15 NOTE — ASSESSMENT & PLAN NOTE
Lab Results   Component Value Date    HGBA1C 6.5 04/15/2025       Recent Labs     06/14/25  1230 06/14/25  2105 06/15/25  0111 06/15/25  0623   POCGLU 160* 244* 196* 221*       Blood Sugar Average: Last 72 hrs:  (P) 220  Hemoglobin A1c well-controlled  Managed by primary team   TITLE OF PROCEDURE:Genicular Nerve Block, #1/1    INDICATION: Knee pain by history and physical exam    SIDE: right    PREOPERATIVE DIAGNOSES: right Knee Osteoarthritis, Pain in Knee Joint    POSTOPERATIVE DIAGNOSES:  Same    SURGEON: Yandy Reza MD    ANESTHESIA:  Local    DESCRIPTION OF OPERATIVE PROCEDURE:The importance of doing the procedure without sedation was discussed with the patient again as was the possibility of pain during the procedure.  Informed consent was obtained. The patient was brought to the Procedure Room and he/she positioned themselves to their comfort and optimal positioning for procedure.  Time-out was conducted with all members of the care team.  Standard ASA monitors were placed. Standard prep and drape were performed.    Supine Position.   The areas overlying the superior medial, superior lateral, and inferior medial genicular nerves was identified with Fluoroscopy.  The needle tip was directed to the periosteal areas connecting the shaft of the femur the bilateral epicondyles and the shaft of the tibia to the medial epicondyle.  0.2 cc of contrast dye was injected through each of the needles to confirm absence of vascular uptake under continuous fluoroscopy. 0.5 cc of 2% Lidocaine was injected after negative aspiration at each level. The needle(s) was then withdrawn.  The surgical site preparation was washed off of the patient. Band-Aids were applied. The patient was brought to the recovery area.    The patient did very well and the procedure results were discussed.  Standard discharge instructions were given to the patient.  The patient knows how to contact the clinic should they have any questions or problems.  Our clinic staff will call the patient for follow-up tomorrow.    Preprocedural pain score was  6/10  Post procedural pain score was 0/10    COMPLICATIONS: None    EBL: Minimal    URINE OUTPUT: Not monitored    FLUIDS: None    PLAN: RF protocol:  The patient will be called  following the procedure and asked to recount their pain diary results. If the patient received significant pain relief of the treated area in a period 2-6hrs after the procedure while conducting normal pain-provoking activities, the patient will then be scheduled for a second nerve block (if this was the first) or RF denervation (if this was the second) of the same levels on the same side (or patients most painful side).  There is no expectation that the patient.

## 2025-06-15 NOTE — ASSESSMENT & PLAN NOTE
POA: Worsening fatigue and weakness.  Hemoglobin 5.2.  Fecal occult blood positive.  BUN elevated  IV Protonix bolus/drip  N.p.o.  GI consulted  Xarelto held  Transfused 2 units packed red blood cells on 6/13   Stable hgb 8.9 and hct 27.2    EGD and colonoscopy 6/16

## 2025-06-16 ENCOUNTER — PATIENT OUTREACH (OUTPATIENT)
Dept: CASE MANAGEMENT | Facility: OTHER | Age: 85
End: 2025-06-16

## 2025-06-16 PROBLEM — I5A NONISCHEMIC NONTRAUMATIC MYOCARDIAL INJURY: Status: ACTIVE | Noted: 2023-12-25

## 2025-06-16 PROBLEM — E87.20 LACTIC ACIDOSIS: Status: RESOLVED | Noted: 2025-06-14 | Resolved: 2025-06-16

## 2025-06-16 PROBLEM — N17.9 AKI (ACUTE KIDNEY INJURY) (HCC): Status: RESOLVED | Noted: 2024-10-31 | Resolved: 2025-06-16

## 2025-06-16 LAB
ALBUMIN SERPL BCG-MCNC: 3 G/DL (ref 3.5–5)
ALP SERPL-CCNC: 57 U/L (ref 34–104)
ALT SERPL W P-5'-P-CCNC: 18 U/L (ref 7–52)
ANION GAP SERPL CALCULATED.3IONS-SCNC: 5 MMOL/L (ref 4–13)
AST SERPL W P-5'-P-CCNC: 57 U/L (ref 13–39)
BILIRUB SERPL-MCNC: 0.91 MG/DL (ref 0.2–1)
BUN SERPL-MCNC: 65 MG/DL (ref 5–25)
CALCIUM ALBUM COR SERPL-MCNC: 9.3 MG/DL (ref 8.3–10.1)
CALCIUM SERPL-MCNC: 8.5 MG/DL (ref 8.4–10.2)
CHLORIDE SERPL-SCNC: 97 MMOL/L (ref 96–108)
CO2 SERPL-SCNC: 39 MMOL/L (ref 21–32)
CREAT SERPL-MCNC: 1.4 MG/DL (ref 0.6–1.3)
ERYTHROCYTE [DISTWIDTH] IN BLOOD BY AUTOMATED COUNT: 17 % (ref 11.6–15.1)
GFR SERPL CREATININE-BSD FRML MDRD: 34 ML/MIN/1.73SQ M
GLUCOSE SERPL-MCNC: 125 MG/DL (ref 65–140)
GLUCOSE SERPL-MCNC: 163 MG/DL (ref 65–140)
GLUCOSE SERPL-MCNC: 238 MG/DL (ref 65–140)
GLUCOSE SERPL-MCNC: 254 MG/DL (ref 65–140)
GLUCOSE SERPL-MCNC: 322 MG/DL (ref 65–140)
HCT VFR BLD AUTO: 27.5 % (ref 34.8–46.1)
HGB BLD-MCNC: 8.7 G/DL (ref 11.5–15.4)
MAGNESIUM SERPL-MCNC: 1.9 MG/DL (ref 1.9–2.7)
MCH RBC QN AUTO: 31.3 PG (ref 26.8–34.3)
MCHC RBC AUTO-ENTMCNC: 31.6 G/DL (ref 31.4–37.4)
MCV RBC AUTO: 99 FL (ref 82–98)
PLATELET # BLD AUTO: 205 THOUSANDS/UL (ref 149–390)
PMV BLD AUTO: 10.7 FL (ref 8.9–12.7)
POTASSIUM SERPL-SCNC: 3.3 MMOL/L (ref 3.5–5.3)
PROT SERPL-MCNC: 6 G/DL (ref 6.4–8.4)
RBC # BLD AUTO: 2.78 MILLION/UL (ref 3.81–5.12)
SODIUM SERPL-SCNC: 141 MMOL/L (ref 135–147)
WBC # BLD AUTO: 9 THOUSAND/UL (ref 4.31–10.16)

## 2025-06-16 PROCEDURE — 94760 N-INVAS EAR/PLS OXIMETRY 1: CPT

## 2025-06-16 PROCEDURE — 82948 REAGENT STRIP/BLOOD GLUCOSE: CPT

## 2025-06-16 PROCEDURE — 99232 SBSQ HOSP IP/OBS MODERATE 35: CPT | Performed by: FAMILY MEDICINE

## 2025-06-16 PROCEDURE — 83735 ASSAY OF MAGNESIUM: CPT

## 2025-06-16 PROCEDURE — 99232 SBSQ HOSP IP/OBS MODERATE 35: CPT | Performed by: INTERNAL MEDICINE

## 2025-06-16 PROCEDURE — 85027 COMPLETE CBC AUTOMATED: CPT

## 2025-06-16 PROCEDURE — 94640 AIRWAY INHALATION TREATMENT: CPT

## 2025-06-16 PROCEDURE — 94660 CPAP INITIATION&MGMT: CPT

## 2025-06-16 PROCEDURE — 80053 COMPREHEN METABOLIC PANEL: CPT

## 2025-06-16 PROCEDURE — 94668 MNPJ CHEST WALL SBSQ: CPT

## 2025-06-16 RX ORDER — POTASSIUM CHLORIDE 1500 MG/1
20 TABLET, EXTENDED RELEASE ORAL ONCE
Status: COMPLETED | OUTPATIENT
Start: 2025-06-16 | End: 2025-06-16

## 2025-06-16 RX ORDER — POTASSIUM CHLORIDE 1500 MG/1
20 TABLET, EXTENDED RELEASE ORAL 2 TIMES DAILY
Status: DISCONTINUED | OUTPATIENT
Start: 2025-06-17 | End: 2025-06-19

## 2025-06-16 RX ORDER — BUMETANIDE 1 MG/1
3 TABLET ORAL
Status: DISCONTINUED | OUTPATIENT
Start: 2025-06-16 | End: 2025-06-21

## 2025-06-16 RX ORDER — ALBUTEROL SULFATE 90 UG/1
2 INHALANT RESPIRATORY (INHALATION) EVERY 4 HOURS PRN
Status: DISCONTINUED | OUTPATIENT
Start: 2025-06-16 | End: 2025-06-17

## 2025-06-16 RX ORDER — POTASSIUM CHLORIDE 1500 MG/1
40 TABLET, EXTENDED RELEASE ORAL ONCE
Status: COMPLETED | OUTPATIENT
Start: 2025-06-16 | End: 2025-06-16

## 2025-06-16 RX ORDER — MAGNESIUM SULFATE HEPTAHYDRATE 40 MG/ML
2 INJECTION, SOLUTION INTRAVENOUS ONCE
Status: COMPLETED | OUTPATIENT
Start: 2025-06-16 | End: 2025-06-16

## 2025-06-16 RX ADMIN — INSULIN LISPRO 1 UNITS: 100 INJECTION, SOLUTION INTRAVENOUS; SUBCUTANEOUS at 09:36

## 2025-06-16 RX ADMIN — INSULIN LISPRO 5 UNITS: 100 INJECTION, SOLUTION INTRAVENOUS; SUBCUTANEOUS at 12:32

## 2025-06-16 RX ADMIN — ACETAMINOPHEN 650 MG: 325 TABLET ORAL at 21:08

## 2025-06-16 RX ADMIN — BUMETANIDE 3 MG: 0.25 INJECTION INTRAMUSCULAR; INTRAVENOUS at 09:29

## 2025-06-16 RX ADMIN — GUAIFENESIN 1200 MG: 600 TABLET, MULTILAYER, EXTENDED RELEASE ORAL at 21:08

## 2025-06-16 RX ADMIN — MAGNESIUM SULFATE HEPTAHYDRATE 2 G: 40 INJECTION, SOLUTION INTRAVENOUS at 06:20

## 2025-06-16 RX ADMIN — ATORVASTATIN CALCIUM 40 MG: 40 TABLET, FILM COATED ORAL at 09:29

## 2025-06-16 RX ADMIN — Medication 400 MG: at 09:29

## 2025-06-16 RX ADMIN — INSULIN LISPRO 3 UNITS: 100 INJECTION, SOLUTION INTRAVENOUS; SUBCUTANEOUS at 21:09

## 2025-06-16 RX ADMIN — METOPROLOL SUCCINATE 75 MG: 50 TABLET, EXTENDED RELEASE ORAL at 09:29

## 2025-06-16 RX ADMIN — ESCITALOPRAM OXALATE 5 MG: 10 TABLET ORAL at 09:29

## 2025-06-16 RX ADMIN — GUAIFENESIN 1200 MG: 600 TABLET, MULTILAYER, EXTENDED RELEASE ORAL at 09:29

## 2025-06-16 RX ADMIN — POTASSIUM CHLORIDE 20 MEQ: 1500 TABLET, EXTENDED RELEASE ORAL at 09:50

## 2025-06-16 RX ADMIN — BUMETANIDE 3 MG: 1 TABLET ORAL at 17:49

## 2025-06-16 RX ADMIN — POTASSIUM CHLORIDE 40 MEQ: 1500 TABLET, EXTENDED RELEASE ORAL at 06:30

## 2025-06-16 RX ADMIN — DONEPEZIL HYDROCHLORIDE 5 MG: 5 TABLET ORAL at 21:08

## 2025-06-16 RX ADMIN — BUDESONIDE 0.5 MG: 0.5 INHALANT RESPIRATORY (INHALATION) at 07:22

## 2025-06-16 RX ADMIN — FLUTICASONE PROPIONATE 1 SPRAY: 50 SPRAY, METERED NASAL at 09:36

## 2025-06-16 RX ADMIN — BUDESONIDE 0.5 MG: 0.5 INHALANT RESPIRATORY (INHALATION) at 19:35

## 2025-06-16 RX ADMIN — PANTOPRAZOLE SODIUM 40 MG: 40 INJECTION, POWDER, LYOPHILIZED, FOR SOLUTION INTRAVENOUS at 21:08

## 2025-06-16 RX ADMIN — PANTOPRAZOLE SODIUM 40 MG: 40 INJECTION, POWDER, LYOPHILIZED, FOR SOLUTION INTRAVENOUS at 09:29

## 2025-06-16 RX ADMIN — INSULIN LISPRO 3 UNITS: 100 INJECTION, SOLUTION INTRAVENOUS; SUBCUTANEOUS at 17:51

## 2025-06-16 NOTE — ASSESSMENT & PLAN NOTE
Patient was initially planned for EGD and colonoscopy today however had worsening shortness of breath and tachycardia therefore this was canceled.  Hemoglobin stable today at 8.7.  Bowel movement yesterday was reported brown.  She is on 2 L nasal cannula and vital signs are stable.    -Discussed with primary team.  Appears to be improving from a volume overload standpoint.  - She did have regular diet this morning.  Will place on clear liquid diet for Tuesday, 6/17 and tentative EGD colonoscopy on Wednesday 6/18 as long as patient is doing well from a cardiopulmonary standpoint.  - Can continue Protonix IV twice daily  - Xarelto was on hold, last dose likely 6/13.    -Monitor stool output.  - Please reach out to GI team if any active signs of GI bleeding for more urgent endoscopic evaluation.

## 2025-06-16 NOTE — ASSESSMENT & PLAN NOTE
Wt Readings from Last 3 Encounters:   06/16/25 51 kg (112 lb 7 oz)   06/13/25 60 kg (132 lb 3.2 oz)   06/09/25 57.5 kg (126 lb 12.8 oz)   Home Rx: Bumex 3 mg twice daily and metolazone 2.5 mg daily for weight gain of 3 pounds in a day or 5 pounds in 5 to 7 days.  Chest x-ray: Small bilateral pleural effusions, vascular congestion.   BNP on admission 3288  Intake/output inaccurate  On 2 LNC (baseline)     Currently diuresing with IV Bumex 3 mg twice daily with extra 1 mg IV dose given 6/15  Echocardiogram (6/14):  LVEF 65%.   Wall motion normal.   Systolic and diastolic flattening of intraventricular septum consistent with right ventricular pressure and overload . Mild AI.   Moderate MR.  Small pericardial effusion without evidence of tamponade.  Mild pulmonic regurgitation   6/16: Appears close to euvolemia with weight down to 112 pounds  Transition back to oral diuretics this afternoon.  Bumex 3 mg p.o. twice daily

## 2025-06-16 NOTE — ASSESSMENT & PLAN NOTE
Patient presents from living facility with complaints of low hemoglobin. In ER, results to 5.2.   Fecal occult obtained in ER +. BUN elevated   admit for possible UGIB   Continue to monitor hemoglobin closely   IV protonix   Hold xarelto  GI consult for possible EGD, in consideration for tomorrow pending hemodynamic stability has improved. Likely do prep this evening pending availability of GI

## 2025-06-16 NOTE — CASE MANAGEMENT
Case Management Assessment & Discharge Planning Note    Patient name Camryn Roblero  Location S /S -01 MRN [id number]  : 1940 Date 2025       Current Admission Date: 2025  Current Admission Diagnosis:Upper GI bleed   Patient Active Problem List    Diagnosis Date Noted    Upper GI bleed 2025    Acute blood loss anemia 2025    Chronic hypoxic respiratory failure, on home oxygen therapy  (HCC) 2025    Moderate protein-calorie malnutrition (HCC) 2025    Right leg pain 2025    Normocytic anemia 2025    Change in bowel habits 2025    Renal lesion 2024    HEATHER (generalized anxiety disorder) 2024    Constipation 10/29/2024    Ambulatory dysfunction 10/26/2024    Dependence on supplemental oxygen 10/23/2024    Paroxysmal atrial fibrillation (HCC) 10/23/2024    Pancreas cyst 2024    History of colon polyps 2024    Herpes zoster without complication 2024    Leukocytosis 2024    Mild protein-calorie malnutrition (HCC) 2024    Abnormal CT scan 2024    Impaired memory 2024    Allergic drug rash 2024    Nonischemic nontraumatic myocardial injury 2023    Tachycardia 2023    Atrial flutter (HCC) 2023    Hyperlipidemia 10/21/2023    Seasonal allergic rhinitis 2023    Osteopenia 2022    Chronic pain of both knees 2021    Cataract of both eyes 2021    Multiple pulmonary nodules 10/05/2020    Colon polyps 10/05/2020    TOM (obstructive sleep apnea) 2020    Acute on chronic heart failure with preserved ejection fraction (HCC) 2020    Persistent proteinuria 10/25/2019    Essential hypertension 2016    Type 2 diabetes mellitus, without long-term current use of insulin (HCC) 2016    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    COPD, severe (HCC) 2016      LOS (days): 3  Geometric Mean LOS (GMLOS) (days):   Days to GMLOS:      OBJECTIVE:  PATIENT READMITTED TO HOSPITAL  Risk of Unplanned Readmission Score: 26.03         Current admission status: Inpatient       Preferred Pharmacy:   [facility] - [place], [place] - [address]  [address] [address]  Phone: [phone] Fax: [phone]    [facility] [address] [place] [id number]  Phone: [phone] Fax: [phone]    Primary Care Provider: Abiel Seals MD    Primary Insurance: MEDICARE  Secondary Insurance: AARP    ASSESSMENT:  Active Health Care Proxies    There are no active Health Care Proxies on file.                      Patient Information  Admitted from:: Facility  Mental Status: Alert  During Assessment patient was accompanied by: Not accompanied during assessment  Assessment information provided by:: Patient  Living Arrangements: Lives in Facility (pt currently admitted from [facility])                                   DISCHARGE DETAILS:    Discharge planning discussed with:: patient  Freedom of Choice: Yes  Comments - Freedom of Choice: CM f/u w/ pt re: dcp. Patient confirmed current dcp for return to [facility] when ready for d/c. Referral sent to [facility] via [facility] and facility confirmed able to accept pt back to facility.

## 2025-06-16 NOTE — ASSESSMENT & PLAN NOTE
Troponins elevated but flat 192> 182  EKG showing no acute ischemic changes.  She denies chest pain.   Troponin elevation in the setting of acute anemia and acute heart failure

## 2025-06-16 NOTE — PROGRESS NOTES
Progress Note - Cardiology   Name: Camryn Roblero 84 y.o. female I MRN: [id number]  Unit/Bed#: S -01 I Date of Admission: 6/13/2025   Date of Service: 6/16/2025 I Hospital Day: 3     Assessment & Plan  Acute on chronic heart failure with preserved ejection fraction (HCC)  Wt Readings from Last 3 Encounters:   06/16/25 51 kg (112 lb 7 oz)   06/13/25 60 kg (132 lb 3.2 oz)   06/09/25 57.5 kg (126 lb 12.8 oz)   Home Rx: Bumex 3 mg twice daily and metolazone 2.5 mg daily for weight gain of 3 pounds in a day or 5 pounds in 5 to 7 days.  Chest x-ray: Small bilateral pleural effusions, vascular congestion.   BNP on admission 3288  Intake/output inaccurate  On 2 LNC (baseline)     Currently diuresing with IV Bumex 3 mg twice daily with extra 1 mg IV dose given 6/15  Echocardiogram (6/14):  LVEF 65%.   Wall motion normal.   Systolic and diastolic flattening of intraventricular septum consistent with right ventricular pressure and overload . Mild AI.   Moderate MR.  Small pericardial effusion without evidence of tamponade.  Mild pulmonic regurgitation   6/16: Appears close to euvolemia with weight down to 112 pounds  Transition back to oral diuretics this afternoon.  Bumex 3 mg p.o. twice daily  Upper GI bleed  POA: Worsening fatigue and weakness.  Hemoglobin 5.2.  Fecal occult blood positive.  BUN elevated  Xarelto held  Transfused 2 units packed red blood cells on 6/13   Stable hgb 8.7   GI following-EGD and colonoscopy planned for 6/18  Essential hypertension  Some intermittent hypotension noted over the last 24 hours with blood pressures dropping into the 90s/40s  Last /58  Type 2 diabetes mellitus, without long-term current use of insulin ([facility])  Lab Results   Component Value Date    HGBA1C 6.5 04/15/2025   Management per primary team  Nonischemic nontraumatic myocardial injury  Troponins elevated but flat 192> 182  EKG showing no acute ischemic changes.  She denies chest pain.   Troponin elevation in the setting  "of acute anemia and acute heart failure  COPD, severe (HCC)  On 2L nasal cannula at baseline  TOM (obstructive sleep apnea)  CPAP at night   Paroxysmal atrial fibrillation (HCC)  Home Rx: metoprolol succinate 50 mg BID and Xarelto 15 mg daily  Current Rx: Metoprolol succinate 75 mg p.o. twice daily  Anticoagulation held given anemia   Rates are controlled in A-fib, continue higher dose of Toprol-XL 75 mg p.o. twice daily  Continue telemetry  Acute blood loss anemia  Transfused 2 units of packed red blood cells  Chronic hypoxic respiratory failure, on home oxygen therapy  (HCC)  On 2 L nasal cannula at baseline    Subjective  Review of Systems   Constitutional: Positive for malaise/fatigue. Negative for chills and weight gain.   Cardiovascular:  Negative for chest pain, dyspnea on exertion, leg swelling, orthopnea, palpitations and syncope.   Respiratory:  Negative for cough, shortness of breath, sleep disturbances due to breathing and sputum production.    Gastrointestinal:  Negative for bloating, nausea and vomiting.   Neurological:  Negative for dizziness, light-headedness and weakness.   Psychiatric/Behavioral:  Negative for altered mental status.    All other systems reviewed and are negative.      Objective:   Vitals: Blood pressure 111/58, pulse 76, temperature 97.5 °F (36.4 °C), resp. rate 16, height 4' 11\" (1.499 m), weight 51 kg (112 lb 7 oz), SpO2 94%., Body mass index is 22.71 kg/m².,     Systolic (24hrs), Av , Min:91 , Max:121     Diastolic (24hrs), Av, Min:45, Max:70        Intake/Output Summary (Last 24 hours) at 2025 1054  Last data filed at 2025 0946  Gross per 24 hour   Intake 1800 ml   Output 2058 ml   Net -258 ml     Wt Readings from Last 3 Encounters:   25 51 kg (112 lb 7 oz)   25 60 kg (132 lb 3.2 oz)   25 57.5 kg (126 lb 12.8 oz)     Telemetry Review: Rate controlled Afib, heart rate 80s    Physical Exam  Vitals reviewed.   Constitutional:       General: She " is not in acute distress.     Appearance: She is ill-appearing.   Neck:      Vascular: No hepatojugular reflux or JVD.     Cardiovascular:      Rate and Rhythm: Normal rate and regular rhythm.      Heart sounds: Normal heart sounds. No murmur heard.     No friction rub. No gallop.   Pulmonary:      Effort: Pulmonary effort is normal. No respiratory distress.      Breath sounds: No rales.      Comments: Decreased at bases, 2LNC  Abdominal:      General: Bowel sounds are normal. There is no distension.      Palpations: Abdomen is soft.      Tenderness: There is no abdominal tenderness.     Musculoskeletal:         General: No tenderness. Normal range of motion.      Cervical back: Neck supple.      Right lower leg: No edema.      Left lower leg: No edema.     Skin:     General: Skin is warm and dry.      Capillary Refill: Capillary refill takes 2 to 3 seconds.      Findings: No erythema.     Neurological:      Mental Status: She is alert and oriented to person, place, and time.     Psychiatric:         Mood and Affect: Mood normal.         LABORATORY RESULTS      CBC with diff:   Results from last 7 days   Lab Units 06/16/25  0457 06/15/25  0556 06/14/25  1207 06/14/25 0621 06/13/25  2230   WBC Thousand/uL 9.00 6.22  --  5.60 5.22   HEMOGLOBIN g/dL 8.7* 8.9* 8.6* 7.3* 5.2*   HEMATOCRIT % 27.5* 27.2* 27.1* 22.3* 16.8*   MCV fL 99* 96  --  95 98   PLATELETS Thousands/uL 205 201  --  181 216   RBC Million/uL 2.78* 2.84*  --  2.36* 1.72*   MCH pg 31.3 31.3  --  30.9 30.2   MCHC g/dL 31.6 32.7  --  32.7 31.0*   RDW % 17.0* 17.0*  --  17.0* 18.0*   MPV fL 10.7 10.6  --  10.6 11.0   NRBC AUTO /100 WBCs  --  0  --   --  0     CMP:  Results from last 7 days   Lab Units 06/16/25  0457 06/15/25  0556 06/14/25  2029 06/14/25  0621 06/13/25  2230   POTASSIUM mmol/L 3.3* 3.0* 3.8 3.0* 3.6   CHLORIDE mmol/L 97 97 97 97 95*   CO2 mmol/L 39* 41* 39* 37* 36*   BUN mg/dL 65* 74* 82* 98* 104*   CREATININE mg/dL 1.40* 1.44* 1.57* 1.56*  "1.66*   CALCIUM mg/dL 8.5 8.8 8.8 8.7 8.9   AST U/L 57* 62*  --  62* 69*   ALT U/L 18 22  --  24 27   ALK PHOS U/L 57 61  --  58 68   EGFR ml/min/1.73sq m 34 33 30 30 28     BMP:  Results from last 7 days   Lab Units 06/16/25  0457 06/15/25  0556 06/14/25 2029 06/14/25  0621 06/13/25  2230   POTASSIUM mmol/L 3.3* 3.0* 3.8 3.0* 3.6   CHLORIDE mmol/L 97 97 97 97 95*   CO2 mmol/L 39* 41* 39* 37* 36*   BUN mg/dL 65* 74* 82* 98* 104*   CREATININE mg/dL 1.40* 1.44* 1.57* 1.56* 1.66*   CALCIUM mg/dL 8.5 8.8 8.8 8.7 8.9     Lab Results   Component Value Date    CREATININE 1.40 (H) 06/16/2025    CREATININE 1.44 (H) 06/15/2025    CREATININE 1.57 (H) 06/14/2025     Results from last 7 days   Lab Units 06/16/25  0457 06/15/25  0556 06/14/25  0621   MAGNESIUM mg/dL 1.9 2.0 2.1     Results from last 7 days   Lab Units 06/15/25  0556   INR  1.04     Lipid Profile:   No results found for: \"CHOL\"  Lab Results   Component Value Date    HDL 35 (L) 04/24/2025    HDL 34 (L) 05/09/2024    HDL 31 (L) 12/23/2023     Lab Results   Component Value Date    LDLCALC 34 04/24/2025    LDLCALC 40 05/09/2024    LDLCALC 37 12/23/2023     Lab Results   Component Value Date    TRIG 57 04/24/2025    TRIG 59 05/09/2024    TRIG 51 12/23/2023       Meds/Allergies   all current active meds have been reviewed and current meds:   Current Facility-Administered Medications:     acetaminophen (TYLENOL) tablet 650 mg, Q6H PRN    albuterol (PROVENTIL HFA,VENTOLIN HFA) inhaler 2 puff, Q4H PRN    atorvastatin (LIPITOR) tablet 40 mg, Daily    budesonide (PULMICORT) inhalation solution 0.5 mg, Q12H    bumetanide (BUMEX) injection 3 mg, BID    donepezil (ARICEPT) tablet 5 mg, HS    escitalopram (LEXAPRO) tablet 5 mg, Daily    fluticasone (FLONASE) 50 mcg/act nasal spray 1 spray, Daily    guaiFENesin (MUCINEX) 12 hr tablet 1,200 mg, Q12H JOE    insulin lispro (HumALOG/ADMELOG) 100 units/mL subcutaneous injection 1-6 Units, 4x Daily (AC & HS) **AND** Fingerstick " Glucose (POCT), 4x Daily AC and at bedtime    magnesium Oxide (MAG-OX) tablet 400 mg, Daily    metoprolol (LOPRESSOR) injection 2.5 mg, Q6H PRN    metoprolol succinate (TOPROL-XL) 24 hr tablet 75 mg, BID    pantoprazole (PROTONIX) injection 40 mg, Q12H JOE    polyethylene glycol (GOLYTELY) bowel prep 4,000 mL, Once    [Held by provider] potassium chloride (Klor-Con M20) CR tablet 20 mEq, BID    [Held by provider] potassium chloride (Klor-Con M20) CR tablet 20 mEq, BID    Medications Prior to Admission:     Accu-Chek FastClix Lancets MISC    Accu-Chek SmartView test strip    acetaminophen (TYLENOL) 500 mg tablet    atorvastatin (LIPITOR) 40 mg tablet    AYR SALINE NASAL DROPS NA    bumetanide (BUMEX) 1 mg tablet    [Paused] diltiazem (CARDIZEM CD) 120 mg 24 hr capsule    donepezil (ARICEPT) 5 mg tablet    escitalopram (LEXAPRO) 5 mg tablet    fluticasone (FLONASE) 50 mcg/act nasal spray    ipratropium (ATROVENT) 0.06 % nasal spray    ipratropium-albuterol (DUO-NEB) 0.5-2.5 mg/3 mL nebulizer solution    latanoprost (XALATAN) 0.005 % ophthalmic solution    loratadine (CLARITIN) 10 mg tablet    magnesium (MAGTAB) 84 MG (7MEQ) TBCR    metolazone (ZAROXOLYN) 2.5 mg tablet    metoprolol succinate (TOPROL-XL) 50 mg 24 hr tablet    potassium chloride (Klor-Con M20) 20 mEq tablet    Xarelto 15 MG tablet       Counseling / Coordination of Care  Total floor / unit time spent today 20 minutes.  Greater than 50% of total time was spent with the patient and / or family counseling and / or coordination of care.      ** Please Note: Dragon 360 Dictation voice to text software may have been used in the creation of this document. **

## 2025-06-16 NOTE — ASSESSMENT & PLAN NOTE
Wt Readings from Last 3 Encounters:   06/16/25 51 kg (112 lb 7 oz)   06/13/25 60 kg (132 lb 3.2 oz)   06/09/25 57.5 kg (126 lb 12.8 oz)     Patient noted to have increased weight gain since discharge earlier this month, received extra metolazone outpatient due to weight gain. Second HF admission within the month.  Will place on HF protocol   Telemetry monitoring   Received IV bumex 3 grams in ED  Will continue with bumex 3mg BID dosing  Low sodium diet. Fluid restriction to 2L/day   Monitor intake and output   Prior echo 10/24 reviewed, had worsening valvular disease  Updated echo: No significant change compared to the previous study. Although there was no adequate tricuspid regurgitation jet to be able to calculate RV systolic pressure, indirect findings are once again consistent with severe pulmonary hypertension   Consult cardiology appreciate recs

## 2025-06-16 NOTE — ASSESSMENT & PLAN NOTE
Hold xarelto  Continue with BB  Interval worsening on a.m. of 6/15 due to uncertainty etiology, patient did refuse CPAP at bedtime which may have led to pulmonary infiltrate worsening A-fib given one-time Lopressor 2.5 mg IV and assess for effect in addition to extra diuresis  -HR and hb stability controlled over night. Patient appears euvolemic on exam today and rate controlled back in NSR.

## 2025-06-16 NOTE — ASSESSMENT & PLAN NOTE
Some intermittent hypotension noted over the last 24 hours with blood pressures dropping into the 90s/40s  Last /58

## 2025-06-16 NOTE — PROGRESS NOTES
Progress Note - Gastroenterology   Name: Camryn Roblero 84 y.o. female I MRN: [id number]  Unit/Bed#: S -01 I Date of Admission: 6/13/2025   Date of Service: 6/16/2025 I Hospital Day: 3      84-year-old female with A-fib on Xarelto who presents to the emergency room for shortness of breath and cough found to have hemoglobin of 5.2 down from 8.6 a week prior.  Dark stools reported in the ER.  Assessment & Plan  Normocytic anemia  Patient was initially planned for EGD and colonoscopy today however had worsening shortness of breath and tachycardia therefore this was canceled.  Hemoglobin stable today at 8.7.  Bowel movement yesterday was reported brown.  She is on 2 L nasal cannula and vital signs are stable.    -Discussed with primary team.  Appears to be improving from a volume overload standpoint.  - She did have regular diet this morning.  Will place on clear liquid diet for Tuesday, 6/17 and tentative EGD colonoscopy on Wednesday 6/18 as long as patient is doing well from a cardiopulmonary standpoint.  - Can continue Protonix IV twice daily  - Xarelto was on hold, last dose likely 6/13.    -Monitor stool output.  - Please reach out to GI team if any active signs of GI bleeding for more urgent endoscopic evaluation.          Subjective   Patient reports she is doing well from a GI standpoint.  No abdominal pain.  She had a small amount of her regular diet this morning.  She believes her breathing may be slightly improved.  She was on 2 L nasal cannula this morning and no longer tachycardic.  Last bowel movement was yesterday, brown.    Objective :  Temp:  [97.5 °F (36.4 °C)-98.1 °F (36.7 °C)] 97.5 °F (36.4 °C)  HR:  [] 76  BP: ()/(45-70) 111/58  Resp:  [16-27] 16  SpO2:  [91 %-100 %] 94 %  O2 Device: Nasal cannula  Nasal Cannula O2 Flow Rate (L/min):  [1.5 L/min-3 L/min] 2 L/min    Physical Exam  Vitals reviewed.   Constitutional:       General: She is not in acute distress.     Appearance: Normal  appearance. She is not ill-appearing.      Comments: Frail appearing   HENT:      Head: Normocephalic and atraumatic.     Eyes:      Conjunctiva/sclera: Conjunctivae normal.       Cardiovascular:      Rate and Rhythm: Normal rate and regular rhythm.      Heart sounds: No murmur heard.  Pulmonary:      Effort: Pulmonary effort is normal. No respiratory distress.      Breath sounds: Normal breath sounds.      Comments: On 2L NC  Abdominal:      General: Abdomen is flat. There is no distension.      Palpations: Abdomen is soft.      Tenderness: There is no abdominal tenderness. There is no guarding or rebound.     Musculoskeletal:         General: No swelling.      Cervical back: Normal range of motion.      Right lower leg: No edema.      Left lower leg: No edema.     Skin:     General: Skin is warm.      Coloration: Skin is not jaundiced.     Neurological:      General: No focal deficit present.      Mental Status: She is alert and oriented to person, place, and time. Mental status is at baseline.     Psychiatric:         Mood and Affect: Mood normal.         Behavior: Behavior normal.         Lab Results: I have reviewed the following results:

## 2025-06-16 NOTE — QUICK NOTE
Patient's daughter Naomie called and provided medical update.  All questions and concerns answered.    Ramana Vasquez,   PGY-2 Family Medicine

## 2025-06-16 NOTE — ASSESSMENT & PLAN NOTE
Recent Labs     06/14/25  2029 06/15/25  0556 06/16/25  0457   CREATININE 1.57* 1.44* 1.40*   EGFR 30 33 34     Estimated Creatinine Clearance: 23.5 mL/min (A) (by C-G formula based on SCr of 1.4 mg/dL (H)).    Renal function has been poor in the last few weeks, thought to be due renal hypoperfusion iso H  Monitor intake and output  Avoid nephrotoxins as able   Kidney function improving

## 2025-06-16 NOTE — PROGRESS NOTES
Update obtained from [facility] the patient admitted 6/13/25 to [facility]. This Admin Coordinator will continue to monitor via chart review.

## 2025-06-16 NOTE — PROGRESS NOTES
Progress Note - Hospitalist   Name: Camryn Roblero 84 y.o. female I MRN: [id number]  Unit/Bed#: S -01 I Date of Admission: 6/13/2025   Date of Service: 6/16/2025 I Hospital Day: 3    Assessment & Plan  Upper GI bleed  Patient presents from living facility with complaints of low hemoglobin. In ER, results to 5.2.   Fecal occult obtained in ER +. BUN elevated   admit for possible UGIB   Continue to monitor hemoglobin closely   IV protonix   Hold xarelto  GI consult for possible EGD, in consideration for tomorrow pending hemodynamic stability has improved. Likely do prep this evening pending availability of GI    Acute blood loss anemia  2/2 UGIB   S/p 2u prbc transfusion in ER  Repeat cbc in 4 hrs post transfusion ([place] 11:30am)  Continue to monitor hemoglobin closely   Hold antiplatelet/AC  Acute on chronic heart failure with preserved ejection fraction (HCC)  Wt Readings from Last 3 Encounters:   06/16/25 51 kg (112 lb 7 oz)   06/13/25 60 kg (132 lb 3.2 oz)   06/09/25 57.5 kg (126 lb 12.8 oz)     Patient noted to have increased weight gain since discharge earlier this month, received extra metolazone outpatient due to weight gain. Second HF admission within the month.  Will place on HF protocol   Telemetry monitoring   Received IV bumex 3 grams in ED  Will continue with bumex 3mg BID dosing  Low sodium diet. Fluid restriction to 2L/day   Monitor intake and output   Prior echo 10/24 reviewed, had worsening valvular disease  Updated echo: No significant change compared to the previous study. Although there was no adequate tricuspid regurgitation jet to be able to calculate RV systolic pressure, indirect findings are once again consistent with severe pulmonary hypertension   Consult cardiology appreciate recs   Elevated troponin  Patient denies chest pain, EKG non ischemic   Due to HF, demand ischemia  Continue to trend, monitor on telemetry   Paroxysmal atrial fibrillation (HCC)  Hold xarelto  Continue with  BB  Interval worsening on a.m. of 6/15 due to uncertainty etiology, patient did refuse CPAP at bedtime which may have led to pulmonary infiltrate worsening A-fib given one-time Lopressor 2.5 mg IV and assess for effect in addition to extra diuresis  -HR and hb stability controlled over night. Patient appears euvolemic on exam today and rate controlled back in NSR.  Type 2 diabetes mellitus, without long-term current use of insulin ([facility])  Lab Results   Component Value Date    HGBA1C 6.5 04/15/2025       Recent Labs     06/15/25  0623 06/15/25  1209 06/15/25  1621 06/15/25  2139   POCGLU 221* 267* 272* 201*       Blood Sugar Average: Last 72 hrs:  (P) [id number] elevated to 382  Place on accuchecks, every 6 hrs while NPO  ISS as needed    FEDE (acute kidney injury) ([facility]) (Resolved: 6/16/2025)  Recent Labs     06/14/25  2029 06/15/25  0556 06/16/25  0457   CREATININE 1.57* 1.44* 1.40*   EGFR 30 33 34     Estimated Creatinine Clearance: 23.5 mL/min (A) (by C-G formula based on SCr of 1.4 mg/dL (H)).    Renal function has been poor in the last few weeks, thought to be due renal hypoperfusion iso H  Monitor intake and output  Avoid nephrotoxins as able   Kidney function improving  Essential hypertension  BP stable   Continue BB  Chronic hypoxic respiratory failure, on home oxygen therapy  (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)  Patient initially noted to be wheezing, since resolved after duoneb in ER  Lower suspicion for acute exacerbation at this time, will defer steroids  Continue with nebulizers as needed   Pulmicort BID  TOM (obstructive sleep apnea)  Continue cpap nightly w/ oxygen   Lactic acidosis (Resolved: 6/16/2025)  POA   Now resolved  Likely due to anemia.     VTE Pharmacologic Prophylaxis:   hold Xarelto in setting of GI bleed    Mobility:   Basic Mobility Inpatient Raw Score: 13  -[facility] Goal: 4: Move to chair/commode  -[facility] Achieved: 2: Bed  activities/Dependent transfer  -[facility] Goal achieved. Continue to encourage appropriate mobility.    Patient Centered Rounds: I performed bedside rounds with nursing staff today.   Discussions with Specialists or Other Care Team Provider: GI and cardiology    Education and Discussions with Family / Patient: Updated  (daughter) via phone.    Current Length of Stay: 3 day(s)  Current Patient Status: Inpatient   Certification Statement: The patient will continue to require additional inpatient hospital stay due to acute blood loss anemia.  And heart failure exacerbation.  Discharge Plan: Anticipate discharge in 48-72 hrs to discharge location to be determined pending rehab evaluations.    Code Status: Level 3 - DNAR and DNI    Subjective   Patient seen evaluated bedside.  In no acute distress.  She is alert and oriented x 3.  Denies any issues with urination or bowel movements.  She is asking me to give her breakfast.  Educated patient on treatment plan and provided updates.  All questions and concerns answered at bedside.  Will call daughter later today to provide medical update as well.  Will also touch base with GI team in regards to plan for EGD/colonoscopy.    Objective :  Temp:  [97.2 °F (36.2 °C)-98.1 °F (36.7 °C)] 97.9 °F (36.6 °C)  HR:  [] 74  BP: ()/(45-70) 115/65  Resp:  [16-27] 16  SpO2:  [91 %-100 %] 96 %  O2 Device: CPAP  Nasal Cannula O2 Flow Rate (L/min):  [1.5 L/min-3 L/min] 2 L/min    Body mass index is 22.71 kg/m².     Input and Output Summary (last 24 hours):     Intake/Output Summary (Last 24 hours) at 6/16/2025 0558  Last data filed at 6/15/2025 2248  Gross per 24 hour   Intake 1800 ml   Output 1568 ml   Net 232 ml       Physical Exam  Constitutional:       General: She is not in acute distress.     Appearance: She is well-developed. She is not diaphoretic.   HENT:      Head: Normocephalic and atraumatic.      Right Ear: External ear normal.      Left Ear: External ear  normal.     Eyes:      Conjunctiva/sclera: Conjunctivae normal.      Pupils: Pupils are equal, round, and reactive to light.       Cardiovascular:      Rate and Rhythm: Normal rate. Rhythm irregular.      Heart sounds: Normal heart sounds. No murmur heard.     No friction rub. No gallop.   Pulmonary:      Effort: Pulmonary effort is normal.      Breath sounds: No stridor. Rhonchi (mild bilaterally) present. No wheezing or rales.   Abdominal:      General: Bowel sounds are normal. There is no distension.      Palpations: Abdomen is soft. There is no mass.      Tenderness: There is no abdominal tenderness. There is no guarding or rebound.     Musculoskeletal:         General: No tenderness. Normal range of motion.      Cervical back: Normal range of motion and neck supple.      Right lower leg: No edema.      Left lower leg: No edema.     Skin:     General: Skin is warm and dry.      Coloration: Skin is not pale.      Findings: No erythema or rash.     Neurological:      General: No focal deficit present.      Mental Status: She is alert and oriented to person, place, and time. Mental status is at baseline.     Psychiatric:         Mood and Affect: Mood normal.         Behavior: Behavior normal.           Telemetry:  Telemetry Orders (From admission, onward)               24 Hour Telemetry Monitoring  Continuous x 24 Hours (Telem)        Expiring   Question:  Reason for 24 Hour Telemetry  Answer:  Decompensated CHF- and any one of the following: continuous diuretic infusion or total diuretic dose >200 mg daily, associated electrolyte derangement (I.e. K < 3.0), inotropic drip (continuous infusion), hx of ventricular arrhythmia, or new EF < 35%                     Telemetry Reviewed: Normal Sinus Rhythm  Indication for Continued Telemetry Use: Arrthymias requiring medical therapy               Lab Results: I have reviewed the following results:   Results from last 7 days   Lab Units 06/16/25  0457 06/15/25  0556   WBC  Thousand/uL 9.00 6.22   HEMOGLOBIN g/dL 8.7* 8.9*   HEMATOCRIT % 27.5* 27.2*   PLATELETS Thousands/uL 205 201   SEGS PCT %  --  74   LYMPHO PCT %  --  14   MONO PCT %  --  10   EOS PCT %  --  1     Results from last 7 days   Lab Units 06/16/25  0457   SODIUM mmol/L 141   POTASSIUM mmol/L 3.3*   CHLORIDE mmol/L 97   CO2 mmol/L 39*   BUN mg/dL 65*   CREATININE mg/dL 1.40*   ANION GAP mmol/L 5   CALCIUM mg/dL 8.5   ALBUMIN g/dL 3.0*   TOTAL BILIRUBIN mg/dL 0.91   ALK PHOS U/L 57   ALT U/L 18   AST U/L 57*   GLUCOSE RANDOM mg/dL 125     Results from last 7 days   Lab Units 06/15/25  0556   INR  1.04     Results from last 7 days   Lab Units 06/15/25  2139 06/15/25  1621 06/15/25  1209 06/15/25  0623 06/15/25  0111 06/14/25  2105 06/14/25  1230 06/14/25  0617 06/14/25  0220   POC GLUCOSE mg/dl 201* 272* 267* 221* 196* 244* 160* 223* 276*         Results from last 7 days   Lab Units 06/14/25  1357 06/14/25  0621 06/14/25  0104 06/13/25  2230   LACTIC ACID mmol/L 2.0 2.5* 2.8* 2.6*       Recent Cultures (last 7 days):         Imaging Results Review: No pertinent imaging studies reviewed.  Other Study Results Review: No additional pertinent studies reviewed.    Last 24 Hours Medication List:     Current Facility-Administered Medications:     acetaminophen (TYLENOL) tablet 650 mg, Q6H PRN    atorvastatin (LIPITOR) tablet 40 mg, Daily    budesonide (PULMICORT) inhalation solution 0.5 mg, Q12H    bumetanide (BUMEX) injection 3 mg, BID    donepezil (ARICEPT) tablet 5 mg, HS    escitalopram (LEXAPRO) tablet 5 mg, Daily    fluticasone (FLONASE) 50 mcg/act nasal spray 1 spray, Daily    guaiFENesin (MUCINEX) 12 hr tablet 1,200 mg, Q12H JOE    insulin lispro (HumALOG/ADMELOG) 100 units/mL subcutaneous injection 1-6 Units, 4x Daily (AC & HS) **AND** Fingerstick Glucose (POCT), 4x Daily AC and at bedtime    ipratropium-albuterol (DUO-NEB) 0.5-2.5 mg/3 mL inhalation solution 3 mL, Q6H PRN    magnesium Oxide (MAG-OX) tablet 400 mg,  Daily    metoprolol (LOPRESSOR) injection 2.5 mg, Q6H PRN    metoprolol succinate (TOPROL-XL) 24 hr tablet 75 mg, BID    pantoprazole (PROTONIX) injection 40 mg, Q12H JOE    polyethylene glycol (GOLYTELY) bowel prep 4,000 mL, Once    [Held by provider] potassium chloride (Klor-Con M20) CR tablet 20 mEq, BID    [Held by provider] potassium chloride (Klor-Con M20) CR tablet 20 mEq, BID    Administrative Statements   Today, Patient Was Seen By: Ramana Vasquez, DO  I have spent a total time of 33 minutes in caring for this patient on the day of the visit/encounter including Diagnostic results, Prognosis, Risks and benefits of tx options, Instructions for management, Patient and family education, Importance of tx compliance, Risk factor reductions, Impressions, Counseling / Coordination of care, Documenting in the medical record, Reviewing/placing orders in the medical record (including tests, medications, and/or procedures), Obtaining or reviewing history  , and Communicating with other healthcare professionals .    **Please Note: This note may have been constructed using a voice recognition system.**

## 2025-06-16 NOTE — ASSESSMENT & PLAN NOTE
POA: Worsening fatigue and weakness.  Hemoglobin 5.2.  Fecal occult blood positive.  BUN elevated  Xarelto held  Transfused 2 units packed red blood cells on 6/13   Stable hgb 8.7   GI following-EGD and colonoscopy planned for 6/18

## 2025-06-16 NOTE — ASSESSMENT & PLAN NOTE
Lab Results   Component Value Date    HGBA1C 6.5 04/15/2025       Recent Labs     06/15/25  0623 06/15/25  1209 06/15/25  1621 06/15/25  2139   POCGLU 221* 267* 272* 201*       Blood Sugar Average: Last 72 hrs:  (P) [id number] elevated to 382  Place on accuchecks, every 6 hrs while NPO  ISS as needed

## 2025-06-17 PROBLEM — R19.5 DARK STOOLS: Status: ACTIVE | Noted: 2025-06-17

## 2025-06-17 LAB
ANION GAP SERPL CALCULATED.3IONS-SCNC: 3 MMOL/L (ref 4–13)
APTT PPP: 25 SECONDS (ref 23–34)
APTT PPP: 91 SECONDS (ref 23–34)
BUN SERPL-MCNC: 75 MG/DL (ref 5–25)
CALCIUM SERPL-MCNC: 8.4 MG/DL (ref 8.4–10.2)
CHLORIDE SERPL-SCNC: 96 MMOL/L (ref 96–108)
CO2 SERPL-SCNC: 38 MMOL/L (ref 21–32)
CREAT SERPL-MCNC: 1.64 MG/DL (ref 0.6–1.3)
ERYTHROCYTE [DISTWIDTH] IN BLOOD BY AUTOMATED COUNT: 17.4 % (ref 11.6–15.1)
GFR SERPL CREATININE-BSD FRML MDRD: 28 ML/MIN/1.73SQ M
GLUCOSE SERPL-MCNC: 190 MG/DL (ref 65–140)
GLUCOSE SERPL-MCNC: 204 MG/DL (ref 65–140)
GLUCOSE SERPL-MCNC: 280 MG/DL (ref 65–140)
GLUCOSE SERPL-MCNC: 283 MG/DL (ref 65–140)
GLUCOSE SERPL-MCNC: 374 MG/DL (ref 65–140)
HCT VFR BLD AUTO: 26.2 % (ref 34.8–46.1)
HCT VFR BLD AUTO: 28.7 % (ref 34.8–46.1)
HGB BLD-MCNC: 8.3 G/DL (ref 11.5–15.4)
HGB BLD-MCNC: 9 G/DL (ref 11.5–15.4)
INR PPP: 1 (ref 0.85–1.19)
MAGNESIUM SERPL-MCNC: 2.5 MG/DL (ref 1.9–2.7)
MCH RBC QN AUTO: 31.5 PG (ref 26.8–34.3)
MCHC RBC AUTO-ENTMCNC: 31.4 G/DL (ref 31.4–37.4)
MCV RBC AUTO: 100 FL (ref 82–98)
PLATELET # BLD AUTO: 249 THOUSANDS/UL (ref 149–390)
PMV BLD AUTO: 11 FL (ref 8.9–12.7)
POTASSIUM SERPL-SCNC: 3.9 MMOL/L (ref 3.5–5.3)
PROTHROMBIN TIME: 13.9 SECONDS (ref 12.3–15)
RBC # BLD AUTO: 2.86 MILLION/UL (ref 3.81–5.12)
SODIUM SERPL-SCNC: 137 MMOL/L (ref 135–147)
WBC # BLD AUTO: 6.75 THOUSAND/UL (ref 4.31–10.16)

## 2025-06-17 PROCEDURE — 87081 CULTURE SCREEN ONLY: CPT | Performed by: FAMILY MEDICINE

## 2025-06-17 PROCEDURE — 85014 HEMATOCRIT: CPT

## 2025-06-17 PROCEDURE — 94640 AIRWAY INHALATION TREATMENT: CPT

## 2025-06-17 PROCEDURE — 85610 PROTHROMBIN TIME: CPT

## 2025-06-17 PROCEDURE — 94760 N-INVAS EAR/PLS OXIMETRY 1: CPT

## 2025-06-17 PROCEDURE — 94660 CPAP INITIATION&MGMT: CPT

## 2025-06-17 PROCEDURE — 83735 ASSAY OF MAGNESIUM: CPT

## 2025-06-17 PROCEDURE — 85730 THROMBOPLASTIN TIME PARTIAL: CPT

## 2025-06-17 PROCEDURE — 99232 SBSQ HOSP IP/OBS MODERATE 35: CPT | Performed by: INTERNAL MEDICINE

## 2025-06-17 PROCEDURE — 94664 DEMO&/EVAL PT USE INHALER: CPT

## 2025-06-17 PROCEDURE — 85730 THROMBOPLASTIN TIME PARTIAL: CPT | Performed by: STUDENT IN AN ORGANIZED HEALTH CARE EDUCATION/TRAINING PROGRAM

## 2025-06-17 PROCEDURE — 80048 BASIC METABOLIC PNL TOTAL CA: CPT

## 2025-06-17 PROCEDURE — 82948 REAGENT STRIP/BLOOD GLUCOSE: CPT

## 2025-06-17 PROCEDURE — 94668 MNPJ CHEST WALL SBSQ: CPT

## 2025-06-17 PROCEDURE — 85018 HEMOGLOBIN: CPT

## 2025-06-17 PROCEDURE — 85027 COMPLETE CBC AUTOMATED: CPT

## 2025-06-17 RX ORDER — HEPARIN SODIUM 1000 [USP'U]/ML
3000 INJECTION, SOLUTION INTRAVENOUS; SUBCUTANEOUS ONCE
Status: COMPLETED | OUTPATIENT
Start: 2025-06-17 | End: 2025-06-17

## 2025-06-17 RX ORDER — POLYETHYLENE GLYCOL 3350 17 G/17G
238 POWDER, FOR SOLUTION ORAL ONCE
Status: COMPLETED | OUTPATIENT
Start: 2025-06-17 | End: 2025-06-17

## 2025-06-17 RX ORDER — FORMOTEROL FUMARATE 20 UG/2ML
20 SOLUTION RESPIRATORY (INHALATION)
Status: DISCONTINUED | OUTPATIENT
Start: 2025-06-17 | End: 2025-06-24 | Stop reason: HOSPADM

## 2025-06-17 RX ORDER — ALBUTEROL SULFATE 0.83 MG/ML
2.5 SOLUTION RESPIRATORY (INHALATION) EVERY 4 HOURS PRN
Status: DISCONTINUED | OUTPATIENT
Start: 2025-06-17 | End: 2025-06-24 | Stop reason: HOSPADM

## 2025-06-17 RX ORDER — HEPARIN SODIUM 10000 [USP'U]/100ML
3-20 INJECTION, SOLUTION INTRAVENOUS
Status: DISCONTINUED | OUTPATIENT
Start: 2025-06-17 | End: 2025-06-18

## 2025-06-17 RX ORDER — HEPARIN SODIUM 1000 [USP'U]/ML
3000 INJECTION, SOLUTION INTRAVENOUS; SUBCUTANEOUS EVERY 6 HOURS PRN
Status: DISCONTINUED | OUTPATIENT
Start: 2025-06-17 | End: 2025-06-18

## 2025-06-17 RX ORDER — HEPARIN SODIUM 1000 [USP'U]/ML
1500 INJECTION, SOLUTION INTRAVENOUS; SUBCUTANEOUS EVERY 6 HOURS PRN
Status: DISCONTINUED | OUTPATIENT
Start: 2025-06-17 | End: 2025-06-18

## 2025-06-17 RX ORDER — LEVALBUTEROL INHALATION SOLUTION 0.63 MG/3ML
0.63 SOLUTION RESPIRATORY (INHALATION)
Status: DISCONTINUED | OUTPATIENT
Start: 2025-06-17 | End: 2025-06-24 | Stop reason: HOSPADM

## 2025-06-17 RX ORDER — BISACODYL 5 MG/1
10 TABLET, DELAYED RELEASE ORAL ONCE
Status: COMPLETED | OUTPATIENT
Start: 2025-06-17 | End: 2025-06-17

## 2025-06-17 RX ADMIN — POLYETHYLENE GLYCOL 3350 238 G: 17 POWDER, FOR SOLUTION ORAL at 17:08

## 2025-06-17 RX ADMIN — BISACODYL 10 MG: 5 TABLET, COATED ORAL at 17:06

## 2025-06-17 RX ADMIN — GUAIFENESIN 1200 MG: 600 TABLET, MULTILAYER, EXTENDED RELEASE ORAL at 21:45

## 2025-06-17 RX ADMIN — METOPROLOL SUCCINATE 75 MG: 50 TABLET, EXTENDED RELEASE ORAL at 17:06

## 2025-06-17 RX ADMIN — BUMETANIDE 3 MG: 1 TABLET ORAL at 08:51

## 2025-06-17 RX ADMIN — HEPARIN SODIUM 3000 UNITS: 1000 INJECTION, SOLUTION INTRAVENOUS; SUBCUTANEOUS at 10:53

## 2025-06-17 RX ADMIN — ALBUTEROL SULFATE 2.5 MG: 2.5 SOLUTION RESPIRATORY (INHALATION) at 12:04

## 2025-06-17 RX ADMIN — METOPROLOL SUCCINATE 75 MG: 50 TABLET, EXTENDED RELEASE ORAL at 08:51

## 2025-06-17 RX ADMIN — FLUTICASONE PROPIONATE 1 SPRAY: 50 SPRAY, METERED NASAL at 08:59

## 2025-06-17 RX ADMIN — POTASSIUM CHLORIDE 20 MEQ: 1500 TABLET, EXTENDED RELEASE ORAL at 17:06

## 2025-06-17 RX ADMIN — GUAIFENESIN 1200 MG: 600 TABLET, MULTILAYER, EXTENDED RELEASE ORAL at 08:52

## 2025-06-17 RX ADMIN — POTASSIUM CHLORIDE 20 MEQ: 1500 TABLET, EXTENDED RELEASE ORAL at 08:51

## 2025-06-17 RX ADMIN — DONEPEZIL HYDROCHLORIDE 5 MG: 5 TABLET ORAL at 21:45

## 2025-06-17 RX ADMIN — HEPARIN SODIUM 12 UNITS/KG/HR: 10000 INJECTION, SOLUTION INTRAVENOUS at 10:55

## 2025-06-17 RX ADMIN — ATORVASTATIN CALCIUM 40 MG: 40 TABLET, FILM COATED ORAL at 08:51

## 2025-06-17 RX ADMIN — PANTOPRAZOLE SODIUM 40 MG: 40 INJECTION, POWDER, LYOPHILIZED, FOR SOLUTION INTRAVENOUS at 21:45

## 2025-06-17 RX ADMIN — Medication 400 MG: at 08:51

## 2025-06-17 RX ADMIN — FORMOTEROL FUMARATE DIHYDRATE 20 MCG: 20 SOLUTION RESPIRATORY (INHALATION) at 12:04

## 2025-06-17 RX ADMIN — BUDESONIDE 0.5 MG: 0.5 INHALANT RESPIRATORY (INHALATION) at 19:59

## 2025-06-17 RX ADMIN — BUMETANIDE 3 MG: 1 TABLET ORAL at 17:06

## 2025-06-17 RX ADMIN — ALBUTEROL SULFATE 2 PUFF: 90 AEROSOL, METERED RESPIRATORY (INHALATION) at 08:59

## 2025-06-17 RX ADMIN — BUDESONIDE 0.5 MG: 0.5 INHALANT RESPIRATORY (INHALATION) at 07:31

## 2025-06-17 RX ADMIN — FORMOTEROL FUMARATE DIHYDRATE 20 MCG: 20 SOLUTION RESPIRATORY (INHALATION) at 19:59

## 2025-06-17 RX ADMIN — INSULIN LISPRO 4 UNITS: 100 INJECTION, SOLUTION INTRAVENOUS; SUBCUTANEOUS at 11:00

## 2025-06-17 RX ADMIN — LEVALBUTEROL HYDROCHLORIDE 0.63 MG: 0.63 SOLUTION RESPIRATORY (INHALATION) at 19:59

## 2025-06-17 RX ADMIN — ESCITALOPRAM OXALATE 5 MG: 10 TABLET ORAL at 08:51

## 2025-06-17 RX ADMIN — LEVALBUTEROL HYDROCHLORIDE 0.63 MG: 0.63 SOLUTION RESPIRATORY (INHALATION) at 15:27

## 2025-06-17 RX ADMIN — INSULIN LISPRO 6 UNITS: 100 INJECTION, SOLUTION INTRAVENOUS; SUBCUTANEOUS at 17:13

## 2025-06-17 RX ADMIN — INSULIN LISPRO 2 UNITS: 100 INJECTION, SOLUTION INTRAVENOUS; SUBCUTANEOUS at 08:59

## 2025-06-17 RX ADMIN — INSULIN LISPRO 4 UNITS: 100 INJECTION, SOLUTION INTRAVENOUS; SUBCUTANEOUS at 22:53

## 2025-06-17 RX ADMIN — PANTOPRAZOLE SODIUM 40 MG: 40 INJECTION, POWDER, LYOPHILIZED, FOR SOLUTION INTRAVENOUS at 08:51

## 2025-06-17 NOTE — ASSESSMENT & PLAN NOTE
Wt Readings from Last 3 Encounters:   06/17/25 51.6 kg (113 lb 12.1 oz)   06/13/25 60 kg (132 lb 3.2 oz)   06/09/25 57.5 kg (126 lb 12.8 oz)   Home Rx: Bumex 3 mg twice daily and metolazone 2.5 mg daily for weight gain of 3 pounds in a day or 5 pounds in 5 to 7 days.  Chest x-ray: Small bilateral pleural effusions, vascular congestion.   BNP on admission 3288  Intake/output inaccurate  On 2 LNC (baseline)     Echocardiogram (6/14):  LVEF 65%.   Wall motion normal.   Systolic and diastolic flattening of intraventricular septum consistent with right ventricular pressure and overload . Mild AI.   Moderate MR.  Small pericardial effusion without evidence of tamponade.  Mild pulmonic regurgitation   Transitioned back to oral diuretics 6/16.  Bumex 3 mg p.o. twice daily with PRN metolazone. Continue the same today.

## 2025-06-17 NOTE — ASSESSMENT & PLAN NOTE
Hold xarelto-heparin drip up to 6 hours prior to procedure (hold at 0600)  Continue with BB  Interval worsening on a.m. of 6/15 due to uncertainty etiology, patient did refuse CPAP at bedtime which may have led to pulmonary infiltrate worsening A-fib given one-time Lopressor 2.5 mg IV and assess for effect in addition to extra diuresis  -HR and hb stability controlled over night. Patient appears euvolemic on exam today and rate afib

## 2025-06-17 NOTE — ASSESSMENT & PLAN NOTE
Home Rx: metoprolol succinate 50 mg BID and Xarelto 15 mg daily  Current Rx: Metoprolol succinate 75 mg p.o. twice daily  Anticoagulation held given anemia   Rates are controlled in A-fib, continue higher dose of Toprol-XL 75 mg p.o. twice daily  Resume anticoagulation once safe to do so from a bleeding/anemia standpoint

## 2025-06-17 NOTE — PROGRESS NOTES
Progress Note - Cardiology   Name: Camryn Roblero 84 y.o. female I MRN: [id number]  Unit/Bed#: S -01 I Date of Admission: 6/13/2025   Date of Service: 6/17/2025 I Hospital Day: 4     Assessment & Plan  Acute on chronic heart failure with preserved ejection fraction (HCC)  Wt Readings from Last 3 Encounters:   06/17/25 51.6 kg (113 lb 12.1 oz)   06/13/25 60 kg (132 lb 3.2 oz)   06/09/25 57.5 kg (126 lb 12.8 oz)   Home Rx: Bumex 3 mg twice daily and metolazone 2.5 mg daily for weight gain of 3 pounds in a day or 5 pounds in 5 to 7 days.  Chest x-ray: Small bilateral pleural effusions, vascular congestion.   BNP on admission 3288  Intake/output inaccurate  On 2 LNC (baseline)     Echocardiogram (6/14):  LVEF 65%.   Wall motion normal.   Systolic and diastolic flattening of intraventricular septum consistent with right ventricular pressure and overload . Mild AI.   Moderate MR.  Small pericardial effusion without evidence of tamponade.  Mild pulmonic regurgitation   Transitioned back to oral diuretics 6/16.  Bumex 3 mg p.o. twice daily with PRN metolazone. Continue the same today.  Upper GI bleed  POA: Worsening fatigue and weakness.  Hemoglobin 5.2.  Fecal occult blood positive.  BUN elevated  Xarelto held  Transfused 2 units packed red blood cells on 6/13   Stable hgb 8.7   GI following-EGD and colonoscopy planned for 6/18  Essential hypertension  24 hour avg /58  Last /75  Type 2 diabetes mellitus, without long-term current use of insulin ([facility])  Lab Results   Component Value Date    HGBA1C 6.5 04/15/2025   Management per primary team  Nonischemic nontraumatic myocardial injury  Troponins elevated but flat 192> 182  EKG showing no acute ischemic changes.  She denies chest pain.   Troponin elevation in the setting of acute anemia and acute heart failure  COPD, severe (HCC)  On 2L nasal cannula at baseline  TOM (obstructive sleep apnea)  CPAP at night   Paroxysmal atrial fibrillation (HCC)  Home Rx:  "metoprolol succinate 50 mg BID and Xarelto 15 mg daily  Current Rx: Metoprolol succinate 75 mg p.o. twice daily  Anticoagulation held given anemia   Rates are controlled in A-fib, continue higher dose of Toprol-XL 75 mg p.o. twice daily  Resume anticoagulation once safe to do so from a bleeding/anemia standpoint  Acute blood loss anemia  Transfused 2 units of packed red blood cells  Chronic hypoxic respiratory failure, on home oxygen therapy  (HCC)  On 2 L nasal cannula at baseline according to recent nursing home documentation    Subjective  Review of Systems   Constitutional: Negative for chills, malaise/fatigue and weight gain.   Cardiovascular:  Negative for chest pain, dyspnea on exertion, leg swelling, orthopnea, palpitations and syncope.   Respiratory:  Positive for cough. Negative for sleep disturbances due to breathing and sputum production.    Gastrointestinal:  Negative for bloating, nausea and vomiting.   Genitourinary: Negative.    Neurological:  Positive for weakness. Negative for dizziness and light-headedness.   Psychiatric/Behavioral:  Negative for altered mental status.    All other systems reviewed and are negative.      Objective:   Vitals: Blood pressure 132/75, pulse 76, temperature (!) 97.3 °F (36.3 °C), resp. rate 16, height 4' 11\" (1.499 m), weight 51.6 kg (113 lb 12.1 oz), SpO2 96%., Body mass index is 22.98 kg/m².,     Systolic (24hrs), Av , Min:98 , Max:132     Diastolic (24hrs), Av, Min:50, Max:75      Intake/Output Summary (Last 24 hours) at 2025 0844  Last data filed at 2025 0201  Gross per 24 hour   Intake 840 ml   Output 466 ml   Net 374 ml     Wt Readings from Last 3 Encounters:   25 51.6 kg (113 lb 12.1 oz)   25 60 kg (132 lb 3.2 oz)   25 57.5 kg (126 lb 12.8 oz)     Telemetry Review: N/a    Physical Exam  Vitals reviewed.   Constitutional:       General: She is not in acute distress.     Comments: Frail, chronically ill appearing   Neck:      " Vascular: No hepatojugular reflux or JVD.     Cardiovascular:      Rate and Rhythm: Normal rate. Rhythm irregularly irregular.      Heart sounds: Normal heart sounds. No murmur heard.     No friction rub. No gallop.   Pulmonary:      Effort: Pulmonary effort is normal. No respiratory distress.      Breath sounds: No rales.   Abdominal:      General: Bowel sounds are normal. There is no distension.      Palpations: Abdomen is soft.      Tenderness: There is no abdominal tenderness.     Musculoskeletal:         General: No tenderness. Normal range of motion.      Cervical back: Neck supple.      Right lower leg: Edema (trace) present.      Left lower leg: Edema (trace) present.     Skin:     General: Skin is warm and dry.      Capillary Refill: Capillary refill takes 2 to 3 seconds.      Findings: No erythema.     Neurological:      Mental Status: She is alert and oriented to person, place, and time. Mental status is at baseline.         LABORATORY RESULTS      CBC with diff:   Results from last 7 days   Lab Units 06/17/25  0532 06/16/25  0457 06/15/25  0556 06/14/25  1207 06/14/25  0621 06/13/25  2230   WBC Thousand/uL 6.75 9.00 6.22  --  5.60 5.22   HEMOGLOBIN g/dL 9.0* 8.7* 8.9* 8.6* 7.3* 5.2*   HEMATOCRIT % 28.7* 27.5* 27.2* 27.1* 22.3* 16.8*   MCV fL 100* 99* 96  --  95 98   PLATELETS Thousands/uL 249 205 201  --  181 216   RBC Million/uL 2.86* 2.78* 2.84*  --  2.36* 1.72*   MCH pg 31.5 31.3 31.3  --  30.9 30.2   MCHC g/dL 31.4 31.6 32.7  --  32.7 31.0*   RDW % 17.4* 17.0* 17.0*  --  17.0* 18.0*   MPV fL 11.0 10.7 10.6  --  10.6 11.0   NRBC AUTO /100 WBCs  --   --  0  --   --  0       CMP:  Results from last 7 days   Lab Units 06/17/25  0532 06/16/25  0457 06/15/25  0556 06/14/25 2029 06/14/25 0621 06/13/25  2230   POTASSIUM mmol/L 3.9 3.3* 3.0* 3.8 3.0* 3.6   CHLORIDE mmol/L 96 97 97 97 97 95*   CO2 mmol/L 38* 39* 41* 39* 37* 36*   BUN mg/dL 75* 65* 74* 82* 98* 104*   CREATININE mg/dL 1.64* 1.40* 1.44* 1.57*  "1.56* 1.66*   CALCIUM mg/dL 8.4 8.5 8.8 8.8 8.7 8.9   AST U/L  --  57* 62*  --  62* 69*   ALT U/L  --  18 22  --  24 27   ALK PHOS U/L  --  57 61  --  58 68   EGFR ml/min/1.73sq m 28 34 33 30 30 28       BMP:  Results from last 7 days   Lab Units 06/17/25  0532 06/16/25  0457 06/15/25  0556 06/14/25 2029 06/14/25  0621 06/13/25  2230   POTASSIUM mmol/L 3.9 3.3* 3.0* 3.8 3.0* 3.6   CHLORIDE mmol/L 96 97 97 97 97 95*   CO2 mmol/L 38* 39* 41* 39* 37* 36*   BUN mg/dL 75* 65* 74* 82* 98* 104*   CREATININE mg/dL 1.64* 1.40* 1.44* 1.57* 1.56* 1.66*   CALCIUM mg/dL 8.4 8.5 8.8 8.8 8.7 8.9       Lab Results   Component Value Date    CREATININE 1.64 (H) 06/17/2025    CREATININE 1.40 (H) 06/16/2025    CREATININE 1.44 (H) 06/15/2025       No results found for: \"NTBNP\"        Results from last 7 days   Lab Units 06/17/25  0532 06/16/25  0457 06/15/25  0556 06/14/25  0621   MAGNESIUM mg/dL 2.5 1.9 2.0 2.1                     Results from last 7 days   Lab Units 06/15/25  0556   INR  1.04       Lipid Profile:   No results found for: \"CHOL\"  Lab Results   Component Value Date    HDL 35 (L) 04/24/2025    HDL 34 (L) 05/09/2024    HDL 31 (L) 12/23/2023     Lab Results   Component Value Date    LDLCALC 34 04/24/2025    LDLCALC 40 05/09/2024    LDLCALC 37 12/23/2023     Lab Results   Component Value Date    TRIG 57 04/24/2025    TRIG 59 05/09/2024    TRIG 51 12/23/2023       Meds/Allergies   all current active meds have been reviewed and current meds:   Current Facility-Administered Medications:     acetaminophen (TYLENOL) tablet 650 mg, Q6H PRN    albuterol (PROVENTIL HFA,VENTOLIN HFA) inhaler 2 puff, Q4H PRN    atorvastatin (LIPITOR) tablet 40 mg, Daily    budesonide (PULMICORT) inhalation solution 0.5 mg, Q12H    bumetanide (BUMEX) tablet 3 mg, BID (diuretic)    donepezil (ARICEPT) tablet 5 mg, HS    escitalopram (LEXAPRO) tablet 5 mg, Daily    fluticasone (FLONASE) 50 mcg/act nasal spray 1 spray, Daily    guaiFENesin (MUCINEX) 12 " hr tablet 1,200 mg, Q12H JOE    insulin lispro (HumALOG/ADMELOG) 100 units/mL subcutaneous injection 1-6 Units, 4x Daily (AC & HS) **AND** Fingerstick Glucose (POCT), 4x Daily AC and at bedtime    magnesium Oxide (MAG-OX) tablet 400 mg, Daily    metoprolol (LOPRESSOR) injection 2.5 mg, Q6H PRN    metoprolol succinate (TOPROL-XL) 24 hr tablet 75 mg, BID    pantoprazole (PROTONIX) injection 40 mg, Q12H JOE    polyethylene glycol (GOLYTELY) bowel prep 4,000 mL, Once    potassium chloride (Klor-Con M20) CR tablet 20 mEq, BID    Medications Prior to Admission:     Accu-Chek FastClix Lancets MISC    Accu-Chek SmartView test strip    acetaminophen (TYLENOL) 500 mg tablet    atorvastatin (LIPITOR) 40 mg tablet    AYR SALINE NASAL DROPS NA    bumetanide (BUMEX) 1 mg tablet    [Paused] diltiazem (CARDIZEM CD) 120 mg 24 hr capsule    donepezil (ARICEPT) 5 mg tablet    escitalopram (LEXAPRO) 5 mg tablet    fluticasone (FLONASE) 50 mcg/act nasal spray    ipratropium (ATROVENT) 0.06 % nasal spray    ipratropium-albuterol (DUO-NEB) 0.5-2.5 mg/3 mL nebulizer solution    latanoprost (XALATAN) 0.005 % ophthalmic solution    loratadine (CLARITIN) 10 mg tablet    magnesium (MAGTAB) 84 MG (7MEQ) TBCR    metolazone (ZAROXOLYN) 2.5 mg tablet    metoprolol succinate (TOPROL-XL) 50 mg 24 hr tablet    potassium chloride (Klor-Con M20) 20 mEq tablet    Xarelto 15 MG tablet         Counseling / Coordination of Care  Total floor / unit time spent today 20 minutes.  Greater than 50% of total time was spent with the patient and / or family counseling and / or coordination of care.      ** Please Note: Dragon 360 Dictation voice to text software may have been used in the creation of this document. **

## 2025-06-17 NOTE — PROGRESS NOTES
Progress Note - Hospitalist   Name: Camryn Roblero 84 y.o. female I MRN: [id number]  Unit/Bed#: S -01 I Date of Admission: 6/13/2025   Date of Service: 6/17/2025 I Hospital Day: 4    Assessment & Plan  Upper GI bleed  Patient presents from living facility with complaints of low hemoglobin. In ER, results to 5.2.   Fecal occult obtained in ER +. BUN elevated   admit for possible UGIB   Continue to monitor hemoglobin closely   IV protonix   Hold xarelto  GI consult for possible EGD, likely on 6/18. Diet clears. Likely do prep this evening pending availability of GI    Acute blood loss anemia  2/2 UGIB   S/p 2u prbc transfusion in ER  Since has stabilized with holding Xarelto  Continue to monitor hemoglobin closely   Hold antiplatelet/AC  Acute on chronic heart failure with preserved ejection fraction (HCC)  Wt Readings from Last 3 Encounters:   06/17/25 51.6 kg (113 lb 12.1 oz)   06/13/25 60 kg (132 lb 3.2 oz)   06/09/25 57.5 kg (126 lb 12.8 oz)     Patient noted to have increased weight gain since discharge earlier this month, received extra metolazone outpatient due to weight gain. Second HF admission within the month.  Will place on HF protocol   Telemetry monitoring dc  Received IV bumex 3 grams in ED  Will continue with bumex 3mg BID dosing  Low sodium diet. Fluid restriction to 2L/day   Monitor intake and output   Prior echo 10/24 reviewed, had worsening valvular disease  Updated echo: No significant change compared to the previous study. Although there was no adequate tricuspid regurgitation jet to be able to calculate RV systolic pressure, indirect findings are once again consistent with severe pulmonary hypertension   Consult cardiology appreciate recs   Nonischemic nontraumatic myocardial injury  Patient denies chest pain, EKG non ischemic   Due to HF, demand ischemia  Continue to trend, monitor on telemetry   Paroxysmal atrial fibrillation (HCC)  Hold xarelto  Continue with BB  Interval worsening on a.m.  of 6/15 due to uncertainty etiology, patient did refuse CPAP at bedtime which may have led to pulmonary infiltrate worsening A-fib given one-time Lopressor 2.5 mg IV and assess for effect in addition to extra diuresis  -HR and hb stability controlled over night. Patient appears euvolemic on exam today and rate controlled back in NSR.  Type 2 diabetes mellitus, without long-term current use of insulin ([facility])  Lab Results   Component Value Date    HGBA1C 6.5 04/15/2025       Recent Labs     06/16/25  1154 06/16/25  1604 06/16/25  2054 06/17/25  0723   POCGLU 322* 254* 238* 204*       Blood Sugar Average: Last 72 hrs:  (P) 231.5BG elevated to 382  Place on accuchecks, every 6 hrs while NPO  ISS as needed    Essential hypertension  BP stable   Continue BB  Chronic hypoxic respiratory failure, on home oxygen therapy  ([facility])  No increase in 02 requirement   Continue oxygen therapy at all times  COPD, 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 Continue to encourage appropriate mobility.    Patient Centered Rounds: I performed bedside rounds with nursing staff today.   Discussions with Specialists or Other Care Team Provider: GI team and cardiology     Education and Discussions with Family / Patient: Updated  (daughter) via phone.    Current Length of Stay: 4 day(s)  Current Patient Status: Inpatient   Certification Statement: The patient will continue to require additional inpatient hospital stay due to anemia due to likely gi bleed   Discharge  Plan: Anticipate discharge in 48-72 hrs to discharge location to be determined pending rehab evaluations.    Code Status: Level 3 - DNAR and DNI    Subjective   Patient seen evaluated bedside.  No acute distress.  She denies any complaints or concerns.  She denies any bloody bowel movements overnight.  Denies any issue with urination.  She is resting comfortably without supplemental oxygen.  Treatment plan updated to patient.  All questions and concerns answered.    Objective :  Temp:  [97.3 °F (36.3 °C)-97.5 °F (36.4 °C)] 97.3 °F (36.3 °C)  HR:  [66-78] 76  BP: ()/(50-75) 132/75  SpO2:  [94 %-100 %] 96 %  O2 Device: Nasal cannula  Nasal Cannula O2 Flow Rate (L/min):  [2 L/min] 2 L/min    Body mass index is 22.98 kg/m².     Input and Output Summary (last 24 hours):     Intake/Output Summary (Last 24 hours) at 6/17/2025 0904  Last data filed at 6/17/2025 0201  Gross per 24 hour   Intake 840 ml   Output 466 ml   Net 374 ml       Physical Exam  Constitutional:       General: She is not in acute distress.     Appearance: She is well-developed. She is not diaphoretic.   HENT:      Head: Normocephalic and atraumatic.      Right Ear: External ear normal.      Left Ear: External ear normal.     Eyes:      Conjunctiva/sclera: Conjunctivae normal.      Pupils: Pupils are equal, round, and reactive to light.       Cardiovascular:      Rate and Rhythm: Normal rate. Rhythm irregular.      Heart sounds: Normal heart sounds. No murmur heard.     No friction rub. No gallop.   Pulmonary:      Effort: Pulmonary effort is normal.      Breath sounds: No stridor. Rhonchi (mild bilaterally) present. No wheezing or rales.   Abdominal:      General: Bowel sounds are normal. There is no distension.      Palpations: Abdomen is soft. There is no mass.      Tenderness: There is no abdominal tenderness. There is no guarding or rebound.     Musculoskeletal:         General: No tenderness. Normal range of motion.      Cervical back: Normal  range of motion and neck supple.      Right lower leg: No edema.      Left lower leg: No edema.     Skin:     General: Skin is warm and dry.      Coloration: Skin is not pale.      Findings: No erythema or rash.     Neurological:      General: No focal deficit present.      Mental Status: She is alert and oriented to person, place, and time. Mental status is at baseline.     Psychiatric:         Mood and Affect: Mood normal.         Behavior: Behavior normal.               Lab Results: I have reviewed the following results:   Results from last 7 days   Lab Units 06/17/25  0532 06/16/25  0457 06/15/25  0556   WBC Thousand/uL 6.75   < > 6.22   HEMOGLOBIN g/dL 9.0*   < > 8.9*   HEMATOCRIT % 28.7*   < > 27.2*   PLATELETS Thousands/uL 249   < > 201   SEGS PCT %  --   --  74   LYMPHO PCT %  --   --  14   MONO PCT %  --   --  10   EOS PCT %  --   --  1    < > = values in this interval not displayed.     Results from last 7 days   Lab Units 06/17/25  0532 06/16/25  0457   SODIUM mmol/L 137 141   POTASSIUM mmol/L 3.9 3.3*   CHLORIDE mmol/L 96 97   CO2 mmol/L 38* 39*   BUN mg/dL 75* 65*   CREATININE mg/dL 1.64* 1.40*   ANION GAP mmol/L 3* 5   CALCIUM mg/dL 8.4 8.5   ALBUMIN g/dL  --  3.0*   TOTAL BILIRUBIN mg/dL  --  0.91   ALK PHOS U/L  --  57   ALT U/L  --  18   AST U/L  --  57*   GLUCOSE RANDOM mg/dL 190* 125     Results from last 7 days   Lab Units 06/15/25  0556   INR  1.04     Results from last 7 days   Lab Units 06/17/25  0723 06/16/25  2054 06/16/25  1604 06/16/25  1154 06/16/25  0720 06/15/25  2139 06/15/25  1621 06/15/25  1209 06/15/25  0623 06/15/25  0111 06/14/25  2105 06/14/25  1230   POC GLUCOSE mg/dl 204* 238* 254* 322* 163* 201* 272* 267* 221* 196* 244* 160*         Results from last 7 days   Lab Units 06/14/25  1357 06/14/25  0621 06/14/25  0104 06/13/25  2230   LACTIC ACID mmol/L 2.0 2.5* 2.8* 2.6*       Recent Cultures (last 7 days):         Imaging Results Review: No pertinent imaging studies  reviewed.  Other Study Results Review: No additional pertinent studies reviewed.    Last 24 Hours Medication List:     Current Facility-Administered Medications:     acetaminophen (TYLENOL) tablet 650 mg, Q6H PRN    albuterol (PROVENTIL HFA,VENTOLIN HFA) inhaler 2 puff, Q4H PRN    atorvastatin (LIPITOR) tablet 40 mg, Daily    budesonide (PULMICORT) inhalation solution 0.5 mg, Q12H    bumetanide (BUMEX) tablet 3 mg, BID (diuretic)    donepezil (ARICEPT) tablet 5 mg, HS    escitalopram (LEXAPRO) tablet 5 mg, Daily    fluticasone (FLONASE) 50 mcg/act nasal spray 1 spray, Daily    guaiFENesin (MUCINEX) 12 hr tablet 1,200 mg, Q12H JOE    insulin lispro (HumALOG/ADMELOG) 100 units/mL subcutaneous injection 1-6 Units, 4x Daily (AC & HS) **AND** Fingerstick Glucose (POCT), 4x Daily AC and at bedtime    magnesium Oxide (MAG-OX) tablet 400 mg, Daily    metoprolol (LOPRESSOR) injection 2.5 mg, Q6H PRN    metoprolol succinate (TOPROL-XL) 24 hr tablet 75 mg, BID    pantoprazole (PROTONIX) injection 40 mg, Q12H JOE    polyethylene glycol (GOLYTELY) bowel prep 4,000 mL, Once    potassium chloride (Klor-Con M20) CR tablet 20 mEq, BID    Administrative Statements   Today, Patient Was Seen By: Ramana Vasquez, DO  I have spent a total time of 32 minutes in caring for this patient on the day of the visit/encounter including Diagnostic results, Prognosis, Risks and benefits of tx options, Instructions for management, Patient and family education, Importance of tx compliance, Risk factor reductions, Impressions, Counseling / Coordination of care, Documenting in the medical record, Reviewing/placing orders in the medical record (including tests, medications, and/or procedures), Obtaining or reviewing history  , and Communicating with other healthcare professionals .    **Please Note: This note may have been constructed using a voice recognition system.**

## 2025-06-17 NOTE — ASSESSMENT & PLAN NOTE
Patient presents from living facility with complaints of low hemoglobin. In ER, results to 5.2.   Fecal occult obtained in ER +. BUN elevated   admit for possible UGIB   Continue to monitor hemoglobin closely   IV protonix   Hold xarelto  GI consult for possible EGD, likely on 6/18. Diet clears. Likely do prep this evening pending availability of GI

## 2025-06-17 NOTE — ASSESSMENT & PLAN NOTE
Wt Readings from Last 3 Encounters:   06/17/25 51.6 kg (113 lb 12.1 oz)   06/13/25 60 kg (132 lb 3.2 oz)   06/09/25 57.5 kg (126 lb 12.8 oz)     Patient noted to have increased weight gain since discharge earlier this month, received extra metolazone outpatient due to weight gain. Second HF admission within the month.  Will place on HF protocol   Telemetry monitoring dc  Received IV bumex 3 grams in ED  Will continue with bumex 3mg BID dosing  Low sodium diet. Fluid restriction to 2L/day   Monitor intake and output   Prior echo 10/24 reviewed, had worsening valvular disease  Updated echo: No significant change compared to the previous study. Although there was no adequate tricuspid regurgitation jet to be able to calculate RV systolic pressure, indirect findings are once again consistent with severe pulmonary hypertension   Consult cardiology appreciate recs

## 2025-06-17 NOTE — ASSESSMENT & PLAN NOTE
2/2 UGIB   S/p 2u prbc transfusion in ER  Since has stabilized with holding Xarelto  Continue to monitor hemoglobin closely   Hold antiplatelet/AC

## 2025-06-17 NOTE — ASSESSMENT & PLAN NOTE
Patient was initially planned for EGD and colonoscopy 6/16 however had worsening shortness of breath and tachycardia therefore this was canceled.  Hemoglobin stable today at 9.  No further signs of bleeding.  Respiratory status improved and no longer on nasal cannula.    - Will plan for EGD and colonoscopy tomorrow, 6/18.  - Primary team reached out regarding anticoagulation.  Okay to start heparin drip.  Recommend holding this at 0600 6/18 for procedures. Monitor for signs of bleeding.  - Continue clear liquid diet today.  - MiraLAX/dulcolax bowel prep this afternoon.  - N.p.o. at midnight.    -Monitor stool output while on heparin drip.  - Monitor hemoglobin.  - Transfuse as needed per primary team    - Xarelto was on hold, last dose likely 6/13.  Now on heparin drip.    -Monitor stool output.  - Please reach out to GI team if any active signs of GI bleeding for more urgent endoscopic evaluation.    -We discussed with patient's daughter at bedside yesterday.

## 2025-06-17 NOTE — PROGRESS NOTES
Progress Note - Gastroenterology   Name: Camryn Roblero 84 y.o. female I MRN: [id number]  Unit/Bed#: S -01 I Date of Admission: 6/13/2025   Date of Service: 6/17/2025 I Hospital Day: 4        84-year-old female with A-fib on Xarelto who presents to the emergency room for shortness of breath and cough found to have hemoglobin of 5.2 down from 8.6 a week prior. Dark stools reported in the ER.   Assessment & Plan  Normocytic anemia  Dark stools  Patient was initially planned for EGD and colonoscopy 6/16 however had worsening shortness of breath and tachycardia therefore this was canceled.  Hemoglobin stable today at 9.  No further signs of bleeding.  Respiratory status improved and no longer on nasal cannula.    - Will plan for EGD and colonoscopy tomorrow, 6/18.  - Primary team reached out regarding anticoagulation.  Okay to start heparin drip.  Recommend holding this at 0600 6/18 for procedures. Monitor for signs of bleeding.  - Continue clear liquid diet today.  - MiraLAX/dulcolax bowel prep this afternoon.  - N.p.o. at midnight.    -Monitor stool output while on heparin drip.  - Monitor hemoglobin.  - Transfuse as needed per primary team    - Xarelto was on hold, last dose likely 6/13.  Now on heparin drip.    -Monitor stool output.  - Please reach out to GI team if any active signs of GI bleeding for more urgent endoscopic evaluation.    -We discussed with patient's daughter at bedside yesterday.      I have discussed the above management plan in detail with the primary service.   Gastroenterology service will follow.    Subjective   Patient reports she is doing well.  She is no longer on nasal cannula.  No abdominal pain nausea or vomiting.  She is unsure when her last bowel movement was    Objective :  Temp:  [97.3 °F (36.3 °C)-97.5 °F (36.4 °C)] 97.3 °F (36.3 °C)  HR:  [66-78] 76  BP: ()/(50-75) 132/75  SpO2:  [95 %-100 %] 96 %  O2 Device: Nasal cannula  Nasal Cannula O2 Flow Rate (L/min):  [2 L/min] 2  L/min    Physical Exam  Vitals reviewed.   Constitutional:       General: She is not in acute distress.     Appearance: Normal appearance. She is not ill-appearing.      Comments: Lying comfortably in bed.  No distress. No longer on NC.   HENT:      Head: Normocephalic and atraumatic.     Eyes:      Conjunctiva/sclera: Conjunctivae normal.       Cardiovascular:      Rate and Rhythm: Normal rate and regular rhythm.      Heart sounds: No murmur heard.  Pulmonary:      Effort: Pulmonary effort is normal. No respiratory distress.      Breath sounds: Normal breath sounds.   Abdominal:      General: Abdomen is flat. There is no distension.      Palpations: Abdomen is soft.      Tenderness: There is no abdominal tenderness. There is no guarding or rebound.     Musculoskeletal:         General: No swelling.      Cervical back: Normal range of motion.      Right lower leg: No edema.      Left lower leg: No edema.     Skin:     General: Skin is warm.      Coloration: Skin is not jaundiced.     Neurological:      General: No focal deficit present.      Mental Status: She is alert and oriented to person, place, and time. Mental status is at baseline.     Psychiatric:         Mood and Affect: Mood normal.         Behavior: Behavior normal.           Lab Results: I have reviewed the following results:

## 2025-06-17 NOTE — ASSESSMENT & PLAN NOTE
Lab Results   Component Value Date    HGBA1C 6.5 04/15/2025       Recent Labs     06/16/25  1154 06/16/25  1604 06/16/25 2054 06/17/25  0723   POCGLU 322* 254* 238* 204*       Blood Sugar Average: Last 72 hrs:  (P) 231.5BG elevated to 382  Place on accuchecks, every 6 hrs while NPO  ISS as needed

## 2025-06-18 ENCOUNTER — ANESTHESIA EVENT (INPATIENT)
Dept: GASTROENTEROLOGY | Facility: HOSPITAL | Age: 85
DRG: 377 | End: 2025-06-18
Payer: MEDICARE

## 2025-06-18 ENCOUNTER — APPOINTMENT (INPATIENT)
Dept: RADIOLOGY | Facility: HOSPITAL | Age: 85
DRG: 377 | End: 2025-06-18
Payer: MEDICARE

## 2025-06-18 ENCOUNTER — ANESTHESIA (INPATIENT)
Dept: GASTROENTEROLOGY | Facility: HOSPITAL | Age: 85
DRG: 377 | End: 2025-06-18
Payer: MEDICARE

## 2025-06-18 ENCOUNTER — APPOINTMENT (INPATIENT)
Dept: GASTROENTEROLOGY | Facility: HOSPITAL | Age: 85
DRG: 377 | End: 2025-06-18
Attending: PHYSICIAN ASSISTANT
Payer: MEDICARE

## 2025-06-18 PROBLEM — I27.20 PULMONARY HYPERTENSION (HCC): Status: ACTIVE | Noted: 2025-06-18

## 2025-06-18 PROBLEM — R57.9 SHOCK (HCC): Status: ACTIVE | Noted: 2025-06-18

## 2025-06-18 LAB
ABO GROUP BLD: NORMAL
ALBUMIN SERPL BCG-MCNC: 2.8 G/DL (ref 3.5–5)
ALP SERPL-CCNC: 90 U/L (ref 34–104)
ALT SERPL W P-5'-P-CCNC: 27 U/L (ref 7–52)
ANION GAP SERPL CALCULATED.3IONS-SCNC: 15 MMOL/L (ref 4–13)
ANION GAP SERPL CALCULATED.3IONS-SCNC: 4 MMOL/L (ref 4–13)
APTT PPP: 65 SECONDS (ref 23–34)
AST SERPL W P-5'-P-CCNC: 89 U/L (ref 13–39)
BASE EXCESS BLDA CALC-SCNC: -2.5 MMOL/L
BASOPHILS # BLD AUTO: 0.01 THOUSANDS/ÂΜL (ref 0–0.1)
BASOPHILS # BLD AUTO: 0.02 THOUSANDS/ÂΜL (ref 0–0.1)
BASOPHILS NFR BLD AUTO: 0 % (ref 0–1)
BASOPHILS NFR BLD AUTO: 0 % (ref 0–1)
BILIRUB SERPL-MCNC: 0.63 MG/DL (ref 0.2–1)
BLD GP AB SCN SERPL QL: NEGATIVE
BUN SERPL-MCNC: 55 MG/DL (ref 5–25)
BUN SERPL-MCNC: 61 MG/DL (ref 5–25)
CALCIUM ALBUM COR SERPL-MCNC: 9.8 MG/DL (ref 8.3–10.1)
CALCIUM SERPL-MCNC: 8.4 MG/DL (ref 8.4–10.2)
CALCIUM SERPL-MCNC: 8.8 MG/DL (ref 8.4–10.2)
CARDIAC TROPONIN I PNL SERPL HS: 212 NG/L (ref 8–18)
CHLORIDE SERPL-SCNC: 92 MMOL/L (ref 96–108)
CHLORIDE SERPL-SCNC: 95 MMOL/L (ref 96–108)
CO2 SERPL-SCNC: 23 MMOL/L (ref 21–32)
CO2 SERPL-SCNC: 38 MMOL/L (ref 21–32)
CREAT SERPL-MCNC: 1.47 MG/DL (ref 0.6–1.3)
CREAT SERPL-MCNC: 1.54 MG/DL (ref 0.6–1.3)
EOSINOPHIL # BLD AUTO: 0.08 THOUSAND/ÂΜL (ref 0–0.61)
EOSINOPHIL # BLD AUTO: 0.14 THOUSAND/ÂΜL (ref 0–0.61)
EOSINOPHIL NFR BLD AUTO: 1 % (ref 0–6)
EOSINOPHIL NFR BLD AUTO: 2 % (ref 0–6)
ERYTHROCYTE [DISTWIDTH] IN BLOOD BY AUTOMATED COUNT: 16.8 % (ref 11.6–15.1)
ERYTHROCYTE [DISTWIDTH] IN BLOOD BY AUTOMATED COUNT: 17.2 % (ref 11.6–15.1)
ERYTHROCYTE [DISTWIDTH] IN BLOOD BY AUTOMATED COUNT: 17.3 % (ref 11.6–15.1)
GFR SERPL CREATININE-BSD FRML MDRD: 30 ML/MIN/1.73SQ M
GFR SERPL CREATININE-BSD FRML MDRD: 32 ML/MIN/1.73SQ M
GLUCOSE SERPL-MCNC: 122 MG/DL (ref 65–140)
GLUCOSE SERPL-MCNC: 125 MG/DL (ref 65–140)
GLUCOSE SERPL-MCNC: 126 MG/DL (ref 65–140)
GLUCOSE SERPL-MCNC: 129 MG/DL (ref 65–140)
GLUCOSE SERPL-MCNC: 140 MG/DL (ref 65–140)
GLUCOSE SERPL-MCNC: 174 MG/DL (ref 65–140)
GLUCOSE SERPL-MCNC: 185 MG/DL (ref 65–140)
GLUCOSE SERPL-MCNC: 222 MG/DL (ref 65–140)
GLUCOSE SERPL-MCNC: 249 MG/DL (ref 65–140)
GLUCOSE SERPL-MCNC: 338 MG/DL (ref 65–140)
HCO3 BLDA-SCNC: 22.6 MMOL/L (ref 22–28)
HCT VFR BLD AUTO: 23.4 % (ref 34.8–46.1)
HCT VFR BLD AUTO: 24.5 % (ref 34.8–46.1)
HCT VFR BLD AUTO: 24.6 % (ref 34.8–46.1)
HCT VFR BLD AUTO: 25.5 % (ref 34.8–46.1)
HGB BLD-MCNC: 7.4 G/DL (ref 11.5–15.4)
HGB BLD-MCNC: 7.9 G/DL (ref 11.5–15.4)
HGB BLD-MCNC: 8.1 G/DL (ref 11.5–15.4)
HGB BLD-MCNC: 8.2 G/DL (ref 11.5–15.4)
HOROWITZ INDEX BLDA+IHG-RTO: 80 MM[HG]
IMM GRANULOCYTES # BLD AUTO: 0.04 THOUSAND/UL (ref 0–0.2)
IMM GRANULOCYTES # BLD AUTO: 0.07 THOUSAND/UL (ref 0–0.2)
IMM GRANULOCYTES NFR BLD AUTO: 1 % (ref 0–2)
IMM GRANULOCYTES NFR BLD AUTO: 1 % (ref 0–2)
LACTATE SERPL-SCNC: 2.1 MMOL/L (ref 0.5–2)
LACTATE SERPL-SCNC: 7.8 MMOL/L (ref 0.5–2)
LYMPHOCYTES # BLD AUTO: 1.33 THOUSANDS/ÂΜL (ref 0.6–4.47)
LYMPHOCYTES # BLD AUTO: 1.59 THOUSANDS/ÂΜL (ref 0.6–4.47)
LYMPHOCYTES NFR BLD AUTO: 22 % (ref 14–44)
LYMPHOCYTES NFR BLD AUTO: 22 % (ref 14–44)
MAGNESIUM SERPL-MCNC: 2.3 MG/DL (ref 1.9–2.7)
MAGNESIUM SERPL-MCNC: 2.4 MG/DL (ref 1.9–2.7)
MCH RBC QN AUTO: 31.4 PG (ref 26.8–34.3)
MCH RBC QN AUTO: 31.5 PG (ref 26.8–34.3)
MCH RBC QN AUTO: 31.8 PG (ref 26.8–34.3)
MCHC RBC AUTO-ENTMCNC: 31.6 G/DL (ref 31.4–37.4)
MCHC RBC AUTO-ENTMCNC: 32.2 G/DL (ref 31.4–37.4)
MCHC RBC AUTO-ENTMCNC: 33.1 G/DL (ref 31.4–37.4)
MCV RBC AUTO: 100 FL (ref 82–98)
MCV RBC AUTO: 95 FL (ref 82–98)
MCV RBC AUTO: 98 FL (ref 82–98)
MONOCYTES # BLD AUTO: 0.32 THOUSAND/ÂΜL (ref 0.17–1.22)
MONOCYTES # BLD AUTO: 0.48 THOUSAND/ÂΜL (ref 0.17–1.22)
MONOCYTES NFR BLD AUTO: 5 % (ref 4–12)
MONOCYTES NFR BLD AUTO: 7 % (ref 4–12)
MRSA NOSE QL CULT: NORMAL
NEUTROPHILS # BLD AUTO: 4.14 THOUSANDS/ÂΜL (ref 1.85–7.62)
NEUTROPHILS # BLD AUTO: 4.96 THOUSANDS/ÂΜL (ref 1.85–7.62)
NEUTS SEG NFR BLD AUTO: 68 % (ref 43–75)
NEUTS SEG NFR BLD AUTO: 71 % (ref 43–75)
NRBC BLD AUTO-RTO: 0 /100 WBCS
NRBC BLD AUTO-RTO: 1 /100 WBCS
O2 CT BLDA-SCNC: 12 ML/DL (ref 16–23)
OXYHGB MFR BLDA: 98.2 % (ref 94–97)
PCO2 BLDA: 40.3 MM HG (ref 36–44)
PEEP RESPIRATORY: 6 CM[H2O]
PH BLDA: 7.37 [PH] (ref 7.35–7.45)
PHOSPHATE SERPL-MCNC: 5.4 MG/DL (ref 2.3–4.1)
PLATELET # BLD AUTO: 205 THOUSANDS/UL (ref 149–390)
PLATELET # BLD AUTO: 222 THOUSANDS/UL (ref 149–390)
PLATELET # BLD AUTO: 234 THOUSANDS/UL (ref 149–390)
PMV BLD AUTO: 10.6 FL (ref 8.9–12.7)
PMV BLD AUTO: 10.9 FL (ref 8.9–12.7)
PMV BLD AUTO: 11.1 FL (ref 8.9–12.7)
PO2 BLDA: 248.9 MM HG (ref 75–129)
POTASSIUM SERPL-SCNC: 3.4 MMOL/L (ref 3.5–5.3)
POTASSIUM SERPL-SCNC: 4.9 MMOL/L (ref 3.5–5.3)
PROT SERPL-MCNC: 5.5 G/DL (ref 6.4–8.4)
RBC # BLD AUTO: 2.33 MILLION/UL (ref 3.81–5.12)
RBC # BLD AUTO: 2.57 MILLION/UL (ref 3.81–5.12)
RBC # BLD AUTO: 2.61 MILLION/UL (ref 3.81–5.12)
RH BLD: POSITIVE
SODIUM SERPL-SCNC: 133 MMOL/L (ref 135–147)
SODIUM SERPL-SCNC: 134 MMOL/L (ref 135–147)
SPECIMEN EXPIRATION DATE: NORMAL
SPECIMEN SOURCE: ABNORMAL
VENT AC: 14
VENT- AC: AC
VT SETTING VENT: 350 ML
WBC # BLD AUTO: 5.95 THOUSAND/UL (ref 4.31–10.16)
WBC # BLD AUTO: 7.23 THOUSAND/UL (ref 4.31–10.16)
WBC # BLD AUTO: 7.46 THOUSAND/UL (ref 4.31–10.16)

## 2025-06-18 PROCEDURE — 80053 COMPREHEN METABOLIC PANEL: CPT

## 2025-06-18 PROCEDURE — 88305 TISSUE EXAM BY PATHOLOGIST: CPT | Performed by: STUDENT IN AN ORGANIZED HEALTH CARE EDUCATION/TRAINING PROGRAM

## 2025-06-18 PROCEDURE — 82803 BLOOD GASES ANY COMBINATION: CPT

## 2025-06-18 PROCEDURE — 85014 HEMATOCRIT: CPT

## 2025-06-18 PROCEDURE — 82948 REAGENT STRIP/BLOOD GLUCOSE: CPT

## 2025-06-18 PROCEDURE — 84295 ASSAY OF SERUM SODIUM: CPT

## 2025-06-18 PROCEDURE — 82947 ASSAY GLUCOSE BLOOD QUANT: CPT

## 2025-06-18 PROCEDURE — 85025 COMPLETE CBC W/AUTO DIFF WBC: CPT

## 2025-06-18 PROCEDURE — 94002 VENT MGMT INPAT INIT DAY: CPT

## 2025-06-18 PROCEDURE — 71045 X-RAY EXAM CHEST 1 VIEW: CPT

## 2025-06-18 PROCEDURE — 94760 N-INVAS EAR/PLS OXIMETRY 1: CPT

## 2025-06-18 PROCEDURE — 94640 AIRWAY INHALATION TREATMENT: CPT

## 2025-06-18 PROCEDURE — 94150 VITAL CAPACITY TEST: CPT

## 2025-06-18 PROCEDURE — 85027 COMPLETE CBC AUTOMATED: CPT

## 2025-06-18 PROCEDURE — 84484 ASSAY OF TROPONIN QUANT: CPT

## 2025-06-18 PROCEDURE — 85018 HEMOGLOBIN: CPT

## 2025-06-18 PROCEDURE — 85025 COMPLETE CBC W/AUTO DIFF WBC: CPT | Performed by: STUDENT IN AN ORGANIZED HEALTH CARE EDUCATION/TRAINING PROGRAM

## 2025-06-18 PROCEDURE — 03HY32Z INSERTION OF MONITORING DEVICE INTO UPPER ARTERY, PERCUTANEOUS APPROACH: ICD-10-PCS | Performed by: STUDENT IN AN ORGANIZED HEALTH CARE EDUCATION/TRAINING PROGRAM

## 2025-06-18 PROCEDURE — 84100 ASSAY OF PHOSPHORUS: CPT

## 2025-06-18 PROCEDURE — 99223 1ST HOSP IP/OBS HIGH 75: CPT | Performed by: INTERNAL MEDICINE

## 2025-06-18 PROCEDURE — 86900 BLOOD TYPING SEROLOGIC ABO: CPT

## 2025-06-18 PROCEDURE — 83605 ASSAY OF LACTIC ACID: CPT

## 2025-06-18 PROCEDURE — 86901 BLOOD TYPING SEROLOGIC RH(D): CPT

## 2025-06-18 PROCEDURE — 0BH17EZ INSERTION OF ENDOTRACHEAL AIRWAY INTO TRACHEA, VIA NATURAL OR ARTIFICIAL OPENING: ICD-10-PCS | Performed by: STUDENT IN AN ORGANIZED HEALTH CARE EDUCATION/TRAINING PROGRAM

## 2025-06-18 PROCEDURE — 83735 ASSAY OF MAGNESIUM: CPT | Performed by: STUDENT IN AN ORGANIZED HEALTH CARE EDUCATION/TRAINING PROGRAM

## 2025-06-18 PROCEDURE — 4A133B1 MONITORING OF ARTERIAL PRESSURE, PERIPHERAL, PERCUTANEOUS APPROACH: ICD-10-PCS | Performed by: STUDENT IN AN ORGANIZED HEALTH CARE EDUCATION/TRAINING PROGRAM

## 2025-06-18 PROCEDURE — 82330 ASSAY OF CALCIUM: CPT

## 2025-06-18 PROCEDURE — 85730 THROMBOPLASTIN TIME PARTIAL: CPT | Performed by: STUDENT IN AN ORGANIZED HEALTH CARE EDUCATION/TRAINING PROGRAM

## 2025-06-18 PROCEDURE — P9016 RBC LEUKOCYTES REDUCED: HCPCS

## 2025-06-18 PROCEDURE — 84132 ASSAY OF SERUM POTASSIUM: CPT

## 2025-06-18 PROCEDURE — 94660 CPAP INITIATION&MGMT: CPT

## 2025-06-18 PROCEDURE — 5A1935Z RESPIRATORY VENTILATION, LESS THAN 24 CONSECUTIVE HOURS: ICD-10-PCS | Performed by: STUDENT IN AN ORGANIZED HEALTH CARE EDUCATION/TRAINING PROGRAM

## 2025-06-18 PROCEDURE — 0DB98ZX EXCISION OF DUODENUM, VIA NATURAL OR ARTIFICIAL OPENING ENDOSCOPIC, DIAGNOSTIC: ICD-10-PCS | Performed by: INTERNAL MEDICINE

## 2025-06-18 PROCEDURE — 0DB78ZX EXCISION OF STOMACH, PYLORUS, VIA NATURAL OR ARTIFICIAL OPENING ENDOSCOPIC, DIAGNOSTIC: ICD-10-PCS | Performed by: INTERNAL MEDICINE

## 2025-06-18 PROCEDURE — 80048 BASIC METABOLIC PNL TOTAL CA: CPT | Performed by: STUDENT IN AN ORGANIZED HEALTH CARE EDUCATION/TRAINING PROGRAM

## 2025-06-18 PROCEDURE — 83735 ASSAY OF MAGNESIUM: CPT

## 2025-06-18 PROCEDURE — 43239 EGD BIOPSY SINGLE/MULTIPLE: CPT | Performed by: INTERNAL MEDICINE

## 2025-06-18 PROCEDURE — 86850 RBC ANTIBODY SCREEN: CPT

## 2025-06-18 PROCEDURE — 82805 BLOOD GASES W/O2 SATURATION: CPT

## 2025-06-18 PROCEDURE — 4A133J1 MONITORING OF ARTERIAL PULSE, PERIPHERAL, PERCUTANEOUS APPROACH: ICD-10-PCS | Performed by: STUDENT IN AN ORGANIZED HEALTH CARE EDUCATION/TRAINING PROGRAM

## 2025-06-18 PROCEDURE — 94668 MNPJ CHEST WALL SBSQ: CPT

## 2025-06-18 PROCEDURE — 86923 COMPATIBILITY TEST ELECTRIC: CPT

## 2025-06-18 RX ORDER — SODIUM CHLORIDE, SODIUM LACTATE, POTASSIUM CHLORIDE, CALCIUM CHLORIDE 600; 310; 30; 20 MG/100ML; MG/100ML; MG/100ML; MG/100ML
INJECTION, SOLUTION INTRAVENOUS CONTINUOUS PRN
Status: DISCONTINUED | OUTPATIENT
Start: 2025-06-18 | End: 2025-06-18

## 2025-06-18 RX ORDER — FENTANYL CITRATE 50 UG/ML
50 INJECTION, SOLUTION INTRAMUSCULAR; INTRAVENOUS
Refills: 0 | Status: DISCONTINUED | OUTPATIENT
Start: 2025-06-18 | End: 2025-06-21

## 2025-06-18 RX ORDER — ALBUMIN HUMAN 50 G/1000ML
SOLUTION INTRAVENOUS CONTINUOUS PRN
Status: DISCONTINUED | OUTPATIENT
Start: 2025-06-18 | End: 2025-06-18

## 2025-06-18 RX ORDER — ALBUMIN HUMAN 50 G/1000ML
25 SOLUTION INTRAVENOUS ONCE
Status: COMPLETED | OUTPATIENT
Start: 2025-06-18 | End: 2025-06-18

## 2025-06-18 RX ORDER — CHLORHEXIDINE GLUCONATE ORAL RINSE 1.2 MG/ML
15 SOLUTION DENTAL EVERY 12 HOURS SCHEDULED
Status: DISCONTINUED | OUTPATIENT
Start: 2025-06-18 | End: 2025-06-18

## 2025-06-18 RX ORDER — EPINEPHRINE 0.1 MG/ML
INJECTION INTRAVENOUS AS NEEDED
Status: DISCONTINUED | OUTPATIENT
Start: 2025-06-18 | End: 2025-06-18

## 2025-06-18 RX ORDER — FENTANYL CITRATE 50 UG/ML
25 INJECTION, SOLUTION INTRAMUSCULAR; INTRAVENOUS EVERY 2 HOUR PRN
Refills: 0 | Status: DISCONTINUED | OUTPATIENT
Start: 2025-06-18 | End: 2025-06-18

## 2025-06-18 RX ORDER — CALCIUM CHLORIDE 100 MG/ML
INJECTION INTRAVENOUS; INTRAVENTRICULAR AS NEEDED
Status: DISCONTINUED | OUTPATIENT
Start: 2025-06-18 | End: 2025-06-18

## 2025-06-18 RX ORDER — SUCCINYLCHOLINE/SOD CL,ISO/PF 100 MG/5ML
SYRINGE (ML) INTRAVENOUS AS NEEDED
Status: DISCONTINUED | OUTPATIENT
Start: 2025-06-18 | End: 2025-06-18

## 2025-06-18 RX ORDER — ALBUMIN (HUMAN) 12.5 G/50ML
12.5 SOLUTION INTRAVENOUS ONCE
Status: DISCONTINUED | OUTPATIENT
Start: 2025-06-18 | End: 2025-06-18

## 2025-06-18 RX ORDER — PROPOFOL 10 MG/ML
5-50 INJECTION, EMULSION INTRAVENOUS
Status: DISCONTINUED | OUTPATIENT
Start: 2025-06-18 | End: 2025-06-19

## 2025-06-18 RX ORDER — METOCLOPRAMIDE HYDROCHLORIDE 5 MG/ML
10 INJECTION INTRAMUSCULAR; INTRAVENOUS ONCE AS NEEDED
Status: CANCELLED | OUTPATIENT
Start: 2025-06-18

## 2025-06-18 RX ORDER — CHLORHEXIDINE GLUCONATE ORAL RINSE 1.2 MG/ML
15 SOLUTION DENTAL EVERY 12 HOURS SCHEDULED
Status: DISCONTINUED | OUTPATIENT
Start: 2025-06-18 | End: 2025-06-21

## 2025-06-18 RX ORDER — ONDANSETRON 2 MG/ML
4 INJECTION INTRAMUSCULAR; INTRAVENOUS ONCE AS NEEDED
Status: CANCELLED | OUTPATIENT
Start: 2025-06-18

## 2025-06-18 RX ORDER — INSULIN LISPRO 100 [IU]/ML
2-12 INJECTION, SOLUTION INTRAVENOUS; SUBCUTANEOUS EVERY 6 HOURS SCHEDULED
Status: DISCONTINUED | OUTPATIENT
Start: 2025-06-18 | End: 2025-06-18

## 2025-06-18 RX ORDER — DIPHENHYDRAMINE HYDROCHLORIDE 50 MG/ML
12.5 INJECTION, SOLUTION INTRAMUSCULAR; INTRAVENOUS ONCE AS NEEDED
Status: CANCELLED | OUTPATIENT
Start: 2025-06-18

## 2025-06-18 RX ORDER — LIDOCAINE HYDROCHLORIDE 10 MG/ML
0.5 INJECTION, SOLUTION EPIDURAL; INFILTRATION; INTRACAUDAL; PERINEURAL ONCE AS NEEDED
Status: COMPLETED | OUTPATIENT
Start: 2025-06-18 | End: 2025-06-18

## 2025-06-18 RX ORDER — VASOPRESSIN 20 [USP'U]/ML
INJECTION, SOLUTION INTRAVENOUS AS NEEDED
Status: DISCONTINUED | OUTPATIENT
Start: 2025-06-18 | End: 2025-06-18

## 2025-06-18 RX ORDER — PROPOFOL 10 MG/ML
INJECTION, EMULSION INTRAVENOUS AS NEEDED
Status: DISCONTINUED | OUTPATIENT
Start: 2025-06-18 | End: 2025-06-18

## 2025-06-18 RX ORDER — MAGNESIUM SULFATE HEPTAHYDRATE 40 MG/ML
2 INJECTION, SOLUTION INTRAVENOUS ONCE
Status: COMPLETED | OUTPATIENT
Start: 2025-06-18 | End: 2025-06-18

## 2025-06-18 RX ORDER — POTASSIUM CHLORIDE 1500 MG/1
40 TABLET, EXTENDED RELEASE ORAL ONCE
Status: COMPLETED | OUTPATIENT
Start: 2025-06-18 | End: 2025-06-18

## 2025-06-18 RX ORDER — PHENYLEPHRINE HCL IN 0.9% NACL 1 MG/10 ML
SYRINGE (ML) INTRAVENOUS AS NEEDED
Status: DISCONTINUED | OUTPATIENT
Start: 2025-06-18 | End: 2025-06-18

## 2025-06-18 RX ORDER — POLYETHYLENE GLYCOL 3350 17 G/17G
119 POWDER, FOR SOLUTION ORAL ONCE
Status: COMPLETED | OUTPATIENT
Start: 2025-06-18 | End: 2025-06-18

## 2025-06-18 RX ADMIN — CHLORHEXIDINE GLUCONATE 15 ML: 1.2 SOLUTION ORAL at 20:17

## 2025-06-18 RX ADMIN — POTASSIUM CHLORIDE 20 MEQ: 1500 TABLET, EXTENDED RELEASE ORAL at 08:29

## 2025-06-18 RX ADMIN — NOREPINEPHRINE BITARTRATE 16 MCG: 1 INJECTION, SOLUTION, CONCENTRATE INTRAVENOUS at 13:14

## 2025-06-18 RX ADMIN — PROPOFOL 10 MCG/KG/MIN: 10 INJECTION, EMULSION INTRAVENOUS at 14:15

## 2025-06-18 RX ADMIN — BUMETANIDE 3 MG: 1 TABLET ORAL at 08:29

## 2025-06-18 RX ADMIN — BUDESONIDE 0.5 MG: 0.5 INHALANT RESPIRATORY (INHALATION) at 07:21

## 2025-06-18 RX ADMIN — EPINEPHRINE 0.2 MG: 0.1 INJECTION INTRAVENOUS at 13:14

## 2025-06-18 RX ADMIN — ATORVASTATIN CALCIUM 40 MG: 40 TABLET, FILM COATED ORAL at 08:29

## 2025-06-18 RX ADMIN — VASOPRESSIN 2 UNITS: 20 INJECTION INTRAVENOUS at 13:23

## 2025-06-18 RX ADMIN — ALBUMIN (HUMAN): 12.5 INJECTION, SOLUTION INTRAVENOUS at 13:30

## 2025-06-18 RX ADMIN — ESCITALOPRAM OXALATE 5 MG: 10 TABLET ORAL at 08:29

## 2025-06-18 RX ADMIN — FORMOTEROL FUMARATE DIHYDRATE 20 MCG: 20 SOLUTION RESPIRATORY (INHALATION) at 07:21

## 2025-06-18 RX ADMIN — NOREPINEPHRINE BITARTRATE 16 MCG: 1 INJECTION, SOLUTION, CONCENTRATE INTRAVENOUS at 13:16

## 2025-06-18 RX ADMIN — Medication 100 MCG: at 13:07

## 2025-06-18 RX ADMIN — LEVALBUTEROL HYDROCHLORIDE 0.63 MG: 0.63 SOLUTION RESPIRATORY (INHALATION) at 19:36

## 2025-06-18 RX ADMIN — FENTANYL CITRATE 25 MCG: 50 INJECTION INTRAMUSCULAR; INTRAVENOUS at 15:30

## 2025-06-18 RX ADMIN — BUDESONIDE 0.5 MG: 0.5 INHALANT RESPIRATORY (INHALATION) at 19:36

## 2025-06-18 RX ADMIN — Medication 100 MCG: at 13:04

## 2025-06-18 RX ADMIN — METOPROLOL SUCCINATE 75 MG: 50 TABLET, EXTENDED RELEASE ORAL at 08:29

## 2025-06-18 RX ADMIN — FORMOTEROL FUMARATE DIHYDRATE 20 MCG: 20 SOLUTION RESPIRATORY (INHALATION) at 19:36

## 2025-06-18 RX ADMIN — POTASSIUM CHLORIDE 40 MEQ: 1500 TABLET, EXTENDED RELEASE ORAL at 06:41

## 2025-06-18 RX ADMIN — SODIUM CHLORIDE, SODIUM LACTATE, POTASSIUM CHLORIDE, AND CALCIUM CHLORIDE: .6; .31; .03; .02 INJECTION, SOLUTION INTRAVENOUS at 12:59

## 2025-06-18 RX ADMIN — PANTOPRAZOLE SODIUM 40 MG: 40 INJECTION, POWDER, LYOPHILIZED, FOR SOLUTION INTRAVENOUS at 20:17

## 2025-06-18 RX ADMIN — NOREPINEPHRINE BITARTRATE 16 MCG: 1 INJECTION, SOLUTION, CONCENTRATE INTRAVENOUS at 13:17

## 2025-06-18 RX ADMIN — ALBUMIN (HUMAN) 25 G: 12.5 INJECTION, SOLUTION INTRAVENOUS at 15:35

## 2025-06-18 RX ADMIN — LIDOCAINE HYDROCHLORIDE 30 ML: 10 INJECTION, SOLUTION EPIDURAL; INFILTRATION; INTRACAUDAL at 13:05

## 2025-06-18 RX ADMIN — LEVALBUTEROL HYDROCHLORIDE 0.63 MG: 0.63 SOLUTION RESPIRATORY (INHALATION) at 07:21

## 2025-06-18 RX ADMIN — EPINEPHRINE 0.3 MG: 0.1 INJECTION INTRAVENOUS at 13:20

## 2025-06-18 RX ADMIN — Medication 100 MCG: at 13:02

## 2025-06-18 RX ADMIN — MAGNESIUM SULFATE HEPTAHYDRATE 2 G: 40 INJECTION, SOLUTION INTRAVENOUS at 15:18

## 2025-06-18 RX ADMIN — VASOPRESSIN 2 UNITS: 20 INJECTION INTRAVENOUS at 13:26

## 2025-06-18 RX ADMIN — Medication 100 MCG: at 13:05

## 2025-06-18 RX ADMIN — PANTOPRAZOLE SODIUM 40 MG: 40 INJECTION, POWDER, LYOPHILIZED, FOR SOLUTION INTRAVENOUS at 08:29

## 2025-06-18 RX ADMIN — GUAIFENESIN 1200 MG: 600 TABLET, MULTILAYER, EXTENDED RELEASE ORAL at 08:29

## 2025-06-18 RX ADMIN — Medication 100 MCG: at 13:03

## 2025-06-18 RX ADMIN — EPINEPHRINE 0.2 MG: 0.1 INJECTION INTRAVENOUS at 13:26

## 2025-06-18 RX ADMIN — SODIUM CHLORIDE 3 UNITS/HR: 9 INJECTION, SOLUTION INTRAVENOUS at 17:34

## 2025-06-18 RX ADMIN — NOREPINEPHRINE BITARTRATE 2 MCG/MIN: 1 INJECTION INTRAVENOUS at 15:19

## 2025-06-18 RX ADMIN — Medication 400 MG: at 08:29

## 2025-06-18 RX ADMIN — NOREPINEPHRINE BITARTRATE 16 MCG: 1 INJECTION, SOLUTION, CONCENTRATE INTRAVENOUS at 13:15

## 2025-06-18 RX ADMIN — POLYETHYLENE GLYCOL 3350 119 G: 17 POWDER, FOR SOLUTION ORAL at 09:09

## 2025-06-18 RX ADMIN — VASOPRESSIN 2 UNITS: 20 INJECTION INTRAVENOUS at 13:21

## 2025-06-18 RX ADMIN — NOREPINEPHRINE BITARTRATE 16 MCG: 1 INJECTION, SOLUTION, CONCENTRATE INTRAVENOUS at 13:18

## 2025-06-18 RX ADMIN — Medication 100 MG: at 13:24

## 2025-06-18 RX ADMIN — PROPOFOL 35 MCG/KG/MIN: 10 INJECTION, EMULSION INTRAVENOUS at 20:16

## 2025-06-18 RX ADMIN — CALCIUM CHLORIDE 0.5 G: 100 INJECTION INTRAVENOUS; INTRAVENTRICULAR at 13:16

## 2025-06-18 RX ADMIN — PROPOFOL 40 MG: 10 INJECTION, EMULSION INTRAVENOUS at 13:05

## 2025-06-18 NOTE — ASSESSMENT & PLAN NOTE
Initial hemoglobin 5.2  GI consulted and EGD colonoscopy was to be performed today; post EGD prior to Colonoscopy patient became hypotensive 30/20s requiring pressor support and intubation.  Hemoglobin on ICU admission: 7.4 down from 8.2  Patient has notable dark stools   EGD did not show any bleed    PLAN  -Monitor hemoglobin   -Transfuse 1 unit pRBC  -In regards to intubation; will refer to GI

## 2025-06-18 NOTE — ASSESSMENT & PLAN NOTE
Patient BP reported to be in 30s/20s post EGD  History of blood loss anemia and diastolic heart failure  Shock possibly secondary to mix of blood loss anemia and vasodilation from anesthesia  Was given 500 mcg of phenylephrine and started on Norepinephrine  BP had gone back up to 120s/60s and norepinephrine stopped  BP dropping to 80s/40    PLAN  -Continue with norepinephrine drip  -Albumin 25g  -Transfuse as needed  -Patient currently intubated; defer to GI in regards to plans for colonoscopy.    -May require goals of care discussion with family  -CXR pending

## 2025-06-18 NOTE — ASSESSMENT & PLAN NOTE
Patient with history of hypertension  BP post EGD reported 30/20s  Started on pressors  Hold antihypertensives in setting of shock

## 2025-06-18 NOTE — ASSESSMENT & PLAN NOTE
Lab Results   Component Value Date    HGBA1C 6.5 04/15/2025       Recent Labs     06/18/25  0033 06/18/25  0559 06/18/25  0718 06/18/25  1058   POCGLU 174* 129 140 122       Blood Sugar Average: Last 72 hrs:  (P) 225.6522704663859920  Continue Insulin Sliding scale

## 2025-06-18 NOTE — ASSESSMENT & PLAN NOTE
Patient presents from living facility with complaints of low hemoglobin. In ER, results to 5.2.   Fecal occult obtained in ER +. BUN elevated   admit for possible UGIB   Continue to monitor hemoglobin closely   IV protonix   Hold xarelto- received heparin drip on 6/17/25  GI consult for  EGD/colonoscopy, likely this afternoon. Patient is NPO. Heparin held at 6am this morning     S/p 2u prbc transfusion in ER

## 2025-06-18 NOTE — QUICK NOTE
ICU Attending Attestation    Pt admitted for GIB. Received several days of diuresis for severe PH in setting of diastolic CHF  In Endo for EGD and colonoscopy today, no acute bleeding found on EGD, but sudden hypotension and bradycardia before starting colonoscopy, sp 1L fluids, phenylephrine pushes, norepinephrine drip, intubation. Transferred to ICU    Pt opens eyes to voice, denies abd pain  Elderly frail woman  Intubated, sedated but following commands  ETT in place  Vent sounds b/l  Abd soft, non distended  Rectal tube with dark brown outpt  Extremities warm, no LE edema    Labs and imaging reviewed    Assessment  84M hx CHF, Afib on AC, TOM, severe COPD on chronic 2L, DM2, severe PH, presenting with sever anemia and dark stools.     Plan:  Neuro: sedation/comfort  - propofol drip and fentanyl prn for rass goal 0 to -1    CV:  Severe PH  CHF  Afib  Shock  Pt presented to ICU with sudden shock in endoscopy after EGD, likely mixed shock due to distributive component from anesthesia (less likely septic), volume loss from bleeding, bowel prep and some response to fluids in procedure, as well as cardiogenic from severe PH + anesthesia causing RV failure and reduced CO  On exam, extremities relatively warm (suggests not purely hypovolemic or cardiogenic)  On POCUS, LV EF ok, RV dilated with reduced function (chronic)  Difficult to assess volume/CO status without pulmonary arterial catheter  - will trial albumin bolus again  - levo for map goal 65  - hold diuresis for now, strict I/Os  - hold B blocker and AC  - if lactate not clearing with current pressors and rescucitation, would consider inotrope     Resp:   Acute on chronic hypoxic resp failure  intubated for airway protection  Severe COPD without acute exacerbation  Difficult to get pulse oximetry but ABG with good oxygenation  - continue vent support, wean FiO2   - pt DNR/DNI but reversed for procedure, will likely stay intubated pending repeat colonoscopy w  GI    GI:  GIB, blood loss anemia, EGD with no active bleeding, possible Lower GI source  - colonoscopy deferred due to new shock as above  - GI following  - monitor rectal tube outpt  - PRBC     Renal: CKD - Cr at baseline  - bautista for strict I/Os    ID: monitor off abx    Endo: ISS    Dispo: ICU

## 2025-06-18 NOTE — ASSESSMENT & PLAN NOTE
Lab Results   Component Value Date    HGBA1C 6.5 04/15/2025       Recent Labs     06/17/25  1638 06/17/25  2143 06/18/25  0033 06/18/25  0559   POCGLU 374* 283* 174* 129       Blood Sugar Average: Last 72 hrs:  (P) 238.0415707574039211QM elevated to 382  Place on accuchecks, every 6 hrs while NPO  ISS as needed

## 2025-06-18 NOTE — ANESTHESIA PROCEDURE NOTES
"Arterial Line Insertion    Performed by: Annie Victor CRNA  Authorized by: Steve Núñez MD  Consent: The procedure was performed in an emergent situation  Patient understanding: patient states understanding of the procedure being performed  Patient consent: the patient's understanding of the procedure matches consent given  Procedure consent: procedure consent matches procedure scheduled  Relevant documents: relevant documents present and verified  Patient identity confirmed: anonymous protocol, patient vented/unresponsive  Time out: Immediately prior to procedure a \"time out\" was called to verify the correct patient, procedure, equipment, support staff and site/side marked as required.  Preparation: Patient was prepped and draped in the usual sterile fashion.  Indications: hemodynamic monitoring  Orientation:  Left  Location: radial artery  Sedation:  Patient sedated: GA.    Procedure Details:      Line Type: Arterial Line  Heath's test normal: yes  Needle gauge: 20  Placement technique:  Anatomical landmarks  Number of attempts: 1    Post-procedure:  Post-procedure: dressing applied  Waveform: good waveform  Post-procedure CNS: normal  Patient tolerance: Patient tolerated the procedure well with no immediate complications          "

## 2025-06-18 NOTE — ASSESSMENT & PLAN NOTE
Wt Readings from Last 3 Encounters:   06/17/25 51.6 kg (113 lb 12.1 oz)   06/13/25 60 kg (132 lb 3.2 oz)   06/09/25 57.5 kg (126 lb 12.8 oz)     -Patient noted to have increased weight gain since discharge earlier this month, received extra metolazone outpatient due to weight gain. Second HF admission within the month.  -Will place on HF protocol   -Telemetry monitoring dc  -Received IV bumex 3 grams in ED  -Will continue with bumex 3mg BID dosing  -Low sodium diet. Fluid restriction to 2L/day   -Monitor intake and output   -Prior echo 10/24 reviewed, had worsening valvular disease  -Updated echo: No significant change compared to the previous study. Although there was no adequate tricuspid regurgitation jet to be able to calculate RV systolic pressure, indirect findings are once again consistent with severe pulmonary hypertension   -Consult cardiology appreciate recs   -appears euvolemic today on exam

## 2025-06-18 NOTE — ASSESSMENT & PLAN NOTE
Wt Readings from Last 3 Encounters:   06/17/25 51.6 kg (113 lb 12.1 oz)   06/13/25 60 kg (132 lb 3.2 oz)   06/09/25 57.5 kg (126 lb 12.8 oz)   Was on Bumex 3mg BID while inpatient    PLAN  Hold diuretics while in shock  Resume diuretic when hemodynamically stable  Lucero placed for accurate input/output measures

## 2025-06-18 NOTE — PHYSICAL THERAPY NOTE
Physical Therapy Cancellation Note       06/18/25 1414   Note Type   Note type Cancelled Session   Cancel Reasons Medical status   Additional Comments referral received for PT eval and tx. pt received  EGD/colonoscopy today. pt had issue w/ hypotension and was transferred to ICU. PT session cancelled due to pt's medical status. will follow and initiate PT as medically appropriate and schedule allows.     Andrew Ordoñez, PT

## 2025-06-18 NOTE — ANESTHESIA POSTPROCEDURE EVALUATION
Post-Op Assessment Note    CV Status:  Unstable  Pain scale: Unable to assess.    Pain control: Unable to assess.       Post-procedure mental status: Intubated.   Hydration Status:  Hypovolemic   PONV status: Unable to assess.   Airway patency: Intubated. Unable to assess.     Post Op Vitals Reviewed: Yes    Anethesia notable event occurred.    Staff: CRNA   Comments: Hemodynamically unstable. Intubated. Transferred to ICU. Report given to receiving ICU, RN,& ICU team        Last Filed PACU Vitals:  Vitals Value Taken Time   Temp     Pulse     BP     Resp     SpO2
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
## 2025-06-18 NOTE — QUICK NOTE
Patient's daughter Naomie provided medical update via telephone.  All questions and concerns answered.    Ramana Vasquez,   PGY-2 Family Medicine

## 2025-06-18 NOTE — ANESTHESIA PREPROCEDURE EVALUATION
Procedure:  COLONOSCOPY  EGD    Relevant Problems   ANESTHESIA (within normal limits)      CARDIO   (+) Acute on chronic heart failure with preserved ejection fraction (HCC)   (+) Atrial flutter (HCC)   (+) Essential hypertension   (+) Hyperlipidemia   (+) Paroxysmal atrial fibrillation (HCC)      ENDO   (+) Type 2 diabetes mellitus, without long-term current use of insulin (HCC)      GI/HEPATIC   (+) Pancreas cyst   (+) Upper GI bleed      /RENAL   (+) Renal lesion      GYN (within normal limits)      HEMATOLOGY   (+) Acute blood loss anemia   (+) Normocytic anemia      MUSCULOSKELETAL (within normal limits)      NEURO/PSYCH   (+) HEATHER (generalized anxiety disorder)      PULMONARY   (+) COPD, severe (HCC)   (+) Chronic hypoxic respiratory failure, on home oxygen therapy  (HCC)   (+) Dependence on supplemental oxygen   (+) TOM (obstructive sleep apnea)      Cardiovascular/Peripheral Vascular   (+) Nonischemic nontraumatic myocardial injury      FEN/Gastrointestinal   (+) Colon polyps      Other   (+) Dark stools        Physical Exam    Airway     Mallampati score: II  TM Distance: >3 FB  Neck ROM: full      Cardiovascular  Rhythm: regular, Rate: normal, Pulse is palpable. Cardiovascular exam normal    Dental   No notable dental hx     Pulmonary  Pulmonary exam normal Breath sounds clear to auscultation    Neurological  - normal exam  She appears awake, alert and oriented x3.      Other Findings  post-pubertal.      Anesthesia Plan  ASA Score- 3     Anesthesia Type- IV sedation with anesthesia with ASA Monitors.         Additional Monitors:     Airway Plan: natural airway.           Plan Factors-Exercise tolerance (METS): >4 METS.    Chart reviewed. EKG reviewed.  Existing labs reviewed. Patient summary reviewed.                  Induction- intravenous.    Postoperative Plan- .   Monitoring Plan - Monitoring plan - standard ASA monitoring      Perioperative Resuscitation Plan - Level 1 - Full Code.       Informed  Consent- Anesthetic plan and risks discussed with patient.  I personally reviewed this patient with the CRNA. Discussed and agreed on the Anesthesia Plan with the CRNA..      NPO Status:  Vitals Value Taken Time   Date of last liquid 06/18/25 06/18/25 12:17   Time of last liquid 0830 06/18/25 12:17   Date of last solid     Time of last solid

## 2025-06-18 NOTE — PROGRESS NOTES
Progress Note - Hospitalist   Name: Camryn Roblero 84 y.o. female I MRN: [id number]  Unit/Bed#: S -01 I Date of Admission: 6/13/2025   Date of Service: 6/18/2025 I Hospital Day: 5    Assessment & Plan  Upper GI bleed  Acute blood loss anemia  Patient presents from living facility with complaints of low hemoglobin. In ER, results to 5.2.   Fecal occult obtained in ER +. BUN elevated   admit for possible UGIB   Continue to monitor hemoglobin closely   IV protonix   Hold xarelto- received heparin drip on 6/17/25  GI consult for  EGD/colonoscopy, likely this afternoon. Patient is NPO. Heparin held at 6am this morning     S/p 2u prbc transfusion in ER      Acute on chronic heart failure with preserved ejection fraction (HCC)  Wt Readings from Last 3 Encounters:   06/17/25 51.6 kg (113 lb 12.1 oz)   06/13/25 60 kg (132 lb 3.2 oz)   06/09/25 57.5 kg (126 lb 12.8 oz)     -Patient noted to have increased weight gain since discharge earlier this month, received extra metolazone outpatient due to weight gain. Second HF admission within the month.  -Will place on HF protocol   -Telemetry monitoring dc  -Received IV bumex 3 grams in ED  -Will continue with bumex 3mg BID dosing  -Low sodium diet. Fluid restriction to 2L/day   -Monitor intake and output   -Prior echo 10/24 reviewed, had worsening valvular disease  -Updated echo: No significant change compared to the previous study. Although there was no adequate tricuspid regurgitation jet to be able to calculate RV systolic pressure, indirect findings are once again consistent with severe pulmonary hypertension   -Consult cardiology appreciate recs   -appears euvolemic today on exam  Nonischemic nontraumatic myocardial injury  Patient denies chest pain, EKG non ischemic   Due to HF, demand ischemia  Continue to trend, monitor on telemetry   Paroxysmal atrial fibrillation (HCC)  Hold xarelto-heparin drip up to 6 hours prior to procedure (hold at 0600)  Continue with BB  Interval  worsening on a.m. of 6/15 due to uncertainty etiology, patient did refuse CPAP at bedtime which may have led to pulmonary infiltrate worsening A-fib given one-time Lopressor 2.5 mg IV and assess for effect in addition to extra diuresis  -HR and hb stability controlled over night. Patient appears euvolemic on exam today and rate afib  Type 2 diabetes mellitus, without long-term current use of insulin ([facility])  Lab Results   Component Value Date    HGBA1C 6.5 04/15/2025       Recent Labs     06/17/25  1638 06/17/25  2143 06/18/25  0033 06/18/25  0559   POCGLU 374* 283* 174* 129       Blood Sugar Average: Last 72 hrs:  (P) [id number] elevated to 382  Place on accuchecks, every 6 hrs while NPO  ISS as needed    Essential hypertension  BP stable   Continue BB  Chronic hypoxic respiratory failure, on home oxygen therapy  ([facility])  No increase in 02 requirement   Continue oxygen therapy at all times  COPD, 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 Continue to encourage appropriate mobility.    Patient Centered Rounds: I performed bedside rounds with nursing staff today.   Discussions with Specialists or Other Care Team Provider: GI    Education and Discussions with Family / Patient: Updated  (daughter) via phone. Will call later today    Current Length of Stay: 5 day(s)  Current Patient Status: Inpatient   Certification Statement: The patient will continue to require additional inpatient hospital stay due to acute blood  loss anemia and gi bleed   Discharge Plan: Anticipate discharge in 24-48 hrs to discharge location to be determined pending rehab evaluations. And coloscopy findings     Code Status: Level 3 - DNAR and DNI    Subjective   Patient seen evaluated bedside.  No acute distress.  She is alert and oriented x 3.  Denies any issues with urinations or bowel movements.  Patient aware of EGD and colonoscopy later today.  Treatment plan updated to patient.  All questions and concerns answered at bedside.    Objective :  Temp:  [96.9 °F (36.1 °C)-97.6 °F (36.4 °C)] 96.9 °F (36.1 °C)  HR:  [65-78] 70  BP: ()/(50-75) 90/53  Resp:  [18-19] 19  SpO2:  [91 %-100 %] 95 %  O2 Device: None (Room air)    Body mass index is 22.98 kg/m².     Input and Output Summary (last 24 hours):     Intake/Output Summary (Last 24 hours) at 6/18/2025 0627  Last data filed at 6/17/2025 1800  Gross per 24 hour   Intake 696 ml   Output --   Net 696 ml       Physical Exam  Constitutional:       General: She is not in acute distress.     Appearance: She is well-developed. She is not diaphoretic.   HENT:      Head: Normocephalic and atraumatic.      Right Ear: External ear normal.      Left Ear: External ear normal.     Eyes:      Conjunctiva/sclera: Conjunctivae normal.      Pupils: Pupils are equal, round, and reactive to light.       Cardiovascular:      Rate and Rhythm: Normal rate. Rhythm irregular.      Heart sounds: Normal heart sounds. No murmur heard.     No friction rub. No gallop.   Pulmonary:      Effort: Pulmonary effort is normal.      Breath sounds: No stridor. Rhonchi (mild bilaterally) present. No wheezing or rales.   Abdominal:      General: Bowel sounds are normal. There is no distension.      Palpations: Abdomen is soft. There is no mass.      Tenderness: There is no abdominal tenderness. There is no guarding or rebound.     Musculoskeletal:         General: No tenderness. Normal range of motion.      Cervical back: Normal range of  motion and neck supple.      Right lower leg: No edema.      Left lower leg: No edema.     Skin:     General: Skin is warm and dry.      Coloration: Skin is not pale.      Findings: No erythema or rash.     Neurological:      General: No focal deficit present.      Mental Status: She is alert and oriented to person, place, and time. Mental status is at baseline.     Psychiatric:         Mood and Affect: Mood normal.         Behavior: Behavior normal.               Lab Results: I have reviewed the following results:   Results from last 7 days   Lab Units 06/18/25  0459   WBC Thousand/uL 7.23   HEMOGLOBIN g/dL 8.2*   HEMATOCRIT % 25.5*   PLATELETS Thousands/uL 234   SEGS PCT % 68   LYMPHO PCT % 22   MONO PCT % 7   EOS PCT % 2     Results from last 7 days   Lab Units 06/18/25  0459 06/17/25  0532 06/16/25  0457   SODIUM mmol/L 134*   < > 141   POTASSIUM mmol/L 3.4*   < > 3.3*   CHLORIDE mmol/L 92*   < > 97   CO2 mmol/L 38*   < > 39*   BUN mg/dL 61*   < > 65*   CREATININE mg/dL 1.47*   < > 1.40*   ANION GAP mmol/L 4   < > 5   CALCIUM mg/dL 8.4   < > 8.5   ALBUMIN g/dL  --   --  3.0*   TOTAL BILIRUBIN mg/dL  --   --  0.91   ALK PHOS U/L  --   --  57   ALT U/L  --   --  18   AST U/L  --   --  57*   GLUCOSE RANDOM mg/dL 126   < > 125    < > = values in this interval not displayed.     Results from last 7 days   Lab Units 06/17/25  1035   INR  1.00     Results from last 7 days   Lab Units 06/18/25  0559 06/18/25  0033 06/17/25  2143 06/17/25  1638 06/17/25  1042 06/17/25  0723 06/16/25  2054 06/16/25  1604 06/16/25  1154 06/16/25  0720 06/15/25  2139 06/15/25  1621   POC GLUCOSE mg/dl 129 174* 283* 374* 280* 204* 238* 254* 322* 163* 201* 272*         Results from last 7 days   Lab Units 06/14/25  1357 06/14/25  0621 06/14/25  0104 06/13/25  2230   LACTIC ACID mmol/L 2.0 2.5* 2.8* 2.6*       Recent Cultures (last 7 days):         Imaging Results Review: No pertinent imaging studies reviewed.  Other Study Results Review: No  additional pertinent studies reviewed.    Last 24 Hours Medication List:     Current Facility-Administered Medications:     acetaminophen (TYLENOL) tablet 650 mg, Q6H PRN    albuterol inhalation solution 2.5 mg, Q4H PRN    atorvastatin (LIPITOR) tablet 40 mg, Daily    budesonide (PULMICORT) inhalation solution 0.5 mg, Q12H    bumetanide (BUMEX) tablet 3 mg, BID (diuretic)    donepezil (ARICEPT) tablet 5 mg, HS    escitalopram (LEXAPRO) tablet 5 mg, Daily    fluticasone (FLONASE) 50 mcg/act nasal spray 1 spray, Daily    formoterol (PERFOROMIST) nebulizer solution 20 mcg, Q12H    guaiFENesin (MUCINEX) 12 hr tablet 1,200 mg, Q12H JOE    heparin (porcine) injection 1,500 Units, Q6H PRN    heparin (porcine) injection 3,000 Units, Q6H PRN    insulin lispro (HumALOG/ADMELOG) 100 units/mL subcutaneous injection 1-6 Units, 4x Daily (AC & HS) **AND** Fingerstick Glucose (POCT), 4x Daily AC and at bedtime    levalbuterol (XOPENEX) inhalation solution 0.63 mg, TID    magnesium Oxide (MAG-OX) tablet 400 mg, Daily    metoprolol (LOPRESSOR) injection 2.5 mg, Q6H PRN    metoprolol succinate (TOPROL-XL) 24 hr tablet 75 mg, BID    pantoprazole (PROTONIX) injection 40 mg, Q12H JOE    potassium chloride (Klor-Con M20) CR tablet 20 mEq, BID    potassium chloride (Klor-Con M20) CR tablet 40 mEq, Once    Administrative Statements   Today, Patient Was Seen By: Ramana Vasquez, DO  I have spent a total time of 32 minutes in caring for this patient on the day of the visit/encounter including Diagnostic results, Prognosis, Risks and benefits of tx options, Instructions for management, Patient and family education, Importance of tx compliance, Risk factor reductions, Impressions, Counseling / Coordination of care, Documenting in the medical record, Reviewing/placing orders in the medical record (including tests, medications, and/or procedures), Obtaining or reviewing history  , and Communicating with other healthcare professionals  .    **Please Note: This note may have been constructed using a voice recognition system.**

## 2025-06-18 NOTE — PERIOPERATIVE NURSING NOTE
Pt decompensated.  Blood pressure lost in room.  Anesthesia called.  Soft code started in room.  Pt stabilized. Mireya placed and intubated.  Vitals stable.  ICU team notified.  Pt will be transferred to ICU bed.  WIll continue to monitor.

## 2025-06-18 NOTE — SEPSIS NOTE
Sepsis Note   Camryn Roblero 84 y.o. female MRN: [id number]  Unit/Bed#: ICU 10 Encounter: [id number]       Initial Sepsis Screening       Row Name 06/18/25 1612 06/13/25 2319             Is the patient's history suggestive of a new or worsening infection? No  -SS No  -HA                 User Key  (r) = Recorded By, (t) = Taken By, (c) = Cosigned By      Initials Name Provider Type    DEANNE Sargent Nurse Practitioner    CRYSTAL Bustos DO Physician                   Initial Sepsis Screening       Row Name 06/18/25 1612 06/13/25 2319                Initial Sepsis Assessment    Is the patient's history suggestive of a new or worsening infection? No  -SS No  -HA                 User Key  (r) = Recorded By, (t) = Taken By, (c) = Cosigned By      Initials Name Provider Type    DEANNE Sargent Nurse Practitioner    CRYSTAL Bustos DO Physician                         Body mass index is 22.98 kg/m².  Wt Readings from Last 1 Encounters:   06/17/25 51.6 kg (113 lb 12.1 oz)        Ideal body weight: 48.8 kg (107 lb 8.4 oz)  Adjusted ideal body weight: 49.9 kg (110 lb 0.3 oz)    Presented from GI lab in likely cardiogenic shock following anesthesia in the setting of CHF and PH.   No infectious concerns

## 2025-06-18 NOTE — ASSESSMENT & PLAN NOTE
Continue to monitor on telemetry  Continue to hold anticoagulation in setting of suspected GI bleed

## 2025-06-19 LAB
ABO GROUP BLD BPU: NORMAL
ANION GAP SERPL CALCULATED.3IONS-SCNC: 6 MMOL/L (ref 4–13)
BASE EXCESS BLDA CALC-SCNC: 6 MMOL/L
BASOPHILS # BLD AUTO: 0.02 THOUSANDS/ÂΜL (ref 0–0.1)
BASOPHILS NFR BLD AUTO: 0 % (ref 0–1)
BPU ID: NORMAL
BUN SERPL-MCNC: 50 MG/DL (ref 5–25)
CALCIUM SERPL-MCNC: 8.7 MG/DL (ref 8.4–10.2)
CHLORIDE SERPL-SCNC: 98 MMOL/L (ref 96–108)
CO2 SERPL-SCNC: 33 MMOL/L (ref 21–32)
CREAT SERPL-MCNC: 1.38 MG/DL (ref 0.6–1.3)
CROSSMATCH: NORMAL
EOSINOPHIL # BLD AUTO: 0.04 THOUSAND/ÂΜL (ref 0–0.61)
EOSINOPHIL NFR BLD AUTO: 1 % (ref 0–6)
ERYTHROCYTE [DISTWIDTH] IN BLOOD BY AUTOMATED COUNT: 17.2 % (ref 11.6–15.1)
GFR SERPL CREATININE-BSD FRML MDRD: 35 ML/MIN/1.73SQ M
GLUCOSE SERPL-MCNC: 105 MG/DL (ref 65–140)
GLUCOSE SERPL-MCNC: 115 MG/DL (ref 65–140)
GLUCOSE SERPL-MCNC: 115 MG/DL (ref 65–140)
GLUCOSE SERPL-MCNC: 116 MG/DL (ref 65–140)
GLUCOSE SERPL-MCNC: 120 MG/DL (ref 65–140)
GLUCOSE SERPL-MCNC: 120 MG/DL (ref 65–140)
GLUCOSE SERPL-MCNC: 125 MG/DL (ref 65–140)
GLUCOSE SERPL-MCNC: 134 MG/DL (ref 65–140)
GLUCOSE SERPL-MCNC: 182 MG/DL (ref 65–140)
HCO3 BLDA-SCNC: 29.3 MMOL/L (ref 22–28)
HCT VFR BLD AUTO: 26.1 % (ref 34.8–46.1)
HCT VFR BLD AUTO: 26.2 % (ref 34.8–46.1)
HCT VFR BLD AUTO: 27.4 % (ref 34.8–46.1)
HCT VFR BLD AUTO: 28 % (ref 34.8–46.1)
HGB BLD-MCNC: 8.6 G/DL (ref 11.5–15.4)
HGB BLD-MCNC: 8.7 G/DL (ref 11.5–15.4)
HGB BLD-MCNC: 9.1 G/DL (ref 11.5–15.4)
HGB BLD-MCNC: 9.2 G/DL (ref 11.5–15.4)
IMM GRANULOCYTES # BLD AUTO: 0.03 THOUSAND/UL (ref 0–0.2)
IMM GRANULOCYTES NFR BLD AUTO: 0 % (ref 0–2)
LYMPHOCYTES # BLD AUTO: 1.11 THOUSANDS/ÂΜL (ref 0.6–4.47)
LYMPHOCYTES NFR BLD AUTO: 15 % (ref 14–44)
MAGNESIUM SERPL-MCNC: 2.6 MG/DL (ref 1.9–2.7)
MCH RBC QN AUTO: 31.4 PG (ref 26.8–34.3)
MCHC RBC AUTO-ENTMCNC: 33.2 G/DL (ref 31.4–37.4)
MCV RBC AUTO: 95 FL (ref 82–98)
MONOCYTES # BLD AUTO: 0.48 THOUSAND/ÂΜL (ref 0.17–1.22)
MONOCYTES NFR BLD AUTO: 7 % (ref 4–12)
NEUTROPHILS # BLD AUTO: 5.58 THOUSANDS/ÂΜL (ref 1.85–7.62)
NEUTS SEG NFR BLD AUTO: 77 % (ref 43–75)
NRBC BLD AUTO-RTO: 0 /100 WBCS
O2 CT BLDA-SCNC: 12.9 ML/DL (ref 16–23)
OXYHGB MFR BLDA: 97.4 % (ref 94–97)
PCO2 BLDA: 36.9 MM HG (ref 36–44)
PH BLDA: 7.52 [PH] (ref 7.35–7.45)
PLATELET # BLD AUTO: 241 THOUSANDS/UL (ref 149–390)
PMV BLD AUTO: 10.5 FL (ref 8.9–12.7)
PO2 BLDA: 130.2 MM HG (ref 75–129)
POTASSIUM SERPL-SCNC: 3.8 MMOL/L (ref 3.5–5.3)
RBC # BLD AUTO: 2.77 MILLION/UL (ref 3.81–5.12)
SIMV VENT INSPIRED AIR FIO2: 40
SIMV VENT PEEP: 6
SIMV VENT TIDAL VOLUME: 350
SIMV VENT: ABNORMAL
SODIUM SERPL-SCNC: 137 MMOL/L (ref 135–147)
SPECIMEN SOURCE: ABNORMAL
UNIT DISPENSE STATUS: NORMAL
UNIT PRODUCT CODE: NORMAL
UNIT PRODUCT VOLUME: 350 ML
UNIT RH: NORMAL
VENT SIMV: 14
WBC # BLD AUTO: 7.26 THOUSAND/UL (ref 4.31–10.16)

## 2025-06-19 PROCEDURE — 80048 BASIC METABOLIC PNL TOTAL CA: CPT

## 2025-06-19 PROCEDURE — 82805 BLOOD GASES W/O2 SATURATION: CPT

## 2025-06-19 PROCEDURE — 94760 N-INVAS EAR/PLS OXIMETRY 1: CPT

## 2025-06-19 PROCEDURE — 85018 HEMOGLOBIN: CPT

## 2025-06-19 PROCEDURE — 99232 SBSQ HOSP IP/OBS MODERATE 35: CPT | Performed by: INTERNAL MEDICINE

## 2025-06-19 PROCEDURE — 85014 HEMATOCRIT: CPT

## 2025-06-19 PROCEDURE — 94640 AIRWAY INHALATION TREATMENT: CPT

## 2025-06-19 PROCEDURE — 94003 VENT MGMT INPAT SUBQ DAY: CPT

## 2025-06-19 PROCEDURE — 83735 ASSAY OF MAGNESIUM: CPT

## 2025-06-19 PROCEDURE — 94150 VITAL CAPACITY TEST: CPT

## 2025-06-19 PROCEDURE — 85025 COMPLETE CBC W/AUTO DIFF WBC: CPT

## 2025-06-19 PROCEDURE — 82948 REAGENT STRIP/BLOOD GLUCOSE: CPT

## 2025-06-19 RX ORDER — POTASSIUM CHLORIDE 20MEQ/15ML
20 LIQUID (ML) ORAL ONCE
Status: COMPLETED | OUTPATIENT
Start: 2025-06-19 | End: 2025-06-19

## 2025-06-19 RX ORDER — POTASSIUM CHLORIDE 1500 MG/1
20 TABLET, EXTENDED RELEASE ORAL DAILY
Status: DISCONTINUED | OUTPATIENT
Start: 2025-06-20 | End: 2025-06-19

## 2025-06-19 RX ORDER — PANTOPRAZOLE SODIUM 40 MG/10ML
40 INJECTION, POWDER, LYOPHILIZED, FOR SOLUTION INTRAVENOUS
Status: DISCONTINUED | OUTPATIENT
Start: 2025-06-20 | End: 2025-06-24 | Stop reason: HOSPADM

## 2025-06-19 RX ORDER — INSULIN LISPRO 100 [IU]/ML
1-5 INJECTION, SOLUTION INTRAVENOUS; SUBCUTANEOUS EVERY 6 HOURS
Status: DISCONTINUED | OUTPATIENT
Start: 2025-06-19 | End: 2025-06-19

## 2025-06-19 RX ORDER — INSULIN LISPRO 100 [IU]/ML
1-5 INJECTION, SOLUTION INTRAVENOUS; SUBCUTANEOUS EVERY 6 HOURS
Status: DISCONTINUED | OUTPATIENT
Start: 2025-06-19 | End: 2025-06-22

## 2025-06-19 RX ORDER — POTASSIUM CHLORIDE 1500 MG/1
20 TABLET, EXTENDED RELEASE ORAL DAILY
Status: DISCONTINUED | OUTPATIENT
Start: 2025-06-19 | End: 2025-06-19

## 2025-06-19 RX ADMIN — PANTOPRAZOLE SODIUM 40 MG: 40 INJECTION, POWDER, LYOPHILIZED, FOR SOLUTION INTRAVENOUS at 08:48

## 2025-06-19 RX ADMIN — POTASSIUM CHLORIDE 20 MEQ: 20 SOLUTION ORAL at 10:17

## 2025-06-19 RX ADMIN — FORMOTEROL FUMARATE DIHYDRATE 20 MCG: 20 SOLUTION RESPIRATORY (INHALATION) at 19:35

## 2025-06-19 RX ADMIN — FORMOTEROL FUMARATE DIHYDRATE 20 MCG: 20 SOLUTION RESPIRATORY (INHALATION) at 07:18

## 2025-06-19 RX ADMIN — CHLORHEXIDINE GLUCONATE 15 ML: 1.2 SOLUTION ORAL at 08:48

## 2025-06-19 RX ADMIN — PROPOFOL 35 MCG/KG/MIN: 10 INJECTION, EMULSION INTRAVENOUS at 06:08

## 2025-06-19 RX ADMIN — FENTANYL CITRATE 50 MCG: 50 INJECTION INTRAMUSCULAR; INTRAVENOUS at 02:32

## 2025-06-19 RX ADMIN — INSULIN LISPRO 1 UNITS: 100 INJECTION, SOLUTION INTRAVENOUS; SUBCUTANEOUS at 18:14

## 2025-06-19 RX ADMIN — LEVALBUTEROL HYDROCHLORIDE 0.63 MG: 0.63 SOLUTION RESPIRATORY (INHALATION) at 07:18

## 2025-06-19 RX ADMIN — BUMETANIDE 3 MG: 1 TABLET ORAL at 17:10

## 2025-06-19 RX ADMIN — CHLORHEXIDINE GLUCONATE 15 ML: 1.2 SOLUTION ORAL at 22:00

## 2025-06-19 RX ADMIN — LEVALBUTEROL HYDROCHLORIDE 0.63 MG: 0.63 SOLUTION RESPIRATORY (INHALATION) at 19:35

## 2025-06-19 RX ADMIN — BUDESONIDE 0.5 MG: 0.5 INHALANT RESPIRATORY (INHALATION) at 19:35

## 2025-06-19 RX ADMIN — LEVALBUTEROL HYDROCHLORIDE 0.63 MG: 0.63 SOLUTION RESPIRATORY (INHALATION) at 13:38

## 2025-06-19 RX ADMIN — BUDESONIDE 0.5 MG: 0.5 INHALANT RESPIRATORY (INHALATION) at 07:18

## 2025-06-19 NOTE — QUICK NOTE
Patient tolerated spontaneous awakening trial and spontaneous breathing trial; discussion was had with family in waiting room.  Decision to resume patient's DNR/DNI level.  Patient safely extubated and tolerated well maintaining saturations on room air; pressor support able to be weaned off.    Per discussion with Attending Dr Huynh, would recommend holding anticoagulation indefinitely due to risk of recurrent GI bleed.  Can resume home bumex dosing.

## 2025-06-19 NOTE — QUICK NOTE
Provided clinical update with the intent to extubate her. We discussed the patient's wishes if she were to need to be re-intubated. Daughters states she would not want to be intubated again.   Her coded status will be changed to a Level III DNR/DNI following extubation.   Daughters will join her at the bedside when the tube is removed.   Orders for extubation placed as patient has already passed her SBT.    DEANNE Celestin

## 2025-06-19 NOTE — ASSESSMENT & PLAN NOTE
Patient transferred to the ICU yesterday after complications from procedure.  She was still intubated on evaluation this morning.  Hemoglobin stable at 8.7.  Dark brown output from rectal tube.    - Discussed with patient's daughter this morning, Naomie.  We will hold off on any further procedures including colonoscopy for patient. She is agreeable.  - Informed ICU, no plans for colonoscopy.  - Monitor hemoglobin.  -Monitor stool output  - At the time of dictation, patient has been extubated.  -Please reach out to GI if any questions or concerns.

## 2025-06-19 NOTE — PROGRESS NOTES
Progress Note - Critical Care/ICU   Name: Camryn Roblero 84 y.o. female I MRN: [id number]  Unit/Bed#: ICU 10 I Date of Admission: 6/13/2025   Date of Service: 6/19/2025 I Hospital Day: 6      Assessment & Plan  Shock ([facility])  Patient BP reported to be in 30s/20s post EGD  History of blood loss anemia and diastolic heart failure  Shock possibly secondary to mix of blood loss anemia and vasodilation from anesthesia  Was given 500 mcg of phenylephrine and started on Norepinephrine  BP had gone back up to 120s/60s and norepinephrine stopped  BP dropping to 80s/40    PLAN  -Continue with norepinephrine drip  -Wean sedation as able; suspect anesthesia component in hypotension  -Hold antihypertensive medications  -Hold anticoagulation  -Transfuse as needed  -Patient currently intubated    -May require goals of care discussion with family  Essential hypertension  Patient with history of hypertension  BP post EGD reported 30/20s  Started on pressors  Hold antihypertensives in setting of shock  Type 2 diabetes mellitus, without long-term current use of insulin ([facility])  Lab Results   Component Value Date    HGBA1C 6.5 04/15/2025       Recent Labs     06/19/25  0006 06/19/25  0201 06/19/25  0417 06/19/25  0602   POCGLU 120 116 125 105       Blood Sugar Average: Last 72 hrs:  (P) 196.5  Continue Insulin Sliding scale  Acute on chronic heart failure with preserved ejection fraction ([facility])  Wt Readings from Last 3 Encounters:   06/17/25 51.6 kg (113 lb 12.1 oz)   06/13/25 60 kg (132 lb 3.2 oz)   06/09/25 57.5 kg (126 lb 12.8 oz)   Was on Bumex 3mg BID while inpatient    PLAN  Hold diuretics while in shock  Resume diuretic when hemodynamically stable  Lucero placed for accurate input/output measures    COPD, severe (HCC)  Continue nebulizers  Paroxysmal atrial fibrillation ([facility])  Continue to monitor on telemetry  Continue to hold anticoagulation in setting of suspected GI bleed  Acute blood loss anemia  Initial hemoglobin 5.2  GI consulted  and EGD colonoscopy was to be performed today; post EGD prior to Colonoscopy patient became hypotensive 30/20s requiring pressor support and intubation.  Hemoglobin on ICU admission: 7.4 down from 8.2  Patient has notable dark stools   EGD did not show any bleed    PLAN  -Monitor hemoglobin   -Transfuse 1 unit pRBC  -In regards to intubation; will refer to GI  Disposition: Critical care    ICU Core Measures     Vented Patient  VAP Bundle  VAP bundle ordered     A: Assess, Prevent, and Manage Pain Has pain been assessed? No  Need for changes to pain regimen? N/A   B: Both Spontaneous Awakening Trials (SATs) and Spontaneous Breathing Trials (SBTs) Plan to perform spontaneous awakening trial today? Yes   Plan to perform spontaneous breathing trial today? Yes   Obvious barriers to extubation? No   C: Choice of Sedation RASS Goal: -3 Moderate Sedation or 0 Alert and Calm  Need for changes to sedation or analgesia regimen? Yes   D: Delirium CAM-ICU: Negative   E: Early Mobility  Plan for early mobility? No   F: Family Engagement Plan for family engagement today? Yes       Review of Invasive Devices:    Jazmine Plan: Continue for accurate I/O monitoring for 48 hours    Mireya Plan: Keep arterial line for hemodynamic monitoring    Prophylaxis:  VTE VTE covered by:    None       Stress Ulcer  covered bypantoprazole (PROTONIX) injection 40 mg [[id number]]         24 Hour Events : No overnight events  Subjective       Objective :                   Vitals I/O      Most Recent Min/Max in 24hrs   Temp 98.1 °F (36.7 °C) Temp  Min: 96.1 °F (35.6 °C)  Max: 98.1 °F (36.7 °C)   Pulse 66 Pulse  Min: 57  Max: 93   Resp 14 Resp  Min: 5  Max: 31   /69 BP  Min: 90/54  Max: 134/64   O2 Sat 100 % SpO2  Min: 90 %  Max: 100 %      Intake/Output Summary (Last 24 hours) at 6/19/2025 0615  Last data filed at 6/19/2025 0400  Gross per 24 hour   Intake 1670.13 ml   Output 825 ml   Net 845.13 ml       Diet NPO    Invasive Monitoring            Physical Exam   Physical Exam  Skin:     Coloration: Skin is pale.   HENT:      Head: Normocephalic.   Cardiovascular:      Rate and Rhythm: Normal rate.      Heart sounds: Normal heart sounds.   Musculoskeletal:         General: Swelling (Right upper extremity) present.      Right lower leg: No edema.      Left lower leg: No edema.   Abdominal: General: There is distension.     Palpations: Abdomen is soft.      Tenderness: There is no abdominal tenderness.   Constitutional:       Interventions: She is sedated, intubated and restrained.   Pulmonary:      Effort: She is intubated.      Breath sounds: Normal breath sounds. No wheezing.   Genitourinary/Anorectal:     Rectum: Rectal tube present.   Lucero present.        Diagnostic Studies        Lab Results: I have reviewed the following results:     Medications:  Scheduled PRN   [Held by provider] atorvastatin, 40 mg, Daily  budesonide, 0.5 mg, Q12H  [Held by provider] bumetanide, 3 mg, BID (diuretic)  chlorhexidine, 15 mL, Q12H JOE  [Held by provider] donepezil, 5 mg, HS  [Held by provider] escitalopram, 5 mg, Daily  [Held by provider] fluticasone, 1 spray, Daily  formoterol, 20 mcg, Q12H  levalbuterol, 0.63 mg, TID  [Held by provider] magnesium Oxide, 400 mg, Daily  [Held by provider] metoprolol succinate, 75 mg, BID  pantoprazole, 40 mg, Q12H JOE  [Held by provider] potassium chloride, 20 mEq, BID      acetaminophen, 650 mg, Q6H PRN  albuterol, 2.5 mg, Q4H PRN  fentaNYL, 50 mcg, Q1H PRN       Continuous    insulin regular (HumuLIN R,NovoLIN R) 1 Units/mL in sodium chloride 0.9 % 100 mL infusion, 0.3-21 Units/hr, Last Rate: Stopped (06/19/25 0606)  norepinephrine, 1-30 mcg/min, Last Rate: 2 mcg/min (06/19/25 0211)  propofol, 5-50 mcg/kg/min, Last Rate: 35 mcg/kg/min (06/19/25 0608)         Labs:   CBC    Recent Labs     06/18/25 2025 06/19/25  0007 06/19/25  0453   WBC 7.46  --  7.26   HGB 8.1*  7.9* 8.6* 8.7*   HCT 24.5*  24.6* 26.1* 26.2*     --  241      BMP    Recent Labs     06/18/25  1435 06/19/25  0453   SODIUM 133* 137   K 4.9 3.8   CL 95* 98   CO2 23 33*   AGAP 15* 6   BUN 55* 50*   CREATININE 1.54* 1.38*   CALCIUM 8.8 8.7       Coags    Recent Labs     06/17/25  1035 06/17/25  1745 06/18/25  0100   INR 1.00  --   --    PTT 25 91* 65*        Additional Electrolytes  Recent Labs     06/18/25  1435 06/19/25  0453   MG 2.4 2.6   PHOS 5.4*  --           Blood Gas    Recent Labs     06/19/25  0454   PHART 7.517*   [id number] 36.9   PO2ART 130.2*   [id number] 29.3*   BEART 6.0   SOURCE Line, Arterial     Recent Labs     06/19/25  0454   SOURCE Line, Arterial    LFTs  Recent Labs     06/18/25  1435   ALT 27   AST 89*   ALKPHOS 90   ALB 2.8*   TBILI 0.63       Infectious  No recent results  Glucose  Recent Labs     06/18/25  0459 06/18/25  1435 06/19/25  0453   GLUC 126 338* 115

## 2025-06-19 NOTE — PLAN OF CARE
Problem: Prexisting or High Potential for Compromised Skin Integrity  Goal: Skin integrity is maintained or improved  Description: INTERVENTIONS:  - Identify patients at risk for skin breakdown  - Assess and monitor skin integrity including under and around medical devices   - Assess and monitor nutrition and hydration status  - Monitor labs  - Assess for incontinence   - Turn and reposition patient  - Assist with mobility/ambulation  - Relieve pressure over jessica prominences   - Avoid friction and shearing  - Provide appropriate hygiene as needed including keeping skin clean and dry  - Evaluate need for skin moisturizer/barrier cream  - Collaborate with interdisciplinary team  - Patient/family teaching  - Consider wound care consult     Problem: CARDIOVASCULAR - ADULT  Goal: Maintains optimal cardiac output and hemodynamic stability  Description: INTERVENTIONS:  - Monitor I/O, vital signs and rhythm  - Monitor for S/S and trends of decreased cardiac output  - Administer and titrate ordered vasoactive medications to optimize hemodynamic stability  - Assess quality of pulses, skin color and temperature  - Assess for signs of decreased coronary artery perfusion  - Instruct patient to report change in severity of symptoms  Outcome: Progressing  Goal: Absence of cardiac dysrhythmias or at baseline rhythm  Description: INTERVENTIONS:  - Continuous cardiac monitoring, vital signs, obtain 12 lead EKG if ordered  - Administer antiarrhythmic and heart rate control medications as ordered  - Monitor electrolytes and administer replacement therapy as ordered  Outcome: Progressing     Problem: GASTROINTESTINAL - ADULT  Goal: Minimal or absence of nausea and/or vomiting  Description: INTERVENTIONS:  - Administer IV fluids if ordered to ensure adequate hydration  - Maintain NPO status until nausea and vomiting are resolved  - Nasogastric tube if ordered  - Administer ordered antiemetic medications as needed  - Provide  nonpharmacologic comfort measures as appropriate  - Advance diet as tolerated, if ordered  - Consider nutrition services referral to assist patient with adequate nutrition and appropriate food choices  Outcome: Progressing  Goal: Maintains or returns to baseline bowel function  Description: INTERVENTIONS:  - Assess bowel function  - Encourage oral fluids to ensure adequate hydration  - Administer IV fluids if ordered to ensure adequate hydration  - Administer ordered medications as needed  - Encourage mobilization and activity  - Consider nutritional services referral to assist patient with adequate nutrition and appropriate food choices  Outcome: Progressing  Goal: Maintains adequate nutritional intake  Description: INTERVENTIONS:  - Monitor percentage of each meal consumed  - Identify factors contributing to decreased intake, treat as appropriate  - Assist with meals as needed  - Monitor I&O, weight, and lab values if indicated  - Obtain nutrition services referral as needed  Outcome: Progressing  Goal: Establish and maintain optimal ostomy function  Description: INTERVENTIONS:  - Assess bowel function  - Encourage oral fluids to ensure adequate hydration  - Administer IV fluids if ordered to ensure adequate hydration   - Administer ordered medications as needed  - Encourage mobilization and activity  - Nutrition services referral to assist patient with appropriate food choices  - Assess stoma site  - Consider wound care consult   Outcome: Progressing  Goal: Oral mucous membranes remain intact  Description: INTERVENTIONS  - Assess oral mucosa and hygiene practices  - Implement preventative oral hygiene regimen  - Implement oral medicated treatments as ordered  - Initiate Nutrition services referral as needed  Outcome: Progressing     Problem: METABOLIC, FLUID AND ELECTROLYTES - ADULT  Goal: Electrolytes maintained within normal limits  Description: INTERVENTIONS:  - Monitor labs and assess patient for signs and  symptoms of electrolyte imbalances  - Administer electrolyte replacement as ordered  - Monitor response to electrolyte replacements, including repeat lab results as appropriate  - Instruct patient on fluid and nutrition as appropriate  Outcome: Progressing  Goal: Glucose maintained within target range  Description: INTERVENTIONS:  - Monitor Blood Glucose as ordered  - Assess for signs and symptoms of hyperglycemia and hypoglycemia  - Administer ordered medications to maintain glucose within target range  - Assess nutritional intake and initiate nutrition service referral as needed  Outcome: Progressing     Problem: HEMATOLOGIC - ADULT  Goal: Maintains hematologic stability  Description: INTERVENTIONS  - Assess for signs and symptoms of bleeding or hemorrhage  - Monitor labs  - Administer supportive blood products/factors as ordered and appropriate  Outcome: Progressing     Problem: SAFETY,RESTRAINT: NV/NON-SELF DESTRUCTIVE BEHAVIOR  Goal: Remains free of harm/injury (restraint for non violent/non self-detsructive behavior)  Description: INTERVENTIONS:  - Instruct patient/family regarding restraint use   - Assess and monitor physiologic and psychological status   - Provide interventions and comfort measures to meet assessed patient needs   - Identify and implement measures to help patient regain control  - Assess readiness for release of restraint   Outcome: Progressing  Goal: Returns to optimal restraint-free functioning  Description: INTERVENTIONS:  - Assess the patient's behavior and symptoms that indicate continued need for restraint  - Identify and implement measures to help patient regain control  - Assess readiness for release of restraint   Outcome: Progressing

## 2025-06-19 NOTE — ASSESSMENT & PLAN NOTE
Lab Results   Component Value Date    HGBA1C 6.5 04/15/2025       Recent Labs     06/19/25  0006 06/19/25  0201 06/19/25  0417 06/19/25  0602   POCGLU 120 116 125 105       Blood Sugar Average: Last 72 hrs:  (P) 196.5  Continue Insulin Sliding scale

## 2025-06-19 NOTE — ASSESSMENT & PLAN NOTE
Patient BP reported to be in 30s/20s post EGD  History of blood loss anemia and diastolic heart failure  Shock possibly secondary to mix of blood loss anemia and vasodilation from anesthesia  Was given 500 mcg of phenylephrine and started on Norepinephrine  BP had gone back up to 120s/60s and norepinephrine stopped  BP dropping to 80s/40    PLAN  -Continue with norepinephrine drip  -Wean sedation as able; suspect anesthesia component in hypotension  -Hold antihypertensive medications  -Hold anticoagulation  -Transfuse as needed  -Patient currently intubated    -May require goals of care discussion with family

## 2025-06-19 NOTE — PROGRESS NOTES
Progress Note - Gastroenterology   Name: Camryn Roblero 84 y.o. female I MRN: [id number]  Unit/Bed#: ICU 10 I Date of Admission: 6/13/2025   Date of Service: 6/19/2025 I Hospital Day: 6      84-year-old female with A-fib on Xarelto who presents to the emergency room for shortness of breath and cough found to have hemoglobin of 5.2 down from 8.6 a week prior. Dark stools reported in the ER.     Status post EGD yesterday 6/18 complicated by hypotension, bradycardia requiring intubation and resuscitation along with vasopressor support.  She was transferred to the ICU.  Assessment & Plan  Normocytic anemia  Dark stools  Patient transferred to the ICU yesterday after complications from procedure.  She was still intubated on evaluation this morning.  Hemoglobin stable at 8.7.  Dark brown output from rectal tube.    - Discussed with patient's daughter this morning, Naomie.  We will hold off on any further procedures including colonoscopy for patient. She is agreeable.  - Informed ICU, no plans for colonoscopy.  - Monitor hemoglobin.  -Monitor stool output  - At the time of dictation, patient has been extubated.  -Please reach out to GI if any questions or concerns.      Gastroenterology service signing off.    Subjective   Patient remained intubated during encounter this morning.  Dark brown output per rectal tube.  No significant events overnight.    Objective :  Temp:  [96.8 °F (36 °C)-98.2 °F (36.8 °C)] 98.2 °F (36.8 °C)  HR:  [57-97] 95  BP: ()/(41-73) 119/63  Resp:  [5-31] 15  SpO2:  [90 %-100 %] 95 %  O2 Device: Nasal cannula    Physical Exam  Vitals reviewed.   Constitutional:       Comments: Intubated, sedated   HENT:      Head: Normocephalic and atraumatic.     Eyes:      Conjunctiva/sclera: Conjunctivae normal.       Cardiovascular:      Rate and Rhythm: Normal rate and regular rhythm.      Heart sounds: No murmur heard.  Pulmonary:      Effort: Pulmonary effort is normal. No respiratory distress.      Breath  sounds: Normal breath sounds.   Abdominal:      General: Abdomen is flat. There is no distension.      Palpations: Abdomen is soft.      Tenderness: There is no abdominal tenderness. There is no guarding or rebound.      Comments: Dark brown rectal tube output     Musculoskeletal:         General: No swelling.      Cervical back: Normal range of motion.      Right lower leg: No edema.      Left lower leg: No edema.     Skin:     General: Skin is warm.      Coloration: Skin is not jaundiced.     Neurological:      Comments: Sedated       Lab Results: I have reviewed the following results:

## 2025-06-19 NOTE — MALNUTRITION/BMI
This medical record reflects one or more clinical indicators suggestive of malnutrition and/or morbid obesity.    Malnutrition Findings:   Adult Malnutrition type: Chronic illness  Adult Degree of Malnutrition: Malnutrition of moderate degree  Malnutrition Characteristics: Inadequate energy, Weight loss, Muscle loss              360 Statement: Protein calorie malnutrition r/t inadequate intake as evidenced by unintentional 20% weight loss over 6 months, <75% energy intake compared to estimated energy needs >1 month, and muscle wasting present to clavicles; currently NPO    BMI Findings:           Body mass index is 22.98 kg/m².     See Nutrition note dated 6/19/2025  for additional details.  Completed nutrition assessment is viewable in the nutrition documentation.

## 2025-06-19 NOTE — RESPIRATORY THERAPY NOTE
06/19/25 0944   Respiratory Assessment   Resp Comments Placed pt on SBT   Vent Information   Vent    Vent type Ambrosio G5   Ambrosio Vent Mode SPONT   SPONT Settings   FIO2 (%) 40 %   PEEP (cmH2O) 6 cmH2O   Pressure Support (cmH2O) 8 cmH20   Flow Trigger (LPM) 3 LPM   P-ramp (ms) 50 ms   ETS  (%) 25 %   Humidification Heater   Heater Temp 98.6 °F (37 °C)   SPONT Actuals   Resp Rate (BPM) 26 BPM   VT (mL) 315 mL   MV (Obs) 8.4   MAP (cmH2O) 8.8 cmH2O   Peak Pressure (cmH2O) 15 cmH2O   I:E Ratio (Obs) 1:2.5   RSBI (f/vt) 77 f/Vt   Heater Temperature (Obs) 98.6 °F (37 °C)   SPONT Alarms   High Peak Pressure (cmH20) 40 cmH2O   High Resp Rate (BPM) 5 BPM   High MV (L/min) 12 L/min   Low MV (L/min) 3 L/min   High Spont VTE (mL) 35 mL   Low Spont VTE (mL) 8 mL   Apnea Time (S) 20 S   SPONT Apnea Settings   Resp Rate (BPM) 15 BPM   FIO2 (%) 40 %   %TI (%) 1.3 %   Apnea Time (S) 20 S

## 2025-06-20 LAB
ANION GAP SERPL CALCULATED.3IONS-SCNC: 2 MMOL/L (ref 4–13)
BASOPHILS # BLD AUTO: 0.01 THOUSANDS/ÂΜL (ref 0–0.1)
BASOPHILS NFR BLD AUTO: 0 % (ref 0–1)
BUN SERPL-MCNC: 47 MG/DL (ref 5–25)
CALCIUM SERPL-MCNC: 8.4 MG/DL (ref 8.4–10.2)
CHLORIDE SERPL-SCNC: 100 MMOL/L (ref 96–108)
CO2 SERPL-SCNC: 35 MMOL/L (ref 21–32)
CREAT SERPL-MCNC: 1.5 MG/DL (ref 0.6–1.3)
EOSINOPHIL # BLD AUTO: 0.05 THOUSAND/ÂΜL (ref 0–0.61)
EOSINOPHIL NFR BLD AUTO: 1 % (ref 0–6)
ERYTHROCYTE [DISTWIDTH] IN BLOOD BY AUTOMATED COUNT: 17.5 % (ref 11.6–15.1)
GFR SERPL CREATININE-BSD FRML MDRD: 31 ML/MIN/1.73SQ M
GLUCOSE SERPL-MCNC: 117 MG/DL (ref 65–140)
GLUCOSE SERPL-MCNC: 121 MG/DL (ref 65–140)
GLUCOSE SERPL-MCNC: 125 MG/DL (ref 65–140)
GLUCOSE SERPL-MCNC: 148 MG/DL (ref 65–140)
GLUCOSE SERPL-MCNC: 212 MG/DL (ref 65–140)
GLUCOSE SERPL-MCNC: 255 MG/DL (ref 65–140)
HCT VFR BLD AUTO: 25.6 % (ref 34.8–46.1)
HCT VFR BLD AUTO: 26.7 % (ref 34.8–46.1)
HCT VFR BLD AUTO: 26.9 % (ref 34.8–46.1)
HCT VFR BLD AUTO: 27.9 % (ref 34.8–46.1)
HGB BLD-MCNC: 8.2 G/DL (ref 11.5–15.4)
HGB BLD-MCNC: 8.3 G/DL (ref 11.5–15.4)
HGB BLD-MCNC: 8.6 G/DL (ref 11.5–15.4)
HGB BLD-MCNC: 8.9 G/DL (ref 11.5–15.4)
IMM GRANULOCYTES # BLD AUTO: 0.02 THOUSAND/UL (ref 0–0.2)
IMM GRANULOCYTES NFR BLD AUTO: 0 % (ref 0–2)
LYMPHOCYTES # BLD AUTO: 1.02 THOUSANDS/ÂΜL (ref 0.6–4.47)
LYMPHOCYTES NFR BLD AUTO: 18 % (ref 14–44)
MAGNESIUM SERPL-MCNC: 2.4 MG/DL (ref 1.9–2.7)
MCH RBC QN AUTO: 31.7 PG (ref 26.8–34.3)
MCHC RBC AUTO-ENTMCNC: 32 G/DL (ref 31.4–37.4)
MCV RBC AUTO: 99 FL (ref 82–98)
MONOCYTES # BLD AUTO: 0.59 THOUSAND/ÂΜL (ref 0.17–1.22)
MONOCYTES NFR BLD AUTO: 10 % (ref 4–12)
NEUTROPHILS # BLD AUTO: 4.06 THOUSANDS/ÂΜL (ref 1.85–7.62)
NEUTS SEG NFR BLD AUTO: 71 % (ref 43–75)
NRBC BLD AUTO-RTO: 0 /100 WBCS
PHOSPHATE SERPL-MCNC: 3.9 MG/DL (ref 2.3–4.1)
PLATELET # BLD AUTO: 197 THOUSANDS/UL (ref 149–390)
PMV BLD AUTO: 10.8 FL (ref 8.9–12.7)
POTASSIUM SERPL-SCNC: 3.8 MMOL/L (ref 3.5–5.3)
RBC # BLD AUTO: 2.71 MILLION/UL (ref 3.81–5.12)
SODIUM SERPL-SCNC: 137 MMOL/L (ref 135–147)
WBC # BLD AUTO: 5.75 THOUSAND/UL (ref 4.31–10.16)

## 2025-06-20 PROCEDURE — 94760 N-INVAS EAR/PLS OXIMETRY 1: CPT

## 2025-06-20 PROCEDURE — 83735 ASSAY OF MAGNESIUM: CPT | Performed by: NURSE PRACTITIONER

## 2025-06-20 PROCEDURE — 97163 PT EVAL HIGH COMPLEX 45 MIN: CPT

## 2025-06-20 PROCEDURE — 85025 COMPLETE CBC W/AUTO DIFF WBC: CPT | Performed by: NURSE PRACTITIONER

## 2025-06-20 PROCEDURE — 94640 AIRWAY INHALATION TREATMENT: CPT

## 2025-06-20 PROCEDURE — 85014 HEMATOCRIT: CPT | Performed by: NURSE PRACTITIONER

## 2025-06-20 PROCEDURE — 84100 ASSAY OF PHOSPHORUS: CPT | Performed by: NURSE PRACTITIONER

## 2025-06-20 PROCEDURE — 85018 HEMOGLOBIN: CPT

## 2025-06-20 PROCEDURE — 80048 BASIC METABOLIC PNL TOTAL CA: CPT | Performed by: NURSE PRACTITIONER

## 2025-06-20 PROCEDURE — 82948 REAGENT STRIP/BLOOD GLUCOSE: CPT

## 2025-06-20 PROCEDURE — 85014 HEMATOCRIT: CPT

## 2025-06-20 PROCEDURE — 97167 OT EVAL HIGH COMPLEX 60 MIN: CPT

## 2025-06-20 PROCEDURE — 85018 HEMOGLOBIN: CPT | Performed by: NURSE PRACTITIONER

## 2025-06-20 PROCEDURE — 99232 SBSQ HOSP IP/OBS MODERATE 35: CPT | Performed by: INTERNAL MEDICINE

## 2025-06-20 RX ORDER — METOPROLOL TARTRATE 50 MG
50 TABLET ORAL EVERY 12 HOURS SCHEDULED
Status: DISCONTINUED | OUTPATIENT
Start: 2025-06-20 | End: 2025-06-22

## 2025-06-20 RX ORDER — METOPROLOL TARTRATE 1 MG/ML
5 INJECTION, SOLUTION INTRAVENOUS EVERY 6 HOURS PRN
Status: DISCONTINUED | OUTPATIENT
Start: 2025-06-20 | End: 2025-06-24 | Stop reason: HOSPADM

## 2025-06-20 RX ORDER — GUAIFENESIN 600 MG/1
600 TABLET, EXTENDED RELEASE ORAL EVERY 12 HOURS SCHEDULED
Status: DISCONTINUED | OUTPATIENT
Start: 2025-06-20 | End: 2025-06-24 | Stop reason: HOSPADM

## 2025-06-20 RX ADMIN — GUAIFENESIN 600 MG: 600 TABLET ORAL at 09:24

## 2025-06-20 RX ADMIN — INSULIN LISPRO 2 UNITS: 100 INJECTION, SOLUTION INTRAVENOUS; SUBCUTANEOUS at 12:26

## 2025-06-20 RX ADMIN — METOPROLOL TARTRATE 50 MG: 50 TABLET, FILM COATED ORAL at 12:23

## 2025-06-20 RX ADMIN — METOPROLOL TARTRATE 50 MG: 50 TABLET, FILM COATED ORAL at 21:06

## 2025-06-20 RX ADMIN — BUDESONIDE 0.5 MG: 0.5 INHALANT RESPIRATORY (INHALATION) at 19:57

## 2025-06-20 RX ADMIN — LEVALBUTEROL HYDROCHLORIDE 0.63 MG: 0.63 SOLUTION RESPIRATORY (INHALATION) at 07:46

## 2025-06-20 RX ADMIN — INSULIN LISPRO 2 UNITS: 100 INJECTION, SOLUTION INTRAVENOUS; SUBCUTANEOUS at 00:44

## 2025-06-20 RX ADMIN — LEVALBUTEROL HYDROCHLORIDE 0.63 MG: 0.63 SOLUTION RESPIRATORY (INHALATION) at 19:57

## 2025-06-20 RX ADMIN — FORMOTEROL FUMARATE DIHYDRATE 20 MCG: 20 SOLUTION RESPIRATORY (INHALATION) at 19:57

## 2025-06-20 RX ADMIN — CHLORHEXIDINE GLUCONATE 15 ML: 1.2 SOLUTION ORAL at 09:24

## 2025-06-20 RX ADMIN — LEVALBUTEROL HYDROCHLORIDE 0.63 MG: 0.63 SOLUTION RESPIRATORY (INHALATION) at 13:34

## 2025-06-20 RX ADMIN — FORMOTEROL FUMARATE DIHYDRATE 20 MCG: 20 SOLUTION RESPIRATORY (INHALATION) at 08:04

## 2025-06-20 RX ADMIN — GUAIFENESIN 600 MG: 600 TABLET ORAL at 21:07

## 2025-06-20 RX ADMIN — PANTOPRAZOLE SODIUM 40 MG: 40 INJECTION, POWDER, LYOPHILIZED, FOR SOLUTION INTRAVENOUS at 09:24

## 2025-06-20 RX ADMIN — BUDESONIDE 0.5 MG: 0.5 INHALANT RESPIRATORY (INHALATION) at 07:46

## 2025-06-20 RX ADMIN — CHLORHEXIDINE GLUCONATE 15 ML: 1.2 SOLUTION ORAL at 21:06

## 2025-06-20 NOTE — ASSESSMENT & PLAN NOTE
Lab Results   Component Value Date    HGBA1C 6.5 04/15/2025       Recent Labs     06/20/25  0041 06/20/25  0522 06/20/25  0601 06/20/25  1225   POCGLU 212* 117 125 255*       Blood Sugar Average: Last 72 hrs:  (P) 175.2927600328453423NI elevated to 382    Place on accuchecks, every 6 hrs while NPO  SSI as needed, can adjust regimen based on BG

## 2025-06-20 NOTE — ASSESSMENT & PLAN NOTE
Patient presented from living facility with complaints of low hemoglobin noted to be 5.2 on admission  Positive FOBT in ER with BUN elevation; S/p 2u prbc transfusion in ER with improvement to 7s-8s  EGD with no active bleeding, only a hiatal hernia and gastritis noted  Hemoglobin stable in the 8-9s, though patient continues to have black, tarry stools    Plan:  Continue to monitor hemoglobin q12h and transfuse as needed  IV protonix 40 mg daily  Discontinue Xarelto in the setting of severe pulmonary hypertension, preload dependence, and likely GI bleed  Discussed possibility of resuming given afib/aflutter should her melena resolve  Hold off on any further procedures, including colonoscopy, due to risk of decompensation
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
## 2025-06-20 NOTE — ASSESSMENT & PLAN NOTE
Patient initially noted to be wheezing, since resolved after duoneb in ER; ALYSHA lobe wheezing heard on exam 6/20  Lower suspicion for acute exacerbation at this time, will defer steroids    Plan:  Continue with nebulizers as needed   Pulmicort BID

## 2025-06-20 NOTE — ASSESSMENT & PLAN NOTE
Noted to be in atrial flutter per telemetry with rates in the 80s-120s  Discussed high bleeding risk with daughter, and explained that we will not be resuming anticoagulation at this time    Plan:  Start metoprolol to tartrate 50 mg twice daily for rate control as BP allows

## 2025-06-20 NOTE — ASSESSMENT & PLAN NOTE
Wt Readings from Last 3 Encounters:   06/17/25 51.6 kg (113 lb 12.1 oz)   06/13/25 60 kg (132 lb 3.2 oz)   06/09/25 57.5 kg (126 lb 12.8 oz)     Patient noted to have increased weight gain since discharge earlier this month, received extra metolazone outpatient due to weight gain. Second HF admission within the month.  Prior echo 10/24 reviewed, had worsening valvular disease  Updated echo: No significant change compared to the previous study. Although there was no adequate tricuspid regurgitation jet to be able to calculate RV systolic pressure, indirect findings are once again consistent with severe pulmonary hypertension   Received IV bumex 3 grams in ED  No notable edema, pulmonary effusion or JVD/hepatojugular reflex present on exam to suggest fluid overload on exam 6/20    Plan:  Heart failure protocol  2 g sodium restriction. Fluid restriction to 2L/day   Start metoprolol to tartrate 50 mg twice daily as blood pressure allows  Consider restarting Bumex 3 mg BID if BPs stable overnight   Monitor intake and output

## 2025-06-20 NOTE — ASSESSMENT & PLAN NOTE
Prior echo 10/24 reviewed, had worsening valvular disease  Updated echo: No significant change compared to the previous study. Although there was no adequate tricuspid regurgitation jet to be able to calculate RV systolic pressure, indirect findings are once again consistent with severe pulmonary hypertension     Plan:  Monitor intake and output and fluid status  Hold off on resuming diuretics due to tenuous volume status and preload dependency

## 2025-06-20 NOTE — ASSESSMENT & PLAN NOTE
Interval worsening on a.m. of 6/15 due to uncertainty etiology, patient did refuse CPAP at bedtime which may have led to pulmonary infiltrate worsening A-fib given one-time Lopressor 2.5 mg IV and assess for effect in addition to extra diuresis  Patient found to be in rate controlled A-fib with rates ~ on 6/20    Plan:  Discontinue anticoagulation for now given high risk of bleeding and decompensation  Start metoprolol tartrate 50 mg Q12

## 2025-06-20 NOTE — ASSESSMENT & PLAN NOTE
Patient denies chest pain, EKG non ischemic   Due to HF, likely demand ischemia  Monitor on telemetry

## 2025-06-20 NOTE — PLAN OF CARE
Problem: Prexisting or High Potential for Compromised Skin Integrity  Goal: Skin integrity is maintained or improved  Description: INTERVENTIONS:  - Identify patients at risk for skin breakdown  - Assess and monitor skin integrity including under and around medical devices   - Assess and monitor nutrition and hydration status  - Monitor labs  - Assess for incontinence   - Turn and reposition patient  - Assist with mobility/ambulation  - Relieve pressure over jessica prominences   - Avoid friction and shearing  - Provide appropriate hygiene as needed including keeping skin clean and dry  - Evaluate need for skin moisturizer/barrier cream  - Collaborate with interdisciplinary team  - Patient/family teaching  - Consider wound care consult       Problem: CARDIOVASCULAR - ADULT  Goal: Maintains optimal cardiac output and hemodynamic stability  Description: INTERVENTIONS:  - Monitor I/O, vital signs and rhythm  - Monitor for S/S and trends of decreased cardiac output  - Administer and titrate ordered vasoactive medications to optimize hemodynamic stability  - Assess quality of pulses, skin color and temperature  - Assess for signs of decreased coronary artery perfusion  - Instruct patient to report change in severity of symptoms  Outcome: Progressing  Goal: Absence of cardiac dysrhythmias or at baseline rhythm  Description: INTERVENTIONS:  - Continuous cardiac monitoring, vital signs, obtain 12 lead EKG if ordered  - Administer antiarrhythmic and heart rate control medications as ordered  - Monitor electrolytes and administer replacement therapy as ordered  Outcome: Progressing     Problem: GASTROINTESTINAL - ADULT  Goal: Minimal or absence of nausea and/or vomiting  Description: INTERVENTIONS:  - Administer IV fluids if ordered to ensure adequate hydration  - Maintain NPO status until nausea and vomiting are resolved  - Nasogastric tube if ordered  - Administer ordered antiemetic medications as needed  - Provide  nonpharmacologic comfort measures as appropriate  - Advance diet as tolerated, if ordered  - Consider nutrition services referral to assist patient with adequate nutrition and appropriate food choices  Outcome: Progressing  Goal: Maintains or returns to baseline bowel function  Description: INTERVENTIONS:  - Assess bowel function  - Encourage oral fluids to ensure adequate hydration  - Administer IV fluids if ordered to ensure adequate hydration  - Administer ordered medications as needed  - Encourage mobilization and activity  - Consider nutritional services referral to assist patient with adequate nutrition and appropriate food choices  Outcome: Progressing  Goal: Maintains adequate nutritional intake  Description: INTERVENTIONS:  - Monitor percentage of each meal consumed  - Identify factors contributing to decreased intake, treat as appropriate  - Assist with meals as needed  - Monitor I&O, weight, and lab values if indicated  - Obtain nutrition services referral as needed  Outcome: Progressing  Goal: Establish and maintain optimal ostomy function  Description: INTERVENTIONS:  - Assess bowel function  - Encourage oral fluids to ensure adequate hydration  - Administer IV fluids if ordered to ensure adequate hydration   - Administer ordered medications as needed  - Encourage mobilization and activity  - Nutrition services referral to assist patient with appropriate food choices  - Assess stoma site  - Consider wound care consult   Outcome: Progressing  Goal: Oral mucous membranes remain intact  Description: INTERVENTIONS  - Assess oral mucosa and hygiene practices  - Implement preventative oral hygiene regimen  - Implement oral medicated treatments as ordered  - Initiate Nutrition services referral as needed  Outcome: Progressing     Problem: METABOLIC, FLUID AND ELECTROLYTES - ADULT  Goal: Electrolytes maintained within normal limits  Description: INTERVENTIONS:  - Monitor labs and assess patient for signs and  symptoms of electrolyte imbalances  - Administer electrolyte replacement as ordered  - Monitor response to electrolyte replacements, including repeat lab results as appropriate  - Instruct patient on fluid and nutrition as appropriate  Outcome: Progressing  Goal: Glucose maintained within target range  Description: INTERVENTIONS:  - Monitor Blood Glucose as ordered  - Assess for signs and symptoms of hyperglycemia and hypoglycemia  - Administer ordered medications to maintain glucose within target range  - Assess nutritional intake and initiate nutrition service referral as needed  Outcome: Progressing     Problem: HEMATOLOGIC - ADULT  Goal: Maintains hematologic stability  Description: INTERVENTIONS  - Assess for signs and symptoms of bleeding or hemorrhage  - Monitor labs  - Administer supportive blood products/factors as ordered and appropriate  Outcome: Progressing     Problem: Nutrition/Hydration-ADULT  Goal: Nutrient/Hydration intake appropriate for improving, restoring or maintaining nutritional needs  Description: Monitor and assess patient's nutrition/hydration status for malnutrition. Collaborate with interdisciplinary team and initiate plan and interventions as ordered.  Monitor patient's weight and dietary intake as ordered or per policy. Utilize nutrition screening tool and intervene as necessary. Determine patient's food preferences and provide high-protein, high-caloric foods as appropriate.     INTERVENTIONS:  - Monitor oral intake, urinary output, labs, and treatment plans  - Assess nutrition and hydration status and recommend course of action  - Evaluate amount of meals eaten  - Assist patient with eating if necessary   - Allow adequate time for meals  - Recommend/ encourage appropriate diets, oral nutritional supplements, and vitamin/mineral supplements  - Order, calculate, and assess calorie counts as needed  - Recommend, monitor, and adjust tube feedings and TPN/PPN based on assessed needs  -  Assess need for intravenous fluids  - Provide specific nutrition/hydration education as appropriate  - Include patient/family/caregiver in decisions related to nutrition  Outcome: Progressing

## 2025-06-20 NOTE — ANESTHESIA POSTPROCEDURE EVALUATION
Post-Op Assessment Note            Anethesia notable event occurred.            Last Filed PACU Vitals:  Vitals Value Taken Time   Temp 97.8 °F (36.6 °C) 06/20/25 11:28   Pulse 62 06/20/25 14:00   /57 06/20/25 12:23   Resp 20 06/20/25 07:47   SpO2 100 % 06/20/25 14:00   Vitals shown include unfiled device data.

## 2025-06-20 NOTE — PROGRESS NOTES
Progress Note - Hospitalist   Name: Camryn Roblero 84 y.o. female I MRN: [id number]  Unit/Bed#: ICU 10 I Date of Admission: 6/13/2025   Date of Service: 6/20/2025 I Hospital Day: 7    Assessment & Plan  Shock (HCC)  History of severe pulmonary hypertension with systolic and diastolic flattening of the IV septum consistent with right ventricular pressure and volume overload; LA severe dilation; LVEF of 65%  P/w anemia with Hgb of 5.2, which improved to the 7-8s s/p 2 units pRBCs, which remained stable  FOBT+ on testing, with GI consultation  Initially planned for EGD and colonoscopy on 6/16, which was postponed to 6/18 due to stability  EGD performed on 6/18 (with reversal of DNR/DNI for the procedure) showing hiatal hernia and gastritis; prior to colonoscopy, patient decompensated and became hypotensive to 30s/20s and bradycardic; critical care consulted and A-line placed; she was intubated  6/18 admitted to the ICU with hypotension and bradycardia  Pressures dropped to the 80s/40s and she was initiated on a levophed gtt to maintain MAPs > 65  S/p NE gtt and albumin, BPs stabilized to 120s/60s  Per critical care evaluation, likely mixed etiology of shock due primarily to cardiogenic shock from severe pulmonary hypertension and anesthesia leading to RV failure and reduced CO  Other contributing, factors of distributive shock from anesthesia, and hypovolemic shock from volume loss from bleeding/bowel prep  6/19 she was weaned off levophed with stable BPs and extubated successfully; determined stable for MedSur  6/20 blood pressures stable in 110s/60s, but patient in atrial flutter with uncontrolled rates per telemetry    Plan:  PT/OT evaluation pending, appreciate recommendations  Given stable BP, restart metoprolol tartrate 50 mg Q12h  Will continue to hold PO bumex and consider re-initiation in AM if BPs stable s/p beta-blocker re-introduction  Monitor hgb q12h and transfuse as needed for Hgb < 7  Continue to hold AC  indefinitely due to high bleeding risk  Upper GI bleed  Acute blood loss anemia  Patient presented from living facility with complaints of low hemoglobin noted to be 5.2 on admission  Positive FOBT in ER with BUN elevation; S/p 2u prbc transfusion in ER with improvement to 7s-8s  EGD with no active bleeding, only a hiatal hernia and gastritis noted  Hemoglobin stable in the 8-9s, though patient continues to have black, tarry stools    Plan:  Continue to monitor hemoglobin q12h and transfuse as needed  IV protonix 40 mg daily  Discontinue Xarelto in the setting of severe pulmonary hypertension, preload dependence, and likely GI bleed  Discussed possibility of resuming given afib/aflutter should her melena resolve  Hold off on any further procedures, including colonoscopy, due to risk of decompensation  Atrial flutter (HCC)  Noted to be in atrial flutter per telemetry with rates in the 80s-120s  Discussed high bleeding risk with daughter, and explained that we will not be resuming anticoagulation at this time    Plan:  Start metoprolol to tartrate 50 mg twice daily for rate control as BP allows  Paroxysmal atrial fibrillation (HCC)  Interval worsening on a.m. of 6/15 due to uncertainty etiology, patient did refuse CPAP at bedtime which may have led to pulmonary infiltrate worsening A-fib given one-time Lopressor 2.5 mg IV and assess for effect in addition to extra diuresis  Patient found to be in rate controlled A-fib with rates ~ on 6/20    Plan:  Discontinue anticoagulation for now given high risk of bleeding and decompensation  Start metoprolol tartrate 50 mg Q12  Acute on chronic heart failure with preserved ejection fraction (HCC)  Wt Readings from Last 3 Encounters:   06/17/25 51.6 kg (113 lb 12.1 oz)   06/13/25 60 kg (132 lb 3.2 oz)   06/09/25 57.5 kg (126 lb 12.8 oz)     Patient noted to have increased weight gain since discharge earlier this month, received extra metolazone outpatient due to weight  gain. Second HF admission within the month.  Prior echo 10/24 reviewed, had worsening valvular disease  Updated echo: No significant change compared to the previous study. Although there was no adequate tricuspid regurgitation jet to be able to calculate RV systolic pressure, indirect findings are once again consistent with severe pulmonary hypertension   Received IV bumex 3 grams in ED  No notable edema, pulmonary effusion or JVD/hepatojugular reflex present on exam to suggest fluid overload on exam 6/20    Plan:  Heart failure protocol  2 g sodium restriction. Fluid restriction to 2L/day   Start metoprolol to tartrate 50 mg twice daily as blood pressure allows  Consider restarting Bumex 3 mg BID if BPs stable overnight   Monitor intake and output   Pulmonary hypertension (HCC)  Prior echo 10/24 reviewed, had worsening valvular disease  Updated echo: No significant change compared to the previous study. Although there was no adequate tricuspid regurgitation jet to be able to calculate RV systolic pressure, indirect findings are once again consistent with severe pulmonary hypertension     Plan:  Monitor intake and output and fluid status  Hold off on resuming diuretics due to tenuous volume status and preload dependency  Type 2 diabetes mellitus, without long-term current use of insulin ([facility])  Lab Results   Component Value Date    HGBA1C 6.5 04/15/2025       Recent Labs     06/20/25  0041 06/20/25  0522 06/20/25  0601 06/20/25  1225   POCGLU 212* 117 125 255*       Blood Sugar Average: Last 72 hrs:  (P) [id number] elevated to 382    Place on accuchecks, every 6 hrs while NPO  SSI as needed, can adjust regimen based on BG  Chronic hypoxic respiratory failure, on home oxygen therapy  ([facility])  No increase in O2 requirement from baseline of 2 L  Continue 2 L oxygen therapy at all times  COPD, severe ([facility])  Patient initially noted to be wheezing, since resolved after duoneb in ER; ALYSHA lobe wheezing heard on exam  6/20  Lower suspicion for acute exacerbation at this time, will defer steroids    Plan:  Continue with nebulizers as needed   Pulmicort BID  TOM (obstructive sleep apnea)  Continue cpap nightly w/ oxygen     VTE Pharmacologic Prophylaxis:   High Risk (Score >/= 5) - Pharmacological DVT Prophylaxis Contraindicated. Sequential Compression Devices Ordered.    Mobility:   Basic Mobility Inpatient Raw Score: 13  [facility] Goal: 4: Move to chair/commode  [facility] Achieved: 5: Stand (1 or more minutes)  -HLM Goal achieved. Continue to encourage appropriate mobility.    Patient Centered Rounds: I performed bedside rounds with nursing staff today.   Discussions with Specialists or Other Care Team Provider: none    Education and Discussions with Family / Patient: Updated  (daughter) at bedside.    Current Length of Stay: 7 day(s)  Current Patient Status: Inpatient   Certification Statement: The patient will continue to require additional inpatient hospital stay due to high bleeding risk  Discharge Plan: Anticipate discharge in 48-72 hrs to discharge location to be determined pending rehab evaluations.    Code Status: Level 3 - DNAR and DNI    Subjective   Patient was seen and evaluated at bedside today with no acute events overnight. She notes feeling generally well with no significant change since yesterday. She was eating breakfast on evaluation without any issues. She denied any pain or discomfort but did endorse a cough which she has had for ~1 week with some phlegm buildup that she has been unable to expectorate except for a few days ago, when she produced clear/white sputum. We discussed initiating Mucinex for mucus expectoration, and she was in agreement with the plan.    Objective :  Temp:  [97.5 °F (36.4 °C)-97.8 °F (36.6 °C)] 97.8 °F (36.6 °C)  HR:  [] 82  BP: (101-162)/(55-79) 114/57  Resp:  [15-20] 20  SpO2:  [85 %-100 %] 97 %  O2 Device: Nasal cannula  Nasal Cannula O2 Flow Rate (L/min):  [2  L/min] 2 L/min    Body mass index is 22.98 kg/m².     Input and Output Summary (last 24 hours):     Intake/Output Summary (Last 24 hours) at 6/20/2025 1244  Last data filed at 6/20/2025 0942  Gross per 24 hour   Intake 0 ml   Output 494 ml   Net -494 ml       Physical Exam  Constitutional:       Appearance: She is normal weight.   HENT:      Mouth/Throat:      Mouth: Mucous membranes are moist.     Eyes:      Pupils: Pupils are equal, round, and reactive to light.      Comments: Conjunctiva pale     Cardiovascular:      Rate and Rhythm: Normal rate. Rhythm irregularly irregular.      Pulses: Normal pulses.      Heart sounds: Normal heart sounds.   Pulmonary:      Effort: Pulmonary effort is normal.      Breath sounds: Wheezing (LUQ) present.   Abdominal:      General: Abdomen is flat.      Palpations: Abdomen is soft.     Skin:     General: Skin is warm and dry.     Neurological:      General: No focal deficit present.      Mental Status: She is alert and oriented to person, place, and time.     Psychiatric:         Mood and Affect: Mood normal.         Behavior: Behavior normal.         Thought Content: Thought content normal.         Judgment: Judgment normal.           Lines/Drains:        Telemetry:  Telemetry Orders (From admission, onward)               24 Hour Telemetry Monitoring  Continuous x 24 Hours (Telem)        Expiring   Question:  Reason for 24 Hour Telemetry  Answer:  Decompensated CHF- and any one of the following: continuous diuretic infusion or total diuretic dose >200 mg daily, associated electrolyte derangement (I.e. K < 3.0), inotropic drip (continuous infusion), hx of ventricular arrhythmia, or new EF < 35%                     Telemetry Reviewed: Atrial fibrillation. HR averaging 100s and Atrial flutter. HR averaging 100s  Indication for Continued Telemetry Use: Arrthymias requiring medical therapy               Lab Results: I have reviewed the following results:   Results from last 7 days    Lab Units 06/20/25  1205 06/20/25  0558   WBC Thousand/uL  --  5.75   HEMOGLOBIN g/dL 8.9* 8.6*   HEMATOCRIT % 27.9* 26.9*   PLATELETS Thousands/uL  --  197   SEGS PCT %  --  71   LYMPHO PCT %  --  18   MONO PCT %  --  10   EOS PCT %  --  1     Results from last 7 days   Lab Units 06/20/25  0558 06/19/25  0453 06/18/25  1435   SODIUM mmol/L 137   < > 133*   POTASSIUM mmol/L 3.8   < > 4.9   CHLORIDE mmol/L 100   < > 95*   CO2 mmol/L 35*   < > 23   BUN mg/dL 47*   < > 55*   CREATININE mg/dL 1.50*   < > 1.54*   ANION GAP mmol/L 2*   < > 15*   CALCIUM mg/dL 8.4   < > 8.8   ALBUMIN g/dL  --   --  2.8*   TOTAL BILIRUBIN mg/dL  --   --  0.63   ALK PHOS U/L  --   --  90   ALT U/L  --   --  27   AST U/L  --   --  89*   GLUCOSE RANDOM mg/dL 121   < > 338*    < > = values in this interval not displayed.     Results from last 7 days   Lab Units 06/17/25  1035   INR  1.00     Results from last 7 days   Lab Units 06/20/25  1225 06/20/25  0601 06/20/25  0522 06/20/25  0041 06/19/25  1813 06/19/25  1222 06/19/25  0959 06/19/25  0810 06/19/25  0602 06/19/25  0417 06/19/25  0201 06/19/25  0006   POC GLUCOSE mg/dl 255* 125 117 212* 182* 134 120 115 105 125 116 120         Results from last 7 days   Lab Units 06/18/25  1735 06/18/25  1435 06/14/25  1357 06/14/25  0621   LACTIC ACID mmol/L 2.1* 7.8* 2.0 2.5*       Recent Cultures (last 7 days):         Imaging Results Review: I reviewed radiology reports from this admission including: chest xray and Echocardiogram.  Other Study Results Review: EKG was reviewed.     Last 24 Hours Medication List:     Current Facility-Administered Medications:     acetaminophen (TYLENOL) tablet 650 mg, Q6H PRN    albuterol inhalation solution 2.5 mg, Q4H PRN    [Held by provider] atorvastatin (LIPITOR) tablet 40 mg, Daily    budesonide (PULMICORT) inhalation solution 0.5 mg, Q12H    [Held by provider] bumetanide (BUMEX) tablet 3 mg, BID (diuretic)    chlorhexidine (PERIDEX) 0.12 % oral rinse 15 mL, Q12H  JOE    [Held by provider] donepezil (ARICEPT) tablet 5 mg, HS    [Held by provider] escitalopram (LEXAPRO) tablet 5 mg, Daily    fentaNYL injection 50 mcg, Q1H PRN    [Held by provider] fluticasone (FLONASE) 50 mcg/act nasal spray 1 spray, Daily    formoterol (PERFOROMIST) nebulizer solution 20 mcg, Q12H    guaiFENesin (MUCINEX) 12 hr tablet 600 mg, Q12H JOE    insulin lispro (HumALOG/ADMELOG) 100 units/mL subcutaneous injection 1-5 Units, Q6H **AND** Fingerstick Glucose (POCT), Q6H    levalbuterol (XOPENEX) inhalation solution 0.63 mg, TID    [Held by provider] magnesium Oxide (MAG-OX) tablet 400 mg, Daily    metoprolol tartrate (LOPRESSOR) tablet 50 mg, Q12H JOE    pantoprazole (PROTONIX) injection 40 mg, Q24H JOE    Administrative Statements   Today, Patient Was Seen By: Parth Claire & Bebe Bain DO      **Please Note: This note may have been constructed using a voice recognition system.**

## 2025-06-20 NOTE — ANESTHESIA PROCEDURE NOTES
Anesthesia Notable Event    Date/Time: 6/18/2025 3:00 PM    Patient location during procedure: OR procedure room  Performed by: Steve Núñez MD  Authorized by: Steve Núñez MD    Anesthesia notable event Intraoperative:refractory hypotension  Anesthesia notable event Post Operative: unanticipated transfer to ICU  Details in chart, sudden loss of BP likely rv failure in setting of sedation and increased PVR. Stat overhead called, attending and multiple crnas as well as hospital rapid team immediately available, cynthia to 30 but no code/cpr, resolution of BP with large dose pressors however intubated in setting of acute instability and inability to get peripheral pulse ox reading while awaiting ABG to result.  DNR status realized during process, discussed with family again afterwards reasons for pausing DNR periprocedure and our goal for short intubation as patient intubated for hemodynamic status not respiratory status.  - KAYDEN

## 2025-06-20 NOTE — ASSESSMENT & PLAN NOTE
History of severe pulmonary hypertension with systolic and diastolic flattening of the IV septum consistent with right ventricular pressure and volume overload; LA severe dilation; LVEF of 65%  P/w anemia with Hgb of 5.2, which improved to the 7-8s s/p 2 units pRBCs, which remained stable  FOBT+ on testing, with GI consultation  Initially planned for EGD and colonoscopy on 6/16, which was postponed to 6/18 due to stability  EGD performed on 6/18 (with reversal of DNR/DNI for the procedure) showing hiatal hernia and gastritis; prior to colonoscopy, patient decompensated and became hypotensive to 30s/20s and bradycardic; critical care consulted and A-line placed; she was intubated  6/18 admitted to the ICU with hypotension and bradycardia  Pressures dropped to the 80s/40s and she was initiated on a levophed gtt to maintain MAPs > 65  S/p NE gtt and albumin, BPs stabilized to 120s/60s  Per critical care evaluation, likely mixed etiology of shock due primarily to cardiogenic shock from severe pulmonary hypertension and anesthesia leading to RV failure and reduced CO  Other contributing, factors of distributive shock from anesthesia, and hypovolemic shock from volume loss from bleeding/bowel prep  6/19 she was weaned off levophed with stable BPs and extubated successfully; determined stable for Medr  6/20 blood pressures stable in 110s/60s, but patient in atrial flutter with uncontrolled rates per telemetry    Plan:  PT/OT evaluation pending, appreciate recommendations  Given stable BP, restart metoprolol tartrate 50 mg Q12h  Will continue to hold PO bumex and consider re-initiation in AM if BPs stable s/p beta-blocker re-introduction  Monitor hgb q12h and transfuse as needed for Hgb < 7  Continue to hold AC indefinitely due to high bleeding risk
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
## 2025-06-20 NOTE — PLAN OF CARE
Problem: CARDIOVASCULAR - ADULT  Goal: Maintains optimal cardiac output and hemodynamic stability  Description: INTERVENTIONS:  - Monitor I/O, vital signs and rhythm  - Monitor for S/S and trends of decreased cardiac output  - Administer and titrate ordered vasoactive medications to optimize hemodynamic stability  - Assess quality of pulses, skin color and temperature  - Assess for signs of decreased coronary artery perfusion  - Instruct patient to report change in severity of symptoms  Outcome: Progressing  Goal: Absence of cardiac dysrhythmias or at baseline rhythm  Description: INTERVENTIONS:  - Continuous cardiac monitoring, vital signs, obtain 12 lead EKG if ordered  - Administer antiarrhythmic and heart rate control medications as ordered  - Monitor electrolytes and administer replacement therapy as ordered  Outcome: Progressing     Problem: GASTROINTESTINAL - ADULT  Goal: Minimal or absence of nausea and/or vomiting  Description: INTERVENTIONS:  - Administer IV fluids if ordered to ensure adequate hydration  - Maintain NPO status until nausea and vomiting are resolved  - Nasogastric tube if ordered  - Administer ordered antiemetic medications as needed  - Provide nonpharmacologic comfort measures as appropriate  - Advance diet as tolerated, if ordered  - Consider nutrition services referral to assist patient with adequate nutrition and appropriate food choices  Outcome: Progressing  Goal: Maintains or returns to baseline bowel function  Description: INTERVENTIONS:  - Assess bowel function  - Encourage oral fluids to ensure adequate hydration  - Administer IV fluids if ordered to ensure adequate hydration  - Administer ordered medications as needed  - Encourage mobilization and activity  - Consider nutritional services referral to assist patient with adequate nutrition and appropriate food choices  Outcome: Progressing  Goal: Maintains adequate nutritional intake  Description: INTERVENTIONS:  - Monitor  percentage of each meal consumed  - Identify factors contributing to decreased intake, treat as appropriate  - Assist with meals as needed  - Monitor I&O, weight, and lab values if indicated  - Obtain nutrition services referral as needed  Outcome: Progressing  Goal: Establish and maintain optimal ostomy function  Description: INTERVENTIONS:  - Assess bowel function  - Encourage oral fluids to ensure adequate hydration  - Administer IV fluids if ordered to ensure adequate hydration   - Administer ordered medications as needed  - Encourage mobilization and activity  - Nutrition services referral to assist patient with appropriate food choices  - Assess stoma site  - Consider wound care consult   Outcome: Progressing  Goal: Oral mucous membranes remain intact  Description: INTERVENTIONS  - Assess oral mucosa and hygiene practices  - Implement preventative oral hygiene regimen  - Implement oral medicated treatments as ordered  - Initiate Nutrition services referral as needed  Outcome: Progressing     Problem: METABOLIC, FLUID AND ELECTROLYTES - ADULT  Goal: Electrolytes maintained within normal limits  Description: INTERVENTIONS:  - Monitor labs and assess patient for signs and symptoms of electrolyte imbalances  - Administer electrolyte replacement as ordered  - Monitor response to electrolyte replacements, including repeat lab results as appropriate  - Instruct patient on fluid and nutrition as appropriate  Outcome: Progressing  Goal: Glucose maintained within target range  Description: INTERVENTIONS:  - Monitor Blood Glucose as ordered  - Assess for signs and symptoms of hyperglycemia and hypoglycemia  - Administer ordered medications to maintain glucose within target range  - Assess nutritional intake and initiate nutrition service referral as needed  Outcome: Progressing     Problem: HEMATOLOGIC - ADULT  Goal: Maintains hematologic stability  Description: INTERVENTIONS  - Assess for signs and symptoms of bleeding  or hemorrhage  - Monitor labs  - Administer supportive blood products/factors as ordered and appropriate  Outcome: Progressing     Problem: Nutrition/Hydration-ADULT  Goal: Nutrient/Hydration intake appropriate for improving, restoring or maintaining nutritional needs  Description: Monitor and assess patient's nutrition/hydration status for malnutrition. Collaborate with interdisciplinary team and initiate plan and interventions as ordered.  Monitor patient's weight and dietary intake as ordered or per policy. Utilize nutrition screening tool and intervene as necessary. Determine patient's food preferences and provide high-protein, high-caloric foods as appropriate.     INTERVENTIONS:  - Monitor oral intake, urinary output, labs, and treatment plans  - Assess nutrition and hydration status and recommend course of action  - Evaluate amount of meals eaten  - Assist patient with eating if necessary   - Allow adequate time for meals  - Recommend/ encourage appropriate diets, oral nutritional supplements, and vitamin/mineral supplements  - Order, calculate, and assess calorie counts as needed  - Recommend, monitor, and adjust tube feedings and TPN/PPN based on assessed needs  - Assess need for intravenous fluids  - Provide specific nutrition/hydration education as appropriate  - Include patient/family/caregiver in decisions related to nutrition  Outcome: Progressing

## 2025-06-20 NOTE — PLAN OF CARE
Problem: PHYSICAL THERAPY ADULT  Goal: Performs mobility at highest level of function for planned discharge setting.  See evaluation for individualized goals.  Description: Treatment/Interventions: Functional transfer training, LE strengthening/ROM, Elevations, Therapeutic exercise, Cognitive reorientation, Patient/family training, Equipment eval/education, Gait training, Bed mobility, Spoke to case management, Spoke to nursing, OT          See flowsheet documentation for full assessment, interventions and recommendations.  Note: Prognosis: Fair  Problem List: Decreased strength, Decreased endurance, Decreased mobility, Impaired balance, Decreased cognition, Impaired judgement, Obesity, Decreased skin integrity  Assessment: Pt is a 84 y.o. female seen for PT evaluation s/p admit to [facility] on 6/13/2025. Pt was admitted with a primary dx of: shock.  PT now consulted for assessment of mobility and d/c needs. Pt with Activity as tolerated orders.  Pts current comorbidities and personal factors effecting treatment include: HTN, COPD, O2 dependent, DM, osteopenia . Pts current clinical presentation is Unstable/Unpredictable (high complexity) due to Ongoing medical management for primary dx, Decreased activity tolerance compared to baseline, Fall risk, Increased assistance needed from caregiver at current time, Ongoing telemetry monitoring, Increased O2 via NC from pts baseline. Prior to admission, pt was independent with use of RW. Upon evaluation, pt currently is requiring ModA for transfers and ModA for ambulation 10 ft w/ RW. Pt presents at PT eval functioning below baseline and currently w/ overall mobility deficits 2* to: BLE weakness, impaired balance, gait deviations, decreased activity tolerance compared to baseline, decreased functional mobility tolerance compared to baseline, decreased safety awareness, impaired judgement, fall risk, decreased skin integrity. Pt currently at a fall risk 2* to  impairments listed above.  Pt will continue to benefit from skilled acute PT interventions to address stated impairments; to maximize functional mobility; for ongoing pt/ family training; and DME needs. At conclusion of PT session chair alarm engaged, all needs in reach, RN notified of session findings/recommendations, and pt returned back in recliner chair with phone and call bell within reach. Pt denies any further questions at this time. Recommend Level II (Moderate Resource Intensity)  upon hospital D/C.        Rehab Resource Intensity Level, PT: II (Moderate Resource Intensity)    See flowsheet documentation for full assessment.

## 2025-06-20 NOTE — PLAN OF CARE
Problem: OCCUPATIONAL THERAPY ADULT  Goal: Performs self-care activities at highest level of function for planned discharge setting.  See evaluation for individualized goals.  Description: Treatment Interventions: ADL retraining, Functional transfer training, Endurance training, Cognitive reorientation, Patient/family training, Equipment evaluation/education, Compensatory technique education, Activityengagement, Energy conservation          See flowsheet documentation for full assessment, interventions and recommendations.   Note: Limitation: Decreased ADL status, Decreased Safe judgement during ADL, Decreased cognition, Decreased endurance, Decreased self-care trans, Decreased high-level ADLs  Prognosis: Good  Assessment: Patient is a 84 y.o. female seen for OT evaluation following admission on 6/13/2025  s/p Shock (HCC). Please see above for comprehensive list of comorbidities and significant PMHx impacting functional performance. Upon initial evaluation, pt appears to be performing below baseline functional status. Pt requires JOVANI for UB ADLs, MAXA for LB ADLs, MODA for transfers and MODA for functional mobility short distance  with RW. The AM-PAC & Barthel Index outcome tools were used to assist in determining pt safety w/self care /mobility and appropriate d/c recommendations, see above for score. Occupational performance is affected by the following deficits: endurance ,  decreased muscular strength , decreased balance , decreased standing tolerance for self care tasks , decreased dynamic balance impacting functional reach, decreased functional reach , decreased activity tolerance , impaired memory , impaired safety awareness , and impaired learning ability d/t current cognitive status . Personal/Environmental factors impacting D/C include: Assistance needed for ADL/IADLs, Assistance needed for ADLs and functional mobility, High fall risk , decreased insight toward deficits , decreased recall of precautions  , health management, and baseline cognitive deficits. Supporting factors include: support system available, facility staff available for continued assistance, and attitude towards recovery . Patient would benefit from OT services within the acute care setting to maximize level of functional independence in the following areas fall prevention , energy conservation , self-care transfers, bed mobility , functional mobility, formal cognitive evaluation, and ADLs.  From OT standpoint, recommendation at time of D/C would be Level II (Moderate Resource Intensity) .     Rehab Resource Intensity Level, OT: II (Moderate Resource Intensity)     Sally Mchugh MS OTR/L   [place] Licensure# [id number]

## 2025-06-21 LAB
ANION GAP SERPL CALCULATED.3IONS-SCNC: 3 MMOL/L (ref 4–13)
BUN SERPL-MCNC: 37 MG/DL (ref 5–25)
CALCIUM SERPL-MCNC: 8.4 MG/DL (ref 8.4–10.2)
CHLORIDE SERPL-SCNC: 99 MMOL/L (ref 96–108)
CO2 SERPL-SCNC: 34 MMOL/L (ref 21–32)
CREAT SERPL-MCNC: 1.28 MG/DL (ref 0.6–1.3)
ERYTHROCYTE [DISTWIDTH] IN BLOOD BY AUTOMATED COUNT: 16.9 % (ref 11.6–15.1)
GFR SERPL CREATININE-BSD FRML MDRD: 38 ML/MIN/1.73SQ M
GLUCOSE SERPL-MCNC: 152 MG/DL (ref 65–140)
GLUCOSE SERPL-MCNC: 176 MG/DL (ref 65–140)
GLUCOSE SERPL-MCNC: 189 MG/DL (ref 65–140)
GLUCOSE SERPL-MCNC: 216 MG/DL (ref 65–140)
GLUCOSE SERPL-MCNC: 226 MG/DL (ref 65–140)
HCT VFR BLD AUTO: 25.9 % (ref 34.8–46.1)
HCT VFR BLD AUTO: 26.3 % (ref 34.8–46.1)
HCT VFR BLD AUTO: 28.6 % (ref 34.8–46.1)
HGB BLD-MCNC: 8.1 G/DL (ref 11.5–15.4)
HGB BLD-MCNC: 8.3 G/DL (ref 11.5–15.4)
HGB BLD-MCNC: 8.8 G/DL (ref 11.5–15.4)
MAGNESIUM SERPL-MCNC: 2.3 MG/DL (ref 1.9–2.7)
MCH RBC QN AUTO: 32 PG (ref 26.8–34.3)
MCHC RBC AUTO-ENTMCNC: 31.6 G/DL (ref 31.4–37.4)
MCV RBC AUTO: 102 FL (ref 82–98)
PLATELET # BLD AUTO: 191 THOUSANDS/UL (ref 149–390)
PMV BLD AUTO: 10.3 FL (ref 8.9–12.7)
POTASSIUM SERPL-SCNC: 3.5 MMOL/L (ref 3.5–5.3)
RBC # BLD AUTO: 2.59 MILLION/UL (ref 3.81–5.12)
SODIUM SERPL-SCNC: 136 MMOL/L (ref 135–147)
WBC # BLD AUTO: 6.46 THOUSAND/UL (ref 4.31–10.16)

## 2025-06-21 PROCEDURE — 85014 HEMATOCRIT: CPT

## 2025-06-21 PROCEDURE — 85014 HEMATOCRIT: CPT | Performed by: INTERNAL MEDICINE

## 2025-06-21 PROCEDURE — 94640 AIRWAY INHALATION TREATMENT: CPT

## 2025-06-21 PROCEDURE — 94760 N-INVAS EAR/PLS OXIMETRY 1: CPT

## 2025-06-21 PROCEDURE — 85027 COMPLETE CBC AUTOMATED: CPT

## 2025-06-21 PROCEDURE — 99232 SBSQ HOSP IP/OBS MODERATE 35: CPT | Performed by: INTERNAL MEDICINE

## 2025-06-21 PROCEDURE — 85018 HEMOGLOBIN: CPT | Performed by: INTERNAL MEDICINE

## 2025-06-21 PROCEDURE — 85018 HEMOGLOBIN: CPT

## 2025-06-21 PROCEDURE — 82948 REAGENT STRIP/BLOOD GLUCOSE: CPT

## 2025-06-21 PROCEDURE — 80048 BASIC METABOLIC PNL TOTAL CA: CPT

## 2025-06-21 PROCEDURE — 83735 ASSAY OF MAGNESIUM: CPT

## 2025-06-21 RX ORDER — BUMETANIDE 1 MG/1
2 TABLET ORAL
Status: DISCONTINUED | OUTPATIENT
Start: 2025-06-21 | End: 2025-06-24 | Stop reason: HOSPADM

## 2025-06-21 RX ORDER — ACETAMINOPHEN 325 MG/1
650 TABLET ORAL EVERY 6 HOURS PRN
Status: DISCONTINUED | OUTPATIENT
Start: 2025-06-21 | End: 2025-06-24 | Stop reason: HOSPADM

## 2025-06-21 RX ORDER — HYDROMORPHONE HCL IN WATER/PF 6 MG/30 ML
0.2 PATIENT CONTROLLED ANALGESIA SYRINGE INTRAVENOUS EVERY 2 HOUR PRN
Refills: 0 | Status: DISCONTINUED | OUTPATIENT
Start: 2025-06-21 | End: 2025-06-24 | Stop reason: HOSPADM

## 2025-06-21 RX ORDER — OXYCODONE HYDROCHLORIDE 5 MG/1
5 TABLET ORAL EVERY 4 HOURS PRN
Refills: 0 | Status: DISCONTINUED | OUTPATIENT
Start: 2025-06-21 | End: 2025-06-24 | Stop reason: HOSPADM

## 2025-06-21 RX ADMIN — CHLORHEXIDINE GLUCONATE 15 ML: 1.2 SOLUTION ORAL at 08:16

## 2025-06-21 RX ADMIN — METOPROLOL TARTRATE 50 MG: 50 TABLET, FILM COATED ORAL at 08:14

## 2025-06-21 RX ADMIN — LEVALBUTEROL HYDROCHLORIDE 0.63 MG: 0.63 SOLUTION RESPIRATORY (INHALATION) at 19:36

## 2025-06-21 RX ADMIN — FORMOTEROL FUMARATE DIHYDRATE 20 MCG: 20 SOLUTION RESPIRATORY (INHALATION) at 07:43

## 2025-06-21 RX ADMIN — INSULIN LISPRO 2 UNITS: 100 INJECTION, SOLUTION INTRAVENOUS; SUBCUTANEOUS at 12:39

## 2025-06-21 RX ADMIN — INSULIN LISPRO 1 UNITS: 100 INJECTION, SOLUTION INTRAVENOUS; SUBCUTANEOUS at 06:25

## 2025-06-21 RX ADMIN — LEVALBUTEROL HYDROCHLORIDE 0.63 MG: 0.63 SOLUTION RESPIRATORY (INHALATION) at 13:21

## 2025-06-21 RX ADMIN — LEVALBUTEROL HYDROCHLORIDE 0.63 MG: 0.63 SOLUTION RESPIRATORY (INHALATION) at 07:43

## 2025-06-21 RX ADMIN — FORMOTEROL FUMARATE DIHYDRATE 20 MCG: 20 SOLUTION RESPIRATORY (INHALATION) at 19:36

## 2025-06-21 RX ADMIN — PANTOPRAZOLE SODIUM 40 MG: 40 INJECTION, POWDER, LYOPHILIZED, FOR SOLUTION INTRAVENOUS at 08:16

## 2025-06-21 RX ADMIN — INSULIN LISPRO 1 UNITS: 100 INJECTION, SOLUTION INTRAVENOUS; SUBCUTANEOUS at 00:21

## 2025-06-21 RX ADMIN — ATORVASTATIN CALCIUM 40 MG: 40 TABLET, FILM COATED ORAL at 08:17

## 2025-06-21 RX ADMIN — GUAIFENESIN 600 MG: 600 TABLET ORAL at 08:16

## 2025-06-21 RX ADMIN — DONEPEZIL HYDROCHLORIDE 5 MG: 5 TABLET ORAL at 21:24

## 2025-06-21 RX ADMIN — BUDESONIDE 0.5 MG: 0.5 INHALANT RESPIRATORY (INHALATION) at 19:36

## 2025-06-21 RX ADMIN — GUAIFENESIN 600 MG: 600 TABLET ORAL at 21:24

## 2025-06-21 RX ADMIN — ESCITALOPRAM OXALATE 5 MG: 10 TABLET ORAL at 08:11

## 2025-06-21 RX ADMIN — BUDESONIDE 0.5 MG: 0.5 INHALANT RESPIRATORY (INHALATION) at 07:43

## 2025-06-21 RX ADMIN — METOPROLOL TARTRATE 50 MG: 50 TABLET, FILM COATED ORAL at 21:24

## 2025-06-21 RX ADMIN — INSULIN LISPRO 2 UNITS: 100 INJECTION, SOLUTION INTRAVENOUS; SUBCUTANEOUS at 17:31

## 2025-06-21 RX ADMIN — BUMETANIDE 2 MG: 1 TABLET ORAL at 08:12

## 2025-06-21 NOTE — PROGRESS NOTES
Progress Note - Hospitalist   Name: Camryn Roblero 84 y.o. female I MRN: [id number]  Unit/Bed#: S -01 I Date of Admission: 6/13/2025   Date of Service: 6/21/2025 I Hospital Day: 8    Assessment & Plan  Shock (HCC)  History of severe pulmonary hypertension with systolic and diastolic flattening of the IV septum consistent with right ventricular pressure and volume overload; LA severe dilation; LVEF of 65%  P/w anemia with Hgb of 5.2, which improved to the 7-8s s/p 2 units pRBCs, which remained stable  FOBT+ on testing, with GI consultation  EGD performed on 6/18 (with reversal of DNR/DNI for the procedure) showing hiatal hernia and gastritis; prior to colonoscopy, patient decompensated and became hypotensive to 30s/20s and bradycardic; critical care consulted and A-line placed; she was intubated  6/18 admitted to the ICU with hypotension and bradycardia  Pressures dropped to the 80s/40s and she was initiated on a levophed gtt to maintain MAPs > 65  Per critical care evaluation, likely mixed etiology of shock due primarily to cardiogenic shock from severe pulmonary hypertension and anesthesia leading to RV failure and reduced CO  Other contributing, factors of distributive shock from anesthesia, and hypovolemic shock from volume loss from bleeding/bowel prep  6/19 she was weaned off levophed with stable BPs and extubated successfully; determined stable for MedSurg  6/21 BPs remained stable, though per telemetry, patient remains in atrial flutter    Plan:  PT/OT evaluation recommending level II resource intensity (moderate)  Continue metoprolol tartrate 50 mg Q12h  If BPs remain stable, will consider initiating metoprolol succinate 75 mg BID as recommended by cardiology  Resume PO bumex at reduced dose of 2 mg BID, with monitoring of response  Monitor hgb q12h and transfuse as needed for Hgb < 7  Continue to hold AC indefinitely due to high bleeding risk  Upper GI bleed  Acute blood loss anemia  Lab Results    Component Value Date    HGB 8.1 (L) 06/21/2025    HGB 8.3 (L) 06/21/2025    HGB 8.3 (L) 06/20/2025    HGB 8.9 (L) 06/20/2025       Patient presented from living facility with complaints of low hemoglobin noted to be 5.2 on admission  Positive FOBT in ER with BUN elevation; S/p 2u prbc transfusion in ER with improvement to 7s-8s  EGD with no active bleeding, only a hiatal hernia and gastritis noted  Endorses continued melena  Hemoglobin down-trending but stable in 8s    Plan:  Continue to monitor hemoglobin q12h and transfuse as needed  IV protonix 40 mg daily  Discontinue Xarelto in the setting of severe pulmonary hypertension, preload dependence, and likely GI bleed  Discussed possibility of resuming given afib/aflutter should her melena resolve  Hold off on any further procedures, including colonoscopy, due to risk of decompensation  Atrial flutter (HCC)  Noted to be in atrial flutter per telemetry with rates in the 80s-90s  Discussed high bleeding risk with daughter, and explained that we will not be resuming anticoagulation at this time    Plan:  Continue metoprolol to tartrate 50 mg twice daily for rate control as BP allows  Paroxysmal atrial fibrillation (HCC)  Interval worsening on a.m. of 6/15 due to uncertainty etiology, patient did refuse CPAP at bedtime which may have led to pulmonary infiltrate worsening A-fib given one-time Lopressor 2.5 mg IV and assess for effect in addition to extra diuresis  Patient found to be in rate controlled A-fib with rates ~ on 6/20    Plan:  Discontinue anticoagulation for now given high risk of bleeding and decompensation  Continue metoprolol tartrate 50 mg Q12  Acute on chronic heart failure with preserved ejection fraction (HCC)  Wt Readings from Last 3 Encounters:   06/17/25 51.6 kg (113 lb 12.1 oz)   06/13/25 60 kg (132 lb 3.2 oz)   06/09/25 57.5 kg (126 lb 12.8 oz)     Patient noted to have increased weight gain since discharge earlier this month, received  extra metolazone outpatient due to weight gain. Second HF admission within the month.  Prior echo 10/24 reviewed, had worsening valvular disease  Updated echo: No significant change compared to the previous study. Although there was no adequate tricuspid regurgitation jet to be able to calculate RV systolic pressure, indirect findings are once again consistent with severe pulmonary hypertension   Received IV bumex 3 grams in ED  No notable edema, pulmonary effusion or JVD/hepatojugular reflex present on exam to suggest fluid overload    Plan:  Heart failure protocol  2 g sodium restriction. Fluid restriction to 2L/day   Continue metoprolol to tartrate 50 mg twice daily as blood pressure allows  Resume Bumex at lower dose of 2 mg BID   Monitor intake and output   Pulmonary hypertension (HCC)  Prior echo 10/24 reviewed, had worsening valvular disease  Updated echo w/o significant change; indirect findings are once again consistent with severe pulmonary hypertension     Plan:  Monitor intake and output and fluid status  Hold off on resuming diuretics due to tenuous volume status and preload dependency  Type 2 diabetes mellitus, without long-term current use of insulin (HCC)  Lab Results   Component Value Date    HGBA1C 6.5 04/15/2025       Recent Labs     06/21/25  0008 06/21/25  0612 06/21/25  1208 06/21/25  1611   POCGLU 189* 152* 226* 216*       Blood Sugar Average: Last 72 hrs:  (P) 160.12BG elevated to 382    Place on accuchecks, every 6 hrs while NPO  SSI as needed, can adjust regimen based on BG  Chronic hypoxic respiratory failure, on home oxygen therapy  (HCC)  Continue 2 L oxygen therapy at all times (baseline)  COPD, severe (HCC)  Patient initially noted to be wheezing, since resolved after duoneb in ER; ALYSHA lobe wheezing heard on exam 6/20  Lower suspicion for acute exacerbation at this time, will defer steroids    Plan:  Continue with nebulizers as needed   Pulmicort BID  TOM (obstructive sleep  apnea)  Continue cpap nightly w/ oxygen     VTE Pharmacologic Prophylaxis: VTE Score: 6 High Risk (Score >/= 5) - Pharmacological DVT Prophylaxis Contraindicated. Sequential Compression Devices Ordered.    Mobility:   Basic Mobility Inpatient Raw Score: 11  JH-HLM Goal: 4: Move to chair/commode  JH-HLM Achieved: 2: Bed activities/Dependent transfer  JH-HLM Goal NOT achieved. Continue with multidisciplinary rounding and encourage appropriate mobility to improve upon JH-HLM goals.    Patient Centered Rounds: I performed bedside rounds with nursing staff today.   Discussions with Specialists or Other Care Team Provider:     Education and Discussions with Family / Patient: Updated  (daughter) at bedside.    Current Length of Stay: 8 day(s)  Current Patient Status: Inpatient   Certification Statement: The patient will continue to require additional inpatient hospital stay due to management of volume status and monitoring for bleeding  Discharge Plan: Anticipate discharge in 48-72 hrs to rehab facility.    Code Status: Level 3 - DNAR and DNI    Subjective   Camryn was seen and examined at the bedside.  This morning, she was receiving her nebulizer treatment, and explained that she felt better than yesterday.  She was looking forward to working with PT/OT and regaining some strength.    Objective :  Temp:  [97.6 °F (36.4 °C)-98.4 °F (36.9 °C)] 97.6 °F (36.4 °C)  HR:  [65-82] 71  BP: (106-132)/(55-86) 118/86  Resp:  [18-20] 20  SpO2:  [97 %-100 %] 97 %  O2 Device: Nasal cannula  Nasal Cannula O2 Flow Rate (L/min):  [2 L/min] 2 L/min    Body mass index is 22.98 kg/m².     Input and Output Summary (last 24 hours):     Intake/Output Summary (Last 24 hours) at 6/21/2025 1640  Last data filed at 6/21/2025 0900  Gross per 24 hour   Intake 480 ml   Output 287 ml   Net 193 ml       Physical Exam  Vitals reviewed.   Constitutional:       General: She is not in acute distress.     Appearance: Normal appearance. She is not  ill-appearing or diaphoretic.      Interventions: Face mask in place.   HENT:      Head: Normocephalic and atraumatic.      Mouth/Throat:      Mouth: Mucous membranes are moist.      Pharynx: Oropharynx is clear.     Eyes:      General: No scleral icterus.     Extraocular Movements: Extraocular movements intact.      Conjunctiva/sclera: Conjunctivae normal.       Cardiovascular:      Rate and Rhythm: Normal rate. Rhythm irregular.   Pulmonary:      Effort: Pulmonary effort is normal.      Breath sounds: Normal breath sounds.   Abdominal:      General: There is no distension.      Palpations: Abdomen is soft.      Tenderness: There is no abdominal tenderness. There is no guarding.     Musculoskeletal:      Right lower leg: No edema.      Left lower leg: No edema.     Skin:     General: Skin is warm and dry.     Neurological:      General: No focal deficit present.      Mental Status: She is alert and oriented to person, place, and time.     Psychiatric:         Mood and Affect: Mood normal.         Behavior: Behavior normal.           Lines/Drains:        Telemetry:  Telemetry Orders (From admission, onward)               24 Hour Telemetry Monitoring  Continuous x 24 Hours (Telem)        Expiring   Question:  Reason for 24 Hour Telemetry  Answer:  Decompensated CHF- and any one of the following: continuous diuretic infusion or total diuretic dose >200 mg daily, associated electrolyte derangement (I.e. K < 3.0), inotropic drip (continuous infusion), hx of ventricular arrhythmia, or new EF < 35%                     Telemetry Reviewed: Atrial flutter. HR averaging 80s  Indication for Continued Telemetry Use: Arrthymias requiring medical therapy               Lab Results: I have reviewed the following results:   Results from last 7 days   Lab Units 06/21/25  0849 06/21/25  0446 06/20/25  1205 06/20/25  0558   WBC Thousand/uL  --  6.46  --  5.75   HEMOGLOBIN g/dL 8.1* 8.3*   < > 8.6*   HEMATOCRIT % 25.9* 26.3*   < > 26.9*    PLATELETS Thousands/uL  --  191  --  197   SEGS PCT %  --   --   --  71   LYMPHO PCT %  --   --   --  18   MONO PCT %  --   --   --  10   EOS PCT %  --   --   --  1    < > = values in this interval not displayed.     Results from last 7 days   Lab Units 06/21/25  0446 06/19/25  0453 06/18/25  1435   SODIUM mmol/L 136   < > 133*   POTASSIUM mmol/L 3.5   < > 4.9   CHLORIDE mmol/L 99   < > 95*   CO2 mmol/L 34*   < > 23   BUN mg/dL 37*   < > 55*   CREATININE mg/dL 1.28   < > 1.54*   ANION GAP mmol/L 3*   < > 15*   CALCIUM mg/dL 8.4   < > 8.8   ALBUMIN g/dL  --   --  2.8*   TOTAL BILIRUBIN mg/dL  --   --  0.63   ALK PHOS U/L  --   --  90   ALT U/L  --   --  27   AST U/L  --   --  89*   GLUCOSE RANDOM mg/dL 176*   < > 338*    < > = values in this interval not displayed.     Results from last 7 days   Lab Units 06/17/25  1035   INR  1.00     Results from last 7 days   Lab Units 06/21/25  1611 06/21/25  1208 06/21/25  0612 06/21/25  0008 06/20/25  1743 06/20/25  1225 06/20/25  0601 06/20/25  0522 06/20/25  0041 06/19/25  1813 06/19/25  1222 06/19/25  0959   POC GLUCOSE mg/dl 216* 226* 152* 189* 148* 255* 125 117 212* 182* 134 120         Results from last 7 days   Lab Units 06/18/25  1735 06/18/25  1435   LACTIC ACID mmol/L 2.1* 7.8*       Recent Cultures (last 7 days):               Last 24 Hours Medication List:     Current Facility-Administered Medications:     [Transfer Hold] acetaminophen (TYLENOL) tablet 650 mg, Q6H PRN    albuterol inhalation solution 2.5 mg, Q4H PRN    atorvastatin (LIPITOR) tablet 40 mg, Daily    budesonide (PULMICORT) inhalation solution 0.5 mg, Q12H    [Transfer Hold] bumetanide (BUMEX) tablet 2 mg, BID (diuretic)    donepezil (ARICEPT) tablet 5 mg, HS    escitalopram (LEXAPRO) tablet 5 mg, Daily    fluticasone (FLONASE) 50 mcg/act nasal spray 1 spray, Daily    formoterol (PERFOROMIST) nebulizer solution 20 mcg, Q12H    [Transfer Hold] guaiFENesin (MUCINEX) 12 hr tablet 600 mg, Q12H JOE     [Transfer Hold] HYDROmorphone HCl (DILAUDID) injection 0.2 mg, Q2H PRN    insulin lispro (HumALOG/ADMELOG) 100 units/mL subcutaneous injection 1-5 Units, Q6H **AND** Fingerstick Glucose (POCT), Q6H    levalbuterol (XOPENEX) inhalation solution 0.63 mg, TID    magnesium Oxide (MAG-OX) tablet 400 mg, Daily    [Transfer Hold] metoprolol (LOPRESSOR) injection 5 mg, Q6H PRN    [Transfer Hold] metoprolol tartrate (LOPRESSOR) tablet 50 mg, Q12H JOE    [Transfer Hold] oxyCODONE (ROXICODONE) split tablet 2.5 mg, Q4H PRN **OR** [Transfer Hold] oxyCODONE (ROXICODONE) IR tablet 5 mg, Q4H PRN    pantoprazole (PROTONIX) injection 40 mg, Q24H JOE    Administrative Statements   Today, Patient Was Seen By: Bebe Bain DO      **Please Note: This note may have been constructed using a voice recognition system.**

## 2025-06-21 NOTE — ASSESSMENT & PLAN NOTE
Wt Readings from Last 3 Encounters:   06/17/25 51.6 kg (113 lb 12.1 oz)   06/13/25 60 kg (132 lb 3.2 oz)   06/09/25 57.5 kg (126 lb 12.8 oz)     Patient noted to have increased weight gain since discharge earlier this month, received extra metolazone outpatient due to weight gain. Second HF admission within the month.  Prior echo 10/24 reviewed, had worsening valvular disease  Updated echo: No significant change compared to the previous study. Although there was no adequate tricuspid regurgitation jet to be able to calculate RV systolic pressure, indirect findings are once again consistent with severe pulmonary hypertension   Received IV bumex 3 grams in ED  No notable edema, pulmonary effusion or JVD/hepatojugular reflex present on exam to suggest fluid overload    Plan:  Heart failure protocol  2 g sodium restriction. Fluid restriction to 2L/day   Continue metoprolol to tartrate 50 mg twice daily as blood pressure allows  Resume Bumex at lower dose of 2 mg BID   Monitor intake and output

## 2025-06-21 NOTE — ASSESSMENT & PLAN NOTE
Interval worsening on a.m. of 6/15 due to uncertainty etiology, patient did refuse CPAP at bedtime which may have led to pulmonary infiltrate worsening A-fib given one-time Lopressor 2.5 mg IV and assess for effect in addition to extra diuresis  Patient found to be in rate controlled A-fib with rates ~ on 6/20    Plan:  Discontinue anticoagulation for now given high risk of bleeding and decompensation  Continue metoprolol tartrate 50 mg Q12

## 2025-06-21 NOTE — ASSESSMENT & PLAN NOTE
Prior echo 10/24 reviewed, had worsening valvular disease  Updated echo w/o significant change; indirect findings are once again consistent with severe pulmonary hypertension     Plan:  Monitor intake and output and fluid status  Hold off on resuming diuretics due to tenuous volume status and preload dependency

## 2025-06-21 NOTE — ASSESSMENT & PLAN NOTE
Lab Results   Component Value Date    HGB 8.1 (L) 06/21/2025    HGB 8.3 (L) 06/21/2025    HGB 8.3 (L) 06/20/2025    HGB 8.9 (L) 06/20/2025       Patient presented from living facility with complaints of low hemoglobin noted to be 5.2 on admission  Positive FOBT in ER with BUN elevation; S/p 2u prbc transfusion in ER with improvement to 7s-8s  EGD with no active bleeding, only a hiatal hernia and gastritis noted  Endorses continued melena  Hemoglobin down-trending but stable in 8s    Plan:  Continue to monitor hemoglobin q12h and transfuse as needed  IV protonix 40 mg daily  Discontinue Xarelto in the setting of severe pulmonary hypertension, preload dependence, and likely GI bleed  Discussed possibility of resuming given afib/aflutter should her melena resolve  Hold off on any further procedures, including colonoscopy, due to risk of decompensation

## 2025-06-21 NOTE — PLAN OF CARE
Problem: Prexisting or High Potential for Compromised Skin Integrity  Goal: Skin integrity is maintained or improved  Description: INTERVENTIONS:  - Identify patients at risk for skin breakdown  - Assess and monitor skin integrity including under and around medical devices   - Assess and monitor nutrition and hydration status  - Monitor labs  - Assess for incontinence   - Turn and reposition patient  - Assist with mobility/ambulation  - Relieve pressure over jessica prominences   - Avoid friction and shearing  - Provide appropriate hygiene as needed including keeping skin clean and dry  - Evaluate need for skin moisturizer/barrier cream  - Collaborate with interdisciplinary team  - Patient/family teaching  - Consider wound care consult    Assess:  - Review Wu scale daily  - Clean and moisturize skin every   - Inspect skin when repositioning, toileting, and assisting with ADLS  - Assess under medical devices such as  every   - Assess extremities for adequate circulation and sensation     Bed Management:  - Have minimal linens on bed & keep smooth, unwrinkled  - Change linens as needed when moist or perspiring  - Avoid sitting or lying in one position for more than hours while in bed?Keep HOB at degrees   - Toileting:  - Offer bedside commode  - Assess for incontinence every   - Use incontinent care products after each incontinent episode such as     Activity:  - Mobilize patient  times a day  - Encourage activity and walks on unit  - Encourage or provide ROM exercises   - Turn and reposition patient every  Hours  - Use appropriate equipment to lift or move patient in bed  - Instruct/ Assist with weight shifting every  when out of bed in chair  - Consider limitation of chair time  hour intervals    Skin Care:  - Avoid use of baby powder, tape, friction and shearing, hot water or constrictive clothing  - Relieve pressure over bony prominences using   - Do not massage red bony areas    Next Steps:  - Teach patient  strategies to minimize risks such as   - Consider consults to  interdisciplinary teams such as   Outcome: Progressing     Problem: CARDIOVASCULAR - ADULT  Goal: Maintains optimal cardiac output and hemodynamic stability  Description: INTERVENTIONS:  - Monitor I/O, vital signs and rhythm  - Monitor for S/S and trends of decreased cardiac output  - Administer and titrate ordered vasoactive medications to optimize hemodynamic stability  - Assess quality of pulses, skin color and temperature  - Assess for signs of decreased coronary artery perfusion  - Instruct patient to report change in severity of symptoms  Outcome: Progressing  Goal: Absence of cardiac dysrhythmias or at baseline rhythm  Description: INTERVENTIONS:  - Continuous cardiac monitoring, vital signs, obtain 12 lead EKG if ordered  - Administer antiarrhythmic and heart rate control medications as ordered  - Monitor electrolytes and administer replacement therapy as ordered  Outcome: Progressing     Problem: GASTROINTESTINAL - ADULT  Goal: Minimal or absence of nausea and/or vomiting  Description: INTERVENTIONS:  - Administer IV fluids if ordered to ensure adequate hydration  - Maintain NPO status until nausea and vomiting are resolved  - Nasogastric tube if ordered  - Administer ordered antiemetic medications as needed  - Provide nonpharmacologic comfort measures as appropriate  - Advance diet as tolerated, if ordered  - Consider nutrition services referral to assist patient with adequate nutrition and appropriate food choices  Outcome: Progressing  Goal: Maintains or returns to baseline bowel function  Description: INTERVENTIONS:  - Assess bowel function  - Encourage oral fluids to ensure adequate hydration  - Administer IV fluids if ordered to ensure adequate hydration  - Administer ordered medications as needed  - Encourage mobilization and activity  - Consider nutritional services referral to assist patient with adequate nutrition and appropriate food  choices  Outcome: Progressing  Goal: Maintains adequate nutritional intake  Description: INTERVENTIONS:  - Monitor percentage of each meal consumed  - Identify factors contributing to decreased intake, treat as appropriate  - Assist with meals as needed  - Monitor I&O, weight, and lab values if indicated  - Obtain nutrition services referral as needed  Outcome: Progressing  Goal: Establish and maintain optimal ostomy function  Description: INTERVENTIONS:  - Assess bowel function  - Encourage oral fluids to ensure adequate hydration  - Administer IV fluids if ordered to ensure adequate hydration   - Administer ordered medications as needed  - Encourage mobilization and activity  - Nutrition services referral to assist patient with appropriate food choices  - Assess stoma site  - Consider wound care consult   Outcome: Progressing  Goal: Oral mucous membranes remain intact  Description: INTERVENTIONS  - Assess oral mucosa and hygiene practices  - Implement preventative oral hygiene regimen  - Implement oral medicated treatments as ordered  - Initiate Nutrition services referral as needed  Outcome: Progressing     Problem: METABOLIC, FLUID AND ELECTROLYTES - ADULT  Goal: Electrolytes maintained within normal limits  Description: INTERVENTIONS:  - Monitor labs and assess patient for signs and symptoms of electrolyte imbalances  - Administer electrolyte replacement as ordered  - Monitor response to electrolyte replacements, including repeat lab results as appropriate  - Instruct patient on fluid and nutrition as appropriate  Outcome: Progressing  Goal: Glucose maintained within target range  Description: INTERVENTIONS:  - Monitor Blood Glucose as ordered  - Assess for signs and symptoms of hyperglycemia and hypoglycemia  - Administer ordered medications to maintain glucose within target range  - Assess nutritional intake and initiate nutrition service referral as needed  Outcome: Progressing     Problem: HEMATOLOGIC -  ADULT  Goal: Maintains hematologic stability  Description: INTERVENTIONS  - Assess for signs and symptoms of bleeding or hemorrhage  - Monitor labs  - Administer supportive blood products/factors as ordered and appropriate  Outcome: Progressing     Problem: Nutrition/Hydration-ADULT  Goal: Nutrient/Hydration intake appropriate for improving, restoring or maintaining nutritional needs  Description: Monitor and assess patient's nutrition/hydration status for malnutrition. Collaborate with interdisciplinary team and initiate plan and interventions as ordered.  Monitor patient's weight and dietary intake as ordered or per policy. Utilize nutrition screening tool and intervene as necessary. Determine patient's food preferences and provide high-protein, high-caloric foods as appropriate.     INTERVENTIONS:  - Monitor oral intake, urinary output, labs, and treatment plans  - Assess nutrition and hydration status and recommend course of action  - Evaluate amount of meals eaten  - Assist patient with eating if necessary   - Allow adequate time for meals  - Recommend/ encourage appropriate diets, oral nutritional supplements, and vitamin/mineral supplements  - Order, calculate, and assess calorie counts as needed  - Recommend, monitor, and adjust tube feedings and TPN/PPN based on assessed needs  - Assess need for intravenous fluids  - Provide specific nutrition/hydration education as appropriate  - Include patient/family/caregiver in decisions related to nutrition  Outcome: Progressing

## 2025-06-21 NOTE — ASSESSMENT & PLAN NOTE
Lab Results   Component Value Date    HGBA1C 6.5 04/15/2025       Recent Labs     06/21/25  0008 06/21/25  0612 06/21/25  1208 06/21/25  1611   POCGLU 189* 152* 226* 216*       Blood Sugar Average: Last 72 hrs:  (P) 160.12BG elevated to 382    Place on accuchecks, every 6 hrs while NPO  SSI as needed, can adjust regimen based on BG

## 2025-06-21 NOTE — ASSESSMENT & PLAN NOTE
History of severe pulmonary hypertension with systolic and diastolic flattening of the IV septum consistent with right ventricular pressure and volume overload; LA severe dilation; LVEF of 65%  P/w anemia with Hgb of 5.2, which improved to the 7-8s s/p 2 units pRBCs, which remained stable  FOBT+ on testing, with GI consultation  EGD performed on 6/18 (with reversal of DNR/DNI for the procedure) showing hiatal hernia and gastritis; prior to colonoscopy, patient decompensated and became hypotensive to 30s/20s and bradycardic; critical care consulted and A-line placed; she was intubated  6/18 admitted to the ICU with hypotension and bradycardia  Pressures dropped to the 80s/40s and she was initiated on a levophed gtt to maintain MAPs > 65  Per critical care evaluation, likely mixed etiology of shock due primarily to cardiogenic shock from severe pulmonary hypertension and anesthesia leading to RV failure and reduced CO  Other contributing, factors of distributive shock from anesthesia, and hypovolemic shock from volume loss from bleeding/bowel prep  6/19 she was weaned off levophed with stable BPs and extubated successfully; determined stable for MedSurg  6/21 BPs remained stable, though per telemetry, patient remains in atrial flutter    Plan:  PT/OT evaluation recommending level II resource intensity (moderate)  Continue metoprolol tartrate 50 mg Q12h  If BPs remain stable, will consider initiating metoprolol succinate 75 mg BID as recommended by cardiology  Resume PO bumex at reduced dose of 2 mg BID, with monitoring of response  Monitor hgb q12h and transfuse as needed for Hgb < 7  Continue to hold AC indefinitely due to high bleeding risk

## 2025-06-22 LAB
GLUCOSE SERPL-MCNC: 152 MG/DL (ref 65–140)
GLUCOSE SERPL-MCNC: 159 MG/DL (ref 65–140)
GLUCOSE SERPL-MCNC: 188 MG/DL (ref 65–140)
GLUCOSE SERPL-MCNC: 235 MG/DL (ref 65–140)
GLUCOSE SERPL-MCNC: 244 MG/DL (ref 65–140)
HCT VFR BLD AUTO: 27.2 % (ref 34.8–46.1)
HCT VFR BLD AUTO: 29.4 % (ref 34.8–46.1)
HGB BLD-MCNC: 8.6 G/DL (ref 11.5–15.4)
HGB BLD-MCNC: 8.9 G/DL (ref 11.5–15.4)

## 2025-06-22 PROCEDURE — 85014 HEMATOCRIT: CPT | Performed by: INTERNAL MEDICINE

## 2025-06-22 PROCEDURE — 82948 REAGENT STRIP/BLOOD GLUCOSE: CPT

## 2025-06-22 PROCEDURE — 94760 N-INVAS EAR/PLS OXIMETRY 1: CPT

## 2025-06-22 PROCEDURE — 85018 HEMOGLOBIN: CPT | Performed by: INTERNAL MEDICINE

## 2025-06-22 PROCEDURE — 99232 SBSQ HOSP IP/OBS MODERATE 35: CPT | Performed by: INTERNAL MEDICINE

## 2025-06-22 PROCEDURE — 94640 AIRWAY INHALATION TREATMENT: CPT

## 2025-06-22 RX ORDER — METOPROLOL SUCCINATE 50 MG/1
50 TABLET, EXTENDED RELEASE ORAL 2 TIMES DAILY
Status: DISCONTINUED | OUTPATIENT
Start: 2025-06-22 | End: 2025-06-23

## 2025-06-22 RX ORDER — INSULIN LISPRO 100 [IU]/ML
1-5 INJECTION, SOLUTION INTRAVENOUS; SUBCUTANEOUS
Status: DISCONTINUED | OUTPATIENT
Start: 2025-06-22 | End: 2025-06-24

## 2025-06-22 RX ADMIN — INSULIN LISPRO 1 UNITS: 100 INJECTION, SOLUTION INTRAVENOUS; SUBCUTANEOUS at 21:12

## 2025-06-22 RX ADMIN — FORMOTEROL FUMARATE DIHYDRATE 20 MCG: 20 SOLUTION RESPIRATORY (INHALATION) at 19:44

## 2025-06-22 RX ADMIN — INSULIN LISPRO 1 UNITS: 100 INJECTION, SOLUTION INTRAVENOUS; SUBCUTANEOUS at 08:57

## 2025-06-22 RX ADMIN — Medication 400 MG: at 08:57

## 2025-06-22 RX ADMIN — INSULIN LISPRO 1 UNITS: 100 INJECTION, SOLUTION INTRAVENOUS; SUBCUTANEOUS at 00:53

## 2025-06-22 RX ADMIN — METOPROLOL SUCCINATE 50 MG: 50 TABLET, EXTENDED RELEASE ORAL at 18:28

## 2025-06-22 RX ADMIN — DONEPEZIL HYDROCHLORIDE 5 MG: 5 TABLET ORAL at 21:12

## 2025-06-22 RX ADMIN — ESCITALOPRAM OXALATE 5 MG: 10 TABLET ORAL at 08:57

## 2025-06-22 RX ADMIN — METOPROLOL TARTRATE 50 MG: 50 TABLET, FILM COATED ORAL at 08:57

## 2025-06-22 RX ADMIN — ACETAMINOPHEN 650 MG: 325 TABLET ORAL at 18:28

## 2025-06-22 RX ADMIN — INSULIN LISPRO 2 UNITS: 100 INJECTION, SOLUTION INTRAVENOUS; SUBCUTANEOUS at 12:28

## 2025-06-22 RX ADMIN — PANTOPRAZOLE SODIUM 40 MG: 40 INJECTION, POWDER, LYOPHILIZED, FOR SOLUTION INTRAVENOUS at 08:57

## 2025-06-22 RX ADMIN — LEVALBUTEROL HYDROCHLORIDE 0.63 MG: 0.63 SOLUTION RESPIRATORY (INHALATION) at 19:44

## 2025-06-22 RX ADMIN — BUMETANIDE 2 MG: 1 TABLET ORAL at 18:28

## 2025-06-22 RX ADMIN — BUDESONIDE 0.5 MG: 0.5 INHALANT RESPIRATORY (INHALATION) at 19:44

## 2025-06-22 RX ADMIN — LEVALBUTEROL HYDROCHLORIDE 0.63 MG: 0.63 SOLUTION RESPIRATORY (INHALATION) at 13:20

## 2025-06-22 RX ADMIN — GUAIFENESIN 600 MG: 600 TABLET ORAL at 08:57

## 2025-06-22 RX ADMIN — BUDESONIDE 0.5 MG: 0.5 INHALANT RESPIRATORY (INHALATION) at 07:07

## 2025-06-22 RX ADMIN — INSULIN LISPRO 2 UNITS: 100 INJECTION, SOLUTION INTRAVENOUS; SUBCUTANEOUS at 18:28

## 2025-06-22 RX ADMIN — FORMOTEROL FUMARATE DIHYDRATE 20 MCG: 20 SOLUTION RESPIRATORY (INHALATION) at 07:06

## 2025-06-22 RX ADMIN — BUMETANIDE 2 MG: 1 TABLET ORAL at 08:57

## 2025-06-22 RX ADMIN — ATORVASTATIN CALCIUM 40 MG: 40 TABLET, FILM COATED ORAL at 08:57

## 2025-06-22 RX ADMIN — GUAIFENESIN 600 MG: 600 TABLET ORAL at 21:12

## 2025-06-22 RX ADMIN — LEVALBUTEROL HYDROCHLORIDE 0.63 MG: 0.63 SOLUTION RESPIRATORY (INHALATION) at 07:07

## 2025-06-22 NOTE — ASSESSMENT & PLAN NOTE
Noted to be in atrial flutter per telemetry with rates in the 80s-90s  Discussed high bleeding risk with daughter, and explained that we will not be resuming anticoagulation at this time    Plan:  Rate control as above

## 2025-06-22 NOTE — ASSESSMENT & PLAN NOTE
Interval worsening on a.m. of 6/15 due to uncertainty etiology, patient did refuse CPAP at bedtime which may have led to pulmonary infiltrate worsening A-fib given one-time Lopressor 2.5 mg IV and assess for effect in addition to extra diuresis  Patient found to be in rate controlled A-fib with rates ~ on 6/20    Plan:  Plan for anticoagulation and rate control as above

## 2025-06-22 NOTE — ASSESSMENT & PLAN NOTE
Lab Results   Component Value Date    HGB 8.9 (L) 06/22/2025    HGB 8.8 (L) 06/21/2025    HGB 8.1 (L) 06/21/2025    HGB 8.3 (L) 06/21/2025       Patient presented from living facility with complaints of low hemoglobin noted to be 5.2 on admission  Positive FOBT in ER with BUN elevation; S/p 2u prbc transfusion in ER with improvement to 7s-8s  EGD with no active bleeding, only a hiatal hernia and gastritis noted  Endorses continued melena  Hemoglobin down-trending but stable in 8s    Plan:  Continue to monitor hemoglobin q12h and transfuse as needed  IV protonix 40 mg daily  Discontinue Xarelto in the setting of severe pulmonary hypertension, preload dependence, and likely GI bleed  Considering risk versus benefit, recommend to hold on discharge, will discuss the same with family  Hold off on any further procedures, including colonoscopy, due to risk of decompensation

## 2025-06-22 NOTE — ASSESSMENT & PLAN NOTE
Wt Readings from Last 3 Encounters:   06/22/25 51.8 kg (114 lb 3.2 oz)   06/13/25 60 kg (132 lb 3.2 oz)   06/09/25 57.5 kg (126 lb 12.8 oz)     Patient noted to have increased weight gain since discharge earlier this month, received extra metolazone outpatient due to weight gain. Second HF admission within the month.  Prior echo 10/24 reviewed, had worsening valvular disease  Updated echo: No significant change compared to the previous study. Although there was no adequate tricuspid regurgitation jet to be able to calculate RV systolic pressure, indirect findings are once again consistent with severe pulmonary hypertension   Received IV bumex 3 grams in ED  No notable edema, pulmonary effusion or JVD/hepatojugular reflex present on exam to suggest fluid overload    Plan:  Heart failure protocol  2 g sodium restriction. Fluid restriction to 2L/day   Rate control and diuretics as above  Monitor intake and output

## 2025-06-22 NOTE — PROGRESS NOTES
Progress Note - Hospitalist   Name: Camryn Roblero 84 y.o. female I MRN: [id number]  Unit/Bed#: S -01 I Date of Admission: 6/13/2025   Date of Service: 6/22/2025 I Hospital Day: 9    Assessment & Plan  Shock (HCC)  History of severe pulmonary hypertension with systolic and diastolic flattening of the IV septum consistent with right ventricular pressure and volume overload; LA severe dilation; LVEF of 65%  P/w anemia with Hgb of 5.2, which improved to the 7-8s s/p 2 units pRBCs, which remained stable  FOBT+ on testing, with GI consultation  EGD performed on 6/18 (with reversal of DNR/DNI for the procedure) showing hiatal hernia and gastritis; prior to colonoscopy, patient decompensated and became hypotensive to 30s/20s and bradycardic; critical care consulted and A-line placed; she was intubated  6/18 admitted to the ICU with hypotension and bradycardia  Pressures dropped to the 80s/40s and she was initiated on a levophed gtt to maintain MAPs > 65  Per critical care evaluation, likely mixed etiology of shock due primarily to cardiogenic shock from severe pulmonary hypertension and anesthesia leading to RV failure and reduced CO  Other contributing, factors of distributive shock from anesthesia, and hypovolemic shock from volume loss from bleeding/bowel prep  6/19 she was weaned off levophed with stable BPs and extubated successfully; determined stable for MedSurg  6/21 BPs remained stable, though per telemetry, patient remains in atrial flutter    Plan:  PT/OT evaluation recommending level II resource intensity (moderate)  Continue metoprolol succinate 50 mg Q12h  If BPs remain stable, will consider increasing to metoprolol succinate 75 mg BID as recommended by cardiology  Continue PO bumex at reduced dose of 2 mg BID, with monitoring of response, if BP remains stable will increase it to home dose: 3 mg twice daily  Monitor hgb q12h and transfuse as needed for Hgb < 7  Continue to hold AC indefinitely due to high  bleeding risk, would recommend to hold on discharge, will discuss with family regarding same  Upper GI bleed  Acute blood loss anemia  Lab Results   Component Value Date    HGB 8.9 (L) 06/22/2025    HGB 8.8 (L) 06/21/2025    HGB 8.1 (L) 06/21/2025    HGB 8.3 (L) 06/21/2025       Patient presented from living facility with complaints of low hemoglobin noted to be 5.2 on admission  Positive FOBT in ER with BUN elevation; S/p 2u prbc transfusion in ER with improvement to 7s-8s  EGD with no active bleeding, only a hiatal hernia and gastritis noted  Endorses continued melena  Hemoglobin down-trending but stable in 8s    Plan:  Continue to monitor hemoglobin q12h and transfuse as needed  IV protonix 40 mg daily  Discontinue Xarelto in the setting of severe pulmonary hypertension, preload dependence, and likely GI bleed  Considering risk versus benefit, recommend to hold on discharge, will discuss the same with family  Hold off on any further procedures, including colonoscopy, due to risk of decompensation  Atrial flutter (HCC)  Noted to be in atrial flutter per telemetry with rates in the 80s-90s  Discussed high bleeding risk with daughter, and explained that we will not be resuming anticoagulation at this time    Plan:  Rate control as above  Paroxysmal atrial fibrillation (HCC)  Interval worsening on a.m. of 6/15 due to uncertainty etiology, patient did refuse CPAP at bedtime which may have led to pulmonary infiltrate worsening A-fib given one-time Lopressor 2.5 mg IV and assess for effect in addition to extra diuresis  Patient found to be in rate controlled A-fib with rates ~ on 6/20    Plan:  Plan for anticoagulation and rate control as above  Acute on chronic heart failure with preserved ejection fraction (HCC)  Wt Readings from Last 3 Encounters:   06/22/25 51.8 kg (114 lb 3.2 oz)   06/13/25 60 kg (132 lb 3.2 oz)   06/09/25 57.5 kg (126 lb 12.8 oz)     Patient noted to have increased weight gain since  discharge earlier this month, received extra metolazone outpatient due to weight gain. Second HF admission within the month.  Prior echo 10/24 reviewed, had worsening valvular disease  Updated echo: No significant change compared to the previous study. Although there was no adequate tricuspid regurgitation jet to be able to calculate RV systolic pressure, indirect findings are once again consistent with severe pulmonary hypertension   Received IV bumex 3 grams in ED  No notable edema, pulmonary effusion or JVD/hepatojugular reflex present on exam to suggest fluid overload    Plan:  Heart failure protocol  2 g sodium restriction. Fluid restriction to 2L/day   Rate control and diuretics as above  Monitor intake and output   Pulmonary hypertension (HCC)  Prior echo 10/24 reviewed, had worsening valvular disease  Updated echo w/o significant change; indirect findings are once again consistent with severe pulmonary hypertension     Plan:  Monitor intake and output and fluid status  Hold off on resuming diuretics due to tenuous volume status and preload dependency  Type 2 diabetes mellitus, without long-term current use of insulin (HCC)  Lab Results   Component Value Date    HGBA1C 6.5 04/15/2025       Recent Labs     06/21/25  1611 06/22/25  0020 06/22/25  0756 06/22/25  1158   POCGLU 216* 159* 152* 244*       Blood Sugar Average: Last 72 hrs:  (P) 160.6BG elevated to 382    SSI as needed, can adjust regimen based on BG  Chronic hypoxic respiratory failure, on home oxygen therapy  (HCC)  Continue 2 L oxygen therapy at all times (baseline)  COPD, severe (HCC)  Patient initially noted to be wheezing, since resolved after duoneb in ER; ALYSHA lobe wheezing heard on exam 6/20  Lower suspicion for acute exacerbation at this time, will defer steroids    Plan:  Continue with nebulizers as needed   Pulmicort BID  TOM (obstructive sleep apnea)  Continue cpap nightly w/ oxygen     VTE Pharmacologic Prophylaxis: VTE Score: 6 High Risk  (Score >/= 5) - Pharmacological DVT Prophylaxis Contraindicated. Sequential Compression Devices Ordered.    Mobility:   Basic Mobility Inpatient Raw Score: 11  -[facility] Goal: 4: Move to chair/commode  -HL Achieved: 4: Move to chair/commode  Not assessed by me    Patient Centered Rounds: I performed bedside rounds with nursing staff today.   Discussions with Specialists or Other Care Team Provider: None    Education and Discussions with Family / Patient: Updated  (daughter) via phone.    Current Length of Stay: 9 day(s)  Current Patient Status: Inpatient   Discharge Plan: Anticipate discharge in 48-72 hrs to rehab facility.    Code Status: Level 3 - DNAR and DNI    Subjective   I have evaluated patient at bedside, she was comfortably lying in her bed, reports no complaints, although she is still having loose bowel movements, not quite sure about if it is dark or not.  Her hemoglobin has been stable around 8.    Objective :  Temp:  [97.5 °F (36.4 °C)-98.4 °F (36.9 °C)] 97.5 °F (36.4 °C)  HR:  [68-72] 68  BP: (106-124)/(55-86) 124/71  Resp:  [20] 20  SpO2:  [97 %-100 %] 100 %  O2 Device: Nasal cannula  Nasal Cannula O2 Flow Rate (L/min):  [2 L/min] 2 L/min    Body mass index is 23.07 kg/m².     Input and Output Summary (last 24 hours):     Intake/Output Summary (Last 24 hours) at 6/22/2025 1311  Last data filed at 6/22/2025 0900  Gross per 24 hour   Intake 170 ml   Output --   Net 170 ml       Physical Exam  Vitals and nursing note reviewed.   Constitutional:       General: She is not in acute distress.     Appearance: She is well-developed.   HENT:      Head: Normocephalic and atraumatic.     Cardiovascular:      Rate and Rhythm: Normal rate and regular rhythm.      Heart sounds: No murmur heard.  Pulmonary:      Effort: Pulmonary effort is normal. No respiratory distress.      Breath sounds: Normal breath sounds. No wheezing or rhonchi.   Abdominal:      Palpations: Abdomen is soft.      Tenderness:  There is no abdominal tenderness. There is no guarding or rebound.     Musculoskeletal:         General: No swelling.      Cervical back: Neck supple.      Right lower leg: No edema.      Left lower leg: No edema.     Skin:     General: Skin is warm and dry.      Capillary Refill: Capillary refill takes less than 2 seconds.     Neurological:      Mental Status: She is alert.     Psychiatric:         Mood and Affect: Mood normal.           Lines/Drains:        Telemetry:  Telemetry Orders (From admission, onward)               24 Hour Telemetry Monitoring  Continuous x 24 Hours (Telem)        Expiring   Question:  Reason for 24 Hour Telemetry  Answer:  Decompensated CHF- and any one of the following: continuous diuretic infusion or total diuretic dose >200 mg daily, associated electrolyte derangement (I.e. K < 3.0), inotropic drip (continuous infusion), hx of ventricular arrhythmia, or new EF < 35%                     Telemetry Reviewed: A-fib/flutter heart rate averaging in 80s  Indication for Continued Telemetry Use: Arrthymias requiring medical therapy               Lab Results: I have reviewed the following results:   Results from last 7 days   Lab Units 06/22/25  0928 06/21/25  0849 06/21/25  0446 06/20/25  1205 06/20/25  0558   WBC Thousand/uL  --   --  6.46  --  5.75   HEMOGLOBIN g/dL 8.9*   < > 8.3*   < > 8.6*   HEMATOCRIT % 29.4*   < > 26.3*   < > 26.9*   PLATELETS Thousands/uL  --   --  191  --  197   SEGS PCT %  --   --   --   --  71   LYMPHO PCT %  --   --   --   --  18   MONO PCT %  --   --   --   --  10   EOS PCT %  --   --   --   --  1    < > = values in this interval not displayed.     Results from last 7 days   Lab Units 06/21/25  0446 06/19/25  0453 06/18/25  1435   SODIUM mmol/L 136   < > 133*   POTASSIUM mmol/L 3.5   < > 4.9   CHLORIDE mmol/L 99   < > 95*   CO2 mmol/L 34*   < > 23   BUN mg/dL 37*   < > 55*   CREATININE mg/dL 1.28   < > 1.54*   ANION GAP mmol/L 3*   < > 15*   CALCIUM mg/dL 8.4   <  > 8.8   ALBUMIN g/dL  --   --  2.8*   TOTAL BILIRUBIN mg/dL  --   --  0.63   ALK PHOS U/L  --   --  90   ALT U/L  --   --  27   AST U/L  --   --  89*   GLUCOSE RANDOM mg/dL 176*   < > 338*    < > = values in this interval not displayed.     Results from last 7 days   Lab Units 06/17/25  1035   INR  1.00     Results from last 7 days   Lab Units 06/22/25  1158 06/22/25  0756 06/22/25  0020 06/21/25  1611 06/21/25  1208 06/21/25  0612 06/21/25  0008 06/20/25  1743 06/20/25  1225 06/20/25  0601 06/20/25  0522 06/20/25  0041   POC GLUCOSE mg/dl 244* 152* 159* 216* 226* 152* 189* 148* 255* 125 117 212*         Results from last 7 days   Lab Units 06/18/25  1735 06/18/25  1435   LACTIC ACID mmol/L 2.1* 7.8*       Recent Cultures (last 7 days):         Imaging Results Review: No pertinent imaging studies reviewed.  Other Study Results Review: No additional pertinent studies reviewed.    Last 24 Hours Medication List:     Current Facility-Administered Medications:     acetaminophen (TYLENOL) tablet 650 mg, Q6H PRN    albuterol inhalation solution 2.5 mg, Q4H PRN    atorvastatin (LIPITOR) tablet 40 mg, Daily    budesonide (PULMICORT) inhalation solution 0.5 mg, Q12H    bumetanide (BUMEX) tablet 2 mg, BID (diuretic)    donepezil (ARICEPT) tablet 5 mg, HS    escitalopram (LEXAPRO) tablet 5 mg, Daily    fluticasone (FLONASE) 50 mcg/act nasal spray 1 spray, Daily    formoterol (PERFOROMIST) nebulizer solution 20 mcg, Q12H    guaiFENesin (MUCINEX) 12 hr tablet 600 mg, Q12H JOE    HYDROmorphone HCl (DILAUDID) injection 0.2 mg, Q2H PRN    insulin lispro (HumALOG/ADMELOG) 100 units/mL subcutaneous injection 1-5 Units, Q6H **AND** Fingerstick Glucose (POCT), Q6H    levalbuterol (XOPENEX) inhalation solution 0.63 mg, TID    magnesium Oxide (MAG-OX) tablet 400 mg, Daily    metoprolol (LOPRESSOR) injection 5 mg, Q6H PRN    metoprolol succinate (TOPROL-XL) 24 hr tablet 50 mg, BID    oxyCODONE (ROXICODONE) split tablet 2.5 mg, Q4H PRN  **OR** oxyCODONE (ROXICODONE) IR tablet 5 mg, Q4H PRN    pantoprazole (PROTONIX) injection 40 mg, Q24H JOE    Administrative Statements   Today, Patient Was Seen By: Red Yang MD      **Please Note: This note may have been constructed using a voice recognition system.**

## 2025-06-22 NOTE — ASSESSMENT & PLAN NOTE
Lab Results   Component Value Date    HGBA1C 6.5 04/15/2025       Recent Labs     06/21/25  1611 06/22/25  0020 06/22/25  0756 06/22/25  1158   POCGLU 216* 159* 152* 244*       Blood Sugar Average: Last 72 hrs:  (P) 160.6BG elevated to 382    SSI as needed, can adjust regimen based on BG

## 2025-06-22 NOTE — ASSESSMENT & PLAN NOTE
History of severe pulmonary hypertension with systolic and diastolic flattening of the IV septum consistent with right ventricular pressure and volume overload; LA severe dilation; LVEF of 65%  P/w anemia with Hgb of 5.2, which improved to the 7-8s s/p 2 units pRBCs, which remained stable  FOBT+ on testing, with GI consultation  EGD performed on 6/18 (with reversal of DNR/DNI for the procedure) showing hiatal hernia and gastritis; prior to colonoscopy, patient decompensated and became hypotensive to 30s/20s and bradycardic; critical care consulted and A-line placed; she was intubated  6/18 admitted to the ICU with hypotension and bradycardia  Pressures dropped to the 80s/40s and she was initiated on a levophed gtt to maintain MAPs > 65  Per critical care evaluation, likely mixed etiology of shock due primarily to cardiogenic shock from severe pulmonary hypertension and anesthesia leading to RV failure and reduced CO  Other contributing, factors of distributive shock from anesthesia, and hypovolemic shock from volume loss from bleeding/bowel prep  6/19 she was weaned off levophed with stable BPs and extubated successfully; determined stable for MedSurg  6/21 BPs remained stable, though per telemetry, patient remains in atrial flutter    Plan:  PT/OT evaluation recommending level II resource intensity (moderate)  Continue metoprolol succinate 50 mg Q12h  If BPs remain stable, will consider increasing to metoprolol succinate 75 mg BID as recommended by cardiology  Continue PO bumex at reduced dose of 2 mg BID, with monitoring of response, if BP remains stable will increase it to home dose: 3 mg twice daily  Monitor hgb q12h and transfuse as needed for Hgb < 7  Continue to hold AC indefinitely due to high bleeding risk, would recommend to hold on discharge, will discuss with family regarding same

## 2025-06-23 ENCOUNTER — PATIENT OUTREACH (OUTPATIENT)
Dept: CASE MANAGEMENT | Facility: OTHER | Age: 85
End: 2025-06-23

## 2025-06-23 LAB
ANION GAP SERPL CALCULATED.3IONS-SCNC: 1 MMOL/L (ref 4–13)
BUN SERPL-MCNC: 32 MG/DL (ref 5–25)
CALCIUM SERPL-MCNC: 8 MG/DL (ref 8.4–10.2)
CHLORIDE SERPL-SCNC: 100 MMOL/L (ref 96–108)
CO2 SERPL-SCNC: 34 MMOL/L (ref 21–32)
CREAT SERPL-MCNC: 1.18 MG/DL (ref 0.6–1.3)
GFR SERPL CREATININE-BSD FRML MDRD: 42 ML/MIN/1.73SQ M
GLUCOSE SERPL-MCNC: 126 MG/DL (ref 65–140)
GLUCOSE SERPL-MCNC: 143 MG/DL (ref 65–140)
GLUCOSE SERPL-MCNC: 156 MG/DL (ref 65–140)
GLUCOSE SERPL-MCNC: 223 MG/DL (ref 65–140)
GLUCOSE SERPL-MCNC: 238 MG/DL (ref 65–140)
HCT VFR BLD AUTO: 26.5 % (ref 34.8–46.1)
HGB BLD-MCNC: 8.2 G/DL (ref 11.5–15.4)
POTASSIUM SERPL-SCNC: 3.7 MMOL/L (ref 3.5–5.3)
SODIUM SERPL-SCNC: 135 MMOL/L (ref 135–147)

## 2025-06-23 PROCEDURE — 94640 AIRWAY INHALATION TREATMENT: CPT

## 2025-06-23 PROCEDURE — 88305 TISSUE EXAM BY PATHOLOGIST: CPT | Performed by: STUDENT IN AN ORGANIZED HEALTH CARE EDUCATION/TRAINING PROGRAM

## 2025-06-23 PROCEDURE — 99232 SBSQ HOSP IP/OBS MODERATE 35: CPT | Performed by: INTERNAL MEDICINE

## 2025-06-23 PROCEDURE — 94760 N-INVAS EAR/PLS OXIMETRY 1: CPT

## 2025-06-23 PROCEDURE — 94668 MNPJ CHEST WALL SBSQ: CPT

## 2025-06-23 PROCEDURE — 94664 DEMO&/EVAL PT USE INHALER: CPT

## 2025-06-23 PROCEDURE — 85018 HEMOGLOBIN: CPT | Performed by: INTERNAL MEDICINE

## 2025-06-23 PROCEDURE — 82948 REAGENT STRIP/BLOOD GLUCOSE: CPT

## 2025-06-23 PROCEDURE — 80048 BASIC METABOLIC PNL TOTAL CA: CPT | Performed by: INTERNAL MEDICINE

## 2025-06-23 PROCEDURE — 85014 HEMATOCRIT: CPT | Performed by: INTERNAL MEDICINE

## 2025-06-23 RX ORDER — INSULIN GLARGINE 100 [IU]/ML
3 INJECTION, SOLUTION SUBCUTANEOUS EVERY MORNING
Status: DISCONTINUED | OUTPATIENT
Start: 2025-06-23 | End: 2025-06-24 | Stop reason: HOSPADM

## 2025-06-23 RX ORDER — METOPROLOL SUCCINATE 50 MG/1
50 TABLET, EXTENDED RELEASE ORAL 2 TIMES DAILY
Status: DISCONTINUED | OUTPATIENT
Start: 2025-06-23 | End: 2025-06-24 | Stop reason: HOSPADM

## 2025-06-23 RX ADMIN — BUDESONIDE 0.5 MG: 0.5 INHALANT RESPIRATORY (INHALATION) at 19:52

## 2025-06-23 RX ADMIN — LEVALBUTEROL HYDROCHLORIDE 0.63 MG: 0.63 SOLUTION RESPIRATORY (INHALATION) at 13:33

## 2025-06-23 RX ADMIN — DONEPEZIL HYDROCHLORIDE 5 MG: 5 TABLET ORAL at 22:22

## 2025-06-23 RX ADMIN — FORMOTEROL FUMARATE DIHYDRATE 20 MCG: 20 SOLUTION RESPIRATORY (INHALATION) at 07:23

## 2025-06-23 RX ADMIN — INSULIN GLARGINE 3 UNITS: 100 INJECTION, SOLUTION SUBCUTANEOUS at 14:26

## 2025-06-23 RX ADMIN — BUMETANIDE 2 MG: 1 TABLET ORAL at 17:05

## 2025-06-23 RX ADMIN — ESCITALOPRAM OXALATE 5 MG: 10 TABLET ORAL at 09:22

## 2025-06-23 RX ADMIN — ATORVASTATIN CALCIUM 40 MG: 40 TABLET, FILM COATED ORAL at 09:22

## 2025-06-23 RX ADMIN — GUAIFENESIN 600 MG: 600 TABLET ORAL at 09:21

## 2025-06-23 RX ADMIN — GUAIFENESIN 600 MG: 600 TABLET ORAL at 22:22

## 2025-06-23 RX ADMIN — FORMOTEROL FUMARATE DIHYDRATE 20 MCG: 20 SOLUTION RESPIRATORY (INHALATION) at 19:52

## 2025-06-23 RX ADMIN — LEVALBUTEROL HYDROCHLORIDE 0.63 MG: 0.63 SOLUTION RESPIRATORY (INHALATION) at 19:52

## 2025-06-23 RX ADMIN — PANTOPRAZOLE SODIUM 40 MG: 40 INJECTION, POWDER, LYOPHILIZED, FOR SOLUTION INTRAVENOUS at 09:23

## 2025-06-23 RX ADMIN — METOPROLOL SUCCINATE 50 MG: 50 TABLET, EXTENDED RELEASE ORAL at 17:05

## 2025-06-23 RX ADMIN — BUMETANIDE 2 MG: 1 TABLET ORAL at 09:21

## 2025-06-23 RX ADMIN — LEVALBUTEROL HYDROCHLORIDE 0.63 MG: 0.63 SOLUTION RESPIRATORY (INHALATION) at 07:23

## 2025-06-23 RX ADMIN — INSULIN LISPRO 2 UNITS: 100 INJECTION, SOLUTION INTRAVENOUS; SUBCUTANEOUS at 17:05

## 2025-06-23 RX ADMIN — INSULIN LISPRO 2 UNITS: 100 INJECTION, SOLUTION INTRAVENOUS; SUBCUTANEOUS at 11:56

## 2025-06-23 RX ADMIN — ACETAMINOPHEN 650 MG: 325 TABLET ORAL at 22:22

## 2025-06-23 RX ADMIN — Medication 400 MG: at 09:22

## 2025-06-23 RX ADMIN — BUDESONIDE 0.5 MG: 0.5 INHALANT RESPIRATORY (INHALATION) at 07:23

## 2025-06-23 NOTE — ASSESSMENT & PLAN NOTE
History of severe pulmonary hypertension with systolic and diastolic flattening of the IV septum consistent with right ventricular pressure and volume overload; LA severe dilation; LVEF of 65%  P/w anemia with Hgb of 5.2, which improved to the 7-8s s/p 2 units pRBCs, which remained stable  FOBT+ on testing, with GI consultation  EGD performed on 6/18 (with reversal of DNR/DNI for the procedure) showing hiatal hernia and gastritis; prior to colonoscopy, patient decompensated and became hypotensive to 30s/20s and bradycardic; critical care consulted and A-line placed; she was intubated  6/18 admitted to the ICU with hypotension and bradycardia  Pressures dropped to the 80s/40s and she was initiated on a levophed gtt to maintain MAPs > 65  Per critical care evaluation, likely mixed etiology of shock due primarily to cardiogenic shock from severe pulmonary hypertension and anesthesia leading to RV failure and reduced CO  Other contributing, factors of distributive shock from anesthesia, and hypovolemic shock from volume loss from bleeding/bowel prep  6/19 she was weaned off levophed with stable BPs and extubated successfully; determined stable for MedSurg  6/21 BPs remained stable, though per telemetry, patient remains in atrial flutter  Hb have remained stable ~8.1-8.9 from 6/20-6/23 but have generally trended downwards from 6/22-6/23 from 8.9->8.6->8.2  Had a bowel movement 6/23 which was noted to be loose/watery and green with no evidence of melena or brown fecal matter  On the afternoon of 6/22 she had a blood pressure of 95/58 and was unable to start her Toprol XL as prescribed but it was found to be normal on two subsequent readings and was started as prescribed.    Plan:  PT/OT evaluation recommending level II resource intensity (moderate)  Continue metoprolol succinate 50 mg Q12h  If BPs remain stable, will consider increasing to metoprolol succinate 75 mg BID as recommended by cardiology if clinically  indicated  Continue PO bumex at reduced dose of 2 mg BID, with monitoring of response, consider increasing to home dose of 3 mg BID if BP remains stable and is clinically indicated  2L fluid restriction diet  Monitor Hgb q12h and transfuse as needed for Hgb < 7  Continue to hold AC indefinitely due to high bleeding risk, would recommend to hold on discharge but can be adjusted with further discussion, monitoring Hb trend, and family concerns

## 2025-06-23 NOTE — PLAN OF CARE
Problem: Nutrition/Hydration-ADULT  Goal: Nutrient/Hydration intake appropriate for improving, restoring or maintaining nutritional needs  Description: Monitor and assess patient's nutrition/hydration status for malnutrition. Collaborate with interdisciplinary team and initiate plan and interventions as ordered.  Monitor patient's weight and dietary intake as ordered or per policy. Utilize nutrition screening tool and intervene as necessary. Determine patient's food preferences and provide high-protein, high-caloric foods as appropriate.     INTERVENTIONS:  - Monitor oral intake, urinary output, labs, and treatment plans  - Assess nutrition and hydration status and recommend course of action  - Evaluate amount of meals eaten  - Assist patient with eating if necessary   - Allow adequate time for meals  - Recommend/ encourage appropriate diets, oral nutritional supplements, and vitamin/mineral supplements  - Order, calculate, and assess calorie counts as needed  - Recommend, monitor, and adjust tube feedings and TPN/PPN based on assessed needs  - Assess need for intravenous fluids  - Provide specific nutrition/hydration education as appropriate  - Include patient/family/caregiver in decisions related to nutrition  Outcome: Progressing      No significant past surgical history

## 2025-06-23 NOTE — ASSESSMENT & PLAN NOTE
Lab Results   Component Value Date    HGBA1C 6.5 04/15/2025       Recent Labs     06/22/25  1530 06/22/25  2111 06/23/25  0740 06/23/25  1113   POCGLU 235* 188* 143* 223*       Blood Sugar Average: Last 72 hrs:  (P) 186.5BG elevated to 382    Given frequently elevated blood sugars >180, Start 3 units of lantus and continue SSI as needed, can adjust regimen based on BG readings

## 2025-06-23 NOTE — PROGRESS NOTES
Progress Note - Hospitalist   Name: Camryn Roblero 84 y.o. female I MRN: [id number]  Unit/Bed#: S -01 I Date of Admission: 6/13/2025   Date of Service: 6/23/2025 I Hospital Day: 10    Assessment & Plan  Shock (HCC)  History of severe pulmonary hypertension with systolic and diastolic flattening of the IV septum consistent with right ventricular pressure and volume overload; LA severe dilation; LVEF of 65%  P/w anemia with Hgb of 5.2, which improved to the 7-8s s/p 2 units pRBCs, which remained stable  FOBT+ on testing, with GI consultation  EGD performed on 6/18 (with reversal of DNR/DNI for the procedure) showing hiatal hernia and gastritis; prior to colonoscopy, patient decompensated and became hypotensive to 30s/20s and bradycardic; critical care consulted and A-line placed; she was intubated  6/18 admitted to the ICU with hypotension and bradycardia  Pressures dropped to the 80s/40s and she was initiated on a levophed gtt to maintain MAPs > 65  Per critical care evaluation, likely mixed etiology of shock due primarily to cardiogenic shock from severe pulmonary hypertension and anesthesia leading to RV failure and reduced CO  Other contributing, factors of distributive shock from anesthesia, and hypovolemic shock from volume loss from bleeding/bowel prep  6/19 she was weaned off levophed with stable BPs and extubated successfully; determined stable for MedSurg  6/21 BPs remained stable, though per telemetry, patient remains in atrial flutter  Hb have remained stable ~8.1-8.9 from 6/20-6/23 but have generally trended downwards from 6/22-6/23 from 8.9->8.6->8.2  Had a bowel movement 6/23 which was noted to be loose/watery and green with no evidence of melena or brown fecal matter  On the afternoon of 6/22 she had a blood pressure of 95/58 and was unable to start her Toprol XL as prescribed but it was found to be normal on two subsequent readings and was started as prescribed.    Plan:  PT/OT evaluation  recommending level II resource intensity (moderate)  Continue metoprolol succinate 50 mg Q12h  If BPs remain stable, will consider increasing to metoprolol succinate 75 mg BID as recommended by cardiology if clinically indicated  Continue PO bumex at reduced dose of 2 mg BID, with monitoring of response, consider increasing to home dose of 3 mg BID if BP remains stable and is clinically indicated  2L fluid restriction diet  Monitor Hgb q12h and transfuse as needed for Hgb < 7  Continue to hold AC indefinitely due to high bleeding risk, would recommend to hold on discharge but can be adjusted with further discussion, monitoring Hb trend, and family concerns  Upper GI bleed  Acute blood loss anemia  Lab Results   Component Value Date    HGB 8.2 (L) 06/23/2025    HGB 8.6 (L) 06/22/2025    HGB 8.9 (L) 06/22/2025    HGB 8.8 (L) 06/21/2025       Patient presented from living facility with complaints of low hemoglobin noted to be 5.2 on admission  Positive FOBT in ER with BUN elevation; S/p 2u prbc transfusion in ER with improvement to 7s-8s  EGD with no active bleeding, only a hiatal hernia and gastritis noted  Endorses continued melena  Hemoglobin down-trending but stable in 8s    Plan:  Continue to monitor hemoglobin q12h and transfuse as needed  IV protonix 40 mg daily  Discontinue Xarelto in the setting of severe pulmonary hypertension, preload dependence, and likely GI bleed; continue to trend Hb for possibility of change in plans  Considering risk versus benefit, recommend to hold on discharge, will discuss the same with family  Hold off on any further procedures, including colonoscopy, due to risk of decompensation  Atrial flutter (HCC)  Noted to be in atrial flutter per telemetry from 6/20-6/23 with rates in the 80s-90s  Discussed high bleeding risk with daughter, and explained that we will not be resuming anticoagulation at this time    Plan:  Rate control as above  Paroxysmal atrial fibrillation (HCC)  Interval  worsening on a.m. of 6/15 due to uncertainty etiology, patient did refuse CPAP at bedtime which may have led to pulmonary infiltrate worsening A-fib given one-time Lopressor 2.5 mg IV and assess for effect in addition to extra diuresis  Patient found to be in rate controlled A-fib with rates ~ on 6/20    Plan:  Plan for anticoagulation and rate control as above  Acute on chronic heart failure with preserved ejection fraction (HCC)  Wt Readings from Last 3 Encounters:   06/22/25 51.8 kg (114 lb 3.2 oz)   06/13/25 60 kg (132 lb 3.2 oz)   06/09/25 57.5 kg (126 lb 12.8 oz)     Patient noted to have increased weight gain since discharge earlier this month, received extra metolazone outpatient due to weight gain. Second HF admission within the month.  Prior echo 10/24 reviewed, had worsening valvular disease  Updated echo: No significant change compared to the previous study. Although there was no adequate tricuspid regurgitation jet to be able to calculate RV systolic pressure, indirect findings are once again consistent with severe pulmonary hypertension   Received IV bumex 3 grams in ED  No notable edema, pulmonary effusion or JVD/hepatojugular reflex present on exam to suggest fluid overload    Plan:  Heart failure protocol  2 g sodium restriction. Fluid restriction to 2L/day   Rate control and diuretics as above  Monitor intake and output   Pulmonary hypertension (HCC)  Prior echo 10/24 reviewed, had worsening valvular disease  Updated echo w/o significant change; indirect findings are once again consistent with severe pulmonary hypertension     Plan:  Monitor intake and output and fluid status  Hold off on resuming diuretics due to tenuous volume status and preload dependency  Type 2 diabetes mellitus, without long-term current use of insulin (HCC)  Lab Results   Component Value Date    HGBA1C 6.5 04/15/2025       Recent Labs     06/22/25  1530 06/22/25  2111 06/23/25  0740 06/23/25  1113   POCGLU 235* 188*  143* 223*       Blood Sugar Average: Last 72 hrs:  (P) 186.5BG elevated to 382    Given frequently elevated blood sugars >180, Start 3 units of lantus and continue SSI as needed, can adjust regimen based on BG readings  Chronic hypoxic respiratory failure, on home oxygen therapy  (HCC)  Continue 2 L oxygen therapy at all times (baseline)  COPD, severe (HCC)  Patient initially noted to be wheezing, since resolved after duoneb in ER; ALYSHA lobe wheezing heard on exam 6/20  Lower suspicion for acute exacerbation at this time, will defer steroids    Plan:  Continue with nebulizers as needed   Pulmicort BID  TOM (obstructive sleep apnea)  Continue cpap nightly w/ oxygen     VTE Pharmacologic Prophylaxis: VTE Score: 6 High Risk (Score >/= 5) - Pharmacological DVT Prophylaxis Contraindicated. Sequential Compression Devices Ordered.    Mobility:   Basic Mobility Inpatient Raw Score: 11  JH-HLM Goal: 4: Move to chair/commode  JH-HLM Achieved: 4: Move to chair/commode  JH-HLM Goal achieved. Continue to encourage appropriate mobility.    Patient Centered Rounds: I performed bedside rounds with nursing staff today.   Discussions with Specialists or Other Care Team Provider: none    Education and Discussions with Family / Patient: Will update.     Current Length of Stay: 10 day(s)  Current Patient Status: Inpatient   Certification Statement: The patient will continue to require additional inpatient hospital stay due to Hb trending and medication adjustments  Discharge Plan: Anticipate discharge in 48 hrs to rehab facility.    Code Status: Level 3 - DNAR and DNI    Subjective   Patient was seen and evaluated at bedside today with no acute events overnight. She notes feeling relatively well today with no notable change over the weekend. She still endorses a cough but feels it has gotten better since she started the mucinex a few days ago. She denies any chest pain, palpitations, SOB, constipation, nausea, vomiting, headache or  weakness although she notes she hasn't been very mobile, only moving from her bed to the chair and back. The nurse noted a bowel movement which they described as green and watery with no evidence of melena or brown fecal matter.    Objective :  Temp:  [97.6 °F (36.4 °C)-98.2 °F (36.8 °C)] 97.6 °F (36.4 °C)  HR:  [63-81] 71  BP: ()/(46-64) 113/56  Resp:  [18] 18  SpO2:  [85 %-100 %] 99 %  O2 Device: Nasal cannula  Nasal Cannula O2 Flow Rate (L/min):  [4 L/min] 4 L/min    Body mass index is 23.07 kg/m².     Input and Output Summary (last 24 hours):     Intake/Output Summary (Last 24 hours) at 6/23/2025 1513  Last data filed at 6/22/2025 1856  Gross per 24 hour   Intake 220 ml   Output --   Net 220 ml       Physical Exam  Constitutional:       Appearance: Normal appearance. She is normal weight.   HENT:      Head: Normocephalic and atraumatic.      Mouth/Throat:      Mouth: Mucous membranes are moist.     Eyes:      Pupils: Pupils are equal, round, and reactive to light.      Comments: Pale mucous membranes     Cardiovascular:      Rate and Rhythm: Normal rate. Rhythm irregular.      Comments: A-Fib noted on telemetry, found to be A-flutter on intepretation  Pulmonary:      Effort: Pulmonary effort is normal.      Breath sounds: Examination of the left-lower field reveals rales. Rales present.   Abdominal:      General: Abdomen is flat.      Palpations: Abdomen is soft.     Skin:     General: Skin is warm and dry.      Capillary Refill: Capillary refill takes less than 2 seconds.     Neurological:      General: No focal deficit present.      Mental Status: She is alert and oriented to person, place, and time.     Psychiatric:         Mood and Affect: Mood normal.         Behavior: Behavior normal.         Thought Content: Thought content normal.         Judgment: Judgment normal.               Lab Results: I have reviewed the following results:   Results from last 7 days   Lab Units 06/23/25  0357 06/21/25  0849  06/21/25  0446 06/20/25  1205 06/20/25  0558   WBC Thousand/uL  --   --  6.46  --  5.75   HEMOGLOBIN g/dL 8.2*   < > 8.3*   < > 8.6*   HEMATOCRIT % 26.5*   < > 26.3*   < > 26.9*   PLATELETS Thousands/uL  --   --  191  --  197   SEGS PCT %  --   --   --   --  71   LYMPHO PCT %  --   --   --   --  18   MONO PCT %  --   --   --   --  10   EOS PCT %  --   --   --   --  1    < > = values in this interval not displayed.     Results from last 7 days   Lab Units 06/23/25  0357 06/19/25  0453 06/18/25  1435   SODIUM mmol/L 135   < > 133*   POTASSIUM mmol/L 3.7   < > 4.9   CHLORIDE mmol/L 100   < > 95*   CO2 mmol/L 34*   < > 23   BUN mg/dL 32*   < > 55*   CREATININE mg/dL 1.18   < > 1.54*   ANION GAP mmol/L 1*   < > 15*   CALCIUM mg/dL 8.0*   < > 8.8   ALBUMIN g/dL  --   --  2.8*   TOTAL BILIRUBIN mg/dL  --   --  0.63   ALK PHOS U/L  --   --  90   ALT U/L  --   --  27   AST U/L  --   --  89*   GLUCOSE RANDOM mg/dL 156*   < > 338*    < > = values in this interval not displayed.     Results from last 7 days   Lab Units 06/17/25  1035   INR  1.00     Results from last 7 days   Lab Units 06/23/25  1113 06/23/25  0740 06/22/25  2111 06/22/25  1530 06/22/25  1158 06/22/25  0756 06/22/25  0020 06/21/25  1611 06/21/25  1208 06/21/25  0612 06/21/25  0008 06/20/25  1743   POC GLUCOSE mg/dl 223* 143* 188* 235* 244* 152* 159* 216* 226* 152* 189* 148*         Results from last 7 days   Lab Units 06/18/25  1735 06/18/25  1435   LACTIC ACID mmol/L 2.1* 7.8*       Recent Cultures (last 7 days):         Imaging Results Review: I reviewed radiology reports from this admission including: xray(s).  Other Study Results Review: EKG was reviewed.     Last 24 Hours Medication List:     Current Facility-Administered Medications:     acetaminophen (TYLENOL) tablet 650 mg, Q6H PRN    albuterol inhalation solution 2.5 mg, Q4H PRN    atorvastatin (LIPITOR) tablet 40 mg, Daily    budesonide (PULMICORT) inhalation solution 0.5 mg, Q12H    bumetanide  (BUMEX) tablet 2 mg, BID (diuretic)    donepezil (ARICEPT) tablet 5 mg, HS    escitalopram (LEXAPRO) tablet 5 mg, Daily    fluticasone (FLONASE) 50 mcg/act nasal spray 1 spray, Daily    formoterol (PERFOROMIST) nebulizer solution 20 mcg, Q12H    guaiFENesin (MUCINEX) 12 hr tablet 600 mg, Q12H JOE    HYDROmorphone HCl (DILAUDID) injection 0.2 mg, Q2H PRN    insulin glargine (LANTUS) subcutaneous injection 3 Units 0.03 mL, QAM    insulin lispro (HumALOG/ADMELOG) 100 units/mL subcutaneous injection 1-5 Units, 4x Daily (AC & HS) **AND** Fingerstick Glucose (POCT), 4x Daily AC and at bedtime    levalbuterol (XOPENEX) inhalation solution 0.63 mg, TID    magnesium Oxide (MAG-OX) tablet 400 mg, Daily    metoprolol (LOPRESSOR) injection 5 mg, Q6H PRN    metoprolol succinate (TOPROL-XL) 24 hr tablet 50 mg, BID    oxyCODONE (ROXICODONE) split tablet 2.5 mg, Q4H PRN **OR** oxyCODONE (ROXICODONE) IR tablet 5 mg, Q4H PRN    pantoprazole (PROTONIX) injection 40 mg, Q24H JOE    Administrative Statements   Today, Patient Was Seen By: Parth Claire MS and Hamilton Nettles MD       **Please Note: This note may have been constructed using a voice recognition system.**

## 2025-06-23 NOTE — PROGRESS NOTES
Update obtained from PCC the patient admitted 6/13/25 to [facility]. I have removed myself from the care team, updated the Care Coordination note, and closed the Care Transitions program.

## 2025-06-23 NOTE — ASSESSMENT & PLAN NOTE
Noted to be in atrial flutter per telemetry from 6/20-6/23 with rates in the 80s-90s  Discussed high bleeding risk with daughter, and explained that we will not be resuming anticoagulation at this time    Plan:  Rate control as above

## 2025-06-23 NOTE — ASSESSMENT & PLAN NOTE
Lab Results   Component Value Date    HGB 8.2 (L) 06/23/2025    HGB 8.6 (L) 06/22/2025    HGB 8.9 (L) 06/22/2025    HGB 8.8 (L) 06/21/2025       Patient presented from living facility with complaints of low hemoglobin noted to be 5.2 on admission  Positive FOBT in ER with BUN elevation; S/p 2u prbc transfusion in ER with improvement to 7s-8s  EGD with no active bleeding, only a hiatal hernia and gastritis noted  Endorses continued melena  Hemoglobin down-trending but stable in 8s    Plan:  Continue to monitor hemoglobin q12h and transfuse as needed  IV protonix 40 mg daily  Discontinue Xarelto in the setting of severe pulmonary hypertension, preload dependence, and likely GI bleed; continue to trend Hb for possibility of change in plans  Considering risk versus benefit, recommend to hold on discharge, will discuss the same with family  Hold off on any further procedures, including colonoscopy, due to risk of decompensation

## 2025-06-24 VITALS
HEIGHT: 59 IN | HEART RATE: 74 BPM | BODY MASS INDEX: 23.02 KG/M2 | WEIGHT: 114.2 LBS | OXYGEN SATURATION: 90 % | SYSTOLIC BLOOD PRESSURE: 104 MMHG | DIASTOLIC BLOOD PRESSURE: 58 MMHG | RESPIRATION RATE: 16 BRPM | TEMPERATURE: 98.5 F

## 2025-06-24 LAB
ANION GAP SERPL CALCULATED.3IONS-SCNC: 2 MMOL/L (ref 4–13)
BUN SERPL-MCNC: 31 MG/DL (ref 5–25)
CALCIUM SERPL-MCNC: 8.2 MG/DL (ref 8.4–10.2)
CHLORIDE SERPL-SCNC: 99 MMOL/L (ref 96–108)
CO2 SERPL-SCNC: 36 MMOL/L (ref 21–32)
CREAT SERPL-MCNC: 1.17 MG/DL (ref 0.6–1.3)
ERYTHROCYTE [DISTWIDTH] IN BLOOD BY AUTOMATED COUNT: 15.7 % (ref 11.6–15.1)
GFR SERPL CREATININE-BSD FRML MDRD: 43 ML/MIN/1.73SQ M
GLUCOSE SERPL-MCNC: 105 MG/DL (ref 65–140)
GLUCOSE SERPL-MCNC: 229 MG/DL (ref 65–140)
GLUCOSE SERPL-MCNC: 97 MG/DL (ref 65–140)
HCT VFR BLD AUTO: 26.7 % (ref 34.8–46.1)
HGB BLD-MCNC: 8.3 G/DL (ref 11.5–15.4)
MCH RBC QN AUTO: 31.3 PG (ref 26.8–34.3)
MCHC RBC AUTO-ENTMCNC: 31.1 G/DL (ref 31.4–37.4)
MCV RBC AUTO: 101 FL (ref 82–98)
PLATELET # BLD AUTO: 214 THOUSANDS/UL (ref 149–390)
PMV BLD AUTO: 10 FL (ref 8.9–12.7)
POTASSIUM SERPL-SCNC: 4.2 MMOL/L (ref 3.5–5.3)
RBC # BLD AUTO: 2.65 MILLION/UL (ref 3.81–5.12)
SODIUM SERPL-SCNC: 137 MMOL/L (ref 135–147)
WBC # BLD AUTO: 6.01 THOUSAND/UL (ref 4.31–10.16)

## 2025-06-24 PROCEDURE — 99239 HOSP IP/OBS DSCHRG MGMT >30: CPT | Performed by: INTERNAL MEDICINE

## 2025-06-24 PROCEDURE — 94760 N-INVAS EAR/PLS OXIMETRY 1: CPT

## 2025-06-24 PROCEDURE — 94640 AIRWAY INHALATION TREATMENT: CPT

## 2025-06-24 PROCEDURE — 85027 COMPLETE CBC AUTOMATED: CPT

## 2025-06-24 PROCEDURE — 80048 BASIC METABOLIC PNL TOTAL CA: CPT | Performed by: INTERNAL MEDICINE

## 2025-06-24 PROCEDURE — 82948 REAGENT STRIP/BLOOD GLUCOSE: CPT

## 2025-06-24 RX ORDER — PANTOPRAZOLE SODIUM 40 MG/1
40 TABLET, DELAYED RELEASE ORAL DAILY
Start: 2025-06-24

## 2025-06-24 RX ORDER — BUMETANIDE 1 MG/1
2 TABLET ORAL 2 TIMES DAILY
Start: 2025-06-24

## 2025-06-24 RX ORDER — GUAIFENESIN 600 MG/1
600 TABLET, EXTENDED RELEASE ORAL EVERY 12 HOURS PRN
Start: 2025-06-24

## 2025-06-24 RX ORDER — INSULIN LISPRO 100 [IU]/ML
1-5 INJECTION, SOLUTION INTRAVENOUS; SUBCUTANEOUS
Status: DISCONTINUED | OUTPATIENT
Start: 2025-06-24 | End: 2025-06-24 | Stop reason: HOSPADM

## 2025-06-24 RX ADMIN — ESCITALOPRAM OXALATE 5 MG: 10 TABLET ORAL at 10:07

## 2025-06-24 RX ADMIN — INSULIN GLARGINE 3 UNITS: 100 INJECTION, SOLUTION SUBCUTANEOUS at 10:07

## 2025-06-24 RX ADMIN — PANTOPRAZOLE SODIUM 40 MG: 40 INJECTION, POWDER, LYOPHILIZED, FOR SOLUTION INTRAVENOUS at 10:14

## 2025-06-24 RX ADMIN — Medication 400 MG: at 10:07

## 2025-06-24 RX ADMIN — LEVALBUTEROL HYDROCHLORIDE 0.63 MG: 0.63 SOLUTION RESPIRATORY (INHALATION) at 13:57

## 2025-06-24 RX ADMIN — BUMETANIDE 2 MG: 1 TABLET ORAL at 10:07

## 2025-06-24 RX ADMIN — ATORVASTATIN CALCIUM 40 MG: 40 TABLET, FILM COATED ORAL at 10:07

## 2025-06-24 RX ADMIN — FLUTICASONE PROPIONATE 1 SPRAY: 50 SPRAY, METERED NASAL at 11:02

## 2025-06-24 RX ADMIN — LEVALBUTEROL HYDROCHLORIDE 0.63 MG: 0.63 SOLUTION RESPIRATORY (INHALATION) at 07:06

## 2025-06-24 RX ADMIN — METOPROLOL SUCCINATE 50 MG: 50 TABLET, EXTENDED RELEASE ORAL at 10:07

## 2025-06-24 RX ADMIN — BUDESONIDE 0.5 MG: 0.5 INHALANT RESPIRATORY (INHALATION) at 07:06

## 2025-06-24 RX ADMIN — FORMOTEROL FUMARATE DIHYDRATE 20 MCG: 20 SOLUTION RESPIRATORY (INHALATION) at 07:06

## 2025-06-24 RX ADMIN — INSULIN LISPRO 2 UNITS: 100 INJECTION, SOLUTION INTRAVENOUS; SUBCUTANEOUS at 12:39

## 2025-06-24 RX ADMIN — GUAIFENESIN 600 MG: 600 TABLET ORAL at 10:07

## 2025-06-24 NOTE — ASSESSMENT & PLAN NOTE
Prior echo 10/24 reviewed, had worsening valvular disease  Updated echo w/o significant change; indirect findings are once again consistent with severe pulmonary hypertension     Plan:  Monitor intake/output and fluid status  Continue Bumex 2 mg daily; follow up with PCP and/or cardiology within 1 week for possibility of increasing dose to 3 mg per clinical judgement   [de-identified] : Discussed the results of the patient's history, physical exam, and imaging with patient and daughter (daughter translating for patient). MRI thoracic shows T10 compression fracture now healed, no acute fractures. Patient has already had a course of physical therapy. I am recommending an evaluation with pain management, referral to Dr. Mays given. The patient will follow up with me in about 3-4 months, sooner if there is an issue. XR full spine at that time. All questions answered.

## 2025-06-24 NOTE — ASSESSMENT & PLAN NOTE
Interval worsening on a.m. of 6/15 due to uncertainty etiology, patient did refuse CPAP at bedtime which may have led to pulmonary infiltrate worsening A-fib given one-time Lopressor 2.5 mg IV and assess for effect in addition to extra diuresis  Patient found to be in rate controlled A-fib with rates ~ on 6/20    Plan:  Plan rate control as above

## 2025-06-24 NOTE — ASSESSMENT & PLAN NOTE
History of severe pulmonary hypertension with systolic and diastolic flattening of the IV septum consistent with right ventricular pressure and volume overload; LA severe dilation; LVEF of 65%  P/w anemia with Hgb of 5.2, which improved to the 7-8s s/p 2 units pRBCs, which remained stable  FOBT+ on testing, with GI consultation  EGD performed on 6/18 (with reversal of DNR/DNI for the procedure) showing hiatal hernia and gastritis; prior to colonoscopy, patient decompensated and became hypotensive to 30s/20s and bradycardic; critical care consulted and A-line placed; she was intubated  6/18 admitted to the ICU with hypotension and bradycardia  Pressures dropped to the 80s/40s and she was initiated on a levophed gtt to maintain MAPs > 65  Per critical care evaluation, likely mixed etiology of shock due primarily to cardiogenic shock from severe pulmonary hypertension and anesthesia leading to RV failure and reduced CO  Other contributing, factors of distributive shock from anesthesia, and hypovolemic shock from volume loss from bleeding/bowel prep  6/19 she was weaned off levophed with stable BPs and extubated successfully; determined stable for MedSurg  6/21 BPs remained stable, though per telemetry, patient remains in atrial flutter  Hb have remained stable ~8.1-8.9 from 6/20-6/23 but have generally trended downwards from 6/22-6/23 from 8.9->8.6->8.2  Had a bowel movement 6/23 which was noted to be loose/watery and green with no evidence of melena or brown fecal matter  On the afternoon of 6/22 she had a blood pressure of 95/58 and was unable to start her Toprol XL as prescribed but it was found to be normal on two subsequent readings and was started as prescribed.    Plan:  PT/OT evaluation recommending level II resource intensity (moderate); Discharge to rehab facility confirmed  Continue metoprolol succinate 50 mg Q12h  Continue PO bumex at reduced dose of 2 mg BID  Continue to hold AC indefinitely due to high  bleeding risk as per internal medicine and GI teams but can be adjusted with further discussion with cardiology, and PCP outpatient   Follow-up with cardiology outpatient  Follow-up with gastroenterology outpatient

## 2025-06-24 NOTE — ASSESSMENT & PLAN NOTE
Wt Readings from Last 3 Encounters:   06/22/25 51.8 kg (114 lb 3.2 oz)   06/13/25 60 kg (132 lb 3.2 oz)   06/09/25 57.5 kg (126 lb 12.8 oz)     Patient noted to have increased weight gain since discharge earlier this month, received extra metolazone outpatient due to weight gain. Second HF admission within the month.  Prior echo 10/24 reviewed, had worsening valvular disease  Updated echo: No significant change compared to the previous study. Although there was no adequate tricuspid regurgitation jet to be able to calculate RV systolic pressure, indirect findings are once again consistent with severe pulmonary hypertension   Received IV bumex 3 grams in ED  No notable edema, pulmonary effusion or JVD/hepatojugular reflex present on exam to suggest fluid overload    Plan:  Rate control and diuretics as above

## 2025-06-24 NOTE — CASE MANAGEMENT
Case Management Discharge Planning Note    Patient name Camryn Roblero  Location S /S -01 MRN [id number]  : 1940 Date 2025       Current Admission Date: 2025  Current Admission Diagnosis:Shock (HCC)   Patient Active Problem List    Diagnosis Date Noted    Pulmonary hypertension (HCC) 2025    Shock (HCC) 2025    Dark stools 2025    Upper GI bleed 2025    Acute blood loss anemia 2025    Chronic hypoxic respiratory failure, on home oxygen therapy  (HCC) 2025    Moderate protein-calorie malnutrition (HCC) 2025    Right leg pain 2025    Normocytic anemia 2025    Change in bowel habits 2025    Renal lesion 2024    HEATHER (generalized anxiety disorder) 2024    Constipation 10/29/2024    Ambulatory dysfunction 10/26/2024    Dependence on supplemental oxygen 10/23/2024    Paroxysmal atrial fibrillation (HCC) 10/23/2024    Pancreas cyst 2024    History of colon polyps 2024    Herpes zoster without complication 2024    Leukocytosis 2024    Mild protein-calorie malnutrition (HCC) 2024    Abnormal CT scan 2024    Impaired memory 2024    Allergic drug rash 2024    Nonischemic nontraumatic myocardial injury 2023    Tachycardia 2023    Atrial flutter (HCC) 2023    Hyperlipidemia 10/21/2023    Seasonal allergic rhinitis 2023    Osteopenia 2022    Chronic pain of both knees 2021    Cataract of both eyes 2021    Multiple pulmonary nodules 10/05/2020    Colon polyps 10/05/2020    TOM (obstructive sleep apnea) 2020    Acute on chronic heart failure with preserved ejection fraction (HCC) 2020    Persistent proteinuria 10/25/2019    Essential hypertension 2016    Type 2 diabetes mellitus, without long-term current use of insulin (HCC) 2016    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    COPD, severe (HCC)  12/09/2016      LOS (days): 11  Geometric Mean LOS (GMLOS) (days): 4.5  Days to GMLOS:-6.1     OBJECTIVE:  Risk of Unplanned Readmission Score: 33.47         Current admission status: Inpatient   Preferred Pharmacy:   [facility] - [place], [place] - [address]  [address] [address]  Phone: [phone] Fax: [phone]    [facility] [address] [place] [id number]  Phone: [phone] Fax: [phone]    Primary Care Provider: Abiel Seals MD    Primary Insurance: MEDICARE  Secondary Insurance: AARP    DISCHARGE DETAILS:    Discharge planning discussed with:: patient  Freedom of Choice: Yes  Comments - Freedom of Choice: Per SLIM - pt medically stable for d/c today. CM f/u with [facility] liaison Deyanira via secure chat re: same - confirmed facility able to accept pt today. CM met w/ pt at bedside re: same - pt confirmed dcp remains for return to [facility] as PT/OT remains recommending STR. Transport referral placed to [facility], confirmed for 1600 p/u time via [facility] to [facility] today. All parties notified of same.  CM contacted family/caregiver?: Yes (DCP update provided to dtr)  Were Treatment Team discharge recommendations reviewed with patient/caregiver?: Yes  Did patient/caregiver verbalize understanding of patient care needs?: N/A- going to facility  Were patient/caregiver advised of the risks associated with not following Treatment Team discharge recommendations?: Yes    Contacts  Patient Contacts: Naomie, daughter  Relationship to Patient:: Family  Contact Method: Phone  Phone Number: [phone]  Reason/Outcome: Continuity of Care, Discharge Planning, Referral, Emergency Contact    Requested Home Health Care         Is the patient interested in HHC at discharge?: No    DME Referral Provided  Referral made for DME?: No    Other Referral/Resources/Interventions Provided:  Interventions: Short Term  Rehab  Referral Comments: Transport referral placed to [facility], confirmed for 1600 p/u time via [facility] to [facility] today. All parties notified of same.         Treatment Team Recommendation: Short Term Rehab  Expected Discharge Disposition: Short Term Rehab  Additional Discharge Dispositions: Skilled Nursing Facility  Transport at Discharge : [facility]        Transported by (Company and Unit #): [facility]  ETA of Transport (Date): 06/24/25  ETA of Transport (Time): 1600                IMM reviewed with patient, patient agrees with discharge determination.  IMM Given (Date):: 06/24/25  IMM Given to:: Patient          Accepting Facility Name, City & State : [facility]  Receiving Facility/Agency Phone Number: [phone]  Facility/Agency Fax Number: [phone]

## 2025-06-24 NOTE — HOSPITAL COURSE
Camryn Roblero is a 84 y.o. female with a PMH of CHF, COPD, chronic respiratory failure on 2 L O2 on baseline, DM, A-fib on Xarelto, HLD, neurocognitive disorder, HTN presented from rehab on 6/24 with complaints of abnormal hemoglobin of 5.2, pallor and fatigue. Patient was evaluated in therapy the day of admission where she had an episode of acute drop in O2 sat and change in skin color. Also appeared to have increased weight. Patient was a poor historian but stated she feels very tired, and had been feeling weak for the past few days. Also reported an intermittent cough, but cannot give timing on when it began. The SNF provider ordered routine blood work 6/14 which showed hemoglobin of 5.6 and patient sent to ER for further treatment.   On presentation to the ER the labs were significant for H/H 5.2/16.8,  Cr 1.66, LA 2.6, BNP 3288, Occult bld stool +.  Patient received bumex 1mg once, protonix bolus and drip, albuterol/atrovent nebulizer, and 2u prbc transfusion and was admitted for further management.    On the floor: 6/14 echo with LVEF 65%; systolic and diastolic flattening of IV septum consistent with RV pressure and overload    6/18 EGD with hiatal hernia and moderate gastritis  Hypotensive, bradycardic requiring intubation/resuscitation with pressor support and transferred to ICU; colonoscopy canceled  6/18 presented ICU with shock after EGD, likely distributive from anesthesia, volume loss, bowel prep, some response to fluids during procedure, and cardiogenic from severe pulmonary hypertension anesthesia because of RV failure and reduced CO    Beta-blocker, diuresis, and ACE held  6/19 extubated, placed on DNI status;     Patient was also being diuresed with IV Bumex for CHF exacerbation     6/20-6/24 patients vitals remained stable with MAPs ~75-90 but was persistently in atrial fibrillation as per telemetry but atrial fibrillation upon interpretation; rate controlled ~80-90 bpm throughout. Hb stable  ~8.1-8.9 with general downtrend from 8/22->8/23 of 8.9->8.6->8.2 and 8.3 on 6/24. She had a bowel movement 6/23 which was noted to be loose/watery and green with no evidence of melena or brown fecal matter. Given frequently elevated hyperglycemia with blood sugars >180, she was started on 3 units of basal insulin alongside her SSI and her blood sugars improved but there was an episode of 229 on 6/24.    Her metoprolol was kept at Toprol succinate 50 mg BID and Bumex was kept at 2 mg daily while monitoring blood pressures for the possibility of increasing dosages if clinically indicated but given her euvolemia, rate controlled Afib/flutter and well controlled BPs the dosages were kept at these levels to be monitored outpatient.    Discussions with family were made throughout her hospital stay for risk benefit analysis for bleeding risk on a blood thinner vs PE/stroke risk without a blood thinner alongside her history of shock after receiving procedural anesthesia. GI recommended avoiding all procedures and her colonoscopy was canceled and while hospitalized she stayed off of AC. ***

## 2025-06-24 NOTE — ASSESSMENT & PLAN NOTE
Lab Results   Component Value Date    HGB 8.3 (L) 06/24/2025    HGB 8.2 (L) 06/23/2025    HGB 8.6 (L) 06/22/2025    HGB 8.9 (L) 06/22/2025       Patient presented from living facility with complaints of low hemoglobin noted to be 5.2 on admission  Positive FOBT in ER with BUN elevation; S/p 2u prbc transfusion in ER with improvement to 7s-8s  EGD with no active bleeding, only a hiatal hernia and gastritis noted  Endorses continued melena  Hemoglobin down-trending but stable in 8s    Plan:  Protonix 40 mg daily  Continue off of Xarelto. Considering risk versus benefit, recommend to hold on discharge, will discuss the same with cardiology and outpatient provider

## 2025-06-24 NOTE — ASSESSMENT & PLAN NOTE
Lab Results   Component Value Date    HGBA1C 6.5 04/15/2025       Recent Labs     06/23/25  1543 06/23/25  2047 06/24/25  0744 06/24/25  1119   POCGLU 238* 126 97 229*       Blood Sugar Average: Last 72 hrs:  (P) 187.8BG elevated to 382    Given frequently elevated blood sugars >180, she was started on 3 units of lantus alongside SSI,    Plan:  Follow up with PCP within 1 week for further diabetic management  Monitor blood glucose

## 2025-06-24 NOTE — DISCHARGE SUMMARY
Discharge Summary - Hospitalist   Name: Camryn Roblero 84 y.o. female I MRN: [id number]  Unit/Bed#: S -01 I Date of Admission: 6/13/2025   Date of Service: 6/24/2025 I Hospital Day: 11     Assessment & Plan  Shock (HCC)  History of severe pulmonary hypertension with systolic and diastolic flattening of the IV septum consistent with right ventricular pressure and volume overload; LA severe dilation; LVEF of 65%  P/w anemia with Hgb of 5.2, which improved to the 7-8s s/p 2 units pRBCs, which remained stable  FOBT+ on testing, with GI consultation  EGD performed on 6/18 (with reversal of DNR/DNI for the procedure) showing hiatal hernia and gastritis; prior to colonoscopy, patient decompensated and became hypotensive to 30s/20s and bradycardic; critical care consulted and A-line placed; she was intubated  6/18 admitted to the ICU with hypotension and bradycardia  Pressures dropped to the 80s/40s and she was initiated on a levophed gtt to maintain MAPs > 65  Per critical care evaluation, likely mixed etiology of shock due primarily to cardiogenic shock from severe pulmonary hypertension and anesthesia leading to RV failure and reduced CO  Other contributing, factors of distributive shock from anesthesia, and hypovolemic shock from volume loss from bleeding/bowel prep  6/19 she was weaned off levophed with stable BPs and extubated successfully; determined stable for MedSurg  6/21 BPs remained stable, though per telemetry, patient remains in atrial flutter  Hb have remained stable ~8.1-8.9 from 6/20-6/23 but have generally trended downwards from 6/22-6/23 from 8.9->8.6->8.2  Had a bowel movement 6/23 which was noted to be loose/watery and green with no evidence of melena or brown fecal matter  On the afternoon of 6/22 she had a blood pressure of 95/58 and was unable to start her Toprol XL as prescribed but it was found to be normal on two subsequent readings and was started as prescribed.    Plan:  PT/OT evaluation  recommending level II resource intensity (moderate); Discharge to rehab facility confirmed  Continue metoprolol succinate 50 mg Q12h  Continue PO bumex at reduced dose of 2 mg BID  Continue to hold AC indefinitely due to high bleeding risk as per internal medicine and GI teams but can be adjusted with further discussion with cardiology, and PCP outpatient   Follow-up with cardiology outpatient  Follow-up with gastroenterology outpatient  Upper GI bleed  Acute blood loss anemia  Lab Results   Component Value Date    HGB 8.3 (L) 06/24/2025    HGB 8.2 (L) 06/23/2025    HGB 8.6 (L) 06/22/2025    HGB 8.9 (L) 06/22/2025       Patient presented from living facility with complaints of low hemoglobin noted to be 5.2 on admission  Positive FOBT in ER with BUN elevation; S/p 2u prbc transfusion in ER with improvement to 7s-8s  EGD with no active bleeding, only a hiatal hernia and gastritis noted  Endorses continued melena  Hemoglobin down-trending but stable in 8s    Plan:  Protonix 40 mg daily  Continue off of Xarelto. Considering risk versus benefit, recommend to hold on discharge, will discuss the same with cardiology and outpatient provider  Atrial flutter (HCC)  Noted to be in atrial flutter per telemetry from 6/20-6/23 with rates in the 80s-90s  Discussed high bleeding risk with daughter, and explained that we will not be resuming anticoagulation at this time    Plan:  Rate control as above  Paroxysmal atrial fibrillation (HCC)  Interval worsening on a.m. of 6/15 due to uncertainty etiology, patient did refuse CPAP at bedtime which may have led to pulmonary infiltrate worsening A-fib given one-time Lopressor 2.5 mg IV and assess for effect in addition to extra diuresis  Patient found to be in rate controlled A-fib with rates ~ on 6/20    Plan:  Plan rate control as above  Acute on chronic heart failure with preserved ejection fraction (HCC)  Wt Readings from Last 3 Encounters:   06/22/25 51.8 kg (114 lb 3.2 oz)    06/13/25 60 kg (132 lb 3.2 oz)   06/09/25 57.5 kg (126 lb 12.8 oz)     Patient noted to have increased weight gain since discharge earlier this month, received extra metolazone outpatient due to weight gain. Second HF admission within the month.  Prior echo 10/24 reviewed, had worsening valvular disease  Updated echo: No significant change compared to the previous study. Although there was no adequate tricuspid regurgitation jet to be able to calculate RV systolic pressure, indirect findings are once again consistent with severe pulmonary hypertension   Received IV bumex 3 grams in ED  No notable edema, pulmonary effusion or JVD/hepatojugular reflex present on exam to suggest fluid overload    Plan:  Rate control and diuretics as above  Pulmonary hypertension (HCC)  Prior echo 10/24 reviewed, had worsening valvular disease  Updated echo w/o significant change; indirect findings are once again consistent with severe pulmonary hypertension     Plan:  Monitor intake/output and fluid status  Continue Bumex 2 mg daily; follow up with PCP and/or cardiology within 1 week for possibility of increasing dose to 3 mg per clinical judgement  Type 2 diabetes mellitus, without long-term current use of insulin ([facility])  Lab Results   Component Value Date    HGBA1C 6.5 04/15/2025       Recent Labs     06/23/25  1543 06/23/25  2047 06/24/25  0744 06/24/25  1119   POCGLU 238* 126 97 229*       Blood Sugar Average: Last 72 hrs:  (P) 187.8BG elevated to 382    Given frequently elevated blood sugars >180, she was started on 3 units of lantus alongside SSI,    Plan:  Follow up with PCP within 1 week for further diabetic management  Monitor blood glucose  Chronic hypoxic respiratory failure, on home oxygen therapy  (HCC)  Continue 2 L oxygen therapy at all times (baseline)  COPD, severe (HCC)  Patient initially noted to be wheezing, since resolved after duoneb in ER; ALYSHA lobe wheezing heard on exam 6/20  Lower suspicion for acute  exacerbation at this time, will defer steroids    Plan:  Continue with nebulizers as needed   Pulmicort BID  TOM (obstructive sleep apnea)  Continue CPAP nightly w/ oxygen      Medical Problems       Resolved Problems  Date Reviewed: 6/18/2025          Resolved    FEDE (acute kidney injury) (HCC) 6/16/2025     Resolved by  Ramana Vasquez DO    Lactic acidosis 6/16/2025     Resolved by  Ramana Vasquez DO        Discharging Physician / Practitioner: Hamilton Nettles MD  PCP: Abiel Seals MD  Admission Date:   Admission Orders (From admission, onward)       Ordered        06/13/25 2348  INPATIENT ADMISSION  Once                          Discharge Date: 06/24/25    Next Steps for Physician/AP Assuming Care:  Reevaluate anticoagulation bleeding risk vs stroke prevention risk benefit  Monitor blood glucose and adjust regiment for diabetic control  Monitor BP and heart rate for possible adjustment of metoprolol and bumex dosing    Test Results Pending at Discharge (will require follow up):  None    Medication Changes for Discharge & Rationale:   Decreased dosage of Bumex from 3 mg/day to 2 mg/day; metoprolol succinate 50 mg BID; Hold diltiazem 120 mg daily.   See after visit summary for reconciled discharge medications provided to patient and/or family.     Consultations During Hospital Stay:  Medical surgical, nutrition, OT, PT, case management, GI, and cardiology    Procedures Performed:   EGD  Chest XR x3  ECG  Intubation/Extubation    Significant Findings / Test Results:   Atrial flutter with variable A-V block and RBBB    Incidental Findings:   none     Hospital Course:   Camryn Roblero is a 84 y.o. female with a PMH of CHF, COPD, chronic respiratory failure on 2 L O2 on baseline, DM, A-fib on Xarelto, HLD, neurocognitive disorder, HTN presented from rehab on 6/24 with complaints of abnormal hemoglobin of 5.2, pallor and fatigue. Patient was evaluated in therapy the day of admission where she had an episode of acute  drop in O2 sat and change in skin color. Also appeared to have increased weight. Patient was a poor historian but stated she feels very tired, and had been feeling weak for the past few days. Also reported an intermittent cough, but cannot give timing on when it began. The SNF provider ordered routine blood work 6/14 which showed hemoglobin of 5.6 and patient sent to ER for further treatment.   On presentation to the ER the labs were significant for H/H 5.2/16.8,  Cr 1.66, LA 2.6, BNP 3288, Occult bld stool +.  Patient received bumex 1mg once, protonix bolus and drip, albuterol/atrovent nebulizer, and 2u prbc transfusion and was admitted for further management.    On the floor: 6/14 echo with LVEF 65%; systolic and diastolic flattening of IV septum consistent with RV pressure and overload    6/18 EGD with hiatal hernia and moderate gastritis  Hypotensive, bradycardic requiring intubation/resuscitation with pressor support and transferred to ICU; colonoscopy canceled  6/18 presented ICU with shock after EGD, likely distributive from anesthesia, volume loss, bowel prep, some response to fluids during procedure, and cardiogenic from severe pulmonary hypertension anesthesia because of RV failure and reduced CO  Beta-blocker, diuresis, and ACE held    6/19 extubated, placed on DNI status;     Patient was also being diuresed with IV Bumex for CHF exacerbation     6/20-6/24 patients vitals remained stable with MAPs ~75-90 but was persistently in atrial fibrillation as per telemetry but atrial fibrillation upon interpretation; rate controlled ~80-90 bpm throughout. Hb stable ~8.1-8.9 with general downtrend from 8/22->8/23 of 8.9->8.6->8.2 and 8.3 on 6/24. She had a bowel movement 6/23 which was noted to be loose/watery and green with no evidence of melena or brown fecal matter. Given frequently elevated hyperglycemia with blood sugars >180, she was started on 3 units of basal insulin alongside her SSI and her blood  "sugars improved but there was an episode of 229 on 6/24.    Her metoprolol was kept at Toprol succinate 50 mg BID and Bumex was kept at 2 mg daily while monitoring blood pressures for the possibility of increasing dosages if clinically indicated but given her euvolemia, rate controlled Afib/flutter and well controlled BPs the dosages were kept at these levels to be monitored outpatient.    Discussions with family were made throughout her hospital stay for risk benefit analysis for bleeding risk on a blood thinner vs PE/stroke risk without a blood thinner alongside her history of shock after receiving procedural anesthesia. GI recommended avoiding all procedures and her colonoscopy was canceled; while hospitalized she stayed off of AC and GI recommended continuing to remain off of anticoagulation until further outpatient follow up with cardiology and PCP.  At the time of discharge patient is medically stable.    Please see above list of diagnoses and related plan for additional information.     Discharge Day Visit / Exam:   Subjective:  Patient was seen and evaluated at bedside today with no acute events overnight. She notes feeling well today with mild improvement each day over the course of her hospital stay. She notes having a loose, non bloody bowel movement but is a poor historian in this regard and is not fully sure of her bowel movements. She denies any constipation, diarrhea, nausea, vomiting, chest pain, SOB or pain otherwise but does endorse her chronic cough which has been improving over the last couple of days.      Vitals: Blood Pressure: 102/50 (06/24/25 1115)  Pulse: 68 (06/24/25 1115)  Temperature: (!) 97.1 °F (36.2 °C) (06/24/25 1115)  Temp Source: Oral (06/23/25 1109)  Respirations: 18 (06/23/25 1109)  Height: 4' 11\" (149.9 cm) (06/14/25 0949)  Weight - Scale: 51.8 kg (114 lb 3.2 oz) (06/22/25 0500)  SpO2: 96 % (06/24/25 1357)  Physical Exam  Constitutional:       Appearance: Normal appearance. She " is normal weight.   HENT:      Head: Normocephalic and atraumatic.      Mouth/Throat:      Mouth: Mucous membranes are moist.     Eyes:      Conjunctiva/sclera: Conjunctivae normal.      Pupils: Pupils are equal, round, and reactive to light.       Cardiovascular:      Rate and Rhythm: Normal rate.      Pulses: Normal pulses.      Heart sounds: Normal heart sounds.   Pulmonary:      Effort: Pulmonary effort is normal.      Breath sounds: Normal breath sounds.   Abdominal:      General: Abdomen is flat.      Palpations: Abdomen is soft.     Musculoskeletal:      Right lower leg: No edema.      Left lower leg: No edema.     Skin:     General: Skin is warm and dry.      Capillary Refill: Capillary refill takes less than 2 seconds.     Neurological:      General: No focal deficit present.      Mental Status: She is alert and oriented to person, place, and time.     Psychiatric:         Mood and Affect: Mood normal.         Behavior: Behavior normal.         Thought Content: Thought content normal.         Judgment: Judgment normal.        Discussion with Family: Updated  (daughter) via phone.    Discharge instructions/Information to patient and family:   See after visit summary for information provided to patient and family.      Provisions for Follow-Up Care:  See after visit summary for information related to follow-up care and any pertinent home health orders.      Mobility at time of Discharge:   Basic Mobility Inpatient Raw Score: 14  JH-HLM Goal: 4: Move to chair/commode  JH-HLM Achieved: 2: Bed activities/Dependent transfer  HLM Goal NOT achieved. Continue to encourage mobility in post discharge setting.     Disposition:   Acute Rehab at [facility]    Planned Readmission: N/A    Administrative Statements   Discharge Statement:  I have spent a total time of 30 minutes in caring for this patient on the day of the visit/encounter.    **Please Note: This note may have been constructed using a voice  recognition system**

## 2025-06-24 NOTE — DISCHARGE INSTR - AVS FIRST PAGE
Dear Camryn Roblero,     It was our pleasure to care for you here at [facility].  It is our hope that we were always able to exceed the expected standards for your care during your stay.  You were hospitalized due to low hemoglobin.  You were cared for on the third floor by Hamilton Nettles MD under the service of Luis Alberto North MD with the [facility] who covers for your primary care physician (PCP), Abiel Seals MD, while you were hospitalized.  If you have any questions or concerns related to this hospitalization, you may contact us at .  For follow up as well as any medication refills, we recommend that you follow up with your primary care physician.  A registered nurse will reach out to you by phone within a few days after your discharge to answer any additional questions that you may have after going home.  However, at this time we provide for you here, the most important instructions / recommendations at discharge:     Notable Medication Adjustments -   Please hold xarelto until cardiology visit to determine risk vs benefit.  Please continue to hold Cardizem.  Please change bumex to 2 mg twice a day.   Please take Mucinex 600 mg twice a day as needed for cough.  Please take Protonix 40 mg daily.  Please continue taking all other medications as prescribed.   Testing Required after Discharge -   None  Important follow up information -   Please follow up with cardiology outpatient.  Please follow up with gastroenterology.   Please follow-up with your PCP within a week of discharge.   Other Instructions -   Please monitor blood sugars closely  Please take all your medications regularly as directed  If symptoms return/persist contact your PCP if unable then visit to nearest ER  Please review this entire after visit summary as additional general instructions including medication list, appointments, activity, diet, any pertinent wound care, and  other additional recommendations from your care team that may be provided for you.      Sincerely,     Hamilton Nettles MD

## 2025-06-25 ENCOUNTER — NURSING HOME VISIT (OUTPATIENT)
Dept: GERIATRICS | Facility: OTHER | Age: 85
End: 2025-06-25

## 2025-06-25 ENCOUNTER — NURSING HOME VISIT (OUTPATIENT)
Dept: WOUND CARE | Facility: HOSPITAL | Age: 85
End: 2025-06-25
Payer: MEDICARE

## 2025-06-25 ENCOUNTER — TELEPHONE (OUTPATIENT)
Dept: OTHER | Facility: OTHER | Age: 85
End: 2025-06-25

## 2025-06-25 DIAGNOSIS — D50.0 IRON DEFICIENCY ANEMIA DUE TO CHRONIC BLOOD LOSS: Primary | ICD-10-CM

## 2025-06-25 DIAGNOSIS — E11.9 TYPE 2 DIABETES MELLITUS WITHOUT COMPLICATION, WITHOUT LONG-TERM CURRENT USE OF INSULIN (HCC): Primary | ICD-10-CM

## 2025-06-25 DIAGNOSIS — E11.9 TYPE 2 DIABETES MELLITUS WITHOUT COMPLICATION, WITHOUT LONG-TERM CURRENT USE OF INSULIN (HCC): ICD-10-CM

## 2025-06-25 DIAGNOSIS — I50.33 ACUTE ON CHRONIC HEART FAILURE WITH PRESERVED EJECTION FRACTION (HCC): ICD-10-CM

## 2025-06-25 DIAGNOSIS — R26.2 AMBULATORY DYSFUNCTION: ICD-10-CM

## 2025-06-25 DIAGNOSIS — I10 ESSENTIAL HYPERTENSION: ICD-10-CM

## 2025-06-25 DIAGNOSIS — J96.11 CHRONIC HYPOXIC RESPIRATORY FAILURE, ON HOME OXYGEN THERAPY  (HCC): ICD-10-CM

## 2025-06-25 DIAGNOSIS — T14.8XXA DEEP TISSUE INJURY: ICD-10-CM

## 2025-06-25 DIAGNOSIS — I48.92 ATRIAL FLUTTER, UNSPECIFIED TYPE (HCC): ICD-10-CM

## 2025-06-25 DIAGNOSIS — E44.0 MODERATE PROTEIN-CALORIE MALNUTRITION (HCC): ICD-10-CM

## 2025-06-25 DIAGNOSIS — G47.33 OSA (OBSTRUCTIVE SLEEP APNEA): ICD-10-CM

## 2025-06-25 DIAGNOSIS — Z99.81 CHRONIC HYPOXIC RESPIRATORY FAILURE, ON HOME OXYGEN THERAPY  (HCC): ICD-10-CM

## 2025-06-25 PROCEDURE — 99305 1ST NF CARE MODERATE MDM 35: CPT | Performed by: NURSE PRACTITIONER

## 2025-06-25 NOTE — TELEPHONE ENCOUNTER
Nursing home or Independent living name: [facility]  If its not nursing home or Independent living, do they see PCP in Network:  Who is calling: Leticia  Callback number: [phone]  Symptoms: review labs  Did you page oncall or place in triage hub: esc Dr. Fairchild
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
## 2025-06-26 PROBLEM — T14.8XXA DEEP TISSUE INJURY: Status: ACTIVE | Noted: 2025-06-26

## 2025-06-26 PROBLEM — D50.0 IRON DEFICIENCY ANEMIA DUE TO CHRONIC BLOOD LOSS: Status: ACTIVE | Noted: 2025-06-26
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
## 2025-06-26 NOTE — ASSESSMENT & PLAN NOTE
Sacrum  Purple, non blanchable  Local wound care - zguard  Continue to offload, need air mattress  Increase protein intake  Follow up next week

## 2025-06-26 NOTE — ASSESSMENT & PLAN NOTE
A1C results reviewed with the patient today.   Lab Results   Component Value Date    HGBA1C 6.5 04/15/2025   Under the care of Senior care team

## 2025-06-27 ENCOUNTER — NURSING HOME VISIT (OUTPATIENT)
Dept: GERIATRICS | Facility: OTHER | Age: 85
End: 2025-06-27

## 2025-06-27 VITALS
RESPIRATION RATE: 18 BRPM | DIASTOLIC BLOOD PRESSURE: 70 MMHG | SYSTOLIC BLOOD PRESSURE: 127 MMHG | HEART RATE: 60 BPM | OXYGEN SATURATION: 96 % | TEMPERATURE: 97.3 F

## 2025-06-27 DIAGNOSIS — R26.2 AMBULATORY DYSFUNCTION: ICD-10-CM

## 2025-06-27 DIAGNOSIS — I48.92 ATRIAL FLUTTER, UNSPECIFIED TYPE (HCC): ICD-10-CM

## 2025-06-27 DIAGNOSIS — I48.0 PAROXYSMAL ATRIAL FIBRILLATION (HCC): ICD-10-CM

## 2025-06-27 DIAGNOSIS — E11.9 TYPE 2 DIABETES MELLITUS WITHOUT COMPLICATION, WITHOUT LONG-TERM CURRENT USE OF INSULIN (HCC): ICD-10-CM

## 2025-06-27 DIAGNOSIS — I10 ESSENTIAL HYPERTENSION: ICD-10-CM

## 2025-06-27 DIAGNOSIS — D50.0 IRON DEFICIENCY ANEMIA DUE TO CHRONIC BLOOD LOSS: Primary | ICD-10-CM

## 2025-06-27 DIAGNOSIS — I50.33 ACUTE ON CHRONIC HEART FAILURE WITH PRESERVED EJECTION FRACTION (HCC): ICD-10-CM

## 2025-06-27 NOTE — ASSESSMENT & PLAN NOTE
BP stable, 127/70  Continue to monitor BP  Continue Toprol XL 50 mg every 12 hours  Continue Bumex 2 mg twice daily

## 2025-06-27 NOTE — ASSESSMENT & PLAN NOTE
Recently hospitalized for anemia Hgb 5.2  Received 2 units PRBC's  Underwent EGD, went in to shock following procedure, GI deferred future procedures  AC was placed on hold until follow up with cardiology, risk of bleeding may out weigh the benefit  6/26/27 H/H 8.7/26.1

## 2025-06-27 NOTE — ASSESSMENT & PLAN NOTE
Lab Results   Component Value Date    HGBA1C 6.5 04/15/2025   Morning blood sugar 131  Continue Accu-Cheks  Continue glipizide 5 mg daily   encourage diabetic diet  Avoid hypoglycemia

## 2025-06-27 NOTE — ASSESSMENT & PLAN NOTE
"Wt Readings from Last 3 Encounters:   06/22/25 51.8 kg (114 lb 3.2 oz)   06/13/25 60 kg (132 lb 3.2 oz)   06/09/25 57.5 kg (126 lb 12.8 oz)   Echo done 6/14/25  \"Left ventricular cavity size is normal. Wall thickness is increased. The left ventricular ejection fraction is 65%. Systolic function is normal. Wall motion is normal. Diastolic function is abnormal.\"  Weight stable 125.6 lb  Continue daily weights  Continue PRN metolazone for 3 lbs in 1 day or 5 lbs in a week  Dose was given 6/13/25  Continue Bumex 2 mg twice daily  Continue metoprolol XL 50 mg every 12 hours  Outpatient follow up with cardiology    "
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
## 2025-06-30 LAB
BASE EXCESS BLDA CALC-SCNC: -5 MMOL/L (ref -2–3)
CA-I BLD-SCNC: 1.43 MMOL/L (ref 1.12–1.32)
GLUCOSE SERPL-MCNC: 298 MG/DL (ref 65–140)
HCO3 BLDA-SCNC: 22.6 MMOL/L (ref 22–28)
HCT VFR BLD CALC: 23 % (ref 34.8–46.1)
HGB BLDA-MCNC: 7.8 G/DL (ref 11.5–15.4)
PCO2 BLD: 24 MMOL/L (ref 21–32)
PCO2 BLD: 55.9 MM HG (ref 36–44)
PH BLD: 7.22 [PH] (ref 7.35–7.45)
PO2 BLD: >400 MM HG (ref 75–129)
POTASSIUM BLD-SCNC: 5.3 MMOL/L (ref 3.5–5.3)
SODIUM BLD-SCNC: 132 MMOL/L (ref 136–145)
SPECIMEN SOURCE: ABNORMAL

## 2025-07-01 ENCOUNTER — NURSING HOME VISIT (OUTPATIENT)
Dept: GERIATRICS | Facility: OTHER | Age: 85
End: 2025-07-01
Payer: MEDICARE

## 2025-07-01 VITALS
OXYGEN SATURATION: 95 % | RESPIRATION RATE: 18 BRPM | SYSTOLIC BLOOD PRESSURE: 125 MMHG | DIASTOLIC BLOOD PRESSURE: 67 MMHG | HEART RATE: 88 BPM | TEMPERATURE: 97.5 F

## 2025-07-01 DIAGNOSIS — E11.9 TYPE 2 DIABETES MELLITUS WITHOUT COMPLICATION, WITHOUT LONG-TERM CURRENT USE OF INSULIN (HCC): ICD-10-CM

## 2025-07-01 DIAGNOSIS — I48.0 PAROXYSMAL ATRIAL FIBRILLATION (HCC): ICD-10-CM

## 2025-07-01 DIAGNOSIS — I10 ESSENTIAL HYPERTENSION: ICD-10-CM

## 2025-07-01 DIAGNOSIS — I48.92 ATRIAL FLUTTER, UNSPECIFIED TYPE (HCC): ICD-10-CM

## 2025-07-01 DIAGNOSIS — I50.33 ACUTE ON CHRONIC HEART FAILURE WITH PRESERVED EJECTION FRACTION (HCC): Primary | ICD-10-CM

## 2025-07-01 DIAGNOSIS — R26.2 AMBULATORY DYSFUNCTION: ICD-10-CM

## 2025-07-01 PROCEDURE — 99309 SBSQ NF CARE MODERATE MDM 30: CPT

## 2025-07-01 RX ORDER — GLIPIZIDE 5 MG/1
5 TABLET ORAL DAILY
COMMUNITY

## 2025-07-01 NOTE — ASSESSMENT & PLAN NOTE
"Wt Readings from Last 3 Encounters:   06/22/25 51.8 kg (114 lb 3.2 oz)   06/13/25 60 kg (132 lb 3.2 oz)   06/09/25 57.5 kg (126 lb 12.8 oz)     Wt Readings from Last 3 Encounters:   06/22/25 51.8 kg (114 lb 3.2 oz)   06/13/25 60 kg (132 lb 3.2 oz)   06/09/25 57.5 kg (126 lb 12.8 oz)   Echo done 6/14/25  \"Left ventricular cavity size is normal. Wall thickness is increased. The left ventricular ejection fraction is 65%. Systolic function is normal. Wall motion is normal. Diastolic function is abnormal.\"  Weight stable 125.6 lb  Continue daily weights  Continue PRN metolazone for 3 lbs in 1 day or 5 lbs in a week  Dose was given 6/13/25  Continue Bumex 2 mg twice daily  Continue metoprolol XL 50 mg every 12 hours  Outpatient follow up with cardiology            "
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
## 2025-07-01 NOTE — ASSESSMENT & PLAN NOTE
BP stable, 125/67  Continue to monitor BP  Continue Toprol XL 50 mg every 12 hours  Continue Bumex 2 mg twice daily

## 2025-07-01 NOTE — ASSESSMENT & PLAN NOTE
Lab Results   Component Value Date    HGBA1C 6.5 04/15/2025   Morning blood sugar 126  Continue Accu-Cheks  Continue glipizide 5 mg daily   encourage diabetic diet  Avoid hypoglycemia

## 2025-07-02 ENCOUNTER — NURSING HOME VISIT (OUTPATIENT)
Dept: WOUND CARE | Facility: HOSPITAL | Age: 85
End: 2025-07-02
Payer: MEDICARE

## 2025-07-02 DIAGNOSIS — T14.8XXA DEEP TISSUE INJURY: Primary | ICD-10-CM

## 2025-07-02 PROCEDURE — 99307 SBSQ NF CARE SF MDM 10: CPT | Performed by: NURSE PRACTITIONER

## 2025-07-03 ENCOUNTER — NURSING HOME VISIT (OUTPATIENT)
Dept: GERIATRICS | Facility: OTHER | Age: 85
End: 2025-07-03
Payer: MEDICARE

## 2025-07-03 VITALS
TEMPERATURE: 97.5 F | SYSTOLIC BLOOD PRESSURE: 136 MMHG | BODY MASS INDEX: 25.65 KG/M2 | HEART RATE: 74 BPM | RESPIRATION RATE: 16 BRPM | DIASTOLIC BLOOD PRESSURE: 75 MMHG | OXYGEN SATURATION: 96 % | WEIGHT: 127 LBS

## 2025-07-03 DIAGNOSIS — I10 ESSENTIAL HYPERTENSION: Primary | ICD-10-CM

## 2025-07-03 DIAGNOSIS — R26.2 AMBULATORY DYSFUNCTION: ICD-10-CM

## 2025-07-03 DIAGNOSIS — I48.92 ATRIAL FLUTTER, UNSPECIFIED TYPE (HCC): ICD-10-CM

## 2025-07-03 DIAGNOSIS — I48.0 PAROXYSMAL ATRIAL FIBRILLATION (HCC): ICD-10-CM

## 2025-07-03 DIAGNOSIS — D50.0 IRON DEFICIENCY ANEMIA DUE TO CHRONIC BLOOD LOSS: ICD-10-CM

## 2025-07-03 DIAGNOSIS — I50.33 ACUTE ON CHRONIC HEART FAILURE WITH PRESERVED EJECTION FRACTION (HCC): ICD-10-CM

## 2025-07-03 PROCEDURE — 99309 SBSQ NF CARE MODERATE MDM 30: CPT

## 2025-07-03 NOTE — ASSESSMENT & PLAN NOTE
"Wt Readings from Last 3 Encounters:   07/03/25 57.6 kg (127 lb)   06/22/25 51.8 kg (114 lb 3.2 oz)   06/13/25 60 kg (132 lb 3.2 oz)   Echo done 6/14/25  \"Left ventricular cavity size is normal. Wall thickness is increased. The left ventricular ejection fraction is 65%. Systolic function is normal. Wall motion is normal. Diastolic function is abnormal.\"  Weight stable   Continue daily weights  Continue PRN metolazone for 3 lbs in 1 day or 5 lbs in a week  Dose was given 6/27/25  Continue Bumex 2 mg twice daily  Continue metoprolol XL 50 mg every 12 hours  Outpatient follow up with cardiology            "

## 2025-07-03 NOTE — ASSESSMENT & PLAN NOTE
Location : Sacrum and buttocks  Wound is healed  Hydraguard for moisture management  Continue to ollfoad  Sign off. Facility staff will continue to provide treatment and monitor the wound. Can reconsult wound nurse practitioner if wound failed to heal or worsened.

## 2025-07-03 NOTE — ASSESSMENT & PLAN NOTE
Recently hospitalized for anemia Hgb 5.2  Received 2 units PRBC's  Underwent EGD, went in to shock following procedure, GI deferred future procedures  AC was placed on hold until follow up with cardiology, risk of bleeding may out weigh the benefit  6/26/27 H/H 8.7/26.1  Monitor CBC
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
## 2025-07-03 NOTE — ASSESSMENT & PLAN NOTE
BP stable, 136/75  Continue to monitor BP  Continue Toprol XL 50 mg every 12 hours  Continue Bumex 2 mg twice daily
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
## 2025-07-09 NOTE — ASSESSMENT & PLAN NOTE
Recent hospitalization due to bleed, Hgb low as 5.2 requiring 2 units of RBCs, and EGD.   During EGD patient suffered cardiogenic shock, and GI deferred further evaluations at this time.   AC was placed on hold, Hgb has remained stable since d/c.   Last Hgb 8.5 on 6/30, plan to obtain CBC in AM to monitor for stability.   Denies any S&S of anemia, or bleeding at this time.   Plan to monitor hemodynamic stability closely.

## 2025-07-09 NOTE — ASSESSMENT & PLAN NOTE
Chronically on 2L NC at baseline.   Denies any acute SOB, cough, or respiratory distress.   Plan to monitor oxygenation status closely.

## 2025-07-09 NOTE — PROGRESS NOTES
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 (10 mg total) by mouth daily as needed for allergies, Disp: , Rfl:     magnesium (MAGTAB) 84 MG (7MEQ) TBCR, Take 84 mg by mouth in the morning. Take 2 tablets- MWF., Disp: , Rfl:     metolazone (ZAROXOLYN) 2.5 mg tablet, Take 1 tablet as needed for weight gain of 3 lbs in a day or 5 lbs in 5-7 days., Disp: 12 tablet, Rfl: 3    metoprolol succinate (TOPROL-XL) 50 mg 24 hr tablet, TAKE 1 TABLET TWICE DAILY, Disp: 180 tablet, Rfl: 3    pantoprazole (PROTONIX) 40 mg tablet, Take 1 tablet (40 mg total) by mouth daily, Disp: , Rfl:     potassium chloride (Klor-Con M20) 20 mEq tablet, TAKE 1 TABLET TWICE DAILY, Disp: 60 tablet, Rfl: 5    AYR SALINE NASAL DROPS NA, , Disp: , Rfl:     [Paused] diltiazem (CARDIZEM CD) 120 mg 24 hr capsule, TAKE 1 CAPSULE EVERY DAY (Patient not taking: Reported on 7/9/2025), Disp: 90 capsule, Rfl: 1    [Paused] Xarelto 15 MG tablet, Take 1 tablet by mouth once daily with breakfast (Patient not taking: Reported on 7/9/2025), Disp: 30 tablet, Rfl: 0

## 2025-07-09 NOTE — ASSESSMENT & PLAN NOTE
Wt Readings from Last 3 Encounters:   07/03/25 57.6 kg (127 lb)   06/22/25 51.8 kg (114 lb 3.2 oz)   06/13/25 60 kg (132 lb 3.2 oz)   +1 pitting edema to BL lower extremities, tubi  in place.    Continues on Bumex 2 mg BID for fluid retention with PRN Metolazone for increased weight gain.   Plan to monitor volume status, continues on a heart healthy diet.   Monitor weights daily while at STR for any changes.   Encouraged leg elevation when at rest, and compliant with tubi  to decrease dependent edema.   Will order CBC, BMP for the AM to monitor electrolytes.

## 2025-07-09 NOTE — ASSESSMENT & PLAN NOTE
HR is controlled and regular upon assessment.   Continues on Metoprolol 50 mg BID for HR control. AC is on hold due to recent bleed.   Denies any SOB, dizziness or chest pain.   Continue to monitor HR and BP while at STR.   Fall precautions in place due to anticoagulation.

## 2025-07-09 NOTE — ASSESSMENT & PLAN NOTE
Multifactorial.   PT/OT services working with patient for strength, gait, and balance training.   Patient is actively working with PT/OT services.   Encouraged patient to continue to work with PT/OT.   Fall precautions in place.   Will monitor for improvement for discharge planning.   Plan to monitor CBC, BMP.

## 2025-07-09 NOTE — ASSESSMENT & PLAN NOTE
Stable.   Continues on Metoprolol 20 mg BID, and Bumex 2 mg BID for BP management.   Plan to monitor BP while at STR, hold parameters in place to prevent hypotension.   Can adjust accordingly.

## 2025-07-11 PROBLEM — K44.9 HIATAL HERNIA: Status: ACTIVE | Noted: 2025-01-01

## 2025-07-11 PROBLEM — N18.32 STAGE 3B CHRONIC KIDNEY DISEASE (HCC): Status: ACTIVE | Noted: 2025-01-01

## 2025-07-11 PROBLEM — R10.31 RIGHT LOWER QUADRANT ABDOMINAL PAIN: Status: ACTIVE | Noted: 2025-01-01
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
## 2025-07-11 NOTE — Clinical Note
Case was discussed with KIYA and the patient's admission status was agreed to be Admission Status: Inpatient status to the service of Dr. Davidson.

## 2025-07-11 NOTE — ASSESSMENT & PLAN NOTE
Wt Readings from Last 3 Encounters:   07/09/25 58.2 kg (128 lb 3.2 oz)   07/03/25 57.6 kg (127 lb)   06/22/25 51.8 kg (114 lb 3.2 oz)     Noted with abdominal distention greater than baseline and +2 pitting edema, patient c/o stomach pain  Seen by Cardiology 7/9- appreciate recommendations  Will increase Bumex to 3 mg BID   Give additional metolazone today x1   Give additional KDUR 20meq today then continue 20 meq BID  Check Chest X ray and KUB  Re-weigh now   Check BMP Monday 7/14

## 2025-07-11 NOTE — ASSESSMENT & PLAN NOTE
HR regular and controlled on exam   Continue Metoprolol 50 mg Q 12 hrs for rate control   Hold AC due to highrick for bleeds with recent GIB

## 2025-07-11 NOTE — ASSESSMENT & PLAN NOTE
Recent hospital stay for Hemoglobin 5.2  +FOBT in ED ---->S/p 2 units PRBCs --> Hgb 7-8s  Xarelto stopped due to risk of bleeding   Continue Protonix 40 mg daily   Repeat hemoglobin 7/10 at rehab stable at 8.0  Repeat CBC Monday to trend   Patient was unable to tolerate sedation to perform colonoscopy during recent hospital stay, will need OP colonoscopy when able

## 2025-07-11 NOTE — ASSESSMENT & PLAN NOTE
Recent hospital stay with anemia  s/p EGD performed on 6/18 showing hiatal hernia and gastritis; prior to colonoscopy, patient decompensated and became hypotensive to 30s/20s and bradycardic; critical care consulted and A-line placed; she was intubated for about 12 hours and on 6/19 and was successfully weaned off to room air   Continue Protonix   OP f/u with GI

## 2025-07-11 NOTE — ASSESSMENT & PLAN NOTE
Hx of severe pulmonary HTN   On 2-3 L supplemental oxygen at baseline- 96% on room air   Continue Bumex- increasing dose to 3 mg BID today due to fluild retention with increased abdominal distension and increased pitting edema to b/l lower legs   Check Chest x ray today

## 2025-07-11 NOTE — ASSESSMENT & PLAN NOTE
"Patient c/o stomach pain   On exam she is c/o \"achy\" pain to RLQ- able to point to specific area  Abdomen is distendend yet soft--->Had 2 medium sized BM's yesterday  Bladder scan =18 ml  Pateint endores that she feels she is not evacuating her bowels enough  Chart Review done today:   May of 2024 noted with 50 lbs weight loss and hyponatremia, was seen by Nephrology who recommended CT CAP to r/o malignancy   CT CAP at that time noted: Multiple pancreatic cysts measuring up to 1.2 cm. For simple cyst(s) less than 1.5 cm, recommend follow-up every 2 years for 2 times. After 4 years, no more follow-ups. Recommend next follow-up in 2 years. Preferred imaging modality: abdomen MRI and MRCP with and without IV contrast, or triple phase abdomen CT with IV contrast, or abdomen MRI and MRCP without IV contrast. Indeterminate left renal hypodensity, possibly a hemorrhagic cyst, not present in 2017. 6-month follow-up renal protocol CT is recommended to evaluate for growth and enhancement.    CT renal protocol done 11/2024- reviewed:   RIGHT KIDNEY AND URETER:  No solid renal mass or detectable urothelial mass.  Subcentimeter hypoattenuating renal lesion(s), too small to characterize but statistically likely benign, which do not warrant follow-up (Radiology June 2019).  No hydronephrosis or hydroureter.  No urinary tract calculi.  No perinephric collection.  Plan:  Check KUB  Check UA/C&S  Consider CT C/A/P pending KUB results   OP f/u with GI   "

## 2025-07-11 NOTE — ASSESSMENT & PLAN NOTE
Lab Results   Component Value Date    EGFR 43 06/24/2025    EGFR 42 06/23/2025    EGFR 38 06/21/2025    CREATININE 1.17 06/24/2025    CREATININE 1.18 06/23/2025    CREATININE 1.28 06/21/2025     Has followed with Nephrology - reviewed chart today   Baseline creatinine appears to be 1.2-1.4   GFR ranging 36-40  She is currently on Bumex 2 mg BID---> will increase to 3 mg BID in light of her fluid overload noted on exam today   Last creat 1.45--> stable   May need to accept a higher creatinine to keep fluid off at this time   Repeat BMP ordered for Monday 7/14

## 2025-07-11 NOTE — TELEPHONE ENCOUNTER
"Nursing home or Independent living name: [facility]  Who is calling: EMELY Gunn  Callback number: [phone]  Symptoms: \"Pt x-ray came back positive for a bowel obstruction pt is complaining of pain 9/10 and requesting to go to the hospital.\"  Did you page oncall or place in triage hub: on-call notified via esc  "

## 2025-07-12 PROBLEM — K56.609 SMALL BOWEL OBSTRUCTION (HCC): Status: ACTIVE | Noted: 2025-01-01

## 2025-07-12 NOTE — ASSESSMENT & PLAN NOTE
Wt Readings from Last 3 Encounters:   07/09/25 58.2 kg (128 lb 3.2 oz)   07/03/25 57.6 kg (127 lb)   06/22/25 51.8 kg (114 lb 3.2 oz)

## 2025-07-12 NOTE — ASSESSMENT & PLAN NOTE
Follow-up with pulmonology outpatient  PFT showing severe airflow obstruction  Chronic 2 to 3 L O2    Plan  At baseline  Continue DuoNebs as needed

## 2025-07-12 NOTE — ASSESSMENT & PLAN NOTE
Lab Results   Component Value Date    WBC 12.01 (H) 07/12/2025    WBC 10.77 (H) 07/11/2025    LACTICACID 7.5 (HH) 07/12/2025    LACTICACID 6.0 (HH) 07/11/2025     [facility] and distention.  Vitally stable  CT A/P showing small bowel obstruction with perforation.   Surgery was consulted, but pt ultimately declined surgical intervention.  Surgery team recommended supportive tx progressing toward comfort measures  Pt and family discussed treatment options, plan for conservative measures now.  Pt and family aware for high likelihood/imminent acute irreversible decompensation, still chose to decline surgery.  Lactic acid 6.6 and 6  Received 2 L IV fluid bolus in ED    Plan  NPO, maintain NGT to suction   IV abx: rochephin and flagyl  Pain control: IV tylenol 1000 mg Q8 prn, IV dilaudid 0.2 mg Q4 prn   Palliative care consulted

## 2025-07-12 NOTE — ASSESSMENT & PLAN NOTE
Lab Results   Component Value Date    EGFR 29 07/11/2025    EGFR 43 06/24/2025    EGFR 42 06/23/2025    CREATININE 1.62 (H) 07/11/2025    CREATININE 1.17 06/24/2025    CREATININE 1.18 06/23/2025

## 2025-07-12 NOTE — CONSULTS
Consultation - Surgery-General   Name: Camryn Roblero 84 y.o. female I MRN: [id number]  Unit/Bed#: ED-33 I Date of Admission: 7/11/2025   Date of Service: 7/12/2025 I Hospital Day: 0   Consults  Physician Requesting Evaluation: Catie Marshall MD   Reason for Evaluation / Principal Problem: Small bowel perforation    Assessment & Plan  Small bowel obstruction (HCC)  7/12 small bowel perforation leading to sepsis with high likelihood of death without surgical intervention.  Patient declining surgical intervention, with competence and understanding risk of likely death    Plan:  Per patient wishes, no surgical intervention  Involve palliative care for optimization of patient's symptoms and care  Acute on chronic heart failure with preserved ejection fraction (HCC)  Wt Readings from Last 3 Encounters:   07/09/25 58.2 kg (128 lb 3.2 oz)   07/03/25 57.6 kg (127 lb)   06/22/25 51.8 kg (114 lb 3.2 oz)     Stage 3b chronic kidney disease (HCC)  Lab Results   Component Value Date    EGFR 29 07/11/2025    EGFR 43 06/24/2025    EGFR 42 06/23/2025    CREATININE 1.62 (H) 07/11/2025    CREATININE 1.17 06/24/2025    CREATININE 1.18 06/23/2025         History of Present Illness   Camryn Roblero is a 84 y.o. female, small bowel perforation    Hx of HFpEF, COPD, diabetes.  Past surgical history of remote open cholecystectomy, hysterectomy, abdominal hernia repair unsure of mesh.  She presents from her nursing home with abdominal pain, nausea, emesis since 7/11 morning.  On exam, she has signs of peritonitis and marked distention.  Labs include lactate of 6.6 which did not respond to fluids (repeat lactate 6.0), leukocytosis of 10.7 with bandemia of 16.  Chemistry was questionable for hemolysis, however showed elevated creatinine and hyperkalemia without EKG changes which was treated in the emergency department.  CT showed free air dissecting the mesentery, near areas of markedly dilated small bowel.    Of note, she was admitted for  presumed GI bleed about 1 month ago.  She was resuscitated with cessation of her Xarelto and PRBC infusions.  She had an EGD that did not show the source of bleeding.  However, during the sedation for the EGD she had a hemodynamic collapse with hypotension and bradycardia requiring a rapid response.  This was considered to be due to right heart strain.  Required intubation for at least 1 day following this procedure.  Her colonoscopy was then canceled.    Extensive discussion was had with the patient and her daughter at the bedside regarding treatment options, including high risk surgery versus comfort care.  The patient informed us that she did not want surgery understanding the risks of likely death without a surgical procedure.  She was competent and understood the risks of likely death without surgical intervention.      Review of Systems  Medical History Review: I have reviewed the patient's PMH, PSH, Social History, Family History, Meds, and Allergies     Objective :  Temp:  [98.1 °F (36.7 °C)] 98.1 °F (36.7 °C)  HR:  [62-65] 63  BP: (119-125)/(57-69) 119/60  Resp:  [15-20] 15  SpO2:  [96 %-98 %] 96 %  O2 Device: Nasal cannula  Nasal Cannula O2 Flow Rate (L/min):  [2 L/min] 2 L/min      Physical Exam  General: Uncomfortable appearing  CV: nl rate  Lungs: nl wob. No resp distress.  Chest: no lesions  ABD: Peritonitic, markedly distended and protuberant  Extrem: No CCE      Lab Results: I have reviewed the following results:  Recent Labs     07/11/25  1943 07/11/25  2049 07/11/25  2206 07/11/25  2303   WBC 10.77*  --   --   --    HGB 11.6  --   --   --    HCT 37.7  --   --   --      --   --   --    BANDSPCT 16*  --   --   --    SODIUM 134*  --   --   --    K 6.3*  --   --  4.3   CL 92*  --   --   --    CO2 28  --   --   --    BUN 63*  --   --   --    CREATININE 1.62*  --   --   --    GLUC 236*  --   --   --    AST 76*  --   --   --    ALT 20  --   --   --    ALB 4.0  --   --   --    TBILI 1.86*  --   --    --    ALKPHOS 99  --   --   --    PTT  --   --  25  --    INR  --   --  1.26*  --    LACTICACID  --    < > 6.0*  --     < > = values in this interval not displayed.             VTE Pharmacologic Prophylaxis: VTE covered by:    None     VTE Mechanical Prophylaxis:

## 2025-07-12 NOTE — ASSESSMENT & PLAN NOTE
7/12 small bowel perforation leading to sepsis with high likelihood of death without surgical intervention.  Patient declining surgical intervention, with competence and understanding risk of likely death    Plan:  Per patient wishes, no surgical intervention  Involve palliative care for optimization of patient's symptoms and care

## 2025-07-12 NOTE — DEATH NOTE
INPATIENT DEATH NOTE  Camryn Roblero 84 y.o. female MRN: [id number]  Unit/Bed#: ICU 15 Encounter: [id number]  Date, Time and Cause of Death    Date of Death: 7/12/25  Time of Death:  2:14 PM  Preliminary Cause of Death: SBO (small bowel obstruction) ([facility])  Entered by: Alma Mohan[TW1.1]       Attribution       TW1.1 Alma Mohan MD 07/12/25 14:25               Physical Exam: Unresponsive to noxious stimuli, Spontaneous respirations absent, Breath sounds absent, Carotid pulse absent, Heart sounds absent, Pupillary light reflex absent, and Corneal blink reflex absent    Medical Examiner notification criteria: NONE APPLICABLE   Medical Examiner's office notified?: No, does not meet ME notification criteria   NOTE: Cases related to accidents, trauma, overdose, or crimes must be reported regardless of the time lapse between the incident and death.    Medical Examiner accepted case?:  No  Name of Medical Examiner: N/A         Autopsy Options: No Autopsy:  and Next-of-kin declined  NOTE: Autopsy Authorization Form Next-of kin should be notified that final autopsy reports take up to 90 working days.     Primary Service Attending Physician notified?:  yes - Attending:  Catie Marshall MD    Physician/Resident responsible for completing Discharge Summary:  Catie Marshall MD

## 2025-07-12 NOTE — SEPSIS NOTE
"Sepsis Note   Camryn Roblero 84 y.o. female MRN: [id number]  Unit/Bed#: ICU 15 Encounter: [id number]       Initial Sepsis Screening       Row Name 07/12/25 0725                Is the patient's history suggestive of a new or worsening infection? Yes (Proceed)  -TW        Suspected source of infection acute abdominal infection  -TW        Indicate SIRS criteria Tachypnea > 20 resp per min;Leukocytosis (WBC > 23700 IJL) OR Leukopenia (WBC <4000 IJL) OR Bandemia (WBC >10% bands)  -TW        Are two or more of the above signs & symptoms of infection both present and new to the patient? Yes (Proceed)  -TW        Assess for evidence of organ dysfunction: Are any of the below criteria present within 6 hours of suspected infection and SIRS criteria that are NOT considered to be chronic conditions? Lactate >/equal 4.0  -TW        Date of presentation of septic shock --        Time of presentation of septic shock --        Fluid Resuscitation: A lesser volume than 30 ml/kg IV fluid will be given  -TW        The 30 mL/kg fluid bolus was not given to the patient despite having hypotension, a lactate of >= 4 mmol/L, or documentation of septic shock secondary to: Concern for fluid/volume overload  -TW        Instead of the 30 ml/kg fluid bolus, the following volume of crystalloid fluid will be ordered: Other (document in comments)  -TW        Of the following fluid type: Other (document in comments)  -TW        Is the patient persistently hypotensive in the hour after fluid bolus administration? If yes, patient meets criteria for vasopressor use. --        Sepsis Note: Click \"NEXT\" below (NOT \"close\") to generate sepsis note based on above information. --                  User Key  (r) = Recorded By, (t) = Taken By, (c) = Cosigned By      Initials Name Provider Type    TW Alma Mohan MD Resident                   Initial Sepsis Screening       Row Name 07/12/25 0725                   Initial Sepsis Assessment    Is the patient's " history suggestive of a new or worsening infection? Yes (Proceed)  -TW        Suspected source of infection acute abdominal infection  -TW        Indicate SIRS criteria Tachypnea > 20 resp per min;Leukocytosis (WBC > 95817 IJL) OR Leukopenia (WBC <4000 IJL) OR Bandemia (WBC >10% bands)  -TW        Are two or more of the above signs & symptoms of infection both present and new to the patient? Yes (Proceed)  -TW           If the answer is yes to both above questions, suspicion of sepsis is present. Proceed with algorithm to determine if severe sepsis or septic shock criteria are met.    Assess for evidence of organ dysfunction: Are any of the below criteria present within 6 hours of suspected infection and SIRS criteria that are NOT considered to be chronic conditions? Lactate >/equal 4.0  -TW           Based on the above information, the patient meets criteria for SEPTIC SHOCK. CALL A SEPSIS ALERT. Use sepsis order set. If the lactate is > or equal to 4, or the SBP is <90, give 30 ml/kg cystalloid IV fluid bolus or document exclusion criteria.    Fluid Resuscitation: A lesser volume than 30 ml/kg IV fluid will be given  -TW        The 30 mL/kg fluid bolus was not given to the patient despite having hypotension, a lactate of >= 4 mmol/L, or documentation of septic shock secondary to: Concern for fluid/volume overload  -TW        Instead of the 30 ml/kg fluid bolus, the following volume of crystalloid fluid will be ordered: Other (document in comments)  -TW        Of the following fluid type: Other (document in comments)  -TW                  User Key  (r) = Recorded By, (t) = Taken By, (c) = Cosigned By      Initials Name Provider Type    TW Alma Mohan MD Resident                         There is no height or weight on file to calculate BMI.  Wt Readings from Last 1 Encounters:   07/09/25 58.2 kg (128 lb 3.2 oz)        Ideal body weight: 48.8 kg (107 lb 8.4 oz)  Adjusted ideal body weight: 52.5 kg (115 lb 12.7  oz)

## 2025-07-12 NOTE — ASSESSMENT & PLAN NOTE
Wt Readings from Last 3 Encounters:   07/09/25 58.2 kg (128 lb 3.2 oz)   07/03/25 57.6 kg (127 lb)   06/22/25 51.8 kg (114 lb 3.2 oz)     Recent pcp visit 7/11  14 pound weight gain in 2.5 weeks  On bumex 3mg bid, added metolazone prn   KDUR 20meq bid  Recent echo: EF 65%, normal systolic function, abnormal diastolic function, right ventricular systolic function moderately to severely reduced  Patient with 3+ pitting edema bilaterally   Received 2 L IV fluid bolus in ED  BNP >4700    Plan   Lasix IV 40 mg, monitor response  Hold fluids

## 2025-07-12 NOTE — CASE MANAGEMENT
Case Management Discharge Planning Note    Patient name Camryn Roblero  Location ICU 15/ICU 15 MRN [id number]  : 1940 Date 2025       Current Admission Date: 2025  Current Admission Diagnosis:Small bowel obstruction (HCC)   Patient Active Problem List    Diagnosis Date Noted    Small bowel obstruction (HCC) 2025    Hiatal hernia 2025    Right lower quadrant abdominal pain 2025    Elevated creatinine on stage 3b chronic kidney disease (HCC) 2025    Deep tissue injury 2025    Iron deficiency anemia due to chronic blood loss 2025    Pulmonary hypertension (HCC) 2025    Shock (HCC) 2025    Dark stools 2025    Upper GI bleed 2025    Acute blood loss anemia 2025    Chronic hypoxic respiratory failure, on home oxygen therapy  (HCC) 2025    Moderate protein-calorie malnutrition (HCC) 2025    Right leg pain 2025    Normocytic anemia 2025    Change in bowel habits 2025    Renal lesion 2024    HEATHER (generalized anxiety disorder) 2024    Constipation 10/29/2024    Ambulatory dysfunction 10/26/2024    Dependence on supplemental oxygen 10/23/2024    Paroxysmal atrial fibrillation (HCC) 10/23/2024    Pancreas cyst 2024    History of colon polyps 2024    Herpes zoster without complication 2024    Leukocytosis 2024    Mild protein-calorie malnutrition (HCC) 2024    Abnormal CT scan 2024    Impaired memory 2024    Allergic drug rash 2024    Nonischemic nontraumatic myocardial injury 2023    Tachycardia 2023    Atrial flutter (HCC) 2023    Hyperlipidemia 10/21/2023    Seasonal allergic rhinitis 2023    Osteopenia 2022    Chronic pain of both knees 2021    Cataract of both eyes 2021    Multiple pulmonary nodules 10/05/2020    Colon polyps 10/05/2020    TOM (obstructive sleep apnea) 2020    Acute on chronic heart failure  with preserved ejection fraction (HCC) 07/21/2020    Persistent proteinuria 10/25/2019    Essential hypertension 12/09/2016    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 12/09/2016    COPD, severe (HCC) 12/09/2016      LOS (days): 0  Geometric Mean LOS (GMLOS) (days):   Days to GMLOS:     OBJECTIVE:  Risk of Unplanned Readmission Score: 39.59         Current admission status: Inpatient   Preferred Pharmacy:   [facility] - [place], [place] - [address]  [address] [address]  Phone: [phone] Fax: [phone]    [facility] [address] [place] [address]  Phone: [phone] Fax: [phone]    [place], [place] - [address] [place] [address]  Phone: [phone] Fax: [phone]    Primary Care Provider: Abiel Seals MD    Primary Insurance: MEDICARE  Secondary Insurance: [facility]    DISCHARGE DETAILS:        CM notified by MD that patient is transitioning to comfort care. CM sent referral to [facility] via [facility] to evaluate.

## 2025-07-12 NOTE — DISCHARGE SUMMARY
Discharge Summary - Hospitalist   Name: Camryn Roblero 84 y.o. female I MRN: [id number]  Unit/Bed#: ICU 15 I Date of Admission: 7/11/2025   Date of Service: 7/12/2025 I Hospital Day: 0     Assessment & Plan  Small bowel obstruction (HCC)  Lab Results   Component Value Date    WBC 12.01 (H) 07/12/2025    WBC 10.77 (H) 07/11/2025    LACTICACID 7.5 (HH) 07/12/2025    LACTICACID 6.0 (HH) 07/11/2025     [facility].  Vitally stable  CT A/P showing small bowel obstruction with perforation.   Surgery was consulted, but pt ultimately declined surgical intervention.  Surgery team recommended supportive tx progressing toward comfort measures  Pt and family discussed treatment options, plan for conservative measures now.  Pt and family aware for high likelihood/imminent acute irreversible decompensation, still chose to decline surgery.  Lactic acid 6.6 and 6  Received 2 L IV fluid bolus in ED    Plan  NPO, maintain NGT to suction   IV abx: rochephin and flagyl  Pain control: IV tylenol 1000 mg Q8 prn, IV dilaudid 0.2 mg Q4 prn   Palliative care consulted     Medical Problems       Resolved Problems  Date Reviewed: 7/11/2025   None       Discharging Physician / Practitioner: Catie Marshall MD  PCP: Abiel Seals MD  Admission Date:   Admission Orders (From admission, onward)       Ordered        07/12/25 0056  INPATIENT ADMISSION  Once                          Discharge Date: 07/12/25      Consultations During Hospital Stay:  General Surgery    Procedures Performed:   CAT scan abdomen and pelvis was performed which showed a small bowel obstruction with free air consistent with perforation as well as dilated bowels.    Significant Findings / Test Results:   Elevated lactate        Hospital Course:   Camryn Roblero is a 84 y.o. female patient who originally presented to the hospital on 7/11/2025 due to abdominal discomfort found to have a small bowel obstruction with  perforation.  General surgery was consulted and in discussion with the patient and family, the patient declined surgery: Intervention understanding the high risk of mortality.  A nasogastric tube was placed to help with some of the discomfort.  The patient continued to endorse wanting a comfort directed care plan and on the morning of  and discussion with her youngest daughter at the bedside, the patient was transition to comfort directed care.       At 2:01p on , the medical team was called to the bedside due to lack of respirations. Dr Mohan was at the bedside and pronounced the patient  at 2:14p.     May she rest in peace.    Discharge Day Visit / Exam:   * Please refer to separate progress note for these details *    Discussion with Family: Updated  (daughter) at bedside.    Discharge instructions/Information to patient and family:   See after visit summary for information provided to patient and family.      Provisions for Follow-Up Care:  See after visit summary for information related to follow-up care and any pertinent home health orders.      Mobility at time of Discharge:      HLM Goal NOT achieved. Continue to encourage mobility in post discharge setting.         Planned Readmission: no    Administrative Statements   Discharge Statement:  I have spent a total time of 35 minutes in caring for this patient on the day of the visit/encounter. .    **Please Note: This note may have been constructed using a voice recognition system**
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
## 2025-07-12 NOTE — ASSESSMENT & PLAN NOTE
Lab Results   Component Value Date    EGFR 29 07/11/2025    EGFR 43 06/24/2025    EGFR 42 06/23/2025    CREATININE 1.62 (H) 07/11/2025    CREATININE 1.17 06/24/2025    CREATININE 1.18 06/23/2025     Follows with [facility]  Cr on admission, 1.62.  Baseline creatinine appears to be 1.2-1.4   Increased Bumex to 3 mg BID in light of her fluid overload     Plan  IV lasix 40 mg once, monitor response   Possible cardiorenal syndrome vs poor PO

## 2025-07-12 NOTE — DEATH NOTE
INPATIENT DEATH NOTE  Camryn Roblero 84 y.o. female MRN: [id number]  Unit/Bed#: ICU 15 Encounter: [id number]  Date, Time and Cause of Death    Date of Death: 25  Time of Death:  2:14 PM  Preliminary Cause of Death: SBO (small bowel obstruction) ([facility])  Entered by: Alma Mohan[TW1.1]       Attribution       TW1.1 Alma Mohan MD 25 14:25           Patient's Information  Pronounced by: Dr Alma Mohan  Did the patient's death occur in the ED?: No  Did the patient's death occur in the OR?: No  Did the patient's death occur less than 10 days post-op?: No  Did the patient's death occur within 24 hours of admission?: Yes  Was code status DNR at the time of death?: Yes    Physical Exam: Spontaneous respirations absent, Breath sounds absent, and Heart sounds absent    Medical Examiner notification criteria: Death within 24 hours of arrival/transfer/discharge from medical attendance or facility   Medical Examiner's office notified?: No, does not meet ME notification criteria   NOTE: Cases related to accidents, trauma, overdose, or crimes must be reported regardless of the time lapse between the incident and death.    Medical Examiner accepted case?:  No      Family Notification  Was the family notified?: Yes  Date Notified: 25  Time Notified: 214  Notified by: Dr Mohan   Relationship to Patient: Daughter  Family Notification Route: At bedside    Autopsy Options: No Autopsy:  and Next-of-kin declined  NOTE: Autopsy Authorization Form Next-of kin should be notified that final autopsy reports take up to 90 working days.     Primary Service Attending Physician notified?:  yes - Attending:  Catie Marshall MD    Physician/Resident responsible for completing Discharge Summary:  Sudeep

## 2025-07-12 NOTE — QUICK NOTE
Went to evaluate patient after her BP dropped to 67/39 and was requiring 15 L mid flow.  Patient alert and oriented, looked more lethargic from prior.  Repeat blood pressure was 105/50.  Ordered CXR and albumin 25 g.  CXR showed pulmonary vascular congestion similar to prior, no significant changes.  Lungs sounded clear on auscultation.    Patient returned to baseline oxygen of 2 L without interventions.  Made CC aware of patient.  Spoke to daughter on the phone who mentioned that she was leaning towards comfort care however wanted to confirm with other family members first.

## 2025-07-12 NOTE — ASSESSMENT & PLAN NOTE
Maintained on metoprolol succinate 50 mg twice daily  Anticoagulation held during last admission due to GI bleed  Also noted to have a flutter during last admission  In sinus rhythm    Plan  Hold oral meds  Lopressor 5 as needed for A-fib with heart rate greater than 110

## 2025-07-12 NOTE — ASSESSMENT & PLAN NOTE
Lab Results   Component Value Date    WBC 10.77 (H) 07/11/2025    WBC 6.01 06/24/2025    LACTICACID 6.0 (HH) 07/11/2025    LACTICACID 6.6 (HH) 07/11/2025     [facility].  Vitally stable  CT A/P showing small bowel obstruction with perforation.   Surgery was consulted, but pt ultimately declined surgical intervention.  Surgery team recommended supportive tx progressing toward comfort measures  Pt and family discussed treatment options, plan for conservative measures now.  Pt and family aware for high likelihood/imminent acute irreversible decompensation, still chose to decline surgery.  Lactic acid 6.6 and 6  Received 2 L IV fluid bolus in ED    Plan  NPO, maintain NGT to suction   IV abx: rochephin and flagyl  Pain control: IV tylenol 1000 mg Q8 prn, IV dilaudid 0.2 mg Q4 prn   Palliative care consulted
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
## 2025-07-13 NOTE — ED ATTENDING ATTESTATION
7/11/2025  I, Hu Marie DO, saw and evaluated the patient. I have discussed the patient with the resident/non-physician practitioner and agree with the resident's/non-physician practitioner's findings, Plan of Care, and MDM as documented in the resident's/non-physician practitioner's note, except where noted. All available labs and Radiology studies were reviewed.  I was present for key portions of any procedure(s) performed by the resident/non-physician practitioner and I was immediately available to provide assistance.       At this point I agree with the current assessment done in the Emergency Department.  I have conducted an independent evaluation of this patient a history and physical is as follows:    83 yo F coming in for eval of abdominal pain, vomiting, and abdominal distention from nursing facility.     PE:  The patient is ill appearing, pale, actively retching into emesis bag. Head: normocephalic, atraumatic. HEENT: mucous membranes are dry.  Lungs: CTA b/l, no resp distress. Heart: RRR. No M/R/G.   Abdomen: mild diffuse tenderness and severe abdominal distention, tympanitic in nature.  Neuro: CN2-12 intact, GCS 15. Normal strength and sensation, normal speech and gait. Cap refill < 2 sec, skin warm and dry. No rashes or lesions.    ED workup revealed acute SBO w/ perforation.  Consulted gen surg who evaluated, and patient and daughter requested no surgical intervention. In fact, she wished to be DNR/DNI and wanted to be managed conservatively with an NG tube.  Admitted to [facility] w/ surgery consultation.      ED Course         Critical Care Time  Procedures    Critical Care Time Statement: Upon my evaluation, this patient had a high probability of imminent or life-threatening deterioration due to acute abdomen, sepsis, lactic acidosis and FEDE, which required my direct attention, intervention, and personal management.  I spent a total of 75 minutes directly providing critical care services, including  interpretation of complex medical databases, evaluating for the presence of life-threatening injuries or illnesses, management of organ system failure(s) , and complex medical decision making (to support/prevent further life-threatening deterioration).. This time is exclusive of procedures, teaching, treating other patients, family meetings, and any prior time recorded by providers other than myself.

## 2025-07-16 LAB
ATRIAL RATE: 64 BPM
P AXIS: 68 DEGREES
PR INTERVAL: 154 MS
QRS AXIS: 78 DEGREES
QRSD INTERVAL: 106 MS
QT INTERVAL: 458 MS
QTC INTERVAL: 472 MS
T WAVE AXIS: -60 DEGREES
VENTRICULAR RATE: 64 BPM

## 2025-07-17 LAB
BACTERIA BLD CULT: NORMAL
BACTERIA BLD CULT: NORMAL

## (undated) DEVICE — NEEDLE SPINAL 25G X 3.5 IN QUINCKE

## (undated) DEVICE — SYRINGE 5ML LL

## (undated) DEVICE — NEEDLE 25G X 1 1/2

## (undated) DEVICE — STERILE LATEX POWDER-FREE SURGICAL GLOVESWITH NITRILE COATING: Brand: PROTEXIS

## (undated) DEVICE — TOWEL SET X-RAY

## (undated) DEVICE — SYRINGE 10ML LL

## (undated) DEVICE — CHLORASCRUB PREP 1ML

## (undated) DEVICE — PLASTIC ADHESIVE BANDAGE: Brand: CURITY

## (undated) DEVICE — NEEDLE 18 G X 1 1/2 SAFETY